# Patient Record
Sex: MALE | Race: BLACK OR AFRICAN AMERICAN | NOT HISPANIC OR LATINO | Employment: OTHER | URBAN - METROPOLITAN AREA
[De-identification: names, ages, dates, MRNs, and addresses within clinical notes are randomized per-mention and may not be internally consistent; named-entity substitution may affect disease eponyms.]

---

## 2021-01-04 ENCOUNTER — OFFICE VISIT (OUTPATIENT)
Dept: INTERNAL MEDICINE | Facility: CLINIC | Age: 66
End: 2021-01-04
Payer: MEDICARE

## 2021-01-04 ENCOUNTER — LAB VISIT (OUTPATIENT)
Dept: LAB | Facility: HOSPITAL | Age: 66
End: 2021-01-04
Attending: INTERNAL MEDICINE
Payer: MEDICARE

## 2021-01-04 VITALS
WEIGHT: 249.56 LBS | OXYGEN SATURATION: 97 % | HEART RATE: 58 BPM | TEMPERATURE: 98 F | BODY MASS INDEX: 40.11 KG/M2 | HEIGHT: 66 IN | SYSTOLIC BLOOD PRESSURE: 130 MMHG | DIASTOLIC BLOOD PRESSURE: 72 MMHG

## 2021-01-04 DIAGNOSIS — E78.5 HYPERLIPIDEMIA ASSOCIATED WITH TYPE 2 DIABETES MELLITUS: ICD-10-CM

## 2021-01-04 DIAGNOSIS — E11.69 HYPERLIPIDEMIA ASSOCIATED WITH TYPE 2 DIABETES MELLITUS: ICD-10-CM

## 2021-01-04 DIAGNOSIS — E55.9 VITAMIN D DEFICIENCY: ICD-10-CM

## 2021-01-04 DIAGNOSIS — E11.9 DIABETES MELLITUS WITHOUT COMPLICATION: ICD-10-CM

## 2021-01-04 DIAGNOSIS — Z00.00 ROUTINE GENERAL MEDICAL EXAMINATION AT A HEALTH CARE FACILITY: ICD-10-CM

## 2021-01-04 DIAGNOSIS — E66.01 MORBID OBESITY WITH BMI OF 40.0-44.9, ADULT: ICD-10-CM

## 2021-01-04 DIAGNOSIS — Z12.5 PROSTATE CANCER SCREENING: ICD-10-CM

## 2021-01-04 DIAGNOSIS — E11.59 HYPERTENSION ASSOCIATED WITH DIABETES: ICD-10-CM

## 2021-01-04 DIAGNOSIS — G56.02 CARPAL TUNNEL SYNDROME, LEFT: ICD-10-CM

## 2021-01-04 DIAGNOSIS — Z86.010 HISTORY OF COLON POLYPS: ICD-10-CM

## 2021-01-04 DIAGNOSIS — I15.2 HYPERTENSION ASSOCIATED WITH DIABETES: ICD-10-CM

## 2021-01-04 DIAGNOSIS — H26.9 CATARACT, UNSPECIFIED CATARACT TYPE, UNSPECIFIED LATERALITY: ICD-10-CM

## 2021-01-04 DIAGNOSIS — Z00.00 ROUTINE GENERAL MEDICAL EXAMINATION AT A HEALTH CARE FACILITY: Primary | ICD-10-CM

## 2021-01-04 LAB
BASOPHILS # BLD AUTO: 0.03 K/UL (ref 0–0.2)
BASOPHILS NFR BLD: 0.5 % (ref 0–1.9)
DIFFERENTIAL METHOD: ABNORMAL
EOSINOPHIL # BLD AUTO: 0.4 K/UL (ref 0–0.5)
EOSINOPHIL NFR BLD: 7.7 % (ref 0–8)
ERYTHROCYTE [DISTWIDTH] IN BLOOD BY AUTOMATED COUNT: 14.6 % (ref 11.5–14.5)
HCT VFR BLD AUTO: 45.9 % (ref 40–54)
HGB BLD-MCNC: 14.2 G/DL (ref 14–18)
IMM GRANULOCYTES # BLD AUTO: 0.02 K/UL (ref 0–0.04)
IMM GRANULOCYTES NFR BLD AUTO: 0.4 % (ref 0–0.5)
LYMPHOCYTES # BLD AUTO: 2.5 K/UL (ref 1–4.8)
LYMPHOCYTES NFR BLD: 44.4 % (ref 18–48)
MCH RBC QN AUTO: 28.7 PG (ref 27–31)
MCHC RBC AUTO-ENTMCNC: 30.9 G/DL (ref 32–36)
MCV RBC AUTO: 93 FL (ref 82–98)
MONOCYTES # BLD AUTO: 0.3 K/UL (ref 0.3–1)
MONOCYTES NFR BLD: 5.8 % (ref 4–15)
NEUTROPHILS # BLD AUTO: 2.4 K/UL (ref 1.8–7.7)
NEUTROPHILS NFR BLD: 41.2 % (ref 38–73)
NRBC BLD-RTO: 0 /100 WBC
PLATELET # BLD AUTO: 156 K/UL (ref 150–350)
PMV BLD AUTO: 13.3 FL (ref 9.2–12.9)
RBC # BLD AUTO: 4.94 M/UL (ref 4.6–6.2)
WBC # BLD AUTO: 5.7 K/UL (ref 3.9–12.7)

## 2021-01-04 PROCEDURE — 3078F DIAST BP <80 MM HG: CPT | Mod: HCNC,CPTII,S$GLB, | Performed by: INTERNAL MEDICINE

## 2021-01-04 PROCEDURE — 3288F PR FALLS RISK ASSESSMENT DOCUMENTED: ICD-10-PCS | Mod: HCNC,CPTII,S$GLB, | Performed by: INTERNAL MEDICINE

## 2021-01-04 PROCEDURE — 99999 PR PBB SHADOW E&M-EST. PATIENT-LVL IV: ICD-10-PCS | Mod: PBBFAC,HCNC,, | Performed by: INTERNAL MEDICINE

## 2021-01-04 PROCEDURE — 99999 PR PBB SHADOW E&M-EST. PATIENT-LVL IV: CPT | Mod: PBBFAC,HCNC,, | Performed by: INTERNAL MEDICINE

## 2021-01-04 PROCEDURE — 1125F AMNT PAIN NOTED PAIN PRSNT: CPT | Mod: HCNC,S$GLB,, | Performed by: INTERNAL MEDICINE

## 2021-01-04 PROCEDURE — 80061 LIPID PANEL: CPT | Mod: HCNC

## 2021-01-04 PROCEDURE — 99499 RISK ADDL DX/OHS AUDIT: ICD-10-PCS | Mod: HCNC,S$GLB,, | Performed by: INTERNAL MEDICINE

## 2021-01-04 PROCEDURE — 84443 ASSAY THYROID STIM HORMONE: CPT | Mod: HCNC

## 2021-01-04 PROCEDURE — 84153 ASSAY OF PSA TOTAL: CPT | Mod: HCNC

## 2021-01-04 PROCEDURE — 1125F PR PAIN SEVERITY QUANTIFIED, PAIN PRESENT: ICD-10-PCS | Mod: HCNC,S$GLB,, | Performed by: INTERNAL MEDICINE

## 2021-01-04 PROCEDURE — 80053 COMPREHEN METABOLIC PANEL: CPT | Mod: HCNC

## 2021-01-04 PROCEDURE — 83036 HEMOGLOBIN GLYCOSYLATED A1C: CPT | Mod: HCNC

## 2021-01-04 PROCEDURE — 3008F PR BODY MASS INDEX (BMI) DOCUMENTED: ICD-10-PCS | Mod: HCNC,CPTII,S$GLB, | Performed by: INTERNAL MEDICINE

## 2021-01-04 PROCEDURE — G0009 ADMIN PNEUMOCOCCAL VACCINE: HCPCS | Mod: HCNC,S$GLB,, | Performed by: INTERNAL MEDICINE

## 2021-01-04 PROCEDURE — G0009 PNEUMOCOCCAL CONJUGATE VACCINE 13-VALENT LESS THAN 5YO & GREATER THAN: ICD-10-PCS | Mod: HCNC,S$GLB,, | Performed by: INTERNAL MEDICINE

## 2021-01-04 PROCEDURE — 90670 PNEUMOCOCCAL CONJUGATE VACCINE 13-VALENT LESS THAN 5YO & GREATER THAN: ICD-10-PCS | Mod: HCNC,S$GLB,, | Performed by: INTERNAL MEDICINE

## 2021-01-04 PROCEDURE — 3008F BODY MASS INDEX DOCD: CPT | Mod: HCNC,CPTII,S$GLB, | Performed by: INTERNAL MEDICINE

## 2021-01-04 PROCEDURE — 99499 UNLISTED E&M SERVICE: CPT | Mod: HCNC,S$GLB,, | Performed by: INTERNAL MEDICINE

## 2021-01-04 PROCEDURE — 1101F PR PT FALLS ASSESS DOC 0-1 FALLS W/OUT INJ PAST YR: ICD-10-PCS | Mod: HCNC,CPTII,S$GLB, | Performed by: INTERNAL MEDICINE

## 2021-01-04 PROCEDURE — 99387 PR PREVENTIVE VISIT,NEW,65 & OVER: ICD-10-PCS | Mod: 25,HCNC,S$GLB, | Performed by: INTERNAL MEDICINE

## 2021-01-04 PROCEDURE — 3075F PR MOST RECENT SYSTOLIC BLOOD PRESS GE 130-139MM HG: ICD-10-PCS | Mod: HCNC,CPTII,S$GLB, | Performed by: INTERNAL MEDICINE

## 2021-01-04 PROCEDURE — 3288F FALL RISK ASSESSMENT DOCD: CPT | Mod: HCNC,CPTII,S$GLB, | Performed by: INTERNAL MEDICINE

## 2021-01-04 PROCEDURE — 1101F PT FALLS ASSESS-DOCD LE1/YR: CPT | Mod: HCNC,CPTII,S$GLB, | Performed by: INTERNAL MEDICINE

## 2021-01-04 PROCEDURE — 3075F SYST BP GE 130 - 139MM HG: CPT | Mod: HCNC,CPTII,S$GLB, | Performed by: INTERNAL MEDICINE

## 2021-01-04 PROCEDURE — 99387 INIT PM E/M NEW PAT 65+ YRS: CPT | Mod: 25,HCNC,S$GLB, | Performed by: INTERNAL MEDICINE

## 2021-01-04 PROCEDURE — 85025 COMPLETE CBC W/AUTO DIFF WBC: CPT | Mod: HCNC

## 2021-01-04 PROCEDURE — 3078F PR MOST RECENT DIASTOLIC BLOOD PRESSURE < 80 MM HG: ICD-10-PCS | Mod: HCNC,CPTII,S$GLB, | Performed by: INTERNAL MEDICINE

## 2021-01-04 PROCEDURE — 36415 COLL VENOUS BLD VENIPUNCTURE: CPT | Mod: HCNC,PO

## 2021-01-04 PROCEDURE — 90670 PCV13 VACCINE IM: CPT | Mod: HCNC,S$GLB,, | Performed by: INTERNAL MEDICINE

## 2021-01-04 RX ORDER — CHOLECALCIFEROL (VITAMIN D3) 125 MCG
220 CAPSULE ORAL DAILY
COMMUNITY
End: 2021-05-17

## 2021-01-04 RX ORDER — ASPIRIN 81 MG/1
81 TABLET ORAL DAILY
Status: ON HOLD | COMMUNITY
End: 2022-06-22 | Stop reason: HOSPADM

## 2021-01-04 RX ORDER — ATORVASTATIN CALCIUM 10 MG/1
10 TABLET, FILM COATED ORAL DAILY
COMMUNITY
Start: 2020-11-24 | End: 2021-11-05 | Stop reason: SDUPTHER

## 2021-01-04 RX ORDER — ERGOCALCIFEROL 1.25 MG/1
CAPSULE ORAL
COMMUNITY
Start: 2020-11-24 | End: 2021-04-12

## 2021-01-04 RX ORDER — METFORMIN HYDROCHLORIDE 1000 MG/1
1000 TABLET ORAL 2 TIMES DAILY WITH MEALS
COMMUNITY
Start: 2020-10-18 | End: 2021-05-03 | Stop reason: SDUPTHER

## 2021-01-04 RX ORDER — IRBESARTAN 150 MG/1
150 TABLET ORAL DAILY
COMMUNITY
Start: 2020-10-18 | End: 2021-05-03 | Stop reason: SDUPTHER

## 2021-01-05 ENCOUNTER — PATIENT MESSAGE (OUTPATIENT)
Dept: INTERNAL MEDICINE | Facility: CLINIC | Age: 66
End: 2021-01-05

## 2021-01-05 DIAGNOSIS — E83.51 HYPOCALCEMIA: Primary | ICD-10-CM

## 2021-01-05 DIAGNOSIS — E11.9 DIABETES MELLITUS WITHOUT COMPLICATION: ICD-10-CM

## 2021-01-05 LAB
ALBUMIN SERPL BCP-MCNC: 4.1 G/DL (ref 3.5–5.2)
ALP SERPL-CCNC: 82 U/L (ref 55–135)
ALT SERPL W/O P-5'-P-CCNC: 19 U/L (ref 10–44)
ANION GAP SERPL CALC-SCNC: 9 MMOL/L (ref 8–16)
AST SERPL-CCNC: 17 U/L (ref 10–40)
BILIRUB SERPL-MCNC: 0.4 MG/DL (ref 0.1–1)
BUN SERPL-MCNC: 16 MG/DL (ref 8–23)
CALCIUM SERPL-MCNC: 11 MG/DL (ref 8.7–10.5)
CHLORIDE SERPL-SCNC: 105 MMOL/L (ref 95–110)
CHOLEST SERPL-MCNC: 153 MG/DL (ref 120–199)
CHOLEST/HDLC SERPL: 4.8 {RATIO} (ref 2–5)
CO2 SERPL-SCNC: 27 MMOL/L (ref 23–29)
COMPLEXED PSA SERPL-MCNC: 0.73 NG/ML (ref 0–4)
CREAT SERPL-MCNC: 1.1 MG/DL (ref 0.5–1.4)
EST. GFR  (AFRICAN AMERICAN): >60 ML/MIN/1.73 M^2
EST. GFR  (NON AFRICAN AMERICAN): >60 ML/MIN/1.73 M^2
ESTIMATED AVG GLUCOSE: 177 MG/DL (ref 68–131)
GLUCOSE SERPL-MCNC: 125 MG/DL (ref 70–110)
HBA1C MFR BLD HPLC: 7.8 % (ref 4–5.6)
HDLC SERPL-MCNC: 32 MG/DL (ref 40–75)
HDLC SERPL: 20.9 % (ref 20–50)
LDLC SERPL CALC-MCNC: 80.4 MG/DL (ref 63–159)
NONHDLC SERPL-MCNC: 121 MG/DL
POTASSIUM SERPL-SCNC: 5.4 MMOL/L (ref 3.5–5.1)
PROT SERPL-MCNC: 7.4 G/DL (ref 6–8.4)
SODIUM SERPL-SCNC: 141 MMOL/L (ref 136–145)
TRIGL SERPL-MCNC: 203 MG/DL (ref 30–150)
TSH SERPL DL<=0.005 MIU/L-ACNC: 1.9 UIU/ML (ref 0.4–4)

## 2021-01-05 RX ORDER — EMPAGLIFLOZIN 10 MG/1
10 TABLET, FILM COATED ORAL DAILY
Qty: 90 TABLET | Refills: 3 | Status: SHIPPED | OUTPATIENT
Start: 2021-01-05 | End: 2021-04-12

## 2021-01-19 ENCOUNTER — TELEPHONE (OUTPATIENT)
Dept: ORTHOPEDICS | Facility: CLINIC | Age: 66
End: 2021-01-19

## 2021-01-19 ENCOUNTER — OFFICE VISIT (OUTPATIENT)
Dept: INTERNAL MEDICINE | Facility: CLINIC | Age: 66
End: 2021-01-19
Payer: MEDICARE

## 2021-01-19 VITALS
HEIGHT: 66 IN | TEMPERATURE: 98 F | SYSTOLIC BLOOD PRESSURE: 120 MMHG | OXYGEN SATURATION: 96 % | DIASTOLIC BLOOD PRESSURE: 74 MMHG | HEART RATE: 74 BPM | BODY MASS INDEX: 40.25 KG/M2 | WEIGHT: 250.44 LBS | RESPIRATION RATE: 20 BRPM

## 2021-01-19 DIAGNOSIS — E11.59 HYPERTENSION ASSOCIATED WITH DIABETES: ICD-10-CM

## 2021-01-19 DIAGNOSIS — E78.5 HYPERLIPIDEMIA ASSOCIATED WITH TYPE 2 DIABETES MELLITUS: ICD-10-CM

## 2021-01-19 DIAGNOSIS — E11.9 DIABETES MELLITUS WITHOUT COMPLICATION: Primary | ICD-10-CM

## 2021-01-19 DIAGNOSIS — G56.00 CARPAL TUNNEL SYNDROME, UNSPECIFIED LATERALITY: ICD-10-CM

## 2021-01-19 DIAGNOSIS — E11.69 HYPERLIPIDEMIA ASSOCIATED WITH TYPE 2 DIABETES MELLITUS: ICD-10-CM

## 2021-01-19 DIAGNOSIS — I15.2 HYPERTENSION ASSOCIATED WITH DIABETES: ICD-10-CM

## 2021-01-19 PROCEDURE — 3008F PR BODY MASS INDEX (BMI) DOCUMENTED: ICD-10-PCS | Mod: HCNC,CPTII,S$GLB, | Performed by: INTERNAL MEDICINE

## 2021-01-19 PROCEDURE — 3078F DIAST BP <80 MM HG: CPT | Mod: HCNC,CPTII,S$GLB, | Performed by: INTERNAL MEDICINE

## 2021-01-19 PROCEDURE — 3051F PR MOST RECENT HEMOGLOBIN A1C LEVEL 7.0 - < 8.0%: ICD-10-PCS | Mod: HCNC,CPTII,S$GLB, | Performed by: INTERNAL MEDICINE

## 2021-01-19 PROCEDURE — 3074F SYST BP LT 130 MM HG: CPT | Mod: HCNC,CPTII,S$GLB, | Performed by: INTERNAL MEDICINE

## 2021-01-19 PROCEDURE — 99999 PR PBB SHADOW E&M-EST. PATIENT-LVL IV: CPT | Mod: PBBFAC,HCNC,, | Performed by: INTERNAL MEDICINE

## 2021-01-19 PROCEDURE — 3078F PR MOST RECENT DIASTOLIC BLOOD PRESSURE < 80 MM HG: ICD-10-PCS | Mod: HCNC,CPTII,S$GLB, | Performed by: INTERNAL MEDICINE

## 2021-01-19 PROCEDURE — 1125F PR PAIN SEVERITY QUANTIFIED, PAIN PRESENT: ICD-10-PCS | Mod: HCNC,S$GLB,, | Performed by: INTERNAL MEDICINE

## 2021-01-19 PROCEDURE — 99213 OFFICE O/P EST LOW 20 MIN: CPT | Mod: HCNC,S$GLB,, | Performed by: INTERNAL MEDICINE

## 2021-01-19 PROCEDURE — 99999 PR PBB SHADOW E&M-EST. PATIENT-LVL IV: ICD-10-PCS | Mod: PBBFAC,HCNC,, | Performed by: INTERNAL MEDICINE

## 2021-01-19 PROCEDURE — 3051F HG A1C>EQUAL 7.0%<8.0%: CPT | Mod: HCNC,CPTII,S$GLB, | Performed by: INTERNAL MEDICINE

## 2021-01-19 PROCEDURE — 3008F BODY MASS INDEX DOCD: CPT | Mod: HCNC,CPTII,S$GLB, | Performed by: INTERNAL MEDICINE

## 2021-01-19 PROCEDURE — 99213 PR OFFICE/OUTPT VISIT, EST, LEVL III, 20-29 MIN: ICD-10-PCS | Mod: HCNC,S$GLB,, | Performed by: INTERNAL MEDICINE

## 2021-01-19 PROCEDURE — 1125F AMNT PAIN NOTED PAIN PRSNT: CPT | Mod: HCNC,S$GLB,, | Performed by: INTERNAL MEDICINE

## 2021-01-19 PROCEDURE — 3074F PR MOST RECENT SYSTOLIC BLOOD PRESSURE < 130 MM HG: ICD-10-PCS | Mod: HCNC,CPTII,S$GLB, | Performed by: INTERNAL MEDICINE

## 2021-01-20 ENCOUNTER — TELEPHONE (OUTPATIENT)
Dept: ORTHOPEDICS | Facility: CLINIC | Age: 66
End: 2021-01-20

## 2021-01-20 ENCOUNTER — OFFICE VISIT (OUTPATIENT)
Dept: ORTHOPEDICS | Facility: CLINIC | Age: 66
End: 2021-01-20
Payer: MEDICARE

## 2021-01-20 VITALS
HEART RATE: 71 BPM | BODY MASS INDEX: 40.18 KG/M2 | WEIGHT: 250 LBS | DIASTOLIC BLOOD PRESSURE: 78 MMHG | HEIGHT: 66 IN | SYSTOLIC BLOOD PRESSURE: 124 MMHG

## 2021-01-20 DIAGNOSIS — G56.22 CUBITAL TUNNEL SYNDROME ON LEFT: Primary | ICD-10-CM

## 2021-01-20 DIAGNOSIS — G56.02 CARPAL TUNNEL SYNDROME OF LEFT WRIST: ICD-10-CM

## 2021-01-20 DIAGNOSIS — Z01.818 PREOP TESTING: Primary | ICD-10-CM

## 2021-01-20 DIAGNOSIS — G56.02 CARPAL TUNNEL SYNDROME OF LEFT WRIST: Primary | ICD-10-CM

## 2021-01-20 DIAGNOSIS — G56.22 CUBITAL TUNNEL SYNDROME ON LEFT: ICD-10-CM

## 2021-01-20 PROCEDURE — 1126F AMNT PAIN NOTED NONE PRSNT: CPT | Mod: HCNC,S$GLB,, | Performed by: ORTHOPAEDIC SURGERY

## 2021-01-20 PROCEDURE — 1126F PR PAIN SEVERITY QUANTIFIED, NO PAIN PRESENT: ICD-10-PCS | Mod: HCNC,S$GLB,, | Performed by: ORTHOPAEDIC SURGERY

## 2021-01-20 PROCEDURE — 3078F PR MOST RECENT DIASTOLIC BLOOD PRESSURE < 80 MM HG: ICD-10-PCS | Mod: HCNC,CPTII,S$GLB, | Performed by: ORTHOPAEDIC SURGERY

## 2021-01-20 PROCEDURE — 99999 PR PBB SHADOW E&M-EST. PATIENT-LVL III: ICD-10-PCS | Mod: PBBFAC,HCNC,, | Performed by: ORTHOPAEDIC SURGERY

## 2021-01-20 PROCEDURE — 99203 PR OFFICE/OUTPT VISIT, NEW, LEVL III, 30-44 MIN: ICD-10-PCS | Mod: HCNC,S$GLB,, | Performed by: ORTHOPAEDIC SURGERY

## 2021-01-20 PROCEDURE — 3078F DIAST BP <80 MM HG: CPT | Mod: HCNC,CPTII,S$GLB, | Performed by: ORTHOPAEDIC SURGERY

## 2021-01-20 PROCEDURE — 99999 PR PBB SHADOW E&M-EST. PATIENT-LVL III: CPT | Mod: PBBFAC,HCNC,, | Performed by: ORTHOPAEDIC SURGERY

## 2021-01-20 PROCEDURE — 1101F PT FALLS ASSESS-DOCD LE1/YR: CPT | Mod: HCNC,CPTII,S$GLB, | Performed by: ORTHOPAEDIC SURGERY

## 2021-01-20 PROCEDURE — 3288F PR FALLS RISK ASSESSMENT DOCUMENTED: ICD-10-PCS | Mod: HCNC,CPTII,S$GLB, | Performed by: ORTHOPAEDIC SURGERY

## 2021-01-20 PROCEDURE — 1101F PR PT FALLS ASSESS DOC 0-1 FALLS W/OUT INJ PAST YR: ICD-10-PCS | Mod: HCNC,CPTII,S$GLB, | Performed by: ORTHOPAEDIC SURGERY

## 2021-01-20 PROCEDURE — 3288F FALL RISK ASSESSMENT DOCD: CPT | Mod: HCNC,CPTII,S$GLB, | Performed by: ORTHOPAEDIC SURGERY

## 2021-01-20 PROCEDURE — 3008F BODY MASS INDEX DOCD: CPT | Mod: HCNC,CPTII,S$GLB, | Performed by: ORTHOPAEDIC SURGERY

## 2021-01-20 PROCEDURE — 99203 OFFICE O/P NEW LOW 30 MIN: CPT | Mod: HCNC,S$GLB,, | Performed by: ORTHOPAEDIC SURGERY

## 2021-01-20 PROCEDURE — 3008F PR BODY MASS INDEX (BMI) DOCUMENTED: ICD-10-PCS | Mod: HCNC,CPTII,S$GLB, | Performed by: ORTHOPAEDIC SURGERY

## 2021-01-20 PROCEDURE — 3074F SYST BP LT 130 MM HG: CPT | Mod: HCNC,CPTII,S$GLB, | Performed by: ORTHOPAEDIC SURGERY

## 2021-01-20 PROCEDURE — 3074F PR MOST RECENT SYSTOLIC BLOOD PRESSURE < 130 MM HG: ICD-10-PCS | Mod: HCNC,CPTII,S$GLB, | Performed by: ORTHOPAEDIC SURGERY

## 2021-01-25 DIAGNOSIS — Z01.818 PREOP TESTING: Primary | ICD-10-CM

## 2021-02-11 ENCOUNTER — PATIENT OUTREACH (OUTPATIENT)
Dept: ADMINISTRATIVE | Facility: HOSPITAL | Age: 66
End: 2021-02-11

## 2021-02-22 ENCOUNTER — PATIENT OUTREACH (OUTPATIENT)
Dept: ADMINISTRATIVE | Facility: OTHER | Age: 66
End: 2021-02-22

## 2021-02-23 ENCOUNTER — OFFICE VISIT (OUTPATIENT)
Dept: OPHTHALMOLOGY | Facility: CLINIC | Age: 66
End: 2021-02-23
Payer: MEDICARE

## 2021-02-23 DIAGNOSIS — E11.9 TYPE 2 DIABETES MELLITUS WITHOUT COMPLICATION, WITHOUT LONG-TERM CURRENT USE OF INSULIN: Primary | ICD-10-CM

## 2021-02-23 DIAGNOSIS — E11.36 DIABETIC CATARACT OF RIGHT EYE: ICD-10-CM

## 2021-02-23 DIAGNOSIS — H52.7 REFRACTIVE DISORDER: ICD-10-CM

## 2021-02-23 PROCEDURE — 99999 PR PBB SHADOW E&M-EST. PATIENT-LVL II: ICD-10-PCS | Mod: PBBFAC,,, | Performed by: STUDENT IN AN ORGANIZED HEALTH CARE EDUCATION/TRAINING PROGRAM

## 2021-02-23 PROCEDURE — 99999 PR PBB SHADOW E&M-EST. PATIENT-LVL II: CPT | Mod: PBBFAC,,, | Performed by: STUDENT IN AN ORGANIZED HEALTH CARE EDUCATION/TRAINING PROGRAM

## 2021-02-23 PROCEDURE — 92004 PR EYE EXAM, NEW PATIENT,COMPREHESV: ICD-10-PCS | Mod: S$GLB,,, | Performed by: STUDENT IN AN ORGANIZED HEALTH CARE EDUCATION/TRAINING PROGRAM

## 2021-02-23 PROCEDURE — 2023F PR DILATED RETINAL EXAM W/O EVID OF RETINOPATHY: ICD-10-PCS | Mod: S$GLB,,, | Performed by: STUDENT IN AN ORGANIZED HEALTH CARE EDUCATION/TRAINING PROGRAM

## 2021-02-23 PROCEDURE — 2023F DILAT RTA XM W/O RTNOPTHY: CPT | Mod: S$GLB,,, | Performed by: STUDENT IN AN ORGANIZED HEALTH CARE EDUCATION/TRAINING PROGRAM

## 2021-02-23 PROCEDURE — 92004 COMPRE OPH EXAM NEW PT 1/>: CPT | Mod: S$GLB,,, | Performed by: STUDENT IN AN ORGANIZED HEALTH CARE EDUCATION/TRAINING PROGRAM

## 2021-03-01 ENCOUNTER — HOSPITAL ENCOUNTER (OUTPATIENT)
Dept: CARDIOLOGY | Facility: HOSPITAL | Age: 66
Discharge: HOME | End: 2021-03-01
Attending: ORTHOPAEDIC SURGERY
Payer: MEDICARE

## 2021-03-01 ENCOUNTER — HOSPITAL ENCOUNTER (OUTPATIENT)
Dept: RADIOLOGY | Facility: HOSPITAL | Age: 66
Discharge: HOME | End: 2021-03-01
Attending: ORTHOPAEDIC SURGERY
Payer: MEDICARE

## 2021-03-01 DIAGNOSIS — Z01.818 PREOP TESTING: ICD-10-CM

## 2021-03-01 PROCEDURE — 71046 X-RAY EXAM CHEST 2 VIEWS: CPT | Mod: TC

## 2021-03-01 PROCEDURE — 93005 ELECTROCARDIOGRAM TRACING: CPT

## 2021-03-01 PROCEDURE — 71046 XR CHEST PA AND LATERAL PRE-OP: ICD-10-PCS | Mod: 26,,, | Performed by: RADIOLOGY

## 2021-03-01 PROCEDURE — 93010 ELECTROCARDIOGRAM REPORT: CPT | Mod: ,,, | Performed by: INTERNAL MEDICINE

## 2021-03-01 PROCEDURE — 93010 EKG 12-LEAD: ICD-10-PCS | Mod: ,,, | Performed by: INTERNAL MEDICINE

## 2021-03-01 PROCEDURE — 71046 X-RAY EXAM CHEST 2 VIEWS: CPT | Mod: 26,,, | Performed by: RADIOLOGY

## 2021-03-23 ENCOUNTER — HOSPITAL ENCOUNTER (OUTPATIENT)
Dept: PREADMISSION TESTING | Facility: HOSPITAL | Age: 66
Discharge: HOME | End: 2021-03-23
Attending: ORTHOPAEDIC SURGERY
Payer: MEDICARE

## 2021-03-23 ENCOUNTER — OFFICE VISIT (OUTPATIENT)
Dept: CARDIOLOGY | Facility: CLINIC | Age: 66
End: 2021-03-23
Payer: MEDICARE

## 2021-03-23 VITALS
SYSTOLIC BLOOD PRESSURE: 140 MMHG | TEMPERATURE: 98 F | OXYGEN SATURATION: 99 % | RESPIRATION RATE: 16 BRPM | DIASTOLIC BLOOD PRESSURE: 68 MMHG | HEART RATE: 60 BPM

## 2021-03-23 VITALS
OXYGEN SATURATION: 100 % | WEIGHT: 252 LBS | BODY MASS INDEX: 40.5 KG/M2 | HEIGHT: 66 IN | SYSTOLIC BLOOD PRESSURE: 128 MMHG | DIASTOLIC BLOOD PRESSURE: 80 MMHG | HEART RATE: 58 BPM

## 2021-03-23 DIAGNOSIS — Z01.810 PREOP CARDIOVASCULAR EXAM: Primary | ICD-10-CM

## 2021-03-23 DIAGNOSIS — E11.69 HYPERLIPIDEMIA ASSOCIATED WITH TYPE 2 DIABETES MELLITUS: ICD-10-CM

## 2021-03-23 DIAGNOSIS — I15.2 HYPERTENSION ASSOCIATED WITH DIABETES: ICD-10-CM

## 2021-03-23 DIAGNOSIS — E11.9 DIABETES MELLITUS WITHOUT COMPLICATION: ICD-10-CM

## 2021-03-23 DIAGNOSIS — E11.59 HYPERTENSION ASSOCIATED WITH DIABETES: ICD-10-CM

## 2021-03-23 DIAGNOSIS — E78.5 HYPERLIPIDEMIA ASSOCIATED WITH TYPE 2 DIABETES MELLITUS: ICD-10-CM

## 2021-03-23 DIAGNOSIS — G56.02 LEFT CARPAL TUNNEL SYNDROME: ICD-10-CM

## 2021-03-23 DIAGNOSIS — E83.52 HYPERCALCEMIA: ICD-10-CM

## 2021-03-23 DIAGNOSIS — E66.01 MORBID OBESITY WITH BMI OF 40.0-44.9, ADULT: ICD-10-CM

## 2021-03-23 DIAGNOSIS — E55.9 VITAMIN D DEFICIENCY: ICD-10-CM

## 2021-03-23 DIAGNOSIS — R94.31 ABNORMAL EKG: ICD-10-CM

## 2021-03-23 DIAGNOSIS — D64.9 ANEMIA, UNSPECIFIED TYPE: ICD-10-CM

## 2021-03-23 PROCEDURE — 1126F PR PAIN SEVERITY QUANTIFIED, NO PAIN PRESENT: ICD-10-PCS | Mod: S$GLB,,, | Performed by: INTERNAL MEDICINE

## 2021-03-23 PROCEDURE — 3074F SYST BP LT 130 MM HG: CPT | Mod: CPTII,S$GLB,, | Performed by: INTERNAL MEDICINE

## 2021-03-23 PROCEDURE — 99999 PR PBB SHADOW E&M-EST. PATIENT-LVL III: CPT | Mod: PBBFAC,,, | Performed by: INTERNAL MEDICINE

## 2021-03-23 PROCEDURE — 3079F DIAST BP 80-89 MM HG: CPT | Mod: CPTII,S$GLB,, | Performed by: INTERNAL MEDICINE

## 2021-03-23 PROCEDURE — 3008F BODY MASS INDEX DOCD: CPT | Mod: CPTII,S$GLB,, | Performed by: INTERNAL MEDICINE

## 2021-03-23 PROCEDURE — 99204 OFFICE O/P NEW MOD 45 MIN: CPT | Mod: S$GLB,,, | Performed by: INTERNAL MEDICINE

## 2021-03-23 PROCEDURE — 1126F AMNT PAIN NOTED NONE PRSNT: CPT | Mod: S$GLB,,, | Performed by: INTERNAL MEDICINE

## 2021-03-23 PROCEDURE — 3051F HG A1C>EQUAL 7.0%<8.0%: CPT | Mod: CPTII,S$GLB,, | Performed by: INTERNAL MEDICINE

## 2021-03-23 PROCEDURE — 3074F PR MOST RECENT SYSTOLIC BLOOD PRESSURE < 130 MM HG: ICD-10-PCS | Mod: CPTII,S$GLB,, | Performed by: INTERNAL MEDICINE

## 2021-03-23 PROCEDURE — 99999 PR PBB SHADOW E&M-EST. PATIENT-LVL III: ICD-10-PCS | Mod: PBBFAC,,, | Performed by: INTERNAL MEDICINE

## 2021-03-23 PROCEDURE — 99204 PR OFFICE/OUTPT VISIT, NEW, LEVL IV, 45-59 MIN: ICD-10-PCS | Mod: S$GLB,,, | Performed by: INTERNAL MEDICINE

## 2021-03-23 PROCEDURE — 3008F PR BODY MASS INDEX (BMI) DOCUMENTED: ICD-10-PCS | Mod: CPTII,S$GLB,, | Performed by: INTERNAL MEDICINE

## 2021-03-23 PROCEDURE — 3079F PR MOST RECENT DIASTOLIC BLOOD PRESSURE 80-89 MM HG: ICD-10-PCS | Mod: CPTII,S$GLB,, | Performed by: INTERNAL MEDICINE

## 2021-03-23 PROCEDURE — 3051F PR MOST RECENT HEMOGLOBIN A1C LEVEL 7.0 - < 8.0%: ICD-10-PCS | Mod: CPTII,S$GLB,, | Performed by: INTERNAL MEDICINE

## 2021-03-29 ENCOUNTER — LAB VISIT (OUTPATIENT)
Dept: OTOLARYNGOLOGY | Facility: CLINIC | Age: 66
End: 2021-03-29
Payer: MEDICARE

## 2021-03-29 ENCOUNTER — TELEPHONE (OUTPATIENT)
Dept: ORTHOPEDICS | Facility: CLINIC | Age: 66
End: 2021-03-29

## 2021-03-29 DIAGNOSIS — Z01.818 PREOP TESTING: ICD-10-CM

## 2021-03-29 PROCEDURE — U0003 INFECTIOUS AGENT DETECTION BY NUCLEIC ACID (DNA OR RNA); SEVERE ACUTE RESPIRATORY SYNDROME CORONAVIRUS 2 (SARS-COV-2) (CORONAVIRUS DISEASE [COVID-19]), AMPLIFIED PROBE TECHNIQUE, MAKING USE OF HIGH THROUGHPUT TECHNOLOGIES AS DESCRIBED BY CMS-2020-01-R: HCPCS | Performed by: ORTHOPAEDIC SURGERY

## 2021-03-29 PROCEDURE — U0005 INFEC AGEN DETEC AMPLI PROBE: HCPCS | Performed by: ORTHOPAEDIC SURGERY

## 2021-03-30 LAB — SARS-COV-2 RNA RESP QL NAA+PROBE: NOT DETECTED

## 2021-03-31 ENCOUNTER — TELEPHONE (OUTPATIENT)
Dept: PREADMISSION TESTING | Facility: HOSPITAL | Age: 66
End: 2021-03-31

## 2021-03-31 ENCOUNTER — ANESTHESIA EVENT (OUTPATIENT)
Dept: SURGERY | Facility: HOSPITAL | Age: 66
End: 2021-03-31
Payer: MEDICARE

## 2021-04-01 ENCOUNTER — ANESTHESIA (OUTPATIENT)
Dept: SURGERY | Facility: HOSPITAL | Age: 66
End: 2021-04-01
Payer: MEDICARE

## 2021-04-01 ENCOUNTER — HOSPITAL ENCOUNTER (OUTPATIENT)
Facility: HOSPITAL | Age: 66
Discharge: HOME | End: 2021-04-01
Attending: ORTHOPAEDIC SURGERY | Admitting: ORTHOPAEDIC SURGERY
Payer: MEDICARE

## 2021-04-01 DIAGNOSIS — G56.22 CUBITAL TUNNEL SYNDROME ON LEFT: ICD-10-CM

## 2021-04-01 DIAGNOSIS — G56.02 LEFT CARPAL TUNNEL SYNDROME: ICD-10-CM

## 2021-04-01 DIAGNOSIS — G56.02 CARPAL TUNNEL SYNDROME OF LEFT WRIST: Primary | ICD-10-CM

## 2021-04-01 LAB
POCT GLUCOSE: 105 MG/DL (ref 70–110)
POCT GLUCOSE: 117 MG/DL (ref 70–110)

## 2021-04-01 PROCEDURE — 71000015 HC POSTOP RECOV 1ST HR: Performed by: ORTHOPAEDIC SURGERY

## 2021-04-01 PROCEDURE — 82962 GLUCOSE BLOOD TEST: CPT | Mod: 91 | Performed by: ORTHOPAEDIC SURGERY

## 2021-04-01 PROCEDURE — 25000003 PHARM REV CODE 250: Performed by: NURSE ANESTHETIST, CERTIFIED REGISTERED

## 2021-04-01 PROCEDURE — 63600175 PHARM REV CODE 636 W HCPCS: Performed by: PHYSICIAN ASSISTANT

## 2021-04-01 PROCEDURE — 63600175 PHARM REV CODE 636 W HCPCS: Performed by: ANESTHESIOLOGY

## 2021-04-01 PROCEDURE — 64721 PR REVISE MEDIAN N/CARPAL TUNNEL SURG: ICD-10-PCS | Mod: 51,LT,, | Performed by: ORTHOPAEDIC SURGERY

## 2021-04-01 PROCEDURE — 71000033 HC RECOVERY, INTIAL HOUR: Performed by: ORTHOPAEDIC SURGERY

## 2021-04-01 PROCEDURE — 36000707: Performed by: ORTHOPAEDIC SURGERY

## 2021-04-01 PROCEDURE — 63600175 PHARM REV CODE 636 W HCPCS: Performed by: NURSE ANESTHETIST, CERTIFIED REGISTERED

## 2021-04-01 PROCEDURE — 64718 REVISE ULNAR NERVE AT ELBOW: CPT | Mod: LT,,, | Performed by: ORTHOPAEDIC SURGERY

## 2021-04-01 PROCEDURE — 64721 CARPAL TUNNEL SURGERY: CPT | Mod: 51,LT,, | Performed by: ORTHOPAEDIC SURGERY

## 2021-04-01 PROCEDURE — D9220A PRA ANESTHESIA: Mod: ,,, | Performed by: ANESTHESIOLOGY

## 2021-04-01 PROCEDURE — 37000008 HC ANESTHESIA 1ST 15 MINUTES: Performed by: ORTHOPAEDIC SURGERY

## 2021-04-01 PROCEDURE — 64718 PR REVISE ULNAR NERVE AT ELBOW: ICD-10-PCS | Mod: LT,,, | Performed by: ORTHOPAEDIC SURGERY

## 2021-04-01 PROCEDURE — 36000706: Performed by: ORTHOPAEDIC SURGERY

## 2021-04-01 PROCEDURE — 82962 GLUCOSE BLOOD TEST: CPT | Performed by: ORTHOPAEDIC SURGERY

## 2021-04-01 PROCEDURE — D9220A PRA ANESTHESIA: ICD-10-PCS | Mod: ,,, | Performed by: ANESTHESIOLOGY

## 2021-04-01 PROCEDURE — 37000009 HC ANESTHESIA EA ADD 15 MINS: Performed by: ORTHOPAEDIC SURGERY

## 2021-04-01 PROCEDURE — 25000003 PHARM REV CODE 250: Performed by: ORTHOPAEDIC SURGERY

## 2021-04-01 RX ORDER — DIPHENHYDRAMINE HYDROCHLORIDE 50 MG/ML
25 INJECTION INTRAMUSCULAR; INTRAVENOUS EVERY 6 HOURS PRN
Status: DISCONTINUED | OUTPATIENT
Start: 2021-04-01 | End: 2021-04-01 | Stop reason: HOSPADM

## 2021-04-01 RX ORDER — LIDOCAINE HYDROCHLORIDE 20 MG/ML
INJECTION, SOLUTION INFILTRATION; PERINEURAL
Status: DISCONTINUED
Start: 2021-04-01 | End: 2021-04-01 | Stop reason: WASHOUT

## 2021-04-01 RX ORDER — HYDROCODONE BITARTRATE AND ACETAMINOPHEN 5; 325 MG/1; MG/1
1 TABLET ORAL EVERY 6 HOURS PRN
Qty: 28 TABLET | Refills: 0 | Status: SHIPPED | OUTPATIENT
Start: 2021-04-01 | End: 2021-05-17

## 2021-04-01 RX ORDER — FENTANYL CITRATE 50 UG/ML
INJECTION, SOLUTION INTRAMUSCULAR; INTRAVENOUS
Status: DISCONTINUED | OUTPATIENT
Start: 2021-04-01 | End: 2021-04-01

## 2021-04-01 RX ORDER — HYDROCODONE BITARTRATE AND ACETAMINOPHEN 5; 325 MG/1; MG/1
1 TABLET ORAL EVERY 4 HOURS PRN
Status: DISCONTINUED | OUTPATIENT
Start: 2021-04-01 | End: 2021-04-01 | Stop reason: HOSPADM

## 2021-04-01 RX ORDER — MIDAZOLAM HYDROCHLORIDE 1 MG/ML
INJECTION INTRAMUSCULAR; INTRAVENOUS
Status: DISCONTINUED | OUTPATIENT
Start: 2021-04-01 | End: 2021-04-01

## 2021-04-01 RX ORDER — HYDROCODONE BITARTRATE AND ACETAMINOPHEN 5; 325 MG/1; MG/1
1 TABLET ORAL
Status: DISCONTINUED | OUTPATIENT
Start: 2021-04-01 | End: 2021-04-01 | Stop reason: HOSPADM

## 2021-04-01 RX ORDER — SODIUM CHLORIDE, SODIUM LACTATE, POTASSIUM CHLORIDE, CALCIUM CHLORIDE 600; 310; 30; 20 MG/100ML; MG/100ML; MG/100ML; MG/100ML
INJECTION, SOLUTION INTRAVENOUS
Status: DISCONTINUED | OUTPATIENT
Start: 2021-04-01 | End: 2021-08-12

## 2021-04-01 RX ORDER — PROPOFOL 10 MG/ML
VIAL (ML) INTRAVENOUS
Status: DISCONTINUED | OUTPATIENT
Start: 2021-04-01 | End: 2021-04-01

## 2021-04-01 RX ORDER — BUPIVACAINE HYDROCHLORIDE 2.5 MG/ML
INJECTION, SOLUTION EPIDURAL; INFILTRATION; INTRACAUDAL
Status: DISCONTINUED | OUTPATIENT
Start: 2021-04-01 | End: 2021-04-01 | Stop reason: HOSPADM

## 2021-04-01 RX ORDER — BUPIVACAINE HYDROCHLORIDE 2.5 MG/ML
INJECTION, SOLUTION EPIDURAL; INFILTRATION; INTRACAUDAL
Status: DISCONTINUED
Start: 2021-04-01 | End: 2021-04-01 | Stop reason: HOSPADM

## 2021-04-01 RX ORDER — ONDANSETRON 2 MG/ML
4 INJECTION INTRAMUSCULAR; INTRAVENOUS ONCE AS NEEDED
Status: DISCONTINUED | OUTPATIENT
Start: 2021-04-01 | End: 2021-04-01 | Stop reason: HOSPADM

## 2021-04-01 RX ORDER — CHLORHEXIDINE GLUCONATE ORAL RINSE 1.2 MG/ML
10 SOLUTION DENTAL 2 TIMES DAILY
Status: DISCONTINUED | OUTPATIENT
Start: 2021-04-01 | End: 2021-04-01 | Stop reason: HOSPADM

## 2021-04-01 RX ORDER — MEPERIDINE HYDROCHLORIDE 25 MG/ML
12.5 INJECTION INTRAMUSCULAR; INTRAVENOUS; SUBCUTANEOUS ONCE
Status: DISCONTINUED | OUTPATIENT
Start: 2021-04-01 | End: 2021-04-01 | Stop reason: HOSPADM

## 2021-04-01 RX ORDER — FENTANYL CITRATE 50 UG/ML
25 INJECTION, SOLUTION INTRAMUSCULAR; INTRAVENOUS EVERY 5 MIN PRN
Status: DISCONTINUED | OUTPATIENT
Start: 2021-04-01 | End: 2021-04-01 | Stop reason: HOSPADM

## 2021-04-01 RX ORDER — CEFAZOLIN SODIUM 2 G/50ML
2 SOLUTION INTRAVENOUS
Status: COMPLETED | OUTPATIENT
Start: 2021-04-01 | End: 2021-04-01

## 2021-04-01 RX ORDER — SODIUM CHLORIDE, SODIUM LACTATE, POTASSIUM CHLORIDE, CALCIUM CHLORIDE 600; 310; 30; 20 MG/100ML; MG/100ML; MG/100ML; MG/100ML
INJECTION, SOLUTION INTRAVENOUS CONTINUOUS
Status: DISCONTINUED | OUTPATIENT
Start: 2021-04-01 | End: 2021-04-01 | Stop reason: HOSPADM

## 2021-04-01 RX ORDER — KETOROLAC TROMETHAMINE 30 MG/ML
INJECTION, SOLUTION INTRAMUSCULAR; INTRAVENOUS
Status: DISCONTINUED | OUTPATIENT
Start: 2021-04-01 | End: 2021-04-01

## 2021-04-01 RX ORDER — ONDANSETRON 2 MG/ML
INJECTION INTRAMUSCULAR; INTRAVENOUS
Status: DISCONTINUED | OUTPATIENT
Start: 2021-04-01 | End: 2021-04-01

## 2021-04-01 RX ORDER — ALBUTEROL SULFATE 0.83 MG/ML
2.5 SOLUTION RESPIRATORY (INHALATION) EVERY 4 HOURS PRN
Status: DISCONTINUED | OUTPATIENT
Start: 2021-04-01 | End: 2021-04-01 | Stop reason: HOSPADM

## 2021-04-01 RX ORDER — LIDOCAINE HYDROCHLORIDE 20 MG/ML
INJECTION, SOLUTION EPIDURAL; INFILTRATION; INTRACAUDAL; PERINEURAL
Status: DISCONTINUED | OUTPATIENT
Start: 2021-04-01 | End: 2021-04-01

## 2021-04-01 RX ADMIN — FENTANYL CITRATE 75 MCG: 50 INJECTION, SOLUTION INTRAMUSCULAR; INTRAVENOUS at 07:04

## 2021-04-01 RX ADMIN — CEFAZOLIN SODIUM 2 G: 2 SOLUTION INTRAVENOUS at 07:04

## 2021-04-01 RX ADMIN — SODIUM CHLORIDE, SODIUM LACTATE, POTASSIUM CHLORIDE, AND CALCIUM CHLORIDE: .6; .31; .03; .02 INJECTION, SOLUTION INTRAVENOUS at 06:04

## 2021-04-01 RX ADMIN — ONDANSETRON 4 MG: 2 INJECTION, SOLUTION INTRAMUSCULAR; INTRAVENOUS at 07:04

## 2021-04-01 RX ADMIN — PROPOFOL 200 MG: 10 INJECTION, EMULSION INTRAVENOUS at 07:04

## 2021-04-01 RX ADMIN — MIDAZOLAM HYDROCHLORIDE 2 MG: 1 INJECTION INTRAMUSCULAR; INTRAVENOUS at 06:04

## 2021-04-01 RX ADMIN — LIDOCAINE HYDROCHLORIDE 50 MG: 20 INJECTION, SOLUTION EPIDURAL; INFILTRATION; INTRACAUDAL; PERINEURAL at 07:04

## 2021-04-01 RX ADMIN — KETOROLAC TROMETHAMINE 30 MG: 30 INJECTION, SOLUTION INTRAMUSCULAR at 07:04

## 2021-04-05 ENCOUNTER — LAB VISIT (OUTPATIENT)
Dept: LAB | Facility: HOSPITAL | Age: 66
End: 2021-04-05
Attending: INTERNAL MEDICINE
Payer: MEDICARE

## 2021-04-05 ENCOUNTER — OFFICE VISIT (OUTPATIENT)
Dept: ORTHOPEDICS | Facility: CLINIC | Age: 66
End: 2021-04-05
Payer: MEDICARE

## 2021-04-05 VITALS
SYSTOLIC BLOOD PRESSURE: 131 MMHG | DIASTOLIC BLOOD PRESSURE: 84 MMHG | BODY MASS INDEX: 39.05 KG/M2 | HEART RATE: 66 BPM | WEIGHT: 243 LBS | HEIGHT: 66 IN

## 2021-04-05 DIAGNOSIS — G56.02 CARPAL TUNNEL SYNDROME OF LEFT WRIST: Primary | ICD-10-CM

## 2021-04-05 DIAGNOSIS — Z09 POSTOP CHECK: ICD-10-CM

## 2021-04-05 DIAGNOSIS — E83.51 HYPOCALCEMIA: ICD-10-CM

## 2021-04-05 DIAGNOSIS — E55.9 VITAMIN D DEFICIENCY: ICD-10-CM

## 2021-04-05 DIAGNOSIS — G56.22 CUBITAL TUNNEL SYNDROME ON LEFT: ICD-10-CM

## 2021-04-05 DIAGNOSIS — E11.9 DIABETES MELLITUS WITHOUT COMPLICATION: ICD-10-CM

## 2021-04-05 PROCEDURE — 99999 PR PBB SHADOW E&M-EST. PATIENT-LVL III: ICD-10-PCS | Mod: PBBFAC,,, | Performed by: PHYSICIAN ASSISTANT

## 2021-04-05 PROCEDURE — 83970 ASSAY OF PARATHORMONE: CPT | Performed by: INTERNAL MEDICINE

## 2021-04-05 PROCEDURE — 99999 PR PBB SHADOW E&M-EST. PATIENT-LVL III: CPT | Mod: PBBFAC,,, | Performed by: PHYSICIAN ASSISTANT

## 2021-04-05 PROCEDURE — 99024 POSTOP FOLLOW-UP VISIT: CPT | Mod: POP,,, | Performed by: PHYSICIAN ASSISTANT

## 2021-04-05 PROCEDURE — 36415 COLL VENOUS BLD VENIPUNCTURE: CPT | Mod: PO | Performed by: INTERNAL MEDICINE

## 2021-04-05 PROCEDURE — 80061 LIPID PANEL: CPT | Performed by: INTERNAL MEDICINE

## 2021-04-05 PROCEDURE — 80053 COMPREHEN METABOLIC PANEL: CPT | Performed by: INTERNAL MEDICINE

## 2021-04-05 PROCEDURE — 83036 HEMOGLOBIN GLYCOSYLATED A1C: CPT | Performed by: INTERNAL MEDICINE

## 2021-04-05 PROCEDURE — 99024 PR POST-OP FOLLOW-UP VISIT: ICD-10-PCS | Mod: POP,,, | Performed by: PHYSICIAN ASSISTANT

## 2021-04-05 PROCEDURE — 82330 ASSAY OF CALCIUM: CPT | Performed by: INTERNAL MEDICINE

## 2021-04-05 PROCEDURE — 99213 OFFICE O/P EST LOW 20 MIN: CPT | Mod: PBBFAC | Performed by: PHYSICIAN ASSISTANT

## 2021-04-05 PROCEDURE — 82306 VITAMIN D 25 HYDROXY: CPT | Performed by: INTERNAL MEDICINE

## 2021-04-05 PROCEDURE — 85025 COMPLETE CBC W/AUTO DIFF WBC: CPT | Performed by: INTERNAL MEDICINE

## 2021-04-05 PROCEDURE — 84100 ASSAY OF PHOSPHORUS: CPT | Performed by: INTERNAL MEDICINE

## 2021-04-05 PROCEDURE — 84443 ASSAY THYROID STIM HORMONE: CPT | Performed by: INTERNAL MEDICINE

## 2021-04-06 LAB
25(OH)D3+25(OH)D2 SERPL-MCNC: 19 NG/ML (ref 30–96)
ALBUMIN SERPL BCP-MCNC: 4.1 G/DL (ref 3.5–5.2)
ALP SERPL-CCNC: 79 U/L (ref 55–135)
ALT SERPL W/O P-5'-P-CCNC: 22 U/L (ref 10–44)
ANION GAP SERPL CALC-SCNC: 10 MMOL/L (ref 8–16)
ANION GAP SERPL CALC-SCNC: 10 MMOL/L (ref 8–16)
AST SERPL-CCNC: 22 U/L (ref 10–40)
BASOPHILS # BLD AUTO: 0.04 K/UL (ref 0–0.2)
BASOPHILS NFR BLD: 0.7 % (ref 0–1.9)
BILIRUB SERPL-MCNC: 0.6 MG/DL (ref 0.1–1)
BUN SERPL-MCNC: 10 MG/DL (ref 8–23)
BUN SERPL-MCNC: 10 MG/DL (ref 8–23)
CA-I BLDV-SCNC: 1.58 MMOL/L (ref 1.06–1.42)
CALCIUM SERPL-MCNC: 11.5 MG/DL (ref 8.7–10.5)
CALCIUM SERPL-MCNC: 11.5 MG/DL (ref 8.7–10.5)
CHLORIDE SERPL-SCNC: 102 MMOL/L (ref 95–110)
CHLORIDE SERPL-SCNC: 102 MMOL/L (ref 95–110)
CHOLEST SERPL-MCNC: 126 MG/DL (ref 120–199)
CHOLEST SERPL-MCNC: 126 MG/DL (ref 120–199)
CHOLEST/HDLC SERPL: 3.8 {RATIO} (ref 2–5)
CHOLEST/HDLC SERPL: 3.8 {RATIO} (ref 2–5)
CO2 SERPL-SCNC: 28 MMOL/L (ref 23–29)
CO2 SERPL-SCNC: 28 MMOL/L (ref 23–29)
CREAT SERPL-MCNC: 1.1 MG/DL (ref 0.5–1.4)
CREAT SERPL-MCNC: 1.1 MG/DL (ref 0.5–1.4)
DIFFERENTIAL METHOD: ABNORMAL
EOSINOPHIL # BLD AUTO: 0.3 K/UL (ref 0–0.5)
EOSINOPHIL NFR BLD: 4.9 % (ref 0–8)
ERYTHROCYTE [DISTWIDTH] IN BLOOD BY AUTOMATED COUNT: 14.4 % (ref 11.5–14.5)
EST. GFR  (AFRICAN AMERICAN): >60 ML/MIN/1.73 M^2
EST. GFR  (AFRICAN AMERICAN): >60 ML/MIN/1.73 M^2
EST. GFR  (NON AFRICAN AMERICAN): >60 ML/MIN/1.73 M^2
EST. GFR  (NON AFRICAN AMERICAN): >60 ML/MIN/1.73 M^2
ESTIMATED AVG GLUCOSE: 186 MG/DL (ref 68–131)
GLUCOSE SERPL-MCNC: 135 MG/DL (ref 70–110)
GLUCOSE SERPL-MCNC: 135 MG/DL (ref 70–110)
HBA1C MFR BLD: 8.1 % (ref 4–5.6)
HCT VFR BLD AUTO: 44.7 % (ref 40–54)
HDLC SERPL-MCNC: 33 MG/DL (ref 40–75)
HDLC SERPL-MCNC: 33 MG/DL (ref 40–75)
HDLC SERPL: 26.2 % (ref 20–50)
HDLC SERPL: 26.2 % (ref 20–50)
HGB BLD-MCNC: 14.1 G/DL (ref 14–18)
IMM GRANULOCYTES # BLD AUTO: 0.01 K/UL (ref 0–0.04)
IMM GRANULOCYTES NFR BLD AUTO: 0.2 % (ref 0–0.5)
LDLC SERPL CALC-MCNC: 61 MG/DL (ref 63–159)
LDLC SERPL CALC-MCNC: 61 MG/DL (ref 63–159)
LYMPHOCYTES # BLD AUTO: 2.6 K/UL (ref 1–4.8)
LYMPHOCYTES NFR BLD: 43 % (ref 18–48)
MCH RBC QN AUTO: 29.3 PG (ref 27–31)
MCHC RBC AUTO-ENTMCNC: 31.5 G/DL (ref 32–36)
MCV RBC AUTO: 93 FL (ref 82–98)
MONOCYTES # BLD AUTO: 0.3 K/UL (ref 0.3–1)
MONOCYTES NFR BLD: 5.5 % (ref 4–15)
NEUTROPHILS # BLD AUTO: 2.8 K/UL (ref 1.8–7.7)
NEUTROPHILS NFR BLD: 45.7 % (ref 38–73)
NONHDLC SERPL-MCNC: 93 MG/DL
NONHDLC SERPL-MCNC: 93 MG/DL
NRBC BLD-RTO: 0 /100 WBC
PHOSPHATE SERPL-MCNC: 2.4 MG/DL (ref 2.7–4.5)
PLATELET # BLD AUTO: 153 K/UL (ref 150–450)
PMV BLD AUTO: 13.5 FL (ref 9.2–12.9)
POTASSIUM SERPL-SCNC: 4.9 MMOL/L (ref 3.5–5.1)
POTASSIUM SERPL-SCNC: 4.9 MMOL/L (ref 3.5–5.1)
PROT SERPL-MCNC: 7.5 G/DL (ref 6–8.4)
PTH-INTACT SERPL-MCNC: 254 PG/ML (ref 9–77)
RBC # BLD AUTO: 4.81 M/UL (ref 4.6–6.2)
SODIUM SERPL-SCNC: 140 MMOL/L (ref 136–145)
SODIUM SERPL-SCNC: 140 MMOL/L (ref 136–145)
TRIGL SERPL-MCNC: 160 MG/DL (ref 30–150)
TRIGL SERPL-MCNC: 160 MG/DL (ref 30–150)
TSH SERPL DL<=0.005 MIU/L-ACNC: 2.5 UIU/ML (ref 0.4–4)
WBC # BLD AUTO: 6.14 K/UL (ref 3.9–12.7)

## 2021-04-12 ENCOUNTER — OFFICE VISIT (OUTPATIENT)
Dept: INTERNAL MEDICINE | Facility: CLINIC | Age: 66
End: 2021-04-12
Payer: MEDICARE

## 2021-04-12 VITALS
DIASTOLIC BLOOD PRESSURE: 80 MMHG | HEART RATE: 65 BPM | SYSTOLIC BLOOD PRESSURE: 130 MMHG | BODY MASS INDEX: 40.74 KG/M2 | OXYGEN SATURATION: 97 % | HEIGHT: 66 IN | WEIGHT: 253.5 LBS

## 2021-04-12 DIAGNOSIS — E11.69 HYPERLIPIDEMIA ASSOCIATED WITH TYPE 2 DIABETES MELLITUS: ICD-10-CM

## 2021-04-12 DIAGNOSIS — E78.5 HYPERLIPIDEMIA ASSOCIATED WITH TYPE 2 DIABETES MELLITUS: ICD-10-CM

## 2021-04-12 DIAGNOSIS — E66.01 MORBID OBESITY WITH BMI OF 40.0-44.9, ADULT: ICD-10-CM

## 2021-04-12 DIAGNOSIS — D64.9 ANEMIA, UNSPECIFIED TYPE: ICD-10-CM

## 2021-04-12 DIAGNOSIS — E11.59 HYPERTENSION ASSOCIATED WITH DIABETES: ICD-10-CM

## 2021-04-12 DIAGNOSIS — I15.2 HYPERTENSION ASSOCIATED WITH DIABETES: ICD-10-CM

## 2021-04-12 DIAGNOSIS — E11.9 DIABETES MELLITUS WITHOUT COMPLICATION: ICD-10-CM

## 2021-04-12 DIAGNOSIS — E83.52 HYPERCALCEMIA: Primary | ICD-10-CM

## 2021-04-12 DIAGNOSIS — E55.9 VITAMIN D DEFICIENCY: ICD-10-CM

## 2021-04-12 PROCEDURE — 99214 PR OFFICE/OUTPT VISIT, EST, LEVL IV, 30-39 MIN: ICD-10-PCS | Mod: S$PBB,,, | Performed by: INTERNAL MEDICINE

## 2021-04-12 PROCEDURE — 99999 PR PBB SHADOW E&M-EST. PATIENT-LVL III: ICD-10-PCS | Mod: PBBFAC,,, | Performed by: INTERNAL MEDICINE

## 2021-04-12 PROCEDURE — 99499 UNLISTED E&M SERVICE: CPT | Mod: S$PBB,,, | Performed by: INTERNAL MEDICINE

## 2021-04-12 PROCEDURE — 99499 RISK ADDL DX/OHS AUDIT: ICD-10-PCS | Mod: S$PBB,,, | Performed by: INTERNAL MEDICINE

## 2021-04-12 PROCEDURE — 99999 PR PBB SHADOW E&M-EST. PATIENT-LVL III: CPT | Mod: PBBFAC,,, | Performed by: INTERNAL MEDICINE

## 2021-04-12 PROCEDURE — 99213 OFFICE O/P EST LOW 20 MIN: CPT | Mod: PBBFAC,PO | Performed by: INTERNAL MEDICINE

## 2021-04-12 PROCEDURE — 99214 OFFICE O/P EST MOD 30 MIN: CPT | Mod: S$PBB,,, | Performed by: INTERNAL MEDICINE

## 2021-04-12 RX ORDER — ERGOCALCIFEROL 1.25 MG/1
50000 CAPSULE ORAL
Qty: 13 CAPSULE | Refills: 3 | Status: SHIPPED | OUTPATIENT
Start: 2021-04-12 | End: 2021-05-17 | Stop reason: SDUPTHER

## 2021-04-12 RX ORDER — EMPAGLIFLOZIN 10 MG/1
10 TABLET, FILM COATED ORAL DAILY
Qty: 90 TABLET | Refills: 3 | Status: SHIPPED | OUTPATIENT
Start: 2021-04-12 | End: 2021-07-12

## 2021-04-15 ENCOUNTER — OFFICE VISIT (OUTPATIENT)
Dept: ORTHOPEDICS | Facility: CLINIC | Age: 66
End: 2021-04-15
Payer: MEDICARE

## 2021-04-15 VITALS
HEART RATE: 58 BPM | SYSTOLIC BLOOD PRESSURE: 119 MMHG | BODY MASS INDEX: 40.66 KG/M2 | HEIGHT: 66 IN | DIASTOLIC BLOOD PRESSURE: 73 MMHG | WEIGHT: 253 LBS

## 2021-04-15 DIAGNOSIS — G56.02 CARPAL TUNNEL SYNDROME OF LEFT WRIST: Primary | ICD-10-CM

## 2021-04-15 DIAGNOSIS — G56.22 CUBITAL TUNNEL SYNDROME ON LEFT: ICD-10-CM

## 2021-04-15 DIAGNOSIS — Z09 POSTOP CHECK: ICD-10-CM

## 2021-04-15 PROCEDURE — 99024 POSTOP FOLLOW-UP VISIT: CPT | Mod: ,,, | Performed by: PHYSICIAN ASSISTANT

## 2021-04-15 PROCEDURE — 99999 PR PBB SHADOW E&M-EST. PATIENT-LVL III: CPT | Mod: PBBFAC,,, | Performed by: PHYSICIAN ASSISTANT

## 2021-04-15 PROCEDURE — 99213 OFFICE O/P EST LOW 20 MIN: CPT | Mod: PBBFAC | Performed by: PHYSICIAN ASSISTANT

## 2021-04-15 PROCEDURE — 99999 PR PBB SHADOW E&M-EST. PATIENT-LVL III: ICD-10-PCS | Mod: PBBFAC,,, | Performed by: PHYSICIAN ASSISTANT

## 2021-04-15 PROCEDURE — 99024 PR POST-OP FOLLOW-UP VISIT: ICD-10-PCS | Mod: ,,, | Performed by: PHYSICIAN ASSISTANT

## 2021-05-03 RX ORDER — IRBESARTAN 150 MG/1
150 TABLET ORAL DAILY
Qty: 90 TABLET | Refills: 3 | Status: SHIPPED | OUTPATIENT
Start: 2021-05-03 | End: 2022-08-20

## 2021-05-03 RX ORDER — METFORMIN HYDROCHLORIDE 1000 MG/1
1000 TABLET ORAL 2 TIMES DAILY WITH MEALS
Qty: 90 TABLET | Refills: 3 | Status: SHIPPED | OUTPATIENT
Start: 2021-05-03 | End: 2022-07-13

## 2021-05-05 ENCOUNTER — HOSPITAL ENCOUNTER (OUTPATIENT)
Dept: RADIOLOGY | Facility: HOSPITAL | Age: 66
Discharge: HOME | End: 2021-05-05
Attending: INTERNAL MEDICINE
Payer: MEDICARE

## 2021-05-05 DIAGNOSIS — E83.52 HYPERCALCEMIA: ICD-10-CM

## 2021-05-05 PROCEDURE — 78071 NM PARATHYROID SCAN WITH SPECT ROUTINE: ICD-10-PCS | Mod: 26,,, | Performed by: RADIOLOGY

## 2021-05-05 PROCEDURE — 78071 PARATHYRD PLANAR W/WO SUBTRJ: CPT | Mod: TC

## 2021-05-05 PROCEDURE — 78071 PARATHYRD PLANAR W/WO SUBTRJ: CPT | Mod: 26,,, | Performed by: RADIOLOGY

## 2021-05-05 PROCEDURE — A9500 TC99M SESTAMIBI: HCPCS

## 2021-05-07 ENCOUNTER — PATIENT MESSAGE (OUTPATIENT)
Dept: INTERNAL MEDICINE | Facility: CLINIC | Age: 66
End: 2021-05-07

## 2021-05-07 DIAGNOSIS — E21.3 HYPERPARATHYROIDISM: ICD-10-CM

## 2021-05-07 DIAGNOSIS — E21.0 PARATHYROID HYPERPLASIA: Primary | ICD-10-CM

## 2021-05-11 PROBLEM — E21.0 PRIMARY HYPERPARATHYROIDISM: Status: ACTIVE | Noted: 2021-05-11

## 2021-05-12 ENCOUNTER — PATIENT MESSAGE (OUTPATIENT)
Dept: INTERNAL MEDICINE | Facility: CLINIC | Age: 66
End: 2021-05-12

## 2021-05-12 ENCOUNTER — PATIENT OUTREACH (OUTPATIENT)
Dept: ADMINISTRATIVE | Facility: OTHER | Age: 66
End: 2021-05-12

## 2021-05-14 ENCOUNTER — LAB VISIT (OUTPATIENT)
Dept: LAB | Facility: HOSPITAL | Age: 66
End: 2021-05-14
Attending: OTOLARYNGOLOGY
Payer: MEDICARE

## 2021-05-14 ENCOUNTER — OFFICE VISIT (OUTPATIENT)
Dept: OTOLARYNGOLOGY | Facility: CLINIC | Age: 66
End: 2021-05-14
Payer: MEDICARE

## 2021-05-14 VITALS
HEART RATE: 65 BPM | HEIGHT: 66 IN | BODY MASS INDEX: 39.12 KG/M2 | WEIGHT: 243.38 LBS | DIASTOLIC BLOOD PRESSURE: 75 MMHG | TEMPERATURE: 98 F | SYSTOLIC BLOOD PRESSURE: 117 MMHG

## 2021-05-14 DIAGNOSIS — E21.0 PRIMARY HYPERPARATHYROIDISM: ICD-10-CM

## 2021-05-14 DIAGNOSIS — E21.0 PRIMARY HYPERPARATHYROIDISM: Primary | ICD-10-CM

## 2021-05-14 LAB
CREAT SERPL-MCNC: 1.1 MG/DL (ref 0.5–1.4)
EST. GFR  (AFRICAN AMERICAN): >60 ML/MIN/1.73 M^2
EST. GFR  (NON AFRICAN AMERICAN): >60 ML/MIN/1.73 M^2

## 2021-05-14 PROCEDURE — 99999 PR PBB SHADOW E&M-EST. PATIENT-LVL IV: ICD-10-PCS | Mod: PBBFAC,,, | Performed by: OTOLARYNGOLOGY

## 2021-05-14 PROCEDURE — 36415 COLL VENOUS BLD VENIPUNCTURE: CPT | Performed by: OTOLARYNGOLOGY

## 2021-05-14 PROCEDURE — 99214 OFFICE O/P EST MOD 30 MIN: CPT | Mod: PBBFAC | Performed by: OTOLARYNGOLOGY

## 2021-05-14 PROCEDURE — 99204 OFFICE O/P NEW MOD 45 MIN: CPT | Mod: S$GLB,,, | Performed by: OTOLARYNGOLOGY

## 2021-05-14 PROCEDURE — 99999 PR PBB SHADOW E&M-EST. PATIENT-LVL IV: CPT | Mod: PBBFAC,,, | Performed by: OTOLARYNGOLOGY

## 2021-05-14 PROCEDURE — 99204 PR OFFICE/OUTPT VISIT, NEW, LEVL IV, 45-59 MIN: ICD-10-PCS | Mod: S$GLB,,, | Performed by: OTOLARYNGOLOGY

## 2021-05-14 PROCEDURE — 82565 ASSAY OF CREATININE: CPT | Performed by: OTOLARYNGOLOGY

## 2021-05-17 ENCOUNTER — OFFICE VISIT (OUTPATIENT)
Dept: INTERNAL MEDICINE | Facility: CLINIC | Age: 66
End: 2021-05-17
Payer: MEDICARE

## 2021-05-17 ENCOUNTER — TELEPHONE (OUTPATIENT)
Dept: RADIOLOGY | Facility: HOSPITAL | Age: 66
End: 2021-05-17

## 2021-05-17 VITALS
TEMPERATURE: 98 F | BODY MASS INDEX: 39.72 KG/M2 | HEART RATE: 72 BPM | HEIGHT: 66 IN | SYSTOLIC BLOOD PRESSURE: 124 MMHG | OXYGEN SATURATION: 98 % | WEIGHT: 247.13 LBS | DIASTOLIC BLOOD PRESSURE: 72 MMHG

## 2021-05-17 DIAGNOSIS — M54.50 LUMBAR BACK PAIN: Primary | ICD-10-CM

## 2021-05-17 PROCEDURE — 99999 PR PBB SHADOW E&M-EST. PATIENT-LVL III: CPT | Mod: PBBFAC,,, | Performed by: INTERNAL MEDICINE

## 2021-05-17 PROCEDURE — 99213 PR OFFICE/OUTPT VISIT, EST, LEVL III, 20-29 MIN: ICD-10-PCS | Mod: S$PBB,,, | Performed by: INTERNAL MEDICINE

## 2021-05-17 PROCEDURE — 99213 OFFICE O/P EST LOW 20 MIN: CPT | Mod: S$PBB,,, | Performed by: INTERNAL MEDICINE

## 2021-05-17 PROCEDURE — 99213 OFFICE O/P EST LOW 20 MIN: CPT | Mod: PBBFAC,PO | Performed by: INTERNAL MEDICINE

## 2021-05-17 PROCEDURE — 99999 PR PBB SHADOW E&M-EST. PATIENT-LVL III: ICD-10-PCS | Mod: PBBFAC,,, | Performed by: INTERNAL MEDICINE

## 2021-05-17 RX ORDER — ERGOCALCIFEROL 1.25 MG/1
50000 CAPSULE ORAL
Qty: 13 CAPSULE | Refills: 3 | Status: SHIPPED | OUTPATIENT
Start: 2021-05-17 | End: 2022-05-18

## 2021-05-17 RX ORDER — MELOXICAM 15 MG/1
15 TABLET ORAL DAILY
Qty: 30 TABLET | Refills: 1 | Status: SHIPPED | OUTPATIENT
Start: 2021-05-17 | End: 2021-07-21

## 2021-05-17 RX ORDER — CYCLOBENZAPRINE HCL 10 MG
10 TABLET ORAL 3 TIMES DAILY PRN
Qty: 60 TABLET | Refills: 1 | Status: SHIPPED | OUTPATIENT
Start: 2021-05-17 | End: 2021-05-27

## 2021-05-18 ENCOUNTER — TELEPHONE (OUTPATIENT)
Dept: RADIOLOGY | Facility: HOSPITAL | Age: 66
End: 2021-05-18

## 2021-05-19 ENCOUNTER — HOSPITAL ENCOUNTER (OUTPATIENT)
Dept: RADIOLOGY | Facility: HOSPITAL | Age: 66
Discharge: HOME | End: 2021-05-19
Attending: OTOLARYNGOLOGY
Payer: MEDICARE

## 2021-05-19 DIAGNOSIS — E21.0 PRIMARY HYPERPARATHYROIDISM: ICD-10-CM

## 2021-05-19 PROCEDURE — 76536 US EXAM OF HEAD AND NECK: CPT | Mod: 26,,, | Performed by: RADIOLOGY

## 2021-05-19 PROCEDURE — 76536 US SOFT TISSUE HEAD NECK THYROID: ICD-10-PCS | Mod: 26,,, | Performed by: RADIOLOGY

## 2021-05-19 PROCEDURE — 76536 US EXAM OF HEAD AND NECK: CPT | Mod: TC

## 2021-05-19 PROCEDURE — 70492 CT SFT TSUE NCK W/O & W/DYE: CPT | Mod: TC

## 2021-05-19 PROCEDURE — 70492 CT NECK PARATHYROID (4D): ICD-10-PCS | Mod: 26,,, | Performed by: RADIOLOGY

## 2021-05-19 PROCEDURE — 25500020 PHARM REV CODE 255: Performed by: OTOLARYNGOLOGY

## 2021-05-19 PROCEDURE — 70492 CT SFT TSUE NCK W/O & W/DYE: CPT | Mod: 26,,, | Performed by: RADIOLOGY

## 2021-05-19 RX ADMIN — IOHEXOL 100 ML: 350 INJECTION, SOLUTION INTRAVENOUS at 03:05

## 2021-05-20 ENCOUNTER — OFFICE VISIT (OUTPATIENT)
Dept: OTOLARYNGOLOGY | Facility: CLINIC | Age: 66
End: 2021-05-20
Payer: MEDICARE

## 2021-05-20 VITALS
BODY MASS INDEX: 39.78 KG/M2 | DIASTOLIC BLOOD PRESSURE: 69 MMHG | SYSTOLIC BLOOD PRESSURE: 114 MMHG | HEART RATE: 59 BPM | TEMPERATURE: 98 F | WEIGHT: 246.5 LBS

## 2021-05-20 DIAGNOSIS — E21.0 PRIMARY HYPERPARATHYROIDISM: Primary | ICD-10-CM

## 2021-05-20 DIAGNOSIS — J98.59 MEDIASTINAL MASS: ICD-10-CM

## 2021-05-20 PROCEDURE — 99999 PR PBB SHADOW E&M-EST. PATIENT-LVL V: ICD-10-PCS | Mod: PBBFAC,,, | Performed by: OTOLARYNGOLOGY

## 2021-05-20 PROCEDURE — 99215 OFFICE O/P EST HI 40 MIN: CPT | Mod: PBBFAC | Performed by: OTOLARYNGOLOGY

## 2021-05-20 PROCEDURE — 99214 PR OFFICE/OUTPT VISIT, EST, LEVL IV, 30-39 MIN: ICD-10-PCS | Mod: S$GLB,,, | Performed by: OTOLARYNGOLOGY

## 2021-05-20 PROCEDURE — 99999 PR PBB SHADOW E&M-EST. PATIENT-LVL V: CPT | Mod: PBBFAC,,, | Performed by: OTOLARYNGOLOGY

## 2021-05-20 PROCEDURE — 99214 OFFICE O/P EST MOD 30 MIN: CPT | Mod: S$GLB,,, | Performed by: OTOLARYNGOLOGY

## 2021-06-01 ENCOUNTER — TELEPHONE (OUTPATIENT)
Dept: OTOLARYNGOLOGY | Facility: CLINIC | Age: 66
End: 2021-06-01

## 2021-06-02 VITALS
TEMPERATURE: 98 F | HEART RATE: 63 BPM | RESPIRATION RATE: 16 BRPM | HEIGHT: 66 IN | OXYGEN SATURATION: 98 % | DIASTOLIC BLOOD PRESSURE: 82 MMHG | WEIGHT: 243.5 LBS | BODY MASS INDEX: 39.13 KG/M2 | SYSTOLIC BLOOD PRESSURE: 128 MMHG

## 2021-06-08 ENCOUNTER — OFFICE VISIT (OUTPATIENT)
Dept: CARDIOTHORACIC SURGERY | Facility: CLINIC | Age: 66
End: 2021-06-08
Payer: MEDICARE

## 2021-06-08 VITALS
DIASTOLIC BLOOD PRESSURE: 67 MMHG | HEART RATE: 72 BPM | SYSTOLIC BLOOD PRESSURE: 111 MMHG | TEMPERATURE: 98 F | WEIGHT: 241.19 LBS | BODY MASS INDEX: 38.93 KG/M2

## 2021-06-08 DIAGNOSIS — D35.1 PARATHYROID ADENOMA: Primary | ICD-10-CM

## 2021-06-08 PROCEDURE — 99202 PR OFFICE/OUTPT VISIT, NEW, LEVL II, 15-29 MIN: ICD-10-PCS | Mod: S$GLB,,, | Performed by: THORACIC SURGERY (CARDIOTHORACIC VASCULAR SURGERY)

## 2021-06-08 PROCEDURE — 3008F PR BODY MASS INDEX (BMI) DOCUMENTED: ICD-10-PCS | Mod: CPTII,S$GLB,, | Performed by: THORACIC SURGERY (CARDIOTHORACIC VASCULAR SURGERY)

## 2021-06-08 PROCEDURE — 1101F PT FALLS ASSESS-DOCD LE1/YR: CPT | Mod: CPTII,S$GLB,, | Performed by: THORACIC SURGERY (CARDIOTHORACIC VASCULAR SURGERY)

## 2021-06-08 PROCEDURE — 3008F BODY MASS INDEX DOCD: CPT | Mod: CPTII,S$GLB,, | Performed by: THORACIC SURGERY (CARDIOTHORACIC VASCULAR SURGERY)

## 2021-06-08 PROCEDURE — 3288F PR FALLS RISK ASSESSMENT DOCUMENTED: ICD-10-PCS | Mod: CPTII,S$GLB,, | Performed by: THORACIC SURGERY (CARDIOTHORACIC VASCULAR SURGERY)

## 2021-06-08 PROCEDURE — 99202 OFFICE O/P NEW SF 15 MIN: CPT | Mod: S$GLB,,, | Performed by: THORACIC SURGERY (CARDIOTHORACIC VASCULAR SURGERY)

## 2021-06-08 PROCEDURE — 1126F AMNT PAIN NOTED NONE PRSNT: CPT | Mod: S$GLB,,, | Performed by: THORACIC SURGERY (CARDIOTHORACIC VASCULAR SURGERY)

## 2021-06-08 PROCEDURE — 3288F FALL RISK ASSESSMENT DOCD: CPT | Mod: CPTII,S$GLB,, | Performed by: THORACIC SURGERY (CARDIOTHORACIC VASCULAR SURGERY)

## 2021-06-08 PROCEDURE — 99999 PR PBB SHADOW E&M-EST. PATIENT-LVL III: CPT | Mod: PBBFAC,,, | Performed by: THORACIC SURGERY (CARDIOTHORACIC VASCULAR SURGERY)

## 2021-06-08 PROCEDURE — 99999 PR PBB SHADOW E&M-EST. PATIENT-LVL III: ICD-10-PCS | Mod: PBBFAC,,, | Performed by: THORACIC SURGERY (CARDIOTHORACIC VASCULAR SURGERY)

## 2021-06-08 PROCEDURE — 1126F PR PAIN SEVERITY QUANTIFIED, NO PAIN PRESENT: ICD-10-PCS | Mod: S$GLB,,, | Performed by: THORACIC SURGERY (CARDIOTHORACIC VASCULAR SURGERY)

## 2021-06-08 PROCEDURE — 1101F PR PT FALLS ASSESS DOC 0-1 FALLS W/OUT INJ PAST YR: ICD-10-PCS | Mod: CPTII,S$GLB,, | Performed by: THORACIC SURGERY (CARDIOTHORACIC VASCULAR SURGERY)

## 2021-07-06 ENCOUNTER — LAB VISIT (OUTPATIENT)
Dept: LAB | Facility: HOSPITAL | Age: 66
End: 2021-07-06
Attending: INTERNAL MEDICINE
Payer: MEDICARE

## 2021-07-06 DIAGNOSIS — E11.9 DIABETES MELLITUS WITHOUT COMPLICATION: ICD-10-CM

## 2021-07-06 LAB
ANION GAP SERPL CALC-SCNC: 7 MMOL/L (ref 8–16)
BUN SERPL-MCNC: 14 MG/DL (ref 8–23)
CALCIUM SERPL-MCNC: 10.9 MG/DL (ref 8.7–10.5)
CHLORIDE SERPL-SCNC: 105 MMOL/L (ref 95–110)
CHOLEST SERPL-MCNC: 210 MG/DL (ref 120–199)
CHOLEST/HDLC SERPL: 5.7 {RATIO} (ref 2–5)
CO2 SERPL-SCNC: 24 MMOL/L (ref 23–29)
CREAT SERPL-MCNC: 0.9 MG/DL (ref 0.5–1.4)
EST. GFR  (AFRICAN AMERICAN): >60 ML/MIN/1.73 M^2
EST. GFR  (NON AFRICAN AMERICAN): >60 ML/MIN/1.73 M^2
GLUCOSE SERPL-MCNC: 123 MG/DL (ref 70–110)
HDLC SERPL-MCNC: 37 MG/DL (ref 40–75)
HDLC SERPL: 17.6 % (ref 20–50)
LDLC SERPL CALC-MCNC: 119.8 MG/DL (ref 63–159)
NONHDLC SERPL-MCNC: 173 MG/DL
POTASSIUM SERPL-SCNC: 4.9 MMOL/L (ref 3.5–5.1)
SODIUM SERPL-SCNC: 136 MMOL/L (ref 136–145)
TRIGL SERPL-MCNC: 266 MG/DL (ref 30–150)

## 2021-07-06 PROCEDURE — 80048 BASIC METABOLIC PNL TOTAL CA: CPT | Performed by: INTERNAL MEDICINE

## 2021-07-06 PROCEDURE — 36415 COLL VENOUS BLD VENIPUNCTURE: CPT | Mod: PO | Performed by: INTERNAL MEDICINE

## 2021-07-06 PROCEDURE — 83036 HEMOGLOBIN GLYCOSYLATED A1C: CPT | Performed by: INTERNAL MEDICINE

## 2021-07-06 PROCEDURE — 80061 LIPID PANEL: CPT | Performed by: INTERNAL MEDICINE

## 2021-07-07 LAB
ESTIMATED AVG GLUCOSE: 171 MG/DL (ref 68–131)
HBA1C MFR BLD: 7.6 % (ref 4–5.6)

## 2021-07-09 ENCOUNTER — ANESTHESIA EVENT (OUTPATIENT)
Dept: SURGERY | Facility: HOSPITAL | Age: 66
DRG: 627 | End: 2021-07-09
Payer: MEDICARE

## 2021-07-12 ENCOUNTER — OFFICE VISIT (OUTPATIENT)
Dept: INTERNAL MEDICINE | Facility: CLINIC | Age: 66
End: 2021-07-12
Payer: MEDICARE

## 2021-07-12 VITALS
BODY MASS INDEX: 38.17 KG/M2 | TEMPERATURE: 99 F | HEART RATE: 62 BPM | WEIGHT: 243.19 LBS | SYSTOLIC BLOOD PRESSURE: 120 MMHG | DIASTOLIC BLOOD PRESSURE: 70 MMHG | OXYGEN SATURATION: 98 % | HEIGHT: 67 IN | RESPIRATION RATE: 20 BRPM

## 2021-07-12 DIAGNOSIS — E11.59 HYPERTENSION ASSOCIATED WITH DIABETES: ICD-10-CM

## 2021-07-12 DIAGNOSIS — E11.69 HYPERLIPIDEMIA ASSOCIATED WITH TYPE 2 DIABETES MELLITUS: Primary | ICD-10-CM

## 2021-07-12 DIAGNOSIS — M54.50 LUMBAR BACK PAIN: ICD-10-CM

## 2021-07-12 DIAGNOSIS — E78.5 HYPERLIPIDEMIA ASSOCIATED WITH TYPE 2 DIABETES MELLITUS: Primary | ICD-10-CM

## 2021-07-12 DIAGNOSIS — E11.9 DIABETES MELLITUS WITHOUT COMPLICATION: ICD-10-CM

## 2021-07-12 DIAGNOSIS — E55.9 VITAMIN D DEFICIENCY: ICD-10-CM

## 2021-07-12 DIAGNOSIS — I15.2 HYPERTENSION ASSOCIATED WITH DIABETES: ICD-10-CM

## 2021-07-12 DIAGNOSIS — E21.0 PRIMARY HYPERPARATHYROIDISM: ICD-10-CM

## 2021-07-12 PROCEDURE — 3051F PR MOST RECENT HEMOGLOBIN A1C LEVEL 7.0 - < 8.0%: ICD-10-PCS | Mod: CPTII,S$GLB,, | Performed by: INTERNAL MEDICINE

## 2021-07-12 PROCEDURE — 99214 OFFICE O/P EST MOD 30 MIN: CPT | Mod: S$GLB,,, | Performed by: INTERNAL MEDICINE

## 2021-07-12 PROCEDURE — 3008F BODY MASS INDEX DOCD: CPT | Mod: CPTII,S$GLB,, | Performed by: INTERNAL MEDICINE

## 2021-07-12 PROCEDURE — 3051F HG A1C>EQUAL 7.0%<8.0%: CPT | Mod: CPTII,S$GLB,, | Performed by: INTERNAL MEDICINE

## 2021-07-12 PROCEDURE — 99214 PR OFFICE/OUTPT VISIT, EST, LEVL IV, 30-39 MIN: ICD-10-PCS | Mod: S$GLB,,, | Performed by: INTERNAL MEDICINE

## 2021-07-12 PROCEDURE — 99999 PR PBB SHADOW E&M-EST. PATIENT-LVL IV: CPT | Mod: PBBFAC,,, | Performed by: INTERNAL MEDICINE

## 2021-07-12 PROCEDURE — 1125F AMNT PAIN NOTED PAIN PRSNT: CPT | Mod: S$GLB,,, | Performed by: INTERNAL MEDICINE

## 2021-07-12 PROCEDURE — 3008F PR BODY MASS INDEX (BMI) DOCUMENTED: ICD-10-PCS | Mod: CPTII,S$GLB,, | Performed by: INTERNAL MEDICINE

## 2021-07-12 PROCEDURE — 1125F PR PAIN SEVERITY QUANTIFIED, PAIN PRESENT: ICD-10-PCS | Mod: S$GLB,,, | Performed by: INTERNAL MEDICINE

## 2021-07-12 PROCEDURE — 99999 PR PBB SHADOW E&M-EST. PATIENT-LVL IV: ICD-10-PCS | Mod: PBBFAC,,, | Performed by: INTERNAL MEDICINE

## 2021-07-12 RX ORDER — EMPAGLIFLOZIN 25 MG/1
25 TABLET, FILM COATED ORAL DAILY
Qty: 90 TABLET | Refills: 3 | Status: SHIPPED | OUTPATIENT
Start: 2021-07-12 | End: 2022-01-04

## 2021-07-13 ENCOUNTER — CLINICAL SUPPORT (OUTPATIENT)
Dept: REHABILITATION | Facility: HOSPITAL | Age: 66
End: 2021-07-13
Attending: INTERNAL MEDICINE
Payer: MEDICARE

## 2021-07-13 DIAGNOSIS — R29.898 DECREASED STRENGTH OF TRUNK AND BACK: ICD-10-CM

## 2021-07-13 DIAGNOSIS — M54.50 LUMBAR BACK PAIN: ICD-10-CM

## 2021-07-13 PROCEDURE — 97110 THERAPEUTIC EXERCISES: CPT

## 2021-07-13 PROCEDURE — 97161 PT EVAL LOW COMPLEX 20 MIN: CPT

## 2021-07-21 ENCOUNTER — TELEPHONE (OUTPATIENT)
Dept: REHABILITATION | Facility: HOSPITAL | Age: 66
End: 2021-07-21

## 2021-07-21 DIAGNOSIS — Z01.818 PREOP TESTING: Primary | ICD-10-CM

## 2021-07-22 ENCOUNTER — LAB VISIT (OUTPATIENT)
Dept: LAB | Facility: HOSPITAL | Age: 66
End: 2021-07-22
Attending: OTOLARYNGOLOGY
Payer: MEDICARE

## 2021-07-22 DIAGNOSIS — Z01.818 PREOP TESTING: ICD-10-CM

## 2021-07-22 LAB
BASOPHILS # BLD AUTO: 0.03 K/UL (ref 0–0.2)
BASOPHILS NFR BLD: 0.6 % (ref 0–1.9)
DIFFERENTIAL METHOD: ABNORMAL
EOSINOPHIL # BLD AUTO: 0.3 K/UL (ref 0–0.5)
EOSINOPHIL NFR BLD: 4.8 % (ref 0–8)
ERYTHROCYTE [DISTWIDTH] IN BLOOD BY AUTOMATED COUNT: 15.2 % (ref 11.5–14.5)
HCT VFR BLD AUTO: 45 % (ref 40–54)
HGB BLD-MCNC: 14.1 G/DL (ref 14–18)
IMM GRANULOCYTES # BLD AUTO: 0.03 K/UL (ref 0–0.04)
IMM GRANULOCYTES NFR BLD AUTO: 0.6 % (ref 0–0.5)
LYMPHOCYTES # BLD AUTO: 2.6 K/UL (ref 1–4.8)
LYMPHOCYTES NFR BLD: 49.4 % (ref 18–48)
MCH RBC QN AUTO: 28.8 PG (ref 27–31)
MCHC RBC AUTO-ENTMCNC: 31.3 G/DL (ref 32–36)
MCV RBC AUTO: 92 FL (ref 82–98)
MONOCYTES # BLD AUTO: 0.3 K/UL (ref 0.3–1)
MONOCYTES NFR BLD: 6 % (ref 4–15)
NEUTROPHILS # BLD AUTO: 2 K/UL (ref 1.8–7.7)
NEUTROPHILS NFR BLD: 38.6 % (ref 38–73)
NRBC BLD-RTO: 0 /100 WBC
PLATELET # BLD AUTO: 184 K/UL (ref 150–450)
PMV BLD AUTO: 12.8 FL (ref 9.2–12.9)
RBC # BLD AUTO: 4.9 M/UL (ref 4.6–6.2)
WBC # BLD AUTO: 5.2 K/UL (ref 3.9–12.7)

## 2021-07-22 PROCEDURE — 36415 COLL VENOUS BLD VENIPUNCTURE: CPT | Mod: PO | Performed by: OTOLARYNGOLOGY

## 2021-07-22 PROCEDURE — 85025 COMPLETE CBC W/AUTO DIFF WBC: CPT | Performed by: OTOLARYNGOLOGY

## 2021-07-26 ENCOUNTER — TELEPHONE (OUTPATIENT)
Dept: OTOLARYNGOLOGY | Facility: CLINIC | Age: 66
End: 2021-07-26

## 2021-07-27 ENCOUNTER — ANESTHESIA (OUTPATIENT)
Dept: SURGERY | Facility: HOSPITAL | Age: 66
DRG: 627 | End: 2021-07-27
Payer: MEDICARE

## 2021-07-27 ENCOUNTER — HOSPITAL ENCOUNTER (INPATIENT)
Facility: HOSPITAL | Age: 66
LOS: 2 days | Discharge: HOME | DRG: 627 | End: 2021-07-29
Attending: OTOLARYNGOLOGY | Admitting: OTOLARYNGOLOGY
Payer: MEDICARE

## 2021-07-27 DIAGNOSIS — E83.52 HYPERCALCEMIA: ICD-10-CM

## 2021-07-27 DIAGNOSIS — E21.0 PRIMARY HYPERPARATHYROIDISM: Primary | ICD-10-CM

## 2021-07-27 LAB
CALCIUM SERPL-MCNC: 10.4 MG/DL (ref 8.7–10.5)
CALCIUM SERPL-MCNC: 9.8 MG/DL (ref 8.7–10.5)
MAGNESIUM SERPL-MCNC: 1.9 MG/DL (ref 1.6–2.6)
POCT GLUCOSE: 148 MG/DL (ref 70–110)
POCT GLUCOSE: 159 MG/DL (ref 70–110)
POCT GLUCOSE: 168 MG/DL (ref 70–110)
POCT GLUCOSE: 196 MG/DL (ref 70–110)
PTH-INTACT SERPL-MCNC: 190.3 PG/ML (ref 9–77)
PTH-INTACT SERPL-MCNC: 29.1 PG/ML (ref 9–77)

## 2021-07-27 PROCEDURE — C1729 CATH, DRAINAGE: HCPCS | Performed by: OTOLARYNGOLOGY

## 2021-07-27 PROCEDURE — 71000039 HC RECOVERY, EACH ADD'L HOUR: Performed by: OTOLARYNGOLOGY

## 2021-07-27 PROCEDURE — 25000003 PHARM REV CODE 250: Performed by: OTOLARYNGOLOGY

## 2021-07-27 PROCEDURE — 25000003 PHARM REV CODE 250: Performed by: NURSE ANESTHETIST, CERTIFIED REGISTERED

## 2021-07-27 PROCEDURE — 63600175 PHARM REV CODE 636 W HCPCS: Performed by: PHYSICIAN ASSISTANT

## 2021-07-27 PROCEDURE — 63600175 PHARM REV CODE 636 W HCPCS: Performed by: OTOLARYNGOLOGY

## 2021-07-27 PROCEDURE — 37000008 HC ANESTHESIA 1ST 15 MINUTES: Performed by: OTOLARYNGOLOGY

## 2021-07-27 PROCEDURE — 36000708 HC OR TIME LEV III 1ST 15 MIN: Performed by: OTOLARYNGOLOGY

## 2021-07-27 PROCEDURE — C9290 INJ, BUPIVACAINE LIPOSOME: HCPCS | Performed by: OTOLARYNGOLOGY

## 2021-07-27 PROCEDURE — 63600175 PHARM REV CODE 636 W HCPCS: Performed by: ANESTHESIOLOGY

## 2021-07-27 PROCEDURE — 88331 PATH CONSLTJ SURG 1 BLK 1SPC: CPT | Mod: 26,,, | Performed by: PATHOLOGY

## 2021-07-27 PROCEDURE — 36500 PR VENOUS SAMPLING BY CATHETER, W/ORGAN BLOOD SAMPLE: ICD-10-PCS | Mod: 51,,, | Performed by: OTOLARYNGOLOGY

## 2021-07-27 PROCEDURE — 88307 TISSUE EXAM BY PATHOLOGIST: CPT | Mod: 26,,, | Performed by: PATHOLOGY

## 2021-07-27 PROCEDURE — 83970 ASSAY OF PARATHORMONE: CPT | Mod: 91 | Performed by: OTOLARYNGOLOGY

## 2021-07-27 PROCEDURE — 83735 ASSAY OF MAGNESIUM: CPT | Performed by: OTOLARYNGOLOGY

## 2021-07-27 PROCEDURE — 60505 PR EXPLORE PARATHYROID+MEDIASTINUM: ICD-10-PCS | Mod: 62,,, | Performed by: THORACIC SURGERY (CARDIOTHORACIC VASCULAR SURGERY)

## 2021-07-27 PROCEDURE — 63600175 PHARM REV CODE 636 W HCPCS: Performed by: NURSE ANESTHETIST, CERTIFIED REGISTERED

## 2021-07-27 PROCEDURE — 60505 EXPLORE PARATHYROID GLANDS: CPT | Mod: 62,,, | Performed by: OTOLARYNGOLOGY

## 2021-07-27 PROCEDURE — 36415 COLL VENOUS BLD VENIPUNCTURE: CPT | Performed by: OTOLARYNGOLOGY

## 2021-07-27 PROCEDURE — 36000709 HC OR TIME LEV III EA ADD 15 MIN: Performed by: OTOLARYNGOLOGY

## 2021-07-27 PROCEDURE — 37000009 HC ANESTHESIA EA ADD 15 MINS: Performed by: OTOLARYNGOLOGY

## 2021-07-27 PROCEDURE — 36500 INSERTION OF CATHETER VEIN: CPT | Mod: 51,,, | Performed by: OTOLARYNGOLOGY

## 2021-07-27 PROCEDURE — 88307 PR  SURG PATH,LEVEL V: ICD-10-PCS | Mod: 26,,, | Performed by: PATHOLOGY

## 2021-07-27 PROCEDURE — 83970 ASSAY OF PARATHORMONE: CPT | Performed by: OTOLARYNGOLOGY

## 2021-07-27 PROCEDURE — 82310 ASSAY OF CALCIUM: CPT | Mod: 91 | Performed by: OTOLARYNGOLOGY

## 2021-07-27 PROCEDURE — 88331 PR  PATH CONSULT IN SURG,W FRZ SEC: ICD-10-PCS | Mod: 26,,, | Performed by: PATHOLOGY

## 2021-07-27 PROCEDURE — 60505 PR EXPLORE PARATHYROID+MEDIASTINUM: ICD-10-PCS | Mod: 62,,, | Performed by: OTOLARYNGOLOGY

## 2021-07-27 PROCEDURE — 88307 TISSUE EXAM BY PATHOLOGIST: CPT | Performed by: PATHOLOGY

## 2021-07-27 PROCEDURE — 27201423 OPTIME MED/SURG SUP & DEVICES STERILE SUPPLY: Performed by: OTOLARYNGOLOGY

## 2021-07-27 PROCEDURE — 11000001 HC ACUTE MED/SURG PRIVATE ROOM

## 2021-07-27 PROCEDURE — 88331 PATH CONSLTJ SURG 1 BLK 1SPC: CPT | Performed by: PATHOLOGY

## 2021-07-27 PROCEDURE — 71000033 HC RECOVERY, INTIAL HOUR: Performed by: OTOLARYNGOLOGY

## 2021-07-27 PROCEDURE — 60505 EXPLORE PARATHYROID GLANDS: CPT | Mod: 62,,, | Performed by: THORACIC SURGERY (CARDIOTHORACIC VASCULAR SURGERY)

## 2021-07-27 RX ORDER — FENTANYL CITRATE 50 UG/ML
INJECTION, SOLUTION INTRAMUSCULAR; INTRAVENOUS
Status: DISCONTINUED | OUTPATIENT
Start: 2021-07-27 | End: 2021-07-27

## 2021-07-27 RX ORDER — OXYCODONE HYDROCHLORIDE 5 MG/1
15 TABLET ORAL EVERY 4 HOURS PRN
Status: DISCONTINUED | OUTPATIENT
Start: 2021-07-27 | End: 2021-07-29 | Stop reason: HOSPADM

## 2021-07-27 RX ORDER — IBUPROFEN 200 MG
24 TABLET ORAL
Status: DISCONTINUED | OUTPATIENT
Start: 2021-07-27 | End: 2021-07-29 | Stop reason: HOSPADM

## 2021-07-27 RX ORDER — KETOROLAC TROMETHAMINE 30 MG/ML
15 INJECTION, SOLUTION INTRAMUSCULAR; INTRAVENOUS EVERY 8 HOURS PRN
Status: DISCONTINUED | OUTPATIENT
Start: 2021-07-27 | End: 2021-07-27 | Stop reason: HOSPADM

## 2021-07-27 RX ORDER — SODIUM CHLORIDE AND POTASSIUM CHLORIDE 150; 900 MG/100ML; MG/100ML
INJECTION, SOLUTION INTRAVENOUS CONTINUOUS
Status: DISCONTINUED | OUTPATIENT
Start: 2021-07-27 | End: 2021-07-29

## 2021-07-27 RX ORDER — INSULIN ASPART 100 [IU]/ML
1-10 INJECTION, SOLUTION INTRAVENOUS; SUBCUTANEOUS
Status: DISCONTINUED | OUTPATIENT
Start: 2021-07-27 | End: 2021-07-29 | Stop reason: HOSPADM

## 2021-07-27 RX ORDER — IBUPROFEN 200 MG
16 TABLET ORAL
Status: DISCONTINUED | OUTPATIENT
Start: 2021-07-27 | End: 2021-07-29 | Stop reason: HOSPADM

## 2021-07-27 RX ORDER — ROCURONIUM BROMIDE 10 MG/ML
INJECTION, SOLUTION INTRAVENOUS
Status: DISCONTINUED | OUTPATIENT
Start: 2021-07-27 | End: 2021-07-27

## 2021-07-27 RX ORDER — ONDANSETRON 2 MG/ML
4 INJECTION INTRAMUSCULAR; INTRAVENOUS DAILY PRN
Status: DISCONTINUED | OUTPATIENT
Start: 2021-07-27 | End: 2021-07-27 | Stop reason: HOSPADM

## 2021-07-27 RX ORDER — BUPIVACAINE HYDROCHLORIDE 2.5 MG/ML
INJECTION, SOLUTION EPIDURAL; INFILTRATION; INTRACAUDAL
Status: DISCONTINUED | OUTPATIENT
Start: 2021-07-27 | End: 2021-07-27 | Stop reason: HOSPADM

## 2021-07-27 RX ORDER — AMOXICILLIN 250 MG
1 CAPSULE ORAL 2 TIMES DAILY
Status: DISCONTINUED | OUTPATIENT
Start: 2021-07-27 | End: 2021-07-29 | Stop reason: HOSPADM

## 2021-07-27 RX ORDER — HYDROMORPHONE HYDROCHLORIDE 2 MG/ML
0.2 INJECTION, SOLUTION INTRAMUSCULAR; INTRAVENOUS; SUBCUTANEOUS EVERY 5 MIN PRN
Status: DISCONTINUED | OUTPATIENT
Start: 2021-07-27 | End: 2021-07-27 | Stop reason: HOSPADM

## 2021-07-27 RX ORDER — ATORVASTATIN CALCIUM 10 MG/1
10 TABLET, FILM COATED ORAL DAILY
Status: DISCONTINUED | OUTPATIENT
Start: 2021-07-27 | End: 2021-07-29 | Stop reason: HOSPADM

## 2021-07-27 RX ORDER — ERGOCALCIFEROL 1.25 MG/1
50000 CAPSULE ORAL
Status: DISCONTINUED | OUTPATIENT
Start: 2021-07-27 | End: 2021-07-29 | Stop reason: HOSPADM

## 2021-07-27 RX ORDER — NEOSTIGMINE METHYLSULFATE 1 MG/ML
INJECTION, SOLUTION INTRAVENOUS
Status: DISCONTINUED | OUTPATIENT
Start: 2021-07-27 | End: 2021-07-27

## 2021-07-27 RX ORDER — ONDANSETRON 2 MG/ML
4 INJECTION INTRAMUSCULAR; INTRAVENOUS EVERY 12 HOURS PRN
Status: DISCONTINUED | OUTPATIENT
Start: 2021-07-27 | End: 2021-07-29 | Stop reason: HOSPADM

## 2021-07-27 RX ORDER — ONDANSETRON 2 MG/ML
INJECTION INTRAMUSCULAR; INTRAVENOUS
Status: DISCONTINUED | OUTPATIENT
Start: 2021-07-27 | End: 2021-07-27

## 2021-07-27 RX ORDER — GLUCAGON 1 MG
1 KIT INJECTION
Status: DISCONTINUED | OUTPATIENT
Start: 2021-07-27 | End: 2021-07-29 | Stop reason: HOSPADM

## 2021-07-27 RX ORDER — OXYCODONE AND ACETAMINOPHEN 5; 325 MG/1; MG/1
1 TABLET ORAL
Status: DISCONTINUED | OUTPATIENT
Start: 2021-07-27 | End: 2021-07-27 | Stop reason: HOSPADM

## 2021-07-27 RX ORDER — VALSARTAN 40 MG/1
40 TABLET ORAL DAILY
Status: DISCONTINUED | OUTPATIENT
Start: 2021-07-27 | End: 2021-07-29 | Stop reason: HOSPADM

## 2021-07-27 RX ORDER — CEFAZOLIN SODIUM 2 G/50ML
2 SOLUTION INTRAVENOUS ONCE
Status: COMPLETED | OUTPATIENT
Start: 2021-07-27 | End: 2021-07-27

## 2021-07-27 RX ORDER — PROPOFOL 10 MG/ML
VIAL (ML) INTRAVENOUS
Status: DISCONTINUED | OUTPATIENT
Start: 2021-07-27 | End: 2021-07-27

## 2021-07-27 RX ORDER — ACETAMINOPHEN 325 MG/1
650 TABLET ORAL EVERY 4 HOURS PRN
Status: DISCONTINUED | OUTPATIENT
Start: 2021-07-27 | End: 2021-07-29 | Stop reason: HOSPADM

## 2021-07-27 RX ORDER — OXYCODONE HYDROCHLORIDE 5 MG/1
5 TABLET ORAL EVERY 4 HOURS PRN
Status: DISCONTINUED | OUTPATIENT
Start: 2021-07-27 | End: 2021-07-29 | Stop reason: HOSPADM

## 2021-07-27 RX ORDER — MIDAZOLAM HYDROCHLORIDE 1 MG/ML
INJECTION, SOLUTION INTRAMUSCULAR; INTRAVENOUS
Status: DISCONTINUED | OUTPATIENT
Start: 2021-07-27 | End: 2021-07-27

## 2021-07-27 RX ORDER — MORPHINE SULFATE 2 MG/ML
2 INJECTION, SOLUTION INTRAMUSCULAR; INTRAVENOUS
Status: DISCONTINUED | OUTPATIENT
Start: 2021-07-27 | End: 2021-07-29 | Stop reason: HOSPADM

## 2021-07-27 RX ORDER — PHENYLEPHRINE HYDROCHLORIDE 10 MG/ML
INJECTION INTRAVENOUS
Status: DISCONTINUED | OUTPATIENT
Start: 2021-07-27 | End: 2021-07-27

## 2021-07-27 RX ADMIN — SODIUM CHLORIDE AND POTASSIUM CHLORIDE: 9; 1.49 INJECTION, SOLUTION INTRAVENOUS at 02:07

## 2021-07-27 RX ADMIN — CEFAZOLIN SODIUM 2 G: 2 SOLUTION INTRAVENOUS at 09:07

## 2021-07-27 RX ADMIN — SODIUM CHLORIDE, SODIUM LACTATE, POTASSIUM CHLORIDE, AND CALCIUM CHLORIDE: .6; .31; .03; .02 INJECTION, SOLUTION INTRAVENOUS at 07:07

## 2021-07-27 RX ADMIN — HYDROMORPHONE HYDROCHLORIDE 0.2 MG: 2 INJECTION INTRAMUSCULAR; INTRAVENOUS; SUBCUTANEOUS at 12:07

## 2021-07-27 RX ADMIN — CEFAZOLIN SODIUM 2 G: 2 SOLUTION INTRAVENOUS at 07:07

## 2021-07-27 RX ADMIN — MIDAZOLAM HYDROCHLORIDE 2 MG: 1 INJECTION, SOLUTION INTRAMUSCULAR; INTRAVENOUS at 07:07

## 2021-07-27 RX ADMIN — OXYCODONE HYDROCHLORIDE 15 MG: 5 TABLET ORAL at 11:07

## 2021-07-27 RX ADMIN — ATORVASTATIN CALCIUM 10 MG: 10 TABLET, FILM COATED ORAL at 02:07

## 2021-07-27 RX ADMIN — SENNOSIDES AND DOCUSATE SODIUM 1 TABLET: 8.6; 5 TABLET ORAL at 09:07

## 2021-07-27 RX ADMIN — ONDANSETRON 4 MG: 2 INJECTION, SOLUTION INTRAMUSCULAR; INTRAVENOUS at 11:07

## 2021-07-27 RX ADMIN — ROCURONIUM BROMIDE 40 MG: 10 INJECTION, SOLUTION INTRAVENOUS at 07:07

## 2021-07-27 RX ADMIN — NEOSTIGMINE METHYLSULFATE 3 MG: 1 INJECTION INTRAVENOUS at 11:07

## 2021-07-27 RX ADMIN — PROPOFOL 50 MG: 10 INJECTION, EMULSION INTRAVENOUS at 09:07

## 2021-07-27 RX ADMIN — FENTANYL CITRATE 100 MCG: 50 INJECTION, SOLUTION INTRAMUSCULAR; INTRAVENOUS at 07:07

## 2021-07-27 RX ADMIN — PHENYLEPHRINE HYDROCHLORIDE 200 MCG: 10 INJECTION INTRAVENOUS at 08:07

## 2021-07-27 RX ADMIN — SODIUM CHLORIDE, SODIUM LACTATE, POTASSIUM CHLORIDE, AND CALCIUM CHLORIDE: .6; .31; .03; .02 INJECTION, SOLUTION INTRAVENOUS at 10:07

## 2021-07-27 RX ADMIN — PHENYLEPHRINE HYDROCHLORIDE 100 MCG: 10 INJECTION INTRAVENOUS at 07:07

## 2021-07-27 RX ADMIN — ROCURONIUM BROMIDE 40 MG: 10 INJECTION, SOLUTION INTRAVENOUS at 09:07

## 2021-07-27 RX ADMIN — ERGOCALCIFEROL 50000 UNITS: 1.25 CAPSULE ORAL at 03:07

## 2021-07-27 RX ADMIN — OXYCODONE HYDROCHLORIDE 15 MG: 5 TABLET ORAL at 05:07

## 2021-07-27 RX ADMIN — FENTANYL CITRATE 50 MCG: 50 INJECTION, SOLUTION INTRAMUSCULAR; INTRAVENOUS at 07:07

## 2021-07-27 RX ADMIN — HYDROMORPHONE HYDROCHLORIDE 0.2 MG: 2 INJECTION INTRAMUSCULAR; INTRAVENOUS; SUBCUTANEOUS at 01:07

## 2021-07-27 RX ADMIN — SODIUM CHLORIDE, SODIUM LACTATE, POTASSIUM CHLORIDE, AND CALCIUM CHLORIDE: .6; .31; .03; .02 INJECTION, SOLUTION INTRAVENOUS at 08:07

## 2021-07-27 RX ADMIN — PROPOFOL 50 MG: 10 INJECTION, EMULSION INTRAVENOUS at 07:07

## 2021-07-27 RX ADMIN — FENTANYL CITRATE 50 MCG: 50 INJECTION, SOLUTION INTRAMUSCULAR; INTRAVENOUS at 09:07

## 2021-07-27 RX ADMIN — PROPOFOL 70 MG: 10 INJECTION, EMULSION INTRAVENOUS at 07:07

## 2021-07-27 RX ADMIN — GLYCOPYRROLATE 0.4 MG: 0.2 INJECTION, SOLUTION INTRAMUSCULAR; INTRAVENOUS at 11:07

## 2021-07-27 RX ADMIN — VALSARTAN 40 MG: 40 TABLET, FILM COATED ORAL at 03:07

## 2021-07-28 LAB
CALCIUM SERPL-MCNC: 8.9 MG/DL (ref 8.7–10.5)
CALCIUM SERPL-MCNC: 9.5 MG/DL (ref 8.7–10.5)
CALCIUM SERPL-MCNC: 9.5 MG/DL (ref 8.7–10.5)
POCT GLUCOSE: 154 MG/DL (ref 70–110)
POCT GLUCOSE: 160 MG/DL (ref 70–110)
POCT GLUCOSE: 169 MG/DL (ref 70–110)
POCT GLUCOSE: 194 MG/DL (ref 70–110)

## 2021-07-28 PROCEDURE — 82310 ASSAY OF CALCIUM: CPT | Mod: 91 | Performed by: OTOLARYNGOLOGY

## 2021-07-28 PROCEDURE — 11000001 HC ACUTE MED/SURG PRIVATE ROOM

## 2021-07-28 PROCEDURE — 25000003 PHARM REV CODE 250: Performed by: THORACIC SURGERY (CARDIOTHORACIC VASCULAR SURGERY)

## 2021-07-28 PROCEDURE — 94760 N-INVAS EAR/PLS OXIMETRY 1: CPT

## 2021-07-28 PROCEDURE — 25000003 PHARM REV CODE 250: Performed by: OTOLARYNGOLOGY

## 2021-07-28 PROCEDURE — 63600175 PHARM REV CODE 636 W HCPCS: Performed by: OTOLARYNGOLOGY

## 2021-07-28 PROCEDURE — 36415 COLL VENOUS BLD VENIPUNCTURE: CPT | Performed by: OTOLARYNGOLOGY

## 2021-07-28 RX ORDER — SYRING-NEEDL,DISP,INSUL,0.3 ML 29 G X1/2"
296 SYRINGE, EMPTY DISPOSABLE MISCELLANEOUS ONCE
Status: COMPLETED | OUTPATIENT
Start: 2021-07-28 | End: 2021-07-28

## 2021-07-28 RX ORDER — POLYETHYLENE GLYCOL 3350 17 G/17G
17 POWDER, FOR SOLUTION ORAL 2 TIMES DAILY
Status: DISCONTINUED | OUTPATIENT
Start: 2021-07-28 | End: 2021-07-29 | Stop reason: HOSPADM

## 2021-07-28 RX ADMIN — VALSARTAN 40 MG: 40 TABLET, FILM COATED ORAL at 08:07

## 2021-07-28 RX ADMIN — SODIUM CHLORIDE AND POTASSIUM CHLORIDE: 9; 1.49 INJECTION, SOLUTION INTRAVENOUS at 09:07

## 2021-07-28 RX ADMIN — OXYCODONE HYDROCHLORIDE 15 MG: 5 TABLET ORAL at 05:07

## 2021-07-28 RX ADMIN — SENNOSIDES AND DOCUSATE SODIUM 1 TABLET: 8.6; 5 TABLET ORAL at 08:07

## 2021-07-28 RX ADMIN — SODIUM CHLORIDE AND POTASSIUM CHLORIDE: 9; 1.49 INJECTION, SOLUTION INTRAVENOUS at 12:07

## 2021-07-28 RX ADMIN — POLYETHYLENE GLYCOL 3350 17 G: 17 POWDER, FOR SOLUTION ORAL at 09:07

## 2021-07-28 RX ADMIN — INSULIN ASPART 1 UNITS: 100 INJECTION, SOLUTION INTRAVENOUS; SUBCUTANEOUS at 09:07

## 2021-07-28 RX ADMIN — OXYCODONE HYDROCHLORIDE 15 MG: 5 TABLET ORAL at 11:07

## 2021-07-28 RX ADMIN — INSULIN ASPART 2 UNITS: 100 INJECTION, SOLUTION INTRAVENOUS; SUBCUTANEOUS at 04:07

## 2021-07-28 RX ADMIN — SODIUM CHLORIDE AND POTASSIUM CHLORIDE: 9; 1.49 INJECTION, SOLUTION INTRAVENOUS at 11:07

## 2021-07-28 RX ADMIN — ATORVASTATIN CALCIUM 10 MG: 10 TABLET, FILM COATED ORAL at 08:07

## 2021-07-28 RX ADMIN — OXYCODONE HYDROCHLORIDE 15 MG: 5 TABLET ORAL at 12:07

## 2021-07-28 RX ADMIN — SENNOSIDES AND DOCUSATE SODIUM 1 TABLET: 8.6; 5 TABLET ORAL at 09:07

## 2021-07-28 RX ADMIN — OXYCODONE HYDROCHLORIDE 15 MG: 5 TABLET ORAL at 07:07

## 2021-07-28 RX ADMIN — MAGNESIUM CITRATE 296 ML: 1.75 LIQUID ORAL at 08:07

## 2021-07-28 RX ADMIN — POLYETHYLENE GLYCOL 3350 17 G: 17 POWDER, FOR SOLUTION ORAL at 08:07

## 2021-07-28 RX ADMIN — INSULIN ASPART 2 UNITS: 100 INJECTION, SOLUTION INTRAVENOUS; SUBCUTANEOUS at 05:07

## 2021-07-28 RX ADMIN — INSULIN ASPART 2 UNITS: 100 INJECTION, SOLUTION INTRAVENOUS; SUBCUTANEOUS at 12:07

## 2021-07-29 ENCOUNTER — TELEPHONE (OUTPATIENT)
Dept: CARDIOTHORACIC SURGERY | Facility: CLINIC | Age: 66
End: 2021-07-29

## 2021-07-29 VITALS
RESPIRATION RATE: 18 BRPM | TEMPERATURE: 99 F | HEART RATE: 67 BPM | BODY MASS INDEX: 37.7 KG/M2 | DIASTOLIC BLOOD PRESSURE: 80 MMHG | OXYGEN SATURATION: 94 % | HEIGHT: 67 IN | WEIGHT: 240.19 LBS | SYSTOLIC BLOOD PRESSURE: 135 MMHG

## 2021-07-29 PROBLEM — E21.0 PRIMARY HYPERPARATHYROIDISM: Status: RESOLVED | Noted: 2021-07-27 | Resolved: 2021-07-29

## 2021-07-29 PROBLEM — Z98.890 S/P PARATHYROIDECTOMY: Status: ACTIVE | Noted: 2021-07-29

## 2021-07-29 PROBLEM — Z90.89 S/P PARATHYROIDECTOMY: Status: ACTIVE | Noted: 2021-07-29

## 2021-07-29 LAB
CALCIUM SERPL-MCNC: 8.8 MG/DL (ref 8.7–10.5)
CALCIUM SERPL-MCNC: 9 MG/DL (ref 8.7–10.5)
POCT GLUCOSE: 145 MG/DL (ref 70–110)
POCT GLUCOSE: 149 MG/DL (ref 70–110)

## 2021-07-29 PROCEDURE — 94760 N-INVAS EAR/PLS OXIMETRY 1: CPT

## 2021-07-29 PROCEDURE — 25000003 PHARM REV CODE 250: Performed by: THORACIC SURGERY (CARDIOTHORACIC VASCULAR SURGERY)

## 2021-07-29 PROCEDURE — 25000003 PHARM REV CODE 250: Performed by: NURSE PRACTITIONER

## 2021-07-29 PROCEDURE — 94799 UNLISTED PULMONARY SVC/PX: CPT

## 2021-07-29 PROCEDURE — 82310 ASSAY OF CALCIUM: CPT | Performed by: OTOLARYNGOLOGY

## 2021-07-29 PROCEDURE — 25000003 PHARM REV CODE 250: Performed by: OTOLARYNGOLOGY

## 2021-07-29 PROCEDURE — 63600175 PHARM REV CODE 636 W HCPCS: Performed by: OTOLARYNGOLOGY

## 2021-07-29 PROCEDURE — 36415 COLL VENOUS BLD VENIPUNCTURE: CPT | Performed by: OTOLARYNGOLOGY

## 2021-07-29 RX ORDER — CALCIUM CARBONATE 500(1250)
1 TABLET ORAL 2 TIMES DAILY
Qty: 60 TABLET | Refills: 0 | Status: SHIPPED | OUTPATIENT
Start: 2021-07-29 | End: 2021-08-12

## 2021-07-29 RX ORDER — PSEUDOEPHEDRINE/ACETAMINOPHEN 30MG-500MG
100 TABLET ORAL
Status: COMPLETED | OUTPATIENT
Start: 2021-07-29 | End: 2021-07-29

## 2021-07-29 RX ORDER — HYDROCODONE BITARTRATE AND ACETAMINOPHEN 5; 325 MG/1; MG/1
1 TABLET ORAL EVERY 4 HOURS PRN
Status: DISCONTINUED | OUTPATIENT
Start: 2021-07-29 | End: 2021-07-29 | Stop reason: HOSPADM

## 2021-07-29 RX ORDER — SYRING-NEEDL,DISP,INSUL,0.3 ML 29 G X1/2"
296 SYRINGE, EMPTY DISPOSABLE MISCELLANEOUS
Status: COMPLETED | OUTPATIENT
Start: 2021-07-29 | End: 2021-07-29

## 2021-07-29 RX ORDER — OXYCODONE AND ACETAMINOPHEN 5; 325 MG/1; MG/1
1 TABLET ORAL EVERY 4 HOURS PRN
Qty: 30 TABLET | Refills: 0 | Status: SHIPPED | OUTPATIENT
Start: 2021-07-29 | End: 2021-08-12

## 2021-07-29 RX ADMIN — VALSARTAN 40 MG: 40 TABLET, FILM COATED ORAL at 08:07

## 2021-07-29 RX ADMIN — SODIUM CHLORIDE AND POTASSIUM CHLORIDE: 9; 1.49 INJECTION, SOLUTION INTRAVENOUS at 06:07

## 2021-07-29 RX ADMIN — POLYETHYLENE GLYCOL 3350 17 G: 17 POWDER, FOR SOLUTION ORAL at 08:07

## 2021-07-29 RX ADMIN — OXYCODONE HYDROCHLORIDE 15 MG: 5 TABLET ORAL at 11:07

## 2021-07-29 RX ADMIN — MAGNESIUM CITRATE 296 ML: 1.75 LIQUID ORAL at 09:07

## 2021-07-29 RX ADMIN — Medication 100 ML: at 09:07

## 2021-07-29 RX ADMIN — SENNOSIDES AND DOCUSATE SODIUM 1 TABLET: 8.6; 5 TABLET ORAL at 08:07

## 2021-07-29 RX ADMIN — ATORVASTATIN CALCIUM 10 MG: 10 TABLET, FILM COATED ORAL at 08:07

## 2021-07-29 RX ADMIN — SODIUM CHLORIDE 500 ML: 0.9 INJECTION, SOLUTION INTRAVENOUS at 09:07

## 2021-07-29 RX ADMIN — OXYCODONE HYDROCHLORIDE 15 MG: 5 TABLET ORAL at 06:07

## 2021-07-30 LAB
FINAL PATHOLOGIC DIAGNOSIS: NORMAL
FROZEN SECTION DIAGNOSIS: NORMAL
FROZEN SECTION FOOTNOTE: NORMAL
GROSS: NORMAL
Lab: NORMAL

## 2021-08-06 ENCOUNTER — DOCUMENTATION ONLY (OUTPATIENT)
Dept: REHABILITATION | Facility: HOSPITAL | Age: 66
End: 2021-08-06

## 2021-08-10 ENCOUNTER — OFFICE VISIT (OUTPATIENT)
Dept: CARDIOTHORACIC SURGERY | Facility: CLINIC | Age: 66
End: 2021-08-10
Payer: MEDICARE

## 2021-08-10 VITALS
TEMPERATURE: 99 F | WEIGHT: 226.81 LBS | SYSTOLIC BLOOD PRESSURE: 116 MMHG | BODY MASS INDEX: 35.52 KG/M2 | HEART RATE: 64 BPM | DIASTOLIC BLOOD PRESSURE: 69 MMHG

## 2021-08-10 DIAGNOSIS — Z90.89 S/P PARATHYROIDECTOMY: Primary | ICD-10-CM

## 2021-08-10 DIAGNOSIS — Z98.890 S/P PARATHYROIDECTOMY: Primary | ICD-10-CM

## 2021-08-10 PROCEDURE — 1126F PR PAIN SEVERITY QUANTIFIED, NO PAIN PRESENT: ICD-10-PCS | Mod: CPTII,S$GLB,, | Performed by: THORACIC SURGERY (CARDIOTHORACIC VASCULAR SURGERY)

## 2021-08-10 PROCEDURE — 3074F PR MOST RECENT SYSTOLIC BLOOD PRESSURE < 130 MM HG: ICD-10-PCS | Mod: CPTII,S$GLB,, | Performed by: THORACIC SURGERY (CARDIOTHORACIC VASCULAR SURGERY)

## 2021-08-10 PROCEDURE — 3078F DIAST BP <80 MM HG: CPT | Mod: CPTII,S$GLB,, | Performed by: THORACIC SURGERY (CARDIOTHORACIC VASCULAR SURGERY)

## 2021-08-10 PROCEDURE — 99024 PR POST-OP FOLLOW-UP VISIT: ICD-10-PCS | Mod: S$GLB,,, | Performed by: THORACIC SURGERY (CARDIOTHORACIC VASCULAR SURGERY)

## 2021-08-10 PROCEDURE — 99999 PR PBB SHADOW E&M-EST. PATIENT-LVL III: ICD-10-PCS | Mod: PBBFAC,,, | Performed by: THORACIC SURGERY (CARDIOTHORACIC VASCULAR SURGERY)

## 2021-08-10 PROCEDURE — 1126F AMNT PAIN NOTED NONE PRSNT: CPT | Mod: CPTII,S$GLB,, | Performed by: THORACIC SURGERY (CARDIOTHORACIC VASCULAR SURGERY)

## 2021-08-10 PROCEDURE — 3051F PR MOST RECENT HEMOGLOBIN A1C LEVEL 7.0 - < 8.0%: ICD-10-PCS | Mod: CPTII,S$GLB,, | Performed by: THORACIC SURGERY (CARDIOTHORACIC VASCULAR SURGERY)

## 2021-08-10 PROCEDURE — 1159F MED LIST DOCD IN RCRD: CPT | Mod: CPTII,S$GLB,, | Performed by: THORACIC SURGERY (CARDIOTHORACIC VASCULAR SURGERY)

## 2021-08-10 PROCEDURE — 99499 UNLISTED E&M SERVICE: CPT | Mod: S$PBB,,, | Performed by: THORACIC SURGERY (CARDIOTHORACIC VASCULAR SURGERY)

## 2021-08-10 PROCEDURE — 3008F BODY MASS INDEX DOCD: CPT | Mod: CPTII,S$GLB,, | Performed by: THORACIC SURGERY (CARDIOTHORACIC VASCULAR SURGERY)

## 2021-08-10 PROCEDURE — 3074F SYST BP LT 130 MM HG: CPT | Mod: CPTII,S$GLB,, | Performed by: THORACIC SURGERY (CARDIOTHORACIC VASCULAR SURGERY)

## 2021-08-10 PROCEDURE — 1159F PR MEDICATION LIST DOCUMENTED IN MEDICAL RECORD: ICD-10-PCS | Mod: CPTII,S$GLB,, | Performed by: THORACIC SURGERY (CARDIOTHORACIC VASCULAR SURGERY)

## 2021-08-10 PROCEDURE — 99999 PR PBB SHADOW E&M-EST. PATIENT-LVL III: CPT | Mod: PBBFAC,,, | Performed by: THORACIC SURGERY (CARDIOTHORACIC VASCULAR SURGERY)

## 2021-08-10 PROCEDURE — 99024 POSTOP FOLLOW-UP VISIT: CPT | Mod: S$GLB,,, | Performed by: THORACIC SURGERY (CARDIOTHORACIC VASCULAR SURGERY)

## 2021-08-10 PROCEDURE — 3008F PR BODY MASS INDEX (BMI) DOCUMENTED: ICD-10-PCS | Mod: CPTII,S$GLB,, | Performed by: THORACIC SURGERY (CARDIOTHORACIC VASCULAR SURGERY)

## 2021-08-10 PROCEDURE — 3078F PR MOST RECENT DIASTOLIC BLOOD PRESSURE < 80 MM HG: ICD-10-PCS | Mod: CPTII,S$GLB,, | Performed by: THORACIC SURGERY (CARDIOTHORACIC VASCULAR SURGERY)

## 2021-08-10 PROCEDURE — 99499 RISK ADDL DX/OHS AUDIT: ICD-10-PCS | Mod: S$PBB,,, | Performed by: THORACIC SURGERY (CARDIOTHORACIC VASCULAR SURGERY)

## 2021-08-10 PROCEDURE — 3051F HG A1C>EQUAL 7.0%<8.0%: CPT | Mod: CPTII,S$GLB,, | Performed by: THORACIC SURGERY (CARDIOTHORACIC VASCULAR SURGERY)

## 2021-08-10 RX ORDER — POLYETHYLENE GLYCOL 3350 17 G/17G
17 POWDER, FOR SOLUTION ORAL DAILY
Qty: 30 EACH | Refills: 0 | Status: SHIPPED | OUTPATIENT
Start: 2021-08-10 | End: 2021-09-09

## 2021-08-10 RX ORDER — DOCUSATE CALCIUM 240 MG
240 CAPSULE ORAL 2 TIMES DAILY PRN
Qty: 60 CAPSULE | Refills: 0 | Status: SHIPPED | OUTPATIENT
Start: 2021-08-10 | End: 2021-09-09

## 2021-08-10 RX ORDER — PSYLLIUM HUSK 2.6 G/4.1G
2.6 POWDER (GRAM) ORAL 2 TIMES DAILY
Qty: 100 G | Refills: 0 | Status: SHIPPED | OUTPATIENT
Start: 2021-08-10 | End: 2022-05-18

## 2021-08-12 ENCOUNTER — OFFICE VISIT (OUTPATIENT)
Dept: OTOLARYNGOLOGY | Facility: CLINIC | Age: 66
End: 2021-08-12
Payer: MEDICARE

## 2021-08-12 VITALS
SYSTOLIC BLOOD PRESSURE: 104 MMHG | TEMPERATURE: 99 F | DIASTOLIC BLOOD PRESSURE: 70 MMHG | BODY MASS INDEX: 36.67 KG/M2 | WEIGHT: 234.13 LBS | HEART RATE: 65 BPM

## 2021-08-12 DIAGNOSIS — Z98.890 POSTOPERATIVE STATE: Primary | ICD-10-CM

## 2021-08-12 PROBLEM — Z90.89 S/P PARATHYROIDECTOMY: Status: RESOLVED | Noted: 2021-07-29 | Resolved: 2021-08-12

## 2021-08-12 PROBLEM — E83.52 HYPERCALCEMIA: Status: RESOLVED | Noted: 2021-03-23 | Resolved: 2021-08-12

## 2021-08-12 PROCEDURE — 3078F DIAST BP <80 MM HG: CPT | Mod: CPTII,S$GLB,, | Performed by: OTOLARYNGOLOGY

## 2021-08-12 PROCEDURE — 99024 PR POST-OP FOLLOW-UP VISIT: ICD-10-PCS | Mod: S$GLB,,, | Performed by: OTOLARYNGOLOGY

## 2021-08-12 PROCEDURE — 3074F SYST BP LT 130 MM HG: CPT | Mod: CPTII,S$GLB,, | Performed by: OTOLARYNGOLOGY

## 2021-08-12 PROCEDURE — 3074F PR MOST RECENT SYSTOLIC BLOOD PRESSURE < 130 MM HG: ICD-10-PCS | Mod: CPTII,S$GLB,, | Performed by: OTOLARYNGOLOGY

## 2021-08-12 PROCEDURE — 3051F PR MOST RECENT HEMOGLOBIN A1C LEVEL 7.0 - < 8.0%: ICD-10-PCS | Mod: CPTII,S$GLB,, | Performed by: OTOLARYNGOLOGY

## 2021-08-12 PROCEDURE — 3008F BODY MASS INDEX DOCD: CPT | Mod: CPTII,S$GLB,, | Performed by: OTOLARYNGOLOGY

## 2021-08-12 PROCEDURE — 3078F PR MOST RECENT DIASTOLIC BLOOD PRESSURE < 80 MM HG: ICD-10-PCS | Mod: CPTII,S$GLB,, | Performed by: OTOLARYNGOLOGY

## 2021-08-12 PROCEDURE — 3008F PR BODY MASS INDEX (BMI) DOCUMENTED: ICD-10-PCS | Mod: CPTII,S$GLB,, | Performed by: OTOLARYNGOLOGY

## 2021-08-12 PROCEDURE — 99024 POSTOP FOLLOW-UP VISIT: CPT | Mod: S$GLB,,, | Performed by: OTOLARYNGOLOGY

## 2021-08-12 PROCEDURE — 1159F MED LIST DOCD IN RCRD: CPT | Mod: CPTII,S$GLB,, | Performed by: OTOLARYNGOLOGY

## 2021-08-12 PROCEDURE — 99999 PR PBB SHADOW E&M-EST. PATIENT-LVL III: CPT | Mod: PBBFAC,,, | Performed by: OTOLARYNGOLOGY

## 2021-08-12 PROCEDURE — 99999 PR PBB SHADOW E&M-EST. PATIENT-LVL III: ICD-10-PCS | Mod: PBBFAC,,, | Performed by: OTOLARYNGOLOGY

## 2021-08-12 PROCEDURE — 3051F HG A1C>EQUAL 7.0%<8.0%: CPT | Mod: CPTII,S$GLB,, | Performed by: OTOLARYNGOLOGY

## 2021-08-12 PROCEDURE — 1159F PR MEDICATION LIST DOCUMENTED IN MEDICAL RECORD: ICD-10-PCS | Mod: CPTII,S$GLB,, | Performed by: OTOLARYNGOLOGY

## 2021-08-16 ENCOUNTER — LAB VISIT (OUTPATIENT)
Dept: LAB | Facility: HOSPITAL | Age: 66
End: 2021-08-16
Attending: OTOLARYNGOLOGY
Payer: MEDICARE

## 2021-08-16 DIAGNOSIS — Z98.890 POSTOPERATIVE STATE: ICD-10-CM

## 2021-08-16 LAB — CALCIUM SERPL-MCNC: 10.1 MG/DL (ref 8.7–10.5)

## 2021-08-16 PROCEDURE — 82310 ASSAY OF CALCIUM: CPT | Performed by: OTOLARYNGOLOGY

## 2021-08-16 PROCEDURE — 36415 COLL VENOUS BLD VENIPUNCTURE: CPT | Mod: PO | Performed by: OTOLARYNGOLOGY

## 2021-08-17 ENCOUNTER — PATIENT MESSAGE (OUTPATIENT)
Dept: OTOLARYNGOLOGY | Facility: CLINIC | Age: 66
End: 2021-08-17

## 2021-08-19 ENCOUNTER — PATIENT MESSAGE (OUTPATIENT)
Dept: INTERNAL MEDICINE | Facility: CLINIC | Age: 66
End: 2021-08-19

## 2021-08-20 ENCOUNTER — PATIENT MESSAGE (OUTPATIENT)
Dept: INTERNAL MEDICINE | Facility: CLINIC | Age: 66
End: 2021-08-20

## 2021-08-20 RX ORDER — INSULIN PUMP SYRINGE, 3 ML
EACH MISCELLANEOUS
Qty: 1 EACH | Refills: 0 | Status: SHIPPED | OUTPATIENT
Start: 2021-08-20 | End: 2022-01-04

## 2021-08-20 RX ORDER — LANCETS
EACH MISCELLANEOUS
Qty: 100 EACH | Refills: 3 | Status: SHIPPED | OUTPATIENT
Start: 2021-08-20 | End: 2022-09-10

## 2021-09-14 ENCOUNTER — HOSPITAL ENCOUNTER (OUTPATIENT)
Dept: CARDIOLOGY | Facility: HOSPITAL | Age: 66
Discharge: HOME | End: 2021-09-14
Attending: INTERNAL MEDICINE
Payer: MEDICARE

## 2021-09-14 ENCOUNTER — HOSPITAL ENCOUNTER (OUTPATIENT)
Dept: RADIOLOGY | Facility: HOSPITAL | Age: 66
Discharge: HOME | End: 2021-09-14
Attending: INTERNAL MEDICINE
Payer: MEDICARE

## 2021-09-14 VITALS
SYSTOLIC BLOOD PRESSURE: 104 MMHG | DIASTOLIC BLOOD PRESSURE: 70 MMHG | BODY MASS INDEX: 36.73 KG/M2 | HEIGHT: 67 IN | WEIGHT: 234 LBS

## 2021-09-14 DIAGNOSIS — I15.2 HYPERTENSION ASSOCIATED WITH DIABETES: ICD-10-CM

## 2021-09-14 DIAGNOSIS — E11.59 HYPERTENSION ASSOCIATED WITH DIABETES: ICD-10-CM

## 2021-09-14 DIAGNOSIS — E78.5 HYPERLIPIDEMIA ASSOCIATED WITH TYPE 2 DIABETES MELLITUS: ICD-10-CM

## 2021-09-14 DIAGNOSIS — R94.31 ABNORMAL EKG: ICD-10-CM

## 2021-09-14 DIAGNOSIS — E11.69 HYPERLIPIDEMIA ASSOCIATED WITH TYPE 2 DIABETES MELLITUS: ICD-10-CM

## 2021-09-14 DIAGNOSIS — E66.01 MORBID OBESITY WITH BMI OF 40.0-44.9, ADULT: ICD-10-CM

## 2021-09-14 DIAGNOSIS — Z01.810 PREOP CARDIOVASCULAR EXAM: ICD-10-CM

## 2021-09-14 LAB
AORTIC ROOT ANNULUS: 3.56 CM
AV INDEX (PROSTH): 0.93
AV MEAN GRADIENT: 4 MMHG
AV PEAK GRADIENT: 7 MMHG
AV VALVE AREA: 2.83 CM2
AV VELOCITY RATIO: 0.92
BSA FOR ECHO PROCEDURE: 2.24 M2
CV ECHO LV RWT: 0.38 CM
CV STRESS BASE HR: 56 BPM
DIASTOLIC BLOOD PRESSURE: 85 MMHG
DOP CALC AO PEAK VEL: 1.3 M/S
DOP CALC AO VTI: 24.72 CM
DOP CALC LVOT AREA: 3 CM2
DOP CALC LVOT DIAMETER: 1.97 CM
DOP CALC LVOT PEAK VEL: 1.19 M/S
DOP CALC LVOT STROKE VOLUME: 69.92 CM3
DOP CALC MV VTI: 25.9 CM
DOP CALC RVOT PEAK VEL: 0.67 M/S
DOP CALC RVOT VTI: 13.76 CM
DOP CALCLVOT PEAK VEL VTI: 22.95 CM
E WAVE DECELERATION TIME: 277.17 MSEC
E/A RATIO: 1.09
E/E' RATIO: 7.73 M/S
ECHO LV POSTERIOR WALL: 0.91 CM (ref 0.6–1.1)
EJECTION FRACTION: 65 %
FRACTIONAL SHORTENING: 22 % (ref 28–44)
INTERVENTRICULAR SEPTUM: 0.96 CM (ref 0.6–1.1)
IVRT: 85.63 MSEC
LA MAJOR: 4.3 CM
LA MINOR: 4.09 CM
LA WIDTH: 3.35 CM
LEFT ATRIUM SIZE: 3.14 CM
LEFT ATRIUM VOLUME INDEX: 17.4 ML/M2
LEFT ATRIUM VOLUME: 37.48 CM3
LEFT INTERNAL DIMENSION IN SYSTOLE: 3.71 CM (ref 2.1–4)
LEFT VENTRICLE DIASTOLIC VOLUME INDEX: 48.57 ML/M2
LEFT VENTRICLE DIASTOLIC VOLUME: 104.92 ML
LEFT VENTRICLE MASS INDEX: 71 G/M2
LEFT VENTRICLE SYSTOLIC VOLUME INDEX: 27.1 ML/M2
LEFT VENTRICLE SYSTOLIC VOLUME: 58.45 ML
LEFT VENTRICULAR INTERNAL DIMENSION IN DIASTOLE: 4.75 CM (ref 3.5–6)
LEFT VENTRICULAR MASS: 152.81 G
LV LATERAL E/E' RATIO: 7.08 M/S
LV SEPTAL E/E' RATIO: 8.5 M/S
MV MEAN GRADIENT: 1 MMHG
MV PEAK A VEL: 0.78 M/S
MV PEAK E VEL: 0.85 M/S
MV PEAK GRADIENT: 3 MMHG
MV VALVE AREA BY CONTINUITY EQUATION: 2.7 CM2
NUC REST EJECTION FRACTION: 65
NUC STRESS EJECTION FRACTION: 64 %
OHS CV CPX 1 MINUTE RECOVERY HEART RATE: 131 BPM
OHS CV CPX 85 PERCENT MAX PREDICTED HEART RATE MALE: 132
OHS CV CPX ESTIMATED METS: 10
OHS CV CPX MAX PREDICTED HEART RATE: 155
OHS CV CPX PATIENT IS FEMALE: 0
OHS CV CPX PATIENT IS MALE: 1
OHS CV CPX PEAK DIASTOLIC BLOOD PRESSURE: 97 MMHG
OHS CV CPX PEAK HEAR RATE: 141 BPM
OHS CV CPX PEAK RATE PRESSURE PRODUCT: NORMAL
OHS CV CPX PEAK SYSTOLIC BLOOD PRESSURE: 199 MMHG
OHS CV CPX PERCENT MAX PREDICTED HEART RATE ACHIEVED: 91
OHS CV CPX RATE PRESSURE PRODUCT PRESENTING: 7840
PV MEAN GRADIENT: 1 MMHG
PV PEAK VELOCITY: 0.8 CM/S
RA MAJOR: 4.47 CM
RA PRESSURE: 3 MMHG
RA WIDTH: 2.8 CM
RIGHT VENTRICULAR END-DIASTOLIC DIMENSION: 2.09 CM
SINUS: 2.83 CM
STJ: 2.65 CM
STRESS ECHO POST EXERCISE DUR MIN: 9 MINUTES
STRESS ECHO POST EXERCISE DUR SEC: 23 SECONDS
SYSTOLIC BLOOD PRESSURE: 140 MMHG
TDI LATERAL: 0.12 M/S
TDI SEPTAL: 0.1 M/S
TDI: 0.11 M/S
TRICUSPID ANNULAR PLANE SYSTOLIC EXCURSION: 1.98 CM

## 2021-09-14 PROCEDURE — 93016 CV STRESS TEST SUPVJ ONLY: CPT | Mod: HCNC,,, | Performed by: INTERNAL MEDICINE

## 2021-09-14 PROCEDURE — 78452 HT MUSCLE IMAGE SPECT MULT: CPT | Mod: HCNC

## 2021-09-14 PROCEDURE — 93306 TTE W/DOPPLER COMPLETE: CPT | Mod: HCNC

## 2021-09-14 PROCEDURE — 93306 ECHO (CUPID ONLY): ICD-10-PCS | Mod: 26,HCNC,, | Performed by: INTERNAL MEDICINE

## 2021-09-14 PROCEDURE — 93018 STRESS TEST WITH MYOCARDIAL PERFUSION (CUPID ONLY): ICD-10-PCS | Mod: HCNC,,, | Performed by: INTERNAL MEDICINE

## 2021-09-14 PROCEDURE — 93306 TTE W/DOPPLER COMPLETE: CPT | Mod: 26,HCNC,, | Performed by: INTERNAL MEDICINE

## 2021-09-14 PROCEDURE — 93016 STRESS TEST WITH MYOCARDIAL PERFUSION (CUPID ONLY): ICD-10-PCS | Mod: HCNC,,, | Performed by: INTERNAL MEDICINE

## 2021-09-14 PROCEDURE — 93017 CV STRESS TEST TRACING ONLY: CPT | Mod: HCNC

## 2021-09-14 PROCEDURE — A9502 TC99M TETROFOSMIN: HCPCS | Mod: HCNC

## 2021-09-14 PROCEDURE — 78452 HT MUSCLE IMAGE SPECT MULT: CPT | Mod: 26,HCNC,, | Performed by: INTERNAL MEDICINE

## 2021-09-14 PROCEDURE — 78452 STRESS TEST WITH MYOCARDIAL PERFUSION (CUPID ONLY): ICD-10-PCS | Mod: 26,HCNC,, | Performed by: INTERNAL MEDICINE

## 2021-09-14 PROCEDURE — 93018 CV STRESS TEST I&R ONLY: CPT | Mod: HCNC,,, | Performed by: INTERNAL MEDICINE

## 2021-09-27 ENCOUNTER — PATIENT OUTREACH (OUTPATIENT)
Dept: ADMINISTRATIVE | Facility: OTHER | Age: 66
End: 2021-09-27

## 2021-09-28 ENCOUNTER — OFFICE VISIT (OUTPATIENT)
Dept: CARDIOLOGY | Facility: CLINIC | Age: 66
End: 2021-09-28
Payer: MEDICARE

## 2021-09-28 VITALS
DIASTOLIC BLOOD PRESSURE: 70 MMHG | SYSTOLIC BLOOD PRESSURE: 118 MMHG | BODY MASS INDEX: 37.3 KG/M2 | HEART RATE: 60 BPM | HEIGHT: 67 IN | OXYGEN SATURATION: 98 % | WEIGHT: 237.63 LBS

## 2021-09-28 DIAGNOSIS — E11.59 HYPERTENSION ASSOCIATED WITH DIABETES: Primary | ICD-10-CM

## 2021-09-28 DIAGNOSIS — E66.01 MORBID OBESITY WITH BMI OF 40.0-44.9, ADULT: ICD-10-CM

## 2021-09-28 DIAGNOSIS — E11.9 DIABETES MELLITUS WITHOUT COMPLICATION: ICD-10-CM

## 2021-09-28 DIAGNOSIS — M54.50 BILATERAL LOW BACK PAIN, UNSPECIFIED CHRONICITY, UNSPECIFIED WHETHER SCIATICA PRESENT: ICD-10-CM

## 2021-09-28 DIAGNOSIS — I15.2 HYPERTENSION ASSOCIATED WITH DIABETES: Primary | ICD-10-CM

## 2021-09-28 DIAGNOSIS — R94.31 ABNORMAL ECG: ICD-10-CM

## 2021-09-28 DIAGNOSIS — E11.69 HYPERLIPIDEMIA ASSOCIATED WITH TYPE 2 DIABETES MELLITUS: ICD-10-CM

## 2021-09-28 DIAGNOSIS — E78.5 HYPERLIPIDEMIA ASSOCIATED WITH TYPE 2 DIABETES MELLITUS: ICD-10-CM

## 2021-09-28 PROCEDURE — 3051F PR MOST RECENT HEMOGLOBIN A1C LEVEL 7.0 - < 8.0%: ICD-10-PCS | Mod: HCNC,CPTII,S$GLB, | Performed by: INTERNAL MEDICINE

## 2021-09-28 PROCEDURE — 99214 PR OFFICE/OUTPT VISIT, EST, LEVL IV, 30-39 MIN: ICD-10-PCS | Mod: HCNC,S$GLB,, | Performed by: INTERNAL MEDICINE

## 2021-09-28 PROCEDURE — 1160F PR REVIEW ALL MEDS BY PRESCRIBER/CLIN PHARMACIST DOCUMENTED: ICD-10-PCS | Mod: HCNC,CPTII,S$GLB, | Performed by: INTERNAL MEDICINE

## 2021-09-28 PROCEDURE — 4010F ACE/ARB THERAPY RXD/TAKEN: CPT | Mod: HCNC,CPTII,S$GLB, | Performed by: INTERNAL MEDICINE

## 2021-09-28 PROCEDURE — 1159F PR MEDICATION LIST DOCUMENTED IN MEDICAL RECORD: ICD-10-PCS | Mod: HCNC,CPTII,S$GLB, | Performed by: INTERNAL MEDICINE

## 2021-09-28 PROCEDURE — 99999 PR PBB SHADOW E&M-EST. PATIENT-LVL IV: ICD-10-PCS | Mod: PBBFAC,HCNC,, | Performed by: INTERNAL MEDICINE

## 2021-09-28 PROCEDURE — 3008F BODY MASS INDEX DOCD: CPT | Mod: HCNC,CPTII,S$GLB, | Performed by: INTERNAL MEDICINE

## 2021-09-28 PROCEDURE — 1126F AMNT PAIN NOTED NONE PRSNT: CPT | Mod: HCNC,CPTII,S$GLB, | Performed by: INTERNAL MEDICINE

## 2021-09-28 PROCEDURE — 1126F PR PAIN SEVERITY QUANTIFIED, NO PAIN PRESENT: ICD-10-PCS | Mod: HCNC,CPTII,S$GLB, | Performed by: INTERNAL MEDICINE

## 2021-09-28 PROCEDURE — 99214 OFFICE O/P EST MOD 30 MIN: CPT | Mod: HCNC,S$GLB,, | Performed by: INTERNAL MEDICINE

## 2021-09-28 PROCEDURE — 3008F PR BODY MASS INDEX (BMI) DOCUMENTED: ICD-10-PCS | Mod: HCNC,CPTII,S$GLB, | Performed by: INTERNAL MEDICINE

## 2021-09-28 PROCEDURE — 3074F PR MOST RECENT SYSTOLIC BLOOD PRESSURE < 130 MM HG: ICD-10-PCS | Mod: HCNC,CPTII,S$GLB, | Performed by: INTERNAL MEDICINE

## 2021-09-28 PROCEDURE — 3078F PR MOST RECENT DIASTOLIC BLOOD PRESSURE < 80 MM HG: ICD-10-PCS | Mod: HCNC,CPTII,S$GLB, | Performed by: INTERNAL MEDICINE

## 2021-09-28 PROCEDURE — 1160F RVW MEDS BY RX/DR IN RCRD: CPT | Mod: HCNC,CPTII,S$GLB, | Performed by: INTERNAL MEDICINE

## 2021-09-28 PROCEDURE — 99999 PR PBB SHADOW E&M-EST. PATIENT-LVL IV: CPT | Mod: PBBFAC,HCNC,, | Performed by: INTERNAL MEDICINE

## 2021-09-28 PROCEDURE — 3078F DIAST BP <80 MM HG: CPT | Mod: HCNC,CPTII,S$GLB, | Performed by: INTERNAL MEDICINE

## 2021-09-28 PROCEDURE — 3074F SYST BP LT 130 MM HG: CPT | Mod: HCNC,CPTII,S$GLB, | Performed by: INTERNAL MEDICINE

## 2021-09-28 PROCEDURE — 1159F MED LIST DOCD IN RCRD: CPT | Mod: HCNC,CPTII,S$GLB, | Performed by: INTERNAL MEDICINE

## 2021-09-28 PROCEDURE — 4010F PR ACE/ARB THEARPY RXD/TAKEN: ICD-10-PCS | Mod: HCNC,CPTII,S$GLB, | Performed by: INTERNAL MEDICINE

## 2021-09-28 PROCEDURE — 3051F HG A1C>EQUAL 7.0%<8.0%: CPT | Mod: HCNC,CPTII,S$GLB, | Performed by: INTERNAL MEDICINE

## 2021-10-04 RX ORDER — INSULIN PUMP SYRINGE, 3 ML
EACH MISCELLANEOUS
Qty: 1 EACH | Refills: 0 | Status: ON HOLD | OUTPATIENT
Start: 2021-10-04 | End: 2022-08-12 | Stop reason: HOSPADM

## 2021-10-04 RX ORDER — LANCETS
EACH MISCELLANEOUS
Qty: 100 EACH | Refills: 3 | Status: SHIPPED | OUTPATIENT
Start: 2021-10-04 | End: 2023-06-06 | Stop reason: SDUPTHER

## 2021-10-13 DIAGNOSIS — E11.9 TYPE 2 DIABETES MELLITUS WITHOUT COMPLICATION: ICD-10-CM

## 2021-11-02 ENCOUNTER — LAB VISIT (OUTPATIENT)
Dept: LAB | Facility: HOSPITAL | Age: 66
End: 2021-11-02
Attending: INTERNAL MEDICINE
Payer: MEDICARE

## 2021-11-02 DIAGNOSIS — E21.0 PRIMARY HYPERPARATHYROIDISM: ICD-10-CM

## 2021-11-02 DIAGNOSIS — E11.9 DIABETES MELLITUS WITHOUT COMPLICATION: ICD-10-CM

## 2021-11-02 DIAGNOSIS — E55.9 VITAMIN D DEFICIENCY: ICD-10-CM

## 2021-11-02 DIAGNOSIS — E11.9 TYPE 2 DIABETES MELLITUS WITHOUT COMPLICATION: ICD-10-CM

## 2021-11-02 PROCEDURE — 82570 ASSAY OF URINE CREATININE: CPT | Mod: HCNC | Performed by: INTERNAL MEDICINE

## 2021-11-02 PROCEDURE — 83036 HEMOGLOBIN GLYCOSYLATED A1C: CPT | Mod: HCNC | Performed by: INTERNAL MEDICINE

## 2021-11-02 PROCEDURE — 36415 COLL VENOUS BLD VENIPUNCTURE: CPT | Mod: HCNC,PO | Performed by: INTERNAL MEDICINE

## 2021-11-02 PROCEDURE — 80053 COMPREHEN METABOLIC PANEL: CPT | Mod: HCNC | Performed by: INTERNAL MEDICINE

## 2021-11-02 PROCEDURE — 83970 ASSAY OF PARATHORMONE: CPT | Mod: HCNC | Performed by: INTERNAL MEDICINE

## 2021-11-02 PROCEDURE — 82306 VITAMIN D 25 HYDROXY: CPT | Mod: HCNC | Performed by: INTERNAL MEDICINE

## 2021-11-02 PROCEDURE — 82330 ASSAY OF CALCIUM: CPT | Mod: HCNC | Performed by: INTERNAL MEDICINE

## 2021-11-02 PROCEDURE — 80061 LIPID PANEL: CPT | Mod: HCNC | Performed by: INTERNAL MEDICINE

## 2021-11-02 PROCEDURE — 84100 ASSAY OF PHOSPHORUS: CPT | Mod: HCNC | Performed by: INTERNAL MEDICINE

## 2021-11-03 LAB
25(OH)D3+25(OH)D2 SERPL-MCNC: 25 NG/ML (ref 30–96)
ALBUMIN SERPL BCP-MCNC: 3.9 G/DL (ref 3.5–5.2)
ALBUMIN/CREAT UR: 9.4 UG/MG (ref 0–30)
ALP SERPL-CCNC: 67 U/L (ref 55–135)
ALT SERPL W/O P-5'-P-CCNC: 19 U/L (ref 10–44)
ANION GAP SERPL CALC-SCNC: 10 MMOL/L (ref 8–16)
AST SERPL-CCNC: 22 U/L (ref 10–40)
BILIRUB SERPL-MCNC: 0.5 MG/DL (ref 0.1–1)
BUN SERPL-MCNC: 15 MG/DL (ref 8–23)
CA-I BLDV-SCNC: 1.24 MMOL/L (ref 1.06–1.42)
CALCIUM SERPL-MCNC: 9.4 MG/DL (ref 8.7–10.5)
CHLORIDE SERPL-SCNC: 104 MMOL/L (ref 95–110)
CHOLEST SERPL-MCNC: 186 MG/DL (ref 120–199)
CHOLEST/HDLC SERPL: 5 {RATIO} (ref 2–5)
CO2 SERPL-SCNC: 25 MMOL/L (ref 23–29)
CREAT SERPL-MCNC: 0.9 MG/DL (ref 0.5–1.4)
CREAT UR-MCNC: 159 MG/DL (ref 23–375)
EST. GFR  (AFRICAN AMERICAN): >60 ML/MIN/1.73 M^2
EST. GFR  (NON AFRICAN AMERICAN): >60 ML/MIN/1.73 M^2
ESTIMATED AVG GLUCOSE: 157 MG/DL (ref 68–131)
GLUCOSE SERPL-MCNC: 131 MG/DL (ref 70–110)
HBA1C MFR BLD: 7.1 % (ref 4–5.6)
HDLC SERPL-MCNC: 37 MG/DL (ref 40–75)
HDLC SERPL: 19.9 % (ref 20–50)
LDLC SERPL CALC-MCNC: 116.6 MG/DL (ref 63–159)
MICROALBUMIN UR DL<=1MG/L-MCNC: 15 UG/ML
NONHDLC SERPL-MCNC: 149 MG/DL
PHOSPHATE SERPL-MCNC: 2 MG/DL (ref 2.7–4.5)
POTASSIUM SERPL-SCNC: 4.5 MMOL/L (ref 3.5–5.1)
PROT SERPL-MCNC: 7.1 G/DL (ref 6–8.4)
PTH-INTACT SERPL-MCNC: 84.4 PG/ML (ref 9–77)
SODIUM SERPL-SCNC: 139 MMOL/L (ref 136–145)
TRIGL SERPL-MCNC: 162 MG/DL (ref 30–150)

## 2021-11-05 ENCOUNTER — OFFICE VISIT (OUTPATIENT)
Dept: INTERNAL MEDICINE | Facility: CLINIC | Age: 66
End: 2021-11-05
Payer: MEDICARE

## 2021-11-05 VITALS
HEIGHT: 67 IN | OXYGEN SATURATION: 98 % | BODY MASS INDEX: 36.95 KG/M2 | HEART RATE: 57 BPM | WEIGHT: 235.44 LBS | SYSTOLIC BLOOD PRESSURE: 127 MMHG | DIASTOLIC BLOOD PRESSURE: 76 MMHG

## 2021-11-05 DIAGNOSIS — E11.9 DIABETES MELLITUS WITHOUT COMPLICATION: ICD-10-CM

## 2021-11-05 DIAGNOSIS — E55.9 VITAMIN D DEFICIENCY: ICD-10-CM

## 2021-11-05 DIAGNOSIS — E66.01 MORBID OBESITY WITH BMI OF 40.0-44.9, ADULT: ICD-10-CM

## 2021-11-05 DIAGNOSIS — E11.59 HYPERTENSION ASSOCIATED WITH DIABETES: ICD-10-CM

## 2021-11-05 DIAGNOSIS — I15.2 HYPERTENSION ASSOCIATED WITH DIABETES: ICD-10-CM

## 2021-11-05 DIAGNOSIS — M54.32 SCIATICA OF LEFT SIDE: ICD-10-CM

## 2021-11-05 DIAGNOSIS — G56.00 CARPAL TUNNEL SYNDROME, UNSPECIFIED LATERALITY: ICD-10-CM

## 2021-11-05 DIAGNOSIS — D64.9 ANEMIA, UNSPECIFIED TYPE: ICD-10-CM

## 2021-11-05 DIAGNOSIS — Z12.5 PROSTATE CANCER SCREENING: ICD-10-CM

## 2021-11-05 DIAGNOSIS — E78.5 HYPERLIPIDEMIA ASSOCIATED WITH TYPE 2 DIABETES MELLITUS: Primary | ICD-10-CM

## 2021-11-05 DIAGNOSIS — E11.69 HYPERLIPIDEMIA ASSOCIATED WITH TYPE 2 DIABETES MELLITUS: Primary | ICD-10-CM

## 2021-11-05 PROCEDURE — 4010F PR ACE/ARB THEARPY RXD/TAKEN: ICD-10-PCS | Mod: HCNC,CPTII,S$GLB, | Performed by: INTERNAL MEDICINE

## 2021-11-05 PROCEDURE — 3066F PR DOCUMENTATION OF TREATMENT FOR NEPHROPATHY: ICD-10-PCS | Mod: HCNC,CPTII,S$GLB, | Performed by: INTERNAL MEDICINE

## 2021-11-05 PROCEDURE — 1125F AMNT PAIN NOTED PAIN PRSNT: CPT | Mod: HCNC,CPTII,S$GLB, | Performed by: INTERNAL MEDICINE

## 2021-11-05 PROCEDURE — 3008F PR BODY MASS INDEX (BMI) DOCUMENTED: ICD-10-PCS | Mod: HCNC,CPTII,S$GLB, | Performed by: INTERNAL MEDICINE

## 2021-11-05 PROCEDURE — G0008 FLU VACCINE - QUADRIVALENT - ADJUVANTED: ICD-10-PCS | Mod: HCNC,S$GLB,, | Performed by: INTERNAL MEDICINE

## 2021-11-05 PROCEDURE — 3061F NEG MICROALBUMINURIA REV: CPT | Mod: HCNC,CPTII,S$GLB, | Performed by: INTERNAL MEDICINE

## 2021-11-05 PROCEDURE — 3051F PR MOST RECENT HEMOGLOBIN A1C LEVEL 7.0 - < 8.0%: ICD-10-PCS | Mod: HCNC,CPTII,S$GLB, | Performed by: INTERNAL MEDICINE

## 2021-11-05 PROCEDURE — 3066F NEPHROPATHY DOC TX: CPT | Mod: HCNC,CPTII,S$GLB, | Performed by: INTERNAL MEDICINE

## 2021-11-05 PROCEDURE — 3074F SYST BP LT 130 MM HG: CPT | Mod: HCNC,CPTII,S$GLB, | Performed by: INTERNAL MEDICINE

## 2021-11-05 PROCEDURE — 4010F ACE/ARB THERAPY RXD/TAKEN: CPT | Mod: HCNC,CPTII,S$GLB, | Performed by: INTERNAL MEDICINE

## 2021-11-05 PROCEDURE — G0008 ADMIN INFLUENZA VIRUS VAC: HCPCS | Mod: HCNC,S$GLB,, | Performed by: INTERNAL MEDICINE

## 2021-11-05 PROCEDURE — 1160F PR REVIEW ALL MEDS BY PRESCRIBER/CLIN PHARMACIST DOCUMENTED: ICD-10-PCS | Mod: HCNC,CPTII,S$GLB, | Performed by: INTERNAL MEDICINE

## 2021-11-05 PROCEDURE — 99214 OFFICE O/P EST MOD 30 MIN: CPT | Mod: 25,HCNC,S$GLB, | Performed by: INTERNAL MEDICINE

## 2021-11-05 PROCEDURE — 1101F PR PT FALLS ASSESS DOC 0-1 FALLS W/OUT INJ PAST YR: ICD-10-PCS | Mod: HCNC,CPTII,S$GLB, | Performed by: INTERNAL MEDICINE

## 2021-11-05 PROCEDURE — 3078F DIAST BP <80 MM HG: CPT | Mod: HCNC,CPTII,S$GLB, | Performed by: INTERNAL MEDICINE

## 2021-11-05 PROCEDURE — 3051F HG A1C>EQUAL 7.0%<8.0%: CPT | Mod: HCNC,CPTII,S$GLB, | Performed by: INTERNAL MEDICINE

## 2021-11-05 PROCEDURE — 3061F PR NEG MICROALBUMINURIA RESULT DOCUMENTED/REVIEW: ICD-10-PCS | Mod: HCNC,CPTII,S$GLB, | Performed by: INTERNAL MEDICINE

## 2021-11-05 PROCEDURE — 1159F PR MEDICATION LIST DOCUMENTED IN MEDICAL RECORD: ICD-10-PCS | Mod: HCNC,CPTII,S$GLB, | Performed by: INTERNAL MEDICINE

## 2021-11-05 PROCEDURE — 1160F RVW MEDS BY RX/DR IN RCRD: CPT | Mod: HCNC,CPTII,S$GLB, | Performed by: INTERNAL MEDICINE

## 2021-11-05 PROCEDURE — 99999 PR PBB SHADOW E&M-EST. PATIENT-LVL IV: ICD-10-PCS | Mod: PBBFAC,HCNC,, | Performed by: INTERNAL MEDICINE

## 2021-11-05 PROCEDURE — 3008F BODY MASS INDEX DOCD: CPT | Mod: HCNC,CPTII,S$GLB, | Performed by: INTERNAL MEDICINE

## 2021-11-05 PROCEDURE — 3074F PR MOST RECENT SYSTOLIC BLOOD PRESSURE < 130 MM HG: ICD-10-PCS | Mod: HCNC,CPTII,S$GLB, | Performed by: INTERNAL MEDICINE

## 2021-11-05 PROCEDURE — 99214 PR OFFICE/OUTPT VISIT, EST, LEVL IV, 30-39 MIN: ICD-10-PCS | Mod: 25,HCNC,S$GLB, | Performed by: INTERNAL MEDICINE

## 2021-11-05 PROCEDURE — 1125F PR PAIN SEVERITY QUANTIFIED, PAIN PRESENT: ICD-10-PCS | Mod: HCNC,CPTII,S$GLB, | Performed by: INTERNAL MEDICINE

## 2021-11-05 PROCEDURE — 3288F PR FALLS RISK ASSESSMENT DOCUMENTED: ICD-10-PCS | Mod: HCNC,CPTII,S$GLB, | Performed by: INTERNAL MEDICINE

## 2021-11-05 PROCEDURE — 1159F MED LIST DOCD IN RCRD: CPT | Mod: HCNC,CPTII,S$GLB, | Performed by: INTERNAL MEDICINE

## 2021-11-05 PROCEDURE — 3078F PR MOST RECENT DIASTOLIC BLOOD PRESSURE < 80 MM HG: ICD-10-PCS | Mod: HCNC,CPTII,S$GLB, | Performed by: INTERNAL MEDICINE

## 2021-11-05 PROCEDURE — 99999 PR PBB SHADOW E&M-EST. PATIENT-LVL IV: CPT | Mod: PBBFAC,HCNC,, | Performed by: INTERNAL MEDICINE

## 2021-11-05 PROCEDURE — 90694 VACC AIIV4 NO PRSRV 0.5ML IM: CPT | Mod: HCNC,S$GLB,, | Performed by: INTERNAL MEDICINE

## 2021-11-05 PROCEDURE — 90694 FLU VACCINE - QUADRIVALENT - ADJUVANTED: ICD-10-PCS | Mod: HCNC,S$GLB,, | Performed by: INTERNAL MEDICINE

## 2021-11-05 PROCEDURE — 3288F FALL RISK ASSESSMENT DOCD: CPT | Mod: HCNC,CPTII,S$GLB, | Performed by: INTERNAL MEDICINE

## 2021-11-05 PROCEDURE — 1101F PT FALLS ASSESS-DOCD LE1/YR: CPT | Mod: HCNC,CPTII,S$GLB, | Performed by: INTERNAL MEDICINE

## 2021-11-05 RX ORDER — ATORVASTATIN CALCIUM 10 MG/1
10 TABLET, FILM COATED ORAL NIGHTLY
Qty: 90 TABLET | Refills: 3 | Status: SHIPPED | OUTPATIENT
Start: 2021-11-05 | End: 2022-08-16 | Stop reason: SDUPTHER

## 2021-11-11 ENCOUNTER — CLINICAL SUPPORT (OUTPATIENT)
Dept: REHABILITATION | Facility: HOSPITAL | Age: 66
End: 2021-11-11
Attending: INTERNAL MEDICINE
Payer: MEDICARE

## 2021-11-11 DIAGNOSIS — G89.29 CHRONIC LEFT-SIDED LOW BACK PAIN, UNSPECIFIED WHETHER SCIATICA PRESENT: Primary | ICD-10-CM

## 2021-11-11 DIAGNOSIS — R29.898 DECREASED STRENGTH OF TRUNK AND BACK: ICD-10-CM

## 2021-11-11 DIAGNOSIS — M54.32 SCIATICA OF LEFT SIDE: ICD-10-CM

## 2021-11-11 DIAGNOSIS — M54.50 CHRONIC LEFT-SIDED LOW BACK PAIN, UNSPECIFIED WHETHER SCIATICA PRESENT: Primary | ICD-10-CM

## 2021-11-11 PROCEDURE — 97161 PT EVAL LOW COMPLEX 20 MIN: CPT | Mod: HCNC

## 2021-11-11 PROCEDURE — 97014 ELECTRIC STIMULATION THERAPY: CPT | Mod: HCNC

## 2021-11-11 PROCEDURE — 97140 MANUAL THERAPY 1/> REGIONS: CPT | Mod: HCNC

## 2021-11-11 PROCEDURE — 97110 THERAPEUTIC EXERCISES: CPT | Mod: HCNC

## 2021-11-19 ENCOUNTER — CLINICAL SUPPORT (OUTPATIENT)
Dept: REHABILITATION | Facility: HOSPITAL | Age: 66
End: 2021-11-19
Payer: MEDICARE

## 2021-11-19 ENCOUNTER — DOCUMENTATION ONLY (OUTPATIENT)
Dept: REHABILITATION | Facility: HOSPITAL | Age: 66
End: 2021-11-19
Payer: MEDICARE

## 2021-11-19 DIAGNOSIS — R29.898 DECREASED STRENGTH OF TRUNK AND BACK: ICD-10-CM

## 2021-11-19 PROCEDURE — 97140 MANUAL THERAPY 1/> REGIONS: CPT | Mod: HCNC,CQ

## 2021-11-19 PROCEDURE — 97014 ELECTRIC STIMULATION THERAPY: CPT | Mod: HCNC,CQ

## 2021-11-19 PROCEDURE — 97110 THERAPEUTIC EXERCISES: CPT | Mod: HCNC,CQ

## 2021-11-23 ENCOUNTER — CLINICAL SUPPORT (OUTPATIENT)
Dept: REHABILITATION | Facility: HOSPITAL | Age: 66
End: 2021-11-23
Payer: MEDICARE

## 2021-11-23 DIAGNOSIS — G89.29 CHRONIC LEFT-SIDED LOW BACK PAIN, UNSPECIFIED WHETHER SCIATICA PRESENT: ICD-10-CM

## 2021-11-23 DIAGNOSIS — R29.898 DECREASED STRENGTH OF TRUNK AND BACK: Primary | ICD-10-CM

## 2021-11-23 DIAGNOSIS — M54.50 CHRONIC LEFT-SIDED LOW BACK PAIN, UNSPECIFIED WHETHER SCIATICA PRESENT: ICD-10-CM

## 2021-11-23 DIAGNOSIS — M54.32 SCIATICA OF LEFT SIDE: ICD-10-CM

## 2021-11-23 PROCEDURE — 97110 THERAPEUTIC EXERCISES: CPT | Mod: HCNC

## 2021-11-23 PROCEDURE — 97140 MANUAL THERAPY 1/> REGIONS: CPT | Mod: HCNC

## 2021-11-23 PROCEDURE — 97014 ELECTRIC STIMULATION THERAPY: CPT | Mod: HCNC

## 2021-11-26 ENCOUNTER — CLINICAL SUPPORT (OUTPATIENT)
Dept: REHABILITATION | Facility: HOSPITAL | Age: 66
End: 2021-11-26
Payer: MEDICARE

## 2021-11-26 DIAGNOSIS — R29.898 DECREASED STRENGTH OF TRUNK AND BACK: ICD-10-CM

## 2021-11-26 PROCEDURE — 97014 ELECTRIC STIMULATION THERAPY: CPT | Mod: HCNC,CQ

## 2021-11-26 PROCEDURE — 97110 THERAPEUTIC EXERCISES: CPT | Mod: HCNC,CQ

## 2021-11-29 ENCOUNTER — CLINICAL SUPPORT (OUTPATIENT)
Dept: REHABILITATION | Facility: HOSPITAL | Age: 66
End: 2021-11-29
Payer: MEDICARE

## 2021-11-29 DIAGNOSIS — R29.898 DECREASED STRENGTH OF TRUNK AND BACK: Primary | ICD-10-CM

## 2021-11-29 DIAGNOSIS — M54.32 SCIATICA OF LEFT SIDE: ICD-10-CM

## 2021-11-29 DIAGNOSIS — G89.29 CHRONIC LEFT-SIDED LOW BACK PAIN, UNSPECIFIED WHETHER SCIATICA PRESENT: ICD-10-CM

## 2021-11-29 DIAGNOSIS — M54.50 CHRONIC LEFT-SIDED LOW BACK PAIN, UNSPECIFIED WHETHER SCIATICA PRESENT: ICD-10-CM

## 2021-11-29 PROCEDURE — 97014 ELECTRIC STIMULATION THERAPY: CPT | Mod: HCNC

## 2021-11-29 PROCEDURE — 97140 MANUAL THERAPY 1/> REGIONS: CPT | Mod: HCNC

## 2021-11-29 PROCEDURE — 97110 THERAPEUTIC EXERCISES: CPT | Mod: HCNC

## 2021-12-02 ENCOUNTER — CLINICAL SUPPORT (OUTPATIENT)
Dept: REHABILITATION | Facility: HOSPITAL | Age: 66
End: 2021-12-02
Payer: MEDICARE

## 2021-12-02 DIAGNOSIS — R29.898 DECREASED STRENGTH OF TRUNK AND BACK: ICD-10-CM

## 2021-12-02 PROCEDURE — 97014 ELECTRIC STIMULATION THERAPY: CPT | Mod: HCNC

## 2021-12-02 PROCEDURE — 97110 THERAPEUTIC EXERCISES: CPT | Mod: HCNC

## 2021-12-02 PROCEDURE — 97140 MANUAL THERAPY 1/> REGIONS: CPT | Mod: HCNC

## 2021-12-06 ENCOUNTER — CLINICAL SUPPORT (OUTPATIENT)
Dept: REHABILITATION | Facility: HOSPITAL | Age: 66
End: 2021-12-06
Payer: MEDICARE

## 2021-12-06 DIAGNOSIS — R29.898 DECREASED STRENGTH OF TRUNK AND BACK: ICD-10-CM

## 2021-12-06 PROCEDURE — 97110 THERAPEUTIC EXERCISES: CPT | Mod: HCNC

## 2021-12-06 PROCEDURE — 97014 ELECTRIC STIMULATION THERAPY: CPT | Mod: HCNC

## 2021-12-06 PROCEDURE — 97140 MANUAL THERAPY 1/> REGIONS: CPT | Mod: HCNC

## 2021-12-09 ENCOUNTER — CLINICAL SUPPORT (OUTPATIENT)
Dept: REHABILITATION | Facility: HOSPITAL | Age: 66
End: 2021-12-09
Payer: MEDICARE

## 2021-12-09 DIAGNOSIS — R29.898 DECREASED STRENGTH OF TRUNK AND BACK: ICD-10-CM

## 2021-12-09 PROCEDURE — 97014 ELECTRIC STIMULATION THERAPY: CPT | Mod: HCNC

## 2021-12-09 PROCEDURE — 97110 THERAPEUTIC EXERCISES: CPT | Mod: HCNC

## 2021-12-13 ENCOUNTER — CLINICAL SUPPORT (OUTPATIENT)
Dept: REHABILITATION | Facility: HOSPITAL | Age: 66
End: 2021-12-13
Payer: MEDICARE

## 2021-12-13 DIAGNOSIS — R29.898 DECREASED STRENGTH OF TRUNK AND BACK: ICD-10-CM

## 2021-12-13 PROCEDURE — 97110 THERAPEUTIC EXERCISES: CPT | Mod: HCNC

## 2021-12-13 PROCEDURE — 97014 ELECTRIC STIMULATION THERAPY: CPT | Mod: HCNC

## 2021-12-16 ENCOUNTER — CLINICAL SUPPORT (OUTPATIENT)
Dept: REHABILITATION | Facility: HOSPITAL | Age: 66
End: 2021-12-16
Payer: MEDICARE

## 2021-12-16 DIAGNOSIS — R29.898 DECREASED STRENGTH OF TRUNK AND BACK: ICD-10-CM

## 2021-12-16 PROCEDURE — 97014 ELECTRIC STIMULATION THERAPY: CPT | Mod: HCNC

## 2021-12-16 PROCEDURE — 97110 THERAPEUTIC EXERCISES: CPT | Mod: HCNC

## 2021-12-20 ENCOUNTER — CLINICAL SUPPORT (OUTPATIENT)
Dept: REHABILITATION | Facility: HOSPITAL | Age: 66
End: 2021-12-20
Payer: MEDICARE

## 2021-12-20 DIAGNOSIS — R29.898 DECREASED STRENGTH OF TRUNK AND BACK: ICD-10-CM

## 2021-12-20 PROCEDURE — 97014 ELECTRIC STIMULATION THERAPY: CPT | Mod: HCNC

## 2021-12-20 PROCEDURE — 97110 THERAPEUTIC EXERCISES: CPT | Mod: HCNC

## 2021-12-23 ENCOUNTER — CLINICAL SUPPORT (OUTPATIENT)
Dept: REHABILITATION | Facility: HOSPITAL | Age: 66
End: 2021-12-23
Payer: MEDICARE

## 2021-12-23 DIAGNOSIS — R29.898 DECREASED STRENGTH OF TRUNK AND BACK: ICD-10-CM

## 2021-12-23 PROCEDURE — 97110 THERAPEUTIC EXERCISES: CPT | Mod: HCNC

## 2021-12-23 PROCEDURE — 97014 ELECTRIC STIMULATION THERAPY: CPT | Mod: HCNC

## 2021-12-27 ENCOUNTER — CLINICAL SUPPORT (OUTPATIENT)
Dept: REHABILITATION | Facility: HOSPITAL | Age: 66
End: 2021-12-27
Payer: MEDICARE

## 2021-12-27 DIAGNOSIS — G89.29 CHRONIC LEFT-SIDED LOW BACK PAIN, UNSPECIFIED WHETHER SCIATICA PRESENT: ICD-10-CM

## 2021-12-27 DIAGNOSIS — M54.50 CHRONIC LEFT-SIDED LOW BACK PAIN, UNSPECIFIED WHETHER SCIATICA PRESENT: ICD-10-CM

## 2021-12-27 DIAGNOSIS — R29.898 DECREASED STRENGTH OF TRUNK AND BACK: ICD-10-CM

## 2021-12-27 DIAGNOSIS — M54.50 LUMBAR BACK PAIN: Primary | ICD-10-CM

## 2021-12-27 PROCEDURE — 97014 ELECTRIC STIMULATION THERAPY: CPT | Mod: HCNC

## 2021-12-27 PROCEDURE — 97110 THERAPEUTIC EXERCISES: CPT | Mod: HCNC

## 2021-12-30 ENCOUNTER — CLINICAL SUPPORT (OUTPATIENT)
Dept: REHABILITATION | Facility: HOSPITAL | Age: 66
End: 2021-12-30
Payer: MEDICARE

## 2021-12-30 DIAGNOSIS — R29.898 DECREASED STRENGTH OF TRUNK AND BACK: ICD-10-CM

## 2021-12-30 PROCEDURE — 97110 THERAPEUTIC EXERCISES: CPT | Mod: HCNC

## 2021-12-30 NOTE — PROGRESS NOTES
[facility]   Physical Therapy Treatment Note     Name: Friday ROGELIO Blake  Clinic Number: [id number]    Therapy Diagnosis:   No diagnosis found.  Physician: Sid Elizabeth MD    Visit Date: 12/30/2021      Physician Orders: PT Eval and Treat    Medical Diagnosis from Referral: left sided sciatica  Evaluation Date: 11/11/2021  Authorization Period Expiration: 11/05/2022  Plan of Care Expiration: 1/6/2022  Visit # / Visits authorized: 13 / 20 + eval    PTA Visit #: -- /5     Time In: 7:15 am   Time Out: 8:30 am   Total Billable Time: 45 minutes    SUBJECTIVE     Pt reports:  He is improving   He was compliant with home exercise program.  Response to previous treatment: no complaints  Functional change: nothing significant    Pain: 4/10   Location: left back , buttocks     OBJECTIVE     Objective Measures updated at progress report unless specified.     Treatment     Friday received the treatments listed below:      Friday received therapeutic exercises to develop strength, ROM, posture and core stabilization for 45 minutes including:    Bike x 5  mins   LTR 3' on ball  DKTC 3' on ball  Quadruped cat/cow 3 x 10   Quadruped alt u/e and l/e raise 3 x 10   Piriformis stretch   Bridge  Med X lumbar extension 68#  3 x 10   - slow eccentric   Med X trunk rotation 22# 3 x 10 each     Standing narrow base rows 2 x 10 30#     [place] carry 20# KB 2 x 50 feet each arm    Friday received the following manual therapy techniques: Soft tissue mobilizations were applied to the: Lumbar spine  for 0 minutes, including:     Friday received the following supervised modalities after being cleared for contradictions: IFC Electrical Stimulation:  Friday received IFC Electrical Stimulation for pain control applied to the lumbar spine. Pt received stimulation at 40 % scan at a frequency of 150Hz for 15 minutes. Friday tolderated treatment well without any adverse effects.       Friday received hot pack for 15  minutes to LSp with stim.      Patient Education and Home Exercises     Home Exercises and Patient Education Provided     Education provided:   -Education on condition, HEP, and plan of care      Written Home Exercises Provided: yes.  Exercises were reviewed and Friday was able to demonstrate them prior to the end of the session.  Friday demonstrated good  understanding of the education provided.      See EMR under Patient Instructions for exercises provided 11/11/2021.    ASSESSMENT     Patient is progressing well towards all goals set and is progressing with exercise without any increased symptoms.  He will benefit from continued exercises directed at trunk strength and mobility of the hips.     Friday is progressing well towards his goals.   Pt prognosis is Good.     Pt will continue to benefit from skilled outpatient physical therapy to address the deficits listed in the problem list box on initial evaluation, provide pt/family education and to maximize pt's level of independence in the home and community environment.     Pt's spiritual, cultural and educational needs considered and pt agreeable to plan of care and goals.     Anticipated barriers to physical therapy: chronicity of condition      Goals:   Short Term Goals:   MET   1. Pt will be 50% independent with HEP.  2. Pt to report decreased left leg pain  3. Pt to report decreased pain with walking x up to 20 mins     Long Term Goals: 6 weeks   1. Pt will be 100% independent with HEP.  2. Pt will know proper way to squat down or bend down without hurting his back.  3. Pt will be able to work all day and not have pain in his back by the evening.  4. Pt will have 5/5 strength in his back and core.  5. Pt will be able to perform all of his usual house/work duties without difficulty.        Plan   Plan of care Certification: 11/11/2021 to 1/6/2021.     Outpatient Physical Therapy 2 times weekly for 10 weeks to include the following interventions:  Cervical/Lumbar Traction, Electrical Stimulation prn, dry needling prn, Manual Therapy, Moist Heat/ Ice, Neuromuscular Re-ed, Patient Education, Self Care, Therapeutic Activites and Therapeutic Exercise.        Tha Quiroga, PT

## 2022-01-04 ENCOUNTER — OFFICE VISIT (OUTPATIENT)
Dept: INTERNAL MEDICINE | Facility: CLINIC | Age: 67
End: 2022-01-04
Payer: MEDICARE

## 2022-01-04 ENCOUNTER — TELEPHONE (OUTPATIENT)
Dept: PHYSICAL MEDICINE AND REHAB | Facility: CLINIC | Age: 67
End: 2022-01-04
Payer: MEDICARE

## 2022-01-04 ENCOUNTER — TELEPHONE (OUTPATIENT)
Dept: INTERNAL MEDICINE | Facility: CLINIC | Age: 67
End: 2022-01-04

## 2022-01-04 VITALS
BODY MASS INDEX: 38.2 KG/M2 | HEART RATE: 58 BPM | SYSTOLIC BLOOD PRESSURE: 122 MMHG | DIASTOLIC BLOOD PRESSURE: 80 MMHG | OXYGEN SATURATION: 97 % | WEIGHT: 243.38 LBS | HEIGHT: 67 IN

## 2022-01-04 DIAGNOSIS — M54.16 LUMBAR RADICULOPATHY: Primary | ICD-10-CM

## 2022-01-04 PROCEDURE — 3008F PR BODY MASS INDEX (BMI) DOCUMENTED: ICD-10-PCS | Mod: HCNC,CPTII,S$GLB, | Performed by: INTERNAL MEDICINE

## 2022-01-04 PROCEDURE — 99213 PR OFFICE/OUTPT VISIT, EST, LEVL III, 20-29 MIN: ICD-10-PCS | Mod: HCNC,S$GLB,, | Performed by: INTERNAL MEDICINE

## 2022-01-04 PROCEDURE — 1125F PR PAIN SEVERITY QUANTIFIED, PAIN PRESENT: ICD-10-PCS | Mod: HCNC,CPTII,S$GLB, | Performed by: INTERNAL MEDICINE

## 2022-01-04 PROCEDURE — 3072F LOW RISK FOR RETINOPATHY: CPT | Mod: HCNC,CPTII,S$GLB, | Performed by: INTERNAL MEDICINE

## 2022-01-04 PROCEDURE — 3288F FALL RISK ASSESSMENT DOCD: CPT | Mod: HCNC,CPTII,S$GLB, | Performed by: INTERNAL MEDICINE

## 2022-01-04 PROCEDURE — 3072F PR LOW RISK FOR RETINOPATHY: ICD-10-PCS | Mod: HCNC,CPTII,S$GLB, | Performed by: INTERNAL MEDICINE

## 2022-01-04 PROCEDURE — 1101F PR PT FALLS ASSESS DOC 0-1 FALLS W/OUT INJ PAST YR: ICD-10-PCS | Mod: HCNC,CPTII,S$GLB, | Performed by: INTERNAL MEDICINE

## 2022-01-04 PROCEDURE — 3079F DIAST BP 80-89 MM HG: CPT | Mod: HCNC,CPTII,S$GLB, | Performed by: INTERNAL MEDICINE

## 2022-01-04 PROCEDURE — 3074F SYST BP LT 130 MM HG: CPT | Mod: HCNC,CPTII,S$GLB, | Performed by: INTERNAL MEDICINE

## 2022-01-04 PROCEDURE — 1159F PR MEDICATION LIST DOCUMENTED IN MEDICAL RECORD: ICD-10-PCS | Mod: HCNC,CPTII,S$GLB, | Performed by: INTERNAL MEDICINE

## 2022-01-04 PROCEDURE — 99999 PR PBB SHADOW E&M-EST. PATIENT-LVL III: CPT | Mod: PBBFAC,HCNC,, | Performed by: INTERNAL MEDICINE

## 2022-01-04 PROCEDURE — 1101F PT FALLS ASSESS-DOCD LE1/YR: CPT | Mod: HCNC,CPTII,S$GLB, | Performed by: INTERNAL MEDICINE

## 2022-01-04 PROCEDURE — 3079F PR MOST RECENT DIASTOLIC BLOOD PRESSURE 80-89 MM HG: ICD-10-PCS | Mod: HCNC,CPTII,S$GLB, | Performed by: INTERNAL MEDICINE

## 2022-01-04 PROCEDURE — 99213 OFFICE O/P EST LOW 20 MIN: CPT | Mod: HCNC,S$GLB,, | Performed by: INTERNAL MEDICINE

## 2022-01-04 PROCEDURE — 3288F PR FALLS RISK ASSESSMENT DOCUMENTED: ICD-10-PCS | Mod: HCNC,CPTII,S$GLB, | Performed by: INTERNAL MEDICINE

## 2022-01-04 PROCEDURE — 3008F BODY MASS INDEX DOCD: CPT | Mod: HCNC,CPTII,S$GLB, | Performed by: INTERNAL MEDICINE

## 2022-01-04 PROCEDURE — 1125F AMNT PAIN NOTED PAIN PRSNT: CPT | Mod: HCNC,CPTII,S$GLB, | Performed by: INTERNAL MEDICINE

## 2022-01-04 PROCEDURE — 3074F PR MOST RECENT SYSTOLIC BLOOD PRESSURE < 130 MM HG: ICD-10-PCS | Mod: HCNC,CPTII,S$GLB, | Performed by: INTERNAL MEDICINE

## 2022-01-04 PROCEDURE — 1159F MED LIST DOCD IN RCRD: CPT | Mod: HCNC,CPTII,S$GLB, | Performed by: INTERNAL MEDICINE

## 2022-01-04 PROCEDURE — 99999 PR PBB SHADOW E&M-EST. PATIENT-LVL III: ICD-10-PCS | Mod: PBBFAC,HCNC,, | Performed by: INTERNAL MEDICINE

## 2022-01-04 RX ORDER — INFLUENZA VACCINE, ADJUVANTED 15; 15; 15; 15 UG/.5ML; UG/.5ML; UG/.5ML; UG/.5ML
INJECTION, SUSPENSION INTRAMUSCULAR
COMMUNITY
Start: 2021-11-05 | End: 2022-01-04

## 2022-01-04 NOTE — TELEPHONE ENCOUNTER
----- Message from Faviola Lezama LPN sent at 1/4/2022  8:12 AM CST -----  Please contact and schedule Nerve Conduction. Thanks

## 2022-01-04 NOTE — TELEPHONE ENCOUNTER
----- Message from Harris Vitale LPN sent at 1/4/2022 10:31 AM CST -----  Spoke with the pt; emg is scheduled on 02/25.    Thanks,  May    ----- Message -----  From: Faviola Lezama LPN  Sent: 1/4/2022   8:14 AM CST  To: Harris Vitale LPN    Please contact and schedule Nerve Conduction. Thanks

## 2022-01-04 NOTE — PROGRESS NOTES
"HPI:  Patient is a 66-year-old man who comes in today with continued problems with pain in his left lower lumbar region that radiates down into his leg.  He has been to physical therapy for the last 6-8 weeks.  It really has not had any significant improvement.  Patient was post to have nerve conduction test done 2 months ago but it never got scheduled.  He states that no one called him.  Current meds have been verified and updated per the EMR  Exam:/80 (BP Location: Right arm)   Pulse (!) 58   Ht 5' 7" (1.702 m)   Wt 110.4 kg (243 lb 6.2 oz)   SpO2 97%   BMI 38.12 kg/m²   Exam deferred    Lab Results   Component Value Date    WBC 5.20 07/22/2021    HGB 14.1 07/22/2021    HCT 45.0 07/22/2021     07/22/2021    CHOL 186 11/02/2021    TRIG 162 (H) 11/02/2021    HDL 37 (L) 11/02/2021    ALT 19 11/02/2021    AST 22 11/02/2021     11/02/2021    K 4.5 11/02/2021     11/02/2021    CREATININE 0.9 11/02/2021    BUN 15 11/02/2021    CO2 25 11/02/2021    TSH 2.496 04/05/2021    PSA 0.73 01/04/2021    HGBA1C 7.1 (H) 11/02/2021       Impression:  Lumbar radiculopathy  Patient Active Problem List   Diagnosis    Diabetes mellitus without complication    Hypertension associated with diabetes    Hyperlipidemia associated with type 2 diabetes mellitus    Morbid obesity with BMI of 40.0-44.9, adult    History of colon polyps    Vitamin D deficiency    Left carpal tunnel syndrome    Anemia    Decreased strength of trunk and back    Low back pain       Plan:  Orders Placed This Encounter    MRI Lumbar Spine Without Contrast     Patient will have lumbar MRI done.  He will get nerve conduction test done as well.  We will then proceed once we have better definition of what is going on.    This note is generated with speech recognition software and is subject to transcription error and sound alike phrases that may be missed by proofreading.      "

## 2022-01-06 ENCOUNTER — PATIENT OUTREACH (OUTPATIENT)
Dept: ADMINISTRATIVE | Facility: OTHER | Age: 67
End: 2022-01-06
Payer: MEDICARE

## 2022-01-06 NOTE — PROGRESS NOTES
Health Maintenance Due   Topic Date Due    Hepatitis C Screening  Never done    Colorectal Cancer Screening  Never done    Foot Exam  01/04/2022     Updates were requested from care everywhere.  Chart was reviewed for overdue Proactive [facility] Encounters (KELVIN) topics (CRS, Breast Cancer Screening, Eye exam)  Health Maintenance has been updated.  LINKS immunization registry triggered.  Immunizations were reconciled.

## 2022-01-10 ENCOUNTER — OFFICE VISIT (OUTPATIENT)
Dept: OPHTHALMOLOGY | Facility: CLINIC | Age: 67
End: 2022-01-10
Payer: MEDICARE

## 2022-01-10 DIAGNOSIS — H25.11 AGE-RELATED NUCLEAR CATARACT OF RIGHT EYE: ICD-10-CM

## 2022-01-10 DIAGNOSIS — E11.9 TYPE 2 DIABETES MELLITUS WITHOUT RETINOPATHY: Primary | ICD-10-CM

## 2022-01-10 DIAGNOSIS — H26.9 CORTICAL CATARACT OF RIGHT EYE: ICD-10-CM

## 2022-01-10 DIAGNOSIS — Z96.1 PSEUDOPHAKIA OF LEFT EYE: ICD-10-CM

## 2022-01-10 DIAGNOSIS — E11.9 DIABETES MELLITUS WITHOUT COMPLICATION: ICD-10-CM

## 2022-01-10 DIAGNOSIS — E11.36 DIABETIC CATARACT OF RIGHT EYE: ICD-10-CM

## 2022-01-10 PROCEDURE — 99999 PR PBB SHADOW E&M-EST. PATIENT-LVL III: CPT | Mod: PBBFAC,HCNC,, | Performed by: OPTOMETRIST

## 2022-01-10 PROCEDURE — 1160F PR REVIEW ALL MEDS BY PRESCRIBER/CLIN PHARMACIST DOCUMENTED: ICD-10-PCS | Mod: HCNC,CPTII,S$GLB, | Performed by: OPTOMETRIST

## 2022-01-10 PROCEDURE — 2023F DILAT RTA XM W/O RTNOPTHY: CPT | Mod: HCNC,CPTII,S$GLB, | Performed by: OPTOMETRIST

## 2022-01-10 PROCEDURE — 92004 PR EYE EXAM, NEW PATIENT,COMPREHESV: ICD-10-PCS | Mod: HCNC,S$GLB,, | Performed by: OPTOMETRIST

## 2022-01-10 PROCEDURE — 1159F PR MEDICATION LIST DOCUMENTED IN MEDICAL RECORD: ICD-10-PCS | Mod: HCNC,CPTII,S$GLB, | Performed by: OPTOMETRIST

## 2022-01-10 PROCEDURE — 1159F MED LIST DOCD IN RCRD: CPT | Mod: HCNC,CPTII,S$GLB, | Performed by: OPTOMETRIST

## 2022-01-10 PROCEDURE — 2023F PR DILATED RETINAL EXAM W/O EVID OF RETINOPATHY: ICD-10-PCS | Mod: HCNC,CPTII,S$GLB, | Performed by: OPTOMETRIST

## 2022-01-10 PROCEDURE — 99999 PR PBB SHADOW E&M-EST. PATIENT-LVL III: ICD-10-PCS | Mod: PBBFAC,HCNC,, | Performed by: OPTOMETRIST

## 2022-01-10 PROCEDURE — 1160F RVW MEDS BY RX/DR IN RCRD: CPT | Mod: HCNC,CPTII,S$GLB, | Performed by: OPTOMETRIST

## 2022-01-10 PROCEDURE — 92004 COMPRE OPH EXAM NEW PT 1/>: CPT | Mod: HCNC,S$GLB,, | Performed by: OPTOMETRIST

## 2022-01-10 NOTE — PROGRESS NOTES
HPI     Diabetic Eye Exam     Comments: Diagnosed with diabetes Unsure.  Lab Results       Component                Value               Date                       HGBA1C                   7.1 (H)             11/02/2021                                 Comments     Last visit with ABR on 02/23/2021.  Patient states no changes with vision.  No ocular pain/discomfort.            Last edited by Cindy Estrella on 1/10/2022  8:31 AM. (History)            Assessment /Plan     For exam results, see Encounter Report.    Type 2 diabetes mellitus without retinopathy    There was no diabetic retinopathy present in either eye today.   Recommended that pt continue care with PCP and/or specialists regarding diabetes.  Follow-up dilated eye exam recommended in 12 months, sooner with any vision changes or new concerns.    Diabetic cataract of right eye  Cortical cataract of right eye  Age-related nuclear cataract of right eye  Cataracts not significantly affecting activities of daily living and therefore surgery is not indicated at this time.   Will continue to monitor over the next 12 months. Pt to call or RTC with any significant change in vision prior to next visit.     Pseudophakia of left eye  Doing well OS  Monitor 12 months    Pt declined spec rx    RTC 1 yr for dilated eye exam or PRN if any problems.   Discussed above and answered questions.

## 2022-01-18 ENCOUNTER — HOSPITAL ENCOUNTER (OUTPATIENT)
Dept: RADIOLOGY | Facility: HOSPITAL | Age: 67
Discharge: HOME | End: 2022-01-18
Attending: INTERNAL MEDICINE
Payer: MEDICARE

## 2022-01-18 ENCOUNTER — PATIENT MESSAGE (OUTPATIENT)
Dept: INTERNAL MEDICINE | Facility: CLINIC | Age: 67
End: 2022-01-18
Payer: MEDICARE

## 2022-01-18 DIAGNOSIS — M54.16 LUMBAR RADICULOPATHY: Primary | ICD-10-CM

## 2022-01-18 DIAGNOSIS — M54.16 LUMBAR RADICULOPATHY: ICD-10-CM

## 2022-01-18 PROCEDURE — 72148 MRI LUMBAR SPINE W/O DYE: CPT | Mod: TC,HCNC

## 2022-01-19 ENCOUNTER — PATIENT MESSAGE (OUTPATIENT)
Dept: INTERNAL MEDICINE | Facility: CLINIC | Age: 67
End: 2022-01-19
Payer: MEDICARE

## 2022-01-19 NOTE — TELEPHONE ENCOUNTER
Call pt and tell him his MRI shows pinched nerves at two levels. I want him to see pain management for NEHEMIAS. Referral put in, please book

## 2022-01-20 ENCOUNTER — OFFICE VISIT (OUTPATIENT)
Dept: INTERNAL MEDICINE | Facility: CLINIC | Age: 67
End: 2022-01-20
Payer: MEDICARE

## 2022-01-20 ENCOUNTER — TELEPHONE (OUTPATIENT)
Dept: INTERNAL MEDICINE | Facility: CLINIC | Age: 67
End: 2022-01-20

## 2022-01-20 VITALS
SYSTOLIC BLOOD PRESSURE: 130 MMHG | HEART RATE: 63 BPM | WEIGHT: 244.06 LBS | OXYGEN SATURATION: 97 % | BODY MASS INDEX: 38.3 KG/M2 | DIASTOLIC BLOOD PRESSURE: 88 MMHG | HEIGHT: 67 IN

## 2022-01-20 DIAGNOSIS — M51.16 LUMBAR DISC DISEASE WITH RADICULOPATHY: ICD-10-CM

## 2022-01-20 DIAGNOSIS — I15.2 HYPERTENSION ASSOCIATED WITH DIABETES: Primary | ICD-10-CM

## 2022-01-20 DIAGNOSIS — E11.9 DIABETES MELLITUS WITHOUT COMPLICATION: ICD-10-CM

## 2022-01-20 DIAGNOSIS — E11.59 HYPERTENSION ASSOCIATED WITH DIABETES: Primary | ICD-10-CM

## 2022-01-20 PROCEDURE — 3075F SYST BP GE 130 - 139MM HG: CPT | Mod: HCNC,CPTII,S$GLB, | Performed by: INTERNAL MEDICINE

## 2022-01-20 PROCEDURE — 3075F PR MOST RECENT SYSTOLIC BLOOD PRESS GE 130-139MM HG: ICD-10-PCS | Mod: HCNC,CPTII,S$GLB, | Performed by: INTERNAL MEDICINE

## 2022-01-20 PROCEDURE — 1125F PR PAIN SEVERITY QUANTIFIED, PAIN PRESENT: ICD-10-PCS | Mod: HCNC,CPTII,S$GLB, | Performed by: INTERNAL MEDICINE

## 2022-01-20 PROCEDURE — 1101F PT FALLS ASSESS-DOCD LE1/YR: CPT | Mod: HCNC,CPTII,S$GLB, | Performed by: INTERNAL MEDICINE

## 2022-01-20 PROCEDURE — 99999 PR PBB SHADOW E&M-EST. PATIENT-LVL III: ICD-10-PCS | Mod: PBBFAC,HCNC,, | Performed by: INTERNAL MEDICINE

## 2022-01-20 PROCEDURE — 3008F PR BODY MASS INDEX (BMI) DOCUMENTED: ICD-10-PCS | Mod: HCNC,CPTII,S$GLB, | Performed by: INTERNAL MEDICINE

## 2022-01-20 PROCEDURE — 3072F PR LOW RISK FOR RETINOPATHY: ICD-10-PCS | Mod: HCNC,CPTII,S$GLB, | Performed by: INTERNAL MEDICINE

## 2022-01-20 PROCEDURE — 3072F LOW RISK FOR RETINOPATHY: CPT | Mod: HCNC,CPTII,S$GLB, | Performed by: INTERNAL MEDICINE

## 2022-01-20 PROCEDURE — 3008F BODY MASS INDEX DOCD: CPT | Mod: HCNC,CPTII,S$GLB, | Performed by: INTERNAL MEDICINE

## 2022-01-20 PROCEDURE — 99499 UNLISTED E&M SERVICE: CPT | Mod: S$GLB,,, | Performed by: INTERNAL MEDICINE

## 2022-01-20 PROCEDURE — 1125F AMNT PAIN NOTED PAIN PRSNT: CPT | Mod: HCNC,CPTII,S$GLB, | Performed by: INTERNAL MEDICINE

## 2022-01-20 PROCEDURE — 99499 RISK ADDL DX/OHS AUDIT: ICD-10-PCS | Mod: S$GLB,,, | Performed by: INTERNAL MEDICINE

## 2022-01-20 PROCEDURE — 3288F PR FALLS RISK ASSESSMENT DOCUMENTED: ICD-10-PCS | Mod: HCNC,CPTII,S$GLB, | Performed by: INTERNAL MEDICINE

## 2022-01-20 PROCEDURE — 1101F PR PT FALLS ASSESS DOC 0-1 FALLS W/OUT INJ PAST YR: ICD-10-PCS | Mod: HCNC,CPTII,S$GLB, | Performed by: INTERNAL MEDICINE

## 2022-01-20 PROCEDURE — 1159F MED LIST DOCD IN RCRD: CPT | Mod: HCNC,CPTII,S$GLB, | Performed by: INTERNAL MEDICINE

## 2022-01-20 PROCEDURE — 3079F DIAST BP 80-89 MM HG: CPT | Mod: HCNC,CPTII,S$GLB, | Performed by: INTERNAL MEDICINE

## 2022-01-20 PROCEDURE — 3051F HG A1C>EQUAL 7.0%<8.0%: CPT | Mod: HCNC,CPTII,S$GLB, | Performed by: INTERNAL MEDICINE

## 2022-01-20 PROCEDURE — 1159F PR MEDICATION LIST DOCUMENTED IN MEDICAL RECORD: ICD-10-PCS | Mod: HCNC,CPTII,S$GLB, | Performed by: INTERNAL MEDICINE

## 2022-01-20 PROCEDURE — 99214 OFFICE O/P EST MOD 30 MIN: CPT | Mod: HCNC,S$GLB,, | Performed by: INTERNAL MEDICINE

## 2022-01-20 PROCEDURE — 3079F PR MOST RECENT DIASTOLIC BLOOD PRESSURE 80-89 MM HG: ICD-10-PCS | Mod: HCNC,CPTII,S$GLB, | Performed by: INTERNAL MEDICINE

## 2022-01-20 PROCEDURE — 3051F PR MOST RECENT HEMOGLOBIN A1C LEVEL 7.0 - < 8.0%: ICD-10-PCS | Mod: HCNC,CPTII,S$GLB, | Performed by: INTERNAL MEDICINE

## 2022-01-20 PROCEDURE — 3288F FALL RISK ASSESSMENT DOCD: CPT | Mod: HCNC,CPTII,S$GLB, | Performed by: INTERNAL MEDICINE

## 2022-01-20 PROCEDURE — 99214 PR OFFICE/OUTPT VISIT, EST, LEVL IV, 30-39 MIN: ICD-10-PCS | Mod: HCNC,S$GLB,, | Performed by: INTERNAL MEDICINE

## 2022-01-20 PROCEDURE — 99999 PR PBB SHADOW E&M-EST. PATIENT-LVL III: CPT | Mod: PBBFAC,HCNC,, | Performed by: INTERNAL MEDICINE

## 2022-01-20 RX ORDER — CYCLOBENZAPRINE HCL 10 MG
10 TABLET ORAL 3 TIMES DAILY PRN
Qty: 90 TABLET | Refills: 1 | Status: SHIPPED | OUTPATIENT
Start: 2022-01-20 | End: 2022-01-30

## 2022-01-20 RX ORDER — HYDROCODONE BITARTRATE AND ACETAMINOPHEN 7.5; 325 MG/1; MG/1
1 TABLET ORAL EVERY 8 HOURS PRN
Qty: 90 TABLET | Refills: 0 | Status: SHIPPED | OUTPATIENT
Start: 2022-01-20 | End: 2022-05-18

## 2022-01-20 NOTE — PROGRESS NOTES
"HPI:  Patient is a 66-year-old man who comes today for follow-up of his MRI done for lumbar radiculopathy.  The MRI shows significant compression of 2 of his lumbar nerve roots.  He has been schedule see a pain management doctor but unfortunately he leaves in 2 days ago in a 3 week trip to [facility].  He currently is not taking anything for the pain    Current meds have been verified and updated per the EMR  Exam:/88 (BP Location: Left arm)   Pulse 63   Ht 5' 7" (1.702 m)   Wt 110.7 kg (244 lb 0.8 oz)   SpO2 97%   BMI 38.22 kg/m²   Exam deferred    Lab Results   Component Value Date    WBC 5.20 07/22/2021    HGB 14.1 07/22/2021    HCT 45.0 07/22/2021     07/22/2021    CHOL 186 11/02/2021    TRIG 162 (H) 11/02/2021    HDL 37 (L) 11/02/2021    ALT 19 11/02/2021    AST 22 11/02/2021     11/02/2021    K 4.5 11/02/2021     11/02/2021    CREATININE 0.9 11/02/2021    BUN 15 11/02/2021    CO2 25 11/02/2021    TSH 2.496 04/05/2021    PSA 0.73 01/04/2021    HGBA1C 7.1 (H) 11/02/2021       Impression:  Lumbar disc disease with radiculopathy  Patient Active Problem List   Diagnosis    Diabetes mellitus without complication    Hypertension associated with diabetes    Hyperlipidemia associated with type 2 diabetes mellitus    Morbid obesity with BMI of 40.0-44.9, adult    History of colon polyps    Vitamin D deficiency    Left carpal tunnel syndrome    Anemia    Decreased strength of trunk and back    Low back pain    Lumbar disc disease with radiculopathy       Plan:  Orders Placed This Encounter    HYDROcodone-acetaminophen (NORCO) 7.5-325 mg per tablet    cyclobenzaprine (FLEXERIL) 10 MG tablet   He was given hydrocodone and Flexeril to use.  He may also use ibuprofen as needed.      This note is generated with speech recognition software and is subject to transcription error and sound alike phrases that may be missed by proofreading.      "

## 2022-01-20 NOTE — TELEPHONE ENCOUNTER
----- Message from Gale Stoll sent at 1/20/2022 12:31 PM CST -----  Contact: [facility]  Request a return call concerning the pt Norco medication wants to know if it's for a chronic issues, no additional info given and can be reached at [phone]///[id number]     yes

## 2022-01-21 ENCOUNTER — TELEPHONE (OUTPATIENT)
Dept: INTERNAL MEDICINE | Facility: CLINIC | Age: 67
End: 2022-01-21
Payer: MEDICARE

## 2022-01-21 NOTE — TELEPHONE ENCOUNTER
----- Message from Izabel Christopher sent at 1/21/2022 12:39 PM CST -----  Contact: Patient  Patient called to consult with nurse or staff regarding pain medication. He states that the medication was to be called in at the [facility] in [place], [place] but states that the pharmacy doesn't have the prescription yet. Patient would like a call back as he is leaving out of the country on tomorrow and needs the medication. Patient can be reached at [phone]. Thanks/MR

## 2022-02-25 ENCOUNTER — OFFICE VISIT (OUTPATIENT)
Dept: PHYSICAL MEDICINE AND REHAB | Facility: CLINIC | Age: 67
End: 2022-02-25
Payer: MEDICARE

## 2022-02-25 VITALS — RESPIRATION RATE: 13 BRPM | HEIGHT: 67 IN | BODY MASS INDEX: 38.3 KG/M2 | WEIGHT: 244 LBS

## 2022-02-25 DIAGNOSIS — M54.16 LUMBAR RADICULOPATHY: ICD-10-CM

## 2022-02-25 DIAGNOSIS — G56.03 BILATERAL CARPAL TUNNEL SYNDROME: ICD-10-CM

## 2022-02-25 PROCEDURE — 99999 PR PBB SHADOW E&M-EST. PATIENT-LVL III: CPT | Mod: PBBFAC,,, | Performed by: PHYSICAL MEDICINE & REHABILITATION

## 2022-02-25 PROCEDURE — 99204 OFFICE O/P NEW MOD 45 MIN: CPT | Mod: 25,S$GLB,, | Performed by: PHYSICAL MEDICINE & REHABILITATION

## 2022-02-25 PROCEDURE — 95886 MUSC TEST DONE W/N TEST COMP: CPT | Mod: S$GLB,,, | Performed by: PHYSICAL MEDICINE & REHABILITATION

## 2022-02-25 PROCEDURE — 1125F PR PAIN SEVERITY QUANTIFIED, PAIN PRESENT: ICD-10-PCS | Mod: CPTII,S$GLB,, | Performed by: PHYSICAL MEDICINE & REHABILITATION

## 2022-02-25 PROCEDURE — 1160F RVW MEDS BY RX/DR IN RCRD: CPT | Mod: CPTII,S$GLB,, | Performed by: PHYSICAL MEDICINE & REHABILITATION

## 2022-02-25 PROCEDURE — 99204 PR OFFICE/OUTPT VISIT, NEW, LEVL IV, 45-59 MIN: ICD-10-PCS | Mod: 25,S$GLB,, | Performed by: PHYSICAL MEDICINE & REHABILITATION

## 2022-02-25 PROCEDURE — 99999 PR PBB SHADOW E&M-EST. PATIENT-LVL III: ICD-10-PCS | Mod: PBBFAC,,, | Performed by: PHYSICAL MEDICINE & REHABILITATION

## 2022-02-25 PROCEDURE — 1125F AMNT PAIN NOTED PAIN PRSNT: CPT | Mod: CPTII,S$GLB,, | Performed by: PHYSICAL MEDICINE & REHABILITATION

## 2022-02-25 PROCEDURE — 95913 PR NERVE CONDUCTION STUDY; 13 OR MORE STUDIES: ICD-10-PCS | Mod: S$GLB,,, | Performed by: PHYSICAL MEDICINE & REHABILITATION

## 2022-02-25 PROCEDURE — 3072F LOW RISK FOR RETINOPATHY: CPT | Mod: CPTII,S$GLB,, | Performed by: PHYSICAL MEDICINE & REHABILITATION

## 2022-02-25 PROCEDURE — 1159F MED LIST DOCD IN RCRD: CPT | Mod: CPTII,S$GLB,, | Performed by: PHYSICAL MEDICINE & REHABILITATION

## 2022-02-25 PROCEDURE — 3072F PR LOW RISK FOR RETINOPATHY: ICD-10-PCS | Mod: CPTII,S$GLB,, | Performed by: PHYSICAL MEDICINE & REHABILITATION

## 2022-02-25 PROCEDURE — 3008F BODY MASS INDEX DOCD: CPT | Mod: CPTII,S$GLB,, | Performed by: PHYSICAL MEDICINE & REHABILITATION

## 2022-02-25 PROCEDURE — 1159F PR MEDICATION LIST DOCUMENTED IN MEDICAL RECORD: ICD-10-PCS | Mod: CPTII,S$GLB,, | Performed by: PHYSICAL MEDICINE & REHABILITATION

## 2022-02-25 PROCEDURE — 3008F PR BODY MASS INDEX (BMI) DOCUMENTED: ICD-10-PCS | Mod: CPTII,S$GLB,, | Performed by: PHYSICAL MEDICINE & REHABILITATION

## 2022-02-25 PROCEDURE — 1160F PR REVIEW ALL MEDS BY PRESCRIBER/CLIN PHARMACIST DOCUMENTED: ICD-10-PCS | Mod: CPTII,S$GLB,, | Performed by: PHYSICAL MEDICINE & REHABILITATION

## 2022-02-25 PROCEDURE — 95886 PR EMG COMPLETE, W/ NERVE CONDUCTION STUDIES, 5+ MUSCLES: ICD-10-PCS | Mod: S$GLB,,, | Performed by: PHYSICAL MEDICINE & REHABILITATION

## 2022-02-25 PROCEDURE — 95913 NRV CNDJ TEST 13/> STUDIES: CPT | Mod: S$GLB,,, | Performed by: PHYSICAL MEDICINE & REHABILITATION

## 2022-02-25 NOTE — PROGRESS NOTES
[facility]   [address]  [place] [address]  Phone: [phone]        Full Name: Friday Lou YOB: 1955  Patient ID: [id number]      Visit Date: 2/25/2022 07:52  Age: 66 Years 3 Months Old  Examining Physician: Nilam Poole M.D.  Referring Physician: Dr Elizabeth  Reason for Referral: upper and lower ext       Chief Complaint   Patient presents with    Back Pain     Into left leg    Hand Pain     Left hand       HPI: This is a 66 y.o.  male being seen in clinic today for evaluation of chronic left wrist/hand pain with numbness/tingling into his fingers at times.  He has a history of CTR a few years ago, but his symptoms have gradually returned.  He has a history of chronic low back achy pain with worsening of numbness radiating into his posterior left leg to the calf.  At times of prolonged standing/walking, his symptoms can worsen.  He has a few episodes of his leg buckling.  Rest, position change, and aleve provide some relief.     History obtained from patient    Past family, medical, social, and surgical history reviewed in chart    Review of Systems:     General- denies lethargy, weight change, fever, chills  Head/neck- denies swallowing difficulties  ENT- denies hearing changes  Cardiovascular-denies chest pain  Pulmonary- denies shortness of breath  GI- denies constipation or bowel incontinence  - denies bladder incontinence  Skin- denies wounds or rashes  Musculoskeletal- +/-weakness, +pain  Neurologic- +numbness and tingling  Psychiatric- denies depressive or psychotic features, denies anxiety  Lymphatic-denies swelling  Endocrine- denies hypoglycemic symptoms/+DM history  All other pertinent systems negative     Physical Examination:  General: Well developed, well nourished male, NAD  HEENT:NCAT EOMI bilaterally   Pulmonary:Normal respirations    Spinal Examination: CERVICAL  Active ROM is within normal limits.  Inspection: No deformity of spinal  alignment.    Spinal Examination: LUMBAR or THORACIC  Active ROM is within normal limits, mild limited at endranges  Inspection: No deformity of spinal alignment.    Palpation: No vertebral tenderness to percussion.      Musculoskeletal Tests:  Phalen: neg  Elbow compression (ulnar): neg  Tinels at wrist: + on left    Bilateral Upper and Lower Extremities:  Pulses are 2+ at radial bilaterally.  Shoulder/Elbow/Wrist/Hand ROM wnl  Hip/Knee/Ankle ROM wnl  Bilateral Extremities show normal capillary refill.  No signs of cyanosis, rubor, edema, skin changes, or dysvascular changes of appendages.  Nails appear intact.    Neurological Exam:  Cranial Nerves:  II-XII grossly intact    Manual Muscle Testing: (Motor 5=normal)  5/5 strength bilateral upper/lower extremities    No focal atrophy is noted of either upper or lower extremity.    Bilateral Reflexes:  Rodriguez's response is absent bilaterally.  No clonus at knee or ankle.    Sensation: tested to light touch  - intact in all four limbs except dec at left 2nd and 3rd digits    Gait: Narrow base and good arm swing.      Entire procedure explained to patient prior to proceeding.  Verbal consent obtained          SNC      Nerve / Sites Rec. Site Onset Lat Peak Lat Amp Segments Distance Velocity     ms ms µV  mm m/s   R Median - Digit II (Antidromic)      Wrist Dig II 4.5 5.9 11.4 Wrist - Dig  31   L Median - Digit II (Antidromic)      Wrist Dig II 3.5 4.7 11.0 Wrist - Dig  40   R Ulnar - Digit V (Antidromic)      Wrist Dig V 3.1 3.8 14.9 Wrist - Dig V 140 45   L Ulnar - Digit V (Antidromic)      Wrist Dig V 2.6 3.5 19.6 Wrist - Dig V 140 54   R Radial - Anatomical snuff box (Forearm)      Forearm Wrist 2.2 2.7 11.7 Forearm - Wrist 100 46   L Radial - Anatomical snuff box (Forearm)      Forearm Wrist 2.3 2.6 15.8 Forearm - Wrist 100 43   L Sural - Ankle (Calf)      Calf Ankle 2.8 3.5 8.2 Calf - Ankle 140 51   L Superficial peroneal - Ankle      1  2.6 3.6 15.4 1  - G1 140 55       MNC      Nerve / Sites Muscle Latency Amplitude Duration Rel Amp Segments Distance Lat Diff Velocity     ms mV ms %  mm ms m/s   R Median - APB      Wrist APB 6.8 6.0 7.4 100 Wrist - APB 80        Elbow APB 11.9 6.6  111 Elbow - Wrist 220 5.2 43   L Median - APB      Wrist APB 5.3 4.0 6.8 100 Wrist - APB 80        Elbow APB 10.7 4.0 7.3 98.1 Elbow - Wrist 230 5.4 42   R Ulnar - ADM      Wrist ADM 3.3 9.3 6.6 100 Wrist - ADM 80        B.Elbow ADM 8.0 8.4 7.3 91.1 B.Elbow - Wrist 250 4.7 53      A.Elbow ADM 10.4 8.3 7.6 98 A.Elbow - B.Elbow 120 2.3 51         A.Elbow - Wrist  7.0    L Ulnar - ADM      Wrist ADM 3.2 7.8 5.7 100 Wrist - ADM 80        B.Elbow ADM 7.9 4.7 6.5 60.4 B.Elbow - Wrist 260 4.7 55      A.Elbow ADM 10.1 5.0 7.2 105 A.Elbow - B.Elbow 110 2.1 52         A.Elbow - Wrist  6.8    L Peroneal - EDB      Ankle EDB 3.7 4.7 4.4 100 Ankle - EDB 80        Fib head EDB 10.5 3.6 5.1 77.8 Fib head - Ankle 310 6.8 46      Pop fossa EDB 12.0 3.6 5.1 99.8 Pop fossa - Fib head 70 1.6 45         Pop fossa - Ankle  8.3    L Tibial - AH      Ankle AH 5.4 6.1 4.4 100 Ankle - AH 80        Pop fossa AH 15.1 3.5 5.2 56.5 Pop fossa - Ankle 400 9.7 41       EMG            EMG Summary Table     Spontaneous MUAP Recruitment   Muscle IA Fib PSW Fasc Other Dur. Dur Amp Dur Polys Pattern Effort   L. Rectus femoris N None None None .   N Sl Incr 1+ Reduced Max   L. Vastus lateralis Incr None None None .   N Sl Incr 1+ Reduced Max   L. Biceps femoris (short head) Incr 1+ 1+ None .   N N 1+ sl red Max   L. Tibialis anterior N None None None .   Incr N 1+ sl red Max   L. Gastrocnemius (Medial head) Incr 1+ 2+ None .   N N 1+ Reduced Max   L. Extensor digitorum brevis N None None None .   Incr N 1+ sl red Max   R. Extensor digitorum brevis Incr 1+ 2+ None .   Incr N 1+ Reduced Max   R. Abductor hallucis Incr None None None .   N N 1+ sl red Max   R. Tibialis anterior N None None None .   Incr N 1+ Reduced Max   R.  Vastus lateralis N None None None .   N Sl Incr 1+ sl red Max                                                  INTERPRETATION  -Bilateral median motor nerve conduction study showed prolonged latency, normal amplitude, and dec conduction velocity  -Bilateral median sensory nerve conduction study showed prolonged peak latency and normal amplitude  -Bilateral ulnar motor nerve conduction study showed normal latency, amplitude, and conduction velocity  -Bilateral ulnar sensory nerve conduction study showed normal peak latency and amplitude  -Bilateral radial sensory nerve conduction study showed normal peak latency and amplitude  -Left superficial peroneal sensory nerve conduction study showed normal peak latency and amplitude  -Left sural sensory nerve conduction study showed normal peak latency and amplitude  -Left peroneal motor nerve conduction study showed normal latency, amplitude, and conduction velocity  -Left tibial motor nerve conduction study showed normal latency, amplitude, and conduction velocity  -Needle EMG examination performed to above mentioned muscles       IMPRESSION  1. ABNORMAL study  2. There is electrodiagnostic evidence of a MODERATE demyelinating median neuropathy (Carpal tunnel syndrome) across BILATERAL wrists.  There is an acute on chronic radiculopathy of the left L2,L3, S1 and right L5, S1 nerve roots.  There is a chronic radiculopathy of the left L4,L5 and right L2-L4 nerve roots    PLAN  Discussed in detail for greater than 40 minutes about diagnosis and treatment plan    1. Follow up with referring provider: Dr. Sid Elizabeth  2. Handouts on lumbar radic and CTS provided. Pt has appt with Dr Hodges for NEHEMIAS evals and referral to ortho placed for CTS  3. This study is good for one year. If symptoms worsen or do not improve, please re-consult.    Nilam Poole M.D.  Physical Medicine and Rehab

## 2022-03-08 ENCOUNTER — OFFICE VISIT (OUTPATIENT)
Dept: PAIN MEDICINE | Facility: CLINIC | Age: 67
End: 2022-03-08
Payer: MEDICARE

## 2022-03-08 ENCOUNTER — TELEPHONE (OUTPATIENT)
Dept: CARDIOLOGY | Facility: HOSPITAL | Age: 67
End: 2022-03-08
Payer: MEDICARE

## 2022-03-08 ENCOUNTER — TELEPHONE (OUTPATIENT)
Dept: PAIN MEDICINE | Facility: CLINIC | Age: 67
End: 2022-03-08

## 2022-03-08 VITALS
DIASTOLIC BLOOD PRESSURE: 83 MMHG | RESPIRATION RATE: 18 BRPM | HEART RATE: 76 BPM | BODY MASS INDEX: 38.24 KG/M2 | WEIGHT: 243.63 LBS | SYSTOLIC BLOOD PRESSURE: 123 MMHG | HEIGHT: 67 IN

## 2022-03-08 DIAGNOSIS — M54.16 LUMBAR RADICULOPATHY: ICD-10-CM

## 2022-03-08 DIAGNOSIS — M47.816 LUMBAR SPONDYLOSIS: Primary | ICD-10-CM

## 2022-03-08 PROCEDURE — 1101F PR PT FALLS ASSESS DOC 0-1 FALLS W/OUT INJ PAST YR: ICD-10-PCS | Mod: CPTII,S$GLB,, | Performed by: ANESTHESIOLOGY

## 2022-03-08 PROCEDURE — 3074F SYST BP LT 130 MM HG: CPT | Mod: CPTII,S$GLB,, | Performed by: ANESTHESIOLOGY

## 2022-03-08 PROCEDURE — 99999 PR PBB SHADOW E&M-EST. PATIENT-LVL IV: CPT | Mod: PBBFAC,,, | Performed by: ANESTHESIOLOGY

## 2022-03-08 PROCEDURE — 3079F DIAST BP 80-89 MM HG: CPT | Mod: CPTII,S$GLB,, | Performed by: ANESTHESIOLOGY

## 2022-03-08 PROCEDURE — 1101F PT FALLS ASSESS-DOCD LE1/YR: CPT | Mod: CPTII,S$GLB,, | Performed by: ANESTHESIOLOGY

## 2022-03-08 PROCEDURE — 99204 PR OFFICE/OUTPT VISIT, NEW, LEVL IV, 45-59 MIN: ICD-10-PCS | Mod: S$GLB,,, | Performed by: ANESTHESIOLOGY

## 2022-03-08 PROCEDURE — 3074F PR MOST RECENT SYSTOLIC BLOOD PRESSURE < 130 MM HG: ICD-10-PCS | Mod: CPTII,S$GLB,, | Performed by: ANESTHESIOLOGY

## 2022-03-08 PROCEDURE — 99204 OFFICE O/P NEW MOD 45 MIN: CPT | Mod: S$GLB,,, | Performed by: ANESTHESIOLOGY

## 2022-03-08 PROCEDURE — 3008F BODY MASS INDEX DOCD: CPT | Mod: CPTII,S$GLB,, | Performed by: ANESTHESIOLOGY

## 2022-03-08 PROCEDURE — 3008F PR BODY MASS INDEX (BMI) DOCUMENTED: ICD-10-PCS | Mod: CPTII,S$GLB,, | Performed by: ANESTHESIOLOGY

## 2022-03-08 PROCEDURE — 3079F PR MOST RECENT DIASTOLIC BLOOD PRESSURE 80-89 MM HG: ICD-10-PCS | Mod: CPTII,S$GLB,, | Performed by: ANESTHESIOLOGY

## 2022-03-08 PROCEDURE — 3288F PR FALLS RISK ASSESSMENT DOCUMENTED: ICD-10-PCS | Mod: CPTII,S$GLB,, | Performed by: ANESTHESIOLOGY

## 2022-03-08 PROCEDURE — 1125F AMNT PAIN NOTED PAIN PRSNT: CPT | Mod: CPTII,S$GLB,, | Performed by: ANESTHESIOLOGY

## 2022-03-08 PROCEDURE — 1125F PR PAIN SEVERITY QUANTIFIED, PAIN PRESENT: ICD-10-PCS | Mod: CPTII,S$GLB,, | Performed by: ANESTHESIOLOGY

## 2022-03-08 PROCEDURE — 3072F LOW RISK FOR RETINOPATHY: CPT | Mod: CPTII,S$GLB,, | Performed by: ANESTHESIOLOGY

## 2022-03-08 PROCEDURE — 3288F FALL RISK ASSESSMENT DOCD: CPT | Mod: CPTII,S$GLB,, | Performed by: ANESTHESIOLOGY

## 2022-03-08 PROCEDURE — 99999 PR PBB SHADOW E&M-EST. PATIENT-LVL IV: ICD-10-PCS | Mod: PBBFAC,,, | Performed by: ANESTHESIOLOGY

## 2022-03-08 PROCEDURE — 3072F PR LOW RISK FOR RETINOPATHY: ICD-10-PCS | Mod: CPTII,S$GLB,, | Performed by: ANESTHESIOLOGY

## 2022-03-08 NOTE — TELEPHONE ENCOUNTER
Good afternoon,  Dr. Hodges  would like to perform a Lumbar NEHEMIAS , but patient is on aspirin and would need to hold all asa products 7 days prior.  Please advise.

## 2022-03-08 NOTE — PROGRESS NOTES
Chief Pain Complaint:  Back Pain        History of Present Illness:   Friday ROGELIO Blake is a 66 y.o. male  who is presenting with a chief complaint of Back Pain  . The patient began experiencing this problem insidiously, and the pain has been gradually worsening over the past 5 month(s). The pain is described as throbbing, shooting, burning and electrical and is located in the left lumbar spine . Pain is intermittent and lasts hours. The pain radiates to  left leg L3 distribution. The patient rates his pain a 8 out of ten and interferes with activities of daily living a 8 out of ten. Pain is exacerbated by flexion of the lumbar spine, and is improved by rest. Patient reports no prior trauma, no prior spinal surgery     - pertinent negatives: No fever, No chills, No weight loss, No bladder dysfunction, No bowel dysfunction, No saddle anesthesia  - pertinent positives: left leg weakness    - medications, other therapies tried (physical therapy, injections):     >> NSAIDs, Tylenol, Tramadol, Norco, gabapentin, flexeril and medrol dose pack    >> Has previously undergone Physical Therapy    >> Has NOT previously undergone spinal injection/s      Imaging / Labs / Studies (reviewed on 3/8/2022):    Results for orders placed during the hospital encounter of 01/18/22    MRI Lumbar Spine Without Contrast    Narrative  EXAMINATION:  MRI LUMBAR SPINE WITHOUT CONTRAST    CLINICAL HISTORY:  Radiculopathy, lumbar regionlumbar radiculopahty;    TECHNIQUE:  MR Lumbar spine without contrast. Sagittal T1, T2, STIR. Axial T1, T2. Coronal T1.    COMPARISON:  None.    FINDINGS:  Straightening of the lumbar lordosis.  Vertebral body height is normal.  Marrow signal is within normal limits. The conus medullaris terminates at the level of L1-L2.  No abnormal signal within the conus. Mild congenital spinal stenosis.  Interspaces are as follows:    T12-L1 disc: Mild disc bulge.  Posterior annular fissure.  Slight buckling of ligamentum  flavum.  The thecal sac measures 12 mm.  No significant foraminal stenosis.    L1-L2 disc : Left paracentral disc protrusion that causes mild-to-moderate left lateral recess stenosis.  This is best demonstrated on axial T2 series 6, image 8.  Mild degenerative facet hypertrophy bilaterally.  The thecal sac measures 7 mm.  No significant foraminal stenosis.    L2-L3 disc: Focal left lateral recess/paracentral disc protrusion with annular fissure that likely compresses the descending left L3 spinal nerve roots.  This is best demonstrated on axial T2 series 6, image 12.  Mild degenerative facet hypertrophy with buckling of ligamentum flavum.  The thecal sac measures 6 mm AP.  No significant foraminal stenosis.    L3-L4 disc: Broad-based posterior disc bulge with a posterior annular fissure.  Mild degenerative facet hypertrophy and buckling of ligamentum flavum.  The thecal sac measures 8 mm AP.  Mild bilateral foraminal stenosis.    L4-L5 disc: Circumferential disc bulge.  Anterior osteophytes.  Severe degenerative facet hypertrophy.  Right-sided facet joint effusion.  Posteriorly and inferiorly projecting synovial cyst from the right facet joint measuring 10 mm.  The thecal sac measures 12 mm AP.  Disc encroaches into the left exit foramina and causes mild/moderate left foraminal stenosis.  Mild right foraminal stenosis.    L5-S1 disc: Posterior disc bulge.  Severe degenerative facet hypertrophy.  The thecal sac measures 12 mm.  No significant foraminal stenosis.    Impression  1. 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  2. Left-sided disc protrusion with annular fissure at L2-L3 causes moderate spinal stenosis and severe left lateral recess stenosis.  At likely compresses the descending left L3 spinal nerve roots and may result in left L3 radiculopathy.  3. Left paracentral disc protrusion L1-L2 causes mild to moderate left lateral recess stenosis and may compress the descending left L2  "spinal nerve roots.  4. Mild overall spinal stenosis at L1-L2 and L3-L4.  5. Mild/moderate left and mild right foraminal stenosis at L4-L5.  6. Severe facet arthropathy at L4-L5 and L5-S1.  Right facet joint effusion at L4-L5 with a posteriorly projecting synovial cyst.      Electronically signed by: Ramon Mead Jr., MD  Date:    01/18/2022  Time:    16:06          Review of Systems:  CONSTITUTIONAL: patient denies any fever, chills, or weight loss  SKIN: patient denies any rash or itching  RESPIRATORY: patient denies having any shortness of breath  GASTROINTESTINAL: patient denies having any diarrhea, constipation, or bowel incontinence  GENITOURINARY: patient denies having any abnormal bladder function    MUSCULOSKELETAL:  - patient complains of the above noted pain/s (see chief pain complaint)    NEUROLOGICAL:   - pain as above  - strength in Lower extremities is intact, BILATERALLY  - sensation in Lower extremities is intact, BILATERALLY  - patient denies any loss of bowel or bladder control      PSYCHIATRIC: patient denies any change in mood    Other:  All other systems reviewed and are negative      Physical Exam:  /83   Pulse 76   Resp 18   Ht 5' 7" (1.702 m)   Wt 110.5 kg (243 lb 9.7 oz)   BMI 38.15 kg/m²  (reviewed on 3/8/2022)  General: Alert and oriented, in no apparent distress.  Gait: normal gait.  Skin: No rashes, No discoloration, No obvious lesions  HEENT: Normocephalic, atraumatic. Pupils equal and round.  Cardiovascular: Regular rate and rhythm , no significant peripheral edema present  Respiratory: Without audible wheezing, without use of accessory muscles of respiration.    Musculoskeletal:    Cervical Spine    - Pain on flexion of cervical spine Absent  - Spurling's Test:  Absent    - Pain on extension of cervical spine Absent  - TTP over the cervical facet joints Absent  - Cervical facet loading Absent      Lumbar Spine    - Pain on flexion of lumbar spine Present  - Straight Leg " Raise:  Equivocal    - Pain on extension of lumbar spine Absent  - TTP over the lumbar facet joints Absent  - Lumbar facet loading Absent    -Pain on palpation over the SI joint  Absent  - LUIS: Absent      Neuro:    Strength:  UE R/L: D: 5/5; B: 5/5; T: 5/5; WF: 5/5; WE: 5/5; IO: 5/5;  LE R/L: HF: 5/5, HE: 5/5, KF: 5/5; KE: 5/5; FE: 5/5; FF: 5/5    Extremity Reflexes: Brisk and symmetric throughout.      Extremity Sensory: Sensation to pinprick and temperature symmetric. Proprioception intact.      Psych:  Mood and affect is appropriate      Assessment:    Friday ROGELIO Blake is a 66 y.o. year old male who is presenting with     Encounter Diagnoses   Name Primary?    Lumbar radiculopathy     Lumbar spondylosis Yes       Plan:    1. Interventional: Schedule patient for Left L2-3, L3-4 TFESI RN IV sedation. Will get approval to be off ASA 1 week from Dr White.     2. Pharmacologic: Naproxen PRN. Norco PRN. Consider starting Gabapentin.     3. Rehabilitative: Patient already did PT.    4. Diagnostic: Lumbar MRI reviewed.     5. Follow up: Post Injection.     20 minutes were spent in this encounter with more than 50% of the time used for counseling and review of the plan.  Imaging / studies reviewed, detailed above.  I discussed in detail the risks, benefits, and alternatives to any and all potential treatment options.  All questions and concerns were fully addressed today in clinic. Medical decision making moderate.    Thank you for the opportunity to assist in the care of this patient.    Best wishes,    Signed:    Jay Hodges MD          Disclaimer:  This note may have been prepared using voice recognition software, it may have not been extensively proofed, as such there could be errors within the text such as sound alike errors.

## 2022-03-10 ENCOUNTER — PATIENT MESSAGE (OUTPATIENT)
Dept: PAIN MEDICINE | Facility: CLINIC | Age: 67
End: 2022-03-10
Payer: MEDICARE

## 2022-03-10 NOTE — TELEPHONE ENCOUNTER
Contacted pt. Appt for procedure scheduled 03/23/22 with Dr. Hodges . Made f/u appt post procedure. Went over instructions with pt and pt verbalized understanding.Instructions also mailed to pt.  All questions answered.

## 2022-03-16 ENCOUNTER — TELEPHONE (OUTPATIENT)
Dept: INTERNAL MEDICINE | Facility: CLINIC | Age: 67
End: 2022-03-16
Payer: MEDICARE

## 2022-03-16 NOTE — TELEPHONE ENCOUNTER
----- Message from Lucy Crystal sent at 3/16/2022  8:52 AM CDT -----  Contact: Friday Friday called regarding a letter he received in the mail about a foot exam, but doesn't have issues with his feet. Please give him a call back at [phone]    Thanks  Kb

## 2022-03-17 NOTE — PRE-PROCEDURE INSTRUCTIONS
Attempted to PAT patient for procedure on 3.23 with Dr. purcell. No answer. M with return phone number. No return call at this time.

## 2022-03-21 ENCOUNTER — PATIENT MESSAGE (OUTPATIENT)
Dept: PAIN MEDICINE | Facility: HOSPITAL | Age: 67
End: 2022-03-21
Payer: MEDICARE

## 2022-03-21 NOTE — PRE-PROCEDURE INSTRUCTIONS
Attempted to PAT patient for procedure on 3.23. with Dr. purcell. No answer. LVM with return phone number. No return call at this time.

## 2022-03-22 ENCOUNTER — PATIENT MESSAGE (OUTPATIENT)
Dept: PAIN MEDICINE | Facility: HOSPITAL | Age: 67
End: 2022-03-22
Payer: MEDICARE

## 2022-03-22 NOTE — PRE-PROCEDURE INSTRUCTIONS
Spoke with patient regarding procedure scheduled on 3.23     Arrival time 1400     Has patient been sick with fever or on antibiotics within the last 7 days? No     Does the patient have any open wounds, sores or rashes? No     Does the patient have any recent fractures? no     Has patient received a vaccination within the last 7 days? No     Received the COVID vaccination? yes     Has the patient stopped all medications as directed? Asa 7 days prior to procedure. Cardiac clearance obtained from dr reynoso on 3.8. hold metformin am of procedure     Does patient have a pacemaker and or defibrillator? no     Does the patient have a ride to and from procedure and someone reliable to remain with patient?      Is the patient diabetic? yes     Does the patient have sleep apnea? Or use O2 at home? No and no      Is the patient receiving sedation? yes     Is the patient instructed to remain NPO beginning at midnight the night before their procedure? yes     Procedure location confirmed with patient? Yes     Covid- Denies signs/symptoms. Instructed to notify PAT/MD if any changes.

## 2022-03-23 ENCOUNTER — HOSPITAL ENCOUNTER (OUTPATIENT)
Facility: HOSPITAL | Age: 67
Discharge: HOME | End: 2022-03-23
Attending: ANESTHESIOLOGY | Admitting: ANESTHESIOLOGY
Payer: MEDICARE

## 2022-03-23 VITALS
BODY MASS INDEX: 38.06 KG/M2 | DIASTOLIC BLOOD PRESSURE: 94 MMHG | WEIGHT: 242.5 LBS | TEMPERATURE: 98 F | SYSTOLIC BLOOD PRESSURE: 154 MMHG | HEART RATE: 77 BPM | HEIGHT: 67 IN | OXYGEN SATURATION: 100 % | RESPIRATION RATE: 16 BRPM

## 2022-03-23 DIAGNOSIS — M54.16 LUMBAR RADICULOPATHY: Primary | ICD-10-CM

## 2022-03-23 PROCEDURE — 64484 NJX AA&/STRD TFRM EPI L/S EA: CPT | Mod: 50 | Performed by: ANESTHESIOLOGY

## 2022-03-23 PROCEDURE — 64484 NJX AA&/STRD TFRM EPI L/S EA: CPT | Mod: LT,,, | Performed by: ANESTHESIOLOGY

## 2022-03-23 PROCEDURE — 64493 INJ PARAVERT F JNT L/S 1 LEV: CPT | Mod: 50 | Performed by: ANESTHESIOLOGY

## 2022-03-23 PROCEDURE — 64494 INJ PARAVERT F JNT L/S 2 LEV: CPT | Mod: 50 | Performed by: ANESTHESIOLOGY

## 2022-03-23 PROCEDURE — 64483 NJX AA&/STRD TFRM EPI L/S 1: CPT | Mod: 50 | Performed by: ANESTHESIOLOGY

## 2022-03-23 PROCEDURE — 64483 PR EPIDURAL INJ, ANES/STEROID, TRANSFORAMINAL, LUMB/SACR, SNGL LEVL: ICD-10-PCS | Mod: LT,,, | Performed by: ANESTHESIOLOGY

## 2022-03-23 PROCEDURE — 64483 NJX AA&/STRD TFRM EPI L/S 1: CPT | Mod: LT,,, | Performed by: ANESTHESIOLOGY

## 2022-03-23 PROCEDURE — 25500020 PHARM REV CODE 255: Performed by: ANESTHESIOLOGY

## 2022-03-23 PROCEDURE — 63600175 PHARM REV CODE 636 W HCPCS: Performed by: ANESTHESIOLOGY

## 2022-03-23 PROCEDURE — 25000003 PHARM REV CODE 250: Performed by: ANESTHESIOLOGY

## 2022-03-23 PROCEDURE — 64484 PRA INJECT ANES/STEROID FORAMEN LUMBAR/SACRAL W IMG GUIDE ,EA ADD LEVEL: ICD-10-PCS | Mod: LT,,, | Performed by: ANESTHESIOLOGY

## 2022-03-23 RX ORDER — FENTANYL CITRATE 50 UG/ML
INJECTION, SOLUTION INTRAMUSCULAR; INTRAVENOUS
Status: DISCONTINUED | OUTPATIENT
Start: 2022-03-23 | End: 2022-03-23 | Stop reason: HOSPADM

## 2022-03-23 RX ORDER — DEXAMETHASONE SODIUM PHOSPHATE 10 MG/ML
INJECTION INTRAMUSCULAR; INTRAVENOUS
Status: DISCONTINUED | OUTPATIENT
Start: 2022-03-23 | End: 2022-03-23 | Stop reason: HOSPADM

## 2022-03-23 RX ORDER — LIDOCAINE HYDROCHLORIDE 10 MG/ML
INJECTION, SOLUTION EPIDURAL; INFILTRATION; INTRACAUDAL; PERINEURAL
Status: DISCONTINUED | OUTPATIENT
Start: 2022-03-23 | End: 2022-03-23 | Stop reason: HOSPADM

## 2022-03-23 RX ORDER — MIDAZOLAM HYDROCHLORIDE 1 MG/ML
INJECTION, SOLUTION INTRAMUSCULAR; INTRAVENOUS
Status: DISCONTINUED | OUTPATIENT
Start: 2022-03-23 | End: 2022-03-23 | Stop reason: HOSPADM

## 2022-03-23 NOTE — OP NOTE
"Procedure: Lumbar Transforaminal Epidural Steroid Injection under Fluoroscopic Guidance (supraneural approach)    Level: L2/3 L3/4    Side: Left    PROCEDURE DATE: 3/23/2022    Pre-operative Diagnosis: Lumbar Radiculopathy  Post-operative Diagnosis: Lumbar Radiculopathy    Provider: Jay Hodges MD  Assistant(s): None    Anesthesia: Local, IV Sedation    >> 2 mg of VERSED    >> 100 mcg of FENTANYL     Indication: Low back pain with radiculopathy consistent with distribution of targeted nerve. Symptoms unresponsive to conservative treatments. Fluoroscopy was used to optimize visualization of needle placement and to maximize safety.     Procedure Description / Technique:  The patient was seen and identified in the preoperative area. Risks, benefits, complications, and alternatives were discussed with the patient. The patient agreed to proceed with the procedure and signed the consent. The site and side of the procedure was identified and marked. An IV was started. The patient was taken to the procedural suite.    The patient was positioned in prone orientation on procedure table and a pillow was placed under the abdomen to reduce lumbar lordosis. A time out was performed prior to any intervention. The procedure, site, side, and allergies were stated and agreed to by all present. The lumbosacral area was widely prepped with ChloraPrep. The procedural site was draped in usual sterile fashion. Vital signs were closely monitored throughout this procedure. Conscious sedation was used for this procedure to decrease patient anxiety.    The target area was visualized under fluoroscopy. The cephalocaudal angle of the fluoroscope was adjusted as to align the vertebral end plates. The fluoroscopic arm was rotated ipsilaterally to an angle of approximately 30 degrees until the "elise dog" outline came into view and the tip of the inferior superior articular process pointed towards the midline, 6:00 position of the above pedicle. " "A 25 gauge 3.5 inch spinal needle was directed towards the "chin" of the "elise dog" (adjacent to the pars interarticularis and inferior to the pedicle). The needle was advanced until OS was met at the inferior border of the pedicle / pars interface. The needle was adjusted so that it would pass inferior to the osseous border. The fluoroscope was then placed in the lateral position and the needle was slowly advanced until it rested in the posterior 1/3rd of the vertebral foramen. AP fluoroscopy was checked and the needle tip rested at the 6:00 position under the pedicle. No paresthesia was elicited during needle placement. With the needle tip in its final position, gentle aspiration was negative for blood and CSF. Omnipaque 240 (1 to 2 mL) was injected under live fluoroscopy. Microbore tubing was used for injection. There was no pain or paresthesia on injection. The contrast clearly delineated the targeted nerve root on AP fluoroscopy. No vascular uptake was seen. A solution containing 2 mL of 1% PF Lidocaine and 2 mL of Dexamethasone (10 mg/mL) was mixed and 2 mL was injected slowly at each level targeted. There was minimal resistance on injection. No pain or paresthesia was elicited on injection. The stylet was replaced and the needle was withdrawn intact. This procedure was performed for each of the above indicated levels.     Description of Findings: Not applicable    Prosthetic devices, grafts, tissues, or devices implanted: None    Specimen Removed: No    Estimated Blood Loss: minimal    COMPLICATIONS: None    DISPOSITION / PLANS: The patient was transferred to the recovery area in a stable condition for observation. The patient was reexamined prior to discharge. There was no evidence of acute neurologic injury following the procedure.  Patient was discharged from the recovery room after meeting discharge criteria. Home discharge instructions were given to the patient by the staff.  "

## 2022-03-23 NOTE — DISCHARGE INSTRUCTIONS

## 2022-03-23 NOTE — H&P
HPI  Patient presenting for Procedure(s) (LRB):  BLOCK, SPINAL NERVE ROOT, LUMBAR, SELECTIVE, TRANSFORAMINAL APPROACH Left L2-3, L3-4 RN IV sedation (Left)     Patient on Anti-coagulation No    No health changes since previous encounter    Past Medical History:   Diagnosis Date    Carpal tunnel syndrome     Diabetes mellitus without complication     History of colon polyps     Hypercalcemia 3/23/2021    Hyperlipidemia associated with type 2 diabetes mellitus     Hypertension associated with diabetes     Lumbar disc disease with radiculopathy     Morbid obesity with BMI of 40.0-44.9, adult     S/P parathyroidectomy 7/29/2021    Vitamin D deficiency      Past Surgical History:   Procedure Laterality Date    CARPAL TUNNEL RELEASE Left 4/1/2021    Procedure: RELEASE, CARPAL TUNNEL;  Surgeon: Yoandy David MD;  Location: [facility] OR;  Service: Orthopedics;  Laterality: Left;    HERNIA REPAIR      PARATHYROIDECTOMY Bilateral 7/27/2021    Procedure: PARATHYROIDECTOMY;  Surgeon: Tha Dial MD;  Location: [facility];  Service: ENT;  Laterality: Bilateral;  with medialstinal exploration    STERNOTOMY N/A 7/27/2021    Procedure: STERNOTOMY;  Surgeon: Tha Dial MD;  Location: [facility] OR;  Service: ENT;  Laterality: N/A;    ULNAR NERVE TRANSPOSITION Left 4/1/2021    Procedure: TRANSPOSITION, NERVE, ULNAR;  Surgeon: Yoandy David MD;  Location: [facility] OR;  Service: Orthopedics;  Laterality: Left;    ULNAR TUNNEL RELEASE Left 4/1/2021    Procedure: RELEASE, CUBITAL TUNNEL;  Surgeon: Yoandy David MD;  Location: [facility];  Service: Orthopedics;  Laterality: Left;    UMBILICAL HERNIA REPAIR       Review of patient's allergies indicates:  No Known Allergies     No current facility-administered medications on file prior to encounter.     Current Outpatient Medications on File Prior to Encounter   Medication Sig Dispense Refill    aspirin (ECOTRIN) 81 MG EC tablet Take 81 mg by mouth once daily.       "HYDROcodone-acetaminophen (NORCO) 7.5-325 mg per tablet Take 1 tablet by mouth every 8 (eight) hours as needed for Pain. 90 tablet 0    irbesartan (AVAPRO) 150 MG tablet Take 1 tablet (150 mg total) by mouth once daily. 90 tablet 3    metFORMIN (GLUCOPHAGE) 1000 MG tablet Take 1 tablet (1,000 mg total) by mouth 2 (two) times daily with meals. 90 tablet 3    atorvastatin (LIPITOR) 10 MG tablet Take 1 tablet (10 mg total) by mouth every evening. 90 tablet 3    blood sugar diagnostic Strp To check BG 1 times daily, to use with insurance preferred meter 100 strip 3    blood-glucose meter kit To check BG 1 times daily, to use with insurance preferred meter 1 each 0    ergocalciferol (ERGOCALCIFEROL) 50,000 unit Cap Take 1 capsule (50,000 Units total) by mouth every 7 days. 13 capsule 3    lancets (ONETOUCH ULTRASOFT LANCETS) Misc Check FS daily 100 each 3    lancets Misc To check BG 1 times daily, to use with insurance preferred meter 100 each 3    ONETOUCH VERIO TEST STRIPS Strp USE 1 STRIP TO CHECK GLUCOSE ONCE DAILY 100 each 3    psyllium husk 2.6 gram/4.1 gram Powd Take 2.6 g/day by mouth 2 (two) times a day. 100 g 0        PMHx, PSHx, Allergies, Medications reviewed in epic    ROS negative except pain complaints in HPI    OBJECTIVE:    BP (!) 154/88 (BP Location: Right arm, Patient Position: Sitting)   Pulse 87   Temp 97.5 °F (36.4 °C) (Temporal)   Resp 16   Ht 5' 7" (1.702 m)   Wt 110 kg (242 lb 8.1 oz)   SpO2 97%   BMI 37.98 kg/m²     PHYSICAL EXAMINATION:    GENERAL: Well appearing, in no acute distress, alert and oriented x3.  PSYCH:  Mood and affect appropriate.  SKIN: Skin color, texture, turgor normal, no rashes or lesions which will impact the procedure.  CV: RRR with palpation of the radial artery.  PULM: No evidence of respiratory difficulty, symmetric chest rise. Clear to auscultation.  NEURO: Cranial nerves grossly intact.    Plan:    Proceed with procedure as planned Procedure(s) " (LRB):  BLOCK, SPINAL NERVE ROOT, LUMBAR, SELECTIVE, TRANSFORAMINAL APPROACH Left L2-3, L3-4 RN IV sedation (Left)    Jay Hodges MD  03/23/2022

## 2022-03-23 NOTE — DISCHARGE SUMMARY
[facility]  Discharge Note       Description of Procedure: Lumbar Transforaminal Epidural Steroid Injection under Fluoroscopic Guidance    Procedure Date: 3/23/2022    Admit Date: 3/23/2022  Discharge Date: 3/23/2022     Attending Physician: Jay Hodges   Discharge Provider: Jay Hodges    Preoperative Diagnosis: Lumbar Radiculopathy     Postoperative Diagnosis: as above, same as preoperative diagnosis    Discharged Condition: Stable    Hospital Course: Patient was admitted for an outpatient procedure. The procedure was tolerated well with no complications.    Final Diagnoses: Same as principal problem.    Disposition: Home, self-care.    Follow up/Patient Instructions:  Follow-up in clinic in 2-3 weeks.    Medications: No medications were prescribed today. The patient was advised to resume normal medication regimen without change.  Specific information was provided regarding restarting any anticoagulant/s.    Discharge Procedure Orders (must include Diet, Follow-up, Activity):  Light activity for the remainder of the day, resume normal activity tomorrow. Resume normal diet. Follow-up in clinic in 2-3 weeks.

## 2022-03-24 LAB — POCT GLUCOSE: 164 MG/DL (ref 70–110)

## 2022-03-29 DIAGNOSIS — M25.532 LEFT WRIST PAIN: Primary | ICD-10-CM

## 2022-04-01 ENCOUNTER — OFFICE VISIT (OUTPATIENT)
Dept: SPORTS MEDICINE | Facility: CLINIC | Age: 67
End: 2022-04-01
Payer: MEDICARE

## 2022-04-01 ENCOUNTER — HOSPITAL ENCOUNTER (OUTPATIENT)
Dept: RADIOLOGY | Facility: HOSPITAL | Age: 67
Discharge: HOME | End: 2022-04-01
Attending: STUDENT IN AN ORGANIZED HEALTH CARE EDUCATION/TRAINING PROGRAM
Payer: MEDICARE

## 2022-04-01 VITALS — WEIGHT: 244.06 LBS | BODY MASS INDEX: 38.22 KG/M2

## 2022-04-01 DIAGNOSIS — G56.03 BILATERAL CARPAL TUNNEL SYNDROME: ICD-10-CM

## 2022-04-01 DIAGNOSIS — G56.02 LEFT CARPAL TUNNEL SYNDROME: Primary | ICD-10-CM

## 2022-04-01 DIAGNOSIS — M25.332 SCAPHO-LUNATE DISSOCIATION, LEFT: ICD-10-CM

## 2022-04-01 DIAGNOSIS — M25.532 LEFT WRIST PAIN: ICD-10-CM

## 2022-04-01 DIAGNOSIS — M87.08 IDIOPATHIC ASEPTIC NECROSIS OF BONE, OTHER SITE: ICD-10-CM

## 2022-04-01 PROCEDURE — 1101F PT FALLS ASSESS-DOCD LE1/YR: CPT | Mod: CPTII,S$GLB,, | Performed by: STUDENT IN AN ORGANIZED HEALTH CARE EDUCATION/TRAINING PROGRAM

## 2022-04-01 PROCEDURE — 3072F PR LOW RISK FOR RETINOPATHY: ICD-10-PCS | Mod: CPTII,S$GLB,, | Performed by: STUDENT IN AN ORGANIZED HEALTH CARE EDUCATION/TRAINING PROGRAM

## 2022-04-01 PROCEDURE — 3072F LOW RISK FOR RETINOPATHY: CPT | Mod: CPTII,S$GLB,, | Performed by: STUDENT IN AN ORGANIZED HEALTH CARE EDUCATION/TRAINING PROGRAM

## 2022-04-01 PROCEDURE — 73110 X-RAY EXAM OF WRIST: CPT | Mod: TC,LT

## 2022-04-01 PROCEDURE — 3288F FALL RISK ASSESSMENT DOCD: CPT | Mod: CPTII,S$GLB,, | Performed by: STUDENT IN AN ORGANIZED HEALTH CARE EDUCATION/TRAINING PROGRAM

## 2022-04-01 PROCEDURE — 73110 X-RAY EXAM OF WRIST: CPT | Mod: 26,LT,, | Performed by: RADIOLOGY

## 2022-04-01 PROCEDURE — 3008F PR BODY MASS INDEX (BMI) DOCUMENTED: ICD-10-PCS | Mod: CPTII,S$GLB,, | Performed by: STUDENT IN AN ORGANIZED HEALTH CARE EDUCATION/TRAINING PROGRAM

## 2022-04-01 PROCEDURE — 99999 PR PBB SHADOW E&M-EST. PATIENT-LVL III: CPT | Mod: PBBFAC,,, | Performed by: STUDENT IN AN ORGANIZED HEALTH CARE EDUCATION/TRAINING PROGRAM

## 2022-04-01 PROCEDURE — 3288F PR FALLS RISK ASSESSMENT DOCUMENTED: ICD-10-PCS | Mod: CPTII,S$GLB,, | Performed by: STUDENT IN AN ORGANIZED HEALTH CARE EDUCATION/TRAINING PROGRAM

## 2022-04-01 PROCEDURE — 3008F BODY MASS INDEX DOCD: CPT | Mod: CPTII,S$GLB,, | Performed by: STUDENT IN AN ORGANIZED HEALTH CARE EDUCATION/TRAINING PROGRAM

## 2022-04-01 PROCEDURE — 73110 XR WRIST COMPLETE 3 VIEWS LEFT: ICD-10-PCS | Mod: 26,LT,, | Performed by: RADIOLOGY

## 2022-04-01 PROCEDURE — 99214 PR OFFICE/OUTPT VISIT, EST, LEVL IV, 30-39 MIN: ICD-10-PCS | Mod: 25,S$GLB,, | Performed by: STUDENT IN AN ORGANIZED HEALTH CARE EDUCATION/TRAINING PROGRAM

## 2022-04-01 PROCEDURE — 99214 OFFICE O/P EST MOD 30 MIN: CPT | Mod: 25,S$GLB,, | Performed by: STUDENT IN AN ORGANIZED HEALTH CARE EDUCATION/TRAINING PROGRAM

## 2022-04-01 PROCEDURE — 1101F PR PT FALLS ASSESS DOC 0-1 FALLS W/OUT INJ PAST YR: ICD-10-PCS | Mod: CPTII,S$GLB,, | Performed by: STUDENT IN AN ORGANIZED HEALTH CARE EDUCATION/TRAINING PROGRAM

## 2022-04-01 PROCEDURE — 99999 PR PBB SHADOW E&M-EST. PATIENT-LVL III: ICD-10-PCS | Mod: PBBFAC,,, | Performed by: STUDENT IN AN ORGANIZED HEALTH CARE EDUCATION/TRAINING PROGRAM

## 2022-04-01 NOTE — PROGRESS NOTES
Patient ID: Friday ROGELIO Blake  YOB: 1955  MRN: [id number]    Chief Complaint: Pain, Swelling, and Numbness of the Left Hand and Numbness, Pain, and Swelling of the Left Wrist    Referred By:  Nilam Poole for left wrist pain    History of Present Illness: Friday ROGELIO Blake is a 66-year-old male presenting today for left wrist pain.  Recently seen by Dr. Nilam Poole for an EMG that showed moderate carpal tunnel across the left wrist.  Of note he has had a carpal tunnel release just over year ago with Dr. David.  He had about 6 months of good relief following that surgery but notes pain starting back on both sides his wrist since then.  He notes mostly pain sometimes radiating up to the elbow rarely into the hand denies any specific numbness and tingling but can bother him at night and with increased activity.  Pain is both on the dorsal and volar aspect.  Described as sharp and stabbing at times has been in a wrist brace for last few weeks due to the increased pain which helps overall.  Worse with some end ranges of motion as well.  Denies dropping things or numbness and tingling but notes worsening pain with function.  No new injuries traumas that he can remember recently.  He has tried some topicals over-the-counter without much relief.    Past Medical History:   Past Medical History:   Diagnosis Date    Carpal tunnel syndrome     Diabetes mellitus without complication     History of colon polyps     Hypercalcemia 3/23/2021    Hyperlipidemia associated with type 2 diabetes mellitus     Hypertension associated with diabetes     Lumbar disc disease with radiculopathy     Morbid obesity with BMI of 40.0-44.9, adult     S/P parathyroidectomy 7/29/2021    Vitamin D deficiency      Past Surgical History:   Procedure Laterality Date    CARPAL TUNNEL RELEASE Left 4/1/2021    Procedure: RELEASE, CARPAL TUNNEL;  Surgeon: Yoandy David MD;  Location: [facility];  Service:  Orthopedics;  Laterality: Left;    HERNIA REPAIR      PARATHYROIDECTOMY Bilateral 7/27/2021    Procedure: PARATHYROIDECTOMY;  Surgeon: Tha Dial MD;  Location: [facility] OR;  Service: ENT;  Laterality: Bilateral;  with medialstinal exploration    SELECTIVE INJECTION OF ANESTHETIC AGENT AROUND LUMBAR SPINAL NERVE ROOT BY TRANSFORAMINAL APPROACH Left 3/23/2022    Procedure: BLOCK, SPINAL NERVE ROOT, LUMBAR, SELECTIVE, TRANSFORAMINAL APPROACH Left L2-3, L3-4 RN IV sedation;  Surgeon: Jay Hodges MD;  Location: [facility];  Service: Pain Management;  Laterality: Left;    STERNOTOMY N/A 7/27/2021    Procedure: STERNOTOMY;  Surgeon: Tha Dial MD;  Location: [facility] OR;  Service: ENT;  Laterality: N/A;    ULNAR NERVE TRANSPOSITION Left 4/1/2021    Procedure: TRANSPOSITION, NERVE, ULNAR;  Surgeon: Yoandy David MD;  Location: [facility] OR;  Service: Orthopedics;  Laterality: Left;    ULNAR TUNNEL RELEASE Left 4/1/2021    Procedure: RELEASE, CUBITAL TUNNEL;  Surgeon: Yoandy David MD;  Location: [facility] OR;  Service: Orthopedics;  Laterality: Left;    UMBILICAL HERNIA REPAIR       Family History   Problem Relation Age of Onset    Asthma Mother     Hypertension Father     Diabetes Mellitus Father     Prostate cancer Brother      Social History     Socioeconomic History    Marital status:    Tobacco Use    Smoking status: Never Smoker    Smokeless tobacco: Never Used   Substance and Sexual Activity    Alcohol use: Never    Drug use: Never    Sexual activity: Yes     Partners: Female     Medication List with Changes/Refills   Current Medications    ASPIRIN (ECOTRIN) 81 MG EC TABLET    Take 81 mg by mouth once daily.    ATORVASTATIN (LIPITOR) 10 MG TABLET    Take 1 tablet (10 mg total) by mouth every evening.    BLOOD SUGAR DIAGNOSTIC STRP    To check BG 1 times daily, to use with insurance preferred meter    BLOOD-GLUCOSE METER KIT    To check BG 1 times daily, to use with insurance preferred  meter    ERGOCALCIFEROL (ERGOCALCIFEROL) 50,000 UNIT CAP    Take 1 capsule (50,000 Units total) by mouth every 7 days.    HYDROCODONE-ACETAMINOPHEN (NORCO) 7.5-325 MG PER TABLET    Take 1 tablet by mouth every 8 (eight) hours as needed for Pain.    IRBESARTAN (AVAPRO) 150 MG TABLET    Take 1 tablet (150 mg total) by mouth once daily.    LANCETS (ONETOUCH ULTRASOFT LANCETS) MISC    Check FS daily    LANCETS MISC    To check BG 1 times daily, to use with insurance preferred meter    METFORMIN (GLUCOPHAGE) 1000 MG TABLET    Take 1 tablet (1,000 mg total) by mouth 2 (two) times daily with meals.    ONETOUCH VERIO TEST STRIPS STRP    USE 1 STRIP TO CHECK GLUCOSE ONCE DAILY    PSYLLIUM HUSK 2.6 GRAM/4.1 GRAM POWD    Take 2.6 g/day by mouth 2 (two) times a day.     Review of patient's allergies indicates:  No Known Allergies    Physical Exam:   Body mass index is 38.22 kg/m².    GENERAL: Well appearing, in no acute distress.  HEAD: Normocephalic and atraumatic.  ENT: External ears and nose grossly normal.  EYES: EOMI bilaterally  PULMONARY: Respirations are grossly even and non-labored.  NEURO: Awake, alert, and oriented x 3.  SKIN: No obvious rashes appreciated.  PSYCH: Mood & affect are appropriate.    Detailed MSK exam:     Left wrist exam  Good range of motion in flexion extension slight decrease in extension compared to contralateral normal radial ulnar deviation pain and range of extension as well motor function of median ulnar radial nerve all intact 2+ radial pulse no thenar atrophy appreciated tenderness with Tinel's but no reproduction of symptoms into the hand.  Point tenderness over the scapholunate joint on the dorsal and volar aspect no tenderness in the anatomical snuffbox no tenderness over the ulnar fovea DRUJ or distal radiocarpal joint.  Negative carpal compression    Imaging:    Narrative & Impression  EXAMINATION:  XR WRIST COMPLETE 3 VIEWS LEFT     CLINICAL HISTORY:  Pain in left  wrist     TECHNIQUE:  PA, lateral, and oblique views of the left wrist were performed.     COMPARISON:  None     FINDINGS:  There is widening of the scapholunate interval with suspected increased sclerosis of the lunate suggesting possible osteonecrosis.  No acute fracture.  Soft tissues unremarkable.  Correlate with MRI as clinically indicated.     Impression:     As above        Electronically signed by: Caleb Barron MD  Date:                                            04/01/2022  Time:                                           10:58    Relevant imaging results were reviewed and interpreted by me and per my read as above.  This was discussed with the patient and / or family today.     Assessment:  Friday ROGELIO Blake is a 66 y.o. male presents today for left wrist pain concerns for residual carpal tunnel syndrome on EMG, but exam and x-rays more concerning for possible scapholunate dissociation and possible osteonecrosis of the lunate.  He is point tender on both the dorsal and volar aspect of this would like to get a MR arthrogram to further concern many injuries to the scapholunate joint as well as 2 lunate itself.  Will follow-up after MRI.  Discussed with holding any injections into the carpal tunnel today as he seems to be less symptomatic from that discussed topical Voltaren wrist brace as needed in the meantime.  Follow-up with me after MRI.    Left carpal tunnel syndrome  -     Cancel: Sports Medicine US - Guidance for Needle Placement    Bilateral carpal tunnel syndrome  Comments:  moderate demyelinating bilaterally  Orders:  -     Ambulatory referral/consult to Orthopedics    Scapho-lunate dissociation, left  -     X-Ray Arthrogram Wrist Left, COMPLETE (xpd); Future; Expected date: 04/01/2022  -     MRI Arthrogram Wrist W/O Contrast Left; Future; Expected date: 04/01/2022    Idiopathic aseptic necrosis of bone, other site  -     X-Ray Arthrogram Wrist Left, COMPLETE (xpd); Future; Expected date:  04/01/2022  -     MRI Arthrogram Wrist W/O Contrast Left; Future; Expected date: 04/01/2022         A copy of today's visit note has been sent to the referring provider.       Shankar Aguero MD    Disclaimer: This note was prepared using a voice recognition system and is likely to have sound alike errors within the text.

## 2022-04-01 NOTE — PATIENT INSTRUCTIONS
Assessment:  Friday ROGELIO Blake is a 66 y.o. male   Chief Complaint   Patient presents with    Left Hand - Pain, Swelling, Numbness    Left Wrist - Numbness, Pain, Swelling       Encounter Diagnoses   Name Primary?    Bilateral carpal tunnel syndrome     Left carpal tunnel syndrome Yes    Scapho-lunate dissociation, left         Plan:  Apply topical diclofenac (Voltaren) up to 4 times a day to the affected area.  It can be bought over the counter at any local pharmacy.    MRI left wrist     Follow-up: After MRI or sooner if there are any problems between now and then.    Thank you for choosing [facility] and Dr. Shankar Aguero for your orthopedic & sports medicine care. It is our goal to provide you with exceptional care that will help keep you healthy, active, and get you back in the game.    Please do not hesitate to reach out to us via email, phone, or MyChart with any questions, concerns, or feedback.    If you felt that you received exemplary care today, please consider leaving us feedback on [employer] at:  [email or web address]    If you are experiencing pain/discomfort ,or have questions after 5pm and would like to be connected to the [facility] on-call team, please call this number and specify which Sports Medicine provider is treating you: [phone]

## 2022-04-14 ENCOUNTER — TELEPHONE (OUTPATIENT)
Dept: PAIN MEDICINE | Facility: CLINIC | Age: 67
End: 2022-04-14
Payer: MEDICARE

## 2022-04-14 ENCOUNTER — PATIENT OUTREACH (OUTPATIENT)
Dept: ADMINISTRATIVE | Facility: OTHER | Age: 67
End: 2022-04-14
Payer: MEDICARE

## 2022-04-14 NOTE — TELEPHONE ENCOUNTER
Attempt to call patient to confirm appointment. Patent did not answer, Left message on patients voice mail to call back at earliest convenience to confirm or reschedule p.t apt.     Severiano Torres  Medical Assistant

## 2022-04-19 ENCOUNTER — OFFICE VISIT (OUTPATIENT)
Dept: PAIN MEDICINE | Facility: CLINIC | Age: 67
End: 2022-04-19
Payer: MEDICARE

## 2022-04-19 ENCOUNTER — TELEPHONE (OUTPATIENT)
Dept: NEUROSURGERY | Facility: CLINIC | Age: 67
End: 2022-04-19
Payer: MEDICARE

## 2022-04-19 VITALS
WEIGHT: 249 LBS | HEART RATE: 62 BPM | RESPIRATION RATE: 17 BRPM | DIASTOLIC BLOOD PRESSURE: 81 MMHG | HEIGHT: 67 IN | BODY MASS INDEX: 39.08 KG/M2 | SYSTOLIC BLOOD PRESSURE: 136 MMHG

## 2022-04-19 DIAGNOSIS — M54.16 LUMBAR RADICULOPATHY: ICD-10-CM

## 2022-04-19 DIAGNOSIS — M47.816 LUMBAR SPONDYLOSIS: Primary | ICD-10-CM

## 2022-04-19 DIAGNOSIS — M48.00 SPINAL STENOSIS, UNSPECIFIED SPINAL REGION: ICD-10-CM

## 2022-04-19 DIAGNOSIS — M54.16 LUMBAR RADICULOPATHY: Primary | ICD-10-CM

## 2022-04-19 PROCEDURE — 1101F PT FALLS ASSESS-DOCD LE1/YR: CPT | Mod: CPTII,S$GLB,, | Performed by: PHYSICIAN ASSISTANT

## 2022-04-19 PROCEDURE — 3072F PR LOW RISK FOR RETINOPATHY: ICD-10-PCS | Mod: CPTII,S$GLB,, | Performed by: PHYSICIAN ASSISTANT

## 2022-04-19 PROCEDURE — 1125F AMNT PAIN NOTED PAIN PRSNT: CPT | Mod: CPTII,S$GLB,, | Performed by: PHYSICIAN ASSISTANT

## 2022-04-19 PROCEDURE — 99999 PR PBB SHADOW E&M-EST. PATIENT-LVL IV: CPT | Mod: PBBFAC,,, | Performed by: PHYSICIAN ASSISTANT

## 2022-04-19 PROCEDURE — 1159F MED LIST DOCD IN RCRD: CPT | Mod: CPTII,S$GLB,, | Performed by: PHYSICIAN ASSISTANT

## 2022-04-19 PROCEDURE — 3008F BODY MASS INDEX DOCD: CPT | Mod: CPTII,S$GLB,, | Performed by: PHYSICIAN ASSISTANT

## 2022-04-19 PROCEDURE — 99213 OFFICE O/P EST LOW 20 MIN: CPT | Mod: S$GLB,,, | Performed by: PHYSICIAN ASSISTANT

## 2022-04-19 PROCEDURE — 1101F PR PT FALLS ASSESS DOC 0-1 FALLS W/OUT INJ PAST YR: ICD-10-PCS | Mod: CPTII,S$GLB,, | Performed by: PHYSICIAN ASSISTANT

## 2022-04-19 PROCEDURE — 1125F PR PAIN SEVERITY QUANTIFIED, PAIN PRESENT: ICD-10-PCS | Mod: CPTII,S$GLB,, | Performed by: PHYSICIAN ASSISTANT

## 2022-04-19 PROCEDURE — 99999 PR PBB SHADOW E&M-EST. PATIENT-LVL IV: ICD-10-PCS | Mod: PBBFAC,,, | Performed by: PHYSICIAN ASSISTANT

## 2022-04-19 PROCEDURE — 1159F PR MEDICATION LIST DOCUMENTED IN MEDICAL RECORD: ICD-10-PCS | Mod: CPTII,S$GLB,, | Performed by: PHYSICIAN ASSISTANT

## 2022-04-19 PROCEDURE — 3075F PR MOST RECENT SYSTOLIC BLOOD PRESS GE 130-139MM HG: ICD-10-PCS | Mod: CPTII,S$GLB,, | Performed by: PHYSICIAN ASSISTANT

## 2022-04-19 PROCEDURE — 99213 PR OFFICE/OUTPT VISIT, EST, LEVL III, 20-29 MIN: ICD-10-PCS | Mod: S$GLB,,, | Performed by: PHYSICIAN ASSISTANT

## 2022-04-19 PROCEDURE — 3008F PR BODY MASS INDEX (BMI) DOCUMENTED: ICD-10-PCS | Mod: CPTII,S$GLB,, | Performed by: PHYSICIAN ASSISTANT

## 2022-04-19 PROCEDURE — 3079F DIAST BP 80-89 MM HG: CPT | Mod: CPTII,S$GLB,, | Performed by: PHYSICIAN ASSISTANT

## 2022-04-19 PROCEDURE — 1160F RVW MEDS BY RX/DR IN RCRD: CPT | Mod: CPTII,S$GLB,, | Performed by: PHYSICIAN ASSISTANT

## 2022-04-19 PROCEDURE — 1160F PR REVIEW ALL MEDS BY PRESCRIBER/CLIN PHARMACIST DOCUMENTED: ICD-10-PCS | Mod: CPTII,S$GLB,, | Performed by: PHYSICIAN ASSISTANT

## 2022-04-19 PROCEDURE — 3075F SYST BP GE 130 - 139MM HG: CPT | Mod: CPTII,S$GLB,, | Performed by: PHYSICIAN ASSISTANT

## 2022-04-19 PROCEDURE — 3288F FALL RISK ASSESSMENT DOCD: CPT | Mod: CPTII,S$GLB,, | Performed by: PHYSICIAN ASSISTANT

## 2022-04-19 PROCEDURE — 3288F PR FALLS RISK ASSESSMENT DOCUMENTED: ICD-10-PCS | Mod: CPTII,S$GLB,, | Performed by: PHYSICIAN ASSISTANT

## 2022-04-19 PROCEDURE — 3072F LOW RISK FOR RETINOPATHY: CPT | Mod: CPTII,S$GLB,, | Performed by: PHYSICIAN ASSISTANT

## 2022-04-19 PROCEDURE — 3079F PR MOST RECENT DIASTOLIC BLOOD PRESSURE 80-89 MM HG: ICD-10-PCS | Mod: CPTII,S$GLB,, | Performed by: PHYSICIAN ASSISTANT

## 2022-04-19 NOTE — PROGRESS NOTES
Chief Pain Complaint:  Low-back Pain (C/o lower back pain worse than the wrist ) and Hand Pain (Left wrist pain )        History of Present Illness:    Interval History (4/19/2022): Friday ROGELIO Blake presents today for follow-up visit.  he underwent Left L2/3 L3/4 TF NEHEMIAS on 3/23/22.  The patient reports that he is/was better following the procedure.  he reports 80% pain relief and increased mobility that lasted about 2 weeks.  The changes lasted 4 weeks, slowly returning over time, returning to original pain yesterday. Patient reports pain as 7/10 today. Patient reports that although he did receive good relief from the injection, he would like to discuss surgical options for long-term relief. Reports that he is taking Naproxen daily to help with the pain and is only able to stand for about 30 minutes before having to sit due to pain. Reports the pain has been 80% axial.    Initial HPI (03/08/2022)  Friday ROGELIO Blake is a 66 y.o. male  who is presenting with a chief complaint of Low-back Pain (C/o lower back pain worse than the wrist ) and Hand Pain (Left wrist pain )  . The patient began experiencing this problem insidiously, and the pain has been gradually worsening over the past 5 month(s). The pain is described as throbbing, shooting, burning and electrical and is located in the left lumbar spine . Pain is intermittent and lasts hours. The pain radiates to  left leg L3 distribution. The patient rates his pain a 8 out of ten and interferes with activities of daily living a 8 out of ten. Pain is exacerbated by flexion of the lumbar spine, and is improved by rest. Patient reports no prior trauma, no prior spinal surgery     - pertinent negatives: No fever, No chills, No weight loss, No bladder dysfunction, No bowel dysfunction, No saddle anesthesia  - pertinent positives: left leg weakness    - medications, other therapies tried (physical therapy, injections):     >> NSAIDs, Tylenol, Tramadol, Norco, gabapentin,  flexeril and medrol dose pack    >> Has previously undergone Physical Therapy    >> Has NOT previously undergone spinal injection/s      Imaging / Labs / Studies (reviewed on 4/19/2022):    Results for orders placed during the hospital encounter of 01/18/22    MRI Lumbar Spine Without Contrast    Narrative  EXAMINATION:  MRI LUMBAR SPINE WITHOUT CONTRAST    CLINICAL HISTORY:  Radiculopathy, lumbar regionlumbar radiculopahty;    TECHNIQUE:  MR Lumbar spine without contrast. Sagittal T1, T2, STIR. Axial T1, T2. Coronal T1.    COMPARISON:  None.    FINDINGS:  Straightening of the lumbar lordosis.  Vertebral body height is normal.  Marrow signal is within normal limits. The conus medullaris terminates at the level of L1-L2.  No abnormal signal within the conus. Mild congenital spinal stenosis.  Interspaces are as follows:    T12-L1 disc: Mild disc bulge.  Posterior annular fissure.  Slight buckling of ligamentum flavum.  The thecal sac measures 12 mm.  No significant foraminal stenosis.    L1-L2 disc : Left paracentral disc protrusion that causes mild-to-moderate left lateral recess stenosis.  This is best demonstrated on axial T2 series 6, image 8.  Mild degenerative facet hypertrophy bilaterally.  The thecal sac measures 7 mm.  No significant foraminal stenosis.    L2-L3 disc: Focal left lateral recess/paracentral disc protrusion with annular fissure that likely compresses the descending left L3 spinal nerve roots.  This is best demonstrated on axial T2 series 6, image 12.  Mild degenerative facet hypertrophy with buckling of ligamentum flavum.  The thecal sac measures 6 mm AP.  No significant foraminal stenosis.    L3-L4 disc: Broad-based posterior disc bulge with a posterior annular fissure.  Mild degenerative facet hypertrophy and buckling of ligamentum flavum.  The thecal sac measures 8 mm AP.  Mild bilateral foraminal stenosis.    L4-L5 disc: Circumferential disc bulge.  Anterior osteophytes.  Severe degenerative  facet hypertrophy.  Right-sided facet joint effusion.  Posteriorly and inferiorly projecting synovial cyst from the right facet joint measuring 10 mm.  The thecal sac measures 12 mm AP.  Disc encroaches into the left exit foramina and causes mild/moderate left foraminal stenosis.  Mild right foraminal stenosis.    L5-S1 disc: Posterior disc bulge.  Severe degenerative facet hypertrophy.  The thecal sac measures 12 mm.  No significant foraminal stenosis.    Impression  1. 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  2. Left-sided disc protrusion with annular fissure at L2-L3 causes moderate spinal stenosis and severe left lateral recess stenosis.  At likely compresses the descending left L3 spinal nerve roots and may result in left L3 radiculopathy.  3. Left paracentral disc protrusion L1-L2 causes mild to moderate left lateral recess stenosis and may compress the descending left L2 spinal nerve roots.  4. Mild overall spinal stenosis at L1-L2 and L3-L4.  5. Mild/moderate left and mild right foraminal stenosis at L4-L5.  6. Severe facet arthropathy at L4-L5 and L5-S1.  Right facet joint effusion at L4-L5 with a posteriorly projecting synovial cyst.      Electronically signed by: Ramon Mead Jr., MD  Date:    01/18/2022  Time:    16:06          Review of Systems:  CONSTITUTIONAL: patient denies any fever, chills, or weight loss  SKIN: patient denies any rash or itching  RESPIRATORY: patient denies having any shortness of breath  GASTROINTESTINAL: patient denies having any diarrhea, constipation, or bowel incontinence  GENITOURINARY: patient denies having any abnormal bladder function    MUSCULOSKELETAL:  - patient complains of the above noted pain/s (see chief pain complaint)    NEUROLOGICAL:   - pain as above  - strength in Lower extremities is intact, BILATERALLY  - sensation in Lower extremities is intact, BILATERALLY  - patient denies any loss of bowel or bladder control      PSYCHIATRIC:  "patient denies any change in mood    Other:  All other systems reviewed and are negative      Physical Exam:  /81   Pulse 62   Resp 17   Ht 5' 7" (1.702 m)   Wt 112.9 kg (249 lb 0.2 oz)   BMI 39.00 kg/m²  (reviewed on 4/19/2022)  General: Alert and oriented, in no apparent distress.  Gait: normal gait.  Skin: No rashes, No discoloration, No obvious lesions  HEENT: Normocephalic, atraumatic. Pupils equal and round.  Cardiovascular: Regular rate and rhythm , no significant peripheral edema present  Respiratory: Without audible wheezing, without use of accessory muscles of respiration.    Musculoskeletal:    Cervical Spine    - Pain on flexion of cervical spine Absent  - Spurling's Test:  Absent    - Pain on extension of cervical spine Absent  - TTP over the cervical facet joints Absent  - Cervical facet loading Absent      Lumbar Spine    - Pain on flexion of lumbar spine Present  - Straight Leg Raise:  Equivocal    - Pain on extension of lumbar spine Absent  - TTP over the lumbar facet joints Present  - Lumbar facet loading Present    -Pain on palpation over the SI joint  Absent  - LUIS: Absent      Neuro:    Strength:  UE R/L: D: 5/5; B: 5/5; T: 5/5; WF: 5/5; WE: 5/5; IO: 5/5;  LE R/L: HF: 5/5, HE: 5/5, KF: 5/5; KE: 5/5; FE: 5/5; FF: 5/5    Extremity Reflexes: Brisk and symmetric throughout.      Extremity Sensory: Sensation to pinprick and temperature symmetric. Proprioception intact.      Psych:  Mood and affect is appropriate      Assessment:    Friday ROGELIO Blake is a 66 y.o. year old male who is presenting with     Encounter Diagnosis   Name Primary?    Lumbar spondylosis Yes       Plan:    1. Interventional:   None at this time, patient would like second opinion from neurosurgery to go over surgical options  S/p Left L2-3, L3-4 TFESI RN IV sedation w/ 80% relief x 2 weeks then slowly returned over 2 weeks    2. Pharmacologic: Continue Naproxen PRN.      3. Rehabilitative: Patient already did " PT.    4. Referral: Neurosurgery for surgical options.    5. Follow up: PRN or per Neurosurg     Jessi Mills PA-C  Interventional Pain Medicine        Disclaimer:  This note may have been prepared using voice recognition software, it may have not been extensively proofed, as such there could be errors within the text such as sound alike errors.

## 2022-04-19 NOTE — TELEPHONE ENCOUNTER
Contacted patient regarding referral with Neurosurgery . Scheduled  appointment.  Shaylee Parks RN

## 2022-04-20 ENCOUNTER — TELEPHONE (OUTPATIENT)
Dept: NEUROSURGERY | Facility: CLINIC | Age: 67
End: 2022-04-20
Payer: MEDICARE

## 2022-04-21 ENCOUNTER — HOSPITAL ENCOUNTER (OUTPATIENT)
Dept: RADIOLOGY | Facility: HOSPITAL | Age: 67
Discharge: HOME | End: 2022-04-21
Attending: NURSE PRACTITIONER
Payer: MEDICARE

## 2022-04-21 ENCOUNTER — OFFICE VISIT (OUTPATIENT)
Dept: NEUROSURGERY | Facility: CLINIC | Age: 67
End: 2022-04-21
Payer: MEDICARE

## 2022-04-21 VITALS
RESPIRATION RATE: 18 BRPM | BODY MASS INDEX: 38.76 KG/M2 | SYSTOLIC BLOOD PRESSURE: 131 MMHG | HEIGHT: 67 IN | DIASTOLIC BLOOD PRESSURE: 84 MMHG | HEART RATE: 64 BPM | WEIGHT: 246.94 LBS

## 2022-04-21 DIAGNOSIS — M51.36 DEGENERATIVE DISC DISEASE, LUMBAR: ICD-10-CM

## 2022-04-21 DIAGNOSIS — M48.062 LUMBAR STENOSIS WITH NEUROGENIC CLAUDICATION: ICD-10-CM

## 2022-04-21 DIAGNOSIS — M43.16 SPONDYLOLISTHESIS, LUMBAR REGION: ICD-10-CM

## 2022-04-21 DIAGNOSIS — M54.16 LUMBAR RADICULOPATHY: ICD-10-CM

## 2022-04-21 DIAGNOSIS — M47.816 LUMBAR SPONDYLOSIS: Primary | ICD-10-CM

## 2022-04-21 PROCEDURE — 72114 XR LUMBAR SPINE 5 VIEW WITH FLEX AND EXT: ICD-10-PCS | Mod: 26,,, | Performed by: RADIOLOGY

## 2022-04-21 PROCEDURE — 99999 PR PBB SHADOW E&M-EST. PATIENT-LVL IV: ICD-10-PCS | Mod: PBBFAC,,, | Performed by: NEUROLOGICAL SURGERY

## 2022-04-21 PROCEDURE — 1101F PR PT FALLS ASSESS DOC 0-1 FALLS W/OUT INJ PAST YR: ICD-10-PCS | Mod: CPTII,S$GLB,, | Performed by: NEUROLOGICAL SURGERY

## 2022-04-21 PROCEDURE — 3079F PR MOST RECENT DIASTOLIC BLOOD PRESSURE 80-89 MM HG: ICD-10-PCS | Mod: CPTII,S$GLB,, | Performed by: NEUROLOGICAL SURGERY

## 2022-04-21 PROCEDURE — 3288F PR FALLS RISK ASSESSMENT DOCUMENTED: ICD-10-PCS | Mod: CPTII,S$GLB,, | Performed by: NEUROLOGICAL SURGERY

## 2022-04-21 PROCEDURE — 1101F PT FALLS ASSESS-DOCD LE1/YR: CPT | Mod: CPTII,S$GLB,, | Performed by: NEUROLOGICAL SURGERY

## 2022-04-21 PROCEDURE — 3288F FALL RISK ASSESSMENT DOCD: CPT | Mod: CPTII,S$GLB,, | Performed by: NEUROLOGICAL SURGERY

## 2022-04-21 PROCEDURE — 3079F DIAST BP 80-89 MM HG: CPT | Mod: CPTII,S$GLB,, | Performed by: NEUROLOGICAL SURGERY

## 2022-04-21 PROCEDURE — 3075F PR MOST RECENT SYSTOLIC BLOOD PRESS GE 130-139MM HG: ICD-10-PCS | Mod: CPTII,S$GLB,, | Performed by: NEUROLOGICAL SURGERY

## 2022-04-21 PROCEDURE — 1159F PR MEDICATION LIST DOCUMENTED IN MEDICAL RECORD: ICD-10-PCS | Mod: CPTII,S$GLB,, | Performed by: NEUROLOGICAL SURGERY

## 2022-04-21 PROCEDURE — 72114 X-RAY EXAM L-S SPINE BENDING: CPT | Mod: 26,,, | Performed by: RADIOLOGY

## 2022-04-21 PROCEDURE — 99204 OFFICE O/P NEW MOD 45 MIN: CPT | Mod: S$GLB,,, | Performed by: NEUROLOGICAL SURGERY

## 2022-04-21 PROCEDURE — 1125F AMNT PAIN NOTED PAIN PRSNT: CPT | Mod: CPTII,S$GLB,, | Performed by: NEUROLOGICAL SURGERY

## 2022-04-21 PROCEDURE — 3072F LOW RISK FOR RETINOPATHY: CPT | Mod: CPTII,S$GLB,, | Performed by: NEUROLOGICAL SURGERY

## 2022-04-21 PROCEDURE — 3008F BODY MASS INDEX DOCD: CPT | Mod: CPTII,S$GLB,, | Performed by: NEUROLOGICAL SURGERY

## 2022-04-21 PROCEDURE — 1159F MED LIST DOCD IN RCRD: CPT | Mod: CPTII,S$GLB,, | Performed by: NEUROLOGICAL SURGERY

## 2022-04-21 PROCEDURE — 3072F PR LOW RISK FOR RETINOPATHY: ICD-10-PCS | Mod: CPTII,S$GLB,, | Performed by: NEUROLOGICAL SURGERY

## 2022-04-21 PROCEDURE — 3008F PR BODY MASS INDEX (BMI) DOCUMENTED: ICD-10-PCS | Mod: CPTII,S$GLB,, | Performed by: NEUROLOGICAL SURGERY

## 2022-04-21 PROCEDURE — 1125F PR PAIN SEVERITY QUANTIFIED, PAIN PRESENT: ICD-10-PCS | Mod: CPTII,S$GLB,, | Performed by: NEUROLOGICAL SURGERY

## 2022-04-21 PROCEDURE — 99999 PR PBB SHADOW E&M-EST. PATIENT-LVL IV: CPT | Mod: PBBFAC,,, | Performed by: NEUROLOGICAL SURGERY

## 2022-04-21 PROCEDURE — 99204 PR OFFICE/OUTPT VISIT, NEW, LEVL IV, 45-59 MIN: ICD-10-PCS | Mod: S$GLB,,, | Performed by: NEUROLOGICAL SURGERY

## 2022-04-21 PROCEDURE — 3075F SYST BP GE 130 - 139MM HG: CPT | Mod: CPTII,S$GLB,, | Performed by: NEUROLOGICAL SURGERY

## 2022-04-21 PROCEDURE — 72114 X-RAY EXAM L-S SPINE BENDING: CPT | Mod: TC,PO

## 2022-05-03 ENCOUNTER — HOSPITAL ENCOUNTER (OUTPATIENT)
Dept: RADIOLOGY | Facility: HOSPITAL | Age: 67
Discharge: HOME | End: 2022-05-03
Attending: NEUROLOGICAL SURGERY
Payer: MEDICARE

## 2022-05-03 DIAGNOSIS — M47.816 LUMBAR SPONDYLOSIS: ICD-10-CM

## 2022-05-03 PROCEDURE — 72131 CT LUMBAR SPINE W/O DYE: CPT | Mod: TC

## 2022-05-05 ENCOUNTER — OFFICE VISIT (OUTPATIENT)
Dept: NEUROSURGERY | Facility: CLINIC | Age: 67
End: 2022-05-05
Payer: MEDICARE

## 2022-05-05 ENCOUNTER — TELEPHONE (OUTPATIENT)
Dept: NEUROSURGERY | Facility: CLINIC | Age: 67
End: 2022-05-05
Payer: MEDICARE

## 2022-05-05 ENCOUNTER — TELEPHONE (OUTPATIENT)
Dept: INTERNAL MEDICINE | Facility: CLINIC | Age: 67
End: 2022-05-05
Payer: MEDICARE

## 2022-05-05 VITALS
HEART RATE: 64 BPM | SYSTOLIC BLOOD PRESSURE: 104 MMHG | RESPIRATION RATE: 16 BRPM | WEIGHT: 246 LBS | DIASTOLIC BLOOD PRESSURE: 68 MMHG | BODY MASS INDEX: 38.61 KG/M2 | HEIGHT: 67 IN

## 2022-05-05 DIAGNOSIS — Z01.818 PRE-PROCEDURAL EXAMINATION: Primary | ICD-10-CM

## 2022-05-05 DIAGNOSIS — M54.16 LUMBAR RADICULOPATHY: ICD-10-CM

## 2022-05-05 DIAGNOSIS — M51.36 DEGENERATIVE DISC DISEASE, LUMBAR: ICD-10-CM

## 2022-05-05 DIAGNOSIS — D69.9 BLEEDING DISORDER: ICD-10-CM

## 2022-05-05 PROCEDURE — 3051F HG A1C>EQUAL 7.0%<8.0%: CPT | Mod: HCNC,CPTII,S$GLB, | Performed by: NEUROLOGICAL SURGERY

## 2022-05-05 PROCEDURE — 99999 PR PBB SHADOW E&M-EST. PATIENT-LVL III: ICD-10-PCS | Mod: PBBFAC,HCNC,, | Performed by: NEUROLOGICAL SURGERY

## 2022-05-05 PROCEDURE — 3078F PR MOST RECENT DIASTOLIC BLOOD PRESSURE < 80 MM HG: ICD-10-PCS | Mod: HCNC,CPTII,S$GLB, | Performed by: NEUROLOGICAL SURGERY

## 2022-05-05 PROCEDURE — 3288F PR FALLS RISK ASSESSMENT DOCUMENTED: ICD-10-PCS | Mod: HCNC,CPTII,S$GLB, | Performed by: NEUROLOGICAL SURGERY

## 2022-05-05 PROCEDURE — 3074F PR MOST RECENT SYSTOLIC BLOOD PRESSURE < 130 MM HG: ICD-10-PCS | Mod: HCNC,CPTII,S$GLB, | Performed by: NEUROLOGICAL SURGERY

## 2022-05-05 PROCEDURE — 3066F NEPHROPATHY DOC TX: CPT | Mod: HCNC,CPTII,S$GLB, | Performed by: NEUROLOGICAL SURGERY

## 2022-05-05 PROCEDURE — 4010F PR ACE/ARB THEARPY RXD/TAKEN: ICD-10-PCS | Mod: HCNC,CPTII,S$GLB, | Performed by: NEUROLOGICAL SURGERY

## 2022-05-05 PROCEDURE — 3061F PR NEG MICROALBUMINURIA RESULT DOCUMENTED/REVIEW: ICD-10-PCS | Mod: HCNC,CPTII,S$GLB, | Performed by: NEUROLOGICAL SURGERY

## 2022-05-05 PROCEDURE — 3078F DIAST BP <80 MM HG: CPT | Mod: HCNC,CPTII,S$GLB, | Performed by: NEUROLOGICAL SURGERY

## 2022-05-05 PROCEDURE — 1101F PT FALLS ASSESS-DOCD LE1/YR: CPT | Mod: HCNC,CPTII,S$GLB, | Performed by: NEUROLOGICAL SURGERY

## 2022-05-05 PROCEDURE — 3051F PR MOST RECENT HEMOGLOBIN A1C LEVEL 7.0 - < 8.0%: ICD-10-PCS | Mod: HCNC,CPTII,S$GLB, | Performed by: NEUROLOGICAL SURGERY

## 2022-05-05 PROCEDURE — 3008F BODY MASS INDEX DOCD: CPT | Mod: HCNC,CPTII,S$GLB, | Performed by: NEUROLOGICAL SURGERY

## 2022-05-05 PROCEDURE — 4010F ACE/ARB THERAPY RXD/TAKEN: CPT | Mod: HCNC,CPTII,S$GLB, | Performed by: NEUROLOGICAL SURGERY

## 2022-05-05 PROCEDURE — 1125F PR PAIN SEVERITY QUANTIFIED, PAIN PRESENT: ICD-10-PCS | Mod: HCNC,CPTII,S$GLB, | Performed by: NEUROLOGICAL SURGERY

## 2022-05-05 PROCEDURE — 99212 PR OFFICE/OUTPT VISIT, EST, LEVL II, 10-19 MIN: ICD-10-PCS | Mod: HCNC,S$GLB,, | Performed by: NEUROLOGICAL SURGERY

## 2022-05-05 PROCEDURE — 3008F PR BODY MASS INDEX (BMI) DOCUMENTED: ICD-10-PCS | Mod: HCNC,CPTII,S$GLB, | Performed by: NEUROLOGICAL SURGERY

## 2022-05-05 PROCEDURE — 3288F FALL RISK ASSESSMENT DOCD: CPT | Mod: HCNC,CPTII,S$GLB, | Performed by: NEUROLOGICAL SURGERY

## 2022-05-05 PROCEDURE — 3061F NEG MICROALBUMINURIA REV: CPT | Mod: HCNC,CPTII,S$GLB, | Performed by: NEUROLOGICAL SURGERY

## 2022-05-05 PROCEDURE — 99999 PR PBB SHADOW E&M-EST. PATIENT-LVL III: CPT | Mod: PBBFAC,HCNC,, | Performed by: NEUROLOGICAL SURGERY

## 2022-05-05 PROCEDURE — 3074F SYST BP LT 130 MM HG: CPT | Mod: HCNC,CPTII,S$GLB, | Performed by: NEUROLOGICAL SURGERY

## 2022-05-05 PROCEDURE — 3072F PR LOW RISK FOR RETINOPATHY: ICD-10-PCS | Mod: HCNC,CPTII,S$GLB, | Performed by: NEUROLOGICAL SURGERY

## 2022-05-05 PROCEDURE — 1125F AMNT PAIN NOTED PAIN PRSNT: CPT | Mod: HCNC,CPTII,S$GLB, | Performed by: NEUROLOGICAL SURGERY

## 2022-05-05 PROCEDURE — 3066F PR DOCUMENTATION OF TREATMENT FOR NEPHROPATHY: ICD-10-PCS | Mod: HCNC,CPTII,S$GLB, | Performed by: NEUROLOGICAL SURGERY

## 2022-05-05 PROCEDURE — 1101F PR PT FALLS ASSESS DOC 0-1 FALLS W/OUT INJ PAST YR: ICD-10-PCS | Mod: HCNC,CPTII,S$GLB, | Performed by: NEUROLOGICAL SURGERY

## 2022-05-05 PROCEDURE — 99212 OFFICE O/P EST SF 10 MIN: CPT | Mod: HCNC,S$GLB,, | Performed by: NEUROLOGICAL SURGERY

## 2022-05-05 PROCEDURE — 3072F LOW RISK FOR RETINOPATHY: CPT | Mod: HCNC,CPTII,S$GLB, | Performed by: NEUROLOGICAL SURGERY

## 2022-05-05 NOTE — PROGRESS NOTES
Subjective:      Patient ID: Friday ROGELIO Blake is a 67 y.o. male.    Chief Complaint: L    Patient is here for follow up to review MR and CT   Since last visit he is having lower back and lower extremity symptoms   Back + leg   Left > R to mid thigh slightly past the knee  Rates pain today as 7/10   Worse with prolong standing or walking   Better with sitting/rest  Currently only taking aleve   \    Back Pain  Associated symptoms include weakness. Pertinent negatives include no abdominal pain, chest pain, dysuria or headaches.        Review of Systems   Constitutional:  Negative for activity change, appetite change and chills.   HENT:  Negative for hearing loss, sore throat and tinnitus.    Eyes:  Negative for pain, discharge and itching.   Cardiovascular:  Negative for chest pain.   Gastrointestinal:  Negative for abdominal pain.   Endocrine: Negative for cold intolerance and heat intolerance.   Genitourinary:  Negative for difficulty urinating and dysuria.   Musculoskeletal:  Positive for back pain and gait problem.   Allergic/Immunologic: Negative for environmental allergies.   Neurological:  Positive for weakness. Negative for dizziness, tremors, light-headedness and headaches.   Hematological:  Negative for adenopathy.   Psychiatric/Behavioral:  Negative for agitation, behavioral problems and confusion.          Objective:       Physical Exam:  Nursing note and vitals reviewed.    Constitutional: He appears well-nourished. He is not diaphoretic. No distress.     Eyes: Pupils are equal, round, and reactive to light. EOM are normal.     Cardiovascular: Normal rate and regular rhythm.     Psych/Behavior: He is alert. He is oriented to person, place, and time. He has a normal mood and affect.     Musculoskeletal:        Back: Range of motion is limited. There is tenderness. Muscle strength is 5/5.       Right Lower Extremities: Range of motion is full. There is no tenderness. Muscle strength is 5/5. Tone is normal.         Left Lower Extremities: There is no tenderness. Muscle strength is 5/5. Tone is normal.     Neurological:        Sensory: There is no sensory deficit in the trunk. There is no sensory deficit in the extremities.        Cranial nerves: Cranial nerve(s) II, III, IV, V, VI, VII, VIII, IX, X, XI and XII are intact.     General    Nursing note and vitals reviewed.  Constitutional: He is oriented to person, place, and time. He appears well-nourished. No distress.   Eyes: EOM are normal. Pupils are equal, round, and reactive to light.   Cardiovascular:  Normal rate and regular rhythm.            Neurological: He is alert and oriented to person, place, and time.   Psychiatric: He has a normal mood and affect.             Ortho Exam    CT lumbar     L3-4: Circumferential disc bulge, moderate facet arthropathy and ligament flavum hypertrophy causes mild to moderate central canal and left foraminal stenosis and mild right foraminal stenosis.     L4-5: Severe facet arthropathy with joint space widening, erosive change and intra-articular vacuum phenomena.  Ligamentum flavum hypertrophy.  Circumferential disc bulge.  Moderate bilateral foraminal stenosis.  Mild central canal stenosis.     L5-S1:  Severe facet arthropathy.  Small annular disc bulge.  Mild right foraminal stenosis.  Moderate left foraminal stenosis.  No significant central canal stenosis.     Impression:     Stable multilevel lumbar degenerative changes resulting in various degrees of stenosis, as detailed above.              Results for orders placed during the hospital encounter of 01/18/22    MRI Lumbar Spine Without Contrast    Narrative  EXAMINATION:  MRI LUMBAR SPINE WITHOUT CONTRAST    FINDINGS:  Straightening of the lumbar lordosis.  Vertebral body height is normal.  Marrow signal is within normal limits. The conus medullaris terminates at the level of L1-L2.  No abnormal signal within the conus. Mild congenital spinal stenosis.  Interspaces are as  follows:    T12-L1 disc: Mild disc bulge.  Posterior annular fissure.  Slight buckling of ligamentum flavum.  The thecal sac measures 12 mm.  No significant foraminal stenosis.    L1-L2 disc : Left paracentral disc protrusion that causes mild-to-moderate left lateral recess stenosis.  This is best demonstrated on axial T2 series 6, image 8.  Mild degenerative facet hypertrophy bilaterally.  The thecal sac measures 7 mm.  No significant foraminal stenosis.ertrophy with buckling of ligamentum flavum.  The thecal sac measures 6 mm AP.  No significant foraminal stenosis.    L2-L3 disc: Focal left lateral recess/paracentral disc protrusion with annular fissure that likely compresses the descending left L3 spinal nerve roots.  This is best demonstrated on axial T2 series 6, image 12.  Mild degenerative facet hyp    L3-L4 disc: Broad-based posterior disc bulge with a posterior annular fissure.  Mild degenerative facet hypertrophy and buckling of ligamentum flavum.  The thecal sac measures 8 mm AP.  Mild bilateral foraminal stenosis.    L4-L5 disc: Circumferential disc bulge.  Anterior osteophytes.  Severe degenerative facet hypertrophy.  Right-sided facet joint effusion.  Posteriorly and inferiorly projecting synovial cyst from the right facet joint measuring 10 mm.  The thecal sac measures 12 mm AP.  Disc encroaches into the left exit foramina and causes mild/moderate left foraminal stenosis.  Mild right foraminal stenosis.    L5-S1 disc: Posterior disc bulge.  Severe degenerative facet hypertrophy.  The thecal sac measures 12 mm.  No significant foraminal stenosis.    Impression  1. 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  2. Left-sided disc protrusion with annular fissure at L2-L3 causes moderate spinal stenosis and severe left lateral recess stenosis.  At likely compresses the descending left L3 spinal nerve roots and may result in left L3 radiculopathy.  3. Left paracentral disc protrusion  L1-L2 causes mild to moderate left lateral recess stenosis and may compress the descending left L2 spinal nerve roots.  4. Mild overall spinal stenosis at L1-L2 and L3-L4.  5. Mild/moderate left and mild right foraminal stenosis at L4-L5.  6. Severe facet arthropathy at L4-L5 and L5-S1.  Right facet joint effusion at L4-L5 with a posteriorly projecting synovial cyst.      Electronically signed by: Ramon Mead Jr., MD  Date:    01/18/2022  Time:    16:06     Results for orders placed during the hospital encounter of 04/21/22    X-Ray Lumbar Complete Including Flex And Ext    Narrative  EXAMINATION:  XR LUMBAR SPINE 5 VIEW WITH FLEX AND EXT    CLINICAL HISTORY:  Radiculopathy, lumbar region    TECHNIQUE:  Five views of the lumbar spine plus flexion extension views were performed.    COMPARISON:  None.    FINDINGS:  Alignment of the lumbar spine is normal within the neutral and extension positions.  There is minimal grade 1 anterolisthesis of L4 on L5 which develops with flexion.  No fracture or pars defect identified.  There is mild L4-L5 disc height loss and endplate spurring.  Mild inferior lumbar spine facet arthropathy.  Sacroiliac joints appear normal.  No osseous erosion or suspicious osseous lesion.  Surgical mesh material overlies the lower abdomen.    Impression  As above.      Electronically signed by: Jonathan Marshall  Date:    04/21/2022  Time:    11:51    No results found for this or any previous visit.         I  reviewed all pertinent imaging regarding this case.  Assessment:     1. Pre-procedural examination    2. Bleeding disorder    3. Degenerative disc disease, lumbar    4. Lumbar radiculopathy      Plan:     Pre-procedural examination  -     X-Ray Chest 1 View Pre-OP; Future; Expected date: 05/05/2022  -     EKG 12-lead; Future  -     Comprehensive Metabolic Panel; Future; Expected date: 05/05/2022  -     CBC Auto Differential; Future; Expected date: 05/05/2022  -     Cancel: URINALYSIS  -      APTT; Future; Expected date: 05/05/2022  -     PROTIME-INR; Future; Expected date: 05/05/2022    Bleeding disorder  -     APTT; Future; Expected date: 05/05/2022  -     PROTIME-INR; Future; Expected date: 05/05/2022    Degenerative disc disease, lumbar    Lumbar radiculopathy        Patient with DDD   Primarily L LE symptoms seems to be coming from L3-4 level   He has multiple level DDD throughout   We discussed possible single level discectomy as this seems to be his primary pain generator   Pt will consider and RTC after to discuss sx options   y  Thank you for the referral   Please call with any questions    Shaggy Zepeda MD  Neurosurgery     Disclaimer: This note was prepared using a voice recognition system and is likely to have sound alike errors within the text.

## 2022-05-05 NOTE — TELEPHONE ENCOUNTER
----- Message from Ashli Colindres MA sent at 5/5/2022  1:30 PM CDT -----  Dr. Duane Serrato consented this pt in clinic today for a Microdiscectomy and wanted this pt to get a medical clearance. Can you please reach out to this pt in regards to scheduling a appt for clearance.    Thanks

## 2022-05-09 ENCOUNTER — HOSPITAL ENCOUNTER (OUTPATIENT)
Dept: RADIOLOGY | Facility: HOSPITAL | Age: 67
Discharge: HOME | End: 2022-05-09
Attending: NEUROLOGICAL SURGERY
Payer: MEDICARE

## 2022-05-09 ENCOUNTER — HOSPITAL ENCOUNTER (OUTPATIENT)
Dept: CARDIOLOGY | Facility: HOSPITAL | Age: 67
Discharge: HOME | End: 2022-05-09
Attending: NEUROLOGICAL SURGERY
Payer: MEDICARE

## 2022-05-09 DIAGNOSIS — Z01.818 PRE-PROCEDURAL EXAMINATION: ICD-10-CM

## 2022-05-09 PROCEDURE — 71045 XR CHEST 1 VIEW PRE-OP: ICD-10-PCS | Mod: 26,,, | Performed by: RADIOLOGY

## 2022-05-09 PROCEDURE — 93010 EKG 12-LEAD: ICD-10-PCS | Mod: ,,, | Performed by: STUDENT IN AN ORGANIZED HEALTH CARE EDUCATION/TRAINING PROGRAM

## 2022-05-09 PROCEDURE — 71045 X-RAY EXAM CHEST 1 VIEW: CPT | Mod: TC

## 2022-05-09 PROCEDURE — 93010 ELECTROCARDIOGRAM REPORT: CPT | Mod: ,,, | Performed by: STUDENT IN AN ORGANIZED HEALTH CARE EDUCATION/TRAINING PROGRAM

## 2022-05-09 PROCEDURE — 71045 X-RAY EXAM CHEST 1 VIEW: CPT | Mod: 26,,, | Performed by: RADIOLOGY

## 2022-05-09 PROCEDURE — 93005 ELECTROCARDIOGRAM TRACING: CPT

## 2022-05-13 ENCOUNTER — OFFICE VISIT (OUTPATIENT)
Dept: INTERNAL MEDICINE | Facility: CLINIC | Age: 67
End: 2022-05-13
Payer: MEDICARE

## 2022-05-13 VITALS
HEART RATE: 72 BPM | SYSTOLIC BLOOD PRESSURE: 126 MMHG | BODY MASS INDEX: 38.06 KG/M2 | DIASTOLIC BLOOD PRESSURE: 80 MMHG | HEIGHT: 67 IN | OXYGEN SATURATION: 97 % | WEIGHT: 242.5 LBS

## 2022-05-13 DIAGNOSIS — E66.01 MORBID OBESITY WITH BMI OF 40.0-44.9, ADULT: ICD-10-CM

## 2022-05-13 DIAGNOSIS — E11.69 HYPERLIPIDEMIA ASSOCIATED WITH TYPE 2 DIABETES MELLITUS: ICD-10-CM

## 2022-05-13 DIAGNOSIS — E11.59 HYPERTENSION ASSOCIATED WITH DIABETES: ICD-10-CM

## 2022-05-13 DIAGNOSIS — E11.9 DIABETES MELLITUS WITHOUT COMPLICATION: ICD-10-CM

## 2022-05-13 DIAGNOSIS — E78.5 HYPERLIPIDEMIA ASSOCIATED WITH TYPE 2 DIABETES MELLITUS: ICD-10-CM

## 2022-05-13 DIAGNOSIS — Z01.818 PREOP EXAMINATION: Primary | ICD-10-CM

## 2022-05-13 DIAGNOSIS — I15.2 HYPERTENSION ASSOCIATED WITH DIABETES: ICD-10-CM

## 2022-05-13 DIAGNOSIS — M54.16 LUMBAR RADICULOPATHY: ICD-10-CM

## 2022-05-13 PROCEDURE — 3072F PR LOW RISK FOR RETINOPATHY: ICD-10-PCS | Mod: CPTII,S$GLB,, | Performed by: INTERNAL MEDICINE

## 2022-05-13 PROCEDURE — 99214 OFFICE O/P EST MOD 30 MIN: CPT | Mod: S$GLB,,, | Performed by: INTERNAL MEDICINE

## 2022-05-13 PROCEDURE — 99999 PR PBB SHADOW E&M-EST. PATIENT-LVL III: ICD-10-PCS | Mod: PBBFAC,,, | Performed by: INTERNAL MEDICINE

## 2022-05-13 PROCEDURE — 1126F PR PAIN SEVERITY QUANTIFIED, NO PAIN PRESENT: ICD-10-PCS | Mod: CPTII,S$GLB,, | Performed by: INTERNAL MEDICINE

## 2022-05-13 PROCEDURE — 1159F PR MEDICATION LIST DOCUMENTED IN MEDICAL RECORD: ICD-10-PCS | Mod: CPTII,S$GLB,, | Performed by: INTERNAL MEDICINE

## 2022-05-13 PROCEDURE — 3051F HG A1C>EQUAL 7.0%<8.0%: CPT | Mod: CPTII,S$GLB,, | Performed by: INTERNAL MEDICINE

## 2022-05-13 PROCEDURE — 1126F AMNT PAIN NOTED NONE PRSNT: CPT | Mod: CPTII,S$GLB,, | Performed by: INTERNAL MEDICINE

## 2022-05-13 PROCEDURE — 99499 RISK ADDL DX/OHS AUDIT: ICD-10-PCS | Mod: S$GLB,,, | Performed by: INTERNAL MEDICINE

## 2022-05-13 PROCEDURE — 4010F ACE/ARB THERAPY RXD/TAKEN: CPT | Mod: CPTII,S$GLB,, | Performed by: INTERNAL MEDICINE

## 2022-05-13 PROCEDURE — 99214 PR OFFICE/OUTPT VISIT, EST, LEVL IV, 30-39 MIN: ICD-10-PCS | Mod: S$GLB,,, | Performed by: INTERNAL MEDICINE

## 2022-05-13 PROCEDURE — 3079F PR MOST RECENT DIASTOLIC BLOOD PRESSURE 80-89 MM HG: ICD-10-PCS | Mod: CPTII,S$GLB,, | Performed by: INTERNAL MEDICINE

## 2022-05-13 PROCEDURE — 3288F FALL RISK ASSESSMENT DOCD: CPT | Mod: CPTII,S$GLB,, | Performed by: INTERNAL MEDICINE

## 2022-05-13 PROCEDURE — 1159F MED LIST DOCD IN RCRD: CPT | Mod: CPTII,S$GLB,, | Performed by: INTERNAL MEDICINE

## 2022-05-13 PROCEDURE — 4010F PR ACE/ARB THEARPY RXD/TAKEN: ICD-10-PCS | Mod: CPTII,S$GLB,, | Performed by: INTERNAL MEDICINE

## 2022-05-13 PROCEDURE — 3008F BODY MASS INDEX DOCD: CPT | Mod: CPTII,S$GLB,, | Performed by: INTERNAL MEDICINE

## 2022-05-13 PROCEDURE — 99999 PR PBB SHADOW E&M-EST. PATIENT-LVL III: CPT | Mod: PBBFAC,,, | Performed by: INTERNAL MEDICINE

## 2022-05-13 PROCEDURE — 3074F SYST BP LT 130 MM HG: CPT | Mod: CPTII,S$GLB,, | Performed by: INTERNAL MEDICINE

## 2022-05-13 PROCEDURE — 3072F LOW RISK FOR RETINOPATHY: CPT | Mod: CPTII,S$GLB,, | Performed by: INTERNAL MEDICINE

## 2022-05-13 PROCEDURE — 3008F PR BODY MASS INDEX (BMI) DOCUMENTED: ICD-10-PCS | Mod: CPTII,S$GLB,, | Performed by: INTERNAL MEDICINE

## 2022-05-13 PROCEDURE — 1101F PT FALLS ASSESS-DOCD LE1/YR: CPT | Mod: CPTII,S$GLB,, | Performed by: INTERNAL MEDICINE

## 2022-05-13 PROCEDURE — 1101F PR PT FALLS ASSESS DOC 0-1 FALLS W/OUT INJ PAST YR: ICD-10-PCS | Mod: CPTII,S$GLB,, | Performed by: INTERNAL MEDICINE

## 2022-05-13 PROCEDURE — 3079F DIAST BP 80-89 MM HG: CPT | Mod: CPTII,S$GLB,, | Performed by: INTERNAL MEDICINE

## 2022-05-13 PROCEDURE — 3051F PR MOST RECENT HEMOGLOBIN A1C LEVEL 7.0 - < 8.0%: ICD-10-PCS | Mod: CPTII,S$GLB,, | Performed by: INTERNAL MEDICINE

## 2022-05-13 PROCEDURE — 3074F PR MOST RECENT SYSTOLIC BLOOD PRESSURE < 130 MM HG: ICD-10-PCS | Mod: CPTII,S$GLB,, | Performed by: INTERNAL MEDICINE

## 2022-05-13 PROCEDURE — 99499 UNLISTED E&M SERVICE: CPT | Mod: S$GLB,,, | Performed by: INTERNAL MEDICINE

## 2022-05-13 PROCEDURE — 3288F PR FALLS RISK ASSESSMENT DOCUMENTED: ICD-10-PCS | Mod: CPTII,S$GLB,, | Performed by: INTERNAL MEDICINE

## 2022-05-13 RX ORDER — TAMSULOSIN HYDROCHLORIDE 0.4 MG/1
0.4 CAPSULE ORAL DAILY
Qty: 90 CAPSULE | Refills: 3 | Status: SHIPPED | OUTPATIENT
Start: 2022-05-13 | End: 2022-05-18

## 2022-05-13 RX ORDER — FINASTERIDE 5 MG/1
5 TABLET, FILM COATED ORAL DAILY
Qty: 90 TABLET | Refills: 3 | Status: SHIPPED | OUTPATIENT
Start: 2022-05-13 | End: 2022-05-18

## 2022-05-13 NOTE — PROGRESS NOTES
HPI:  Patient is a 66-year-old man who comes in today for preop clearance to have lumbar back surgery done.  Patient otherwise has been doing well.  He denies any chest pain shortness a breath.  His blood pressures been well controlled.  He has had no problems with his diabetes.  He denies any hypoglycemia.  There have been no other new problems or complaints.      Current MEDS: medcard review, verified and update  Allergies: Per the electronic medical record    Past Medical History:   Diagnosis Date    Carpal tunnel syndrome     Diabetes mellitus without complication     History of colon polyps     Hypercalcemia 3/23/2021    Hyperlipidemia associated with type 2 diabetes mellitus     Hypertension associated with diabetes     Lumbar disc disease with radiculopathy     Morbid obesity with BMI of 40.0-44.9, adult     S/P parathyroidectomy 7/29/2021    Vitamin D deficiency        Past Surgical History:   Procedure Laterality Date    CARPAL TUNNEL RELEASE Left 4/1/2021    Procedure: RELEASE, CARPAL TUNNEL;  Surgeon: Yoandy David MD;  Location: [facility];  Service: Orthopedics;  Laterality: Left;    HERNIA REPAIR      PARATHYROIDECTOMY Bilateral 7/27/2021    Procedure: PARATHYROIDECTOMY;  Surgeon: Tha Dial MD;  Location: [facility] OR;  Service: ENT;  Laterality: Bilateral;  with medialstinal exploration    SELECTIVE INJECTION OF ANESTHETIC AGENT AROUND LUMBAR SPINAL NERVE ROOT BY TRANSFORAMINAL APPROACH Left 3/23/2022    Procedure: BLOCK, SPINAL NERVE ROOT, LUMBAR, SELECTIVE, TRANSFORAMINAL APPROACH Left L2-3, L3-4 RN IV sedation;  Surgeon: Jay Hodges MD;  Location: [facility];  Service: Pain Management;  Laterality: Left;    STERNOTOMY N/A 7/27/2021    Procedure: STERNOTOMY;  Surgeon: Tha Dial MD;  Location: [facility] OR;  Service: ENT;  Laterality: N/A;    ULNAR NERVE TRANSPOSITION Left 4/1/2021    Procedure: TRANSPOSITION, NERVE, ULNAR;  Surgeon: Yoandy David MD;  Location: [facility] OR;   "Service: Orthopedics;  Laterality: Left;    ULNAR TUNNEL RELEASE Left 4/1/2021    Procedure: RELEASE, CUBITAL TUNNEL;  Surgeon: Yoandy David MD;  Location: [facility];  Service: Orthopedics;  Laterality: Left;    UMBILICAL HERNIA REPAIR         SHx: per the electronic medical record    FHx: recorded in the electronic medical record    ROS:    denies any chest pains or shortness of breath. Denies any nausea, vomiting or diarrhea. Denies any fever, chills or sweats. Denies any change in weight, voice, stool, skin or hair. Denies any dysuria, dyspepsia or dysphagia. Denies any change in vision, hearing or headaches. Denies any swollen lymph nodes or loss of memory.    PE:  /80 (BP Location: Right arm)   Pulse 72   Ht 5' 7" (1.702 m)   Wt 110 kg (242 lb 8.1 oz)   SpO2 97%   BMI 37.98 kg/m²   Gen: Well-developed, well-nourished, male, in no acute distress, oriented x3  HEENT: neck is supple, no adenopathy, carotids 2+ equal without bruits, thyroid exam normal size without nodules.  CHEST: clear to auscultation and percussion  CVS: regular rate and rhythm without significant murmur, gallop, or rubs  ABD: soft, benign, no rebound no guarding, no distention.  Bowel sounds are normal.     nontender.  No palpable masses.  No organomegaly and no audible bruits.    Chest x-ray an EKG unremarkable  Coags were normal  Urinalysis normal    Lab Results   Component Value Date    WBC 6.15 05/09/2022    HGB 13.2 (L) 05/09/2022    HCT 42.6 05/09/2022     05/09/2022    CHOL 186 11/02/2021    TRIG 162 (H) 11/02/2021    HDL 37 (L) 11/02/2021    ALT 26 05/09/2022    AST 25 05/09/2022     (L) 05/09/2022    K 4.5 05/09/2022     05/09/2022    CREATININE 1.1 05/09/2022    BUN 18 05/09/2022    CO2 25 05/09/2022    TSH 2.496 04/05/2021    PSA 0.73 01/04/2021    INR 1.0 05/09/2022    HGBA1C 7.1 (H) 11/02/2021       Impression:  Lumbar radiculopathy  Patient Active Problem List   Diagnosis    Diabetes " mellitus without complication    Hypertension associated with diabetes    Hyperlipidemia associated with type 2 diabetes mellitus    Morbid obesity with BMI of 40.0-44.9, adult    History of colon polyps    Vitamin D deficiency    Left carpal tunnel syndrome    Anemia    Decreased strength of trunk and back    Low back pain    Lumbar radiculopathy    Bilateral carpal tunnel syndrome       Plan:   Orders Placed This Encounter    tamsulosin (FLOMAX) 0.4 mg Cap    finasteride (PROSCAR) 5 mg tablet     Patient is cleared for anesthesia and surgery.  He has no absolute contraindications.  He is at slightly higher increased risk due to his comorbid conditions.  All medical problems have been maximally optimized.  His surgeon has been notified.  This note is generated with speech recognition software and is subject to transcription error and sound alike phrases that may be missed by proofreading.

## 2022-05-16 ENCOUNTER — TELEPHONE (OUTPATIENT)
Dept: INTERNAL MEDICINE | Facility: CLINIC | Age: 67
End: 2022-05-16
Payer: MEDICARE

## 2022-05-16 DIAGNOSIS — E11.8 DIABETIC COMPLICATION: Primary | ICD-10-CM

## 2022-05-16 DIAGNOSIS — Z12.5 PROSTATE CANCER SCREENING: ICD-10-CM

## 2022-05-16 DIAGNOSIS — E11.9 DIABETES MELLITUS WITHOUT COMPLICATION: Primary | ICD-10-CM

## 2022-05-18 ENCOUNTER — OFFICE VISIT (OUTPATIENT)
Dept: INTERNAL MEDICINE | Facility: CLINIC | Age: 67
End: 2022-05-18
Payer: MEDICARE

## 2022-05-18 ENCOUNTER — LAB VISIT (OUTPATIENT)
Dept: LAB | Facility: HOSPITAL | Age: 67
End: 2022-05-18
Attending: INTERNAL MEDICINE
Payer: MEDICARE

## 2022-05-18 VITALS
DIASTOLIC BLOOD PRESSURE: 78 MMHG | SYSTOLIC BLOOD PRESSURE: 128 MMHG | TEMPERATURE: 98 F | BODY MASS INDEX: 38.58 KG/M2 | HEIGHT: 67 IN | WEIGHT: 245.81 LBS | HEART RATE: 65 BPM | OXYGEN SATURATION: 97 %

## 2022-05-18 DIAGNOSIS — M54.16 LUMBAR RADICULOPATHY: ICD-10-CM

## 2022-05-18 DIAGNOSIS — E11.9 DIABETES MELLITUS WITHOUT COMPLICATION: ICD-10-CM

## 2022-05-18 DIAGNOSIS — Z00.00 ROUTINE GENERAL MEDICAL EXAMINATION AT A HEALTH CARE FACILITY: Primary | ICD-10-CM

## 2022-05-18 DIAGNOSIS — E78.5 HYPERLIPIDEMIA ASSOCIATED WITH TYPE 2 DIABETES MELLITUS: ICD-10-CM

## 2022-05-18 DIAGNOSIS — Z12.5 PROSTATE CANCER SCREENING: ICD-10-CM

## 2022-05-18 DIAGNOSIS — E11.69 HYPERLIPIDEMIA ASSOCIATED WITH TYPE 2 DIABETES MELLITUS: ICD-10-CM

## 2022-05-18 DIAGNOSIS — Z12.11 SCREENING FOR COLON CANCER: ICD-10-CM

## 2022-05-18 DIAGNOSIS — E55.9 VITAMIN D DEFICIENCY: ICD-10-CM

## 2022-05-18 DIAGNOSIS — E66.01 MORBID OBESITY WITH BMI OF 40.0-44.9, ADULT: ICD-10-CM

## 2022-05-18 DIAGNOSIS — Z86.010 HISTORY OF COLON POLYPS: ICD-10-CM

## 2022-05-18 DIAGNOSIS — E11.59 HYPERTENSION ASSOCIATED WITH DIABETES: ICD-10-CM

## 2022-05-18 DIAGNOSIS — I15.2 HYPERTENSION ASSOCIATED WITH DIABETES: ICD-10-CM

## 2022-05-18 DIAGNOSIS — D64.9 ANEMIA, UNSPECIFIED TYPE: ICD-10-CM

## 2022-05-18 LAB
ALBUMIN SERPL BCP-MCNC: 4 G/DL (ref 3.5–5.2)
ALP SERPL-CCNC: 59 U/L (ref 55–135)
ALT SERPL W/O P-5'-P-CCNC: 25 U/L (ref 10–44)
ANION GAP SERPL CALC-SCNC: 10 MMOL/L (ref 8–16)
AST SERPL-CCNC: 26 U/L (ref 10–40)
BASOPHILS # BLD AUTO: 0.04 K/UL (ref 0–0.2)
BASOPHILS NFR BLD: 0.7 % (ref 0–1.9)
BILIRUB SERPL-MCNC: 0.5 MG/DL (ref 0.1–1)
BUN SERPL-MCNC: 20 MG/DL (ref 8–23)
CALCIUM SERPL-MCNC: 9.4 MG/DL (ref 8.7–10.5)
CHLORIDE SERPL-SCNC: 105 MMOL/L (ref 95–110)
CHOLEST SERPL-MCNC: 131 MG/DL (ref 120–199)
CHOLEST/HDLC SERPL: 3.7 {RATIO} (ref 2–5)
CO2 SERPL-SCNC: 24 MMOL/L (ref 23–29)
COMPLEXED PSA SERPL-MCNC: 0.93 NG/ML (ref 0–4)
CREAT SERPL-MCNC: 1.1 MG/DL (ref 0.5–1.4)
DIFFERENTIAL METHOD: ABNORMAL
EOSINOPHIL # BLD AUTO: 0.3 K/UL (ref 0–0.5)
EOSINOPHIL NFR BLD: 5 % (ref 0–8)
ERYTHROCYTE [DISTWIDTH] IN BLOOD BY AUTOMATED COUNT: 14.9 % (ref 11.5–14.5)
EST. GFR  (AFRICAN AMERICAN): >60 ML/MIN/1.73 M^2
EST. GFR  (NON AFRICAN AMERICAN): >60 ML/MIN/1.73 M^2
ESTIMATED AVG GLUCOSE: 192 MG/DL (ref 68–131)
GLUCOSE SERPL-MCNC: 135 MG/DL (ref 70–110)
HBA1C MFR BLD: 8.3 % (ref 4–5.6)
HCT VFR BLD AUTO: 41.7 % (ref 40–54)
HDLC SERPL-MCNC: 35 MG/DL (ref 40–75)
HDLC SERPL: 26.7 % (ref 20–50)
HGB BLD-MCNC: 12.9 G/DL (ref 14–18)
IMM GRANULOCYTES # BLD AUTO: 0.01 K/UL (ref 0–0.04)
IMM GRANULOCYTES NFR BLD AUTO: 0.2 % (ref 0–0.5)
LDLC SERPL CALC-MCNC: 64.6 MG/DL (ref 63–159)
LYMPHOCYTES # BLD AUTO: 2.7 K/UL (ref 1–4.8)
LYMPHOCYTES NFR BLD: 50.6 % (ref 18–48)
MCH RBC QN AUTO: 28.7 PG (ref 27–31)
MCHC RBC AUTO-ENTMCNC: 30.9 G/DL (ref 32–36)
MCV RBC AUTO: 93 FL (ref 82–98)
MONOCYTES # BLD AUTO: 0.3 K/UL (ref 0.3–1)
MONOCYTES NFR BLD: 6.3 % (ref 4–15)
NEUTROPHILS # BLD AUTO: 2 K/UL (ref 1.8–7.7)
NEUTROPHILS NFR BLD: 37.2 % (ref 38–73)
NONHDLC SERPL-MCNC: 96 MG/DL
NRBC BLD-RTO: 0 /100 WBC
PLATELET # BLD AUTO: 189 K/UL (ref 150–450)
PMV BLD AUTO: 12.7 FL (ref 9.2–12.9)
POTASSIUM SERPL-SCNC: 4.9 MMOL/L (ref 3.5–5.1)
PROT SERPL-MCNC: 6.8 G/DL (ref 6–8.4)
RBC # BLD AUTO: 4.5 M/UL (ref 4.6–6.2)
SODIUM SERPL-SCNC: 139 MMOL/L (ref 136–145)
TRIGL SERPL-MCNC: 157 MG/DL (ref 30–150)
TSH SERPL DL<=0.005 MIU/L-ACNC: 2.69 UIU/ML (ref 0.4–4)
WBC # BLD AUTO: 5.42 K/UL (ref 3.9–12.7)

## 2022-05-18 PROCEDURE — 99397 PR PREVENTIVE VISIT,EST,65 & OVER: ICD-10-PCS | Mod: S$GLB,,, | Performed by: INTERNAL MEDICINE

## 2022-05-18 PROCEDURE — 1101F PR PT FALLS ASSESS DOC 0-1 FALLS W/OUT INJ PAST YR: ICD-10-PCS | Mod: CPTII,S$GLB,, | Performed by: INTERNAL MEDICINE

## 2022-05-18 PROCEDURE — 1125F AMNT PAIN NOTED PAIN PRSNT: CPT | Mod: CPTII,S$GLB,, | Performed by: INTERNAL MEDICINE

## 2022-05-18 PROCEDURE — 3288F PR FALLS RISK ASSESSMENT DOCUMENTED: ICD-10-PCS | Mod: CPTII,S$GLB,, | Performed by: INTERNAL MEDICINE

## 2022-05-18 PROCEDURE — 3051F HG A1C>EQUAL 7.0%<8.0%: CPT | Mod: CPTII,S$GLB,, | Performed by: INTERNAL MEDICINE

## 2022-05-18 PROCEDURE — 99999 PR PBB SHADOW E&M-EST. PATIENT-LVL V: ICD-10-PCS | Mod: PBBFAC,,, | Performed by: INTERNAL MEDICINE

## 2022-05-18 PROCEDURE — 1125F PR PAIN SEVERITY QUANTIFIED, PAIN PRESENT: ICD-10-PCS | Mod: CPTII,S$GLB,, | Performed by: INTERNAL MEDICINE

## 2022-05-18 PROCEDURE — 80053 COMPREHEN METABOLIC PANEL: CPT | Performed by: INTERNAL MEDICINE

## 2022-05-18 PROCEDURE — 99999 PR PBB SHADOW E&M-EST. PATIENT-LVL V: CPT | Mod: PBBFAC,,, | Performed by: INTERNAL MEDICINE

## 2022-05-18 PROCEDURE — 3074F PR MOST RECENT SYSTOLIC BLOOD PRESSURE < 130 MM HG: ICD-10-PCS | Mod: CPTII,S$GLB,, | Performed by: INTERNAL MEDICINE

## 2022-05-18 PROCEDURE — 80061 LIPID PANEL: CPT | Performed by: INTERNAL MEDICINE

## 2022-05-18 PROCEDURE — 3078F PR MOST RECENT DIASTOLIC BLOOD PRESSURE < 80 MM HG: ICD-10-PCS | Mod: CPTII,S$GLB,, | Performed by: INTERNAL MEDICINE

## 2022-05-18 PROCEDURE — 84443 ASSAY THYROID STIM HORMONE: CPT | Performed by: INTERNAL MEDICINE

## 2022-05-18 PROCEDURE — 84153 ASSAY OF PSA TOTAL: CPT | Performed by: INTERNAL MEDICINE

## 2022-05-18 PROCEDURE — 3078F DIAST BP <80 MM HG: CPT | Mod: CPTII,S$GLB,, | Performed by: INTERNAL MEDICINE

## 2022-05-18 PROCEDURE — 3074F SYST BP LT 130 MM HG: CPT | Mod: CPTII,S$GLB,, | Performed by: INTERNAL MEDICINE

## 2022-05-18 PROCEDURE — 4010F PR ACE/ARB THEARPY RXD/TAKEN: ICD-10-PCS | Mod: CPTII,S$GLB,, | Performed by: INTERNAL MEDICINE

## 2022-05-18 PROCEDURE — 3072F PR LOW RISK FOR RETINOPATHY: ICD-10-PCS | Mod: CPTII,S$GLB,, | Performed by: INTERNAL MEDICINE

## 2022-05-18 PROCEDURE — 3051F PR MOST RECENT HEMOGLOBIN A1C LEVEL 7.0 - < 8.0%: ICD-10-PCS | Mod: CPTII,S$GLB,, | Performed by: INTERNAL MEDICINE

## 2022-05-18 PROCEDURE — 83036 HEMOGLOBIN GLYCOSYLATED A1C: CPT | Performed by: INTERNAL MEDICINE

## 2022-05-18 PROCEDURE — 1159F MED LIST DOCD IN RCRD: CPT | Mod: CPTII,S$GLB,, | Performed by: INTERNAL MEDICINE

## 2022-05-18 PROCEDURE — 99397 PER PM REEVAL EST PAT 65+ YR: CPT | Mod: S$GLB,,, | Performed by: INTERNAL MEDICINE

## 2022-05-18 PROCEDURE — 36415 COLL VENOUS BLD VENIPUNCTURE: CPT | Mod: PO | Performed by: INTERNAL MEDICINE

## 2022-05-18 PROCEDURE — 3072F LOW RISK FOR RETINOPATHY: CPT | Mod: CPTII,S$GLB,, | Performed by: INTERNAL MEDICINE

## 2022-05-18 PROCEDURE — 99499 UNLISTED E&M SERVICE: CPT | Mod: S$GLB,,, | Performed by: INTERNAL MEDICINE

## 2022-05-18 PROCEDURE — 1159F PR MEDICATION LIST DOCUMENTED IN MEDICAL RECORD: ICD-10-PCS | Mod: CPTII,S$GLB,, | Performed by: INTERNAL MEDICINE

## 2022-05-18 PROCEDURE — 99499 RISK ADDL DX/OHS AUDIT: ICD-10-PCS | Mod: S$GLB,,, | Performed by: INTERNAL MEDICINE

## 2022-05-18 PROCEDURE — 1101F PT FALLS ASSESS-DOCD LE1/YR: CPT | Mod: CPTII,S$GLB,, | Performed by: INTERNAL MEDICINE

## 2022-05-18 PROCEDURE — 3288F FALL RISK ASSESSMENT DOCD: CPT | Mod: CPTII,S$GLB,, | Performed by: INTERNAL MEDICINE

## 2022-05-18 PROCEDURE — 3008F PR BODY MASS INDEX (BMI) DOCUMENTED: ICD-10-PCS | Mod: CPTII,S$GLB,, | Performed by: INTERNAL MEDICINE

## 2022-05-18 PROCEDURE — 4010F ACE/ARB THERAPY RXD/TAKEN: CPT | Mod: CPTII,S$GLB,, | Performed by: INTERNAL MEDICINE

## 2022-05-18 PROCEDURE — 3008F BODY MASS INDEX DOCD: CPT | Mod: CPTII,S$GLB,, | Performed by: INTERNAL MEDICINE

## 2022-05-18 PROCEDURE — 85025 COMPLETE CBC W/AUTO DIFF WBC: CPT | Performed by: INTERNAL MEDICINE

## 2022-05-18 NOTE — PROGRESS NOTES
HPI:  Patient is 66-year-old man who comes today for follow-up of diabetes, hypertension, lipids, and for his annual physical.  Patient has not yet had his back surgery.  It will be next few weeks to a month.  Patient states his blood sugar and blood pressures have been doing well.  There been no other problems or complaints.      Current MEDS: medcard review, verified and update  Allergies: Per the electronic medical record    Past Medical History:   Diagnosis Date    Carpal tunnel syndrome     Diabetes mellitus without complication     History of colon polyps     Hypercalcemia 3/23/2021    Hyperlipidemia associated with type 2 diabetes mellitus     Hypertension associated with diabetes     Lumbar disc disease with radiculopathy     Morbid obesity with BMI of 40.0-44.9, adult     S/P parathyroidectomy 7/29/2021    Vitamin D deficiency        Past Surgical History:   Procedure Laterality Date    CARPAL TUNNEL RELEASE Left 4/1/2021    Procedure: RELEASE, CARPAL TUNNEL;  Surgeon: Yoandy David MD;  Location: [facility] OR;  Service: Orthopedics;  Laterality: Left;    HERNIA REPAIR      PARATHYROIDECTOMY Bilateral 7/27/2021    Procedure: PARATHYROIDECTOMY;  Surgeon: Tha Dial MD;  Location: [facility] OR;  Service: ENT;  Laterality: Bilateral;  with medialstinal exploration    SELECTIVE INJECTION OF ANESTHETIC AGENT AROUND LUMBAR SPINAL NERVE ROOT BY TRANSFORAMINAL APPROACH Left 3/23/2022    Procedure: BLOCK, SPINAL NERVE ROOT, LUMBAR, SELECTIVE, TRANSFORAMINAL APPROACH Left L2-3, L3-4 RN IV sedation;  Surgeon: Jay Hodges MD;  Location: [facility];  Service: Pain Management;  Laterality: Left;    STERNOTOMY N/A 7/27/2021    Procedure: STERNOTOMY;  Surgeon: Tha Dial MD;  Location: [facility] OR;  Service: ENT;  Laterality: N/A;    ULNAR NERVE TRANSPOSITION Left 4/1/2021    Procedure: TRANSPOSITION, NERVE, ULNAR;  Surgeon: Yoandy David MD;  Location: [facility] OR;  Service: Orthopedics;  Laterality:  "Left;    ULNAR TUNNEL RELEASE Left 4/1/2021    Procedure: RELEASE, CUBITAL TUNNEL;  Surgeon: Yoandy David MD;  Location: [facility];  Service: Orthopedics;  Laterality: Left;    UMBILICAL HERNIA REPAIR         SHx: per the electronic medical record    FHx: recorded in the electronic medical record    ROS:    denies any chest pains or shortness of breath. Denies any nausea, vomiting or diarrhea. Denies any fever, chills or sweats. Denies any change in weight, voice, stool, skin or hair. Denies any dysuria, dyspepsia or dysphagia. Denies any change in vision, hearing or headaches. Denies any swollen lymph nodes or loss of memory.    PE:  /78   Pulse 65   Temp 97.7 °F (36.5 °C) (Temporal)   Ht 5' 7" (1.702 m)   Wt 111.5 kg (245 lb 13 oz)   SpO2 97%   BMI 38.50 kg/m²   Gen: Well-developed, well-nourished, male, in no acute distress, oriented x3  HEENT: neck is supple, no adenopathy, carotids 2+ equal without bruits, thyroid exam normal size without nodules.  CHEST: clear to auscultation and percussion  CVS: regular rate and rhythm without significant murmur, gallop, or rubs  ABD: soft, benign, no rebound no guarding, no distention.  Bowel sounds are normal.     nontender.  No palpable masses.  No organomegaly and no audible bruits.  RECTAL:  Deferred.  EXT: no clubbing, cyanosis, or edema  LYMPH: no cervical, inguinal, or axillary adenopathy  FEET: no loss of sensation.  No ulcers or pressure sores.  Monofilament testing normal  NEURO: gait normal.  Cranial nerves II- XII intact. No nystagmus.  Speech normal.   Gross motor and sensory unremarkable.    Lab Results   Component Value Date    WBC 6.15 05/09/2022    HGB 13.2 (L) 05/09/2022    HCT 42.6 05/09/2022     05/09/2022    CHOL 186 11/02/2021    TRIG 162 (H) 11/02/2021    HDL 37 (L) 11/02/2021    ALT 26 05/09/2022    AST 25 05/09/2022     (L) 05/09/2022    K 4.5 05/09/2022     05/09/2022    CREATININE 1.1 05/09/2022    BUN 18 " 05/09/2022    CO2 25 05/09/2022    TSH 2.496 04/05/2021    PSA 0.73 01/04/2021    INR 1.0 05/09/2022    HGBA1C 7.1 (H) 11/02/2021       Impression:  Multiple medical problems below, stable  Patient Active Problem List   Diagnosis    Diabetes mellitus without complication    Hypertension associated with diabetes    Hyperlipidemia associated with type 2 diabetes mellitus    Morbid obesity with BMI of 40.0-44.9, adult    History of colon polyps    Vitamin D deficiency    Left carpal tunnel syndrome    Anemia    Decreased strength of trunk and back    Low back pain    Lumbar radiculopathy    Bilateral carpal tunnel syndrome       Plan:   Orders Placed This Encounter    Hemoglobin A1C    Lipid Panel    Basic Metabolic Panel    TSH    Ambulatory referral/consult to Endo Procedure     Ambulatory referral/consult to Optometry     He will have above lab work in 6 months.  He due to get an eye exam and also colonoscopy.  This note is generated with speech recognition software and is subject to transcription error and sound alike phrases that may be missed by proofreading.

## 2022-05-19 ENCOUNTER — TELEPHONE (OUTPATIENT)
Dept: INTERNAL MEDICINE | Facility: CLINIC | Age: 67
End: 2022-05-19
Payer: MEDICARE

## 2022-05-19 ENCOUNTER — PATIENT MESSAGE (OUTPATIENT)
Dept: INTERNAL MEDICINE | Facility: CLINIC | Age: 67
End: 2022-05-19
Payer: MEDICARE

## 2022-05-19 RX ORDER — DAPAGLIFLOZIN 5 MG/1
5 TABLET, FILM COATED ORAL DAILY
Qty: 90 TABLET | Refills: 3 | Status: ON HOLD | OUTPATIENT
Start: 2022-05-19 | End: 2022-08-12 | Stop reason: HOSPADM

## 2022-05-19 NOTE — TELEPHONE ENCOUNTER
----- Message from Marie Fisher sent at 5/19/2022  9:53 AM CDT -----  Contact: Pt Mobile [phone]  Patient is returning a phone call.  Who left a message for the patient: Leeanna Ramirez LPN  Does patient know what this is regarding:  A message from Leeanna Ramirez LPN  Would you like a call back, or a response through your [facility] portal?:   Call

## 2022-06-02 ENCOUNTER — PATIENT MESSAGE (OUTPATIENT)
Dept: INTERNAL MEDICINE | Facility: CLINIC | Age: 67
End: 2022-06-02
Payer: MEDICARE

## 2022-06-03 ENCOUNTER — PATIENT MESSAGE (OUTPATIENT)
Dept: NEUROSURGERY | Facility: CLINIC | Age: 67
End: 2022-06-03
Payer: MEDICARE

## 2022-06-04 ENCOUNTER — PATIENT MESSAGE (OUTPATIENT)
Dept: NEUROSURGERY | Facility: CLINIC | Age: 67
End: 2022-06-04
Payer: MEDICARE

## 2022-06-04 DIAGNOSIS — M51.36 DEGENERATIVE DISC DISEASE, LUMBAR: Primary | ICD-10-CM

## 2022-06-04 DIAGNOSIS — M54.16 LUMBAR RADICULOPATHY: ICD-10-CM

## 2022-06-04 DIAGNOSIS — M48.062 LUMBAR STENOSIS WITH NEUROGENIC CLAUDICATION: ICD-10-CM

## 2022-06-10 ENCOUNTER — OFFICE VISIT (OUTPATIENT)
Dept: NEUROSURGERY | Facility: CLINIC | Age: 67
End: 2022-06-10
Payer: MEDICARE

## 2022-06-10 ENCOUNTER — LAB VISIT (OUTPATIENT)
Dept: LAB | Facility: HOSPITAL | Age: 67
End: 2022-06-10
Attending: NEUROLOGICAL SURGERY
Payer: MEDICARE

## 2022-06-10 VITALS
SYSTOLIC BLOOD PRESSURE: 135 MMHG | WEIGHT: 245 LBS | BODY MASS INDEX: 38.45 KG/M2 | HEIGHT: 67 IN | RESPIRATION RATE: 18 BRPM | DIASTOLIC BLOOD PRESSURE: 79 MMHG | HEART RATE: 60 BPM

## 2022-06-10 DIAGNOSIS — D69.9 BLEEDING DISORDER: ICD-10-CM

## 2022-06-10 DIAGNOSIS — Z01.818 PRE-PROCEDURAL EXAMINATION: Primary | ICD-10-CM

## 2022-06-10 DIAGNOSIS — Z01.818 PRE-PROCEDURAL EXAMINATION: ICD-10-CM

## 2022-06-10 LAB
ALBUMIN SERPL BCP-MCNC: 3.9 G/DL (ref 3.5–5.2)
ALP SERPL-CCNC: 60 U/L (ref 55–135)
ALT SERPL W/O P-5'-P-CCNC: 18 U/L (ref 10–44)
ANION GAP SERPL CALC-SCNC: 11 MMOL/L (ref 8–16)
APTT BLDCRRT: 23.3 SEC (ref 21–32)
AST SERPL-CCNC: 20 U/L (ref 10–40)
BILIRUB SERPL-MCNC: 0.6 MG/DL (ref 0.1–1)
BUN SERPL-MCNC: 14 MG/DL (ref 8–23)
CALCIUM SERPL-MCNC: 9.9 MG/DL (ref 8.7–10.5)
CHLORIDE SERPL-SCNC: 101 MMOL/L (ref 95–110)
CO2 SERPL-SCNC: 26 MMOL/L (ref 23–29)
CREAT SERPL-MCNC: 1.2 MG/DL (ref 0.5–1.4)
EST. GFR  (AFRICAN AMERICAN): >60 ML/MIN/1.73 M^2
EST. GFR  (NON AFRICAN AMERICAN): >60 ML/MIN/1.73 M^2
GLUCOSE SERPL-MCNC: 203 MG/DL (ref 70–110)
INR PPP: 1 (ref 0.8–1.2)
POTASSIUM SERPL-SCNC: 4.8 MMOL/L (ref 3.5–5.1)
PROT SERPL-MCNC: 6.9 G/DL (ref 6–8.4)
PROTHROMBIN TIME: 10.9 SEC (ref 9–12.5)
SODIUM SERPL-SCNC: 138 MMOL/L (ref 136–145)

## 2022-06-10 PROCEDURE — 3008F BODY MASS INDEX DOCD: CPT | Mod: CPTII,S$GLB,, | Performed by: NEUROLOGICAL SURGERY

## 2022-06-10 PROCEDURE — 3078F PR MOST RECENT DIASTOLIC BLOOD PRESSURE < 80 MM HG: ICD-10-PCS | Mod: CPTII,S$GLB,, | Performed by: NEUROLOGICAL SURGERY

## 2022-06-10 PROCEDURE — 3288F PR FALLS RISK ASSESSMENT DOCUMENTED: ICD-10-PCS | Mod: CPTII,S$GLB,, | Performed by: NEUROLOGICAL SURGERY

## 2022-06-10 PROCEDURE — 99999 PR PBB SHADOW E&M-EST. PATIENT-LVL III: ICD-10-PCS | Mod: PBBFAC,,, | Performed by: NEUROLOGICAL SURGERY

## 2022-06-10 PROCEDURE — 3075F SYST BP GE 130 - 139MM HG: CPT | Mod: CPTII,S$GLB,, | Performed by: NEUROLOGICAL SURGERY

## 2022-06-10 PROCEDURE — 85025 COMPLETE CBC W/AUTO DIFF WBC: CPT | Performed by: NEUROLOGICAL SURGERY

## 2022-06-10 PROCEDURE — 4010F ACE/ARB THERAPY RXD/TAKEN: CPT | Mod: CPTII,S$GLB,, | Performed by: NEUROLOGICAL SURGERY

## 2022-06-10 PROCEDURE — 3288F FALL RISK ASSESSMENT DOCD: CPT | Mod: CPTII,S$GLB,, | Performed by: NEUROLOGICAL SURGERY

## 2022-06-10 PROCEDURE — 99999 PR PBB SHADOW E&M-EST. PATIENT-LVL III: CPT | Mod: PBBFAC,,, | Performed by: NEUROLOGICAL SURGERY

## 2022-06-10 PROCEDURE — 85610 PROTHROMBIN TIME: CPT | Performed by: NEUROLOGICAL SURGERY

## 2022-06-10 PROCEDURE — 3072F LOW RISK FOR RETINOPATHY: CPT | Mod: CPTII,S$GLB,, | Performed by: NEUROLOGICAL SURGERY

## 2022-06-10 PROCEDURE — 1101F PT FALLS ASSESS-DOCD LE1/YR: CPT | Mod: CPTII,S$GLB,, | Performed by: NEUROLOGICAL SURGERY

## 2022-06-10 PROCEDURE — 4010F PR ACE/ARB THEARPY RXD/TAKEN: ICD-10-PCS | Mod: CPTII,S$GLB,, | Performed by: NEUROLOGICAL SURGERY

## 2022-06-10 PROCEDURE — 1125F AMNT PAIN NOTED PAIN PRSNT: CPT | Mod: CPTII,S$GLB,, | Performed by: NEUROLOGICAL SURGERY

## 2022-06-10 PROCEDURE — 1101F PR PT FALLS ASSESS DOC 0-1 FALLS W/OUT INJ PAST YR: ICD-10-PCS | Mod: CPTII,S$GLB,, | Performed by: NEUROLOGICAL SURGERY

## 2022-06-10 PROCEDURE — 80053 COMPREHEN METABOLIC PANEL: CPT | Performed by: NEUROLOGICAL SURGERY

## 2022-06-10 PROCEDURE — 1159F PR MEDICATION LIST DOCUMENTED IN MEDICAL RECORD: ICD-10-PCS | Mod: CPTII,S$GLB,, | Performed by: NEUROLOGICAL SURGERY

## 2022-06-10 PROCEDURE — 3078F DIAST BP <80 MM HG: CPT | Mod: CPTII,S$GLB,, | Performed by: NEUROLOGICAL SURGERY

## 2022-06-10 PROCEDURE — 3066F PR DOCUMENTATION OF TREATMENT FOR NEPHROPATHY: ICD-10-PCS | Mod: CPTII,S$GLB,, | Performed by: NEUROLOGICAL SURGERY

## 2022-06-10 PROCEDURE — 1159F MED LIST DOCD IN RCRD: CPT | Mod: CPTII,S$GLB,, | Performed by: NEUROLOGICAL SURGERY

## 2022-06-10 PROCEDURE — 3066F NEPHROPATHY DOC TX: CPT | Mod: CPTII,S$GLB,, | Performed by: NEUROLOGICAL SURGERY

## 2022-06-10 PROCEDURE — 99214 PR OFFICE/OUTPT VISIT, EST, LEVL IV, 30-39 MIN: ICD-10-PCS | Mod: S$GLB,,, | Performed by: NEUROLOGICAL SURGERY

## 2022-06-10 PROCEDURE — 3052F HG A1C>EQUAL 8.0%<EQUAL 9.0%: CPT | Mod: CPTII,S$GLB,, | Performed by: NEUROLOGICAL SURGERY

## 2022-06-10 PROCEDURE — 3061F PR NEG MICROALBUMINURIA RESULT DOCUMENTED/REVIEW: ICD-10-PCS | Mod: CPTII,S$GLB,, | Performed by: NEUROLOGICAL SURGERY

## 2022-06-10 PROCEDURE — 3008F PR BODY MASS INDEX (BMI) DOCUMENTED: ICD-10-PCS | Mod: CPTII,S$GLB,, | Performed by: NEUROLOGICAL SURGERY

## 2022-06-10 PROCEDURE — 1125F PR PAIN SEVERITY QUANTIFIED, PAIN PRESENT: ICD-10-PCS | Mod: CPTII,S$GLB,, | Performed by: NEUROLOGICAL SURGERY

## 2022-06-10 PROCEDURE — 3075F PR MOST RECENT SYSTOLIC BLOOD PRESS GE 130-139MM HG: ICD-10-PCS | Mod: CPTII,S$GLB,, | Performed by: NEUROLOGICAL SURGERY

## 2022-06-10 PROCEDURE — 3061F NEG MICROALBUMINURIA REV: CPT | Mod: CPTII,S$GLB,, | Performed by: NEUROLOGICAL SURGERY

## 2022-06-10 PROCEDURE — 36415 COLL VENOUS BLD VENIPUNCTURE: CPT | Performed by: NEUROLOGICAL SURGERY

## 2022-06-10 PROCEDURE — 85730 THROMBOPLASTIN TIME PARTIAL: CPT | Performed by: NEUROLOGICAL SURGERY

## 2022-06-10 PROCEDURE — 99214 OFFICE O/P EST MOD 30 MIN: CPT | Mod: S$GLB,,, | Performed by: NEUROLOGICAL SURGERY

## 2022-06-10 PROCEDURE — 3052F PR MOST RECENT HEMOGLOBIN A1C LEVEL 8.0 - < 9.0%: ICD-10-PCS | Mod: CPTII,S$GLB,, | Performed by: NEUROLOGICAL SURGERY

## 2022-06-10 PROCEDURE — 3072F PR LOW RISK FOR RETINOPATHY: ICD-10-PCS | Mod: CPTII,S$GLB,, | Performed by: NEUROLOGICAL SURGERY

## 2022-06-10 NOTE — H&P (VIEW-ONLY)
Subjective:      Patient ID: Friday ROGELIO Blake is a 66 y.o. male.    Chief Complaint: Pre-op Exam (Pt is here today for pre op appt pt c/o lbp rating pain 5/10. Pt states that the pain worsens if he standing too long. Pt denies PT & currently takes aleve to help ease pain. )    Patient here today for follow up   He has had MR,CT, XR   Complaints of lbp bilateral but through L > R into his groin   Ambulates unassisted   occ will get N/T   No weakness   He has tried PT NEHEMIAS     Review of Systems   Constitutional: Negative for activity change, appetite change and chills.   HENT: Negative for hearing loss, sore throat and tinnitus.    Eyes: Negative for pain, discharge and itching.   Cardiovascular: Negative for chest pain.   Gastrointestinal: Negative for abdominal pain.   Endocrine: Negative for cold intolerance and heat intolerance.   Genitourinary: Negative for difficulty urinating and dysuria.   Musculoskeletal: Positive for back pain and gait problem.   Allergic/Immunologic: Negative for environmental allergies.   Neurological: Positive for weakness. Negative for dizziness, tremors, light-headedness and headaches.   Hematological: Negative for adenopathy.   Psychiatric/Behavioral: Negative for agitation, behavioral problems and confusion.         Objective:       Neurosurgery Physical Exam  Ortho/SPM Exam  Ortho Exam    Strength   Deltoids Triceps Biceps Wrist Extension Wrist Flexion Hand    Upper: R 5/5 5/5 5/5 5/5 5/5 5/5     L 5/5 5/5 5/5 5/5 5/5 5/5       Hip Flexion Knee Extension Knee  Flexion Dorsiflexion Plantar flexion EHL   Lower: R 5/5 5/5 5/5 5/5 5/5 5/5     L 5/5 5/5 5/5 5/5 5/5 5/5    sensation improved to light touch   Proprioception intact          FINDINGS:    L1-L2 disc : Left paracentral disc protrusion that causes mild-to-moderate left lateral recess stenosis.  This is best demonstrated on axial T2 series 6, image 8.  Mild degenerative facet hypertrophy bilaterally.  The thecal sac measures 7  mm.  No significant foraminal stenosis.    L2-L3 disc: Focal left lateral recess/paracentral disc protrusion with annular fissure that likely compresses the descending left L3 spinal nerve roots.  This is best demonstrated on axial T2 series 6, image 12.  Mild degenerative facet hypertrophy with buckling of ligamentum flavum.  The thecal sac measures 6 mm AP.  No significant foraminal stenosis.    L3-L4 disc: Broad-based posterior disc bulge with a posterior annular fissure.  Mild degenerative facet hypertrophy and buckling of ligamentum flavum.  The thecal sac measures 8 mm AP.  Mild bilateral foraminal stenosis.    L4-L5 disc: Circumferential disc bulge.  Anterior osteophytes.  Severe degenerative facet hypertrophy.  Right-sided facet joint effusion.  Posteriorly and inferiorly projecting synovial cyst from the right facet joint measuring 10 mm.  The thecal sac measures 12 mm AP.  Disc encroaches into the left exit foramina and causes mild/moderate left foraminal stenosis.  Mild right foraminal stenosis.    L5-S1 disc: Posterior disc bulge.  Severe degenerative facet hypertrophy.  The thecal sac measures 12 mm.  No significant foraminal stenosis.    Impression  1. 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  2. Left-sided disc protrusion with annular fissure at L2-L3 causes moderate spinal stenosis and severe left lateral recess stenosis.  At likely compresses the descending left L3 spinal nerve roots and may result in left L3 radiculopathy.  3. Left paracentral disc protrusion L1-L2 causes mild to moderate left lateral recess stenosis and may compress the descending left L2 spinal nerve roots.  4. Mild overall spinal stenosis at L1-L2 and L3-L4.  5. Mild/moderate left and mild right foraminal stenosis at L4-L5.  6. Severe facet arthropathy at L4-L5 and L5-S1.  Right facet joint effusion at L4-L5 with a posteriorly projecting synovial cyst.        X-Ray Lumbar Complete Including Flex And  Ext    Narrative  EXAMINATION:  XR LUMBAR SPINE 5 VIEW WITH FLEX AND EXT    CLINICAL HISTORY:  Radiculopathy, lumbar region    TECHNIQUE:  Five views of the lumbar spine plus flexion extension views were performed.    COMPARISON:  None.    FINDINGS:  Alignment of the lumbar spine is normal within the neutral and extension positions.  There is minimal grade 1 anterolisthesis of L4 on L5 which develops with flexion.  No fracture or pars defect identified.  There is mild L4-L5 disc height loss and endplate spurring.  Mild inferior lumbar spine facet arthropathy.  Sacroiliac joints appear normal.      I  reviewed all pertinent imaging regarding this case.  Assessment:     1. Pre-procedural examination    2. Bleeding disorder      Plan:     Pre-procedural examination  -     Comprehensive Metabolic Panel; Future; Expected date: 06/10/2022  -     URINALYSIS; Future; Expected date: 06/10/2022  -     CBC Auto Differential; Future; Expected date: 06/10/2022  -     APTT; Future; Expected date: 06/10/2022  -     PROTIME-INR; Future; Expected date: 06/10/2022  -     TYPE AND SCREEN PREOP; Future; Expected date: 06/10/2022    Bleeding disorder  -     APTT; Future; Expected date: 06/10/2022  -     PROTIME-INR; Future; Expected date: 06/10/2022    This patient is degenerative disc disease with arthropathy at multiple levels.  Given his overall symptoms and presentation I do believe that most of his symptoms are likely stemming from the L2-3 herniated disc on the left side causing radicular symptoms through the left,    He does have some facet disease with mild listhesis at 4 5 however the majority of symptoms again are located the L2-3 level    To lesser degree does have a left-sided recess stenosis at L1-2.    He did have a selective block at L2-3 and L3-4 to see what percentage of his pain was relieved and he was temporarily pleased with results until the symptoms gradually returned    We discussed possible treatment options  including continued conservative treatments PT, NEHEMIAS, medications, as well as multilevel decompression with fusion     At this time will recommend a  minimally invasive L2-3 discectomy and decompression     Consents signed in the office today     The patient was informed of all benefits and potential risk of the operation including but not limited to:  Pain, infection, bleeding, coma, paralysis, death.  Cerebrospinal fluid leak, re herniation of lumbar disc, the need for additional procedures in the future. No guarantee was given that this procedure would alleviate all of the symptoms.    Will tentatively schedule for June 22     Thank you for the referral   Please call with any questions    Shaggy Zepeda MD  Neurosurgery     Disclaimer: This note was prepared using a voice recognition system and is likely to have sound alike errors within the text.

## 2022-06-11 LAB
BASOPHILS # BLD AUTO: 0.04 K/UL (ref 0–0.2)
BASOPHILS NFR BLD: 0.7 % (ref 0–1.9)
DIFFERENTIAL METHOD: ABNORMAL
EOSINOPHIL # BLD AUTO: 0.3 K/UL (ref 0–0.5)
EOSINOPHIL NFR BLD: 5 % (ref 0–8)
ERYTHROCYTE [DISTWIDTH] IN BLOOD BY AUTOMATED COUNT: 14.5 % (ref 11.5–14.5)
HCT VFR BLD AUTO: 41.9 % (ref 40–54)
HGB BLD-MCNC: 13 G/DL (ref 14–18)
IMM GRANULOCYTES # BLD AUTO: 0.02 K/UL (ref 0–0.04)
IMM GRANULOCYTES NFR BLD AUTO: 0.3 % (ref 0–0.5)
LYMPHOCYTES # BLD AUTO: 2.8 K/UL (ref 1–4.8)
LYMPHOCYTES NFR BLD: 47.3 % (ref 18–48)
MCH RBC QN AUTO: 29 PG (ref 27–31)
MCHC RBC AUTO-ENTMCNC: 31 G/DL (ref 32–36)
MCV RBC AUTO: 93 FL (ref 82–98)
MONOCYTES # BLD AUTO: 0.3 K/UL (ref 0.3–1)
MONOCYTES NFR BLD: 5.9 % (ref 4–15)
NEUTROPHILS # BLD AUTO: 2.4 K/UL (ref 1.8–7.7)
NEUTROPHILS NFR BLD: 40.8 % (ref 38–73)
NRBC BLD-RTO: 0 /100 WBC
PLATELET # BLD AUTO: 170 K/UL (ref 150–450)
PMV BLD AUTO: 12.6 FL (ref 9.2–12.9)
RBC # BLD AUTO: 4.49 M/UL (ref 4.6–6.2)
WBC # BLD AUTO: 5.81 K/UL (ref 3.9–12.7)

## 2022-06-20 ENCOUNTER — TELEPHONE (OUTPATIENT)
Dept: PREADMISSION TESTING | Facility: HOSPITAL | Age: 67
End: 2022-06-20
Payer: MEDICARE

## 2022-06-20 NOTE — TELEPHONE ENCOUNTER
----- Message from Ashli Colindres MA sent at 6/20/2022  8:27 AM CDT -----  Regarding: RE: sx pt  Consent is in T&S not needed for this particular sx    ----- Message -----  From: Olga Bateman RN  Sent: 6/20/2022   8:03 AM CDT  To: Shreya Davis RN, Duane Coon Staff  Subject: sx pt                                            Hello, Pre-Admit Testing Department is inquiring about this surgery patient's consent & type and screen appt for their procedure on 6/22/22. Please let us know if it will be available in Epic, or will be done day of surgery. Thanks in advance.          -Pre Admit Testing Dept   Office # [phone] or [phone]

## 2022-06-20 NOTE — PRE ADMISSION SCREENING
Pre op instructions reviewed with pt per phone: Spoke about pre op process and surgery instructions, verbalized understanding.    Surgery is scheduled on 6/22/22. Please arrive at 12pm. We will call you the afternoon prior to surgery to confirm arrival time, as it is subject to change due to cancellations & emergencies.    Please report to the [facility] (1st Floor) at [facility] located off of [facility] (2nd building on the left, in front of the [facility] pole).  Address: [address] [place] [address] [place]. [address]        INSTRUCTIONS IMPORTANT!!!  Do Not Eat, Drink, or Smoke after 12 midnight! NO WATER after midnight! OK to brush teeth, no gum, candy or mints!      *Take only these medicines with a small swallow of water-morning of surgery.  None    ____  NO Acrylic/fake nails or nail polish worn day of surgery (specifically hand/arm & foot surgeries).  ____  NO powder, lotions, deodorants, oils or creams on body.  ____  Please Remove All jewelry & piercings prior to surgery.  ____  Please Remove Dentures, Hearing Aids & Contact Lens prior to the start of surgery.  ____  Please bring photo ID and insurance information to hospital (Leave Valuables at Home).  ____  If going home the same day, arrange for a ride home. You will not be able to drive 24 hrs if Anesthesia was used.   ____  Females (ages 11-60) may need to give a urine sample the morning of surgery; please see Pre op Nurse prior to voiding.  ____  Wear clean, loose fitting clothing. Allow for dressings, bandages.  ____  Stop all Aspirin products, Ibuprofen, Advil, Motrin & Aleve at least 5-7 days before surgery, unless otherwise instructed by your doctor, or the nurse.   ____  Blood Thinners are stopped based on your Provider's recommendation; Call Surgeon's Office to inquire when to stop/hold.  ____  Stop taking any Fish Oil supplements or Vitamins at least 5 days prior to surgery, unless instructed otherwise by your Doctor.            Diabetic  Patients: If you take diabetic medication, do NOT take morning of surgery unless instructed by             Doctor. Metformin to be stopped 24 hrs prior to surgery time. DO NOT take long-acting insulin the evening before surgery. Blood sugars will be checked in pre-op morning of.    Bathing Instructions:    -Do not shave your face or body the day before or the day of surgery.  -Do not shave pubic hair 7 days prior to surgery (gyn pt's).   -Shower & Rinse your body as usual with anti-bacterial Soap (Dial, Lever 2000, or Hibiclens)   -Do not use Hibiclens on your head, face, or genitals.   -Do not wash with anti-bacterial soap after you use the Hibiclens.   -Rinse your body thoroughly.      [facility] Visitor/Ride Policy:  Only 2 adults allowed (over the age of 18) to accompany you to the Hospital. You Must have a ride home from a responsible adult that you know and trust. Medical Transport, Uber or Lyft can only be used if patient has a responsible adult to accompany them during ride home.    Post-Op Instructions: You will receive Post-op/Discharge instructions by your Discharge Nurse prior to going home. Please call your Surgeon's office with any post-surgery questions/concerns.    *Call [facility] Pre-Admissions Department with surgery instruction questions @ [phone] or [phone] (Mon-Fri 7:30a to 3:45p)  *If you are running late or have questions the morning of surgery, please call the Surgery Dept @ [phone]  *Insurance/ Financial Questions, please call [phone].

## 2022-06-21 ENCOUNTER — TELEPHONE (OUTPATIENT)
Dept: PREADMISSION TESTING | Facility: HOSPITAL | Age: 67
End: 2022-06-21
Payer: MEDICARE

## 2022-06-22 ENCOUNTER — ANESTHESIA (OUTPATIENT)
Dept: SURGERY | Facility: HOSPITAL | Age: 67
End: 2022-06-22
Payer: MEDICARE

## 2022-06-22 ENCOUNTER — ANESTHESIA EVENT (OUTPATIENT)
Dept: SURGERY | Facility: HOSPITAL | Age: 67
End: 2022-06-22
Payer: MEDICARE

## 2022-06-22 ENCOUNTER — HOSPITAL ENCOUNTER (OUTPATIENT)
Facility: HOSPITAL | Age: 67
Discharge: HOME | End: 2022-06-23
Attending: NEUROLOGICAL SURGERY | Admitting: NEUROLOGICAL SURGERY
Payer: MEDICARE

## 2022-06-22 DIAGNOSIS — M51.36 DDD (DEGENERATIVE DISC DISEASE), LUMBAR: ICD-10-CM

## 2022-06-22 DIAGNOSIS — M51.36 DEGENERATIVE DISC DISEASE, LUMBAR: Primary | Chronic | ICD-10-CM

## 2022-06-22 DIAGNOSIS — M54.16 LUMBAR RADICULOPATHY: ICD-10-CM

## 2022-06-22 PROBLEM — M51.369 DEGENERATIVE DISC DISEASE, LUMBAR: Chronic | Status: ACTIVE | Noted: 2022-06-22

## 2022-06-22 PROBLEM — M51.369 DEGENERATIVE DISC DISEASE, LUMBAR: Status: ACTIVE | Noted: 2022-06-22

## 2022-06-22 LAB
POCT GLUCOSE: 136 MG/DL (ref 70–110)
POCT GLUCOSE: 181 MG/DL (ref 70–110)

## 2022-06-22 PROCEDURE — 82962 GLUCOSE BLOOD TEST: CPT | Performed by: NEUROLOGICAL SURGERY

## 2022-06-22 PROCEDURE — 25000003 PHARM REV CODE 250: Performed by: NEUROLOGICAL SURGERY

## 2022-06-22 PROCEDURE — 25000003 PHARM REV CODE 250: Performed by: NURSE ANESTHETIST, CERTIFIED REGISTERED

## 2022-06-22 PROCEDURE — 37000009 HC ANESTHESIA EA ADD 15 MINS: Performed by: NEUROLOGICAL SURGERY

## 2022-06-22 PROCEDURE — 37000008 HC ANESTHESIA 1ST 15 MINUTES: Performed by: NEUROLOGICAL SURGERY

## 2022-06-22 PROCEDURE — 63600175 PHARM REV CODE 636 W HCPCS: Performed by: NURSE ANESTHETIST, CERTIFIED REGISTERED

## 2022-06-22 PROCEDURE — 63600175 PHARM REV CODE 636 W HCPCS: Performed by: PHYSICIAN ASSISTANT

## 2022-06-22 PROCEDURE — 71000039 HC RECOVERY, EACH ADD'L HOUR: Performed by: NEUROLOGICAL SURGERY

## 2022-06-22 PROCEDURE — 71000015 HC POSTOP RECOV 1ST HR: Performed by: NEUROLOGICAL SURGERY

## 2022-06-22 PROCEDURE — 63600175 PHARM REV CODE 636 W HCPCS: Performed by: ANESTHESIOLOGY

## 2022-06-22 PROCEDURE — C9290 INJ, BUPIVACAINE LIPOSOME: HCPCS | Performed by: NEUROLOGICAL SURGERY

## 2022-06-22 PROCEDURE — 25000003 PHARM REV CODE 250: Performed by: ANESTHESIOLOGY

## 2022-06-22 PROCEDURE — 63600175 PHARM REV CODE 636 W HCPCS: Performed by: NEUROLOGICAL SURGERY

## 2022-06-22 PROCEDURE — 27201423 OPTIME MED/SURG SUP & DEVICES STERILE SUPPLY: Performed by: NEUROLOGICAL SURGERY

## 2022-06-22 PROCEDURE — 36000710: Performed by: NEUROLOGICAL SURGERY

## 2022-06-22 PROCEDURE — 36000711: Performed by: NEUROLOGICAL SURGERY

## 2022-06-22 PROCEDURE — 63047 PR LAMINEC/FACETECT/FORAMIN,LUMBAR 1 SEG: ICD-10-PCS | Mod: ,,, | Performed by: NEUROLOGICAL SURGERY

## 2022-06-22 PROCEDURE — 25000003 PHARM REV CODE 250: Performed by: PHYSICIAN ASSISTANT

## 2022-06-22 PROCEDURE — 63047 LAM FACETEC & FORAMOT LUMBAR: CPT | Mod: ,,, | Performed by: NEUROLOGICAL SURGERY

## 2022-06-22 PROCEDURE — 71000033 HC RECOVERY, INTIAL HOUR: Performed by: NEUROLOGICAL SURGERY

## 2022-06-22 RX ORDER — OXYCODONE AND ACETAMINOPHEN 10; 325 MG/1; MG/1
1 TABLET ORAL EVERY 4 HOURS PRN
Qty: 30 TABLET | Refills: 0 | Status: ON HOLD | OUTPATIENT
Start: 2022-06-22 | End: 2022-07-06 | Stop reason: HOSPADM

## 2022-06-22 RX ORDER — DOCUSATE SODIUM 100 MG/1
100 CAPSULE, LIQUID FILLED ORAL 2 TIMES DAILY
Qty: 20 CAPSULE | Refills: 0 | Status: SHIPPED | OUTPATIENT
Start: 2022-06-22 | End: 2022-09-10

## 2022-06-22 RX ORDER — ONDANSETRON 2 MG/ML
INJECTION INTRAMUSCULAR; INTRAVENOUS
Status: DISCONTINUED | OUTPATIENT
Start: 2022-06-22 | End: 2022-06-22

## 2022-06-22 RX ORDER — MUPIROCIN 20 MG/G
OINTMENT TOPICAL
Status: DISCONTINUED | OUTPATIENT
Start: 2022-06-22 | End: 2022-06-22 | Stop reason: HOSPADM

## 2022-06-22 RX ORDER — HYDROMORPHONE HYDROCHLORIDE 2 MG/ML
0.2 INJECTION, SOLUTION INTRAMUSCULAR; INTRAVENOUS; SUBCUTANEOUS EVERY 5 MIN PRN
Status: DISCONTINUED | OUTPATIENT
Start: 2022-06-22 | End: 2022-06-22 | Stop reason: HOSPADM

## 2022-06-22 RX ORDER — SODIUM CHLORIDE 0.9 % (FLUSH) 0.9 %
10 SYRINGE (ML) INJECTION
Status: DISCONTINUED | OUTPATIENT
Start: 2022-06-22 | End: 2022-06-23

## 2022-06-22 RX ORDER — BUPIVACAINE HYDROCHLORIDE 2.5 MG/ML
INJECTION, SOLUTION EPIDURAL; INFILTRATION; INTRACAUDAL
Status: DISCONTINUED | OUTPATIENT
Start: 2022-06-22 | End: 2022-06-22 | Stop reason: HOSPADM

## 2022-06-22 RX ORDER — METHYLPREDNISOLONE ACETATE 40 MG/ML
INJECTION, SUSPENSION INTRA-ARTICULAR; INTRALESIONAL; INTRAMUSCULAR; SOFT TISSUE
Status: DISCONTINUED | OUTPATIENT
Start: 2022-06-22 | End: 2022-06-22 | Stop reason: HOSPADM

## 2022-06-22 RX ORDER — ACETAMINOPHEN 325 MG/1
325 TABLET ORAL EVERY 6 HOURS PRN
Status: DISCONTINUED | OUTPATIENT
Start: 2022-06-22 | End: 2022-06-23 | Stop reason: HOSPADM

## 2022-06-22 RX ORDER — ACETAMINOPHEN 10 MG/ML
INJECTION, SOLUTION INTRAVENOUS
Status: DISCONTINUED | OUTPATIENT
Start: 2022-06-22 | End: 2022-06-22

## 2022-06-22 RX ORDER — METHOCARBAMOL 750 MG/1
750 TABLET, FILM COATED ORAL EVERY 8 HOURS PRN
Status: DISCONTINUED | OUTPATIENT
Start: 2022-06-22 | End: 2022-06-23 | Stop reason: HOSPADM

## 2022-06-22 RX ORDER — PROPOFOL 10 MG/ML
VIAL (ML) INTRAVENOUS
Status: DISCONTINUED | OUTPATIENT
Start: 2022-06-22 | End: 2022-06-22

## 2022-06-22 RX ORDER — OXYCODONE AND ACETAMINOPHEN 10; 325 MG/1; MG/1
1 TABLET ORAL EVERY 4 HOURS PRN
Status: DISCONTINUED | OUTPATIENT
Start: 2022-06-22 | End: 2022-06-23 | Stop reason: HOSPADM

## 2022-06-22 RX ORDER — ONDANSETRON 2 MG/ML
4 INJECTION INTRAMUSCULAR; INTRAVENOUS DAILY PRN
Status: DISCONTINUED | OUTPATIENT
Start: 2022-06-22 | End: 2022-06-22 | Stop reason: HOSPADM

## 2022-06-22 RX ORDER — MIDAZOLAM HYDROCHLORIDE 1 MG/ML
INJECTION, SOLUTION INTRAMUSCULAR; INTRAVENOUS
Status: DISCONTINUED | OUTPATIENT
Start: 2022-06-22 | End: 2022-06-22

## 2022-06-22 RX ORDER — OXYCODONE HYDROCHLORIDE 5 MG/1
5 TABLET ORAL
Status: DISCONTINUED | OUTPATIENT
Start: 2022-06-22 | End: 2022-06-22 | Stop reason: HOSPADM

## 2022-06-22 RX ORDER — FENTANYL CITRATE 50 UG/ML
INJECTION, SOLUTION INTRAMUSCULAR; INTRAVENOUS
Status: DISCONTINUED | OUTPATIENT
Start: 2022-06-22 | End: 2022-06-22

## 2022-06-22 RX ORDER — DEXTROSE MONOHYDRATE AND SODIUM CHLORIDE 5; .9 G/100ML; G/100ML
INJECTION, SOLUTION INTRAVENOUS CONTINUOUS
Status: DISCONTINUED | OUTPATIENT
Start: 2022-06-22 | End: 2022-06-23

## 2022-06-22 RX ORDER — METHOCARBAMOL 750 MG/1
750 TABLET, FILM COATED ORAL 3 TIMES DAILY PRN
Qty: 60 TABLET | Refills: 0 | Status: SHIPPED | OUTPATIENT
Start: 2022-06-22 | End: 2023-05-24

## 2022-06-22 RX ORDER — OXYCODONE AND ACETAMINOPHEN 5; 325 MG/1; MG/1
1 TABLET ORAL EVERY 4 HOURS PRN
Status: DISCONTINUED | OUTPATIENT
Start: 2022-06-22 | End: 2022-06-23 | Stop reason: HOSPADM

## 2022-06-22 RX ORDER — AMOXICILLIN 250 MG
1 CAPSULE ORAL DAILY
Qty: 14 TABLET | Refills: 0 | Status: SHIPPED | OUTPATIENT
Start: 2022-06-22 | End: 2022-09-10

## 2022-06-22 RX ORDER — ROCURONIUM BROMIDE 10 MG/ML
INJECTION, SOLUTION INTRAVENOUS
Status: DISCONTINUED | OUTPATIENT
Start: 2022-06-22 | End: 2022-06-22

## 2022-06-22 RX ORDER — ONDANSETRON 8 MG/1
8 TABLET, ORALLY DISINTEGRATING ORAL EVERY 6 HOURS PRN
Status: DISCONTINUED | OUTPATIENT
Start: 2022-06-22 | End: 2022-06-23 | Stop reason: HOSPADM

## 2022-06-22 RX ORDER — VANCOMYCIN HYDROCHLORIDE 1 G/20ML
INJECTION, POWDER, LYOPHILIZED, FOR SOLUTION INTRAVENOUS
Status: DISCONTINUED | OUTPATIENT
Start: 2022-06-22 | End: 2022-06-22 | Stop reason: ALTCHOICE

## 2022-06-22 RX ORDER — LIDOCAINE HYDROCHLORIDE 20 MG/ML
INJECTION INTRAVENOUS
Status: DISCONTINUED | OUTPATIENT
Start: 2022-06-22 | End: 2022-06-22

## 2022-06-22 RX ORDER — NEOSTIGMINE METHYLSULFATE 1 MG/ML
INJECTION, SOLUTION INTRAVENOUS
Status: DISCONTINUED | OUTPATIENT
Start: 2022-06-22 | End: 2022-06-22

## 2022-06-22 RX ADMIN — ROCURONIUM BROMIDE 50 MG: 10 INJECTION, SOLUTION INTRAVENOUS at 12:06

## 2022-06-22 RX ADMIN — GLYCOPYRROLATE 0.8 MG: 0.2 INJECTION, SOLUTION INTRAMUSCULAR; INTRAVENOUS at 03:06

## 2022-06-22 RX ADMIN — HYDROMORPHONE HYDROCHLORIDE 0.2 MG: 2 INJECTION INTRAMUSCULAR; INTRAVENOUS; SUBCUTANEOUS at 05:06

## 2022-06-22 RX ADMIN — VANCOMYCIN HYDROCHLORIDE 1500 MG: 1.5 INJECTION, POWDER, LYOPHILIZED, FOR SOLUTION INTRAVENOUS at 12:06

## 2022-06-22 RX ADMIN — PROPOFOL 110 MG: 10 INJECTION, EMULSION INTRAVENOUS at 12:06

## 2022-06-22 RX ADMIN — FENTANYL CITRATE 50 MCG: 50 INJECTION, SOLUTION INTRAMUSCULAR; INTRAVENOUS at 03:06

## 2022-06-22 RX ADMIN — NEOSTIGMINE METHYLSULFATE 5 MG: 1 INJECTION INTRAVENOUS at 03:06

## 2022-06-22 RX ADMIN — ACETAMINOPHEN 1000 MG: 10 INJECTION, SOLUTION INTRAVENOUS at 02:06

## 2022-06-22 RX ADMIN — MIDAZOLAM 2 MG: 1 INJECTION INTRAMUSCULAR; INTRAVENOUS at 12:06

## 2022-06-22 RX ADMIN — SODIUM CHLORIDE, SODIUM LACTATE, POTASSIUM CHLORIDE, AND CALCIUM CHLORIDE: .6; .31; .03; .02 INJECTION, SOLUTION INTRAVENOUS at 12:06

## 2022-06-22 RX ADMIN — FENTANYL CITRATE 100 MCG: 50 INJECTION, SOLUTION INTRAMUSCULAR; INTRAVENOUS at 12:06

## 2022-06-22 RX ADMIN — FENTANYL CITRATE 50 MCG: 50 INJECTION, SOLUTION INTRAMUSCULAR; INTRAVENOUS at 04:06

## 2022-06-22 RX ADMIN — ROCURONIUM BROMIDE 20 MG: 10 INJECTION, SOLUTION INTRAVENOUS at 12:06

## 2022-06-22 RX ADMIN — ONDANSETRON 4 MG: 2 INJECTION, SOLUTION INTRAMUSCULAR; INTRAVENOUS at 03:06

## 2022-06-22 RX ADMIN — HYDROMORPHONE HYDROCHLORIDE 0.2 MG: 2 INJECTION INTRAMUSCULAR; INTRAVENOUS; SUBCUTANEOUS at 04:06

## 2022-06-22 RX ADMIN — LIDOCAINE HYDROCHLORIDE 100 MG: 20 INJECTION, SOLUTION INTRAVENOUS at 12:06

## 2022-06-22 RX ADMIN — OXYCODONE HYDROCHLORIDE 5 MG: 5 TABLET ORAL at 04:06

## 2022-06-22 RX ADMIN — OXYCODONE AND ACETAMINOPHEN 1 TABLET: 10; 325 TABLET ORAL at 11:06

## 2022-06-22 NOTE — ANESTHESIA POSTPROCEDURE EVALUATION
Anesthesia Post Evaluation    Patient: Friday ROGELIO Blake    Procedure(s) Performed: Procedure(s) (LRB):  LAMINECTOMY, SPINE, LUMBAR, WITH DISCECTOMY (Left)    Final Anesthesia Type: general      Patient location during evaluation: PACU  Patient participation: Yes- Able to Participate  Level of consciousness: awake  Post-procedure vital signs: reviewed and stable  Pain management: adequate  Airway patency: patent    PONV status at discharge: No PONV  Anesthetic complications: no      Cardiovascular status: hemodynamically stable  Respiratory status: unassisted  Hydration status: euvolemic  Follow-up not needed.          Vitals Value Taken Time   /81 06/22/22 1635   Temp 36.3 °C (97.4 °F) 06/22/22 1603   Pulse 75 06/22/22 1635   Resp 28 06/22/22 1635   SpO2 99 % 06/22/22 1635   Vitals shown include unvalidated device data.      No case tracking events are documented in the log.      Pain/Javid Score: Javid Score: 4 (6/22/2022  4:15 PM)

## 2022-06-22 NOTE — ANESTHESIA PREPROCEDURE EVALUATION
06/22/2022 Friday ROGELIO Blake is a 66 y.o., male.    Patient Active Problem List   Diagnosis    Diabetes mellitus without complication    Hypertension associated with diabetes    Hyperlipidemia associated with type 2 diabetes mellitus    Morbid obesity with BMI of 40.0-44.9, adult    History of colon polyps    Vitamin D deficiency    Left carpal tunnel syndrome    Anemia    Decreased strength of trunk and back    Low back pain    Lumbar radiculopathy    Bilateral carpal tunnel syndrome     Past Surgical History:   Procedure Laterality Date    CARPAL TUNNEL RELEASE Left 4/1/2021    Procedure: RELEASE, CARPAL TUNNEL;  Surgeon: Yoandy David MD;  Location: [facility] OR;  Service: Orthopedics;  Laterality: Left;    HERNIA REPAIR      PARATHYROIDECTOMY Bilateral 7/27/2021    Procedure: PARATHYROIDECTOMY;  Surgeon: Tha Dial MD;  Location: [facility] OR;  Service: ENT;  Laterality: Bilateral;  with medialstinal exploration    SELECTIVE INJECTION OF ANESTHETIC AGENT AROUND LUMBAR SPINAL NERVE ROOT BY TRANSFORAMINAL APPROACH Left 3/23/2022    Procedure: BLOCK, SPINAL NERVE ROOT, LUMBAR, SELECTIVE, TRANSFORAMINAL APPROACH Left L2-3, L3-4 RN IV sedation;  Surgeon: Jay Hodges MD;  Location: [facility] PAIN MGT;  Service: Pain Management;  Laterality: Left;    STERNOTOMY N/A 7/27/2021    Procedure: STERNOTOMY;  Surgeon: Tha Dial MD;  Location: [facility] OR;  Service: ENT;  Laterality: N/A;    ULNAR NERVE TRANSPOSITION Left 4/1/2021    Procedure: TRANSPOSITION, NERVE, ULNAR;  Surgeon: Yoandy David MD;  Location: [facility] OR;  Service: Orthopedics;  Laterality: Left;    ULNAR TUNNEL RELEASE Left 4/1/2021    Procedure: RELEASE, CUBITAL TUNNEL;  Surgeon: Yoandy David MD;  Location: [facility] OR;  Service: Orthopedics;  Laterality: Left;    UMBILICAL HERNIA REPAIR         Pre-op Assessment    I have  reviewed the Patient Summary Reports.     I have reviewed the Nursing Notes. I have reviewed the NPO Status.   I have reviewed the Medications.     Review of Systems  Anesthesia Hx:  No problems with previous Anesthesia    Social:  Non-Smoker    Cardiovascular:   Hypertension hyperlipidemia    Pulmonary:  Pulmonary Normal    Renal/:  Renal/ Normal     Hepatic/GI:  Hepatic/GI Normal    Musculoskeletal:  Spine Disorders: lumbar    Neurological:  Neurology Normal    Endocrine:   Diabetes, well controlled, type 2  Morbid Obesity / BMI > 40      Physical Exam  General: Well nourished, Cooperative, Alert and Oriented    Airway:  Mallampati: II   Mouth Opening: Normal  TM Distance: Normal  Neck ROM: Normal ROM    Dental:  Intact      Lab Results   Component Value Date    WBC 5.81 06/10/2022    HGB 13.0 (L) 06/10/2022    HCT 41.9 06/10/2022    MCV 93 06/10/2022     06/10/2022         Chemistry        Component Value Date/Time     06/10/2022 1126    K 4.8 06/10/2022 1126     06/10/2022 1126    CO2 26 06/10/2022 1126    BUN 14 06/10/2022 1126    CREATININE 1.2 06/10/2022 1126     (H) 06/10/2022 1126        Component Value Date/Time    CALCIUM 9.9 06/10/2022 1126    ALKPHOS 60 06/10/2022 1126    AST 20 06/10/2022 1126    ALT 18 06/10/2022 1126    BILITOT 0.6 06/10/2022 1126    ESTGFRAFRICA >60.0 06/10/2022 1126    EGFRNONAA >60.0 06/10/2022 1126        EKG  Normal sinus rhythm   T wave abnormality, consider inferior ischemia   Abnormal ECG   When compared with ECG of 14-SEP-2021 10:32,   Previous ECG has undetermined rhythm, needs review   Criteria for Septal infarct are no longer Present   T wave inversion now evident in Inferior leads   Inverted T waves have replaced nonspecific T wave abnormality in Anterior   leads   Confirmed by Gonzales HINSON, Alpa' JHON ([id number]) on 5  10/2022 6:44:43 AM     ECHO   Summary    · The left ventricle is normal in size with normal systolic function.  · The estimated  ejection fraction is 65%.  · Normal left ventricular diastolic function.  · Normal right ventricular size with normal right ventricular systolic function.      Anesthesia Plan  Type of Anesthesia, risks & benefits discussed:    Anesthesia Type: Gen ETT  Intra-op Monitoring Plan: Standard ASA Monitors  Post Op Pain Control Plan: IV/PO Opioids PRN  Induction:  IV  Airway Plan: Direct  Informed Consent: Informed consent signed with the Patient and all parties understand the risks and agree with anesthesia plan.  All questions answered.   ASA Score: 3    Ready For Surgery From Anesthesia Perspective.        .

## 2022-06-22 NOTE — PATIENT INSTRUCTIONS
PLEASE READ BELOW:    No NSAIDS (Aleve, Advil, Voltaren, etc. ) for 14 days.    Resume blood thinners (including Aspirin, fish oil supplements) in 5 days.    Minimize BLTs- bending, lifting and twisting. No lifting anything greater than 5-10 lbs for the first two weeks.    Dressing change:    It is okay to leave current dressing in place for the next 2-3 days. After this change dressing every other day using Mepilex or similar dressing. With each dressing change, apply thin layer of antibiotic ointment over incision.       Shower/Bath: You may shower on postop day #1. Do not submerge in water until you are evaluated in 2 weeks for staple removal.

## 2022-06-22 NOTE — TRANSFER OF CARE
"Anesthesia Transfer of Care Note    Patient: Friday ROGELIO Blake    Procedure(s) Performed: Procedure(s) (LRB):  LAMINECTOMY, SPINE, LUMBAR, WITH DISCECTOMY (Left)    Patient location: PACU    Anesthesia Type: general    Transport from OR: Transported from OR on room air with adequate spontaneous ventilation    Post pain: adequate analgesia    Post assessment: no apparent anesthetic complications    Post vital signs: stable    Level of consciousness: responds to stimulation    Nausea/Vomiting: no nausea/vomiting    Complications: none    Transfer of care protocol was followed      Last vitals:   Visit Vitals  /72 (BP Location: Right arm, Patient Position: Sitting)   Pulse (!) 55   Temp 36.8 °C (98.2 °F) (Temporal)   Resp 18   Ht 5' 6" (1.676 m)   Wt 110.2 kg (242 lb 15.2 oz)   SpO2 98%   BMI 39.21 kg/m²     "

## 2022-06-22 NOTE — ANESTHESIA PROCEDURE NOTES
Intubation    Date/Time: 6/22/2022 12:07 PM  Performed by: Demarcus Jhaveri CRNA  Authorized by: Presley Jones MD     Intubation:     Induction:  Intravenous    Intubated:  Postinduction    Mask Ventilation:  Easy mask    Attempts:  1    Attempted By:  CRNA    Method of Intubation:  Direct    Blade:  Joshua 3    Laryngeal View Grade: Grade I - full view of cords      Difficult Airway Encountered?: No      Complications:  None    Airway Device:  Oral endotracheal tube    Airway Device Size:  7.5    Style/Cuff Inflation:  Cuffed    Inflation Amount (mL):  8    Tube secured:  21    Secured at:  The lips    Placement Verified By:  Capnometry    Findings Post-Intubation:  BS equal bilateral

## 2022-06-22 NOTE — PLAN OF CARE
Pt resting on stretcher c/o low back pain. IV pain meds will be administered shortly. See flow sheet for detailed assessment.

## 2022-06-22 NOTE — INTERVAL H&P NOTE
The patient has been examined and the H&P has been reviewed:    I concur with the findings and changes have been noted since the H&P was written: jayden went over his imaging again and I added that we would possibly include a  laminectomy discectomy at L1-2 as well as L2-3 as indicated      Surgery risks, benefits and alternative options discussed and understood by patient/family.          There are no hospital problems to display for this patient.

## 2022-06-22 NOTE — BRIEF OP NOTE
[facility] - Surgery (Hospital)  Brief Operative Note    SUMMARY     Surgery Date: 6/22/2022     Surgeon(s) and Role:     * Shaggy Zepeda MD - Primary    Assistant: Oliver Clifton PA-C    Pre-op Diagnosis:  Degenerative disc disease, lumbar [M51.36]  Lumbar stenosis with neurogenic claudication [M48.062]  Lumbar radiculopathy [M54.16]    Post-op Diagnosis:  Post-Op Diagnosis Codes:     * Degenerative disc disease, lumbar [M51.36]     * Lumbar stenosis with neurogenic claudication [M48.062]     * Lumbar radiculopathy [M54.16]    Procedure(s) (LRB):  LAMINECTOMY, SPINE, LUMBAR, WITH DISCECTOMY (Left) L2-3     Anesthesia: General    Operative Findings: herniated disc with left lateral recess stenosis       Estimated Blood Loss: * No values recorded between 6/22/2022 12:40 PM and 6/22/2022  3:44 PM *    Estimated Blood Loss has been documented.         Specimens:   Specimen (24h ago, onward)            None          [id number]

## 2022-06-23 VITALS
DIASTOLIC BLOOD PRESSURE: 56 MMHG | TEMPERATURE: 98 F | SYSTOLIC BLOOD PRESSURE: 110 MMHG | WEIGHT: 242.94 LBS | BODY MASS INDEX: 39.04 KG/M2 | HEIGHT: 66 IN | HEART RATE: 57 BPM | RESPIRATION RATE: 18 BRPM | OXYGEN SATURATION: 96 %

## 2022-06-23 LAB
POCT GLUCOSE: 164 MG/DL (ref 70–110)
POCT GLUCOSE: 193 MG/DL (ref 70–110)
POCT GLUCOSE: 302 MG/DL (ref 70–110)

## 2022-06-23 PROCEDURE — 63600175 PHARM REV CODE 636 W HCPCS: Performed by: NURSE PRACTITIONER

## 2022-06-23 PROCEDURE — 25000003 PHARM REV CODE 250: Performed by: PHYSICIAN ASSISTANT

## 2022-06-23 RX ORDER — DAPAGLIFLOZIN 5 MG/1
5 TABLET, FILM COATED ORAL DAILY
Status: DISCONTINUED | OUTPATIENT
Start: 2022-06-23 | End: 2022-06-23

## 2022-06-23 RX ORDER — GLUCAGON 1 MG
1 KIT INJECTION
Status: DISCONTINUED | OUTPATIENT
Start: 2022-06-23 | End: 2022-06-23 | Stop reason: HOSPADM

## 2022-06-23 RX ORDER — ATORVASTATIN CALCIUM 10 MG/1
10 TABLET, FILM COATED ORAL NIGHTLY
Status: DISCONTINUED | OUTPATIENT
Start: 2022-06-23 | End: 2022-06-23 | Stop reason: HOSPADM

## 2022-06-23 RX ORDER — INSULIN ASPART 100 [IU]/ML
0-5 INJECTION, SOLUTION INTRAVENOUS; SUBCUTANEOUS EVERY 6 HOURS PRN
Status: DISCONTINUED | OUTPATIENT
Start: 2022-06-23 | End: 2022-06-23 | Stop reason: HOSPADM

## 2022-06-23 RX ADMIN — METHOCARBAMOL 750 MG: 750 TABLET ORAL at 01:06

## 2022-06-23 RX ADMIN — INSULIN ASPART 2 UNITS: 100 INJECTION, SOLUTION INTRAVENOUS; SUBCUTANEOUS at 01:06

## 2022-06-23 RX ADMIN — OXYCODONE AND ACETAMINOPHEN 1 TABLET: 10; 325 TABLET ORAL at 08:06

## 2022-06-23 RX ADMIN — ATORVASTATIN CALCIUM 10 MG: 10 TABLET, FILM COATED ORAL at 01:06

## 2022-06-23 NOTE — PLAN OF CARE
[facility] - Med Surg  Initial Discharge Assessment       Primary Care Provider: Sid Elizabeth MD    Admission Diagnosis: Degenerative disc disease, lumbar [M51.36]  Lumbar stenosis with neurogenic claudication [M48.062]  Lumbar radiculopathy [M54.16]  DDD (degenerative disc disease), lumbar [M51.36]    Admission Date: 6/22/2022  Expected Discharge Date: 6/22/2022    Discharge Barriers Identified: None    Payor: [employer] MANAGED MEDICARE / Plan: [employer] TOTAL CARE ADVANTAGE / Product Type: Medicare Advantage /     Extended Emergency Contact Information  Primary Emergency Contact: Cyndi Blake  Mobile Phone: [phone]  Relation: Spouse  Preferred language: English   needed? No    Discharge Plan A: Home  Discharge Plan B: Home      [facility] [address] [place] [address] [address] [address]  Phone: [phone] Fax: [phone]      Initial Assessment (most recent)     Adult Discharge Assessment - 06/23/22 0913        Discharge Assessment    Assessment Type Discharge Planning Assessment     Confirmed/corrected address, phone number and insurance Yes     Confirmed Demographics Correct on Facesheet     Source of Information patient     Communicated MATEUS with patient/caregiver Date not available/Unable to determine     Reason For Admission Degenerative disc disease     Lives With spouse     Facility Arrived From: home     Do you expect to return to your current living situation? Yes     Do you have help at home or someone to help you manage your care at home? No     Prior to hospitilization cognitive status: Alert/Oriented     Current cognitive status: Alert/Oriented     Walking or Climbing Stairs Difficulty none     Dressing/Bathing Difficulty none     Equipment Currently Used at Home none     Readmission within 30 days? No     Patient currently being followed by outpatient case management? No     Do you currently have service(s) that help you manage your care at home? No     Do  you take prescription medications? Yes     Do you have prescription coverage? Yes     Coverage [employer]     Do you have any problems affording any of your prescribed medications? No     Is the patient taking medications as prescribed? yes     Who is going to help you get home at discharge? patients spouse     How do you get to doctors appointments? car, drives self;family or friend will provide     Are you on dialysis? No     Discharge Plan A Home     Discharge Plan B Home     DME Needed Upon Discharge  none     Discharge Plan discussed with: Patient     Discharge Barriers Identified None               Initial assessment completed with patient. Patient reports independence with ADL's  prior to hospitalization. Patient uses no DME at home. Patient currently has no cm needs upon DC. CM will continue following to assist with other needs.   CM provided information on advanced directives, information on pharmacy bedside delivery, and discharge planning begins on admission with contact information for any needs/questions.

## 2022-06-23 NOTE — PROGRESS NOTES
[facility] - [facility]  Neurosurgery  Progress Note    Subjective:     Interval History:   The patient was seen this morning.  No acute events overnight.  He has been ambulating around his room.  No issues urinating.   Tolerating diet.  Pain is controlled.    History of Present Illness: See H&P.    Post-Op Info:  Procedure(s) (LRB):  LAMINECTOMY, SPINE, LUMBAR, WITH DISCECTOMY (Left)   1 Day Post-Op      Medications:  Continuous Infusions:  Scheduled Meds:   atorvastatin  10 mg Oral QHS     PRN Meds:acetaminophen, dextrose 10%, dextrose 10%, glucagon (human recombinant), insulin aspart U-100, methocarbamoL, ondansetron, oxyCODONE-acetaminophen, oxyCODONE-acetaminophen     Review of Systems  Objective:     Weight: 110.2 kg (242 lb 15.2 oz)  Body mass index is 39.21 kg/m².  Vital Signs (Most Recent):  Temp: 98.1 °F (36.7 °C) (06/23/22 0814)  Pulse: (!) 57 (06/23/22 0814)  Resp: 18 (06/23/22 0849)  BP: (!) 110/56 (06/23/22 0814)  SpO2: 96 % (06/23/22 0814) Vital Signs (24h Range):  Temp:  [97.4 °F (36.3 °C)-98.9 °F (37.2 °C)] 98.1 °F (36.7 °C)  Pulse:  [55-81] 57  Resp:  [12-26] 18  SpO2:  [88 %-100 %] 96 %  BP: ()/(51-93) 110/56     Date 06/23/22 0700 - 06/24/22 0659   Shift 2334-8880 8433-4643 0341-2748 24 Hour Total   INTAKE   P.O. 240   240   Shift Total(mL/kg) 240(2.2)   240(2.2)   OUTPUT   Shift Total(mL/kg)       Weight (kg) 110.2 110.2 110.2 110.2              Oxygen Concentration (%):  [21-98] 21         Neurosurgery Physical Exam  - Vitals reviewed  Moves all 4 ext  Incision CDI  Staples intact  There is no erythema, swelling or fluctuance  Dressing changed at bedside      Significant Labs:  No results for input(s): GLU, NA, K, CL, CO2, BUN, CREATININE, CALCIUM, MG in the last 48 hours.  No results for input(s): WBC, HGB, HCT, PLT in the last 48 hours.  No results for input(s): LABPT, INR, APTT in the last 48 hours.  Microbiology Results (last 7 days)     ** No results found for the last 168 hours. **         All pertinent labs from the last 24 hours have been reviewed.  Significant Diagnostics:  I have reviewed all pertinent imaging results/findings within the past 24 hours.    Assessment/Plan:     Active Diagnoses:    Diagnosis Date Noted POA    PRINCIPAL PROBLEM:  Degenerative disc disease, lumbar [M51.36] 06/22/2022 Yes     Chronic      Problems Resolved During this Admission:     POD #1    - Ambulate several times daily  - Up in chair with all meals    Patient will be discharged to home this morning.   He will follow-up in 2 weeks for wound check and staple removal.   Please reach out with any changes/concerns.      Oliver Clifton PA-C  Neurosurgery  [facility] - Med Surg

## 2022-06-23 NOTE — PLAN OF CARE
[facility] - [facility]  Discharge Final Note    Primary Care Provider: Sid Elizabeth MD    Expected Discharge Date: 6/22/2022    Final Discharge Note (most recent)     Final Note - 06/23/22 0917        Final Note    Assessment Type Final Discharge Note     Anticipated Discharge Disposition Home or Self Care     Hospital Resources/Appts/Education Provided Provided patient/caregiver with written discharge plan information;Appointments scheduled and added to AVS        Post-Acute Status    Discharge Delays None known at this time                 Important Message from Medicare             Contact Info     Oliver Clifton PA-C   Specialty: Neurosurgery    [address] [place] [address] [address] [place] [id number]   Phone: [phone]       Next Steps: Go in 2 week(s)    Instructions: For staple removal, For wound re-check        Patient to dc home with self care. Fu appts scheduled and added to AVS. No other cm needs.

## 2022-06-23 NOTE — NURSING
Discharge instructions reviewed with patient and patient's wife at bedside. Questions answered. Paper work handed to patient. IV d'c/d.

## 2022-06-26 NOTE — OP NOTE
[facility] Surg  Neurosurgery  Operative Note    SUMMARY      Date of Procedure: 6/22/2022     Procedure: Procedure(s) (LRB):  LAMINECTOMY, SPINE, LUMBAR, WITH DISCECTOMY (Left)   Left L2-3     Surgeon(s) and Role:     * Shaggy Zepeda MD - Primary    Assisting Surgeon: None    Pre-Operative Diagnosis: Degenerative disc disease, lumbar [M51.36]  Lumbar stenosis with neurogenic claudication [M48.062]  Lumbar radiculopathy [M54.16]    Post-Operative Diagnosis: Post-Op Diagnosis Codes:     * Degenerative disc disease, lumbar [M51.36]     * Lumbar stenosis with neurogenic claudication [M48.062]     * Lumbar radiculopathy [M54.16]    Anesthesia: General    Operative Findings (including complications, if any):   Herniated disc with lateral recess stenosis L2-3 on the L     Description of Technical Procedures:   Hemilaminectomy, partial medial facetectomy and foraminotomy L2-3 on left   with discectomy using microscopic dissection   Use of stereotactic navigation     Significant Surgical Tasks Conducted by the Assistant(s), if Applicable: thecal sac retraction during discectomy     Estimated Blood Loss (EBL): 50 mL           Specimens:   Specimen (24h ago, onward)            None           Implants: * No implants in log *           Condition: Good    Disposition: PACU - hemodynamically stable.    Attestation: I was present and scrubbed for the entire procedure.     Procedure:     History:  Patient is a 66-year-old gentleman who presented to my office with progressive back pain as well as radiculopathy.  He had multiple degenerative disc changes as well as spondylosis at multiple levels.  Based on his symptom pattern his symptoms primarily seem to be on the left side and associated with a large herniated disc at the L2-3 level.  He had multiple areas of degenerative changes including L1-2 L2-3 L4-5 as well as L5-S1.  Based on his symptoms he chose to have a more minimal invasive approach and prior to needing a large  corrective surgery he wished to undergo a decompression with diskectomy to see if this would alleviate the majority of the patient's symptoms.  After discussing all treatment options we set her up performing a decompression with diskectomy at L2-3 on the left side.     Surgical Risks:  The patient was well apprised of all objectives, benefits, risks and potential complications of the procedure, including but not limited to:  Worsening of current status, the possible need for further procedures, the risk of infection, headaches, CSF leak, possible spinal nerve injury resulting in paralysis, infection, injury to major vessels causing hemorrhage, stroke, loss of language function, coma and even death.  No assurance was given whether the symptoms would improve following the procedure.  Informed consent was obtained and secured to the chart after the patient voiced understanding of these risks and decided to proceed with the operation.     Description of procedure  The patient was transferred to the operating room and was given preoperative prophylactic IV antibiotics.  The Anesthesia Service sedated and intubated the patient.  The eyes were taped shut after ointment was applied to prevent corneal abrasion.  A Alessandro Hugger was placed over the upper body to maintain control of the core body temperature.  Roche catheter was inserted.  The patient was turned prone on the Wally table.  All pressure points were carefully padded.  The electrophysiology monitoring team inserted needles in their proper locations.     Operative Technique:  The patient was prepped and draped in the standard sterile fashion.  The C-arm fluoroscopy was draped and brought into the operative field and the L2-3.  Local anesthetic was infiltrated midline.  The skin was subsequently opened sharply with a #10 blade.  Dissection was carried down superiorly and inferiorly in the midline to expose the supraspinous ligament and the lamina.  The musculature  was elevated subperiosteally to expose the facets on the bilaterally with the Bovie electrocautery as well as the Martinez elevator.  Hemostasis was achieved.  Self retraining retractors were then inserted.     Next the stereotactic reference frame was fastened to the spinous process at the level above the planned procedure.  Sterile drapes were then prior placed around the operative site.  The O-arm navigation system was then brought into the operative field and intraoperative spin was done.  The O-arm was then removed. using navigation a high-speed drill was used to remove the lamina on the left side, and medial 1/3 of the left L2-3 facet l. The thecal sac was completely decompressed and the nerve roots going into their foramen was identified     Next the microscope was draped and brought into the surgical field.  Using stereotactic navigation the disc space was identified.  There was a large disc fragment extending on the left side into the L2-3 foramen affecting the L2 nerve root laterally as well as the L3 nerve root descending medially.  The disc was cauterized using the bipolar and then incised with a 15. Blade.  Pituitary Hooks were used to remove the disc fragments until the free fragment was sufficiently removed and nerve root was free of any.  Remaining compression      The wound was copiously irrigated with antibiotic saline solution.    The fascia was subsequently closed utilizing 0 Vicryl sutures.  Exparel was injected into the muscle for postoperative pain control.  The subcuticular layer was closed with a 2 0 Vicryl in an inverted fashion.  The skin was then approximated with staples.  The incision was dressed in a clean and dry dressing.     All sponge counts, needle counts and instrument counts were correct at the end of the case x 2.  The patient tolerated the procedure well without any complication and was transferred in a stable condition to the recovery room.    Thank you for the referral   Please  call with any questions    Shaggy Zepdea MD  Neurosurgery     Disclaimer: This note was prepared using a voice recognition system and is likely to have sound alike errors within the text.

## 2022-06-27 ENCOUNTER — HOSPITAL ENCOUNTER (INPATIENT)
Facility: HOSPITAL | Age: 67
LOS: 6 days | Discharge: HOME HEALTH | DRG: 908 | End: 2022-07-06
Attending: EMERGENCY MEDICINE | Admitting: NEUROLOGICAL SURGERY
Payer: MEDICARE

## 2022-06-27 ENCOUNTER — TELEPHONE (OUTPATIENT)
Dept: INTERNAL MEDICINE | Facility: CLINIC | Age: 67
End: 2022-06-27
Payer: MEDICARE

## 2022-06-27 DIAGNOSIS — M54.42 ACUTE BILATERAL LOW BACK PAIN WITH LEFT-SIDED SCIATICA: ICD-10-CM

## 2022-06-27 DIAGNOSIS — R78.81 BACTEREMIA: ICD-10-CM

## 2022-06-27 DIAGNOSIS — R78.81 GRAM-POSITIVE BACTEREMIA: Primary | ICD-10-CM

## 2022-06-27 DIAGNOSIS — M51.36 DEGENERATIVE DISC DISEASE, LUMBAR: Chronic | ICD-10-CM

## 2022-06-27 DIAGNOSIS — G97.61 POSTOPERATIVE HEMATOMA INVOLVING NERVOUS SYSTEM FOLLOWING NERVOUS SYSTEM PROCEDURE: ICD-10-CM

## 2022-06-27 DIAGNOSIS — M54.9 DORSALGIA, UNSPECIFIED: ICD-10-CM

## 2022-06-27 DIAGNOSIS — R50.82 POST-PROCEDURAL FEVER: ICD-10-CM

## 2022-06-27 DIAGNOSIS — M54.16 LUMBAR RADICULOPATHY: Chronic | ICD-10-CM

## 2022-06-27 DIAGNOSIS — G62.9 NEUROPATHY: ICD-10-CM

## 2022-06-27 LAB
ALBUMIN SERPL BCP-MCNC: 3.9 G/DL (ref 3.5–5.2)
ALP SERPL-CCNC: 68 U/L (ref 55–135)
ALT SERPL W/O P-5'-P-CCNC: 29 U/L (ref 10–44)
ANION GAP SERPL CALC-SCNC: 13 MMOL/L (ref 8–16)
APTT BLDCRRT: 25.3 SEC (ref 21–32)
AST SERPL-CCNC: 23 U/L (ref 10–40)
BASOPHILS # BLD AUTO: 0.04 K/UL (ref 0–0.2)
BASOPHILS NFR BLD: 0.4 % (ref 0–1.9)
BILIRUB SERPL-MCNC: 0.7 MG/DL (ref 0.1–1)
BILIRUB UR QL STRIP: NEGATIVE
BUN SERPL-MCNC: 12 MG/DL (ref 8–23)
CALCIUM SERPL-MCNC: 9.9 MG/DL (ref 8.7–10.5)
CHLORIDE SERPL-SCNC: 98 MMOL/L (ref 95–110)
CLARITY UR: CLEAR
CO2 SERPL-SCNC: 28 MMOL/L (ref 23–29)
COLOR UR: YELLOW
CREAT SERPL-MCNC: 1 MG/DL (ref 0.5–1.4)
DIFFERENTIAL METHOD: ABNORMAL
EOSINOPHIL # BLD AUTO: 0.2 K/UL (ref 0–0.5)
EOSINOPHIL NFR BLD: 2 % (ref 0–8)
ERYTHROCYTE [DISTWIDTH] IN BLOOD BY AUTOMATED COUNT: 14 % (ref 11.5–14.5)
EST. GFR  (AFRICAN AMERICAN): >60 ML/MIN/1.73 M^2
EST. GFR  (NON AFRICAN AMERICAN): >60 ML/MIN/1.73 M^2
GLUCOSE SERPL-MCNC: 184 MG/DL (ref 70–110)
GLUCOSE UR QL STRIP: NEGATIVE
HCT VFR BLD AUTO: 39.8 % (ref 40–54)
HGB BLD-MCNC: 12.9 G/DL (ref 14–18)
HGB UR QL STRIP: NEGATIVE
IMM GRANULOCYTES # BLD AUTO: 0.03 K/UL (ref 0–0.04)
IMM GRANULOCYTES NFR BLD AUTO: 0.3 % (ref 0–0.5)
INR PPP: 1 (ref 0.8–1.2)
KETONES UR QL STRIP: NEGATIVE
LEUKOCYTE ESTERASE UR QL STRIP: NEGATIVE
LYMPHOCYTES # BLD AUTO: 2.1 K/UL (ref 1–4.8)
LYMPHOCYTES NFR BLD: 22.3 % (ref 18–48)
MCH RBC QN AUTO: 28.7 PG (ref 27–31)
MCHC RBC AUTO-ENTMCNC: 32.4 G/DL (ref 32–36)
MCV RBC AUTO: 88 FL (ref 82–98)
MONOCYTES # BLD AUTO: 0.7 K/UL (ref 0.3–1)
MONOCYTES NFR BLD: 7.5 % (ref 4–15)
NEUTROPHILS # BLD AUTO: 6.5 K/UL (ref 1.8–7.7)
NEUTROPHILS NFR BLD: 67.5 % (ref 38–73)
NITRITE UR QL STRIP: NEGATIVE
NRBC BLD-RTO: 0 /100 WBC
PH UR STRIP: 8 [PH] (ref 5–8)
PLATELET # BLD AUTO: 184 K/UL (ref 150–450)
PMV BLD AUTO: 11.5 FL (ref 9.2–12.9)
POTASSIUM SERPL-SCNC: 4.5 MMOL/L (ref 3.5–5.1)
PROT SERPL-MCNC: 7.4 G/DL (ref 6–8.4)
PROT UR QL STRIP: ABNORMAL
PROTHROMBIN TIME: 10.9 SEC (ref 9–12.5)
RBC # BLD AUTO: 4.5 M/UL (ref 4.6–6.2)
SARS-COV-2 RDRP RESP QL NAA+PROBE: NEGATIVE
SODIUM SERPL-SCNC: 139 MMOL/L (ref 136–145)
SP GR UR STRIP: 1.02 (ref 1–1.03)
URN SPEC COLLECT METH UR: ABNORMAL
UROBILINOGEN UR STRIP-ACNC: NEGATIVE EU/DL
WBC # BLD AUTO: 9.6 K/UL (ref 3.9–12.7)

## 2022-06-27 PROCEDURE — 85730 THROMBOPLASTIN TIME PARTIAL: CPT | Performed by: NEUROLOGICAL SURGERY

## 2022-06-27 PROCEDURE — 63600175 PHARM REV CODE 636 W HCPCS: Performed by: PHYSICIAN ASSISTANT

## 2022-06-27 PROCEDURE — G0378 HOSPITAL OBSERVATION PER HR: HCPCS

## 2022-06-27 PROCEDURE — 96374 THER/PROPH/DIAG INJ IV PUSH: CPT

## 2022-06-27 PROCEDURE — 81003 URINALYSIS AUTO W/O SCOPE: CPT | Performed by: NURSE PRACTITIONER

## 2022-06-27 PROCEDURE — 25000003 PHARM REV CODE 250: Performed by: PHYSICIAN ASSISTANT

## 2022-06-27 PROCEDURE — 63600175 PHARM REV CODE 636 W HCPCS: Performed by: NURSE PRACTITIONER

## 2022-06-27 PROCEDURE — 63600175 PHARM REV CODE 636 W HCPCS: Performed by: EMERGENCY MEDICINE

## 2022-06-27 PROCEDURE — 85610 PROTHROMBIN TIME: CPT | Performed by: NEUROLOGICAL SURGERY

## 2022-06-27 PROCEDURE — 85025 COMPLETE CBC W/AUTO DIFF WBC: CPT | Performed by: NURSE PRACTITIONER

## 2022-06-27 PROCEDURE — 96375 TX/PRO/DX INJ NEW DRUG ADDON: CPT

## 2022-06-27 PROCEDURE — 80053 COMPREHEN METABOLIC PANEL: CPT | Performed by: NURSE PRACTITIONER

## 2022-06-27 PROCEDURE — U0002 COVID-19 LAB TEST NON-CDC: HCPCS | Performed by: EMERGENCY MEDICINE

## 2022-06-27 PROCEDURE — 99291 CRITICAL CARE FIRST HOUR: CPT | Mod: 25

## 2022-06-27 RX ORDER — OXYCODONE AND ACETAMINOPHEN 10; 325 MG/1; MG/1
1 TABLET ORAL EVERY 4 HOURS PRN
Status: DISCONTINUED | OUTPATIENT
Start: 2022-06-27 | End: 2022-06-28

## 2022-06-27 RX ORDER — SODIUM CHLORIDE 0.9 % (FLUSH) 0.9 %
10 SYRINGE (ML) INJECTION
Status: DISCONTINUED | OUTPATIENT
Start: 2022-06-27 | End: 2022-07-06 | Stop reason: HOSPADM

## 2022-06-27 RX ORDER — ONDANSETRON 2 MG/ML
4 INJECTION INTRAMUSCULAR; INTRAVENOUS ONCE
Status: DISCONTINUED | OUTPATIENT
Start: 2022-06-27 | End: 2022-06-27

## 2022-06-27 RX ORDER — MORPHINE SULFATE 4 MG/ML
1 INJECTION, SOLUTION INTRAMUSCULAR; INTRAVENOUS EVERY 4 HOURS PRN
Status: DISCONTINUED | OUTPATIENT
Start: 2022-06-27 | End: 2022-06-28

## 2022-06-27 RX ORDER — SODIUM CHLORIDE 9 MG/ML
INJECTION, SOLUTION INTRAVENOUS CONTINUOUS
OUTPATIENT
Start: 2022-06-27

## 2022-06-27 RX ORDER — ONDANSETRON 2 MG/ML
4 INJECTION INTRAMUSCULAR; INTRAVENOUS
Status: COMPLETED | OUTPATIENT
Start: 2022-06-27 | End: 2022-06-27

## 2022-06-27 RX ORDER — TALC
6 POWDER (GRAM) TOPICAL NIGHTLY PRN
Status: DISCONTINUED | OUTPATIENT
Start: 2022-06-27 | End: 2022-07-06 | Stop reason: HOSPADM

## 2022-06-27 RX ORDER — MORPHINE SULFATE 4 MG/ML
6 INJECTION, SOLUTION INTRAMUSCULAR; INTRAVENOUS
Status: COMPLETED | OUTPATIENT
Start: 2022-06-27 | End: 2022-06-27

## 2022-06-27 RX ORDER — MORPHINE SULFATE 4 MG/ML
4 INJECTION, SOLUTION INTRAMUSCULAR; INTRAVENOUS
Status: COMPLETED | OUTPATIENT
Start: 2022-06-27 | End: 2022-06-27

## 2022-06-27 RX ORDER — MORPHINE SULFATE 4 MG/ML
4 INJECTION, SOLUTION INTRAMUSCULAR; INTRAVENOUS EVERY 4 HOURS PRN
Status: DISCONTINUED | OUTPATIENT
Start: 2022-06-27 | End: 2022-06-27 | Stop reason: SDUPTHER

## 2022-06-27 RX ADMIN — MORPHINE SULFATE 6 MG: 4 INJECTION INTRAVENOUS at 02:06

## 2022-06-27 RX ADMIN — MORPHINE SULFATE 4 MG: 4 INJECTION INTRAVENOUS at 04:06

## 2022-06-27 RX ADMIN — ONDANSETRON 4 MG: 2 INJECTION INTRAMUSCULAR; INTRAVENOUS at 01:06

## 2022-06-27 RX ADMIN — MORPHINE SULFATE 1 MG: 4 INJECTION INTRAVENOUS at 07:06

## 2022-06-27 RX ADMIN — OXYCODONE AND ACETAMINOPHEN 1 TABLET: 10; 325 TABLET ORAL at 07:06

## 2022-06-27 NOTE — H&P
[facility]  Neurosurgery  Progress Note    Subjective:     Patient is a 66-year-old male who underwent a L2-3 diskectomy on Thursday of last week.  Postoperatively he was held for observation overnight without any issues and was discharged postoperative day 1 on the 24th.  Patient states that on Saturday he noticed increased lower back pain with radiation into the lower extremities bilaterally.  Numbness and tingling as well.  Denies any weakness.  Pain was not controlled with pain medication.  States that he has a squeezing sensation to his back.  No urinary retention  Denies headache, dizziness, nausea vomiting, chest pain, fevers,redness, swelling or drainage from incision     Positive BM  Positive abdominal pain  MRI done on arrival to the ER shows postoperative ventral epidural fluid collection consistent with most likely epidural hematoma      Post-Op Info:  * No surgery found *          Medications:  Continuous Infusions:  Scheduled Meds:  PRN Meds:     Review of Systems  Objective:     Weight: 108.6 kg (239 lb 6.7 oz)  Body mass index is 38.64 kg/m².  Vital Signs (Most Recent):  Temp: 98.6 °F (37 °C) (06/27/22 1450)  Pulse: 69 (06/27/22 1704)  Resp: 18 (06/27/22 1642)  BP: (!) 155/69 (06/27/22 1704)  SpO2: 98 % (06/27/22 1704) Vital Signs (24h Range):  Temp:  [98.6 °F (37 °C)-98.8 °F (37.1 °C)] 98.6 °F (37 °C)  Pulse:  [69-77] 69  Resp:  [18-22] 18  SpO2:  [97 %-98 %] 98 %  BP: (139-155)/(69-76) 155/69                          Physical Exam:  Vitals reviewed.    Constitutional: He appears well-developed and well-nourished. No distress.     Eyes: Pupils are equal, round, and reactive to light. EOM are normal.     Cardiovascular: Normal rate.     Abdominal: Soft.     Skin: Skin displays no rash on trunk.     Psych/Behavior: He is alert. He is oriented to person, place, and time. He has a normal mood and affect.     Musculoskeletal: Gait is abnormal.        Neck: Range of motion is full. Muscle  strength is 5/5. Tone is normal.        Back: Range of motion is limited. There is tenderness. Muscle strength is 5/5. Tone is normal.        Right Upper Extremities: There is no tenderness. Tone is normal.        Left Upper Extremities: There is no tenderness. Tone is normal.       Right Lower Extremities: There is no tenderness.        Left Lower Extremities: There is no tenderness.     Neurological:        Sensory: There is no sensory deficit in the trunk. There is no sensory deficit in the extremities.        DTRs: DTRs are normal.        Cranial nerves: Cranial nerve(s) II, III, IV, V, VI, VII, VIII, IX, X, XI and XII are intact.     Incision cdi   No redness swelling or drainage    Significant Labs:  Recent Labs   Lab 06/27/22  1354   *      K 4.5   CL 98   CO2 28   BUN 12   CREATININE 1.0   CALCIUM 9.9     Recent Labs   Lab 06/27/22  1354   WBC 9.60   HGB 12.9*   HCT 39.8*        No results for input(s): LABPT, INR, APTT in the last 48 hours.  Microbiology Results (last 7 days)     ** No results found for the last 168 hours. **        All pertinent labs from the last 24 hours have been reviewed.    Significant Diagnostics:  Mri    here are postoperative findings from interval left hemilaminectomy at L2-L3.  There is small granulation tissue within the dorsal laminotomy site.  There is abnormal material within the ventral epidural spinal canal that flattens the thecal sac toward the right at the level of mid L3.  The thecal sac measures 5 mm in transverse dimension as demonstrated on axial T2 series 6, image 18.  The abnormal ventral soft tissue/hematoma/extruded disc measures 57 mm craniocaudal approximately by 19 mm transverse by 8 mm in AP dimension.  There is also abnormal tissue within the bilateral lateral recesses at this level, left greater than right, that could affect the descending L3 spinal nerve roots.  Normal appearing marrow signal without definite signs of spondylodiscitis.   There is increased T2 signal within the bilateral paraspinal muscles adjacent to the operative site extending inferiorly to the level of L4-L5.  The conus medullaris terminates at the level of L1-L2.  No abnormal signal  within the conus. Intervertebral disc levels are as follows:     Assessment/Plan:     There are no hospital problems to display for this patient.    NPO after MN   Postoperative epidural hematoma with increasing back pain   Patient was taking blood thinners prior to surgery but did not resume following procedure       The patient was informed of all benefits and potential risk of the operation including but not limited to:  Pain, infection, bleeding, coma, paralysis, death.  Cerebrospinal fluid leak, failure of any instrumentation, the need for additional procedures in the future. No guarantee was given that this procedure would alleviate all of the symptoms.     Shaggy Zepeda MD  Neurosurgery  [facility] - Emergency Dept.

## 2022-06-27 NOTE — FIRST PROVIDER EVALUATION
Medical screening exam completed.  I have conducted a focused provider triage encounter, findings are as follows:    Brief history of present illness:  66-year-old male with complaint of excruciating lower back pain with radiation into legs since Thursday.  Patient had laminectomy performed 4 days ago.  No fever chills.  No weakness in lower legs.    Vitals:    06/27/22 1244   BP: (!) 148/73   BP Location: Right arm   Patient Position: Sitting   Pulse: 77   Resp: (!) 22   Temp: 98.8 °F (37.1 °C)   TempSrc: Oral   SpO2: 97%   Weight: Comment: please obtain bed weight       Pertinent physical exam:  Midline lower surgical incision clean and free of redness, staples intact

## 2022-06-27 NOTE — TELEPHONE ENCOUNTER
Pt states he has tried to contact the office today about his sx he had on his back his nerves are on fire non stop he cant lie down he cant stand he tried to reach the surgeons and no one has responded to him he is heading to the ER now. FYI/ pt wanted me to let you know.

## 2022-06-27 NOTE — ED PROVIDER NOTES
SCRIBE #1 NOTE: I, Lew Salvador, am scribing for, and in the presence of, Chau Alford Jr., MD. I have scribed the HPI, ROS, and PEx.     SCRIBE #2 NOTE: I, Josselin Ramirez, am scribing for, and in the presence of,  Dominique Franco MD. I have scribed the remaining portions of the note not scribed by Scribe #1.     History      Chief Complaint   Patient presents with    Post-op Problem     Pt had low back surgery last week with Dr. Zepeda.  Pt c/o burning pain and weakness to both legs since the first day post-surgery.       Review of patient's allergies indicates:  No Known Allergies     HPI   HPI    6/27/2022, 1:42 PM   History obtained from the patient      History of Present Illness: Friday ROGELIO Blake is a 66 y.o. male patient who presents to the Emergency Department for post-op problem. Pt had a lumbar laminectomy 4 days ago by Dr. Zepeda (Neurosurgery), and was discharged home the next day. Pt reports lower back pain that radiates to his BLE shortly after returning home. Symptoms are constant and moderate in severity. No mitigating or exacerbating factors reported. Associated sxs include BLE pain. Patient denies any trauma/injury, fever, chills, n/v/d, SOB, CP, weakness, numbness, dizziness, headache, and all other sxs at this time. Prior Tx includes oxycodone, with no improvement of sxs. No further complaints or concerns at this time.     Arrival mode: Personal vehicle    PCP: Sid Elizabeth MD       Past Medical History:  Past Medical History:   Diagnosis Date    Carpal tunnel syndrome     Diabetes mellitus without complication     History of colon polyps     Hypercalcemia 3/23/2021    Hyperlipidemia associated with type 2 diabetes mellitus     Hypertension associated with diabetes     Lumbar disc disease with radiculopathy     Morbid obesity with BMI of 40.0-44.9, adult     S/P parathyroidectomy 7/29/2021    Vitamin D deficiency        Past Surgical History:  Past Surgical History:   Procedure  Laterality Date    CARPAL TUNNEL RELEASE Left 4/1/2021    Procedure: RELEASE, CARPAL TUNNEL;  Surgeon: Yoandy David MD;  Location: [facility] OR;  Service: Orthopedics;  Laterality: Left;    HERNIA REPAIR      LUMBAR LAMINECTOMY WITH DISCECTOMY Left 6/22/2022    Procedure: LAMINECTOMY, SPINE, LUMBAR, WITH DISCECTOMY;  Surgeon: Shaggy Zepeda MD;  Location: [facility] OR;  Service: Neurosurgery;  Laterality: Left;  minimally invasive L2-3 discectomy and decompression     PARATHYROIDECTOMY Bilateral 7/27/2021    Procedure: PARATHYROIDECTOMY;  Surgeon: Tha Dial MD;  Location: [facility] OR;  Service: ENT;  Laterality: Bilateral;  with medialstinal exploration    SELECTIVE INJECTION OF ANESTHETIC AGENT AROUND LUMBAR SPINAL NERVE ROOT BY TRANSFORAMINAL APPROACH Left 3/23/2022    Procedure: BLOCK, SPINAL NERVE ROOT, LUMBAR, SELECTIVE, TRANSFORAMINAL APPROACH Left L2-3, L3-4 RN IV sedation;  Surgeon: Jay Hodges MD;  Location: [facility] PAIN MGT;  Service: Pain Management;  Laterality: Left;    STERNOTOMY N/A 7/27/2021    Procedure: STERNOTOMY;  Surgeon: Tha Dial MD;  Location: [facility] OR;  Service: ENT;  Laterality: N/A;    ULNAR NERVE TRANSPOSITION Left 4/1/2021    Procedure: TRANSPOSITION, NERVE, ULNAR;  Surgeon: Yoandy David MD;  Location: [facility] OR;  Service: Orthopedics;  Laterality: Left;    ULNAR TUNNEL RELEASE Left 4/1/2021    Procedure: RELEASE, CUBITAL TUNNEL;  Surgeon: Yoandy David MD;  Location: [facility] OR;  Service: Orthopedics;  Laterality: Left;    UMBILICAL HERNIA REPAIR           Family History:  Family History   Problem Relation Age of Onset    Asthma Mother     Hypertension Father     Diabetes Mellitus Father     Prostate cancer Brother        Social History:  Social History     Tobacco Use    Smoking status: Never Smoker    Smokeless tobacco: Never Used   Substance and Sexual Activity    Alcohol use: Never    Drug use: Never    Sexual activity: Yes     Partners: Female        ROS   Review of Systems   Constitutional: Negative for chills and fever.   HENT: Negative for sore throat.    Respiratory: Negative for shortness of breath.    Cardiovascular: Negative for chest pain.   Gastrointestinal: Negative for diarrhea, nausea and vomiting.   Genitourinary: Negative for dysuria.   Musculoskeletal: Positive for back pain (lower) and myalgias (BLE).   Skin: Negative for rash.   Neurological: Negative for dizziness, weakness, light-headedness, numbness and headaches.   Hematological: Does not bruise/bleed easily.   All other systems reviewed and are negative.    Physical Exam      Initial Vitals [06/27/22 1244]   BP Pulse Resp Temp SpO2   (!) 148/73 77 (!) 22 98.8 °F (37.1 °C) 97 %      MAP       --          Physical Exam  Nursing Notes and Vital Signs Reviewed.  Constitutional: Patient is in no acute distress. Well-developed and well-nourished.  Head: Atraumatic. Normocephalic.  Eyes: PERRL. EOM intact. Conjunctivae are not pale. No scleral icterus.  ENT: Mucous membranes are moist. Oropharynx is clear and symmetric.    Neck: Supple. Full ROM. No lymphadenopathy.  Cardiovascular: Regular rate. Regular rhythm. No murmurs, rubs, or gallops. Distal pulses are 2+ and symmetric.  Pulmonary/Chest: No respiratory distress. Clear to auscultation bilaterally. No wheezing or rales.  Abdominal: Soft and non-distended.  There is no tenderness.  No rebound, guarding, or rigidity.   Musculoskeletal: Moves all extremities. No obvious deformities. No edema. 6-8 inch surgical incision to the lumbar region. Site is clean, dry, and intact.  Skin: Warm and dry.  Neurological:  Alert, awake, and appropriate.  Normal speech.  No acute focal neurological deficits are appreciated.  Psychiatric: Normal affect. Good eye contact. Appropriate in content.    ED Course    Critical Care    Date/Time: 6/27/2022 4:37 PM  Performed by: Chau Alford Jr., MD  Authorized by: Dominique Franco MD   Direct patient critical  care time: 5 minutes  Additional history critical care time: 5 minutes  Ordering / reviewing critical care time: 5 minutes  Documentation critical care time: 5 minutes  Consulting other physicians critical care time: 10 minutes  Other critical care time: 5 minutes  Total critical care time (exclusive of procedural time) : 35 minutes  Critical care was necessary to treat or prevent imminent or life-threatening deterioration of the following conditions: CNS failure or compromise.  Critical care was time spent personally by me on the following activities: discussions with consultants, pulse oximetry and review of old charts.        ED Vital Signs:  Vitals:    06/27/22 1244 06/27/22 1330 06/27/22 1401 06/27/22 1450   BP: (!) 148/73   139/76   Pulse: 77   76   Resp: (!) 22  18 20   Temp: 98.8 °F (37.1 °C)   98.6 °F (37 °C)   TempSrc: Oral   Oral   SpO2: 97%   98%   Weight:  108.6 kg (239 lb 6.7 oz)         Abnormal Lab Results:  Labs Reviewed   CBC W/ AUTO DIFFERENTIAL - Abnormal; Notable for the following components:       Result Value    RBC 4.50 (*)     Hemoglobin 12.9 (*)     Hematocrit 39.8 (*)     All other components within normal limits   COMPREHENSIVE METABOLIC PANEL - Abnormal; Notable for the following components:    Glucose 184 (*)     All other components within normal limits   URINALYSIS, REFLEX TO URINE CULTURE - Abnormal; Notable for the following components:    Protein, UA Trace (*)     All other components within normal limits    Narrative:     Specimen Source->Urine   SARS-COV-2 RNA AMPLIFICATION, QUAL        All Lab Results:  Results for orders placed or performed during the hospital encounter of 06/27/22   CBC auto differential   Result Value Ref Range    WBC 9.60 3.90 - 12.70 K/uL    RBC 4.50 (L) 4.60 - 6.20 M/uL    Hemoglobin 12.9 (L) 14.0 - 18.0 g/dL    Hematocrit 39.8 (L) 40.0 - 54.0 %    MCV 88 82 - 98 fL    MCH 28.7 27.0 - 31.0 pg    MCHC 32.4 32.0 - 36.0 g/dL    RDW 14.0 11.5 - 14.5 %     Platelets 184 150 - 450 K/uL    MPV 11.5 9.2 - 12.9 fL    Immature Granulocytes 0.3 0.0 - 0.5 %    Gran # (ANC) 6.5 1.8 - 7.7 K/uL    Immature Grans (Abs) 0.03 0.00 - 0.04 K/uL    Lymph # 2.1 1.0 - 4.8 K/uL    Mono # 0.7 0.3 - 1.0 K/uL    Eos # 0.2 0.0 - 0.5 K/uL    Baso # 0.04 0.00 - 0.20 K/uL    nRBC 0 0 /100 WBC    Gran % 67.5 38.0 - 73.0 %    Lymph % 22.3 18.0 - 48.0 %    Mono % 7.5 4.0 - 15.0 %    Eosinophil % 2.0 0.0 - 8.0 %    Basophil % 0.4 0.0 - 1.9 %    Differential Method Automated    Comprehensive metabolic panel   Result Value Ref Range    Sodium 139 136 - 145 mmol/L    Potassium 4.5 3.5 - 5.1 mmol/L    Chloride 98 95 - 110 mmol/L    CO2 28 23 - 29 mmol/L    Glucose 184 (H) 70 - 110 mg/dL    BUN 12 8 - 23 mg/dL    Creatinine 1.0 0.5 - 1.4 mg/dL    Calcium 9.9 8.7 - 10.5 mg/dL    Total Protein 7.4 6.0 - 8.4 g/dL    Albumin 3.9 3.5 - 5.2 g/dL    Total Bilirubin 0.7 0.1 - 1.0 mg/dL    Alkaline Phosphatase 68 55 - 135 U/L    AST 23 10 - 40 U/L    ALT 29 10 - 44 U/L    Anion Gap 13 8 - 16 mmol/L    eGFR if African American >60 >60 mL/min/1.73 m^2    eGFR if non African American >60 >60 mL/min/1.73 m^2   Urinalysis, Reflex to Urine Culture Urine, Clean Catch    Specimen: Urine   Result Value Ref Range    Specimen UA Urine, Clean Catch     Color, UA Yellow Yellow, Straw, Jazmyn    Appearance, UA Clear Clear    pH, UA 8.0 5.0 - 8.0    Specific Gravity, UA 1.020 1.005 - 1.030    Protein, UA Trace (A) Negative    Glucose, UA Negative Negative    Ketones, UA Negative Negative    Bilirubin (UA) Negative Negative    Occult Blood UA Negative Negative    Nitrite, UA Negative Negative    Urobilinogen, UA Negative <2.0 EU/dL    Leukocytes, UA Negative Negative     Imaging Results:  Imaging Results          MRI Lumbar Spine Without Contrast (Final result)  Result time 06/27/22 16:24:14    Final result by Ramon Mead Jr., MD (06/27/22 16:24:14)                 Impression:      1. Interval left hemilaminectomy at L2-L3.   There is ventral epidural tissue that likely relates to small hematoma and less likely extruded disc or phlegmonous material.  This distorts the thecal sac toward the right and results in flattening to 5 mm.  There is severe lateral recess stenosis that could affect the L3 spinal nerve roots that are descending at this level.  2. No definitive signs of spondylodiscitis.  No definite rosette abscess.  Increased T2 signal within the paraspinal muscles about the laminectomy site is not necessarily unusual given the postoperative state.  3. Unchanged degenerative findings elsewhere as described above.  Persistent mild/moderate left lateral recess stenosis at L1-L2 a could affect the left L2 spinal nerve roots.  These findings were discussed with Dr. Alford at 16:24.      Electronically signed by: Ramon Mead Jr., MD  Date:    06/27/2022  Time:    16:24             Narrative:    EXAMINATION:  MRI LUMBAR SPINE WITHOUT CONTRAST    CLINICAL HISTORY:  back pain;    TECHNIQUE:  MR Lumbar spine without contrast. Sagittal T1, T2, STIR. Axial T1, T2. Coronal T1.    COMPARISON:  Intraoperative fluoroscopy views from 06/22/2022 were reviewed.  CT of the lumbar spine from 05/03/2022 was also reviewed.  MRI of the lumbar spine from 01/18/2022 was reviewed as well.    FINDINGS:  There are postoperative findings from interval left hemilaminectomy at L2-L3.  There is small granulation tissue within the dorsal laminotomy site.  There is abnormal material within the ventral epidural spinal canal that flattens the thecal sac toward the right at the level of mid L3.  The thecal sac measures 5 mm in transverse dimension as demonstrated on axial T2 series 6, image 18.  The abnormal ventral soft tissue/hematoma/extruded disc measures 57 mm craniocaudal approximately by 19 mm transverse by 8 mm in AP dimension.  There is also abnormal tissue within the bilateral lateral recesses at this level, left greater than right, that could affect the  descending L3 spinal nerve roots.  Normal appearing marrow signal without definite signs of spondylodiscitis.  There is increased T2 signal within the bilateral paraspinal muscles adjacent to the operative site extending inferiorly to the level of L4-L5.  The conus medullaris terminates at the level of L1-L2.  No abnormal signal  within the conus. Intervertebral disc levels are as follows:    T12-L1 disc: Unchanged minimal disc bulge with mild facet hypertrophy.  The thecal sac measures 11 mm with no significant foraminal stenosis.    L1-L2 disc : Unchanged left paracentral disc protrusion with annular fissure that causes mild left lateral recess stenosis.  Mild degenerative facet hypertrophy.  The thecal sac measures 7 mm AP which is unchanged.  No significant foraminal stenosis.    L2-L3 disc: Level of prior surgery with epidural abnormal tissue that flattens the thecal sac dorsally and toward the right where it measures as small as 5 mm.  Lateral recess stenosis could affect the descending L3 spinal nerve roots.  Abnormal tissue also encroaches into the left exit foramen at L2-L3 surrounding the exiting left L2 spinal nerve.    L3-L4 disc: Broad-based posterior disc bulge.  Mild degenerative facet hypertrophy bilaterally.  The thecal sac measures 8 mm in AP dimension.  Mild bilateral neural foraminal stenosis due to bulging disc which is similar to the previous exam.    L4-L5 disc: Circumferentially bulging disc that encroaches into the floors of the exit foramina.  Anterior osteophytes.  Severe degenerative facet hypertrophy with facet joint effusions.  The thecal sac measures 10 mm.  Mild bilateral foraminal stenosis.    L5-S1 disc: Normal disc space height with severe degenerative facet hypertrophy.  No significant spinal or foraminal stenosis.  The thecal sac measures 11 mm.                                        The Emergency Provider reviewed the vital signs and test results, which are outlined above.    ED  Discussion     1:45 PM: Discussed pt's case with Dr. Zepeda (Neurosurgery) who recommends MRI L-spine without contrast.  4:36 PM spoke to Dr. Zepeda with Neurosurgery       ED Medication(s):  Medications   morphine injection 4 mg (has no administration in time range)   ondansetron injection 4 mg (has no administration in time range)   ondansetron injection 4 mg (4 mg Intravenous Given 6/27/22 1358)   morphine injection 6 mg (6 mg Intravenous Given 6/27/22 1401)     New Prescriptions    No medications on file           Medical Decision Making    Medical Decision Making:   Initial Assessment:   66-year-old male presents with severe right lower extremity radiating pain from his back down to his leg.  He is a few days postop from lumbar surgery.  MRI was obtained patient has a ventral spinal hematoma causing narrowing at the thecal sac down to 5 mm.  Dr. Zepeda was notified.  Patient is being admitted to Neurosurgery Service at this time and will be NPO after midnight for repeat procedure tomorrow.             Scribe Attestation:   Scribe #1: I performed the above scribed service and the documentation accurately describes the services I performed. I attest to the accuracy of the note.    Attending:   Physician Attestation Statement for Scribe #1: I, Chau Alford Jr., MD, personally performed the services described in this documentation, as scribed by Lew Salvador, in my presence, and it is both accurate and complete.       Scribe Attestation:   Scribe #2: I performed the above scribed service and the documentation accurately describes the services I performed. I attest to the accuracy of the note.    Attending Attestation:           Physician Attestation for Scribe:    Physician Attestation Statement for Scribe #2: I, Dominique Franco MD, reviewed documentation, as scribed by Josselin Ramirez in my presence, and it is both accurate and complete. I also acknowledge and confirm the content of the note done by Kya  #1.          Clinical Impression       ICD-10-CM ICD-9-CM   1. Postoperative hematoma involving nervous system following nervous system procedure  G97.61 998.12   2. Neuropathy  G62.9 355.9               Chau Alford Jr., MD  06/27/22 5791

## 2022-06-28 ENCOUNTER — ANESTHESIA EVENT (OUTPATIENT)
Dept: SURGERY | Facility: HOSPITAL | Age: 67
DRG: 908 | End: 2022-06-28
Payer: MEDICARE

## 2022-06-28 ENCOUNTER — TELEPHONE (OUTPATIENT)
Dept: NEUROSURGERY | Facility: CLINIC | Age: 67
End: 2022-06-28
Payer: MEDICARE

## 2022-06-28 ENCOUNTER — ANESTHESIA (OUTPATIENT)
Dept: SURGERY | Facility: HOSPITAL | Age: 67
DRG: 908 | End: 2022-06-28
Payer: MEDICARE

## 2022-06-28 PROBLEM — G97.61 POSTOPERATIVE HEMATOMA INVOLVING NERVOUS SYSTEM FOLLOWING NERVOUS SYSTEM PROCEDURE: Status: ACTIVE | Noted: 2022-06-28

## 2022-06-28 LAB — POCT GLUCOSE: 260 MG/DL (ref 70–110)

## 2022-06-28 PROCEDURE — 63267 EXCISE INTRSPINL LESION LMBR: CPT | Mod: 78,,, | Performed by: NEUROLOGICAL SURGERY

## 2022-06-28 PROCEDURE — 63600175 PHARM REV CODE 636 W HCPCS: Performed by: NEUROLOGICAL SURGERY

## 2022-06-28 PROCEDURE — 25000003 PHARM REV CODE 250: Performed by: PHYSICIAN ASSISTANT

## 2022-06-28 PROCEDURE — 87015 SPECIMEN INFECT AGNT CONCNTJ: CPT | Performed by: NEUROLOGICAL SURGERY

## 2022-06-28 PROCEDURE — G0378 HOSPITAL OBSERVATION PER HR: HCPCS

## 2022-06-28 PROCEDURE — 36000710: Performed by: NEUROLOGICAL SURGERY

## 2022-06-28 PROCEDURE — 87102 FUNGUS ISOLATION CULTURE: CPT | Performed by: NEUROLOGICAL SURGERY

## 2022-06-28 PROCEDURE — 63600175 PHARM REV CODE 636 W HCPCS: Performed by: ANESTHESIOLOGY

## 2022-06-28 PROCEDURE — 87070 CULTURE OTHR SPECIMN AEROBIC: CPT | Mod: 59 | Performed by: NEUROLOGICAL SURGERY

## 2022-06-28 PROCEDURE — 25000003 PHARM REV CODE 250: Performed by: ANESTHESIOLOGY

## 2022-06-28 PROCEDURE — 87077 CULTURE AEROBIC IDENTIFY: CPT | Performed by: NEUROLOGICAL SURGERY

## 2022-06-28 PROCEDURE — A9585 GADOBUTROL INJECTION: HCPCS | Performed by: NEUROLOGICAL SURGERY

## 2022-06-28 PROCEDURE — C1729 CATH, DRAINAGE: HCPCS | Performed by: NEUROLOGICAL SURGERY

## 2022-06-28 PROCEDURE — 27000221 HC OXYGEN, UP TO 24 HOURS

## 2022-06-28 PROCEDURE — 87186 SC STD MICRODIL/AGAR DIL: CPT | Performed by: NEUROLOGICAL SURGERY

## 2022-06-28 PROCEDURE — 88311 DECALCIFY TISSUE: CPT | Mod: 26,,, | Performed by: STUDENT IN AN ORGANIZED HEALTH CARE EDUCATION/TRAINING PROGRAM

## 2022-06-28 PROCEDURE — 99900035 HC TECH TIME PER 15 MIN (STAT)

## 2022-06-28 PROCEDURE — C1762 CONN TISS, HUMAN(INC FASCIA): HCPCS | Performed by: NEUROLOGICAL SURGERY

## 2022-06-28 PROCEDURE — 27201423 OPTIME MED/SURG SUP & DEVICES STERILE SUPPLY: Performed by: NEUROLOGICAL SURGERY

## 2022-06-28 PROCEDURE — 87205 SMEAR GRAM STAIN: CPT | Mod: 59 | Performed by: NEUROLOGICAL SURGERY

## 2022-06-28 PROCEDURE — 87206 SMEAR FLUORESCENT/ACID STAI: CPT | Mod: 91 | Performed by: NEUROLOGICAL SURGERY

## 2022-06-28 PROCEDURE — 25000003 PHARM REV CODE 250: Performed by: NURSE ANESTHETIST, CERTIFIED REGISTERED

## 2022-06-28 PROCEDURE — 88304 TISSUE EXAM BY PATHOLOGIST: CPT | Mod: 26,,, | Performed by: STUDENT IN AN ORGANIZED HEALTH CARE EDUCATION/TRAINING PROGRAM

## 2022-06-28 PROCEDURE — 87075 CULTR BACTERIA EXCEPT BLOOD: CPT | Mod: 59 | Performed by: NEUROLOGICAL SURGERY

## 2022-06-28 PROCEDURE — 63267 PR EXCIS INTRASP LESN,XDURAL,LUMBAR: ICD-10-PCS | Mod: 78,,, | Performed by: NEUROLOGICAL SURGERY

## 2022-06-28 PROCEDURE — 36000711: Performed by: NEUROLOGICAL SURGERY

## 2022-06-28 PROCEDURE — 63600175 PHARM REV CODE 636 W HCPCS: Performed by: NURSE ANESTHETIST, CERTIFIED REGISTERED

## 2022-06-28 PROCEDURE — 71000039 HC RECOVERY, EACH ADD'L HOUR: Performed by: NEUROLOGICAL SURGERY

## 2022-06-28 PROCEDURE — 63600175 PHARM REV CODE 636 W HCPCS: Performed by: PHYSICIAN ASSISTANT

## 2022-06-28 PROCEDURE — 87116 MYCOBACTERIA CULTURE: CPT | Mod: 59 | Performed by: NEUROLOGICAL SURGERY

## 2022-06-28 PROCEDURE — 25500020 PHARM REV CODE 255: Performed by: NEUROLOGICAL SURGERY

## 2022-06-28 PROCEDURE — 37000009 HC ANESTHESIA EA ADD 15 MINS: Performed by: NEUROLOGICAL SURGERY

## 2022-06-28 PROCEDURE — 88304 PR  SURG PATH,LEVEL III: ICD-10-PCS | Mod: 26,,, | Performed by: STUDENT IN AN ORGANIZED HEALTH CARE EDUCATION/TRAINING PROGRAM

## 2022-06-28 PROCEDURE — 88311 DECALCIFY TISSUE: CPT | Performed by: STUDENT IN AN ORGANIZED HEALTH CARE EDUCATION/TRAINING PROGRAM

## 2022-06-28 PROCEDURE — 25000003 PHARM REV CODE 250: Performed by: NEUROLOGICAL SURGERY

## 2022-06-28 PROCEDURE — 87076 CULTURE ANAEROBE IDENT EACH: CPT | Performed by: NEUROLOGICAL SURGERY

## 2022-06-28 PROCEDURE — 94761 N-INVAS EAR/PLS OXIMETRY MLT: CPT

## 2022-06-28 PROCEDURE — 37000008 HC ANESTHESIA 1ST 15 MINUTES: Performed by: NEUROLOGICAL SURGERY

## 2022-06-28 PROCEDURE — 88311 PR  DECALCIFY TISSUE: ICD-10-PCS | Mod: 26,,, | Performed by: STUDENT IN AN ORGANIZED HEALTH CARE EDUCATION/TRAINING PROGRAM

## 2022-06-28 PROCEDURE — 88304 TISSUE EXAM BY PATHOLOGIST: CPT | Performed by: STUDENT IN AN ORGANIZED HEALTH CARE EDUCATION/TRAINING PROGRAM

## 2022-06-28 PROCEDURE — 71000033 HC RECOVERY, INTIAL HOUR: Performed by: NEUROLOGICAL SURGERY

## 2022-06-28 DEVICE — DURA MATRIX ONLAY PLUS 3X3: Type: IMPLANTABLE DEVICE | Site: BACK | Status: FUNCTIONAL

## 2022-06-28 RX ORDER — DIAZEPAM 10 MG/2ML
5 INJECTION INTRAMUSCULAR EVERY 6 HOURS PRN
Status: DISCONTINUED | OUTPATIENT
Start: 2022-06-28 | End: 2022-07-06 | Stop reason: HOSPADM

## 2022-06-28 RX ORDER — BACITRACIN ZINC 500 UNIT/G
OINTMENT (GRAM) TOPICAL
Status: DISCONTINUED | OUTPATIENT
Start: 2022-06-28 | End: 2022-06-28 | Stop reason: HOSPADM

## 2022-06-28 RX ORDER — OXYCODONE AND ACETAMINOPHEN 5; 325 MG/1; MG/1
1 TABLET ORAL
Status: DISCONTINUED | OUTPATIENT
Start: 2022-06-28 | End: 2022-06-28 | Stop reason: HOSPADM

## 2022-06-28 RX ORDER — ROCURONIUM BROMIDE 10 MG/ML
INJECTION, SOLUTION INTRAVENOUS
Status: DISCONTINUED | OUTPATIENT
Start: 2022-06-28 | End: 2022-06-28

## 2022-06-28 RX ORDER — AMOXICILLIN 250 MG
2 CAPSULE ORAL NIGHTLY PRN
Status: DISCONTINUED | OUTPATIENT
Start: 2022-06-28 | End: 2022-07-06 | Stop reason: HOSPADM

## 2022-06-28 RX ORDER — SUCCINYLCHOLINE CHLORIDE 20 MG/ML
INJECTION INTRAMUSCULAR; INTRAVENOUS
Status: DISCONTINUED | OUTPATIENT
Start: 2022-06-28 | End: 2022-06-28

## 2022-06-28 RX ORDER — SODIUM CHLORIDE 0.9 % (FLUSH) 0.9 %
10 SYRINGE (ML) INJECTION
Status: DISCONTINUED | OUTPATIENT
Start: 2022-06-28 | End: 2022-07-06 | Stop reason: HOSPADM

## 2022-06-28 RX ORDER — GADOBUTROL 604.72 MG/ML
10 INJECTION INTRAVENOUS
Status: COMPLETED | OUTPATIENT
Start: 2022-06-28 | End: 2022-06-28

## 2022-06-28 RX ORDER — DOCUSATE SODIUM 100 MG/1
100 CAPSULE, LIQUID FILLED ORAL DAILY
Status: DISCONTINUED | OUTPATIENT
Start: 2022-06-29 | End: 2022-06-28 | Stop reason: SDUPTHER

## 2022-06-28 RX ORDER — VANCOMYCIN HYDROCHLORIDE 1 G/20ML
INJECTION, POWDER, LYOPHILIZED, FOR SOLUTION INTRAVENOUS
Status: DISCONTINUED | OUTPATIENT
Start: 2022-06-28 | End: 2022-06-28 | Stop reason: HOSPADM

## 2022-06-28 RX ORDER — SODIUM CHLORIDE, SODIUM LACTATE, POTASSIUM CHLORIDE, CALCIUM CHLORIDE 600; 310; 30; 20 MG/100ML; MG/100ML; MG/100ML; MG/100ML
INJECTION, SOLUTION INTRAVENOUS CONTINUOUS PRN
Status: DISCONTINUED | OUTPATIENT
Start: 2022-06-28 | End: 2022-06-28

## 2022-06-28 RX ORDER — ATORVASTATIN CALCIUM 10 MG/1
10 TABLET, FILM COATED ORAL NIGHTLY
Status: DISCONTINUED | OUTPATIENT
Start: 2022-06-28 | End: 2022-07-06 | Stop reason: HOSPADM

## 2022-06-28 RX ORDER — MAG HYDROX/ALUMINUM HYD/SIMETH 200-200-20
30 SUSPENSION, ORAL (FINAL DOSE FORM) ORAL EVERY 4 HOURS PRN
Status: DISCONTINUED | OUTPATIENT
Start: 2022-06-28 | End: 2022-07-06 | Stop reason: HOSPADM

## 2022-06-28 RX ORDER — ONDANSETRON 8 MG/1
8 TABLET, ORALLY DISINTEGRATING ORAL EVERY 6 HOURS PRN
Status: DISCONTINUED | OUTPATIENT
Start: 2022-06-28 | End: 2022-07-06 | Stop reason: HOSPADM

## 2022-06-28 RX ORDER — FENTANYL CITRATE 50 UG/ML
INJECTION, SOLUTION INTRAMUSCULAR; INTRAVENOUS
Status: DISCONTINUED | OUTPATIENT
Start: 2022-06-28 | End: 2022-06-28

## 2022-06-28 RX ORDER — DOCUSATE SODIUM 100 MG/1
100 CAPSULE, LIQUID FILLED ORAL 2 TIMES DAILY
Status: DISCONTINUED | OUTPATIENT
Start: 2022-06-28 | End: 2022-07-06 | Stop reason: HOSPADM

## 2022-06-28 RX ORDER — HYDROMORPHONE HYDROCHLORIDE 2 MG/ML
0.2 INJECTION, SOLUTION INTRAMUSCULAR; INTRAVENOUS; SUBCUTANEOUS EVERY 5 MIN PRN
Status: DISCONTINUED | OUTPATIENT
Start: 2022-06-28 | End: 2022-06-28 | Stop reason: HOSPADM

## 2022-06-28 RX ORDER — DIPHENHYDRAMINE HCL 25 MG
50 CAPSULE ORAL EVERY 6 HOURS PRN
Status: DISCONTINUED | OUTPATIENT
Start: 2022-06-28 | End: 2022-07-06 | Stop reason: HOSPADM

## 2022-06-28 RX ORDER — MIDAZOLAM HYDROCHLORIDE 1 MG/ML
INJECTION INTRAMUSCULAR; INTRAVENOUS
Status: DISCONTINUED | OUTPATIENT
Start: 2022-06-28 | End: 2022-06-28

## 2022-06-28 RX ORDER — PROPOFOL 10 MG/ML
VIAL (ML) INTRAVENOUS
Status: DISCONTINUED | OUTPATIENT
Start: 2022-06-28 | End: 2022-06-28

## 2022-06-28 RX ORDER — SODIUM CHLORIDE 9 MG/ML
INJECTION, SOLUTION INTRAVENOUS CONTINUOUS
Status: DISCONTINUED | OUTPATIENT
Start: 2022-06-28 | End: 2022-07-03

## 2022-06-28 RX ORDER — DEXAMETHASONE SODIUM PHOSPHATE 4 MG/ML
4 INJECTION, SOLUTION INTRA-ARTICULAR; INTRALESIONAL; INTRAMUSCULAR; INTRAVENOUS; SOFT TISSUE ONCE
Status: COMPLETED | OUTPATIENT
Start: 2022-06-28 | End: 2022-06-28

## 2022-06-28 RX ORDER — ONDANSETRON 2 MG/ML
INJECTION INTRAMUSCULAR; INTRAVENOUS
Status: DISCONTINUED | OUTPATIENT
Start: 2022-06-28 | End: 2022-06-28

## 2022-06-28 RX ORDER — NALOXONE HCL 0.4 MG/ML
0.02 VIAL (ML) INJECTION
Status: DISCONTINUED | OUTPATIENT
Start: 2022-06-28 | End: 2022-07-06 | Stop reason: HOSPADM

## 2022-06-28 RX ORDER — KETOROLAC TROMETHAMINE 30 MG/ML
INJECTION, SOLUTION INTRAMUSCULAR; INTRAVENOUS
Status: DISCONTINUED | OUTPATIENT
Start: 2022-06-28 | End: 2022-06-28

## 2022-06-28 RX ORDER — HYDROMORPHONE HYDROCHLORIDE 2 MG/ML
0.2 INJECTION, SOLUTION INTRAMUSCULAR; INTRAVENOUS; SUBCUTANEOUS EVERY 6 HOURS PRN
Status: DISCONTINUED | OUTPATIENT
Start: 2022-06-28 | End: 2022-06-28

## 2022-06-28 RX ORDER — HYDROMORPHONE HCL IN 0.9% NACL 6 MG/30 ML
PATIENT CONTROLLED ANALGESIA SYRINGE INTRAVENOUS CONTINUOUS
Status: DISCONTINUED | OUTPATIENT
Start: 2022-06-28 | End: 2022-06-29

## 2022-06-28 RX ORDER — LIDOCAINE HYDROCHLORIDE 10 MG/ML
INJECTION, SOLUTION EPIDURAL; INFILTRATION; INTRACAUDAL; PERINEURAL
Status: DISCONTINUED | OUTPATIENT
Start: 2022-06-28 | End: 2022-06-28

## 2022-06-28 RX ORDER — LABETALOL HYDROCHLORIDE 5 MG/ML
10 INJECTION, SOLUTION INTRAVENOUS ONCE
Status: COMPLETED | OUTPATIENT
Start: 2022-06-28 | End: 2022-06-28

## 2022-06-28 RX ORDER — MORPHINE SULFATE 2 MG/ML
2 INJECTION, SOLUTION INTRAMUSCULAR; INTRAVENOUS
Status: DISCONTINUED | OUTPATIENT
Start: 2022-06-28 | End: 2022-06-30

## 2022-06-28 RX ORDER — PROCHLORPERAZINE EDISYLATE 5 MG/ML
5 INJECTION INTRAMUSCULAR; INTRAVENOUS EVERY 6 HOURS PRN
Status: DISCONTINUED | OUTPATIENT
Start: 2022-06-28 | End: 2022-07-06 | Stop reason: HOSPADM

## 2022-06-28 RX ADMIN — FENTANYL CITRATE 100 MCG: 50 INJECTION, SOLUTION INTRAMUSCULAR; INTRAVENOUS at 08:06

## 2022-06-28 RX ADMIN — FENTANYL CITRATE 100 MCG: 50 INJECTION, SOLUTION INTRAMUSCULAR; INTRAVENOUS at 03:06

## 2022-06-28 RX ADMIN — ONDANSETRON 4 MG: 2 INJECTION, SOLUTION INTRAMUSCULAR; INTRAVENOUS at 03:06

## 2022-06-28 RX ADMIN — MORPHINE SULFATE 1 MG: 4 INJECTION INTRAVENOUS at 12:06

## 2022-06-28 RX ADMIN — SODIUM CHLORIDE, SODIUM LACTATE, POTASSIUM CHLORIDE, AND CALCIUM CHLORIDE: 600; 310; 30; 20 INJECTION, SOLUTION INTRAVENOUS at 03:06

## 2022-06-28 RX ADMIN — ROCURONIUM BROMIDE 5 MG: 10 INJECTION, SOLUTION INTRAVENOUS at 03:06

## 2022-06-28 RX ADMIN — MIDAZOLAM HYDROCHLORIDE 2 MG: 1 INJECTION, SOLUTION INTRAMUSCULAR; INTRAVENOUS at 03:06

## 2022-06-28 RX ADMIN — LABETALOL HYDROCHLORIDE 10 MG: 5 INJECTION INTRAVENOUS at 08:06

## 2022-06-28 RX ADMIN — MORPHINE SULFATE 1 MG: 4 INJECTION INTRAVENOUS at 07:06

## 2022-06-28 RX ADMIN — GADOBUTROL 10 ML: 604.72 INJECTION INTRAVENOUS at 12:06

## 2022-06-28 RX ADMIN — MORPHINE SULFATE 1 MG: 4 INJECTION INTRAVENOUS at 02:06

## 2022-06-28 RX ADMIN — PROPOFOL 100 MG: 10 INJECTION, EMULSION INTRAVENOUS at 03:06

## 2022-06-28 RX ADMIN — DOCUSATE SODIUM 100 MG: 100 CAPSULE, LIQUID FILLED ORAL at 10:06

## 2022-06-28 RX ADMIN — SUCCINYLCHOLINE CHLORIDE 120 MG: 20 INJECTION, SOLUTION INTRAMUSCULAR; INTRAVENOUS at 03:06

## 2022-06-28 RX ADMIN — HYDROMORPHONE HYDROCHLORIDE 0.2 MG: 2 INJECTION INTRAMUSCULAR; INTRAVENOUS; SUBCUTANEOUS at 08:06

## 2022-06-28 RX ADMIN — LIDOCAINE HYDROCHLORIDE 50 MG: 10 INJECTION, SOLUTION EPIDURAL; INFILTRATION; INTRACAUDAL; PERINEURAL at 03:06

## 2022-06-28 RX ADMIN — OXYCODONE AND ACETAMINOPHEN 1 TABLET: 10; 325 TABLET ORAL at 03:06

## 2022-06-28 RX ADMIN — KETOROLAC TROMETHAMINE 30 MG: 30 INJECTION, SOLUTION INTRAMUSCULAR at 07:06

## 2022-06-28 RX ADMIN — VANCOMYCIN HYDROCHLORIDE 1000 MG: 1 INJECTION, POWDER, LYOPHILIZED, FOR SOLUTION INTRAVENOUS at 04:06

## 2022-06-28 RX ADMIN — ATORVASTATIN CALCIUM 10 MG: 10 TABLET, FILM COATED ORAL at 10:06

## 2022-06-28 RX ADMIN — HYDROMORPHONE HYDROCHLORIDE 0.2 MG: 2 INJECTION INTRAMUSCULAR; INTRAVENOUS; SUBCUTANEOUS at 10:06

## 2022-06-28 RX ADMIN — SODIUM CHLORIDE: 0.9 INJECTION, SOLUTION INTRAVENOUS at 10:06

## 2022-06-28 RX ADMIN — OXYCODONE AND ACETAMINOPHEN 1 TABLET: 10; 325 TABLET ORAL at 08:06

## 2022-06-28 RX ADMIN — Medication: at 10:06

## 2022-06-28 RX ADMIN — DEXAMETHASONE SODIUM PHOSPHATE 4 MG: 4 INJECTION INTRA-ARTICULAR; INTRALESIONAL; INTRAMUSCULAR; INTRAVENOUS; SOFT TISSUE at 10:06

## 2022-06-28 NOTE — PROGRESS NOTES
[facility] ([facility])  Neurosurgery  Progress Note    Subjective:     Patient admitted overnight   Pain medication was helping alleviate symptoms   States that this AM medication is no longer able to alleviate pain   States has restless legs   No weaknes  No urinary retention   Ambulated without assistance     MRI done with contrast     Post contrasted images demonstrate a largely rim enhancing collection within the ventral epidural space from L1 through L3-L4.  Largest AP extent of the collection is at the mid L3 vertebral body and L2-L3 levels.  The collection measures up to 11.6 x 21.6 x 92 mm.  As demonstrated on the prior MRI, there is ventral thecal sac impression with moderate to severe spinal canal stenosis.  Findings again may reflect a postoperative hematoma as described on the prior report, but epidural abscess is a consideration.    Post-Op Info:  Procedure(s) (LRB):  LAMINECTOMY, SPINE, LUMBAR, POSTERIOR APPROACH, WITH EXCISION OF NEOPLASM OR LESION OF SPINAL CORD (N/A)   Day of Surgery      Medications:  Continuous Infusions:  Scheduled Meds:  PRN Meds:HYDROmorphone, melatonin, morphine, oxyCODONE-acetaminophen, sodium chloride 0.9%, sodium chloride 0.9%, sodium chloride 0.9%     Review of Systems  Objective:     Weight: 108.2 kg (238 lb 8.6 oz)  Body mass index is 38.5 kg/m².  Vital Signs (Most Recent):  Temp: 98.2 °F (36.8 °C) (06/28/22 1205)  Pulse: 75 (06/28/22 1205)  Resp: 14 (06/28/22 1237)  BP: (!) 117/57 (06/28/22 1205)  SpO2: 97 % (06/28/22 1205) Vital Signs (24h Range):  Temp:  [97.3 °F (36.3 °C)-99.8 °F (37.7 °C)] 98.2 °F (36.8 °C)  Pulse:  [69-79] 75  Resp:  [13-18] 14  SpO2:  [93 %-98 %] 97 %  BP: (117-155)/(57-78) 117/57                              Neurosurgery Physical Exam    Significant Labs:  Recent Labs   Lab 06/27/22  1354   *      K 4.5   CL 98   CO2 28   BUN 12   CREATININE 1.0   CALCIUM 9.9     Recent Labs   Lab 06/27/22  1354   WBC 9.60   HGB 12.9*   HCT  39.8*        Recent Labs   Lab 06/27/22  1744   INR 1.0   APTT 25.3     Microbiology Results (last 7 days)     ** No results found for the last 168 hours. **        All pertinent labs from the last 24 hours have been reviewed.    Significant Diagnostics:  MRI:   Post contrasted images demonstrate a largely rim enhancing collection within the ventral epidural space from L1 through L3-L4.  Largest AP extent of the collection is at the mid L3 vertebral body and L2-L3 levels.  The collection measures up to 11.6 x 21.6 x 92 mm.  As demonstrated on the prior MRI, there is ventral thecal sac impression with moderate to severe spinal canal stenosis.  Findings again may reflect a postoperative hematoma as described on the prior report, but epidural abscess is a consideration.    Assessment/Plan:     There are no hospital problems to display for this patient.  patient with post operative epidural hematoma   Worsened pain with compression   Pt NPO   To OR this afternoon for wound washout and drainage   Possible placement of epidural drain     The patient was informed of all benefits and potential risk of the operation including but not limited to:  Pain, infection, bleeding, coma, paralysis, death.  Cerebrospinal fluid leak, failure of any instrumentation, the need for additional procedures in the future. No guarantee was given that this procedure would alleviate all of the symptoms.    Consents signed and in chart       Shaggy Zepeda MD  Neurosurgery  [facility] ([facility])

## 2022-06-28 NOTE — ANESTHESIA PREPROCEDURE EVALUATION
06/28/2022 Friday ROGELIO Blake is a 66 y.o., male.    Patient Active Problem List   Diagnosis    Diabetes mellitus without complication    Hypertension associated with diabetes    Hyperlipidemia associated with type 2 diabetes mellitus    Morbid obesity with BMI of 40.0-44.9, adult    History of colon polyps    Vitamin D deficiency    Left carpal tunnel syndrome    Anemia    Decreased strength of trunk and back    Low back pain    Lumbar radiculopathy    Bilateral carpal tunnel syndrome    Degenerative disc disease, lumbar     Past Surgical History:   Procedure Laterality Date    CARPAL TUNNEL RELEASE Left 4/1/2021    Procedure: RELEASE, CARPAL TUNNEL;  Surgeon: Yoandy David MD;  Location: [facility] OR;  Service: Orthopedics;  Laterality: Left;    HERNIA REPAIR      LUMBAR LAMINECTOMY WITH DISCECTOMY Left 6/22/2022    Procedure: LAMINECTOMY, SPINE, LUMBAR, WITH DISCECTOMY;  Surgeon: Shaggy Zepeda MD;  Location: [facility] OR;  Service: Neurosurgery;  Laterality: Left;  minimally invasive L2-3 discectomy and decompression     PARATHYROIDECTOMY Bilateral 7/27/2021    Procedure: PARATHYROIDECTOMY;  Surgeon: Tha Dial MD;  Location: [facility] OR;  Service: ENT;  Laterality: Bilateral;  with medialstinal exploration    SELECTIVE INJECTION OF ANESTHETIC AGENT AROUND LUMBAR SPINAL NERVE ROOT BY TRANSFORAMINAL APPROACH Left 3/23/2022    Procedure: BLOCK, SPINAL NERVE ROOT, LUMBAR, SELECTIVE, TRANSFORAMINAL APPROACH Left L2-3, L3-4 RN IV sedation;  Surgeon: Jay Hodges MD;  Location: [facility] PAIN T;  Service: Pain Management;  Laterality: Left;    STERNOTOMY N/A 7/27/2021    Procedure: STERNOTOMY;  Surgeon: Tha Dial MD;  Location: [facility] OR;  Service: ENT;  Laterality: N/A;    ULNAR NERVE TRANSPOSITION Left 4/1/2021    Procedure: TRANSPOSITION, NERVE, ULNAR;  Surgeon: Yoandy David MD;   Location: [facility] OR;  Service: Orthopedics;  Laterality: Left;    ULNAR TUNNEL RELEASE Left 4/1/2021    Procedure: RELEASE, CUBITAL TUNNEL;  Surgeon: Yoandy David MD;  Location: [facility] OR;  Service: Orthopedics;  Laterality: Left;    UMBILICAL HERNIA REPAIR         Pre-op Assessment    I have reviewed the Patient Summary Reports.     I have reviewed the Nursing Notes. I have reviewed the NPO Status.   I have reviewed the Medications.     Review of Systems  Anesthesia Hx:  No problems with previous Anesthesia    Social:  Non-Smoker    Cardiovascular:   Hypertension hyperlipidemia    Pulmonary:  Pulmonary Normal    Renal/:  Renal/ Normal     Hepatic/GI:  Hepatic/GI Normal    Musculoskeletal:  Spine Disorders: lumbar    Neurological:  Neurology Normal    Endocrine:   Diabetes, well controlled, type 2  Morbid Obesity / BMI > 40      Physical Exam  General: Well nourished, Cooperative, Alert and Oriented    Airway:  Mallampati: II   Mouth Opening: Normal  TM Distance: Normal  Neck ROM: Normal ROM    Dental:  Intact      Lab Results   Component Value Date    WBC 9.60 06/27/2022    HGB 12.9 (L) 06/27/2022    HCT 39.8 (L) 06/27/2022    MCV 88 06/27/2022     06/27/2022         Chemistry        Component Value Date/Time     06/27/2022 1354    K 4.5 06/27/2022 1354    CL 98 06/27/2022 1354    CO2 28 06/27/2022 1354    BUN 12 06/27/2022 1354    CREATININE 1.0 06/27/2022 1354     (H) 06/27/2022 1354        Component Value Date/Time    CALCIUM 9.9 06/27/2022 1354    ALKPHOS 68 06/27/2022 1354    AST 23 06/27/2022 1354    ALT 29 06/27/2022 1354    BILITOT 0.7 06/27/2022 1354    ESTGFRAFRICA >60 06/27/2022 1354    EGFRNONAA >60 06/27/2022 1354        EKG  Normal sinus rhythm   T wave abnormality, consider inferior ischemia   Abnormal ECG   When compared with ECG of 14-SEP-2021 10:32,   Previous ECG has undetermined rhythm, needs review   Criteria for Septal infarct are no longer Present   T wave  inversion now evident in Inferior leads   Inverted T waves have replaced nonspecific T wave abnormality in Anterior   leads   Confirmed by Ry Rolon MD ([id number]) on 5  10/2022 6:44:43 AM     ECHO   Summary    · The left ventricle is normal in size with normal systolic function.  · The estimated ejection fraction is 65%.  · Normal left ventricular diastolic function.  · Normal right ventricular size with normal right ventricular systolic function.      Anesthesia Plan  Type of Anesthesia, risks & benefits discussed:    Anesthesia Type: Gen ETT  Intra-op Monitoring Plan: Standard ASA Monitors  Post Op Pain Control Plan: IV/PO Opioids PRN  Induction:  IV  Airway Plan: Direct  Informed Consent: Informed consent signed with the Patient and all parties understand the risks and agree with anesthesia plan.  All questions answered.   ASA Score: 3    Ready For Surgery From Anesthesia Perspective.        .

## 2022-06-28 NOTE — TELEPHONE ENCOUNTER
After checking the pt chart, I did not see where the caller was on the pt list to speak with in regards to the pt care..    Provider notified and stated rounds were made on the pt this morning by the PA and they will see him again today.        ----- Message from Marisabel Lo sent at 6/28/2022 10:29 AM CDT -----  Contact: Mathew/Uncle  Please give Mathew a call back at [phone]. Pt is currently in hospital, stated no one has came to check up on the pt, when last spoken to. The uncle/pt needs to speak with someone for medical advice.

## 2022-06-28 NOTE — PLAN OF CARE
Problem: Adult Inpatient Plan of Care  Goal: Absence of Hospital-Acquired Illness or Injury  Outcome: Ongoing, Progressing     Problem: Adult Inpatient Plan of Care  Goal: Optimal Comfort and Wellbeing  Outcome: Ongoing, Progressing     Problem: Adult Inpatient Plan of Care  Goal: Readiness for Transition of Care  Outcome: Ongoing, Progressing     Problem: Diabetes Comorbidity  Goal: Blood Glucose Level Within Targeted Range  Outcome: Ongoing, Progressing     Problem: Pain Acute  Goal: Acceptable Pain Control and Functional Ability  Outcome: Ongoing, Progressing     Problem: Fall Injury Risk  Goal: Absence of Fall and Fall-Related Injury  Outcome: Ongoing, Progressing

## 2022-06-28 NOTE — PLAN OF CARE
[facility] - Med Surg  Initial Discharge Assessment       Primary Care Provider: Sid Elizabeth MD    Admission Diagnosis: Neuropathy [G62.9]  Postoperative hematoma involving nervous system following nervous system procedure [G97.61]    Admission Date: 6/27/2022  Expected Discharge Date:     Discharge Barriers Identified: None    Payor: HU[employer] MANAGED MEDICARE / Plan: HU[employer] TOTAL CARE ADVANTAGE / Product Type: Medicare Advantage /     Extended Emergency Contact Information  Primary Emergency Contact: Cyndi Blake  Mobile Phone: [phone]  Relation: Spouse  Preferred language: English   needed? No    Discharge Plan A: Home with family  Discharge Plan B: Home with family, Home Health      [facility] [address] [place] [id number] [facility]  [address] [facility] [address]  Phone: [phone] Fax: [phone]      Initial Assessment (most recent)     Adult Discharge Assessment - 06/28/22 0910        Discharge Assessment    Assessment Type Discharge Planning Assessment     Confirmed/corrected address, phone number and insurance Yes     Confirmed Demographics Correct on Facesheet     Source of Information patient     Communicated MATEUS with patient/caregiver Yes     Reason For Admission pain     Lives With spouse     Facility Arrived From: Home     Do you expect to return to your current living situation? Yes     Do you have help at home or someone to help you manage your care at home? Yes     Who are your caregiver(s) and their phone number(s)? Cyndi Blake, spouse [phone]     Prior to hospitilization cognitive status: Alert/Oriented     Current cognitive status: Alert/Oriented     Walking or Climbing Stairs Difficulty ambulation difficulty, requires equipment     Mobility Management has a rolling walker     Dressing/Bathing Difficulty none     Equipment Currently Used at Home walker, rolling     Readmission within 30 days? No     Patient currently being followed by outpatient case  management? No     Do you currently have service(s) that help you manage your care at home? No     Do you take prescription medications? Yes     Do you have prescription coverage? Yes     Do you have any problems affording any of your prescribed medications? No     Is the patient taking medications as prescribed? yes     Who is going to help you get home at discharge? Patience     How do you get to doctors appointments? car, drives self;family or friend will provide     Are you on dialysis? No     Do you take coumadin? No     Discharge Plan A Home with family     Discharge Plan B Home with family;Home Health     DME Needed Upon Discharge  none     Discharge Plan discussed with: Patient     Discharge Barriers Identified None        Relationship/Environment    Name(s) of Who Lives With Patient Cyndi Blake CM met with patient at the bedside to assess for discharge needs.  Patient was using a rolling walker prior to admission after a recent surgery.  Patient needs will be determined by hospital progress.  CM provided a transitional care folder, information on advanced directives, information on pharmacy bedside delivery, and discharge planning begins on admission with contact information for any needs/questions.

## 2022-06-28 NOTE — CHAPLAIN
Consult visit with patient.  Visited with patient to determine ways that I might be of support to him today.  Pt was preparing for a procedure and wanted prayer.  I did this for him before leaving and spiritual care remains available as needed.    Chaplain Vitor Dyer M.Div., [facility]

## 2022-06-28 NOTE — ANESTHESIA PROCEDURE NOTES
Intubation    Date/Time: 6/28/2022 3:34 PM  Performed by: Jean Lancaster CRNA  Authorized by: Jay Cardenas MD     Intubation:     Induction:  Rapid sequence induction    Intubated:  Postinduction    Mask Ventilation:  N/a    Attempts:  1    Attempted By:  CRNA    Method of Intubation:  Video laryngoscopy    Blade:  Rhodes 3    Laryngeal View Grade: Grade I - full view of cords      Difficult Airway Encountered?: No      Complications:  None    Airway Device:  Oral endotracheal tube    Airway Device Size:  7.5    Style/Cuff Inflation:  Cuffed (inflated to minimal occlusive pressure)    Inflation Amount (mL):  5    Tube secured:  22    Secured at:  The lips    Placement Verified By:  Capnometry    Complicating Factors:  None    Findings Post-Intubation:  BS equal bilateral and atraumatic/condition of teeth unchanged

## 2022-06-29 LAB
ANION GAP SERPL CALC-SCNC: 13 MMOL/L (ref 8–16)
BASOPHILS # BLD AUTO: 0.02 K/UL (ref 0–0.2)
BASOPHILS NFR BLD: 0.2 % (ref 0–1.9)
BILIRUB UR QL STRIP: NEGATIVE
BUN SERPL-MCNC: 23 MG/DL (ref 8–23)
CALCIUM SERPL-MCNC: 8.8 MG/DL (ref 8.7–10.5)
CHLORIDE SERPL-SCNC: 101 MMOL/L (ref 95–110)
CLARITY UR: CLEAR
CO2 SERPL-SCNC: 26 MMOL/L (ref 23–29)
COLOR UR: YELLOW
CREAT SERPL-MCNC: 1 MG/DL (ref 0.5–1.4)
CRP SERPL-MCNC: 77.2 MG/L (ref 0–8.2)
DIFFERENTIAL METHOD: ABNORMAL
EOSINOPHIL # BLD AUTO: 0.1 K/UL (ref 0–0.5)
EOSINOPHIL NFR BLD: 0.6 % (ref 0–8)
ERYTHROCYTE [DISTWIDTH] IN BLOOD BY AUTOMATED COUNT: 14 % (ref 11.5–14.5)
ERYTHROCYTE [SEDIMENTATION RATE] IN BLOOD BY WESTERGREN METHOD: 59 MM/HR (ref 0–10)
EST. GFR  (AFRICAN AMERICAN): >60 ML/MIN/1.73 M^2
EST. GFR  (NON AFRICAN AMERICAN): >60 ML/MIN/1.73 M^2
GLUCOSE SERPL-MCNC: 167 MG/DL (ref 70–110)
GLUCOSE UR QL STRIP: ABNORMAL
GRAM STN SPEC: NORMAL
HCT VFR BLD AUTO: 35.9 % (ref 40–54)
HGB BLD-MCNC: 11.5 G/DL (ref 14–18)
HGB UR QL STRIP: NEGATIVE
IMM GRANULOCYTES # BLD AUTO: 0.03 K/UL (ref 0–0.04)
IMM GRANULOCYTES NFR BLD AUTO: 0.3 % (ref 0–0.5)
KETONES UR QL STRIP: NEGATIVE
LEUKOCYTE ESTERASE UR QL STRIP: ABNORMAL
LYMPHOCYTES # BLD AUTO: 2.6 K/UL (ref 1–4.8)
LYMPHOCYTES NFR BLD: 26.9 % (ref 18–48)
MCH RBC QN AUTO: 28.6 PG (ref 27–31)
MCHC RBC AUTO-ENTMCNC: 32 G/DL (ref 32–36)
MCV RBC AUTO: 89 FL (ref 82–98)
MICROSCOPIC COMMENT: NORMAL
MONOCYTES # BLD AUTO: 0.9 K/UL (ref 0.3–1)
MONOCYTES NFR BLD: 9.2 % (ref 4–15)
NEUTROPHILS # BLD AUTO: 6 K/UL (ref 1.8–7.7)
NEUTROPHILS NFR BLD: 62.8 % (ref 38–73)
NITRITE UR QL STRIP: NEGATIVE
NRBC BLD-RTO: 0 /100 WBC
PH UR STRIP: 6 [PH] (ref 5–8)
PLATELET # BLD AUTO: 171 K/UL (ref 150–450)
PMV BLD AUTO: 12.1 FL (ref 9.2–12.9)
POCT GLUCOSE: 221 MG/DL (ref 70–110)
POCT GLUCOSE: 262 MG/DL (ref 70–110)
POTASSIUM SERPL-SCNC: 4.7 MMOL/L (ref 3.5–5.1)
PROCALCITONIN SERPL IA-MCNC: 0.03 NG/ML
PROT UR QL STRIP: NEGATIVE
RBC # BLD AUTO: 4.02 M/UL (ref 4.6–6.2)
SODIUM SERPL-SCNC: 140 MMOL/L (ref 136–145)
SP GR UR STRIP: 1.02 (ref 1–1.03)
URN SPEC COLLECT METH UR: ABNORMAL
UROBILINOGEN UR STRIP-ACNC: NEGATIVE EU/DL
WBC # BLD AUTO: 9.63 K/UL (ref 3.9–12.7)
WBC #/AREA URNS HPF: 3 /HPF (ref 0–5)

## 2022-06-29 PROCEDURE — 97161 PT EVAL LOW COMPLEX 20 MIN: CPT

## 2022-06-29 PROCEDURE — 87186 SC STD MICRODIL/AGAR DIL: CPT | Performed by: NURSE PRACTITIONER

## 2022-06-29 PROCEDURE — 94799 UNLISTED PULMONARY SVC/PX: CPT

## 2022-06-29 PROCEDURE — 99900035 HC TECH TIME PER 15 MIN (STAT)

## 2022-06-29 PROCEDURE — 84145 PROCALCITONIN (PCT): CPT | Performed by: NURSE PRACTITIONER

## 2022-06-29 PROCEDURE — 63600175 PHARM REV CODE 636 W HCPCS: Performed by: PHYSICIAN ASSISTANT

## 2022-06-29 PROCEDURE — 36415 COLL VENOUS BLD VENIPUNCTURE: CPT | Performed by: PHYSICIAN ASSISTANT

## 2022-06-29 PROCEDURE — 87040 BLOOD CULTURE FOR BACTERIA: CPT | Mod: 59 | Performed by: NURSE PRACTITIONER

## 2022-06-29 PROCEDURE — 87186 SC STD MICRODIL/AGAR DIL: CPT | Mod: 59 | Performed by: PHYSICIAN ASSISTANT

## 2022-06-29 PROCEDURE — 87040 BLOOD CULTURE FOR BACTERIA: CPT | Performed by: PHYSICIAN ASSISTANT

## 2022-06-29 PROCEDURE — 25000003 PHARM REV CODE 250: Performed by: NURSE PRACTITIONER

## 2022-06-29 PROCEDURE — 63600175 PHARM REV CODE 636 W HCPCS: Performed by: NURSE PRACTITIONER

## 2022-06-29 PROCEDURE — 25000003 PHARM REV CODE 250: Performed by: NEUROLOGICAL SURGERY

## 2022-06-29 PROCEDURE — 87077 CULTURE AEROBIC IDENTIFY: CPT | Mod: 59 | Performed by: PHYSICIAN ASSISTANT

## 2022-06-29 PROCEDURE — G0378 HOSPITAL OBSERVATION PER HR: HCPCS

## 2022-06-29 PROCEDURE — 86140 C-REACTIVE PROTEIN: CPT | Performed by: PHYSICIAN ASSISTANT

## 2022-06-29 PROCEDURE — 27000221 HC OXYGEN, UP TO 24 HOURS

## 2022-06-29 PROCEDURE — 94761 N-INVAS EAR/PLS OXIMETRY MLT: CPT

## 2022-06-29 PROCEDURE — 80048 BASIC METABOLIC PNL TOTAL CA: CPT | Performed by: PHYSICIAN ASSISTANT

## 2022-06-29 PROCEDURE — 25000003 PHARM REV CODE 250: Performed by: PHYSICIAN ASSISTANT

## 2022-06-29 PROCEDURE — 87077 CULTURE AEROBIC IDENTIFY: CPT | Performed by: NURSE PRACTITIONER

## 2022-06-29 PROCEDURE — 85025 COMPLETE CBC W/AUTO DIFF WBC: CPT | Performed by: PHYSICIAN ASSISTANT

## 2022-06-29 PROCEDURE — 85651 RBC SED RATE NONAUTOMATED: CPT | Performed by: PHYSICIAN ASSISTANT

## 2022-06-29 PROCEDURE — 63600175 PHARM REV CODE 636 W HCPCS: Performed by: NEUROLOGICAL SURGERY

## 2022-06-29 PROCEDURE — 81000 URINALYSIS NONAUTO W/SCOPE: CPT | Performed by: NURSE PRACTITIONER

## 2022-06-29 RX ORDER — OXYCODONE AND ACETAMINOPHEN 10; 325 MG/1; MG/1
1 TABLET ORAL EVERY 4 HOURS PRN
Status: DISCONTINUED | OUTPATIENT
Start: 2022-06-29 | End: 2022-06-30

## 2022-06-29 RX ORDER — LOSARTAN POTASSIUM 50 MG/1
50 TABLET ORAL DAILY
Refills: 3 | Status: DISCONTINUED | OUTPATIENT
Start: 2022-06-30 | End: 2022-07-06 | Stop reason: HOSPADM

## 2022-06-29 RX ORDER — ACETAMINOPHEN 325 MG/1
650 TABLET ORAL EVERY 6 HOURS PRN
Status: DISCONTINUED | OUTPATIENT
Start: 2022-06-29 | End: 2022-07-06 | Stop reason: HOSPADM

## 2022-06-29 RX ORDER — INSULIN ASPART 100 [IU]/ML
1-10 INJECTION, SOLUTION INTRAVENOUS; SUBCUTANEOUS
Status: DISCONTINUED | OUTPATIENT
Start: 2022-06-29 | End: 2022-07-06 | Stop reason: HOSPADM

## 2022-06-29 RX ORDER — IBUPROFEN 200 MG
16 TABLET ORAL
Status: DISCONTINUED | OUTPATIENT
Start: 2022-06-29 | End: 2022-07-06 | Stop reason: HOSPADM

## 2022-06-29 RX ORDER — IBUPROFEN 200 MG
24 TABLET ORAL
Status: DISCONTINUED | OUTPATIENT
Start: 2022-06-29 | End: 2022-07-06 | Stop reason: HOSPADM

## 2022-06-29 RX ORDER — GLUCAGON 1 MG
1 KIT INJECTION
Status: DISCONTINUED | OUTPATIENT
Start: 2022-06-29 | End: 2022-07-06 | Stop reason: HOSPADM

## 2022-06-29 RX ADMIN — SODIUM CHLORIDE: 0.9 INJECTION, SOLUTION INTRAVENOUS at 07:06

## 2022-06-29 RX ADMIN — VANCOMYCIN HYDROCHLORIDE 2000 MG: 10 INJECTION, POWDER, LYOPHILIZED, FOR SOLUTION INTRAVENOUS at 05:06

## 2022-06-29 RX ADMIN — OXYCODONE AND ACETAMINOPHEN 1 TABLET: 10; 325 TABLET ORAL at 09:06

## 2022-06-29 RX ADMIN — MORPHINE SULFATE 2 MG: 2 INJECTION, SOLUTION INTRAMUSCULAR; INTRAVENOUS at 10:06

## 2022-06-29 RX ADMIN — ACETAMINOPHEN 650 MG: 325 TABLET ORAL at 04:06

## 2022-06-29 RX ADMIN — ONDANSETRON 8 MG: 8 TABLET, ORALLY DISINTEGRATING ORAL at 07:06

## 2022-06-29 RX ADMIN — THERA TABS 1 TABLET: TAB at 08:06

## 2022-06-29 RX ADMIN — ATORVASTATIN CALCIUM 10 MG: 10 TABLET, FILM COATED ORAL at 08:06

## 2022-06-29 RX ADMIN — INSULIN ASPART 6 UNITS: 100 INJECTION, SOLUTION INTRAVENOUS; SUBCUTANEOUS at 05:06

## 2022-06-29 RX ADMIN — OXYCODONE AND ACETAMINOPHEN 1 TABLET: 10; 325 TABLET ORAL at 07:06

## 2022-06-29 RX ADMIN — DIPHENHYDRAMINE HYDROCHLORIDE 50 MG: 25 CAPSULE ORAL at 07:06

## 2022-06-29 RX ADMIN — DOCUSATE SODIUM 100 MG: 100 CAPSULE, LIQUID FILLED ORAL at 08:06

## 2022-06-29 RX ADMIN — OXYCODONE AND ACETAMINOPHEN 1 TABLET: 10; 325 TABLET ORAL at 03:06

## 2022-06-29 NOTE — ANESTHESIA POSTPROCEDURE EVALUATION
Anesthesia Post Evaluation    Patient: Friday ROGELIO Blake    Procedure(s) Performed: Procedure(s) (LRB):  INCISION AND DRAINAGE, HEMATOMA (N/A)  LAMINECTOMY, SPINE, LUMBAR, WITH DISCECTOMY (N/A)    Final Anesthesia Type: general      Patient location during evaluation: PACU  Patient participation: Yes- Able to Participate  Level of consciousness: awake and alert  Post-procedure vital signs: reviewed and stable  Pain management: adequate  Airway patency: patent    PONV status at discharge: No PONV  Anesthetic complications: no      Cardiovascular status: blood pressure returned to baseline  Respiratory status: unassisted  Hydration status: euvolemic  Follow-up not needed.          Vitals Value Taken Time   /67 06/29/22 0437   Temp 36.8 °C (98.3 °F) 06/29/22 0437   Pulse 78 06/29/22 0437   Resp 20 06/29/22 0437   SpO2 98 % 06/29/22 0437         Event Time   Out of Recovery 06/28/2022 21:25:00         Pain/Javid Score: Pain Rating Prior to Med Admin: 4 (6/28/2022 10:36 PM)  Pain Rating Post Med Admin: 8 (6/28/2022  1:07 PM)  Javid Score: 8 (6/28/2022  9:15 PM)

## 2022-06-29 NOTE — PLAN OF CARE
Problem: Adult Inpatient Plan of Care  Goal: Plan of Care Review  Outcome: Ongoing, Progressing  Goal: Patient-Specific Goal (Individualized)  Outcome: Ongoing, Progressing  Goal: Absence of Hospital-Acquired Illness or Injury  Outcome: Ongoing, Progressing  Goal: Optimal Comfort and Wellbeing  Outcome: Ongoing, Progressing  Goal: Readiness for Transition of Care  Outcome: Ongoing, Progressing     Problem: Fall Injury Risk  Goal: Absence of Fall and Fall-Related Injury  Outcome: Ongoing, Progressing     Problem: Pain Acute  Goal: Acceptable Pain Control and Functional Ability  Outcome: Ongoing, Progressing     Problem: Diabetes Comorbidity  Goal: Blood Glucose Level Within Targeted Range  Outcome: Ongoing, Progressing

## 2022-06-29 NOTE — DISCHARGE SUMMARY
[facility] - Med [facility]urg  Neurosurgery  Discharge Summary      Patient Name: Friday ROGELIO Blake  MRN: [id number]  Admission Date: 6/22/2022  Hospital Length of Stay: 0 days  Discharge Date and Time: 6/23/2022  1:01 PM  Attending Physician: No att. providers found   Discharging Provider: Oliver Clifton PA-C  Primary Care Provider: Sid Elizabeth MD     HPI: See H&P.    Procedure(s) (LRB):  LAMINECTOMY, SPINE, LUMBAR, WITH DISCECTOMY (Left)     Hospital Course: The patient underwent a lumbar decompression- laminectomy with discectomy.   There were no complications. He was awakened, taken to recovery and placed in observation overnight. Patient was discharged home the following day.     Consults:     Significant Diagnostic Studies: See Epic.    Pending Diagnostic Studies:     None        Final Active Diagnoses:    Diagnosis Date Noted POA    PRINCIPAL PROBLEM:  Degenerative disc disease, lumbar [M51.36] 06/22/2022 Yes     Chronic      Problems Resolved During this Admission:      Discharged Condition: good    Disposition: Home or Self Care    Follow Up:   Follow-up Information     Oliver Clifton PA-C. Go in 2 week(s).    Specialty: Neurosurgery  Why: For staple removal, For wound re-check  Contact information:  [address] [place] [address] [address] Luis BARROS [address] [phone]                       Patient Instructions:      Diet general     Call MD for:  extreme fatigue     Call MD for:  hives     Call MD for:  redness, tenderness, or signs of infection (pain, swelling, redness, odor or green/yellow discharge around incision site)     Call MD for:  difficulty breathing, headache or visual disturbances     Call MD for:  severe uncontrolled pain     Call MD for:  persistent nausea and vomiting     Call MD for:  temperature >100.4     Call MD for:  persistent dizziness or light-headedness     Change dressing (specify)   Order Comments: Dressing change:   It is okay to leave current dressing in place for the next 3 days. After  this change dressing every other day using Mepilex or similar dressing. With each dressing change, apply thin layer of antibiotic ointment over incision.     Medications:  Reconciled Home Medications:      Medication List      START taking these medications    docusate sodium 100 MG capsule  Commonly known as: COLACE  Take 1 capsule (100 mg total) by mouth 2 (two) times daily.     methocarbamoL 750 MG Tab  Commonly known as: ROBAXIN  Take 1 tablet (750 mg total) by mouth 3 (three) times daily as needed (muscle spasms).     oxyCODONE-acetaminophen  mg per tablet  Commonly known as: PERCOCET  Take 1 tablet by mouth every 4 (four) hours as needed for Pain.     STOOL SOFTENER-LAXATIVE 8.6-50 mg per tablet  Generic drug: senna-docusate 8.6-50 mg  Take 1 tablet by mouth once daily.        CONTINUE taking these medications    atorvastatin 10 MG tablet  Commonly known as: LIPITOR  Take 1 tablet (10 mg total) by mouth every evening.     blood-glucose meter kit  To check BG 1 times daily, to use with insurance preferred meter     dapagliflozin 5 mg Tab tablet  Commonly known as: FARXIGA  Take 1 tablet (5 mg total) by mouth once daily.     irbesartan 150 MG tablet  Commonly known as: AVAPRO  Take 1 tablet (150 mg total) by mouth once daily.     * lancets Misc  Commonly known as: ONETOUCH ULTRASOFT LANCETS  Check FS daily     * lancets Misc  To check BG 1 times daily, to use with insurance preferred meter     metFORMIN 1000 MG tablet  Commonly known as: GLUCOPHAGE  Take 1 tablet (1,000 mg total) by mouth 2 (two) times daily with meals.     * ONETOUCH VERIO TEST STRIPS Strp  Generic drug: blood sugar diagnostic  USE 1 STRIP TO CHECK GLUCOSE ONCE DAILY     * blood sugar diagnostic Strp  To check BG 1 times daily, to use with insurance preferred meter         * This list has 4 medication(s) that are the same as other medications prescribed for you. Read the directions carefully, and ask your doctor or other care provider to  review them with you.            STOP taking these medications    aspirin 81 MG EC tablet  Commonly known as: ECOPERCY Clifton PA-C  Neurosurgery  [facility] [place] [facility]

## 2022-06-29 NOTE — CONSULTS
[facility]  Consult Note    Patient Name: Friday ROGELIO Blake  MRN: [id number]  Admission Date: 6/27/2022  Hospital Length of Stay: 0 days  Attending Physician: Shaggy Zepeda MD   Primary Care Provider: Sid Elizabeth MD           Patient information was obtained from patient, past medical records and ER records.     Consults  Subjective:     Principal Problem: Postoperative hematoma involving nervous system following nervous system procedure    Chief Complaint:   Chief Complaint   Patient presents with    Post-op Problem     Pt had low back surgery last week with Dr. Zepeda.  Pt c/o burning pain and weakness to both legs since the first day post-surgery.        HPI: Pt is a 67 yo male with PMhx of DM, HTN, HPL who underwent an I & D of hematoma S/P lumbar spine laminectomy and discectomy performed by Dr. Zepeda. Hospital Medicine was consulted to assist with medical management. Pt's blood pressure controlled today, oxygenation appropriate and labs stable. Glucose 167 and sliding scale insulin ordered. Pt seen and examined and sitting in chair at bedside. He reports working with PT/OT. He describes left hip pain and is now taking oral analgesic. Other symptoms are denied. He denies weakness and paresthesias to legs.       Past Medical History:   Diagnosis Date    Carpal tunnel syndrome     Diabetes mellitus without complication     History of colon polyps     Hypercalcemia 3/23/2021    Hyperlipidemia associated with type 2 diabetes mellitus     Hypertension associated with diabetes     Lumbar disc disease with radiculopathy     Morbid obesity with BMI of 40.0-44.9, adult     S/P parathyroidectomy 7/29/2021    Vitamin D deficiency        Past Surgical History:   Procedure Laterality Date    CARPAL TUNNEL RELEASE Left 4/1/2021    Procedure: RELEASE, CARPAL TUNNEL;  Surgeon: Yoandy David MD;  Location: [facility];  Service: Orthopedics;  Laterality: Left;    HERNIA REPAIR       INCISION AND DRAINAGE OF HEMATOMA N/A 6/28/2022    Procedure: INCISION AND DRAINAGE, HEMATOMA;  Surgeon: Shaggy Zepeda MD;  Location: [facility] OR;  Service: Neurosurgery;  Laterality: N/A;    LUMBAR LAMINECTOMY WITH DISCECTOMY Left 6/22/2022    Procedure: LAMINECTOMY, SPINE, LUMBAR, WITH DISCECTOMY;  Surgeon: Shaggy Zepeda MD;  Location: [facility] OR;  Service: Neurosurgery;  Laterality: Left;  minimally invasive L2-3 discectomy and decompression     LUMBAR LAMINECTOMY WITH DISCECTOMY N/A 6/28/2022    Procedure: LAMINECTOMY, SPINE, LUMBAR, WITH DISCECTOMY;  Surgeon: Shaggy Zepeda MD;  Location: [facility] OR;  Service: Neurosurgery;  Laterality: N/A;    PARATHYROIDECTOMY Bilateral 7/27/2021    Procedure: PARATHYROIDECTOMY;  Surgeon: Tha Dial MD;  Location: [facility] OR;  Service: ENT;  Laterality: Bilateral;  with medialstinal exploration    SELECTIVE INJECTION OF ANESTHETIC AGENT AROUND LUMBAR SPINAL NERVE ROOT BY TRANSFORAMINAL APPROACH Left 3/23/2022    Procedure: BLOCK, SPINAL NERVE ROOT, LUMBAR, SELECTIVE, TRANSFORAMINAL APPROACH Left L2-3, L3-4 RN IV sedation;  Surgeon: Jay Hodges MD;  Location: [facility];  Service: Pain Management;  Laterality: Left;    STERNOTOMY N/A 7/27/2021    Procedure: STERNOTOMY;  Surgeon: Tha Dial MD;  Location: [facility] OR;  Service: ENT;  Laterality: N/A;    ULNAR NERVE TRANSPOSITION Left 4/1/2021    Procedure: TRANSPOSITION, NERVE, ULNAR;  Surgeon: Yoandy David MD;  Location: [facility] OR;  Service: Orthopedics;  Laterality: Left;    ULNAR TUNNEL RELEASE Left 4/1/2021    Procedure: RELEASE, CUBITAL TUNNEL;  Surgeon: Yoandy David MD;  Location: [facility] OR;  Service: Orthopedics;  Laterality: Left;    UMBILICAL HERNIA REPAIR         Review of patient's allergies indicates:  No Known Allergies    No current facility-administered medications on file prior to encounter.     Current Outpatient Medications on File Prior to Encounter   Medication Sig    atorvastatin  (LIPITOR) 10 MG tablet Take 1 tablet (10 mg total) by mouth every evening.    dapagliflozin (FARXIGA) 5 mg Tab tablet Take 1 tablet (5 mg total) by mouth once daily.    docusate sodium (COLACE) 100 MG capsule Take 1 capsule (100 mg total) by mouth 2 (two) times daily.    irbesartan (AVAPRO) 150 MG tablet Take 1 tablet (150 mg total) by mouth once daily.    metFORMIN (GLUCOPHAGE) 1000 MG tablet Take 1 tablet (1,000 mg total) by mouth 2 (two) times daily with meals.    methocarbamoL (ROBAXIN) 750 MG Tab Take 1 tablet (750 mg total) by mouth 3 (three) times daily as needed (muscle spasms).    oxyCODONE-acetaminophen (PERCOCET)  mg per tablet Take 1 tablet by mouth every 4 (four) hours as needed for Pain.    senna-docusate 8.6-50 mg (SENNA LAXATIVE-STOOL SOFTENER) 8.6-50 mg per tablet Take 1 tablet by mouth once daily.    blood sugar diagnostic Strp To check BG 1 times daily, to use with insurance preferred meter    blood-glucose meter kit To check BG 1 times daily, to use with insurance preferred meter    lancets (ONETOUCH ULTRASOFT LANCETS) Misc Check FS daily    lancets Misc To check BG 1 times daily, to use with insurance preferred meter    ONETOUCH VERIO TEST STRIPS Strp USE 1 STRIP TO CHECK GLUCOSE ONCE DAILY    [DISCONTINUED] aspirin (ECOTRIN) 81 MG EC tablet Take 81 mg by mouth once daily.     Family History       Problem Relation (Age of Onset)    Asthma Mother    Diabetes Mellitus Father    Hypertension Father    Prostate cancer Brother          Tobacco Use    Smoking status: Never Smoker    Smokeless tobacco: Never Used   Substance and Sexual Activity    Alcohol use: Never    Drug use: Never    Sexual activity: Yes     Partners: Female     Review of Systems   Constitutional:  Positive for activity change.   HENT: Negative.     Respiratory:  Negative for cough and shortness of breath.    Cardiovascular:  Negative for chest pain, palpitations and leg swelling.   Gastrointestinal:   Negative for nausea and vomiting.   Genitourinary:  Negative for decreased urine volume and difficulty urinating.   Musculoskeletal:  Positive for arthralgias and myalgias.   Skin:  Positive for wound.   Neurological: Negative.  Negative for weakness and numbness.   Psychiatric/Behavioral: Negative.     Objective:     Vital Signs (Most Recent):  Temp: 98.9 °F (37.2 °C) (06/29/22 1119)  Pulse: 83 (06/29/22 1340)  Resp: 16 (06/29/22 1119)  BP: (!) 147/63 (06/29/22 1119)  SpO2: 98 % (06/29/22 1119)   Vital Signs (24h Range):  Temp:  [98.3 °F (36.8 °C)-100.1 °F (37.8 °C)] 98.9 °F (37.2 °C)  Pulse:  [72-99] 83  Resp:  [10-29] 16  SpO2:  [92 %-100 %] 98 %  BP: (127-214)/() 147/63     Weight: 108.2 kg (238 lb 8.6 oz)  Body mass index is 38.5 kg/m².    Physical Exam  Vitals and nursing note reviewed.   Constitutional:       Appearance: He is well-developed.   HENT:      Head: Normocephalic and atraumatic.      Nose: Nose normal.   Eyes:      General: No scleral icterus.     Conjunctiva/sclera: Conjunctivae normal.      Pupils: Pupils are equal, round, and reactive to light.   Cardiovascular:      Rate and Rhythm: Normal rate and regular rhythm.      Heart sounds: Normal heart sounds. No murmur heard.    No friction rub. No gallop.   Pulmonary:      Effort: Pulmonary effort is normal.      Breath sounds: Normal breath sounds.   Abdominal:      General: Bowel sounds are normal.      Palpations: Abdomen is soft.   Musculoskeletal:         General: No tenderness. Normal range of motion.      Cervical back: Normal range of motion and neck supple.   Skin:     General: Skin is warm and dry.      Comments: Occlusive dressing to midline spine   Neurological:      Mental Status: He is alert and oriented to person, place, and time.   Psychiatric:         Behavior: Behavior normal.         Thought Content: Thought content normal.       Significant Labs: All pertinent labs within the past 24 hours have been reviewed.  CBC:    Recent Labs   Lab 06/29/22  0624   WBC 9.63   HGB 11.5*   HCT 35.9*        CMP:   Recent Labs   Lab 06/29/22  0624      K 4.7      CO2 26   *   BUN 23   CREATININE 1.0   CALCIUM 8.8   ANIONGAP 13   EGFRNONAA >60       Significant Imaging: I have reviewed all pertinent imaging results/findings within the past 24 hours.    Assessment/Plan:     * Postoperative hematoma involving nervous system following nervous system procedure  Continue care by primary team      Hypertension associated with diabetes  B/P controlled - 135/63, 147/63  Resume antihypertensives      Diabetes mellitus without complication  accuchecks  Sliding scale insulin  Hold oral diabetic medications        VTE Risk Mitigation (From admission, onward)         Ordered     Place sequential compression device  Until discontinued         06/29/22 0710     IP VTE HIGH RISK PATIENT  Once         06/28/22 7949                    Thank you for your consult. I will follow-up with patient. Please contact us if you have any additional questions.    Josselin Forte NP  Department of Hospital Medicine   [facility]

## 2022-06-29 NOTE — TRANSFER OF CARE
"Anesthesia Transfer of Care Note    Patient: Friday ROGELIO Blake    Procedure(s) Performed: Procedure(s) (LRB):  INCISION AND DRAINAGE, HEMATOMA (N/A)  LAMINECTOMY, SPINE, LUMBAR, WITH DISCECTOMY (N/A)    Patient location: PACU    Anesthesia Type: general    Transport from OR: Transported from OR on room air with adequate spontaneous ventilation    Post pain: adequate analgesia    Post assessment: no apparent anesthetic complications and tolerated procedure well    Post vital signs: stable    Level of consciousness: responds to stimulation    Nausea/Vomiting: no nausea/vomiting    Complications: none    Transfer of care protocol was followed      Last vitals:   Visit Vitals  BP (!) 117/57 (Patient Position: Lying)   Pulse 75   Temp 36.8 °C (98.2 °F) (Oral)   Resp 14   Ht 5' 6" (1.676 m)   Wt 108.2 kg (238 lb 8.6 oz)   SpO2 97%   BMI 38.50 kg/m²     "

## 2022-06-29 NOTE — SIGNIFICANT EVENT
Pt spiked a temp of 101.6 with c/o headache    Gram stain from hematoma evacuation notes rare gram + cocci    Infection work up ordered including  Neuro checks  Blood cultures  CXR, U/A  Vancomycin              Critical care time:30 mins   Critical care time was exclusive of separately billable procedures and treating other patients.   Critical care was time spent personally by me on the following activities: development of treatment plan with patient or surrogate, discussions with consultants, evaluation of patient's response to treatment, examination of patient, obtaining history from patient or surrogate, ordering and performing treatments and interventions, ordering and review of laboratory studies, ordering and review of radiographic studies, pulse oximetry, re-evaluation of patient's condition, review of old charts and interpretation of cardiac output measurements

## 2022-06-29 NOTE — PROGRESS NOTES
[facility]  Neurosurgery  Progress Note    Subjective:     History of Present Illness: No notes on file    Post-Op Info:  Procedure(s) (LRB):  INCISION AND DRAINAGE, HEMATOMA (N/A)  LAMINECTOMY, SPINE, LUMBAR, WITH DISCECTOMY (N/A)   1 Day Post-Op   No issues overnight  Pain controlled with pca pump   Denies weakness or N/T   denies HA N/V      Vitals reviewed   MAEW  Sensation intact   Incision cdi       Assessment/Plan:       D/C pca  Up and ambulate today   Possible d/c to home when cleared by PT   cultures pending   Labs pending       Shaggy Zepeda MD  Neurosurgery  [facility]

## 2022-06-29 NOTE — NURSING
Patient has a PCA pump. Patient has a surgical incision to the medial back, which is CDI. Pt. Has aquaform as the surgical dressing. Pt. Is AX04.pt voided post surgical. Pt. Was free from fall or injury. Pain managed appropriately. Pt. shows no signs of distress. Will continue to monitor. 24 hour chart check complete.

## 2022-06-29 NOTE — PT/OT/SLP EVAL
Physical Therapy Evaluation    Patient Name:  Friday ROGELIO Blake   MRN:  [id number]    Recommendations:     Discharge Recommendations:  rehabilitation facility, home health PT (VS DEPENDING ON PROGRESS)   Discharge Equipment Recommendations: walker, rolling   Barriers to discharge: None    Assessment:     Friday ROGELIO Blake is a 66 y.o. male admitted with a medical diagnosis of Postoperative hematoma involving nervous system following nervous system procedure.  He presents with the following impairments/functional limitations:  weakness, impaired endurance, impaired functional mobilty, gait instability, impaired balance, pain, decreased lower extremity function, decreased ROM .    Rehab Prognosis: Good; patient would benefit from acute skilled PT services to address these deficits and reach maximum level of function.    Recent Surgery: Procedure(s) (LRB):  INCISION AND DRAINAGE, HEMATOMA (N/A)  LAMINECTOMY, SPINE, LUMBAR, WITH DISCECTOMY (N/A) 1 Day Post-Op    Plan:     During this hospitalization, patient to be seen 3 x/week to address the identified rehab impairments via gait training, therapeutic activities, therapeutic exercises and progress toward the following goals:    · Plan of Care Expires:  07/13/22    Subjective     Chief Complaint: PAIN L HIP   Patient/Family Comments/goals: DEC PAIN AND INC MOBILITY  Pain/Comfort:  · Pain Rating 1: 5/10  · Location - Side 1: Left  · Location 1: hip  · Pain Rating Post-Intervention 1: 5/10    Patients cultural, spiritual, Cheondoism conflicts given the current situation:      Living Environment:  PT LIVES AT HOME WITH WIFE IN A ONE STORY HOME WITH NO STEPS   Prior to admission, patients level of function was IND AND DRIVES .  Equipment used at home: none.  DME owned (not currently used): none.  Upon discharge, patient will have assistance from WIFE .    Objective:     Communicated with NURSE AND Epic CHART REVIEW  prior to session.  Patient found sitting edge of bed with  peripheral IV, telemetry  upon PT entry to room.    General Precautions: Standard, fall   Orthopedic Precautions:spinal precautions   Braces: N/A  Respiratory Status: Room air    Exams:  · RLE ROM: WFL  · RLE Strength: WFL  · LLE ROM: IMPAIRED  · LLE Strength: NA D/T PAIN    Functional Mobility:  · Bed Mobility:     · Supine to Sit: NA  · Transfers:     · Sit to Stand:  minimum assistance with rolling walker  · Bed to Chair: minimum assistance with  rolling walker  using  Stand Pivot  · Gait: PT GT TRAINED X 30' WITH STEP TO GT WITH WIFE NAHOMY  AND CUES FOR STEP THROUGH GT.      AM-PAC 6 CLICK MOBILITY  Total Score:14     Patient left sitting edge of bed with call button in reach.    GOALS:   Multidisciplinary Problems     Physical Therapy Goals        Problem: Physical Therapy    Goal Priority Disciplines Outcome Goal Variances Interventions   Physical Therapy Goal     PT, PT/OT      Description: LT22  1. PT WILL COMPLETE BED MOBILITY WITH SBA  2. PT WILL STAND WITH RW AND SBA FOR T/F TO CHAIR  3. PT WILL GT TRAIN X 150' WITH RW AND SBA.                     History:     Past Medical History:   Diagnosis Date    Carpal tunnel syndrome     Diabetes mellitus without complication     History of colon polyps     Hypercalcemia 3/23/2021    Hyperlipidemia associated with type 2 diabetes mellitus     Hypertension associated with diabetes     Lumbar disc disease with radiculopathy     Morbid obesity with BMI of 40.0-44.9, adult     S/P parathyroidectomy 2021    Vitamin D deficiency        Past Surgical History:   Procedure Laterality Date    CARPAL TUNNEL RELEASE Left 2021    Procedure: RELEASE, CARPAL TUNNEL;  Surgeon: Yoandy David MD;  Location: [facility] OR;  Service: Orthopedics;  Laterality: Left;    HERNIA REPAIR      INCISION AND DRAINAGE OF HEMATOMA N/A 2022    Procedure: INCISION AND DRAINAGE, HEMATOMA;  Surgeon: Shaggy Zepeda MD;  Location: [facility] OR;  Service: Neurosurgery;   Laterality: N/A;    LUMBAR LAMINECTOMY WITH DISCECTOMY Left 6/22/2022    Procedure: LAMINECTOMY, SPINE, LUMBAR, WITH DISCECTOMY;  Surgeon: Shaggy Zepeda MD;  Location: [facility] OR;  Service: Neurosurgery;  Laterality: Left;  minimally invasive L2-3 discectomy and decompression     LUMBAR LAMINECTOMY WITH DISCECTOMY N/A 6/28/2022    Procedure: LAMINECTOMY, SPINE, LUMBAR, WITH DISCECTOMY;  Surgeon: Shaggy Zepeda MD;  Location: [facility] OR;  Service: Neurosurgery;  Laterality: N/A;    PARATHYROIDECTOMY Bilateral 7/27/2021    Procedure: PARATHYROIDECTOMY;  Surgeon: Tha Dial MD;  Location: [facility] OR;  Service: ENT;  Laterality: Bilateral;  with medialstinal exploration    SELECTIVE INJECTION OF ANESTHETIC AGENT AROUND LUMBAR SPINAL NERVE ROOT BY TRANSFORAMINAL APPROACH Left 3/23/2022    Procedure: BLOCK, SPINAL NERVE ROOT, LUMBAR, SELECTIVE, TRANSFORAMINAL APPROACH Left L2-3, L3-4 RN IV sedation;  Surgeon: Jay Hodges MD;  [facility];  Service: Pain Management;  Laterality: Left;    STERNOTOMY N/A 7/27/2021    Procedure: STERNOTOMY;  Surgeon: Tha Dial MD;  Location: [facility] OR;  Service: ENT;  Laterality: N/A;    ULNAR NERVE TRANSPOSITION Left 4/1/2021    Procedure: TRANSPOSITION, NERVE, ULNAR;  Surgeon: Yoandy David MD;  Location: [facility] OR;  Service: Orthopedics;  Laterality: Left;    ULNAR TUNNEL RELEASE Left 4/1/2021    Procedure: RELEASE, CUBITAL TUNNEL;  Surgeon: Yoandy David MD;  Location: [facility];  Service: Orthopedics;  Laterality: Left;    UMBILICAL HERNIA REPAIR         Time Tracking:     PT Received On: 06/29/22  PT Start Time: 0935     PT Stop Time: 0958  PT Total Time (min): 23 min     Billable Minutes: Evaluation 23 06/29/2022

## 2022-06-29 NOTE — PLAN OF CARE
Patient remains free of injury. AAOx4. POC reviewed, patient verbalizes understanding. Pain controlled with oral pain medication. NSR on heart monitor #8681. Denies numbness/tingling in legs. Diabetic diet tolerated, glucose monitoring continued. Insulin administered as needed. Facial lesion draining tan drainage- MD aware. Call light and personal items within reach. Chart check complete. Will continue to monitor.    Problem: Adult Inpatient Plan of Care  Goal: Plan of Care Review  Outcome: Ongoing, Progressing  Goal: Patient-Specific Goal (Individualized)  Outcome: Ongoing, Progressing  Goal: Absence of Hospital-Acquired Illness or Injury  Outcome: Ongoing, Progressing  Goal: Optimal Comfort and Wellbeing  Outcome: Ongoing, Progressing  Goal: Readiness for Transition of Care  Outcome: Ongoing, Progressing

## 2022-06-29 NOTE — SUBJECTIVE & OBJECTIVE
Past Medical History:   Diagnosis Date    Carpal tunnel syndrome     Diabetes mellitus without complication     History of colon polyps     Hypercalcemia 3/23/2021    Hyperlipidemia associated with type 2 diabetes mellitus     Hypertension associated with diabetes     Lumbar disc disease with radiculopathy     Morbid obesity with BMI of 40.0-44.9, adult     S/P parathyroidectomy 7/29/2021    Vitamin D deficiency        Past Surgical History:   Procedure Laterality Date    CARPAL TUNNEL RELEASE Left 4/1/2021    Procedure: RELEASE, CARPAL TUNNEL;  Surgeon: Yoandy David MD;  Location: [facility] OR;  Service: Orthopedics;  Laterality: Left;    HERNIA REPAIR      INCISION AND DRAINAGE OF HEMATOMA N/A 6/28/2022    Procedure: INCISION AND DRAINAGE, HEMATOMA;  Surgeon: Shaggy Zepeda MD;  Location: [facility] OR;  Service: Neurosurgery;  Laterality: N/A;    LUMBAR LAMINECTOMY WITH DISCECTOMY Left 6/22/2022    Procedure: LAMINECTOMY, SPINE, LUMBAR, WITH DISCECTOMY;  Surgeon: Shaggy Zepeda MD;  Location: [facility] OR;  Service: Neurosurgery;  Laterality: Left;  minimally invasive L2-3 discectomy and decompression     LUMBAR LAMINECTOMY WITH DISCECTOMY N/A 6/28/2022    Procedure: LAMINECTOMY, SPINE, LUMBAR, WITH DISCECTOMY;  Surgeon: Shaggy Zepeda MD;  Location: [facility] OR;  Service: Neurosurgery;  Laterality: N/A;    PARATHYROIDECTOMY Bilateral 7/27/2021    Procedure: PARATHYROIDECTOMY;  Surgeon: Tha Dial MD;  Location: [facility] OR;  Service: ENT;  Laterality: Bilateral;  with medialstinal exploration    SELECTIVE INJECTION OF ANESTHETIC AGENT AROUND LUMBAR SPINAL NERVE ROOT BY TRANSFORAMINAL APPROACH Left 3/23/2022    Procedure: BLOCK, SPINAL NERVE ROOT, LUMBAR, SELECTIVE, TRANSFORAMINAL APPROACH Left L2-3, L3-4 RN IV sedation;  Surgeon: Jay Hodges MD;  Location: [facility] PAIN MGT;  Service: Pain Management;  Laterality: Left;    STERNOTOMY N/A 7/27/2021    Procedure: STERNOTOMY;  Surgeon: Tha Dial MD;  Location: [facility];   Service: ENT;  Laterality: N/A;    ULNAR NERVE TRANSPOSITION Left 4/1/2021    Procedure: TRANSPOSITION, NERVE, ULNAR;  Surgeon: Yoandy David MD;  Location: [facility] OR;  Service: Orthopedics;  Laterality: Left;    ULNAR TUNNEL RELEASE Left 4/1/2021    Procedure: RELEASE, CUBITAL TUNNEL;  Surgeon: Yoandy David MD;  Location: [facility] OR;  Service: Orthopedics;  Laterality: Left;    UMBILICAL HERNIA REPAIR         Review of patient's allergies indicates:  No Known Allergies    No current facility-administered medications on file prior to encounter.     Current Outpatient Medications on File Prior to Encounter   Medication Sig    atorvastatin (LIPITOR) 10 MG tablet Take 1 tablet (10 mg total) by mouth every evening.    dapagliflozin (FARXIGA) 5 mg Tab tablet Take 1 tablet (5 mg total) by mouth once daily.    docusate sodium (COLACE) 100 MG capsule Take 1 capsule (100 mg total) by mouth 2 (two) times daily.    irbesartan (AVAPRO) 150 MG tablet Take 1 tablet (150 mg total) by mouth once daily.    metFORMIN (GLUCOPHAGE) 1000 MG tablet Take 1 tablet (1,000 mg total) by mouth 2 (two) times daily with meals.    methocarbamoL (ROBAXIN) 750 MG Tab Take 1 tablet (750 mg total) by mouth 3 (three) times daily as needed (muscle spasms).    oxyCODONE-acetaminophen (PERCOCET)  mg per tablet Take 1 tablet by mouth every 4 (four) hours as needed for Pain.    senna-docusate 8.6-50 mg (SENNA LAXATIVE-STOOL SOFTENER) 8.6-50 mg per tablet Take 1 tablet by mouth once daily.    blood sugar diagnostic Strp To check BG 1 times daily, to use with insurance preferred meter    blood-glucose meter kit To check BG 1 times daily, to use with insurance preferred meter    lancets (ONETOUCH ULTRASOFT LANCETS) Misc Check FS daily    lancets Misc To check BG 1 times daily, to use with insurance preferred meter    ONETOUCH VERIO TEST STRIPS Strp USE 1 STRIP TO CHECK GLUCOSE ONCE DAILY    [DISCONTINUED] aspirin (ECOTRIN) 81 MG EC tablet  Take 81 mg by mouth once daily.     Family History       Problem Relation (Age of Onset)    Asthma Mother    Diabetes Mellitus Father    Hypertension Father    Prostate cancer Brother          Tobacco Use    Smoking status: Never Smoker    Smokeless tobacco: Never Used   Substance and Sexual Activity    Alcohol use: Never    Drug use: Never    Sexual activity: Yes     Partners: Female     Review of Systems   Constitutional:  Positive for activity change.   HENT: Negative.     Respiratory:  Negative for cough and shortness of breath.    Cardiovascular:  Negative for chest pain, palpitations and leg swelling.   Gastrointestinal:  Negative for nausea and vomiting.   Genitourinary:  Negative for decreased urine volume and difficulty urinating.   Musculoskeletal:  Positive for arthralgias and myalgias.   Skin:  Positive for wound.   Neurological: Negative.  Negative for weakness and numbness.   Psychiatric/Behavioral: Negative.     Objective:     Vital Signs (Most Recent):  Temp: 98.9 °F (37.2 °C) (06/29/22 1119)  Pulse: 83 (06/29/22 1340)  Resp: 16 (06/29/22 1119)  BP: (!) 147/63 (06/29/22 1119)  SpO2: 98 % (06/29/22 1119)   Vital Signs (24h Range):  Temp:  [98.3 °F (36.8 °C)-100.1 °F (37.8 °C)] 98.9 °F (37.2 °C)  Pulse:  [72-99] 83  Resp:  [10-29] 16  SpO2:  [92 %-100 %] 98 %  BP: (127-214)/() 147/63     Weight: 108.2 kg (238 lb 8.6 oz)  Body mass index is 38.5 kg/m².    Physical Exam  Vitals and nursing note reviewed.   Constitutional:       Appearance: He is well-developed.   HENT:      Head: Normocephalic and atraumatic.      Nose: Nose normal.   Eyes:      General: No scleral icterus.     Conjunctiva/sclera: Conjunctivae normal.      Pupils: Pupils are equal, round, and reactive to light.   Cardiovascular:      Rate and Rhythm: Normal rate and regular rhythm.      Heart sounds: Normal heart sounds. No murmur heard.    No friction rub. No gallop.   Pulmonary:      Effort: Pulmonary effort is normal.       Breath sounds: Normal breath sounds.   Abdominal:      General: Bowel sounds are normal.      Palpations: Abdomen is soft.   Musculoskeletal:         General: No tenderness. Normal range of motion.      Cervical back: Normal range of motion and neck supple.   Skin:     General: Skin is warm and dry.      Comments: Occlusive dressing to midline spine   Neurological:      Mental Status: He is alert and oriented to person, place, and time.   Psychiatric:         Behavior: Behavior normal.         Thought Content: Thought content normal.       Significant Labs: All pertinent labs within the past 24 hours have been reviewed.  CBC:   Recent Labs   Lab 06/29/22  0624   WBC 9.63   HGB 11.5*   HCT 35.9*        CMP:   Recent Labs   Lab 06/29/22  0624      K 4.7      CO2 26   *   BUN 23   CREATININE 1.0   CALCIUM 8.8   ANIONGAP 13   EGFRNONAA >60       Significant Imaging: I have reviewed all pertinent imaging results/findings within the past 24 hours.

## 2022-06-29 NOTE — HPI
Pt is a 65 yo male with PMhx of DM, HTN, HPL who underwent an I & D of hematoma S/P lumbar spine laminectomy and discectomy performed by Dr. Zepeda. Hospital Medicine was consulted to assist with medical management. Pt's blood pressure controlled today, oxygenation appropriate and labs stable. Glucose 167 and sliding scale insulin ordered. Pt seen and examined and sitting in chair at bedside. He reports working with PT/OT. He describes left hip pain and is now taking oral analgesic. Other symptoms are denied. He denies weakness and paresthesias to legs.

## 2022-06-29 NOTE — OP NOTE
[facility] - Surgery (Hospital)  Neurosurgery  Operative Note    SUMMARY      Date of Procedure: 6/28/2022     Procedure: Procedure(s) (LRB):  INCISION AND DRAINAGE, HEMATOMA (N/A)  LAMINECTOMY, SPINE, LUMBAR, WITH DISCECTOMY (N/A)   L2-3   Surgeon(s) and Role:     * Shaggy Zepeda MD - Primary    Assisting Surgeon: None    Pre-Operative Diagnosis: Postoperative hematoma involving nervous system following nervous system procedure [G97.61]    Post-Operative Diagnosis: Post-Op Diagnosis Codes:     * Postoperative hematoma involving nervous system following nervous system procedure [G97.61]    Anesthesia: General    Operative Findings (including complications, if any):   Fluid collection ventral and lateral consistent with epidural epidural hematoma  compression   Retained disc fragment     Description of Technical Procedures:  Removal of spinous process as well as bilateral laminectomy L2-3 with foraminotomy bilateral  Exploration of disc space  Removal of disc fragment  Drainage of epidural hematoma  Wound cultures sent for analysis    Significant Surgical Tasks Conducted by the Assistant(s), if Applicable:  Thecal sac retraction during wound washout and disc exploration    Estimated Blood Loss (EBL): * No values recorded between 6/28/2022  4:19 PM and 6/28/2022  8:28 PM *           Specimens:   Specimen (24h ago, onward)             Start     Ordered    06/28/22 1946  Specimen to Pathology, Surgery Neurosurgery  Once        Comments: Pre-op Diagnosis: Postoperative hematoma involving nervous system following nervous system procedure [G97.61]Procedure(s):LAMINECTOMY, SPINE, LUMBAR, POSTERIOR APPROACH, WITH EXCISION OF NEOPLASM OR LESION OF SPINAL CORD Number of specimens: 2Name of specimens: 1)  disk --perm2)  L2-3 disk fragment --perm     References:    Click here for ordering Quick Tip   Question Answer Comment   Procedure Type: Neurosurgery    Specimen Class: Routine/Screening    Which provider would you like to  cc? RANDALL GILES    Release to patient Immediate        06/28/22 1950                 Implants:   Implant Name Type Inv. Item Serial No.  Lot No. LRB No. Used Action   DURA MATRIX ONLAY PLUS 3X3 - [id number]  DURA MATRIX ONLAY PLUS 3X3  [employer]. [id number] N/A 1 Implanted              Condition: Good    Disposition: PACU - hemodynamically stable.    Attestation: I was present and scrubbed for the entire procedure.      Patient is a 66-year-old gentleman who underwent an L2-3 diskectomy on the left side cervical days prior.  He did stay overnight for observation and was discharged home the following morning.  Initially was doing well without any pain however he had worsening pain over the course of the weekend.  On Monday he did present back to the ER with worsened back pain as well as radicular symptoms.  He had an MRI which did show a ventral epidural fluid collection causing distortion of the thecal sac.  The differential was possible epidural hematoma versus, abscess,seroma, possible CSF leak  Patient was on blood thinners prior surgery however he stopped several weeks prior to undergoing the procedure.  On examination he was neurologically intact.  Denies any numbness and tingling nausea/vomiting, headaches, fever ,redness, swelling or drainage from incision.  After discussing treatment options he did agree and elect to undergo posterior incision and exploration of wound with further laminectomy and diskectomy as needed    Patient's symptoms worsen over the span of his admits and  Surgical Risks:  The patient was well apprised of all objectives, benefits, risks and potential complications of the procedure, including but not limited to:  Worsening of current status, the possible need for further procedures, the risk of infection, headaches, CSF leak, possible spinal nerve injury resulting in paralysis, infection, injury to major vessels causing hemorrhage, stroke, loss of language  function, coma and even death.  No assurance was given whether the symptoms would improve following the procedure.  Informed consent was obtained and secured to the chart after the patient voiced understanding of these risks and decided to proceed with the operation.     Description of procedure  The patient was transferred to the operating room and was given preoperative prophylactic IV antibiotics.  The Anesthesia Service sedated and intubated the patient.  The eyes were taped shut after ointment was applied to prevent corneal abrasion.  A Alessandro Hugger was placed over the upper body to maintain control of the core body temperature.  Roche catheter was inserted.  The patient was turned prone on the Wally table.  All pressure points were carefully padded.       Operative Technique:  The patient was prepped and draped in the standard sterile fashion.  The C-arm fluoroscopy was draped and brought into the operative field.  Next the prior incision was incised using a 10. Blade.  Dissection was carried down to the fascial layer where a fluid collection consistent with postoperative hematoma was identified.  Fluid was sent for culture and analysis.  Dissection was taken down to the fascia.  Retractors were placed deep in the muscle was retracted.  There again was epidural fluid consistent with hematoma in the epidural space.  Cultures were taken of the posterior fluid collection for routine analysis.  Next the microscope was draped.  Using a micro dissector the thecal sac was carefully retracted medially.  Under microscopic visualization the ventral epidural space was explored.  There again was fluid collection consistent with hematoma traveling both superiorly and inferiorly which was drained.  There was a retained disc fragment causing compression ventrally.  This was removed using a pituitary.  This was sent for permanent specimen    Laminectomy was performed bilaterally from the extent of the L2 pedicle to the L3  pedicle.  The nerve roots traveling L2 was identified and there was not any apparent compression of the L2 nerve root.  Traveling inferiorly the nerve root of L3 was identified and its course around the medial pedicle wall of L3 was dissected and there was no further compression of the L3 nerve root either.  Using a Zavala the ventral thecal sac was felt to be flat without any further compression.    At this point there appeared to be no further bleeders or fluid collection causing any further compression of the thecal sac.  FloSeal was used to the ventral space to control any epidural bleeders.  Because there was fluid and although the patient did not have signs and symptoms of CSF leak a piece of Duraform was placed around the extent of the thecal sac laterally and tucked underneath ventrally.  DuraSeal was used on top of the dural onlay to create a seal around the thecal sac.     Next attention turned towards closing the incision    The wound was copiously irrigated with antibiotic saline solution.   The fascia was subsequently closed utilizing 0 Vicryl sutures then strata fix to the superficial layers     The subcuticular layer was closed with a 2 0 Vicryl in an inverted fashion.  The skin was then approximated with running 2-0 nylon.  The incision was dressed in a clean and dry dressing.     All sponge counts, needle counts and instrument counts were correct at the end of the case x 2.  The patient tolerated the procedure well without any complication and was transferred in a stable condition to the recovery room.  Roche catheter was removed prior to going to postoperative holding room    Thank you for the referral   Please call with any questions    Shaggy Zepeda MD  Neurosurgery     Disclaimer: This note was prepared using a voice recognition system and is likely to have sound alike errors within the text.

## 2022-06-30 PROBLEM — R50.9 FEVER: Status: ACTIVE | Noted: 2022-06-30

## 2022-06-30 LAB
BASOPHILS # BLD AUTO: 0.05 K/UL (ref 0–0.2)
BASOPHILS NFR BLD: 0.5 % (ref 0–1.9)
DIFFERENTIAL METHOD: ABNORMAL
EOSINOPHIL # BLD AUTO: 0.2 K/UL (ref 0–0.5)
EOSINOPHIL NFR BLD: 2 % (ref 0–8)
ERYTHROCYTE [DISTWIDTH] IN BLOOD BY AUTOMATED COUNT: 14.1 % (ref 11.5–14.5)
HCT VFR BLD AUTO: 39.2 % (ref 40–54)
HGB BLD-MCNC: 12.2 G/DL (ref 14–18)
IMM GRANULOCYTES # BLD AUTO: 0.07 K/UL (ref 0–0.04)
IMM GRANULOCYTES NFR BLD AUTO: 0.7 % (ref 0–0.5)
LYMPHOCYTES # BLD AUTO: 2 K/UL (ref 1–4.8)
LYMPHOCYTES NFR BLD: 20.5 % (ref 18–48)
MCH RBC QN AUTO: 28.2 PG (ref 27–31)
MCHC RBC AUTO-ENTMCNC: 31.1 G/DL (ref 32–36)
MCV RBC AUTO: 91 FL (ref 82–98)
MONOCYTES # BLD AUTO: 0.7 K/UL (ref 0.3–1)
MONOCYTES NFR BLD: 7.5 % (ref 4–15)
NEUTROPHILS # BLD AUTO: 6.7 K/UL (ref 1.8–7.7)
NEUTROPHILS NFR BLD: 68.8 % (ref 38–73)
NRBC BLD-RTO: 0 /100 WBC
PLATELET # BLD AUTO: 189 K/UL (ref 150–450)
PMV BLD AUTO: 11.9 FL (ref 9.2–12.9)
POCT GLUCOSE: 139 MG/DL (ref 70–110)
POCT GLUCOSE: 145 MG/DL (ref 70–110)
POCT GLUCOSE: 179 MG/DL (ref 70–110)
POCT GLUCOSE: 245 MG/DL (ref 70–110)
RBC # BLD AUTO: 4.32 M/UL (ref 4.6–6.2)
WBC # BLD AUTO: 9.68 K/UL (ref 3.9–12.7)

## 2022-06-30 PROCEDURE — 97110 THERAPEUTIC EXERCISES: CPT | Mod: CQ

## 2022-06-30 PROCEDURE — 63600175 PHARM REV CODE 636 W HCPCS: Performed by: NURSE PRACTITIONER

## 2022-06-30 PROCEDURE — 94799 UNLISTED PULMONARY SVC/PX: CPT

## 2022-06-30 PROCEDURE — 63600175 PHARM REV CODE 636 W HCPCS: Performed by: NEUROLOGICAL SURGERY

## 2022-06-30 PROCEDURE — 99900035 HC TECH TIME PER 15 MIN (STAT)

## 2022-06-30 PROCEDURE — 25000003 PHARM REV CODE 250: Performed by: PHYSICIAN ASSISTANT

## 2022-06-30 PROCEDURE — 36415 COLL VENOUS BLD VENIPUNCTURE: CPT | Performed by: NURSE PRACTITIONER

## 2022-06-30 PROCEDURE — 85025 COMPLETE CBC W/AUTO DIFF WBC: CPT | Performed by: NURSE PRACTITIONER

## 2022-06-30 PROCEDURE — 25000003 PHARM REV CODE 250: Performed by: NURSE PRACTITIONER

## 2022-06-30 PROCEDURE — 11000001 HC ACUTE MED/SURG PRIVATE ROOM

## 2022-06-30 PROCEDURE — 63600175 PHARM REV CODE 636 W HCPCS: Performed by: PHYSICIAN ASSISTANT

## 2022-06-30 PROCEDURE — 94761 N-INVAS EAR/PLS OXIMETRY MLT: CPT

## 2022-06-30 PROCEDURE — 25000003 PHARM REV CODE 250: Performed by: NEUROLOGICAL SURGERY

## 2022-06-30 RX ORDER — HYDROMORPHONE HYDROCHLORIDE 2 MG/ML
1 INJECTION, SOLUTION INTRAMUSCULAR; INTRAVENOUS; SUBCUTANEOUS EVERY 6 HOURS PRN
Status: DISCONTINUED | OUTPATIENT
Start: 2022-06-30 | End: 2022-07-06 | Stop reason: HOSPADM

## 2022-06-30 RX ORDER — POLYETHYLENE GLYCOL 3350 17 G/17G
17 POWDER, FOR SOLUTION ORAL 2 TIMES DAILY
Status: DISCONTINUED | OUTPATIENT
Start: 2022-06-30 | End: 2022-07-06 | Stop reason: HOSPADM

## 2022-06-30 RX ORDER — DEXAMETHASONE SODIUM PHOSPHATE 4 MG/ML
4 INJECTION, SOLUTION INTRA-ARTICULAR; INTRALESIONAL; INTRAMUSCULAR; INTRAVENOUS; SOFT TISSUE EVERY 12 HOURS
Status: DISCONTINUED | OUTPATIENT
Start: 2022-06-30 | End: 2022-07-01

## 2022-06-30 RX ORDER — SYRING-NEEDL,DISP,INSUL,0.3 ML 29 G X1/2"
296 SYRINGE, EMPTY DISPOSABLE MISCELLANEOUS ONCE
Status: COMPLETED | OUTPATIENT
Start: 2022-06-30 | End: 2022-06-30

## 2022-06-30 RX ADMIN — Medication 1 ENEMA: at 09:06

## 2022-06-30 RX ADMIN — DOCUSATE SODIUM 100 MG: 100 CAPSULE, LIQUID FILLED ORAL at 09:06

## 2022-06-30 RX ADMIN — INSULIN ASPART 2 UNITS: 100 INJECTION, SOLUTION INTRAVENOUS; SUBCUTANEOUS at 08:06

## 2022-06-30 RX ADMIN — MORPHINE SULFATE 2 MG: 2 INJECTION, SOLUTION INTRAMUSCULAR; INTRAVENOUS at 10:06

## 2022-06-30 RX ADMIN — ATORVASTATIN CALCIUM 10 MG: 10 TABLET, FILM COATED ORAL at 08:06

## 2022-06-30 RX ADMIN — POLYETHYLENE GLYCOL 3350 17 G: 17 POWDER, FOR SOLUTION ORAL at 08:06

## 2022-06-30 RX ADMIN — VANCOMYCIN HYDROCHLORIDE 1500 MG: 1.5 INJECTION, POWDER, LYOPHILIZED, FOR SOLUTION INTRAVENOUS at 08:06

## 2022-06-30 RX ADMIN — POLYETHYLENE GLYCOL 3350 17 G: 17 POWDER, FOR SOLUTION ORAL at 09:06

## 2022-06-30 RX ADMIN — DIAZEPAM 5 MG: 10 INJECTION, SOLUTION INTRAMUSCULAR; INTRAVENOUS at 02:06

## 2022-06-30 RX ADMIN — DEXAMETHASONE SODIUM PHOSPHATE 4 MG: 4 INJECTION INTRA-ARTICULAR; INTRALESIONAL; INTRAMUSCULAR; INTRAVENOUS; SOFT TISSUE at 08:06

## 2022-06-30 RX ADMIN — OXYCODONE AND ACETAMINOPHEN 1 TABLET: 10; 325 TABLET ORAL at 01:06

## 2022-06-30 RX ADMIN — LOSARTAN POTASSIUM 50 MG: 50 TABLET, FILM COATED ORAL at 09:06

## 2022-06-30 RX ADMIN — VANCOMYCIN HYDROCHLORIDE 1500 MG: 1.5 INJECTION, POWDER, LYOPHILIZED, FOR SOLUTION INTRAVENOUS at 07:06

## 2022-06-30 RX ADMIN — THERA TABS 1 TABLET: TAB at 09:06

## 2022-06-30 RX ADMIN — DOCUSATE SODIUM 100 MG: 100 CAPSULE, LIQUID FILLED ORAL at 08:06

## 2022-06-30 RX ADMIN — HYDROMORPHONE HYDROCHLORIDE 1 MG: 2 INJECTION INTRAMUSCULAR; INTRAVENOUS; SUBCUTANEOUS at 07:06

## 2022-06-30 RX ADMIN — HYDROMORPHONE HYDROCHLORIDE 1 MG: 2 INJECTION INTRAMUSCULAR; INTRAVENOUS; SUBCUTANEOUS at 12:06

## 2022-06-30 RX ADMIN — MORPHINE SULFATE 2 MG: 2 INJECTION, SOLUTION INTRAMUSCULAR; INTRAVENOUS at 05:06

## 2022-06-30 RX ADMIN — MAGNESIUM CITRATE 296 ML: 1.75 LIQUID ORAL at 09:06

## 2022-06-30 NOTE — CARE UPDATE
Contacted Dr. Zepeda by telephone around 0200 without response, secured chat URI [place] in regards to complaints of increasing low back pains with minimal relief from IV morphine and Oxycodone. Patient and his wife is also requesting laxative for bowel movement and wants additional imaging performed to areas of increased pains. New orders by Dr. Zepeda include MRI, Lumbar x-ray and CT Scan. Escorted patient personally and had lumbar x-ray with flexion and extension performed. Will continue to monitor

## 2022-06-30 NOTE — PLAN OF CARE
Pt remains free from falls/injuries this shift. Safety precautions maintained. Pain managed with managed with PRN meds. No s/s of acute distress noted. Will continue to monitor. Chart check completed.

## 2022-06-30 NOTE — HOSPITAL COURSE
6/30/22 - increasing left hip and lower back pain. Primary is attempting to obtain further CT and MRI imaging but patient intolerant due to pain. Given Dilaudid prn. Given Mag Citrate and Enema for c/o constipation with small response. On Vancomycin, no further fevers. Blood cultures NGTD and aerobic culture obtained during surgery has isolated Staph Epi. Neurosurgery following S/P I & D L spine hematoma. Linear bruise to the left nasal fold. To left cheek there is indurated area oozing clear fluid. Wound care is applying bactroban. As of 7/1/2022, vital signs and labs stable. Glucose elevated, probable related to IV steroid. Pt reports back pain improving. Neurosurgery following, no surgery plan for today. Patient reports left facial blister improving as well.  As of 7/2/2022, patient feeling much better, vital signs and labs stable. He reports back pain has greatly decreased. Patient is currently sitting up in chair and walking with walker with Physical Therapy. As of 7/3/2022, patient continue to do well, vital signs stable. Will need 2 weeks of IV antibiotic for Bacteremia, follow repeat Blood cultures. As of 7/4/2022, patient continues feel well, vital signs and labs stable. Will need 2 weeks of IV antibiotic for bacteremia. Case management unable to arranged IV antibiotic due to holiday. PICC line placed today. As of 7/5/2022, patient doing well, vital signs stable, pain well control. Awaiting final insurance approval for home IV antibiotic for Bacteremia. Left facial blister continues to improve. As of 7/6/2022, patient doing well, vital signs stable, awaiting D/C

## 2022-06-30 NOTE — SUBJECTIVE & OBJECTIVE
Interval History: See Hospital Course    Review of Systems   Constitutional:  Positive for activity change.   HENT: Negative.     Respiratory:  Negative for cough and shortness of breath.    Cardiovascular:  Negative for chest pain, palpitations and leg swelling.   Gastrointestinal:  Negative for nausea and vomiting.   Genitourinary:  Negative for decreased urine volume and difficulty urinating.   Musculoskeletal:  Positive for arthralgias and myalgias.   Skin:  Positive for wound.   Neurological: Negative.  Negative for weakness and numbness.   Psychiatric/Behavioral: Negative.     Objective:     Vital Signs (Most Recent):  Temp: 99.8 °F (37.7 °C) (06/30/22 1127)  Pulse: 82 (06/30/22 1300)  Resp: 16 (06/30/22 1256)  BP: 131/68 (06/30/22 1127)  SpO2: (!) 94 % (06/30/22 1127)   Vital Signs (24h Range):  Temp:  [99 °F (37.2 °C)-99.8 °F (37.7 °C)] 99.8 °F (37.7 °C)  Pulse:  [70-85] 82  Resp:  [14-20] 16  SpO2:  [93 %-98 %] 94 %  BP: (130-140)/(59-80) 131/68     Weight: 108.2 kg (238 lb 8.6 oz)  Body mass index is 38.5 kg/m².    Intake/Output Summary (Last 24 hours) at 6/30/2022 1615  Last data filed at 6/30/2022 0600  Gross per 24 hour   Intake 200 ml   Output 550 ml   Net -350 ml      Physical Exam  Vitals and nursing note reviewed.   Constitutional:       Appearance: He is well-developed. He is obese. He is ill-appearing.   HENT:      Head: Normocephalic and atraumatic.      Nose: Nose normal.   Eyes:      General: No scleral icterus.     Conjunctiva/sclera: Conjunctivae normal.      Pupils: Pupils are equal, round, and reactive to light.   Cardiovascular:      Rate and Rhythm: Normal rate and regular rhythm.      Heart sounds: Normal heart sounds. No murmur heard.    No friction rub. No gallop.   Pulmonary:      Effort: Pulmonary effort is normal.      Breath sounds: Normal breath sounds.   Abdominal:      General: Bowel sounds are normal.      Palpations: Abdomen is soft.   Musculoskeletal:         General: No  tenderness. Normal range of motion.      Cervical back: Normal range of motion and neck supple.   Skin:     General: Skin is warm and dry.      Comments: Occlusive dressing to midline spine  Linear bruising to left nasal fold  Left cheek with approx 1 inch circular area that is indurated and draining serous fluid   Neurological:      Mental Status: He is alert and oriented to person, place, and time.   Psychiatric:         Behavior: Behavior normal.         Thought Content: Thought content normal.             Significant Labs: All pertinent labs within the past 24 hours have been reviewed.  CBC:   Recent Labs   Lab 06/29/22  0624 06/30/22  1057   WBC 9.63 9.68   HGB 11.5* 12.2*   HCT 35.9* 39.2*    189     CMP:   Recent Labs   Lab 06/29/22  0624      K 4.7      CO2 26   *   BUN 23   CREATININE 1.0   CALCIUM 8.8   ANIONGAP 13   EGFRNONAA >60       Significant Imaging: I have reviewed all pertinent imaging results/findings within the past 24 hours.

## 2022-06-30 NOTE — CONSULTS
[facility] Surg  Wound Care    Patient Name:  Friday ROGELIO Blake   MRN:  [id number]  Date: 6/30/2022  Diagnosis: Postoperative hematoma involving nervous system following nervous system procedure    History:     Past Medical History:   Diagnosis Date    Carpal tunnel syndrome     Diabetes mellitus without complication     History of colon polyps     Hypercalcemia 3/23/2021    Hyperlipidemia associated with type 2 diabetes mellitus     Hypertension associated with diabetes     Lumbar disc disease with radiculopathy     Morbid obesity with BMI of 40.0-44.9, adult     S/P parathyroidectomy 7/29/2021    Vitamin D deficiency        Social History     Socioeconomic History    Marital status:    Tobacco Use    Smoking status: Never Smoker    Smokeless tobacco: Never Used   Substance and Sexual Activity    Alcohol use: Never    Drug use: Never    Sexual activity: Yes     Partners: Female       Precautions:     Allergies as of 06/27/2022    (No Known Allergies)       [facility] Assessment Details/Treatment             Consulted to this 66 year old male patient for wound care. PMhx of DM, HTN, HPL who underwent an I & D of hematoma S/P lumbar spine laminectomy and discectomy performed with Dr Zepeda. magaly yesterday. Now with swelling and induration and blistering to the left side of his face. Potentially from pressure in the OR. Will follow to see how it evolves. Dr Zepeda aware and patient wanting to speak with anesthesia. Will follow closely.    Recommend bactroban.     06/30/2022

## 2022-06-30 NOTE — PT/OT/SLP PROGRESS
Physical Therapy      Patient Name:  Friday ROGELIO Blake   MRN:  [id number]    12:05 p.m.  Patient on bedside commode at this time and was not finished.    01:05 p.m.  Patient out of room for CT scan.    Will follow up during next scheduled PT session.

## 2022-06-30 NOTE — PLAN OF CARE
Plan of care discussed with patient, safety precautions maintained, cardiac monitoring in place. PRN pain medications administered, cleaned face abrasion and applied Bacitracin. Will continue to monitor

## 2022-06-30 NOTE — CONSULTS
Pharmacokinetic Initial Assessment: IV Vancomycin    Assessment/Plan:    Initiate intravenous vancomycin with loading dose of 2000 mg once followed by a maintenance dose of vancomycin 1500 mg IV every 12 hours  Desired empiric serum trough concentration is 10 to 20 mcg/mL  Draw vancomycin trough level 60 min prior to fourth dose on 7/1/22 at approximately 0500.  Pharmacy will continue to follow and monitor vancomycin.      Please contact pharmacy at extension [phone] with any questions regarding this assessment.     Thank you for the consult,   Mulugeta PonceD       Patient brief summary:  Friday ROGELIO Blake is a 66 y.o. male initiated on antimicrobial therapy with IV Vancomycin for treatment of suspected skin & soft tissue infection    Drug Allergies:   Review of patient's allergies indicates:  No Known Allergies    Actual Body Weight:   108.2 kg    Renal Function:   Estimated Creatinine Clearance: 83.9 mL/min (based on SCr of 1 mg/dL).,     Dialysis Method (if applicable):  N/A    CBC (last 72 hours):  Recent Labs   Lab Result Units 06/27/22  1354 06/29/22  0624   WBC K/uL 9.60 9.63   Hemoglobin g/dL 12.9* 11.5*   Hematocrit % 39.8* 35.9*   Platelets K/uL 184 171   Gran % % 67.5 62.8   Lymph % % 22.3 26.9   Mono % % 7.5 9.2   Eosinophil % % 2.0 0.6   Basophil % % 0.4 0.2   Differential Method  Automated Automated       Metabolic Panel (last 72 hours):  Recent Labs   Lab Result Units 06/27/22  1354 06/27/22  1438 06/29/22  0624 06/29/22  1712   Sodium mmol/L 139  --  140  --    Potassium mmol/L 4.5  --  4.7  --    Chloride mmol/L 98  --  101  --    CO2 mmol/L 28  --  26  --    Glucose mg/dL 184*  --  167*  --    Glucose, UA   --  Negative  --  Trace*   BUN mg/dL 12  --  23  --    Creatinine mg/dL 1.0  --  1.0  --    Albumin g/dL 3.9  --   --   --    Total Bilirubin mg/dL 0.7  --   --   --    Alkaline Phosphatase U/L 68  --   --   --    AST U/L 23  --   --   --    ALT U/L 29  --   --   --        Drug levels (last  3 results):  No results for input(s): VANCOMYCINRA, VANCORANDOM, VANCOMYCINPE, VANCOPEAK, VANCOMYCINTR, VANCOTROUGH in the last 72 hours.    Microbiologic Results:  Microbiology Results (last 7 days)     Procedure Component Value Units Date/Time    Blood culture [[id number]] Collected: 06/29/22 1657    Order Status: Sent Specimen: Blood Updated: 06/29/22 1657    Blood culture [[id number]] Collected: 06/29/22 1439    Order Status: Sent Specimen: Blood Updated: 06/29/22 1439    AFB Culture & Smear [[id number]] Collected: 06/28/22 1950    Order Status: Completed Specimen: Abscess from Back Updated: 06/29/22 1247     AFB CULTURE STAIN No acid fast bacilli seen.    Narrative:      superficial    AFB Culture & Smear [[id number]] Collected: 06/28/22 1950    Order Status: Completed Specimen: Abscess from Back Updated: 06/29/22 1247     AFB CULTURE STAIN No acid fast bacilli seen.    Narrative:      Deep    Gram stain [[id number]] Collected: 06/28/22 1950    Order Status: Completed Specimen: Abscess from Back Updated: 06/29/22 0613     Gram Stain Result Few WBC's      Rare Gram positive cocci    Narrative:      superficial    Gram stain [[id number]] Collected: 06/28/22 1950    Order Status: Completed Specimen: Abscess from Back Updated: 06/29/22 0611     Gram Stain Result Rare WBC's      Rare Gram positive cocci    Narrative:      Deep    Culture, Anaerobe [[id number]] Collected: 06/28/22 1950    Order Status: Sent Specimen: Abscess from Back Updated: 06/29/22 0307    Fungus culture [[id number]] Collected: 06/28/22 1950    Order Status: Sent Specimen: Abscess from Back Updated: 06/29/22 0307    Fungus culture [[id number]] Collected: 06/28/22 1950    Order Status: Sent Specimen: Abscess from Back Updated: 06/29/22 0307    Culture, Anaerobe [[id number]] Collected: 06/28/22 1950    Order Status: Sent Specimen: Abscess from Back Updated: 06/29/22 0307    Aerobic culture [[id number]] Collected: 06/28/22 1950    Order Status: Sent Specimen:  Abscess from Back Updated: 06/29/22 0307    Aerobic culture [[id number]] Collected: 06/28/22 1950    Order Status: Sent Specimen: Abscess from Back Updated: 06/29/22 0307        Thank you for allowing us to participate in this patient's care.     Gely Sanchez, PharmD 06/29/2022 8:01 PM

## 2022-06-30 NOTE — PROGRESS NOTES
Formerly named [facility] Medicine  Progress Note    Patient Name: Friday ROGELIO Blake  MRN: [id number]  Patient Class: OP- Outpatient Recovery   Admission Date: 6/27/2022  Length of Stay: 0 days  Attending Physician: Shaggy Zepeda MD  Primary Care Provider: Sid Elizabeth MD        Subjective:     Principal Problem:Postoperative hematoma involving nervous system following nervous system procedure        HPI:  Pt is a 65 yo male with PMhx of DM, HTN, HPL who underwent an I & D of hematoma S/P lumbar spine laminectomy and discectomy performed by Dr. Zepeda. Hospital Medicine was consulted to assist with medical management. Pt's blood pressure controlled today, oxygenation appropriate and labs stable. Glucose 167 and sliding scale insulin ordered. Pt seen and examined and sitting in chair at bedside. He reports working with PT/OT. He describes left hip pain and is now taking oral analgesic. Other symptoms are denied. He denies weakness and paresthesias to legs.       Overview/Hospital Course:  6/30/22 - increasing left hip and lower back pain. Primary is attempting to obtain further CT and MRI imaging but patient intolerant due to pain. Given Dilaudid prn. Given Mag Citrate and Enema for c/o constipation with small response. On Vancomycin, no further fevers. Blood cultures NGTD and aerobic culture obtained during surgery has isolated Staph Epi. Neurosurgery following S/P I & D L spine hematoma. Linear bruise to the left nasal fold. To left cheek there is indurated area oozing clear fluid. Wound care is applying bactroban.       Interval History: See Hospital Course    Review of Systems   Constitutional:  Positive for activity change.   HENT: Negative.     Respiratory:  Negative for cough and shortness of breath.    Cardiovascular:  Negative for chest pain, palpitations and leg swelling.   Gastrointestinal:  Negative for nausea and vomiting.   Genitourinary:  Negative for decreased urine volume and difficulty  urinating.   Musculoskeletal:  Positive for arthralgias and myalgias.   Skin:  Positive for wound.   Neurological: Negative.  Negative for weakness and numbness.   Psychiatric/Behavioral: Negative.     Objective:     Vital Signs (Most Recent):  Temp: 99.8 °F (37.7 °C) (06/30/22 1127)  Pulse: 82 (06/30/22 1300)  Resp: 16 (06/30/22 1256)  BP: 131/68 (06/30/22 1127)  SpO2: (!) 94 % (06/30/22 1127)   Vital Signs (24h Range):  Temp:  [99 °F (37.2 °C)-99.8 °F (37.7 °C)] 99.8 °F (37.7 °C)  Pulse:  [70-85] 82  Resp:  [14-20] 16  SpO2:  [93 %-98 %] 94 %  BP: (130-140)/(59-80) 131/68     Weight: 108.2 kg (238 lb 8.6 oz)  Body mass index is 38.5 kg/m².    Intake/Output Summary (Last 24 hours) at 6/30/2022 1615  Last data filed at 6/30/2022 0600  Gross per 24 hour   Intake 200 ml   Output 550 ml   Net -350 ml      Physical Exam  Vitals and nursing note reviewed.   Constitutional:       Appearance: He is well-developed. He is obese. He is ill-appearing.   HENT:      Head: Normocephalic and atraumatic.      Nose: Nose normal.   Eyes:      General: No scleral icterus.     Conjunctiva/sclera: Conjunctivae normal.      Pupils: Pupils are equal, round, and reactive to light.   Cardiovascular:      Rate and Rhythm: Normal rate and regular rhythm.      Heart sounds: Normal heart sounds. No murmur heard.    No friction rub. No gallop.   Pulmonary:      Effort: Pulmonary effort is normal.      Breath sounds: Normal breath sounds.   Abdominal:      General: Bowel sounds are normal.      Palpations: Abdomen is soft.   Musculoskeletal:         General: No tenderness. Normal range of motion.      Cervical back: Normal range of motion and neck supple.   Skin:     General: Skin is warm and dry.      Comments: Occlusive dressing to midline spine  Linear bruising to left nasal fold  Left cheek with approx 1 inch circular area that is indurated and draining serous fluid   Neurological:      Mental Status: He is alert and oriented to person,  place, and time.   Psychiatric:         Behavior: Behavior normal.         Thought Content: Thought content normal.             Significant Labs: All pertinent labs within the past 24 hours have been reviewed.  CBC:   Recent Labs   Lab 06/29/22  0624 06/30/22  1057   WBC 9.63 9.68   HGB 11.5* 12.2*   HCT 35.9* 39.2*    189     CMP:   Recent Labs   Lab 06/29/22  0624      K 4.7      CO2 26   *   BUN 23   CREATININE 1.0   CALCIUM 8.8   ANIONGAP 13   EGFRNONAA >60       Significant Imaging: I have reviewed all pertinent imaging results/findings within the past 24 hours.      Assessment/Plan:      * Postoperative hematoma involving nervous system following nervous system procedure  Continue care by primary team      Fever  CXR and U/A negative  Empiric Vancomycin started  Aerobic culture taken from surgery notes Staph Epi      Hypertension associated with diabetes  B/P controlled - 135/63, 147/63  Resume antihypertensives      Diabetes mellitus without complication  accuchecks  Sliding scale insulin  Hold oral diabetic medications        VTE Risk Mitigation (From admission, onward)         Ordered     Place sequential compression device  Until discontinued         06/29/22 0710     IP VTE HIGH RISK PATIENT  Once         06/28/22 2146                Discharge Planning   MATEUS:      Code Status: Prior   Is the patient medically ready for discharge?:     Reason for patient still in hospital (select all that apply): Patient trending condition  Discharge Plan A: Home with family                  Josselin Forte NP  Department of Hospital Medicine   [facility] - Med Surg

## 2022-06-30 NOTE — ASSESSMENT & PLAN NOTE
CXR and U/A negative  Empiric Vancomycin started  Aerobic culture taken from surgery notes Staph Epi

## 2022-07-01 ENCOUNTER — PATIENT MESSAGE (OUTPATIENT)
Dept: NEUROSURGERY | Facility: CLINIC | Age: 67
End: 2022-07-01
Payer: MEDICARE

## 2022-07-01 ENCOUNTER — TELEPHONE (OUTPATIENT)
Dept: CARDIOLOGY | Facility: HOSPITAL | Age: 67
End: 2022-07-01
Payer: MEDICARE

## 2022-07-01 LAB
ANION GAP SERPL CALC-SCNC: 11 MMOL/L (ref 8–16)
BASOPHILS # BLD AUTO: 0.02 K/UL (ref 0–0.2)
BASOPHILS NFR BLD: 0.2 % (ref 0–1.9)
BUN SERPL-MCNC: 13 MG/DL (ref 8–23)
CALCIUM SERPL-MCNC: 8.5 MG/DL (ref 8.7–10.5)
CHLORIDE SERPL-SCNC: 103 MMOL/L (ref 95–110)
CO2 SERPL-SCNC: 23 MMOL/L (ref 23–29)
CREAT SERPL-MCNC: 0.8 MG/DL (ref 0.5–1.4)
DIFFERENTIAL METHOD: ABNORMAL
EOSINOPHIL # BLD AUTO: 0.1 K/UL (ref 0–0.5)
EOSINOPHIL NFR BLD: 1.1 % (ref 0–8)
ERYTHROCYTE [DISTWIDTH] IN BLOOD BY AUTOMATED COUNT: 13.7 % (ref 11.5–14.5)
EST. GFR  (AFRICAN AMERICAN): >60 ML/MIN/1.73 M^2
EST. GFR  (NON AFRICAN AMERICAN): >60 ML/MIN/1.73 M^2
GLUCOSE SERPL-MCNC: 190 MG/DL (ref 70–110)
HCT VFR BLD AUTO: 37.1 % (ref 40–54)
HGB BLD-MCNC: 11.7 G/DL (ref 14–18)
IMM GRANULOCYTES # BLD AUTO: 0.05 K/UL (ref 0–0.04)
IMM GRANULOCYTES NFR BLD AUTO: 0.6 % (ref 0–0.5)
LYMPHOCYTES # BLD AUTO: 2.2 K/UL (ref 1–4.8)
LYMPHOCYTES NFR BLD: 24.8 % (ref 18–48)
MCH RBC QN AUTO: 28.3 PG (ref 27–31)
MCHC RBC AUTO-ENTMCNC: 31.5 G/DL (ref 32–36)
MCV RBC AUTO: 90 FL (ref 82–98)
MONOCYTES # BLD AUTO: 0.6 K/UL (ref 0.3–1)
MONOCYTES NFR BLD: 7.4 % (ref 4–15)
NEUTROPHILS # BLD AUTO: 5.7 K/UL (ref 1.8–7.7)
NEUTROPHILS NFR BLD: 65.9 % (ref 38–73)
NRBC BLD-RTO: 0 /100 WBC
PLATELET # BLD AUTO: 203 K/UL (ref 150–450)
PMV BLD AUTO: 10.8 FL (ref 9.2–12.9)
POCT GLUCOSE: 176 MG/DL (ref 70–110)
POCT GLUCOSE: 195 MG/DL (ref 70–110)
POCT GLUCOSE: 197 MG/DL (ref 70–110)
POCT GLUCOSE: 199 MG/DL (ref 70–110)
POTASSIUM SERPL-SCNC: 4.6 MMOL/L (ref 3.5–5.1)
RBC # BLD AUTO: 4.13 M/UL (ref 4.6–6.2)
SODIUM SERPL-SCNC: 137 MMOL/L (ref 136–145)
VANCOMYCIN TROUGH SERPL-MCNC: 11.8 UG/ML (ref 10–22)
WBC # BLD AUTO: 8.7 K/UL (ref 3.9–12.7)

## 2022-07-01 PROCEDURE — 99024 POSTOP FOLLOW-UP VISIT: CPT | Mod: ,,, | Performed by: PHYSICIAN ASSISTANT

## 2022-07-01 PROCEDURE — 94761 N-INVAS EAR/PLS OXIMETRY MLT: CPT

## 2022-07-01 PROCEDURE — 25500020 PHARM REV CODE 255: Performed by: NEUROLOGICAL SURGERY

## 2022-07-01 PROCEDURE — 63600175 PHARM REV CODE 636 W HCPCS: Performed by: NURSE PRACTITIONER

## 2022-07-01 PROCEDURE — 80202 ASSAY OF VANCOMYCIN: CPT | Performed by: NEUROLOGICAL SURGERY

## 2022-07-01 PROCEDURE — 94799 UNLISTED PULMONARY SVC/PX: CPT

## 2022-07-01 PROCEDURE — 36415 COLL VENOUS BLD VENIPUNCTURE: CPT | Performed by: NEUROLOGICAL SURGERY

## 2022-07-01 PROCEDURE — 63600175 PHARM REV CODE 636 W HCPCS: Performed by: NEUROLOGICAL SURGERY

## 2022-07-01 PROCEDURE — 25000003 PHARM REV CODE 250: Performed by: PHYSICIAN ASSISTANT

## 2022-07-01 PROCEDURE — 85025 COMPLETE CBC W/AUTO DIFF WBC: CPT | Performed by: NEUROLOGICAL SURGERY

## 2022-07-01 PROCEDURE — 25000003 PHARM REV CODE 250: Performed by: NEUROLOGICAL SURGERY

## 2022-07-01 PROCEDURE — 21400001 HC TELEMETRY ROOM

## 2022-07-01 PROCEDURE — 80048 BASIC METABOLIC PNL TOTAL CA: CPT | Performed by: NEUROLOGICAL SURGERY

## 2022-07-01 PROCEDURE — A9585 GADOBUTROL INJECTION: HCPCS | Performed by: NEUROLOGICAL SURGERY

## 2022-07-01 PROCEDURE — 25000003 PHARM REV CODE 250: Performed by: NURSE PRACTITIONER

## 2022-07-01 PROCEDURE — C9399 UNCLASSIFIED DRUGS OR BIOLOG: HCPCS | Performed by: NURSE PRACTITIONER

## 2022-07-01 PROCEDURE — 99900035 HC TECH TIME PER 15 MIN (STAT)

## 2022-07-01 PROCEDURE — 11000001 HC ACUTE MED/SURG PRIVATE ROOM

## 2022-07-01 PROCEDURE — 99024 PR POST-OP FOLLOW-UP VISIT: ICD-10-PCS | Mod: ,,, | Performed by: PHYSICIAN ASSISTANT

## 2022-07-01 RX ORDER — GABAPENTIN 300 MG/1
300 CAPSULE ORAL 3 TIMES DAILY
Status: DISCONTINUED | OUTPATIENT
Start: 2022-07-01 | End: 2022-07-06 | Stop reason: HOSPADM

## 2022-07-01 RX ORDER — DEXAMETHASONE SODIUM PHOSPHATE 4 MG/ML
4 INJECTION, SOLUTION INTRA-ARTICULAR; INTRALESIONAL; INTRAMUSCULAR; INTRAVENOUS; SOFT TISSUE EVERY 6 HOURS
Status: DISCONTINUED | OUTPATIENT
Start: 2022-07-01 | End: 2022-07-02

## 2022-07-01 RX ORDER — GADOBUTROL 604.72 MG/ML
10 INJECTION INTRAVENOUS
Status: COMPLETED | OUTPATIENT
Start: 2022-07-01 | End: 2022-07-01

## 2022-07-01 RX ADMIN — LOSARTAN POTASSIUM 50 MG: 50 TABLET, FILM COATED ORAL at 08:07

## 2022-07-01 RX ADMIN — ONDANSETRON 8 MG: 8 TABLET, ORALLY DISINTEGRATING ORAL at 10:07

## 2022-07-01 RX ADMIN — INSULIN ASPART 2 UNITS: 100 INJECTION, SOLUTION INTRAVENOUS; SUBCUTANEOUS at 05:07

## 2022-07-01 RX ADMIN — INSULIN ASPART 2 UNITS: 100 INJECTION, SOLUTION INTRAVENOUS; SUBCUTANEOUS at 09:07

## 2022-07-01 RX ADMIN — GADOBUTROL 10 ML: 604.72 INJECTION INTRAVENOUS at 09:07

## 2022-07-01 RX ADMIN — VANCOMYCIN HYDROCHLORIDE 1500 MG: 1.5 INJECTION, POWDER, LYOPHILIZED, FOR SOLUTION INTRAVENOUS at 08:07

## 2022-07-01 RX ADMIN — ATORVASTATIN CALCIUM 10 MG: 10 TABLET, FILM COATED ORAL at 09:07

## 2022-07-01 RX ADMIN — GABAPENTIN 300 MG: 300 CAPSULE ORAL at 09:07

## 2022-07-01 RX ADMIN — POLYETHYLENE GLYCOL 3350 17 G: 17 POWDER, FOR SOLUTION ORAL at 09:07

## 2022-07-01 RX ADMIN — INSULIN DETEMIR 10 UNITS: 100 INJECTION, SOLUTION SUBCUTANEOUS at 09:07

## 2022-07-01 RX ADMIN — POLYETHYLENE GLYCOL 3350 17 G: 17 POWDER, FOR SOLUTION ORAL at 08:07

## 2022-07-01 RX ADMIN — DEXAMETHASONE SODIUM PHOSPHATE 4 MG: 4 INJECTION INTRA-ARTICULAR; INTRALESIONAL; INTRAMUSCULAR; INTRAVENOUS; SOFT TISSUE at 05:07

## 2022-07-01 RX ADMIN — DEXAMETHASONE SODIUM PHOSPHATE 4 MG: 4 INJECTION INTRA-ARTICULAR; INTRALESIONAL; INTRAMUSCULAR; INTRAVENOUS; SOFT TISSUE at 08:07

## 2022-07-01 RX ADMIN — INSULIN ASPART 2 UNITS: 100 INJECTION, SOLUTION INTRAVENOUS; SUBCUTANEOUS at 11:07

## 2022-07-01 RX ADMIN — GABAPENTIN 300 MG: 300 CAPSULE ORAL at 03:07

## 2022-07-01 RX ADMIN — VANCOMYCIN HYDROCHLORIDE 1750 MG: 10 INJECTION, POWDER, LYOPHILIZED, FOR SOLUTION INTRAVENOUS at 08:07

## 2022-07-01 RX ADMIN — DOCUSATE SODIUM 100 MG: 100 CAPSULE, LIQUID FILLED ORAL at 09:07

## 2022-07-01 RX ADMIN — HYDROMORPHONE HYDROCHLORIDE 1 MG: 2 INJECTION INTRAMUSCULAR; INTRAVENOUS; SUBCUTANEOUS at 08:07

## 2022-07-01 RX ADMIN — THERA TABS 1 TABLET: TAB at 08:07

## 2022-07-01 RX ADMIN — DOCUSATE SODIUM 100 MG: 100 CAPSULE, LIQUID FILLED ORAL at 08:07

## 2022-07-01 NOTE — PLAN OF CARE
Pt pleasantly declined gait training today due to significant pain with weight bearing. Tolerated LE rom  with out issues.

## 2022-07-01 NOTE — ASSESSMENT & PLAN NOTE
accuchecks  Sliding scale insulin  Hold oral diabetic medications    7/1/2022  Diabetic diet   Glucose elevated, probable related to IV steroids  Continue moderated dose SSI  Start detemir 10 units BID

## 2022-07-01 NOTE — SUBJECTIVE & OBJECTIVE
Interval History: Pt reports overall pain has decrease today. Case reviewed with Neurosurgery, continue current management. Will follow.     Review of Systems   Constitutional:  Positive for activity change.   HENT: Negative.     Respiratory:  Negative for cough and shortness of breath.    Cardiovascular:  Negative for chest pain, palpitations and leg swelling.   Gastrointestinal:  Negative for nausea and vomiting.   Genitourinary:  Negative for decreased urine volume and difficulty urinating.   Musculoskeletal:  Positive for arthralgias, back pain and myalgias.   Skin:  Positive for wound.   Neurological: Negative.  Negative for weakness and numbness.   Psychiatric/Behavioral: Negative.     Objective:     Vital Signs (Most Recent):  Temp: 98.4 °F (36.9 °C) (07/01/22 1628)  Pulse: 70 (07/01/22 1628)  Resp: 17 (07/01/22 1628)  BP: (!) 143/78 (07/01/22 1628)  SpO2: 97 % (07/01/22 1628)   Vital Signs (24h Range):  Temp:  [98 °F (36.7 °C)-99.4 °F (37.4 °C)] 98.4 °F (36.9 °C)  Pulse:  [63-95] 70  Resp:  [17-18] 17  SpO2:  [92 %-97 %] 97 %  BP: (107-153)/(53-81) 143/78     Weight: 107.3 kg (236 lb 8.9 oz)  Body mass index is 38.18 kg/m².    Intake/Output Summary (Last 24 hours) at 7/1/2022 1742  Last data filed at 7/1/2022 1400  Gross per 24 hour   Intake 240 ml   Output 600 ml   Net -360 ml      Physical Exam  Vitals and nursing note reviewed.   Constitutional:       General: He is not in acute distress.     Appearance: He is not ill-appearing, toxic-appearing or diaphoretic.   HENT:      Head: Normocephalic and atraumatic.      Comments: Left facial blister improving.   Cardiovascular:      Heart sounds: No murmur heard.    No friction rub. No gallop.   Pulmonary:      Effort: No respiratory distress.      Breath sounds: No stridor. No wheezing, rhonchi or rales.   Chest:      Chest wall: No tenderness.   Abdominal:      General: There is no distension.      Palpations: There is no mass.      Tenderness: There is no  abdominal tenderness. There is no right CVA tenderness, left CVA tenderness, guarding or rebound.      Hernia: No hernia is present.   Musculoskeletal:         General: Tenderness (lower left back) present. No swelling, deformity or signs of injury.      Right lower leg: No edema.      Left lower leg: No edema.      Comments: Left lower back dressing intact    Skin:     Coloration: Skin is not jaundiced or pale.      Findings: No bruising, erythema, lesion or rash.   Neurological:      Cranial Nerves: No cranial nerve deficit.      Sensory: No sensory deficit.      Motor: Weakness present.      Coordination: Coordination normal.      Gait: Gait normal.      Deep Tendon Reflexes: Reflexes normal.       Significant Labs: All pertinent labs within the past 24 hours have been reviewed.  CBC:   Recent Labs   Lab 06/30/22  1057 07/01/22  0701   WBC 9.68 8.70   HGB 12.2* 11.7*   HCT 39.2* 37.1*    203     CMP:   Recent Labs   Lab 07/01/22  0701      K 4.6      CO2 23   *   BUN 13   CREATININE 0.8   CALCIUM 8.5*   ANIONGAP 11   EGFRNONAA >60     POCT Glucose:   Recent Labs   Lab 07/01/22  0603 07/01/22  1105 07/01/22  1628   POCTGLUCOSE 199* 195* 176*       Significant Imaging:     Imaging Results              MRI Lumbar Spine Without Contrast (Final result)  Result time 06/27/22 16:24:14      Final result by Ramon Mead Jr., MD (06/27/22 16:24:14)                   Impression:      1. Interval left hemilaminectomy at L2-L3.  There is ventral epidural tissue that likely relates to small hematoma and less likely extruded disc or phlegmonous material.  This distorts the thecal sac toward the right and results in flattening to 5 mm.  There is severe lateral recess stenosis that could affect the L3 spinal nerve roots that are descending at this level.  2. No definitive signs of spondylodiscitis.  No definite rosette abscess.  Increased T2 signal within the paraspinal muscles about the laminectomy  site is not necessarily unusual given the postoperative state.  3. Unchanged degenerative findings elsewhere as described above.  Persistent mild/moderate left lateral recess stenosis at L1-L2 a could affect the left L2 spinal nerve roots.  These findings were discussed with Dr. Alford at 16:24.      Electronically signed by: Ramon Mead Jr., MD  Date:    06/27/2022  Time:    16:24               Narrative:    EXAMINATION:  MRI LUMBAR SPINE WITHOUT CONTRAST    CLINICAL HISTORY:  back pain;    TECHNIQUE:  MR Lumbar spine without contrast. Sagittal T1, T2, STIR. Axial T1, T2. Coronal T1.    COMPARISON:  Intraoperative fluoroscopy views from 06/22/2022 were reviewed.  CT of the lumbar spine from 05/03/2022 was also reviewed.  MRI of the lumbar spine from 01/18/2022 was reviewed as well.    FINDINGS:  There are postoperative findings from interval left hemilaminectomy at L2-L3.  There is small granulation tissue within the dorsal laminotomy site.  There is abnormal material within the ventral epidural spinal canal that flattens the thecal sac toward the right at the level of mid L3.  The thecal sac measures 5 mm in transverse dimension as demonstrated on axial T2 series 6, image 18.  The abnormal ventral soft tissue/hematoma/extruded disc measures 57 mm craniocaudal approximately by 19 mm transverse by 8 mm in AP dimension.  There is also abnormal tissue within the bilateral lateral recesses at this level, left greater than right, that could affect the descending L3 spinal nerve roots.  Normal appearing marrow signal without definite signs of spondylodiscitis.  There is increased T2 signal within the bilateral paraspinal muscles adjacent to the operative site extending inferiorly to the level of L4-L5.  The conus medullaris terminates at the level of L1-L2.  No abnormal signal  within the conus. Intervertebral disc levels are as follows:    T12-L1 disc: Unchanged minimal disc bulge with mild facet hypertrophy.  The  thecal sac measures 11 mm with no significant foraminal stenosis.    L1-L2 disc : Unchanged left paracentral disc protrusion with annular fissure that causes mild left lateral recess stenosis.  Mild degenerative facet hypertrophy.  The thecal sac measures 7 mm AP which is unchanged.  No significant foraminal stenosis.    L2-L3 disc: Level of prior surgery with epidural abnormal tissue that flattens the thecal sac dorsally and toward the right where it measures as small as 5 mm.  Lateral recess stenosis could affect the descending L3 spinal nerve roots.  Abnormal tissue also encroaches into the left exit foramen at L2-L3 surrounding the exiting left L2 spinal nerve.    L3-L4 disc: Broad-based posterior disc bulge.  Mild degenerative facet hypertrophy bilaterally.  The thecal sac measures 8 mm in AP dimension.  Mild bilateral neural foraminal stenosis due to bulging disc which is similar to the previous exam.    L4-L5 disc: Circumferentially bulging disc that encroaches into the floors of the exit foramina.  Anterior osteophytes.  Severe degenerative facet hypertrophy with facet joint effusions.  The thecal sac measures 10 mm.  Mild bilateral foraminal stenosis.    L5-S1 disc: Normal disc space height with severe degenerative facet hypertrophy.  No significant spinal or foraminal stenosis.  The thecal sac measures 11 mm.

## 2022-07-01 NOTE — ASSESSMENT & PLAN NOTE
CXR and U/A negative  Empiric Vancomycin started  Aerobic culture taken from surgery notes Staph Epi    7/1/2022  Aerobic culture from surgery positive for Staph  BC, 2 days ago, Gram positive cocci in clusters resembling Staph    Continue Vanco

## 2022-07-01 NOTE — PROGRESS NOTES
Pharmacokinetic Assessment Follow Up: IV Vancomycin    Vancomycin serum concentration assessment(s):  Pt received a 2 gram loading dose followed by 1500 mg every 12 hours (note that 2nd dose was given 2 hours late)  Trough before 4th total dose @ 0701 this AM (collected on time) = 11.8 mcg/ml (subtherapeutic)     Vancomycin Regimen Plan:  Increasing dose from 1500 mg IV every 12 hours to 1750 mg IV every 12 hours  Next trough due tomorrow 7/02 @ 1900 before 3rd new dose (4th total dose since trough result)  Goal trough: 15-20 mcg/ml (until blood culture determined a contaminant vs true bacteremia)     Drug levels (last 3 results):  Recent Labs   Lab Result Units 07/01/22  0701   Vancomycin-Trough ug/mL 11.8       Pharmacy will continue to follow and monitor vancomycin.    Please contact pharmacy at extension [phone] for questions regarding this assessment.    Thank you for the consult,   Katherine McArdle, Pharm.D. 7/1/2022 2:54 PM      Patient brief summary:  Friday ROGELIO Blake is a 66 y.o. male initiated on antimicrobial therapy with IV Vancomycin for treatment of skin & soft tissue infection, possible bacteremia vs contaminant     The patient's current regimen is 1750 mg IV every 12 hours    Drug Allergies:   Review of patient's allergies indicates:  No Known Allergies    Actual Body Weight:   107.3 kg    Renal Function:   Estimated Creatinine Clearance: 104.3 mL/min (based on SCr of 0.8 mg/dL). -- down from 1.0      CBC (last 72 hours):  Recent Labs   Lab Result Units 06/29/22  0624 06/30/22  1057 07/01/22  0701   WBC K/uL 9.63 9.68 8.70   Hemoglobin g/dL 11.5* 12.2* 11.7*   Hematocrit % 35.9* 39.2* 37.1*   Platelets K/uL 171 189 203   Gran % % 62.8 68.8 65.9   Lymph % % 26.9 20.5 24.8   Mono % % 9.2 7.5 7.4   Eosinophil % % 0.6 2.0 1.1   Basophil % % 0.2 0.5 0.2   Differential Method  Automated Automated Automated       Metabolic Panel (last 72 hours):  Recent Labs   Lab Result Units 06/29/22  0624  06/29/22  1712 07/01/22  0701   Sodium mmol/L 140  --  137   Potassium mmol/L 4.7  --  4.6   Chloride mmol/L 101  --  103   CO2 mmol/L 26  --  23   Glucose mg/dL 167*  --  190*   Glucose, UA   --  Trace*  --    BUN mg/dL 23  --  13   Creatinine mg/dL 1.0  --  0.8       Vancomycin Administrations:  vancomycin given in the last 96 hours                   vancomycin 1.5 g in dextrose 5 % 250 mL IVPB (ready to mix) (mg) 1,500 mg New Bag 07/01/22 0818     1,500 mg New Bag 06/30/22 1957     1,500 mg New Bag  0801    vancomycin 2 g in dextrose 5 % 500 mL IVPB (mg) 2,000 mg New Bag 06/29/22 1745    vancomycin injection (g) 1 g Given 06/28/22 1846    vancomycin (VANCOCIN) 1,000 mg in sodium chloride 0.9% 250 mL IVPB (mg) 1,000 mg New Bag 06/28/22 1601                Microbiologic Results:  Microbiology Results (last 7 days)     Procedure Component Value Units Date/Time    Aerobic culture [[id number]]  (Abnormal) Collected: 06/28/22 1950    Order Status: Completed Specimen: Abscess from Back Updated: 07/01/22 1024     Aerobic Bacterial Culture STAPHYLOCOCCUS EPIDERMIDIS  Moderate  Susceptibility pending      Narrative:      Deep    Culture, Anaerobe [[id number]] Collected: 06/28/22 1950    Order Status: Completed Specimen: Abscess from Back Updated: 07/01/22 0857     Anaerobic Culture Culture in progress    Narrative:      Deep    Culture, Anaerobe [[id number]] Collected: 06/28/22 1950    Order Status: Completed Specimen: Abscess from Back Updated: 07/01/22 0856     Anaerobic Culture Culture in progress    Narrative:      superficial    Blood culture [[id number]] Collected: 06/29/22 1657    Order Status: Completed Specimen: Blood Updated: 07/01/22 0547     Blood Culture, Routine Gram stain nico bottle: Gram positive cocci in clusters resembling Staph       Results called to and read back by: oSheila You 07/01/2022  05:47    Blood culture [[id number]] Collected: 06/29/22 1439    Order Status: Completed Specimen: Blood Updated:  06/30/22 2212     Blood Culture, Routine No Growth to date      No Growth to date    Narrative:      Aerobic, anaerobic cx    AFB Culture & Smear [[id number]] Collected: 06/28/22 1950    Order Status: Completed Specimen: Abscess from Back Updated: 06/30/22 0927     AFB Culture & Smear Culture in progress     AFB CULTURE STAIN No acid fast bacilli seen.    Narrative:      superficial    AFB Culture & Smear [[id number]] Collected: 06/28/22 1950    Order Status: Completed Specimen: Abscess from Back Updated: 06/30/22 0927     AFB Culture & Smear Culture in progress     AFB CULTURE STAIN No acid fast bacilli seen.    Narrative:      Deep    Aerobic culture [[id number]] Collected: 06/28/22 1950    Order Status: Completed Specimen: Abscess from Back Updated: 06/30/22 0848     Aerobic Bacterial Culture No growth    Narrative:      Superficial    Gram stain [[id number]] Collected: 06/28/22 1950    Order Status: Completed Specimen: Abscess from Back Updated: 06/29/22 0613     Gram Stain Result Few WBC's      Rare Gram positive cocci    Narrative:      superficial    Gram stain [[id number]] Collected: 06/28/22 1950    Order Status: Completed Specimen: Abscess from Back Updated: 06/29/22 0611     Gram Stain Result Rare WBC's      Rare Gram positive cocci    Narrative:      Deep    Fungus culture [[id number]] Collected: 06/28/22 1950    Order Status: Sent Specimen: Abscess from Back Updated: 06/29/22 0307    Fungus culture [[id number]] Collected: 06/28/22 1950    Order Status: Sent Specimen: Abscess from Back Updated: 06/29/22 0307

## 2022-07-01 NOTE — NURSING
Received in side-lying position awake and alert, resp even and unlabored, assessment per flowsheet, will continue to monitor.

## 2022-07-01 NOTE — PT/OT/SLP PROGRESS
Physical Therapy      Patient Name:  Friday ROGELIO Blake   MRN:  [id number]    08:45 a.m.  Per patient's nurse, patient had just been transported to radiology. Will follow up during next scheduled PT session.

## 2022-07-01 NOTE — PLAN OF CARE
Plan of care discussed with pt. Pt verbalized understanding. Free from injury. No s/s of distress noted. Vitals stable. IV abx. Meds as ordered. Pain controlled. Diet tolerated. Tele monitor in place. Lumbar dressing clean, dry, intact. Q2 hour rounding. No complaints. Will continue to monitor pt. 12 hour chart review.

## 2022-07-01 NOTE — PROGRESS NOTES
[facility] Medicine  Progress Note    Patient Name: Friday ROGELIO Blake  MRN: [id number]  Patient Class: IP- Inpatient   Admission Date: 6/27/2022  Length of Stay: 1 days  Attending Physician: Shaggy Zepeda MD  Primary Care Provider: Sid Elizabeth MD        Subjective:     Principal Problem:Postoperative hematoma involving nervous system following nervous system procedure        HPI:  Pt is a 65 yo male with PMhx of DM, HTN, HPL who underwent an I & D of hematoma S/P lumbar spine laminectomy and discectomy performed by Dr. Zepeda. Hospital Medicine was consulted to assist with medical management. Pt's blood pressure controlled today, oxygenation appropriate and labs stable. Glucose 167 and sliding scale insulin ordered. Pt seen and examined and sitting in chair at bedside. He reports working with PT/OT. He describes left hip pain and is now taking oral analgesic. Other symptoms are denied. He denies weakness and paresthesias to legs.       Overview/Hospital Course:  6/30/22 - increasing left hip and lower back pain. Primary is attempting to obtain further CT and MRI imaging but patient intolerant due to pain. Given Dilaudid prn. Given Mag Citrate and Enema for c/o constipation with small response. On Vancomycin, no further fevers. Blood cultures NGTD and aerobic culture obtained during surgery has isolated Staph Epi. Neurosurgery following S/P I & D L spine hematoma. Linear bruise to the left nasal fold. To left cheek there is indurated area oozing clear fluid. Wound care is applying bactroban. As of 7/1/2022, vital signs and labs stable. Glucose elevated, probable related to IV steroid. Pt reports back pain improving. Neurosurgery following, no surgery plan for today. Patient reports left facial blister improving as well.       Interval History: Pt reports overall pain has decrease today. Case reviewed with Neurosurgery, continue current management. Will follow.     Review of Systems    Constitutional:  Positive for activity change.   HENT: Negative.     Respiratory:  Negative for cough and shortness of breath.    Cardiovascular:  Negative for chest pain, palpitations and leg swelling.   Gastrointestinal:  Negative for nausea and vomiting.   Genitourinary:  Negative for decreased urine volume and difficulty urinating.   Musculoskeletal:  Positive for arthralgias, back pain and myalgias.   Skin:  Positive for wound.   Neurological: Negative.  Negative for weakness and numbness.   Psychiatric/Behavioral: Negative.     Objective:     Vital Signs (Most Recent):  Temp: 98.4 °F (36.9 °C) (07/01/22 1628)  Pulse: 70 (07/01/22 1628)  Resp: 17 (07/01/22 1628)  BP: (!) 143/78 (07/01/22 1628)  SpO2: 97 % (07/01/22 1628)   Vital Signs (24h Range):  Temp:  [98 °F (36.7 °C)-99.4 °F (37.4 °C)] 98.4 °F (36.9 °C)  Pulse:  [63-95] 70  Resp:  [17-18] 17  SpO2:  [92 %-97 %] 97 %  BP: (107-153)/(53-81) 143/78     Weight: 107.3 kg (236 lb 8.9 oz)  Body mass index is 38.18 kg/m².    Intake/Output Summary (Last 24 hours) at 7/1/2022 1742  Last data filed at 7/1/2022 1400  Gross per 24 hour   Intake 240 ml   Output 600 ml   Net -360 ml      Physical Exam  Vitals and nursing note reviewed.   Constitutional:       General: He is not in acute distress.     Appearance: He is not ill-appearing, toxic-appearing or diaphoretic.   HENT:      Head: Normocephalic and atraumatic.      Comments: Left facial blister improving.   Cardiovascular:      Heart sounds: No murmur heard.    No friction rub. No gallop.   Pulmonary:      Effort: No respiratory distress.      Breath sounds: No stridor. No wheezing, rhonchi or rales.   Chest:      Chest wall: No tenderness.   Abdominal:      General: There is no distension.      Palpations: There is no mass.      Tenderness: There is no abdominal tenderness. There is no right CVA tenderness, left CVA tenderness, guarding or rebound.      Hernia: No hernia is present.   Musculoskeletal:          General: Tenderness (lower left back) present. No swelling, deformity or signs of injury.      Right lower leg: No edema.      Left lower leg: No edema.      Comments: Left lower back dressing intact    Skin:     Coloration: Skin is not jaundiced or pale.      Findings: No bruising, erythema, lesion or rash.   Neurological:      Cranial Nerves: No cranial nerve deficit.      Sensory: No sensory deficit.      Motor: Weakness present.      Coordination: Coordination normal.      Gait: Gait normal.      Deep Tendon Reflexes: Reflexes normal.       Significant Labs: All pertinent labs within the past 24 hours have been reviewed.  CBC:   Recent Labs   Lab 06/30/22  1057 07/01/22  0701   WBC 9.68 8.70   HGB 12.2* 11.7*   HCT 39.2* 37.1*    203     CMP:   Recent Labs   Lab 07/01/22  0701      K 4.6      CO2 23   *   BUN 13   CREATININE 0.8   CALCIUM 8.5*   ANIONGAP 11   EGFRNONAA >60     POCT Glucose:   Recent Labs   Lab 07/01/22  0603 07/01/22  1105 07/01/22  1628   POCTGLUCOSE 199* 195* 176*       Significant Imaging:     Imaging Results              MRI Lumbar Spine Without Contrast (Final result)  Result time 06/27/22 16:24:14      Final result by Ramon Mead Jr., MD (06/27/22 16:24:14)                   Impression:      1. Interval left hemilaminectomy at L2-L3.  There is ventral epidural tissue that likely relates to small hematoma and less likely extruded disc or phlegmonous material.  This distorts the thecal sac toward the right and results in flattening to 5 mm.  There is severe lateral recess stenosis that could affect the L3 spinal nerve roots that are descending at this level.  2. No definitive signs of spondylodiscitis.  No definite rosette abscess.  Increased T2 signal within the paraspinal muscles about the laminectomy site is not necessarily unusual given the postoperative state.  3. Unchanged degenerative findings elsewhere as described above.  Persistent mild/moderate left  lateral recess stenosis at L1-L2 a could affect the left L2 spinal nerve roots.  These findings were discussed with Dr. Alford at 16:24.      Electronically signed by: Ramon Mead Jr., MD  Date:    06/27/2022  Time:    16:24               Narrative:    EXAMINATION:  MRI LUMBAR SPINE WITHOUT CONTRAST    CLINICAL HISTORY:  back pain;    TECHNIQUE:  MR Lumbar spine without contrast. Sagittal T1, T2, STIR. Axial T1, T2. Coronal T1.    COMPARISON:  Intraoperative fluoroscopy views from 06/22/2022 were reviewed.  CT of the lumbar spine from 05/03/2022 was also reviewed.  MRI of the lumbar spine from 01/18/2022 was reviewed as well.    FINDINGS:  There are postoperative findings from interval left hemilaminectomy at L2-L3.  There is small granulation tissue within the dorsal laminotomy site.  There is abnormal material within the ventral epidural spinal canal that flattens the thecal sac toward the right at the level of mid L3.  The thecal sac measures 5 mm in transverse dimension as demonstrated on axial T2 series 6, image 18.  The abnormal ventral soft tissue/hematoma/extruded disc measures 57 mm craniocaudal approximately by 19 mm transverse by 8 mm in AP dimension.  There is also abnormal tissue within the bilateral lateral recesses at this level, left greater than right, that could affect the descending L3 spinal nerve roots.  Normal appearing marrow signal without definite signs of spondylodiscitis.  There is increased T2 signal within the bilateral paraspinal muscles adjacent to the operative site extending inferiorly to the level of L4-L5.  The conus medullaris terminates at the level of L1-L2.  No abnormal signal  within the conus. Intervertebral disc levels are as follows:    T12-L1 disc: Unchanged minimal disc bulge with mild facet hypertrophy.  The thecal sac measures 11 mm with no significant foraminal stenosis.    L1-L2 disc : Unchanged left paracentral disc protrusion with annular fissure that causes mild  left lateral recess stenosis.  Mild degenerative facet hypertrophy.  The thecal sac measures 7 mm AP which is unchanged.  No significant foraminal stenosis.    L2-L3 disc: Level of prior surgery with epidural abnormal tissue that flattens the thecal sac dorsally and toward the right where it measures as small as 5 mm.  Lateral recess stenosis could affect the descending L3 spinal nerve roots.  Abnormal tissue also encroaches into the left exit foramen at L2-L3 surrounding the exiting left L2 spinal nerve.    L3-L4 disc: Broad-based posterior disc bulge.  Mild degenerative facet hypertrophy bilaterally.  The thecal sac measures 8 mm in AP dimension.  Mild bilateral neural foraminal stenosis due to bulging disc which is similar to the previous exam.    L4-L5 disc: Circumferentially bulging disc that encroaches into the floors of the exit foramina.  Anterior osteophytes.  Severe degenerative facet hypertrophy with facet joint effusions.  The thecal sac measures 10 mm.  Mild bilateral foraminal stenosis.    L5-S1 disc: Normal disc space height with severe degenerative facet hypertrophy.  No significant spinal or foraminal stenosis.  The thecal sac measures 11 mm.                                         Assessment/Plan:      * Postoperative hematoma involving nervous system following nervous system procedure  Continue care by primary team      Fever  CXR and U/A negative  Empiric Vancomycin started  Aerobic culture taken from surgery notes Staph Epi    7/1/2022  Aerobic culture from surgery positive for Staph  BC, 2 days ago, Gram positive cocci in clusters resembling Staph    Continue Vanco       Hypertension associated with diabetes  B/P controlled - 135/63, 147/63  Resume antihypertensives      Diabetes mellitus without complication  accuchecks  Sliding scale insulin  Hold oral diabetic medications    7/1/2022  Diabetic diet   Glucose elevated, probable related to IV steroids  Continue moderated dose SSI  Start  detemir 10 units BID         VTE Risk Mitigation (From admission, onward)         Ordered     Place sequential compression device  Until discontinued         06/29/22 0710     IP VTE HIGH RISK PATIENT  Once         06/28/22 8695                Discharge Planning   MATEUS:      Code Status: Prior   Is the patient medically ready for discharge?:     Reason for patient still in hospital (select all that apply): Patient trending condition and Treatment  Discharge Plan A: Home with family                  Kamron Suero NP  Department of Hospital Medicine   [facility] - Med Surg

## 2022-07-01 NOTE — PROGRESS NOTES
"[facility] - Med [facility]urg  Neurosurgery  Progress Note    Subjective:     Interval History:   Patient was seen earlier this morning.  Appears more comfortable and in less distress compared to yesterday.  Reports he slept very well from 8:30 pm to 2 am and able to tolerate lying in his bed.   States "my right leg feels normal again."  He still have pain on L side of lower back and pelvis.  Patient reports he wants to attempt MRI today since pain is better.    Yesterday patient had BM.  He participated with therapy yesterday. Able to tolerate LE ROM exercises but declined gait training.    History of Present Illness: See H&P.    Post-Op Info:  Procedure(s) (LRB):  INCISION AND DRAINAGE, HEMATOMA (N/A)  LAMINECTOMY, SPINE, LUMBAR, WITH DISCECTOMY (N/A)   3 Days Post-Op      Medications:  Continuous Infusions:   sodium chloride 0.9% 100 mL/hr at 06/29/22 0707     Scheduled Meds:   atorvastatin  10 mg Oral QHS    dexamethasone  4 mg Intravenous Q6H    docusate sodium  100 mg Oral BID    insulin detemir U-100  10 Units Subcutaneous BID    losartan  50 mg Oral Daily    multivitamin  1 tablet Oral Daily    polyethylene glycol  17 g Oral BID    vancomycin (VANCOCIN) IVPB  1,750 mg Intravenous Q12H     PRN Meds:acetaminophen, aluminum-magnesium hydroxide-simethicone, dextrose 10%, dextrose 10%, diazePAM, diphenhydrAMINE, glucagon (human recombinant), glucose, glucose, HYDROmorphone, insulin aspart U-100, melatonin, naloxone, ondansetron, prochlorperazine, senna-docusate 8.6-50 mg, sodium chloride 0.9%, sodium chloride 0.9%, sodium chloride 0.9%, Pharmacy to dose Vancomycin consult **AND** vancomycin - pharmacy to dose     Review of Systems  Objective:     Weight: 107.3 kg (236 lb 8.9 oz)  Body mass index is 38.18 kg/m².  Vital Signs (Most Recent):  Temp: 98 °F (36.7 °C) (07/01/22 1243)  Pulse: 73 (07/01/22 1300)  Resp: 17 (07/01/22 1243)  BP: 132/81 (07/01/22 1243)  SpO2: 97 % (07/01/22 1243) Vital Signs (24h Range):  Temp:  " [98 °F (36.7 °C)-99.6 °F (37.6 °C)] 98 °F (36.7 °C)  Pulse:  [63-95] 73  Resp:  [16-18] 17  SpO2:  [92 %-97 %] 97 %  BP: (107-153)/(53-81) 132/81     Date 07/01/22 0700 - 07/02/22 0659   Shift 8263-2131 6332-3633 5312-7555 24 Hour Total   INTAKE   P.O. 0   0   Shift Total(mL/kg) 0(0)   0(0)   OUTPUT   Urine(mL/kg/hr) 350   350   Shift Total(mL/kg) 350(3.3)   350(3.3)   Weight (kg) 107.3 107.3 107.3 107.3                 Neurosurgery Physical Exam  Vitals reviewed  Labs reviewed  Moves RLE well , w/out pain  Able to move LLE however it is painful    Incision CDI  There is no drainage, swelling , erythema or fluctuance  Dressing changed at bedside    Significant Labs:  Recent Labs   Lab 07/01/22  0701   *      K 4.6      CO2 23   BUN 13   CREATININE 0.8   CALCIUM 8.5*     Recent Labs   Lab 06/30/22  1057 07/01/22  0701   WBC 9.68 8.70   HGB 12.2* 11.7*   HCT 39.2* 37.1*    203     No results for input(s): LABPT, INR, APTT in the last 48 hours.  Microbiology Results (last 7 days)     Procedure Component Value Units Date/Time    Aerobic culture [[id number]]  (Abnormal) Collected: 06/28/22 1950    Order Status: Completed Specimen: Abscess from Back Updated: 07/01/22 1024     Aerobic Bacterial Culture STAPHYLOCOCCUS EPIDERMIDIS  Moderate  Susceptibility pending      Narrative:      Deep    Culture, Anaerobe [[id number]] Collected: 06/28/22 1950    Order Status: Completed Specimen: Abscess from Back Updated: 07/01/22 0857     Anaerobic Culture Culture in progress    Narrative:      Deep    Culture, Anaerobe [[id number]] Collected: 06/28/22 1950    Order Status: Completed Specimen: Abscess from Back Updated: 07/01/22 0856     Anaerobic Culture Culture in progress    Narrative:      superficial    Blood culture [[id number]] Collected: 06/29/22 1657    Order Status: Completed Specimen: Blood Updated: 07/01/22 0547     Blood Culture, Routine Gram stain nico bottle: Gram positive cocci in clusters  resembling Staph       Results called to and read back by: Soheila You 07/01/2022  05:47    Blood culture [[id number]] Collected: 06/29/22 1439    Order Status: Completed Specimen: Blood Updated: 06/30/22 2212     Blood Culture, Routine No Growth to date      No Growth to date    Narrative:      Aerobic, anaerobic cx    AFB Culture & Smear [[id number]] Collected: 06/28/22 1950    Order Status: Completed Specimen: Abscess from Back Updated: 06/30/22 0927     AFB Culture & Smear Culture in progress     AFB CULTURE STAIN No acid fast bacilli seen.    Narrative:      superficial    AFB Culture & Smear [[id number]] Collected: 06/28/22 1950    Order Status: Completed Specimen: Abscess from Back Updated: 06/30/22 0927     AFB Culture & Smear Culture in progress     AFB CULTURE STAIN No acid fast bacilli seen.    Narrative:      Deep    Aerobic culture [[id number]] Collected: 06/28/22 1950    Order Status: Completed Specimen: Abscess from Back Updated: 06/30/22 0848     Aerobic Bacterial Culture No growth    Narrative:      Superficial    Gram stain [[id number]] Collected: 06/28/22 1950    Order Status: Completed Specimen: Abscess from Back Updated: 06/29/22 0613     Gram Stain Result Few WBC's      Rare Gram positive cocci    Narrative:      superficial    Gram stain [[id number]] Collected: 06/28/22 1950    Order Status: Completed Specimen: Abscess from Back Updated: 06/29/22 0611     Gram Stain Result Rare WBC's      Rare Gram positive cocci    Narrative:      Deep    Fungus culture [[id number]] Collected: 06/28/22 1950    Order Status: Sent Specimen: Abscess from Back Updated: 06/29/22 0307    Fungus culture [[id number]] Collected: 06/28/22 1950    Order Status: Sent Specimen: Abscess from Back Updated: 06/29/22 0307        All pertinent labs from the last 24 hours have been reviewed.  Significant Diagnostics:  MRI:   I have reviewed all pertinent imaging results/findings within the past 24 hours.  COMPARISON:  CT 06/30/2022 and  MRI 06/27/2022     FINDINGS:  There are postoperative changes consistent with recent lumbar spine surgery with L1-2 posterior decompression with left laminectomy.  There is prominent edema in the dorsal paraspinal and subcutaneous soft tissues.   There is nonenhancing fluid in the dorsal laminectomy defect and adjacent paraspinal soft tissues.  This is continuous with dorsal epidural space.   There is a very small linear area of hypoenhancing fluid in the ventral epidural space eccentric to the left at the L1-2 disc space seen best on sagittal T1 postcontrast enhanced sequence image 7 of series 9.   There appears to be edema/enhancement of the intradural nerve roots consistent with possible neuritis or less likely meningitis.   The overall size of the ventral epidural hematoma is reduced.   Remainder of the disc spaces appear stable.     Impression:   Postoperative changes consistent with L1-2 decompression/left laminectomy with evacuation of the ventral epidural hematoma.  Dorsal epidural/paraspinal hypoenhancing fluid may represent postoperative serous fluid.  Follow-up suggested.  Dorsal paraspinal edema/subcutaneous edema consistent with postoperative change.     Intermediate signal of the cauda equina nerve roots at the L2 and L3 levels with enhancement which may represent neuritis/meningitis.     Assessment/Plan:     Active Diagnoses:    Diagnosis Date Noted POA    PRINCIPAL PROBLEM:  Postoperative hematoma involving nervous system following nervous system procedure [G97.61] 06/28/2022 Yes    Fever [R50.9] 06/30/2022 Yes    Hypertension associated with diabetes [E11.59, I15.2]  Yes    Diabetes mellitus without complication [E11.9]  Yes      Problems Resolved During this Admission:     POD #3    MRI from earlier today reviewed. Findings consistent with neuritis.   Continue steroids and Vanco.  Work with PT/OT today  Start Gabapentin.  No surgical intervention planned for today.  Will continue to monitor.  Please call with any changes.    Oliver Clifton PA-C  Neurosurgery  [facility]

## 2022-07-01 NOTE — PT/OT/SLP PROGRESS
"Physical Therapy  Treatment    Friday ROGELIO Blake   MRN: [id number]   Admitting Diagnosis: Postoperative hematoma involving nervous system following nervous system procedure    PT Received On: 06/30/22  PT Start Time: 1740     PT Stop Time: 1755    PT Total Time (min): 15 min       Billable Minutes:  Therapeutic Exercise 15    Treatment Type: Treatment  PT/PTA: PTA     PTA Visit Number: 1       General Precautions: Standard, fall  Orthopedic Precautions: spinal precautions   Braces: N/A  Respiratory Status: Room air         Subjective:  Communicated with epic and nurse Iain prior to session.  Pt found OOB in the recliner, post imaging today. Wife in room "I can't, I can't put any weight on this leg.... it hurts  From my foot to my hip" ( pleasantly refused gait)     Pain/Comfort  Pain Rating 1: 4/10  Location - Side 1: Left  Location 1: leg  Pain Rating Post-Intervention 1: 4/10    Objective:   Patient found with: telemetry, peripheral IV    Functional Mobility:  Bed Mobility:     NT    Transfers:    NT    Gait:     NT        Therapeutic Activities and Exercises:  Long sitting in the recliner pt performed 20 reps of AP, heel slides, hip ab/adcution. Attempted pressing his foot into my arm to simulate weight bearing and pt instantly  Started to  Hurt,  Demonstrating the inability to weight bear.  Very pleasant and talkative. His daughter is getting  in ~ 2 weeks in [facility].    AM-PAC 6 CLICK MOBILITY  How much help from another person does this patient currently need?   1 = Unable, Total/Dependent Assistance  2 = A lot, Maximum/Moderate Assistance  3 = A little, Minimum/Contact Guard/Supervision  4 = None, Modified [place]/Independent    Turning over in bed (including adjusting bedclothes, sheets and blankets)?: 1  Sitting down on and standing up from a chair with arms (e.g., wheelchair, bedside commode, etc.): 1  Moving from lying on back to sitting on the side of the bed?: 1  Moving to and from a bed to a " chair (including a wheelchair)?: 1  Need to walk in hospital room?: 1  Climbing 3-5 steps with a railing?: 1  Basic Mobility Total Score: 6    AM-PAC Raw Score CMS G-Code Modifier Level of Impairment Assistance   6 % Total / Unable   7 - 9 CM 80 - 100% Maximal Assist   10 - 14 CL 60 - 80% Moderate Assist   15 - 19 CK 40 - 60% Moderate Assist   20 - 22 CJ 20 - 40% Minimal Assist   23 CI 1-20% SBA / CGA   24 CH 0% Independent/ Mod I     Patient left up in chair with all lines intact, call button in reach and wife present.    Assessment:  Friday ROGELIO Blake is a 66 y.o. male with a medical diagnosis of Postoperative hematoma involving nervous system following nervous system procedure and presents with ongoing pain which is limiting  His progress and mobility.    Rehab identified problem list/impairments: Rehab identified problem list/impairments: weakness, gait instability, decreased upper extremity function, decreased ROM, pain, impaired self care skills, impaired functional mobilty, impaired sensation, impaired endurance, impaired balance    Rehab potential is fair.    Activity tolerance: Fair    Discharge recommendations: Discharge Facility/Level of Care Needs: rehabilitation facility, home with home health, home health PT     Barriers to discharge:      Equipment recommendations: Equipment Needed After Discharge: walker, rolling     GOALS:   Multidisciplinary Problems     Physical Therapy Goals        Problem: Physical Therapy    Goal Priority Disciplines Outcome Goal Variances Interventions   Physical Therapy Goal     PT, PT/OT Ongoing, Not Progressing     Description: LT22  1. PT WILL COMPLETE BED MOBILITY WITH SBA  2. PT WILL STAND WITH RW AND SBA FOR T/F TO CHAIR  3. PT WILL GT TRAIN X 150' WITH RW AND SBA.                     PLAN:    Patient to be seen 3 x/week  to address the above listed problems via gait training, therapeutic activities, therapeutic exercises  Plan of Care expires:  07/13/22  Plan of Care reviewed with: patient, spouse         06/30/2022

## 2022-07-01 NOTE — PLAN OF CARE
Met with patient at bedside. Explained IMM appeal process and answered all questions. Pt referred to  if appeal is sought.

## 2022-07-02 PROBLEM — R78.81 GRAM-POSITIVE BACTEREMIA: Status: ACTIVE | Noted: 2022-07-02

## 2022-07-02 LAB
ANION GAP SERPL CALC-SCNC: 11 MMOL/L (ref 8–16)
BACTERIA SPEC AEROBE CULT: ABNORMAL
BACTERIA SPEC AEROBE CULT: NO GROWTH
BUN SERPL-MCNC: 18 MG/DL (ref 8–23)
CALCIUM SERPL-MCNC: 8.8 MG/DL (ref 8.7–10.5)
CHLORIDE SERPL-SCNC: 104 MMOL/L (ref 95–110)
CO2 SERPL-SCNC: 22 MMOL/L (ref 23–29)
CREAT SERPL-MCNC: 0.8 MG/DL (ref 0.5–1.4)
EST. GFR  (AFRICAN AMERICAN): >60 ML/MIN/1.73 M^2
EST. GFR  (NON AFRICAN AMERICAN): >60 ML/MIN/1.73 M^2
GLUCOSE SERPL-MCNC: 231 MG/DL (ref 70–110)
POCT GLUCOSE: 183 MG/DL (ref 70–110)
POCT GLUCOSE: 239 MG/DL (ref 70–110)
POCT GLUCOSE: 241 MG/DL (ref 70–110)
POCT GLUCOSE: 341 MG/DL (ref 70–110)
POTASSIUM SERPL-SCNC: 5.5 MMOL/L (ref 3.5–5.1)
SODIUM SERPL-SCNC: 137 MMOL/L (ref 136–145)

## 2022-07-02 PROCEDURE — 97116 GAIT TRAINING THERAPY: CPT | Mod: CQ

## 2022-07-02 PROCEDURE — 25000003 PHARM REV CODE 250: Performed by: NURSE PRACTITIONER

## 2022-07-02 PROCEDURE — 97165 OT EVAL LOW COMPLEX 30 MIN: CPT

## 2022-07-02 PROCEDURE — 63600175 PHARM REV CODE 636 W HCPCS: Performed by: NURSE PRACTITIONER

## 2022-07-02 PROCEDURE — 36415 COLL VENOUS BLD VENIPUNCTURE: CPT | Performed by: NEUROLOGICAL SURGERY

## 2022-07-02 PROCEDURE — 21400001 HC TELEMETRY ROOM

## 2022-07-02 PROCEDURE — C9399 UNCLASSIFIED DRUGS OR BIOLOG: HCPCS | Performed by: NURSE PRACTITIONER

## 2022-07-02 PROCEDURE — 99024 PR POST-OP FOLLOW-UP VISIT: ICD-10-PCS | Mod: 95,,, | Performed by: PHYSICIAN ASSISTANT

## 2022-07-02 PROCEDURE — 97530 THERAPEUTIC ACTIVITIES: CPT

## 2022-07-02 PROCEDURE — 25000003 PHARM REV CODE 250: Performed by: NEUROLOGICAL SURGERY

## 2022-07-02 PROCEDURE — 63600175 PHARM REV CODE 636 W HCPCS: Performed by: NEUROLOGICAL SURGERY

## 2022-07-02 PROCEDURE — 11000001 HC ACUTE MED/SURG PRIVATE ROOM

## 2022-07-02 PROCEDURE — 94761 N-INVAS EAR/PLS OXIMETRY MLT: CPT

## 2022-07-02 PROCEDURE — 80048 BASIC METABOLIC PNL TOTAL CA: CPT | Performed by: NEUROLOGICAL SURGERY

## 2022-07-02 PROCEDURE — 99024 POSTOP FOLLOW-UP VISIT: CPT | Mod: 95,,, | Performed by: PHYSICIAN ASSISTANT

## 2022-07-02 PROCEDURE — 25000003 PHARM REV CODE 250: Performed by: PHYSICIAN ASSISTANT

## 2022-07-02 RX ORDER — CEFAZOLIN SODIUM 2 G/50ML
2 SOLUTION INTRAVENOUS
Status: DISCONTINUED | OUTPATIENT
Start: 2022-07-02 | End: 2022-07-06 | Stop reason: HOSPADM

## 2022-07-02 RX ORDER — DEXAMETHASONE SODIUM PHOSPHATE 4 MG/ML
2 INJECTION, SOLUTION INTRA-ARTICULAR; INTRALESIONAL; INTRAMUSCULAR; INTRAVENOUS; SOFT TISSUE EVERY 6 HOURS
Status: DISCONTINUED | OUTPATIENT
Start: 2022-07-02 | End: 2022-07-03

## 2022-07-02 RX ADMIN — POLYETHYLENE GLYCOL 3350 17 G: 17 POWDER, FOR SOLUTION ORAL at 10:07

## 2022-07-02 RX ADMIN — HYDROMORPHONE HYDROCHLORIDE 1 MG: 2 INJECTION INTRAMUSCULAR; INTRAVENOUS; SUBCUTANEOUS at 04:07

## 2022-07-02 RX ADMIN — DOCUSATE SODIUM 100 MG: 100 CAPSULE, LIQUID FILLED ORAL at 08:07

## 2022-07-02 RX ADMIN — INSULIN ASPART 4 UNITS: 100 INJECTION, SOLUTION INTRAVENOUS; SUBCUTANEOUS at 06:07

## 2022-07-02 RX ADMIN — ATORVASTATIN CALCIUM 10 MG: 10 TABLET, FILM COATED ORAL at 08:07

## 2022-07-02 RX ADMIN — DOCUSATE SODIUM 100 MG: 100 CAPSULE, LIQUID FILLED ORAL at 10:07

## 2022-07-02 RX ADMIN — CEFAZOLIN SODIUM 2 G: 2 SOLUTION INTRAVENOUS at 06:07

## 2022-07-02 RX ADMIN — DEXAMETHASONE SODIUM PHOSPHATE 4 MG: 4 INJECTION INTRA-ARTICULAR; INTRALESIONAL; INTRAMUSCULAR; INTRAVENOUS; SOFT TISSUE at 06:07

## 2022-07-02 RX ADMIN — POLYETHYLENE GLYCOL 3350 17 G: 17 POWDER, FOR SOLUTION ORAL at 08:07

## 2022-07-02 RX ADMIN — INSULIN DETEMIR 10 UNITS: 100 INJECTION, SOLUTION SUBCUTANEOUS at 08:07

## 2022-07-02 RX ADMIN — GABAPENTIN 300 MG: 300 CAPSULE ORAL at 06:07

## 2022-07-02 RX ADMIN — INSULIN ASPART 2 UNITS: 100 INJECTION, SOLUTION INTRAVENOUS; SUBCUTANEOUS at 08:07

## 2022-07-02 RX ADMIN — INSULIN DETEMIR 10 UNITS: 100 INJECTION, SOLUTION SUBCUTANEOUS at 10:07

## 2022-07-02 RX ADMIN — VANCOMYCIN HYDROCHLORIDE 1750 MG: 10 INJECTION, POWDER, LYOPHILIZED, FOR SOLUTION INTRAVENOUS at 10:07

## 2022-07-02 RX ADMIN — DEXAMETHASONE SODIUM PHOSPHATE 4 MG: 4 INJECTION INTRA-ARTICULAR; INTRALESIONAL; INTRAMUSCULAR; INTRAVENOUS; SOFT TISSUE at 12:07

## 2022-07-02 RX ADMIN — THERA TABS 1 TABLET: TAB at 10:07

## 2022-07-02 RX ADMIN — HYDROMORPHONE HYDROCHLORIDE 1 MG: 2 INJECTION INTRAMUSCULAR; INTRAVENOUS; SUBCUTANEOUS at 09:07

## 2022-07-02 RX ADMIN — GABAPENTIN 300 MG: 300 CAPSULE ORAL at 10:07

## 2022-07-02 RX ADMIN — DEXAMETHASONE SODIUM PHOSPHATE 2 MG: 4 INJECTION INTRA-ARTICULAR; INTRALESIONAL; INTRAMUSCULAR; INTRAVENOUS; SOFT TISSUE at 07:07

## 2022-07-02 RX ADMIN — LOSARTAN POTASSIUM 50 MG: 50 TABLET, FILM COATED ORAL at 10:07

## 2022-07-02 NOTE — ASSESSMENT & PLAN NOTE
Blood and wound culture positive for Staphylococcus epidermidis  Sensitivity results noted   Stop Vanco  Start Ancef

## 2022-07-02 NOTE — PROGRESS NOTES
[facility]  Neurosurgery  Progress Note    Subjective:     Interval History:   MRI completed yesterday shows small residual fluid collection and findings consistent with neuritis.  Dr. Zepeda discussed findings and treatment plan with patient just prior to lunch yesterday.    Yesterday he prayed and fasted with family members.  At 8 pm he was determined and walked with RW to/from room door.  Overnight he went w/out pain medications for 10 hrs (had no pain).  Patient reports this morning he stood up at the sink for 45 minutes to shave and brush his teeth.   Since yesterday he reports 80% improvement in LLE pain.  Patient missed PT yesterday due to being transported to radiology.     Patient denies HA, dizziness, shortness of breath and chest pain.  No pain with RLE.  Very mild pain in LLE.  Back pain has greatly improved as well as LLE pain.    Patient continues to have issues with L side of face. Swelling has improved but he now has blistering lesions.  has been applying bactroban to area.   Patient requests to speak to anesthesia about this. Will place consult.    History of Present Illness: See H&P.    Post-Op Info:  Procedure(s) (LRB):  INCISION AND DRAINAGE, HEMATOMA (N/A)  LAMINECTOMY, SPINE, LUMBAR, WITH DISCECTOMY (N/A)   4 Days Post-Op      Medications:  Continuous Infusions:   sodium chloride 0.9% 100 mL/hr at 06/29/22 0707     Scheduled Meds:   atorvastatin  10 mg Oral QHS    dexamethasone  4 mg Intravenous Q6H    docusate sodium  100 mg Oral BID    gabapentin  300 mg Oral TID    insulin detemir U-100  10 Units Subcutaneous BID    losartan  50 mg Oral Daily    multivitamin  1 tablet Oral Daily    polyethylene glycol  17 g Oral BID    vancomycin (VANCOCIN) IVPB  1,750 mg Intravenous Q12H     PRN Meds:acetaminophen, aluminum-magnesium hydroxide-simethicone, dextrose 10%, dextrose 10%, diazePAM, diphenhydrAMINE, glucagon (human recombinant), glucose, glucose, HYDROmorphone, insulin aspart  U-100, melatonin, naloxone, ondansetron, prochlorperazine, senna-docusate 8.6-50 mg, sodium chloride 0.9%, sodium chloride 0.9%, sodium chloride 0.9%, Pharmacy to dose Vancomycin consult **AND** vancomycin - pharmacy to dose     Review of Systems  Objective:     Weight: 107.8 kg (237 lb 10.5 oz)  Body mass index is 38.36 kg/m².  Vital Signs (Most Recent):  Temp: 98.3 °F (36.8 °C) (07/02/22 0415)  Pulse: (!) 59 (07/02/22 0415)  Resp: 18 (07/02/22 0415)  BP: (!) 160/80 (07/02/22 0415)  SpO2: 96 % (07/02/22 0415) Vital Signs (24h Range):  Temp:  [98 °F (36.7 °C)-99.1 °F (37.3 °C)] 98.3 °F (36.8 °C)  Pulse:  [59-83] 59  Resp:  [16-18] 18  SpO2:  [94 %-100 %] 96 %  BP: (132-160)/(70-81) 160/80                  Neurosurgery Physical Exam    Vitals reviewed  Sensation intact to light touch  Moves all 4 ext well  Patient was able to demonstrate standing  He amb around his room with SBA  Incision/dressing CDI  There is no drainage, swelling , erythema or fluctuance       Significant Labs:  Recent Labs   Lab 07/01/22  0701   *      K 4.6      CO2 23   BUN 13   CREATININE 0.8   CALCIUM 8.5*     Recent Labs   Lab 06/30/22  1057 07/01/22  0701   WBC 9.68 8.70   HGB 12.2* 11.7*   HCT 39.2* 37.1*    203     No results for input(s): LABPT, INR, APTT in the last 48 hours.  Microbiology Results (last 7 days)     Procedure Component Value Units Date/Time    Blood culture [[id number]] Collected: 06/29/22 1439    Order Status: Completed Specimen: Blood Updated: 07/02/22 0018     Blood Culture, Routine Gram stain aer bottle: Gram positive cocci in clusters resembling Staph      Results called to and read back by: Alisha Eller RN. 07/02/2022  00:17    Narrative:      Aerobic, anaerobic cx    Aerobic culture [[id number]]  (Abnormal) Collected: 06/28/22 1950    Order Status: Completed Specimen: Abscess from Back Updated: 07/01/22 1024     Aerobic Bacterial Culture STAPHYLOCOCCUS EPIDERMIDIS  Moderate  Susceptibility  pending      Narrative:      Deep    Culture, Anaerobe [[id number]] Collected: 06/28/22 1950    Order Status: Completed Specimen: Abscess from Back Updated: 07/01/22 0857     Anaerobic Culture Culture in progress    Narrative:      Deep    Culture, Anaerobe [[id number]] Collected: 06/28/22 1950    Order Status: Completed Specimen: Abscess from Back Updated: 07/01/22 0856     Anaerobic Culture Culture in progress    Narrative:      superficial    Blood culture [[id number]] Collected: 06/29/22 1657    Order Status: Completed Specimen: Blood Updated: 07/01/22 0547     Blood Culture, Routine Gram stain nico bottle: Gram positive cocci in clusters resembling Staph       Results called to and read back by: Soheila You 07/01/2022  05:47    AFB Culture & Smear [[id number]] Collected: 06/28/22 1950    Order Status: Completed Specimen: Abscess from Back Updated: 06/30/22 0927     AFB Culture & Smear Culture in progress     AFB CULTURE STAIN No acid fast bacilli seen.    Narrative:      superficial    AFB Culture & Smear [[id number]] Collected: 06/28/22 1950    Order Status: Completed Specimen: Abscess from Back Updated: 06/30/22 0927     AFB Culture & Smear Culture in progress     AFB CULTURE STAIN No acid fast bacilli seen.    Narrative:      Deep    Aerobic culture [[id number]] Collected: 06/28/22 1950    Order Status: Completed Specimen: Abscess from Back Updated: 06/30/22 0848     Aerobic Bacterial Culture No growth    Narrative:      Superficial    Gram stain [[id number]] Collected: 06/28/22 1950    Order Status: Completed Specimen: Abscess from Back Updated: 06/29/22 0613     Gram Stain Result Few WBC's      Rare Gram positive cocci    Narrative:      superficial    Gram stain [[id number]] Collected: 06/28/22 1950    Order Status: Completed Specimen: Abscess from Back Updated: 06/29/22 0611     Gram Stain Result Rare WBC's      Rare Gram positive cocci    Narrative:      Deep    Fungus culture [[id number]] Collected: 06/28/22  1950    Order Status: Sent Specimen: Abscess from Back Updated: 06/29/22 0307    Fungus culture [[id number]] Collected: 06/28/22 1950    Order Status: Sent Specimen: Abscess from Back Updated: 06/29/22 0307        All pertinent labs from the last 24 hours have been reviewed.  Significant Diagnostics:  I have reviewed all pertinent imaging results/findings within the past 24 hours.    Assessment/Plan:     Active Diagnoses:    Diagnosis Date Noted POA    PRINCIPAL PROBLEM:  Postoperative hematoma involving nervous system following nervous system procedure [G97.61] 06/28/2022 Yes    Fever [R50.9] 06/30/2022 Yes    Hypertension associated with diabetes [E11.59, I15.2]  Yes    Diabetes mellitus without complication [E11.9]  Yes      Problems Resolved During this Admission:     POD #4    Continue steroids and Vanco.  Work with PT/OT today  No surgical intervention planned.  Patient will continue his stay and be reassessed in the morning.  If he continues to improve, possibly d/c home tomorrow.    Will continue to monitor. Please call with any changes.     Oliver Clifton PA-C  Neurosurgery  [facility]

## 2022-07-02 NOTE — PT/OT/SLP PROGRESS
Physical Therapy  Treatment    Friday ROGELIO Blake   MRN: [id number]   Admitting Diagnosis: Postoperative hematoma involving nervous system following nervous system procedure       PT Start Time: 1130     PT Stop Time: 1150    PT Total Time (min): 20 min       Billable Minutes:  Gait Training 15    Treatment Type: Treatment  PT/PTA: PTA     PTA Visit Number: 1       General Precautions: Standard, fall  Orthopedic Precautions: spinal precautions   Braces:    Subjective:  Communicated with KHADIJAH  prior to session.      Pain/Comfort  Pain Rating 1: 0/10  Pain Rating Post-Intervention 1: 0/10    Therapeutic Activities and Exercises:    SITTING UPON ARRIVING    SIT<-->STAND WITHOUT VC SBA    RW 2 X 80' WITH CONVERSATION CG    INSTRUCTED TO NO AMBULATE IN/OUT OF ROOM WITHOUT STAFF    PT IS MOTIVATED AND WANTS TO DO EVERYTHING HE CAN TO GET BETTER.     EDUCATED ON EXERCISES HE CAN DO INDEPENDENTLY TO ASSIST WITH GAINING STRENGTH.      AM-PAC 6 CLICK MOBILITY  How much help from another person does this patient currently need?   1 = Unable, Total/Dependent Assistance  2 = A lot, Maximum/Moderate Assistance  3 = A little, Minimum/Contact Guard/Supervision  4 = None, Modified [place]/Independent    Turning over in bed (including adjusting bedclothes, sheets and blankets)?:  (NA)  Sitting down on and standing up from a chair with arms (e.g., wheelchair, bedside commode, etc.): 4  Moving from lying on back to sitting on the side of the bed?:  (NA)  Moving to and from a bed to a chair (including a wheelchair)?:  (NA)  Need to walk in hospital room?: 3  Climbing 3-5 steps with a railing?:  (NA)    AM-PAC Raw Score CMS G-Code Modifier Level of Impairment Assistance   6 % Total / Unable   7 - 9 CM 80 - 100% Maximal Assist   10 - 14 CL 60 - 80% Moderate Assist   15 - 19 CK 40 - 60% Moderate Assist   20 - 22 CJ 20 - 40% Minimal Assist   23 CI 1-20% SBA / CGA   24 CH 0% Independent/ Mod I     Patient left up in chair with all  lines intact, call button in reach and WIFE present.    Assessment:  Friday ROGELIO Blake is a 66 y.o. male with a medical diagnosis of Postoperative hematoma involving nervous system following nervous system procedure and presents with .    Rehab identified problem list/impairments: Rehab identified problem list/impairments: weakness, impaired endurance, decreased safety awareness, impaired self care skills, impaired functional mobilty    Rehab potential is good.    Activity tolerance: Good    Discharge recommendations: Discharge Facility/Level of Care Needs: rehabilitation facility     Barriers to discharge:      Equipment recommendations: Equipment Needed After Discharge: walker, rolling     GOALS:   Multidisciplinary Problems     Physical Therapy Goals        Problem: Physical Therapy    Goal Priority Disciplines Outcome Goal Variances Interventions   Physical Therapy Goal     PT, PT/OT Ongoing, Not Progressing     Description: LT22  1. PT WILL COMPLETE BED MOBILITY WITH SBA  2. PT WILL STAND WITH RW AND SBA FOR T/F TO CHAIR  3. PT WILL GT TRAIN X 150' WITH RW AND SBA.                     PLAN:    Patient to be seen 3 x/week  to address the above listed problems via gait training, therapeutic activities, therapeutic exercises  Plan of Care expires: 22  Plan of Care reviewed with: patient, spouse         2022

## 2022-07-02 NOTE — SUBJECTIVE & OBJECTIVE
Interval History: Symptoms have greatly improved.     Review of Systems   Constitutional:  Negative for chills, diaphoresis, fatigue and fever.   HENT:  Negative for congestion, ear discharge, rhinorrhea, sinus pressure, sore throat and trouble swallowing.    Eyes:  Negative for discharge and visual disturbance.   Respiratory:  Negative for apnea, cough, choking, chest tightness, shortness of breath, wheezing and stridor.    Cardiovascular:  Negative for chest pain, palpitations and leg swelling.   Gastrointestinal:  Negative for abdominal distention, abdominal pain, diarrhea, nausea and vomiting.   Endocrine: Negative for cold intolerance and heat intolerance.   Genitourinary:  Negative for dysuria, frequency and hematuria.   Musculoskeletal:  Positive for back pain (decreased) and gait problem (ambulating with walker). Negative for arthralgias, myalgias and neck pain.   Skin:  Negative for pallor and rash.   Neurological:  Negative for dizziness, seizures, syncope, weakness and headaches.   Psychiatric/Behavioral:  Negative for agitation, confusion and sleep disturbance.    Objective:     Vital Signs (Most Recent):  Temp: 97.8 °F (36.6 °C) (07/02/22 1217)  Pulse: 66 (07/02/22 1217)  Resp: 17 (07/02/22 1217)  BP: (!) 147/70 (07/02/22 1217)  SpO2: 100 % (07/02/22 1217)   Vital Signs (24h Range):  Temp:  [97.8 °F (36.6 °C)-99.1 °F (37.3 °C)] 97.8 °F (36.6 °C)  Pulse:  [59-83] 66  Resp:  [16-20] 17  SpO2:  [95 %-100 %] 100 %  BP: (132-170)/(70-81) 147/70     Weight: 107.8 kg (237 lb 10.5 oz)  Body mass index is 38.36 kg/m².    Intake/Output Summary (Last 24 hours) at 7/2/2022 1400  Last data filed at 7/2/2022 0900  Gross per 24 hour   Intake 3534.9 ml   Output 950 ml   Net 2584.9 ml      Physical Exam  Vitals and nursing note reviewed.   Constitutional:       General: He is not in acute distress.     Appearance: He is not ill-appearing, toxic-appearing or diaphoretic.   HENT:      Head: Normocephalic and atraumatic.       Comments: Left facial blister continues to improve   Eyes:      General:         Right eye: No discharge.         Left eye: No discharge.   Cardiovascular:      Heart sounds: No murmur heard.    No friction rub. No gallop.   Pulmonary:      Effort: No respiratory distress.      Breath sounds: No stridor. No wheezing, rhonchi or rales.   Chest:      Chest wall: No tenderness.   Abdominal:      General: There is no distension.      Palpations: There is no mass.      Tenderness: There is no abdominal tenderness. There is no right CVA tenderness, left CVA tenderness, guarding or rebound.      Hernia: No hernia is present.   Musculoskeletal:         General: Tenderness (lower back pain decreased) present. No swelling, deformity or signs of injury.      Right lower leg: No edema.      Left lower leg: No edema.      Comments: Left lower back dressing intact    Skin:     Coloration: Skin is not jaundiced or pale.      Findings: No bruising, erythema, lesion or rash.   Neurological:      Cranial Nerves: No cranial nerve deficit.      Sensory: No sensory deficit.      Motor: No weakness.      Coordination: Coordination normal.      Gait: Gait normal.      Deep Tendon Reflexes: Reflexes normal.       Significant Labs: All pertinent labs within the past 24 hours have been reviewed.  BMP:   Recent Labs   Lab 07/02/22  0715   *      K 5.5*      CO2 22*   BUN 18   CREATININE 0.8   CALCIUM 8.8     POCT Glucose:   Recent Labs   Lab 07/01/22  2103 07/02/22  0604 07/02/22  1231   POCTGLUCOSE 197* 239* 341*       Significant Imaging:     Imaging Results              MRI Lumbar Spine Without Contrast (Final result)  Result time 06/27/22 16:24:14      Final result by Ramon Mead Jr., MD (06/27/22 16:24:14)                   Impression:      1. Interval left hemilaminectomy at L2-L3.  There is ventral epidural tissue that likely relates to small hematoma and less likely extruded disc or phlegmonous material.   This distorts the thecal sac toward the right and results in flattening to 5 mm.  There is severe lateral recess stenosis that could affect the L3 spinal nerve roots that are descending at this level.  2. No definitive signs of spondylodiscitis.  No definite rosette abscess.  Increased T2 signal within the paraspinal muscles about the laminectomy site is not necessarily unusual given the postoperative state.  3. Unchanged degenerative findings elsewhere as described above.  Persistent mild/moderate left lateral recess stenosis at L1-L2 a could affect the left L2 spinal nerve roots.  These findings were discussed with Dr. Alford at 16:24.      Electronically signed by: Ramon Mead Jr., MD  Date:    06/27/2022  Time:    16:24               Narrative:    EXAMINATION:  MRI LUMBAR SPINE WITHOUT CONTRAST    CLINICAL HISTORY:  back pain;    TECHNIQUE:  MR Lumbar spine without contrast. Sagittal T1, T2, STIR. Axial T1, T2. Coronal T1.    COMPARISON:  Intraoperative fluoroscopy views from 06/22/2022 were reviewed.  CT of the lumbar spine from 05/03/2022 was also reviewed.  MRI of the lumbar spine from 01/18/2022 was reviewed as well.    FINDINGS:  There are postoperative findings from interval left hemilaminectomy at L2-L3.  There is small granulation tissue within the dorsal laminotomy site.  There is abnormal material within the ventral epidural spinal canal that flattens the thecal sac toward the right at the level of mid L3.  The thecal sac measures 5 mm in transverse dimension as demonstrated on axial T2 series 6, image 18.  The abnormal ventral soft tissue/hematoma/extruded disc measures 57 mm craniocaudal approximately by 19 mm transverse by 8 mm in AP dimension.  There is also abnormal tissue within the bilateral lateral recesses at this level, left greater than right, that could affect the descending L3 spinal nerve roots.  Normal appearing marrow signal without definite signs of spondylodiscitis.  There is increased  T2 signal within the bilateral paraspinal muscles adjacent to the operative site extending inferiorly to the level of L4-L5.  The conus medullaris terminates at the level of L1-L2.  No abnormal signal  within the conus. Intervertebral disc levels are as follows:    T12-L1 disc: Unchanged minimal disc bulge with mild facet hypertrophy.  The thecal sac measures 11 mm with no significant foraminal stenosis.    L1-L2 disc : Unchanged left paracentral disc protrusion with annular fissure that causes mild left lateral recess stenosis.  Mild degenerative facet hypertrophy.  The thecal sac measures 7 mm AP which is unchanged.  No significant foraminal stenosis.    L2-L3 disc: Level of prior surgery with epidural abnormal tissue that flattens the thecal sac dorsally and toward the right where it measures as small as 5 mm.  Lateral recess stenosis could affect the descending L3 spinal nerve roots.  Abnormal tissue also encroaches into the left exit foramen at L2-L3 surrounding the exiting left L2 spinal nerve.    L3-L4 disc: Broad-based posterior disc bulge.  Mild degenerative facet hypertrophy bilaterally.  The thecal sac measures 8 mm in AP dimension.  Mild bilateral neural foraminal stenosis due to bulging disc which is similar to the previous exam.    L4-L5 disc: Circumferentially bulging disc that encroaches into the floors of the exit foramina.  Anterior osteophytes.  Severe degenerative facet hypertrophy with facet joint effusions.  The thecal sac measures 10 mm.  Mild bilateral foraminal stenosis.    L5-S1 disc: Normal disc space height with severe degenerative facet hypertrophy.  No significant spinal or foraminal stenosis.  The thecal sac measures 11 mm.

## 2022-07-02 NOTE — ASSESSMENT & PLAN NOTE
accuchecks  Sliding scale insulin  Hold oral diabetic medications    7/1/2022  Diabetic diet   Glucose elevated, probable related to IV steroids  Continue moderated dose SSI  Start detemir 10 units BID     7/2/2022  Diabetic diet   Glucose elevated, probable related to IV steroids  Continue moderated dose SSI  Continue detemir 10 units BID

## 2022-07-02 NOTE — ASSESSMENT & PLAN NOTE
CXR and U/A negative  Empiric Vancomycin started  Aerobic culture taken from surgery notes Staph Epi    7/1/2022  Aerobic culture from surgery positive for Staph  BC, 2 days ago, Gram positive cocci in clusters resembling Staph    Continue Vanco       7/2/2022  Blood cultures and wound cultures positive for Staphylococcus epidermidis  Sensitivities noted   Stop Vanco   Start Ancef

## 2022-07-02 NOTE — PT/OT/SLP EVAL
Occupational Therapy   Evaluation and Discharge Note    Name: Friday ROGELIO Blake  MRN: [id number]  Admitting Diagnosis:  Postoperative hematoma involving nervous system following nervous system procedure   Recent Surgery: Procedure(s) (LRB):  INCISION AND DRAINAGE, HEMATOMA (N/A)  LAMINECTOMY, SPINE, LUMBAR, WITH DISCECTOMY (N/A) 4 Days Post-Op    Recommendations:     Discharge Recommendations: home health OT  Discharge Equipment Recommendations:  none  Barriers to discharge:       Assessment:     Friday ROGELIO Blake is a 66 y.o. male with a medical diagnosis of Postoperative hematoma involving nervous system following nervous system procedure. At this time, patient is functioning at their prior level of function and does not require further acute OT services.     Plan:     During this hospitalization, patient does not require further acute OT services.  Please re-consult if situation changes.    · Plan of Care Reviewed with:      Subjective     Chief Complaint: NONE  Patient/Family Comments/goals: TO RETURN HOME Monday, 7/4/22.    Occupational Profile:  Living Environment: PATIENT LIVES WITH WIFE IN 1 STORY HOME WITH 1 STEP AT ENTRANCE.  Previous level of function: PATIENT WAS (I) WITH ALL LEVELS OF SELF CARE.  Roles and Routines: PATIENT OWNS RENTAL PROPERTIES AND PERFORMS REPAIRS AS NEEDED.  Equipment Used at home:  none  Assistance upon Discharge: FAMILY    Pain/Comfort:  · Pain Rating 1: 0/10    Patients cultural, spiritual, Taoism conflicts given the current situation:      Objective:     Communicated with: NURSE prior to session.  Patient found up in chair with peripheral IV, telemetry upon OT entry to room.    General Precautions: Standard,     Orthopedic Precautions:    Braces:    Respiratory Status:      Occupational Performance:    Bed Mobility:    · NT DUE TO PATIENT PRESENTED IN B/S CHAIR.    Functional Mobility/Transfers:  · Patient completed Sit <> Stand Transfer with stand by assistance  with  no  assistive device   · Patient completed Bed <> Chair Transfer using Stand Pivot technique with stand by assistance with no assistive device  · Patient completed Toilet Transfer Stand Pivot technique with stand by assistance with  PATIENT HOLDING ON TO WALL FOR BALANCE.  · Functional Mobility: SBA    Activities of Daily Living:  · Lower Body Dressing: supervision TO DON/DOFF SOCKS.    Cognitive/Visual Perceptual:  NO DEFICITS NOTED.    Physical Exam:  Balance:    -       NO LOB WITH STANDING AND SITTING ACTIVITIES.  Upper Extremity Range of Motion:     -       Right Upper Extremity: WFL  LUE WFL.    AMPAC 6 Click ADL:  AMPAC Total Score: 18    Treatment & Education:  OT EVAL PERFORMED.  PATIENT AND WIFE EDUCATED RE:  PURPOSE OF OT.  PATIENT PRACTICED FOOTWEAR MANAGEMENT AND FUNC T/F'S.  Education:    Patient left up in chair with all lines intact, call button in reach and WIFE AND SON present    GOALS:   Multidisciplinary Problems     Occupational Therapy Goals        Problem: Occupational Therapy    Goal Priority Disciplines Outcome Interventions   Occupational Therapy Goal     OT, PT/OT     Description: OT EVAL PERFORMED. NO SKILLED OT NEEDS IDENTIFIED. PATIENT WILL PARTICIPATE IN PEOPLE MOVERS PROGRAM FOR CONT MOBILITY.                   History:     Past Medical History:   Diagnosis Date    Carpal tunnel syndrome     Diabetes mellitus without complication     History of colon polyps     Hypercalcemia 3/23/2021    Hyperlipidemia associated with type 2 diabetes mellitus     Hypertension associated with diabetes     Lumbar disc disease with radiculopathy     Morbid obesity with BMI of 40.0-44.9, adult     S/P parathyroidectomy 7/29/2021    Vitamin D deficiency        Past Surgical History:   Procedure Laterality Date    CARPAL TUNNEL RELEASE Left 4/1/2021    Procedure: RELEASE, CARPAL TUNNEL;  Surgeon: Yoandy David MD;  Location: [facility];  Service: Orthopedics;  Laterality: Left;    HERNIA  REPAIR      INCISION AND DRAINAGE OF HEMATOMA N/A 6/28/2022    Procedure: INCISION AND DRAINAGE, HEMATOMA;  Surgeon: Shaggy Zepeda MD;  Location: [facility];  Service: Neurosurgery;  Laterality: N/A;    LUMBAR LAMINECTOMY WITH DISCECTOMY Left 6/22/2022    Procedure: LAMINECTOMY, SPINE, LUMBAR, WITH DISCECTOMY;  Surgeon: Shaggy Zepeda MD;  Location: [facility];  Service: Neurosurgery;  Laterality: Left;  minimally invasive L2-3 discectomy and decompression     LUMBAR LAMINECTOMY WITH DISCECTOMY N/A 6/28/2022    Procedure: LAMINECTOMY, SPINE, LUMBAR, WITH DISCECTOMY;  Surgeon: Shaggy Zepeda MD;  Location: [facility];  Service: Neurosurgery;  Laterality: N/A;    PARATHYROIDECTOMY Bilateral 7/27/2021    Procedure: PARATHYROIDECTOMY;  Surgeon: Tha Dial MD;  Location: [facility];  Service: ENT;  Laterality: Bilateral;  with medialstinal exploration    SELECTIVE INJECTION OF ANESTHETIC AGENT AROUND LUMBAR SPINAL NERVE ROOT BY TRANSFORAMINAL APPROACH Left 3/23/2022    Procedure: BLOCK, SPINAL NERVE ROOT, LUMBAR, SELECTIVE, TRANSFORAMINAL APPROACH Left L2-3, L3-4 RN IV sedation;  Surgeon: Jay Hodges MD;  Location: [facility];  Service: Pain Management;  Laterality: Left;    STERNOTOMY N/A 7/27/2021    Procedure: STERNOTOMY;  Surgeon: Tha Dial MD;  Location: [facility];  Service: ENT;  Laterality: N/A;    ULNAR NERVE TRANSPOSITION Left 4/1/2021    Procedure: TRANSPOSITION, NERVE, ULNAR;  Surgeon: Yoandy David MD;  Location: [facility];  Service: Orthopedics;  Laterality: Left;    ULNAR TUNNEL RELEASE Left 4/1/2021    Procedure: RELEASE, CUBITAL TUNNEL;  Surgeon: Yoandy David MD;  Location: [facility];  Service: Orthopedics;  Laterality: Left;    UMBILICAL HERNIA REPAIR         Time Tracking:     OT Date of Treatment: 07/02/22  OT Start Time: 1234  OT Stop Time: 1259  OT Total Time (min): 25 min    Billable Minutes:Evaluation 10  Therapeutic Activity 15    7/2/2022

## 2022-07-02 NOTE — PROGRESS NOTES
[facility] Medicine  Progress Note    Patient Name: Friday ROGELIO Blake  MRN: [id number]  Patient Class: IP- Inpatient   Admission Date: 6/27/2022  Length of Stay: 2 days  Attending Physician: Shaggy Zepeda MD  Primary Care Provider: Sid Elizabeth MD        Subjective:     Principal Problem:Postoperative hematoma involving nervous system following nervous system procedure        HPI:  Pt is a 67 yo male with PMhx of DM, HTN, HPL who underwent an I & D of hematoma S/P lumbar spine laminectomy and discectomy performed by Dr. Zepeda. Hospital Medicine was consulted to assist with medical management. Pt's blood pressure controlled today, oxygenation appropriate and labs stable. Glucose 167 and sliding scale insulin ordered. Pt seen and examined and sitting in chair at bedside. He reports working with PT/OT. He describes left hip pain and is now taking oral analgesic. Other symptoms are denied. He denies weakness and paresthesias to legs.       Overview/Hospital Course:  6/30/22 - increasing left hip and lower back pain. Primary is attempting to obtain further CT and MRI imaging but patient intolerant due to pain. Given Dilaudid prn. Given Mag Citrate and Enema for c/o constipation with small response. On Vancomycin, no further fevers. Blood cultures NGTD and aerobic culture obtained during surgery has isolated Staph Epi. Neurosurgery following S/P I & D L spine hematoma. Linear bruise to the left nasal fold. To left cheek there is indurated area oozing clear fluid. Wound care is applying bactroban. As of 7/1/2022, vital signs and labs stable. Glucose elevated, probable related to IV steroid. Pt reports back pain improving. Neurosurgery following, no surgery plan for today. Patient reports left facial blister improving as well.  As of 7/2/2022, patient feeling much better, vital signs and labs stable. He reports back pain has greatly decreased. Patient is currently sitting up in chair and walking with  walker with Physical Therapy.       Interval History: Symptoms have greatly improved.     Review of Systems   Constitutional:  Negative for chills, diaphoresis, fatigue and fever.   HENT:  Negative for congestion, ear discharge, rhinorrhea, sinus pressure, sore throat and trouble swallowing.    Eyes:  Negative for discharge and visual disturbance.   Respiratory:  Negative for apnea, cough, choking, chest tightness, shortness of breath, wheezing and stridor.    Cardiovascular:  Negative for chest pain, palpitations and leg swelling.   Gastrointestinal:  Negative for abdominal distention, abdominal pain, diarrhea, nausea and vomiting.   Endocrine: Negative for cold intolerance and heat intolerance.   Genitourinary:  Negative for dysuria, frequency and hematuria.   Musculoskeletal:  Positive for back pain (decreased) and gait problem (ambulating with walker). Negative for arthralgias, myalgias and neck pain.   Skin:  Negative for pallor and rash.   Neurological:  Negative for dizziness, seizures, syncope, weakness and headaches.   Psychiatric/Behavioral:  Negative for agitation, confusion and sleep disturbance.    Objective:     Vital Signs (Most Recent):  Temp: 97.8 °F (36.6 °C) (07/02/22 1217)  Pulse: 66 (07/02/22 1217)  Resp: 17 (07/02/22 1217)  BP: (!) 147/70 (07/02/22 1217)  SpO2: 100 % (07/02/22 1217)   Vital Signs (24h Range):  Temp:  [97.8 °F (36.6 °C)-99.1 °F (37.3 °C)] 97.8 °F (36.6 °C)  Pulse:  [59-83] 66  Resp:  [16-20] 17  SpO2:  [95 %-100 %] 100 %  BP: (132-170)/(70-81) 147/70     Weight: 107.8 kg (237 lb 10.5 oz)  Body mass index is 38.36 kg/m².    Intake/Output Summary (Last 24 hours) at 7/2/2022 1400  Last data filed at 7/2/2022 0900  Gross per 24 hour   Intake 3534.9 ml   Output 950 ml   Net 2584.9 ml      Physical Exam  Vitals and nursing note reviewed.   Constitutional:       General: He is not in acute distress.     Appearance: He is not ill-appearing, toxic-appearing or diaphoretic.   HENT:       Head: Normocephalic and atraumatic.      Comments: Left facial blister continues to improve   Eyes:      General:         Right eye: No discharge.         Left eye: No discharge.   Cardiovascular:      Heart sounds: No murmur heard.    No friction rub. No gallop.   Pulmonary:      Effort: No respiratory distress.      Breath sounds: No stridor. No wheezing, rhonchi or rales.   Chest:      Chest wall: No tenderness.   Abdominal:      General: There is no distension.      Palpations: There is no mass.      Tenderness: There is no abdominal tenderness. There is no right CVA tenderness, left CVA tenderness, guarding or rebound.      Hernia: No hernia is present.   Musculoskeletal:         General: Tenderness (lower back pain decreased) present. No swelling, deformity or signs of injury.      Right lower leg: No edema.      Left lower leg: No edema.      Comments: Left lower back dressing intact    Skin:     Coloration: Skin is not jaundiced or pale.      Findings: No bruising, erythema, lesion or rash.   Neurological:      Cranial Nerves: No cranial nerve deficit.      Sensory: No sensory deficit.      Motor: No weakness.      Coordination: Coordination normal.      Gait: Gait normal.      Deep Tendon Reflexes: Reflexes normal.       Significant Labs: All pertinent labs within the past 24 hours have been reviewed.  BMP:   Recent Labs   Lab 07/02/22  0715   *      K 5.5*      CO2 22*   BUN 18   CREATININE 0.8   CALCIUM 8.8     POCT Glucose:   Recent Labs   Lab 07/01/22  2103 07/02/22  0604 07/02/22  1231   POCTGLUCOSE 197* 239* 341*       Significant Imaging:     Imaging Results              MRI Lumbar Spine Without Contrast (Final result)  Result time 06/27/22 16:24:14      Final result by Ramon Mead Jr., MD (06/27/22 16:24:14)                   Impression:      1. Interval left hemilaminectomy at L2-L3.  There is ventral epidural tissue that likely relates to small hematoma and less likely  extruded disc or phlegmonous material.  This distorts the thecal sac toward the right and results in flattening to 5 mm.  There is severe lateral recess stenosis that could affect the L3 spinal nerve roots that are descending at this level.  2. No definitive signs of spondylodiscitis.  No definite rosette abscess.  Increased T2 signal within the paraspinal muscles about the laminectomy site is not necessarily unusual given the postoperative state.  3. Unchanged degenerative findings elsewhere as described above.  Persistent mild/moderate left lateral recess stenosis at L1-L2 a could affect the left L2 spinal nerve roots.  These findings were discussed with Dr. Alford at 16:24.      Electronically signed by: Ramon Mead Jr., MD  Date:    06/27/2022  Time:    16:24               Narrative:    EXAMINATION:  MRI LUMBAR SPINE WITHOUT CONTRAST    CLINICAL HISTORY:  back pain;    TECHNIQUE:  MR Lumbar spine without contrast. Sagittal T1, T2, STIR. Axial T1, T2. Coronal T1.    COMPARISON:  Intraoperative fluoroscopy views from 06/22/2022 were reviewed.  CT of the lumbar spine from 05/03/2022 was also reviewed.  MRI of the lumbar spine from 01/18/2022 was reviewed as well.    FINDINGS:  There are postoperative findings from interval left hemilaminectomy at L2-L3.  There is small granulation tissue within the dorsal laminotomy site.  There is abnormal material within the ventral epidural spinal canal that flattens the thecal sac toward the right at the level of mid L3.  The thecal sac measures 5 mm in transverse dimension as demonstrated on axial T2 series 6, image 18.  The abnormal ventral soft tissue/hematoma/extruded disc measures 57 mm craniocaudal approximately by 19 mm transverse by 8 mm in AP dimension.  There is also abnormal tissue within the bilateral lateral recesses at this level, left greater than right, that could affect the descending L3 spinal nerve roots.  Normal appearing marrow signal without definite signs  of spondylodiscitis.  There is increased T2 signal within the bilateral paraspinal muscles adjacent to the operative site extending inferiorly to the level of L4-L5.  The conus medullaris terminates at the level of L1-L2.  No abnormal signal  within the conus. Intervertebral disc levels are as follows:    T12-L1 disc: Unchanged minimal disc bulge with mild facet hypertrophy.  The thecal sac measures 11 mm with no significant foraminal stenosis.    L1-L2 disc : Unchanged left paracentral disc protrusion with annular fissure that causes mild left lateral recess stenosis.  Mild degenerative facet hypertrophy.  The thecal sac measures 7 mm AP which is unchanged.  No significant foraminal stenosis.    L2-L3 disc: Level of prior surgery with epidural abnormal tissue that flattens the thecal sac dorsally and toward the right where it measures as small as 5 mm.  Lateral recess stenosis could affect the descending L3 spinal nerve roots.  Abnormal tissue also encroaches into the left exit foramen at L2-L3 surrounding the exiting left L2 spinal nerve.    L3-L4 disc: Broad-based posterior disc bulge.  Mild degenerative facet hypertrophy bilaterally.  The thecal sac measures 8 mm in AP dimension.  Mild bilateral neural foraminal stenosis due to bulging disc which is similar to the previous exam.    L4-L5 disc: Circumferentially bulging disc that encroaches into the floors of the exit foramina.  Anterior osteophytes.  Severe degenerative facet hypertrophy with facet joint effusions.  The thecal sac measures 10 mm.  Mild bilateral foraminal stenosis.    L5-S1 disc: Normal disc space height with severe degenerative facet hypertrophy.  No significant spinal or foraminal stenosis.  The thecal sac measures 11 mm.                                         Assessment/Plan:      * Postoperative hematoma involving nervous system following nervous system procedure  Continue care by primary team    7/2/2022  Neurology Surgery managing    Continue Steroids       Gram-positive bacteremia  Blood and wound culture positive for Staphylococcus epidermidis  Sensitivity results noted   Stop Vanco  Start Ancef       Fever  CXR and U/A negative  Empiric Vancomycin started  Aerobic culture taken from surgery notes Staph Epi    7/1/2022  Aerobic culture from surgery positive for Staph  BC, 2 days ago, Gram positive cocci in clusters resembling Staph    Continue Vanco       7/2/2022  Blood cultures and wound cultures positive for Staphylococcus epidermidis  Sensitivities noted   Stop Vanco   Start Ancef         Hypertension associated with diabetes  B/P controlled - 135/63, 147/63  Resume antihypertensives      Diabetes mellitus without complication  accuchecks  Sliding scale insulin  Hold oral diabetic medications    7/1/2022  Diabetic diet   Glucose elevated, probable related to IV steroids  Continue moderated dose SSI  Start detemir 10 units BID     7/2/2022  Diabetic diet   Glucose elevated, probable related to IV steroids  Continue moderated dose SSI  Continue detemir 10 units BID         VTE Risk Mitigation (From admission, onward)         Ordered     Place sequential compression device  Until discontinued         06/29/22 0710     IP VTE HIGH RISK PATIENT  Once         06/28/22 4936                Discharge Planning   MATEUS:      Code Status: Prior   Is the patient medically ready for discharge?:     Reason for patient still in hospital (select all that apply): Patient trending condition and Treatment  Discharge Plan A: Home with family                  Kamron Suero NP  Department of Hospital Medicine   [facility] - Med Surg

## 2022-07-02 NOTE — PLAN OF CARE
SITTING UPON ARRIVING    SIT<-->STAND WITHOUT VC SBA    RW 2 X 80' WITH CONVERSATION CG    INSTRUCTED TO NO AMBULATE IN/OUT OF ROOM WITHOUT STAFF    PT IS MOTIVATED AND WANTS TO DO EVERYTHING HE CAN TO GET BETTER.     EDUCATED ON EXERCISES HE CAN DO INDEPENDENTLY TO ASSIST WITH GAINING STRENGTH.

## 2022-07-02 NOTE — PLAN OF CARE
Problem: Adult Inpatient Plan of Care  Goal: Absence of Hospital-Acquired Illness or Injury  Outcome: Ongoing, Progressing  Goal: Readiness for Transition of Care  Outcome: Ongoing, Progressing     Problem: Diabetes Comorbidity  Goal: Blood Glucose Level Within Targeted Range  Outcome: Ongoing, Progressing

## 2022-07-02 NOTE — PLAN OF CARE
Patient remains free from falls and injuries this shift. Pain managed with Dilaudid. No s/s of acute distress noted. Will continue to monitor patient. Chart check completed.

## 2022-07-02 NOTE — PLAN OF CARE
OT EVAL PERFORMED.  PATIENT WOULD BENEFIT FROM OT HH AT D/C TO ASSESS SAFETY IN THE HOME ENVIRONMENT.

## 2022-07-03 ENCOUNTER — ANESTHESIA EVENT (OUTPATIENT)
Dept: MEDSURG UNIT | Facility: HOSPITAL | Age: 67
End: 2022-07-03

## 2022-07-03 ENCOUNTER — ANESTHESIA (OUTPATIENT)
Dept: MEDSURG UNIT | Facility: HOSPITAL | Age: 67
End: 2022-07-03

## 2022-07-03 LAB
POCT GLUCOSE: 156 MG/DL (ref 70–110)
POCT GLUCOSE: 188 MG/DL (ref 70–110)
POCT GLUCOSE: 194 MG/DL (ref 70–110)

## 2022-07-03 PROCEDURE — 25000003 PHARM REV CODE 250: Performed by: NURSE PRACTITIONER

## 2022-07-03 PROCEDURE — 99024 PR POST-OP FOLLOW-UP VISIT: ICD-10-PCS | Mod: ,,, | Performed by: PHYSICIAN ASSISTANT

## 2022-07-03 PROCEDURE — 21400001 HC TELEMETRY ROOM

## 2022-07-03 PROCEDURE — 25000003 PHARM REV CODE 250: Performed by: PHYSICIAN ASSISTANT

## 2022-07-03 PROCEDURE — 63600175 PHARM REV CODE 636 W HCPCS: Performed by: NEUROLOGICAL SURGERY

## 2022-07-03 PROCEDURE — 25000003 PHARM REV CODE 250: Performed by: NEUROLOGICAL SURGERY

## 2022-07-03 PROCEDURE — 87040 BLOOD CULTURE FOR BACTERIA: CPT | Mod: 59 | Performed by: NURSE PRACTITIONER

## 2022-07-03 PROCEDURE — 63600175 PHARM REV CODE 636 W HCPCS: Performed by: NURSE PRACTITIONER

## 2022-07-03 PROCEDURE — 99024 POSTOP FOLLOW-UP VISIT: CPT | Mod: ,,, | Performed by: PHYSICIAN ASSISTANT

## 2022-07-03 PROCEDURE — C9399 UNCLASSIFIED DRUGS OR BIOLOG: HCPCS | Performed by: NURSE PRACTITIONER

## 2022-07-03 PROCEDURE — 11000001 HC ACUTE MED/SURG PRIVATE ROOM

## 2022-07-03 PROCEDURE — 36415 COLL VENOUS BLD VENIPUNCTURE: CPT | Performed by: NURSE PRACTITIONER

## 2022-07-03 RX ORDER — DEXAMETHASONE 1 MG/1
2 TABLET ORAL EVERY 8 HOURS
Status: DISCONTINUED | OUTPATIENT
Start: 2022-07-03 | End: 2022-07-05

## 2022-07-03 RX ORDER — BISACODYL 10 MG
10 SUPPOSITORY, RECTAL RECTAL ONCE
Status: COMPLETED | OUTPATIENT
Start: 2022-07-03 | End: 2022-07-03

## 2022-07-03 RX ADMIN — INSULIN ASPART 2 UNITS: 100 INJECTION, SOLUTION INTRAVENOUS; SUBCUTANEOUS at 01:07

## 2022-07-03 RX ADMIN — CEFAZOLIN SODIUM 2 G: 2 SOLUTION INTRAVENOUS at 06:07

## 2022-07-03 RX ADMIN — THERA TABS 1 TABLET: TAB at 09:07

## 2022-07-03 RX ADMIN — BISACODYL 10 MG: 10 SUPPOSITORY RECTAL at 09:07

## 2022-07-03 RX ADMIN — INSULIN DETEMIR 10 UNITS: 100 INJECTION, SOLUTION SUBCUTANEOUS at 09:07

## 2022-07-03 RX ADMIN — HYDROMORPHONE HYDROCHLORIDE 1 MG: 2 INJECTION INTRAMUSCULAR; INTRAVENOUS; SUBCUTANEOUS at 07:07

## 2022-07-03 RX ADMIN — POLYETHYLENE GLYCOL 3350 17 G: 17 POWDER, FOR SOLUTION ORAL at 09:07

## 2022-07-03 RX ADMIN — INSULIN ASPART 2 UNITS: 100 INJECTION, SOLUTION INTRAVENOUS; SUBCUTANEOUS at 06:07

## 2022-07-03 RX ADMIN — DEXAMETHASONE SODIUM PHOSPHATE 2 MG: 4 INJECTION INTRA-ARTICULAR; INTRALESIONAL; INTRAMUSCULAR; INTRAVENOUS; SOFT TISSUE at 12:07

## 2022-07-03 RX ADMIN — DOCUSATE SODIUM 100 MG: 100 CAPSULE, LIQUID FILLED ORAL at 09:07

## 2022-07-03 RX ADMIN — DEXAMETHASONE 2 MG: 1 TABLET ORAL at 09:07

## 2022-07-03 RX ADMIN — CEFAZOLIN SODIUM 2 G: 2 SOLUTION INTRAVENOUS at 03:07

## 2022-07-03 RX ADMIN — HYDROMORPHONE HYDROCHLORIDE 1 MG: 2 INJECTION INTRAMUSCULAR; INTRAVENOUS; SUBCUTANEOUS at 03:07

## 2022-07-03 RX ADMIN — ATORVASTATIN CALCIUM 10 MG: 10 TABLET, FILM COATED ORAL at 09:07

## 2022-07-03 RX ADMIN — GABAPENTIN 300 MG: 300 CAPSULE ORAL at 09:07

## 2022-07-03 RX ADMIN — CEFAZOLIN SODIUM 2 G: 2 SOLUTION INTRAVENOUS at 09:07

## 2022-07-03 RX ADMIN — DEXAMETHASONE SODIUM PHOSPHATE 2 MG: 4 INJECTION INTRA-ARTICULAR; INTRALESIONAL; INTRAMUSCULAR; INTRAVENOUS; SOFT TISSUE at 05:07

## 2022-07-03 RX ADMIN — GABAPENTIN 300 MG: 300 CAPSULE ORAL at 03:07

## 2022-07-03 RX ADMIN — LOSARTAN POTASSIUM 50 MG: 50 TABLET, FILM COATED ORAL at 09:07

## 2022-07-03 NOTE — PROGRESS NOTES
[facility]  Neurosurgery  Progress Note    Subjective:     Interval History:  The patient was seen this morning sitting at the edge of his bed.  No acute events overnight.  Patient continues to improve.  Yesterday he participated with therapy. Amb with RW 2 x 80'.  Patient reports that he is amb around his room.  He continues to have decreased back and LLE pain.    Denies HA, dizziness, N/V, shortness of breath and chest pain.   No numbness/tingling.  No issues urinating.  + flatus, no BM in several days.    Blood cultures and Aerobic cultures + STAPHYLOCOCCUS EPIDERMIDIS.   Hosp med will reach out to ID in [place] regarding tx.    History of Present Illness: See H&P.    Post-Op Info:  Procedure(s) (LRB):  INCISION AND DRAINAGE, HEMATOMA (N/A)  LAMINECTOMY, SPINE, LUMBAR, WITH DISCECTOMY (N/A)   5 Days Post-Op      Medications:  Continuous Infusions:   sodium chloride 0.9% 100 mL/hr at 06/29/22 0707     Scheduled Meds:   atorvastatin  10 mg Oral QHS    ceFAZolin (ANCEF) IVPB  2 g Intravenous Q8H    dexamethasone  2 mg Intravenous Q6H    docusate sodium  100 mg Oral BID    gabapentin  300 mg Oral TID    insulin detemir U-100  10 Units Subcutaneous BID    losartan  50 mg Oral Daily    multivitamin  1 tablet Oral Daily    polyethylene glycol  17 g Oral BID     PRN Meds:acetaminophen, aluminum-magnesium hydroxide-simethicone, dextrose 10%, dextrose 10%, diazePAM, diphenhydrAMINE, glucagon (human recombinant), glucose, glucose, HYDROmorphone, insulin aspart U-100, melatonin, naloxone, ondansetron, prochlorperazine, senna-docusate 8.6-50 mg, sodium chloride 0.9%, sodium chloride 0.9%, sodium chloride 0.9%       Objective:     Weight: 107.8 kg (237 lb 10.5 oz)  Body mass index is 38.36 kg/m².  Vital Signs (Most Recent):  Temp: 97.6 °F (36.4 °C) (07/03/22 0739)  Pulse: (!) 53 (07/03/22 0739)  Resp: 16 (07/03/22 0739)  BP: 122/66 (07/03/22 0739)  SpO2: 98 % (07/03/22 0739) Vital Signs (24h Range):  Temp:   [97.5 °F (36.4 °C)-98.3 °F (36.8 °C)] 97.6 °F (36.4 °C)  Pulse:  [49-66] 53  Resp:  [16-18] 16  SpO2:  [96 %-100 %] 98 %  BP: (101-170)/(55-79) 122/66                   Neurosurgery Physical Exam  Vitals reviewed  Sensation intact to light touch  Moves all 4 ext well    Incision/dressing CDI  There is no drainage, swelling , erythema or fluctuance  New dressing  placed    Significant Labs:  Recent Labs   Lab 07/02/22  0715   *      K 5.5*      CO2 22*   BUN 18   CREATININE 0.8   CALCIUM 8.8     No results for input(s): WBC, HGB, HCT, PLT in the last 48 hours.  No results for input(s): LABPT, INR, APTT in the last 48 hours.  Microbiology Results (last 7 days)     Procedure Component Value Units Date/Time    Aerobic culture [[id number]]  (Abnormal)  (Susceptibility) Collected: 06/28/22 1950    Order Status: Completed Specimen: Abscess from Back Updated: 07/02/22 1208     Aerobic Bacterial Culture STAPHYLOCOCCUS EPIDERMIDIS  Moderate      Narrative:      Deep    Aerobic culture [[id number]] Collected: 06/28/22 1950    Order Status: Completed Specimen: Abscess from Back Updated: 07/02/22 1122     Aerobic Bacterial Culture No growth    Narrative:      Superficial    Blood culture [[id number]]  (Abnormal) Collected: 06/29/22 1439    Order Status: Completed Specimen: Blood Updated: 07/02/22 1020     Blood Culture, Routine Gram stain aer bottle: Gram positive cocci in clusters resembling Staph      Results called to and read back by: Alisha Eller RN. 07/02/2022  00:17      STAPHYLOCOCCUS EPIDERMIDIS  Susceptibility pending      Narrative:      Aerobic, anaerobic cx    Blood culture [[id number]]  (Abnormal) Collected: 06/29/22 1657    Order Status: Completed Specimen: Blood Updated: 07/02/22 1018     Blood Culture, Routine Gram stain nico bottle: Gram positive cocci in clusters resembling Staph       Results called to and read back by: Soheila You 07/01/2022  05:47      STAPHYLOCOCCUS  EPIDERMIDIS  Susceptibility pending      Culture, Anaerobe [[id number]] Collected: 06/28/22 1950    Order Status: Completed Specimen: Abscess from Back Updated: 07/01/22 0857     Anaerobic Culture Culture in progress    Narrative:      Deep    Culture, Anaerobe [[id number]] Collected: 06/28/22 1950    Order Status: Completed Specimen: Abscess from Back Updated: 07/01/22 0856     Anaerobic Culture Culture in progress    Narrative:      superficial    AFB Culture & Smear [[id number]] Collected: 06/28/22 1950    Order Status: Completed Specimen: Abscess from Back Updated: 06/30/22 0927     AFB Culture & Smear Culture in progress     AFB CULTURE STAIN No acid fast bacilli seen.    Narrative:      superficial    AFB Culture & Smear [[id number]] Collected: 06/28/22 1950    Order Status: Completed Specimen: Abscess from Back Updated: 06/30/22 0927     AFB Culture & Smear Culture in progress     AFB CULTURE STAIN No acid fast bacilli seen.    Narrative:      Deep    Gram stain [[id number]] Collected: 06/28/22 1950    Order Status: Completed Specimen: Abscess from Back Updated: 06/29/22 0613     Gram Stain Result Few WBC's      Rare Gram positive cocci    Narrative:      superficial    Gram stain [[id number]] Collected: 06/28/22 1950    Order Status: Completed Specimen: Abscess from Back Updated: 06/29/22 0611     Gram Stain Result Rare WBC's      Rare Gram positive cocci    Narrative:      Deep    Fungus culture [[id number]] Collected: 06/28/22 1950    Order Status: Sent Specimen: Abscess from Back Updated: 06/29/22 0307    Fungus culture [[id number]] Collected: 06/28/22 1950    Order Status: Sent Specimen: Abscess from Back Updated: 06/29/22 0307        All pertinent labs from the last 24 hours have been reviewed.  Significant Diagnostics:  I have reviewed all pertinent imaging results/findings within the past 24 hours.    Assessment/Plan:     Active Diagnoses:    Diagnosis Date Noted POA    PRINCIPAL PROBLEM:  Postoperative  hematoma involving nervous system following nervous system procedure [G97.61] 06/28/2022 Yes    Gram-positive bacteremia [R78.81] 07/02/2022 Yes    Fever [R50.9] 06/30/2022 Yes    Hypertension associated with diabetes [E11.59, I15.2]  Yes    Diabetes mellitus without complication [E11.9]  Yes      Problems Resolved During this Admission:     POD #5    Continue current treatment. Will order suppository. Patient is not interested in any further lax/stool softener. Determined to amb more in order to hopefully have BM.    Reached out to [facility]osp [facility] regarding Abx for STAPHYLOCOCCUS EPIDERMIDIS.   NP informed me that he is discussing case with ID in [place] this morning since we don't have ID coverage until later next week.  Continue PT/OT.    Will continue to monitor. Please reach out with any changes.    Update: Per [facility]-  Pt will need 2 weeks of IV antibiotic for Bacteremia, will need PICC line and home IV antibiotic arranged. Probable best D/C tomorrow.         Oliver Clifton PA-C  Neurosurgery  [facility]

## 2022-07-03 NOTE — ANESTHESIA POSTPROCEDURE EVALUATION
Anesthesia Post Evaluation    Patient: Friday ROGELIO Blake    Procedure(s) Performed: Procedure(s) (LRB):  INCISION AND DRAINAGE, HEMATOMA (N/A)  LAMINECTOMY, SPINE, LUMBAR, WITH DISCECTOMY (N/A)                    Comments: I made a visit to the patient's bedside.  He has had some post op swelling and blistering on his face from his spine surgery.  Although it is healing, he has had the expectation that someone from the Anesthesia department would stop by his room to make a visit earlier.  He has voiced his displeasure with the fact that no one from Anesthesia has followed up with him in person prior to today.      We had a lengthy conversation about his hospital course as well as his post op facial blistering.  I have reassured him regarding his skin and its healing process.  He is doing better since his surgery and is looking forward to a discharge soon (depending on the antibiotic regimen that is needed).          Vitals Value Taken Time   /66 07/03/22 0739   Temp 36.4 °C (97.6 °F) 07/03/22 0739   Pulse 53 07/03/22 0739   Resp 16 07/03/22 0739   SpO2 98 % 07/03/22 0739         Event Time   Out of Recovery 06/28/2022 21:25:00         Pain/Javid Score: Pain Rating Prior to Med Admin: 7 (7/3/2022  3:19 AM)  Pain Rating Post Med Admin: 0 (7/3/2022  4:00 AM)  Javid Score: 10 (7/3/2022  7:30 AM)

## 2022-07-03 NOTE — CHAPLAIN
Follow up visit with patient.  Visited with patient because he had requested a visit from me today on a previous visit.  Pt was doing well and he took time to talk about the progress that was made.  Pt wanted us to pray together and we took time to do this before I left.  Pt currently had no other needs and spiritual care remains available as needed.    Chaplain Vitor Dyer M.Div., [facility]

## 2022-07-04 LAB
AORTIC ROOT ANNULUS: 3.65 CM
ASCENDING AORTA: 3.74 CM
AV INDEX (PROSTH): 0.9
AV MEAN GRADIENT: 8 MMHG
AV PEAK GRADIENT: 11 MMHG
AV VALVE AREA: 2.83 CM2
AV VELOCITY RATIO: 0.84
BACTERIA BLD CULT: ABNORMAL
BACTERIA SPEC ANAEROBE CULT: ABNORMAL
BACTERIA SPEC ANAEROBE CULT: ABNORMAL
BSA FOR ECHO PROCEDURE: 2.28 M2
CV ECHO LV RWT: 0.55 CM
DOP CALC AO PEAK VEL: 1.68 M/S
DOP CALC AO VTI: 36 CM
DOP CALC LVOT AREA: 3.1 CM2
DOP CALC LVOT DIAMETER: 2 CM
DOP CALC LVOT PEAK VEL: 1.41 M/S
DOP CALC LVOT STROKE VOLUME: 101.74 CM3
DOP CALC RVOT PEAK VEL: 1.01 M/S
DOP CALC RVOT VTI: 24.3 CM
DOP CALCLVOT PEAK VEL VTI: 32.4 CM
E WAVE DECELERATION TIME: 243.79 MSEC
E/A RATIO: 1.15
E/E' RATIO: 9.9 M/S
ECHO LV POSTERIOR WALL: 1.3 CM (ref 0.6–1.1)
EJECTION FRACTION: 60 %
FRACTIONAL SHORTENING: 35 % (ref 28–44)
INTERVENTRICULAR SEPTUM: 1.27 CM (ref 0.6–1.1)
IVC DIAMETER: 1.56 CM
IVRT: 72.31 MSEC
LA MAJOR: 5.71 CM
LA MINOR: 5.61 CM
LA WIDTH: 3.7 CM
LEFT ATRIUM SIZE: 3.74 CM
LEFT ATRIUM VOLUME INDEX: 30.5 ML/M2
LEFT ATRIUM VOLUME: 66.57 CM3
LEFT INTERNAL DIMENSION IN SYSTOLE: 3.08 CM (ref 2.1–4)
LEFT VENTRICLE DIASTOLIC VOLUME INDEX: 48.15 ML/M2
LEFT VENTRICLE DIASTOLIC VOLUME: 104.97 ML
LEFT VENTRICLE MASS INDEX: 109 G/M2
LEFT VENTRICLE SYSTOLIC VOLUME INDEX: 17.1 ML/M2
LEFT VENTRICLE SYSTOLIC VOLUME: 37.37 ML
LEFT VENTRICULAR INTERNAL DIMENSION IN DIASTOLE: 4.75 CM (ref 3.5–6)
LEFT VENTRICULAR MASS: 237.76 G
LV LATERAL E/E' RATIO: 9.9 M/S
LV SEPTAL E/E' RATIO: 9.9 M/S
LVOT MG: 4.19 MMHG
LVOT MV: 0.98 CM/S
MV PEAK A VEL: 0.86 M/S
MV PEAK E VEL: 0.99 M/S
PISA MRMAX VEL: 3.48 M/S
PISA TR MAX VEL: 2.27 M/S
POCT GLUCOSE: 157 MG/DL (ref 70–110)
POCT GLUCOSE: 184 MG/DL (ref 70–110)
POCT GLUCOSE: 227 MG/DL (ref 70–110)
POCT GLUCOSE: 233 MG/DL (ref 70–110)
PV MEAN GRADIENT: 2.25 MMHG
RA MAJOR: 4.7 CM
RA PRESSURE: 3 MMHG
STJ: 3.65 CM
TDI LATERAL: 0.1 M/S
TDI SEPTAL: 0.1 M/S
TDI: 0.1 M/S
TR MAX PG: 21 MMHG
TV REST PULMONARY ARTERY PRESSURE: 24 MMHG

## 2022-07-04 PROCEDURE — 63600175 PHARM REV CODE 636 W HCPCS: Performed by: NURSE PRACTITIONER

## 2022-07-04 PROCEDURE — 25000003 PHARM REV CODE 250: Performed by: NURSE PRACTITIONER

## 2022-07-04 PROCEDURE — 63600175 PHARM REV CODE 636 W HCPCS: Performed by: NEUROLOGICAL SURGERY

## 2022-07-04 PROCEDURE — 25000003 PHARM REV CODE 250: Performed by: NEUROLOGICAL SURGERY

## 2022-07-04 PROCEDURE — C9399 UNCLASSIFIED DRUGS OR BIOLOG: HCPCS | Performed by: NURSE PRACTITIONER

## 2022-07-04 PROCEDURE — 25000003 PHARM REV CODE 250: Performed by: PHYSICIAN ASSISTANT

## 2022-07-04 PROCEDURE — 97116 GAIT TRAINING THERAPY: CPT | Mod: CQ

## 2022-07-04 PROCEDURE — 11000001 HC ACUTE MED/SURG PRIVATE ROOM

## 2022-07-04 PROCEDURE — C1751 CATH, INF, PER/CENT/MIDLINE: HCPCS

## 2022-07-04 PROCEDURE — 21400001 HC TELEMETRY ROOM

## 2022-07-04 PROCEDURE — 36569 INSJ PICC 5 YR+ W/O IMAGING: CPT

## 2022-07-04 PROCEDURE — 94760 N-INVAS EAR/PLS OXIMETRY 1: CPT

## 2022-07-04 RX ADMIN — GABAPENTIN 300 MG: 300 CAPSULE ORAL at 02:07

## 2022-07-04 RX ADMIN — POLYETHYLENE GLYCOL 3350 17 G: 17 POWDER, FOR SOLUTION ORAL at 08:07

## 2022-07-04 RX ADMIN — LOSARTAN POTASSIUM 50 MG: 50 TABLET, FILM COATED ORAL at 09:07

## 2022-07-04 RX ADMIN — INSULIN DETEMIR 10 UNITS: 100 INJECTION, SOLUTION SUBCUTANEOUS at 08:07

## 2022-07-04 RX ADMIN — CEFAZOLIN SODIUM 2 G: 2 SOLUTION INTRAVENOUS at 06:07

## 2022-07-04 RX ADMIN — DEXAMETHASONE 2 MG: 1 TABLET ORAL at 06:07

## 2022-07-04 RX ADMIN — DEXAMETHASONE 2 MG: 1 TABLET ORAL at 10:07

## 2022-07-04 RX ADMIN — DOCUSATE SODIUM 100 MG: 100 CAPSULE, LIQUID FILLED ORAL at 08:07

## 2022-07-04 RX ADMIN — CEFAZOLIN SODIUM 2 G: 2 SOLUTION INTRAVENOUS at 09:07

## 2022-07-04 RX ADMIN — INSULIN ASPART 4 UNITS: 100 INJECTION, SOLUTION INTRAVENOUS; SUBCUTANEOUS at 06:07

## 2022-07-04 RX ADMIN — INSULIN ASPART 2 UNITS: 100 INJECTION, SOLUTION INTRAVENOUS; SUBCUTANEOUS at 12:07

## 2022-07-04 RX ADMIN — ATORVASTATIN CALCIUM 10 MG: 10 TABLET, FILM COATED ORAL at 08:07

## 2022-07-04 RX ADMIN — GABAPENTIN 300 MG: 300 CAPSULE ORAL at 09:07

## 2022-07-04 RX ADMIN — HYDROMORPHONE HYDROCHLORIDE 1 MG: 2 INJECTION INTRAMUSCULAR; INTRAVENOUS; SUBCUTANEOUS at 07:07

## 2022-07-04 RX ADMIN — HYDROMORPHONE HYDROCHLORIDE 1 MG: 2 INJECTION INTRAMUSCULAR; INTRAVENOUS; SUBCUTANEOUS at 02:07

## 2022-07-04 RX ADMIN — HYDROMORPHONE HYDROCHLORIDE 1 MG: 2 INJECTION INTRAMUSCULAR; INTRAVENOUS; SUBCUTANEOUS at 08:07

## 2022-07-04 RX ADMIN — HYDROMORPHONE HYDROCHLORIDE 1 MG: 2 INJECTION INTRAMUSCULAR; INTRAVENOUS; SUBCUTANEOUS at 01:07

## 2022-07-04 RX ADMIN — DOCUSATE SODIUM 100 MG: 100 CAPSULE, LIQUID FILLED ORAL at 09:07

## 2022-07-04 RX ADMIN — DEXAMETHASONE 2 MG: 1 TABLET ORAL at 02:07

## 2022-07-04 RX ADMIN — POLYETHYLENE GLYCOL 3350 17 G: 17 POWDER, FOR SOLUTION ORAL at 09:07

## 2022-07-04 RX ADMIN — INSULIN ASPART 2 UNITS: 100 INJECTION, SOLUTION INTRAVENOUS; SUBCUTANEOUS at 04:07

## 2022-07-04 RX ADMIN — CEFAZOLIN SODIUM 2 G: 2 SOLUTION INTRAVENOUS at 01:07

## 2022-07-04 RX ADMIN — INSULIN ASPART 2 UNITS: 100 INJECTION, SOLUTION INTRAVENOUS; SUBCUTANEOUS at 08:07

## 2022-07-04 RX ADMIN — THERA TABS 1 TABLET: TAB at 09:07

## 2022-07-04 RX ADMIN — GABAPENTIN 300 MG: 300 CAPSULE ORAL at 08:07

## 2022-07-04 RX ADMIN — INSULIN DETEMIR 10 UNITS: 100 INJECTION, SOLUTION SUBCUTANEOUS at 09:07

## 2022-07-04 NOTE — PROCEDURES
"Friday ROGELIO Blake is a 66 y.o. male patient.    Temp: 98.2 °F (36.8 °C) (07/04/22 1239)  Pulse: 60 (07/04/22 1239)  Resp: 17 (07/04/22 1239)  BP: 133/75 (07/04/22 1239)  SpO2: 98 % (07/04/22 1239)  Weight: 111.6 kg (246 lb) (07/04/22 1239)  Height: 5' 6" (167.6 cm) (07/04/22 1239)    PICC  Date/Time: 7/4/2022 12:32 PM  Performed by: Rafael Mcclendon RN  Supervising provider: Sary Quinones RN  Consent Done: Yes  Time out: Immediately prior to procedure a time out was called to verify the correct patient, procedure, equipment, support staff and site/side marked as required  Indications: med administration and vascular access  Anesthesia: local infiltration  Local anesthetic: lidocaine 1% without epinephrine  Anesthetic Total (mL): 3  Preparation: skin prepped with ChloraPrep  Skin prep agent dried: skin prep agent completely dried prior to procedure  Sterile barriers: all five maximum sterile barriers used - cap, mask, sterile gown, sterile gloves, and large sterile sheet  Hand hygiene: hand hygiene performed prior to central venous catheter insertion  Location details: left basilic  Catheter type: double lumen  Catheter size: 4 Fr  Catheter Length: 45cm    Ultrasound guidance: yes  Vessel Caliber: medium and patent, compressibility normal  Vascular Doppler: not done  Needle advanced into vessel with real time Ultrasound guidance.  Guidewire confirmed in vessel.  Sterile sheath used.  no esophageal manometryNumber of attempts: 1  Post-procedure: blood return through all ports, chlorhexidine patch and sterile dressing applied  Estimated blood loss (mL): 0  Specimens: No    Assessment: placement verified by x-ray  Tip Termination Explanation: see rad. report  Complications: none          Name Rafael Mcclendon RN  7/4/2022    "

## 2022-07-04 NOTE — PLAN OF CARE
Patient remains free from falls and injuries this shift. Patient had no complaints of pain this shift. No s/s of acute distress noted. Will continue to monitor patient chart check completed.

## 2022-07-04 NOTE — ASSESSMENT & PLAN NOTE
Blood and wound culture positive for Staphylococcus epidermidis  Sensitivity results noted   Stop Vanco  Start Ancef     7/3/2022  Repeat BC in progress   Continue Ancef   Will need 2 weeks of IV antibiotic for negative cultures       7/4/2022  Repeat BC negative   Continue Ancef   Will need 2 weeks of IV antibiotic for negative cultures   PICC line placed

## 2022-07-04 NOTE — ASSESSMENT & PLAN NOTE
accuchecks  Sliding scale insulin  Hold oral diabetic medications    7/1/2022  Diabetic diet   Glucose elevated, probable related to IV steroids  Continue moderated dose SSI  Start detemir 10 units BID     7/2/2022  Diabetic diet   Glucose elevated, probable related to IV steroids  Continue moderated dose SSI  Continue detemir 10 units BID       7/4/2022  Diabetic diet   Continue moderated dose SSI  Continue detemir 10 units BID

## 2022-07-04 NOTE — PT/OT/SLP PROGRESS
Physical Therapy  Treatment    Friday ROGELIO Blake   MRN: [id number]   Admitting Diagnosis: Postoperative hematoma involving nervous system following nervous system procedure    PT Received On: 07/04/22  PT Start Time: 0950     PT Stop Time: 1005    PT Total Time (min): 15 min       Billable Minutes:  Gait Training 15    Treatment Type: Treatment  PT/PTA: PTA     PTA Visit Number: 2       General Precautions: Standard, fall  Orthopedic Precautions: spinal precautions   Braces: N/A  Respiratory Status: Room air         Subjective:  Communicated with patient's nurse, Nieves CHRISTOPHER, and completed Epic chart review prior to session.  Patient agreed to PT session and reports he is now feeling significantly better.     Pain/Comfort  Pain Rating 1: 0/10  Pain Rating Post-Intervention 1: 0/10    Objective:   Patient found with: peripheral IV, telemetry    Functional Mobility:  Patient found sitting up in chair.    STS from EOB > RW: Independent    400ft w/ RW SBA     Stand pivot T/F to chair w/ RW: SBA    AM-PAC 6 CLICK MOBILITY  How much help from another person does this patient currently need?   1 = Unable, Total/Dependent Assistance  2 = A lot, Maximum/Moderate Assistance  3 = A little, Minimum/Contact Guard/Supervision  4 = None, Modified [place]ady/Independent    Turning over in bed (including adjusting bedclothes, sheets and blankets)?:  (NT)  Sitting down on and standing up from a chair with arms (e.g., wheelchair, bedside commode, etc.): 4  Moving from lying on back to sitting on the side of the bed?:  (NT)  Moving to and from a bed to a chair (including a wheelchair)?:  (NT)  Need to walk in hospital room?: 4  Climbing 3-5 steps with a railing?: 1    AM-PAC Raw Score CMS G-Code Modifier Level of Impairment Assistance   6 % Total / Unable   7 - 9 CM 80 - 100% Maximal Assist   10 - 14 CL 60 - 80% Moderate Assist   15 - 19 CK 40 - 60% Moderate Assist   20 - 22 CJ 20 - 40% Minimal Assist   23 CI 1-20% SBA / CGA   24  CH 0% Independent/ Mod I     Patient left up in chair with call button in reach and nurse present.    Assessment:  Friday ROGELIO Blake is a 66 y.o. male with a medical diagnosis of Postoperative hematoma involving nervous system following nervous system procedure and presents with overall decline in functional mobility. Patient would continue to benefit from skilled PT to address functional limitations listed below in order to return to PLOF/decrease caregiver burden.     Rehab identified problem list/impairments: Rehab identified problem list/impairments: weakness, impaired cardiopulmonary response to activity, orthopedic precautions    Rehab potential is good.    Activity tolerance: Good    Discharge recommendations: Discharge Facility/Level of Care Needs: home health PT (SPOKE WITH [facility] PT RE: CHANGE IN D/C RECOMMENDATION. UPDATED TO  BASED ON PATIENT PROGRESS)    Barriers to discharge:      Equipment recommendations: Equipment Needed After Discharge: walker, rolling     GOALS:   Multidisciplinary Problems     Physical Therapy Goals        Problem: Physical Therapy    Goal Priority Disciplines Outcome Goal Variances Interventions   Physical Therapy Goal     PT, PT/OT Ongoing, Not Progressing     Description: LT22  1. PT WILL COMPLETE BED MOBILITY WITH SBA  2. PT WILL STAND WITH RW AND SBA FOR T/F TO CHAIR  3. PT WILL GT TRAIN X 150' WITH RW AND SBA.                     PLAN:    Patient to be seen 3 x/week  to address the above listed problems via gait training, therapeutic activities, therapeutic exercises  Plan of Care expires: 22  Plan of Care reviewed with: patient         2022

## 2022-07-04 NOTE — CONSULTS
[facility] [place] via [facility].

## 2022-07-04 NOTE — PROGRESS NOTES
[facility] Medicine  Progress Note    Patient Name: Friday ROGELIO Blake  MRN: [id number]  Patient Class: IP- Inpatient   Admission Date: 6/27/2022  Length of Stay: 4 days  Attending Physician: Shaggy Zepeda MD  Primary Care Provider: Sid Elizabeth MD        Subjective:     Principal Problem:Postoperative hematoma involving nervous system following nervous system procedure        HPI:  Pt is a 67 yo male with PMhx of DM, HTN, HPL who underwent an I & D of hematoma S/P lumbar spine laminectomy and discectomy performed by Dr. Zepeda. Hospital Medicine was consulted to assist with medical management. Pt's blood pressure controlled today, oxygenation appropriate and labs stable. Glucose 167 and sliding scale insulin ordered. Pt seen and examined and sitting in chair at bedside. He reports working with PT/OT. He describes left hip pain and is now taking oral analgesic. Other symptoms are denied. He denies weakness and paresthesias to legs.       Overview/Hospital Course:  6/30/22 - increasing left hip and lower back pain. Primary is attempting to obtain further CT and MRI imaging but patient intolerant due to pain. Given Dilaudid prn. Given Mag Citrate and Enema for c/o constipation with small response. On Vancomycin, no further fevers. Blood cultures NGTD and aerobic culture obtained during surgery has isolated Staph Epi. Neurosurgery following S/P I & D L spine hematoma. Linear bruise to the left nasal fold. To left cheek there is indurated area oozing clear fluid. Wound care is applying bactroban. As of 7/1/2022, vital signs and labs stable. Glucose elevated, probable related to IV steroid. Pt reports back pain improving. Neurosurgery following, no surgery plan for today. Patient reports left facial blister improving as well.  As of 7/2/2022, patient feeling much better, vital signs and labs stable. He reports back pain has greatly decreased. Patient is currently sitting up in chair and walking with  walker with Physical Therapy. As of 7/3/2022, patient continue to do well, vital signs stable. Will need 2 weeks of IV antibiotic for Bacteremia, follow repeat Blood cultures. As of 7/4/2022, patient continues feel well, vital signs and labs stable. Will need 2 weeks of IV antibiotic for bacteremia. Case management unable to arranged IV antibiotic due to holiday. PICC line placed today.       Interval History: Plan discharge tomorrow per Neurosurgery, when IV antidotic arranged.     Review of Systems   Constitutional:  Negative for chills, diaphoresis, fatigue and fever.   HENT:  Negative for congestion, ear discharge, rhinorrhea, sinus pressure, sore throat and trouble swallowing.    Eyes:  Negative for discharge and visual disturbance.   Respiratory:  Negative for apnea, cough, choking, chest tightness, shortness of breath, wheezing and stridor.    Cardiovascular:  Negative for chest pain, palpitations and leg swelling.   Gastrointestinal:  Negative for abdominal distention, abdominal pain, diarrhea, nausea and vomiting.   Endocrine: Negative for cold intolerance and heat intolerance.   Genitourinary:  Negative for dysuria, frequency and hematuria.   Musculoskeletal:  Positive for back pain (decreased) and gait problem (ambulating with walker). Negative for arthralgias, myalgias and neck pain.   Skin:  Negative for pallor and rash.   Neurological:  Negative for dizziness, seizures, syncope, weakness and headaches.   Psychiatric/Behavioral:  Negative for agitation, confusion and sleep disturbance.    Objective:     Vital Signs (Most Recent):  Temp: 97.9 °F (36.6 °C) (07/04/22 1617)  Pulse: 61 (07/04/22 1720)  Resp: 16 (07/04/22 1617)  BP: 129/65 (07/04/22 1617)  SpO2: 97 % (07/04/22 1617) Vital Signs (24h Range):  Temp:  [97.6 °F (36.4 °C)-98.2 °F (36.8 °C)] 97.9 °F (36.6 °C)  Pulse:  [56-62] 61  Resp:  [16-18] 16  SpO2:  [96 %-100 %] 97 %  BP: (106-158)/(53-82) 129/65     Weight: 111.6 kg (246 lb)  Body mass index  is 39.71 kg/m².    Intake/Output Summary (Last 24 hours) at 7/4/2022 1843  Last data filed at 7/4/2022 1813  Gross per 24 hour   Intake 551.69 ml   Output --   Net 551.69 ml      Physical Exam  Vitals and nursing note reviewed.   Constitutional:       General: He is not in acute distress.     Appearance: He is not ill-appearing, toxic-appearing or diaphoretic.   HENT:      Head: Normocephalic and atraumatic.      Comments: Left facial blister continues to improve   Eyes:      General:         Right eye: No discharge.         Left eye: No discharge.   Cardiovascular:      Heart sounds: No murmur heard.    No friction rub. No gallop.   Pulmonary:      Effort: No respiratory distress.      Breath sounds: No stridor. No wheezing, rhonchi or rales.   Chest:      Chest wall: No tenderness.   Abdominal:      General: There is no distension.      Palpations: There is no mass.      Tenderness: There is no abdominal tenderness. There is no right CVA tenderness, left CVA tenderness, guarding or rebound.      Hernia: No hernia is present.   Musculoskeletal:         General: Tenderness (lower back pain decreased) present. No swelling, deformity or signs of injury.      Right lower leg: No edema.      Left lower leg: No edema.      Comments: Left lower back dressing intact    Skin:     Coloration: Skin is not jaundiced or pale.      Findings: No bruising, erythema, lesion or rash.   Neurological:      Cranial Nerves: No cranial nerve deficit.      Sensory: No sensory deficit.      Motor: No weakness.      Coordination: Coordination normal.      Gait: Gait normal.      Deep Tendon Reflexes: Reflexes normal.            Significant Imaging:     Imaging Results              MRI Lumbar Spine Without Contrast (Final result)  Result time 06/27/22 16:24:14      Final result by Ramon Mead Jr., MD (06/27/22 16:24:14)                   Impression:      1. Interval left hemilaminectomy at L2-L3.  There is ventral epidural tissue that  likely relates to small hematoma and less likely extruded disc or phlegmonous material.  This distorts the thecal sac toward the right and results in flattening to 5 mm.  There is severe lateral recess stenosis that could affect the L3 spinal nerve roots that are descending at this level.  2. No definitive signs of spondylodiscitis.  No definite rosette abscess.  Increased T2 signal within the paraspinal muscles about the laminectomy site is not necessarily unusual given the postoperative state.  3. Unchanged degenerative findings elsewhere as described above.  Persistent mild/moderate left lateral recess stenosis at L1-L2 a could affect the left L2 spinal nerve roots.  These findings were discussed with Dr. Alford at 16:24.      Electronically signed by: Ramon Mead Jr., MD  Date:    06/27/2022  Time:    16:24               Narrative:    EXAMINATION:  MRI LUMBAR SPINE WITHOUT CONTRAST    CLINICAL HISTORY:  back pain;    TECHNIQUE:  MR Lumbar spine without contrast. Sagittal T1, T2, STIR. Axial T1, T2. Coronal T1.    COMPARISON:  Intraoperative fluoroscopy views from 06/22/2022 were reviewed.  CT of the lumbar spine from 05/03/2022 was also reviewed.  MRI of the lumbar spine from 01/18/2022 was reviewed as well.    FINDINGS:  There are postoperative findings from interval left hemilaminectomy at L2-L3.  There is small granulation tissue within the dorsal laminotomy site.  There is abnormal material within the ventral epidural spinal canal that flattens the thecal sac toward the right at the level of mid L3.  The thecal sac measures 5 mm in transverse dimension as demonstrated on axial T2 series 6, image 18.  The abnormal ventral soft tissue/hematoma/extruded disc measures 57 mm craniocaudal approximately by 19 mm transverse by 8 mm in AP dimension.  There is also abnormal tissue within the bilateral lateral recesses at this level, left greater than right, that could affect the descending L3 spinal nerve roots.   Normal appearing marrow signal without definite signs of spondylodiscitis.  There is increased T2 signal within the bilateral paraspinal muscles adjacent to the operative site extending inferiorly to the level of L4-L5.  The conus medullaris terminates at the level of L1-L2.  No abnormal signal  within the conus. Intervertebral disc levels are as follows:    T12-L1 disc: Unchanged minimal disc bulge with mild facet hypertrophy.  The thecal sac measures 11 mm with no significant foraminal stenosis.    L1-L2 disc : Unchanged left paracentral disc protrusion with annular fissure that causes mild left lateral recess stenosis.  Mild degenerative facet hypertrophy.  The thecal sac measures 7 mm AP which is unchanged.  No significant foraminal stenosis.    L2-L3 disc: Level of prior surgery with epidural abnormal tissue that flattens the thecal sac dorsally and toward the right where it measures as small as 5 mm.  Lateral recess stenosis could affect the descending L3 spinal nerve roots.  Abnormal tissue also encroaches into the left exit foramen at L2-L3 surrounding the exiting left L2 spinal nerve.    L3-L4 disc: Broad-based posterior disc bulge.  Mild degenerative facet hypertrophy bilaterally.  The thecal sac measures 8 mm in AP dimension.  Mild bilateral neural foraminal stenosis due to bulging disc which is similar to the previous exam.    L4-L5 disc: Circumferentially bulging disc that encroaches into the floors of the exit foramina.  Anterior osteophytes.  Severe degenerative facet hypertrophy with facet joint effusions.  The thecal sac measures 10 mm.  Mild bilateral foraminal stenosis.    L5-S1 disc: Normal disc space height with severe degenerative facet hypertrophy.  No significant spinal or foraminal stenosis.  The thecal sac measures 11 mm.                                         Assessment/Plan:      * Postoperative hematoma involving nervous system following nervous system procedure  Continue care by  primary team    7/2/2022  Neurology Surgery managing   Continue Steroids       Gram-positive bacteremia  Blood and wound culture positive for Staphylococcus epidermidis  Sensitivity results noted   Stop Vanco  Start Ancef     7/3/2022  Repeat BC in progress   Continue Ancef   Will need 2 weeks of IV antibiotic for negative cultures       7/4/2022  Repeat BC negative   Continue Ancef   Will need 2 weeks of IV antibiotic for negative cultures   PICC line placed       Fever  CXR and U/A negative  Empiric Vancomycin started  Aerobic culture taken from surgery notes Staph Epi    7/1/2022  Aerobic culture from surgery positive for Staph  BC, 2 days ago, Gram positive cocci in clusters resembling Staph    Continue Vanco       7/2/2022  Blood cultures and wound cultures positive for Staphylococcus epidermidis  Sensitivities noted   Stop Vanco   Start Ancef         Hypertension associated with diabetes  B/P controlled - 135/63, 147/63  Resume antihypertensives      Diabetes mellitus without complication  accuchecks  Sliding scale insulin  Hold oral diabetic medications    7/1/2022  Diabetic diet   Glucose elevated, probable related to IV steroids  Continue moderated dose SSI  Start detemir 10 units BID     7/2/2022  Diabetic diet   Glucose elevated, probable related to IV steroids  Continue moderated dose SSI  Continue detemir 10 units BID       7/4/2022  Diabetic diet   Continue moderated dose SSI  Continue detemir 10 units BID        VTE Risk Mitigation (From admission, onward)         Ordered     Place sequential compression device  Until discontinued         06/29/22 0710     IP VTE HIGH RISK PATIENT  Once         06/28/22 2146                Discharge Planning   MATEUS:      Code Status: Prior   Is the patient medically ready for discharge?:     Reason for patient still in hospital (select all that apply): Patient trending condition and Treatment  Discharge Plan A: Home with family                  Kamron Suero,  NP  Department of Hospital Medicine   '[facility]

## 2022-07-04 NOTE — PROGRESS NOTES
[facility] Medicine  Progress Note    Patient Name: Friday ROGELIO Blake  MRN: [id number]  Patient Class: IP- Inpatient   Admission Date: 6/27/2022  Length of Stay: 3 days  Attending Physician: Shaggy Zepeda MD  Primary Care Provider: Sid Elizabeth MD        Subjective:     Principal Problem:Postoperative hematoma involving nervous system following nervous system procedure        HPI:  Pt is a 67 yo male with PMhx of DM, HTN, HPL who underwent an I & D of hematoma S/P lumbar spine laminectomy and discectomy performed by Dr. Zepeda. Hospital Medicine was consulted to assist with medical management. Pt's blood pressure controlled today, oxygenation appropriate and labs stable. Glucose 167 and sliding scale insulin ordered. Pt seen and examined and sitting in chair at bedside. He reports working with PT/OT. He describes left hip pain and is now taking oral analgesic. Other symptoms are denied. He denies weakness and paresthesias to legs.       Overview/Hospital Course:  6/30/22 - increasing left hip and lower back pain. Primary is attempting to obtain further CT and MRI imaging but patient intolerant due to pain. Given Dilaudid prn. Given Mag Citrate and Enema for c/o constipation with small response. On Vancomycin, no further fevers. Blood cultures NGTD and aerobic culture obtained during surgery has isolated Staph Epi. Neurosurgery following S/P I & D L spine hematoma. Linear bruise to the left nasal fold. To left cheek there is indurated area oozing clear fluid. Wound care is applying bactroban. As of 7/1/2022, vital signs and labs stable. Glucose elevated, probable related to IV steroid. Pt reports back pain improving. Neurosurgery following, no surgery plan for today. Patient reports left facial blister improving as well.  As of 7/2/2022, patient feeling much better, vital signs and labs stable. He reports back pain has greatly decreased. Patient is currently sitting up in chair and walking with  walker with Physical Therapy. As of 7/3/2022, patient continue to do well, vital signs stable. Will need 2 weeks of IV antibiotic for Bacteremia, follow repeat Blood cultures.       No new subjective & objective note has been filed under this hospital service since the last note was generated.      Assessment/Plan:      * Postoperative hematoma involving nervous system following nervous system procedure  Continue care by primary team    7/2/2022  Neurology Surgery managing   Continue Steroids       Gram-positive bacteremia  Blood and wound culture positive for Staphylococcus epidermidis  Sensitivity results noted   Stop Vanco  Start Ancef     7/3/2022  Repeat BC in progress   Continue Ancef   Will need 2 weeks of IV antibiotic for negative cultures       Fever  CXR and U/A negative  Empiric Vancomycin started  Aerobic culture taken from surgery notes Staph Epi    7/1/2022  Aerobic culture from surgery positive for Staph  BC, 2 days ago, Gram positive cocci in clusters resembling Staph    Continue Vanco       7/2/2022  Blood cultures and wound cultures positive for Staphylococcus epidermidis  Sensitivities noted   Stop Vanco   Start Ancef         Hypertension associated with diabetes  B/P controlled - 135/63, 147/63  Resume antihypertensives      Diabetes mellitus without complication  accuchecks  Sliding scale insulin  Hold oral diabetic medications    7/1/2022  Diabetic diet   Glucose elevated, probable related to IV steroids  Continue moderated dose SSI  Start detemir 10 units BID     7/2/2022  Diabetic diet   Glucose elevated, probable related to IV steroids  Continue moderated dose SSI  Continue detemir 10 units BID         VTE Risk Mitigation (From admission, onward)         Ordered     Place sequential compression device  Until discontinued         06/29/22 0710     IP VTE HIGH RISK PATIENT  Once         06/28/22 8411                Discharge Planning   MATEUS:      Code Status: Prior   Is the patient medically  ready for discharge?:     Reason for patient still in hospital (select all that apply): Patient trending condition and Treatment  Discharge Plan A: Home with family                  Kamron Suero NP  Department of Hospital Medicine   '[facility]

## 2022-07-04 NOTE — SUBJECTIVE & OBJECTIVE
Interval History: Plan discharge tomorrow per Neurosurgery, when IV antidotic arranged.     Review of Systems   Constitutional:  Negative for chills, diaphoresis, fatigue and fever.   HENT:  Negative for congestion, ear discharge, rhinorrhea, sinus pressure, sore throat and trouble swallowing.    Eyes:  Negative for discharge and visual disturbance.   Respiratory:  Negative for apnea, cough, choking, chest tightness, shortness of breath, wheezing and stridor.    Cardiovascular:  Negative for chest pain, palpitations and leg swelling.   Gastrointestinal:  Negative for abdominal distention, abdominal pain, diarrhea, nausea and vomiting.   Endocrine: Negative for cold intolerance and heat intolerance.   Genitourinary:  Negative for dysuria, frequency and hematuria.   Musculoskeletal:  Positive for back pain (decreased) and gait problem (ambulating with walker). Negative for arthralgias, myalgias and neck pain.   Skin:  Negative for pallor and rash.   Neurological:  Negative for dizziness, seizures, syncope, weakness and headaches.   Psychiatric/Behavioral:  Negative for agitation, confusion and sleep disturbance.    Objective:     Vital Signs (Most Recent):  Temp: 97.9 °F (36.6 °C) (07/04/22 1617)  Pulse: 61 (07/04/22 1720)  Resp: 16 (07/04/22 1617)  BP: 129/65 (07/04/22 1617)  SpO2: 97 % (07/04/22 1617) Vital Signs (24h Range):  Temp:  [97.6 °F (36.4 °C)-98.2 °F (36.8 °C)] 97.9 °F (36.6 °C)  Pulse:  [56-62] 61  Resp:  [16-18] 16  SpO2:  [96 %-100 %] 97 %  BP: (106-158)/(53-82) 129/65     Weight: 111.6 kg (246 lb)  Body mass index is 39.71 kg/m².    Intake/Output Summary (Last 24 hours) at 7/4/2022 1843  Last data filed at 7/4/2022 1813  Gross per 24 hour   Intake 551.69 ml   Output --   Net 551.69 ml      Physical Exam  Vitals and nursing note reviewed.   Constitutional:       General: He is not in acute distress.     Appearance: He is not ill-appearing, toxic-appearing or diaphoretic.   HENT:      Head: Normocephalic  and atraumatic.      Comments: Left facial blister continues to improve   Eyes:      General:         Right eye: No discharge.         Left eye: No discharge.   Cardiovascular:      Heart sounds: No murmur heard.    No friction rub. No gallop.   Pulmonary:      Effort: No respiratory distress.      Breath sounds: No stridor. No wheezing, rhonchi or rales.   Chest:      Chest wall: No tenderness.   Abdominal:      General: There is no distension.      Palpations: There is no mass.      Tenderness: There is no abdominal tenderness. There is no right CVA tenderness, left CVA tenderness, guarding or rebound.      Hernia: No hernia is present.   Musculoskeletal:         General: Tenderness (lower back pain decreased) present. No swelling, deformity or signs of injury.      Right lower leg: No edema.      Left lower leg: No edema.      Comments: Left lower back dressing intact    Skin:     Coloration: Skin is not jaundiced or pale.      Findings: No bruising, erythema, lesion or rash.   Neurological:      Cranial Nerves: No cranial nerve deficit.      Sensory: No sensory deficit.      Motor: No weakness.      Coordination: Coordination normal.      Gait: Gait normal.      Deep Tendon Reflexes: Reflexes normal.            Significant Imaging:     Imaging Results              MRI Lumbar Spine Without Contrast (Final result)  Result time 06/27/22 16:24:14      Final result by Ramon Mead Jr., MD (06/27/22 16:24:14)                   Impression:      1. Interval left hemilaminectomy at L2-L3.  There is ventral epidural tissue that likely relates to small hematoma and less likely extruded disc or phlegmonous material.  This distorts the thecal sac toward the right and results in flattening to 5 mm.  There is severe lateral recess stenosis that could affect the L3 spinal nerve roots that are descending at this level.  2. No definitive signs of spondylodiscitis.  No definite rosette abscess.  Increased T2 signal within the  paraspinal muscles about the laminectomy site is not necessarily unusual given the postoperative state.  3. Unchanged degenerative findings elsewhere as described above.  Persistent mild/moderate left lateral recess stenosis at L1-L2 a could affect the left L2 spinal nerve roots.  These findings were discussed with Dr. Alford at 16:24.      Electronically signed by: Ramon Mead Jr., MD  Date:    06/27/2022  Time:    16:24               Narrative:    EXAMINATION:  MRI LUMBAR SPINE WITHOUT CONTRAST    CLINICAL HISTORY:  back pain;    TECHNIQUE:  MR Lumbar spine without contrast. Sagittal T1, T2, STIR. Axial T1, T2. Coronal T1.    COMPARISON:  Intraoperative fluoroscopy views from 06/22/2022 were reviewed.  CT of the lumbar spine from 05/03/2022 was also reviewed.  MRI of the lumbar spine from 01/18/2022 was reviewed as well.    FINDINGS:  There are postoperative findings from interval left hemilaminectomy at L2-L3.  There is small granulation tissue within the dorsal laminotomy site.  There is abnormal material within the ventral epidural spinal canal that flattens the thecal sac toward the right at the level of mid L3.  The thecal sac measures 5 mm in transverse dimension as demonstrated on axial T2 series 6, image 18.  The abnormal ventral soft tissue/hematoma/extruded disc measures 57 mm craniocaudal approximately by 19 mm transverse by 8 mm in AP dimension.  There is also abnormal tissue within the bilateral lateral recesses at this level, left greater than right, that could affect the descending L3 spinal nerve roots.  Normal appearing marrow signal without definite signs of spondylodiscitis.  There is increased T2 signal within the bilateral paraspinal muscles adjacent to the operative site extending inferiorly to the level of L4-L5.  The conus medullaris terminates at the level of L1-L2.  No abnormal signal  within the conus. Intervertebral disc levels are as follows:    T12-L1 disc: Unchanged minimal disc  bulge with mild facet hypertrophy.  The thecal sac measures 11 mm with no significant foraminal stenosis.    L1-L2 disc : Unchanged left paracentral disc protrusion with annular fissure that causes mild left lateral recess stenosis.  Mild degenerative facet hypertrophy.  The thecal sac measures 7 mm AP which is unchanged.  No significant foraminal stenosis.    L2-L3 disc: Level of prior surgery with epidural abnormal tissue that flattens the thecal sac dorsally and toward the right where it measures as small as 5 mm.  Lateral recess stenosis could affect the descending L3 spinal nerve roots.  Abnormal tissue also encroaches into the left exit foramen at L2-L3 surrounding the exiting left L2 spinal nerve.    L3-L4 disc: Broad-based posterior disc bulge.  Mild degenerative facet hypertrophy bilaterally.  The thecal sac measures 8 mm in AP dimension.  Mild bilateral neural foraminal stenosis due to bulging disc which is similar to the previous exam.    L4-L5 disc: Circumferentially bulging disc that encroaches into the floors of the exit foramina.  Anterior osteophytes.  Severe degenerative facet hypertrophy with facet joint effusions.  The thecal sac measures 10 mm.  Mild bilateral foraminal stenosis.    L5-S1 disc: Normal disc space height with severe degenerative facet hypertrophy.  No significant spinal or foraminal stenosis.  The thecal sac measures 11 mm.

## 2022-07-04 NOTE — ASSESSMENT & PLAN NOTE
Blood and wound culture positive for Staphylococcus epidermidis  Sensitivity results noted   Stop Vanco  Start Ancef     7/3/2022  Repeat BC in progress   Continue Ancef   Will need 2 weeks of IV antibiotic for negative cultures

## 2022-07-05 LAB
POCT GLUCOSE: 146 MG/DL (ref 70–110)
POCT GLUCOSE: 170 MG/DL (ref 70–110)
POCT GLUCOSE: 191 MG/DL (ref 70–110)
POCT GLUCOSE: 198 MG/DL (ref 70–110)
POCT GLUCOSE: 215 MG/DL (ref 70–110)

## 2022-07-05 PROCEDURE — 63600175 PHARM REV CODE 636 W HCPCS: Performed by: NEUROLOGICAL SURGERY

## 2022-07-05 PROCEDURE — 25000003 PHARM REV CODE 250: Performed by: NEUROLOGICAL SURGERY

## 2022-07-05 PROCEDURE — 11000001 HC ACUTE MED/SURG PRIVATE ROOM

## 2022-07-05 PROCEDURE — 25000003 PHARM REV CODE 250: Performed by: PHYSICIAN ASSISTANT

## 2022-07-05 PROCEDURE — 25000003 PHARM REV CODE 250: Performed by: NURSE PRACTITIONER

## 2022-07-05 PROCEDURE — 63600175 PHARM REV CODE 636 W HCPCS: Performed by: NURSE PRACTITIONER

## 2022-07-05 PROCEDURE — 21400001 HC TELEMETRY ROOM

## 2022-07-05 RX ORDER — DEXAMETHASONE 1 MG/1
2 TABLET ORAL EVERY 12 HOURS
Status: DISCONTINUED | OUTPATIENT
Start: 2022-07-05 | End: 2022-07-06

## 2022-07-05 RX ADMIN — POLYETHYLENE GLYCOL 3350 17 G: 17 POWDER, FOR SOLUTION ORAL at 10:07

## 2022-07-05 RX ADMIN — INSULIN ASPART 1 UNITS: 100 INJECTION, SOLUTION INTRAVENOUS; SUBCUTANEOUS at 10:07

## 2022-07-05 RX ADMIN — HYDROMORPHONE HYDROCHLORIDE 1 MG: 2 INJECTION INTRAMUSCULAR; INTRAVENOUS; SUBCUTANEOUS at 10:07

## 2022-07-05 RX ADMIN — POLYETHYLENE GLYCOL 3350 17 G: 17 POWDER, FOR SOLUTION ORAL at 09:07

## 2022-07-05 RX ADMIN — CEFAZOLIN SODIUM 2 G: 2 SOLUTION INTRAVENOUS at 05:07

## 2022-07-05 RX ADMIN — INSULIN ASPART 4 UNITS: 100 INJECTION, SOLUTION INTRAVENOUS; SUBCUTANEOUS at 11:07

## 2022-07-05 RX ADMIN — DEXAMETHASONE 2 MG: 1 TABLET ORAL at 05:07

## 2022-07-05 RX ADMIN — INSULIN DETEMIR 10 UNITS: 100 INJECTION, SOLUTION SUBCUTANEOUS at 09:07

## 2022-07-05 RX ADMIN — THERA TABS 1 TABLET: TAB at 09:07

## 2022-07-05 RX ADMIN — GABAPENTIN 300 MG: 300 CAPSULE ORAL at 03:07

## 2022-07-05 RX ADMIN — LOSARTAN POTASSIUM 50 MG: 50 TABLET, FILM COATED ORAL at 09:07

## 2022-07-05 RX ADMIN — DOCUSATE SODIUM 100 MG: 100 CAPSULE, LIQUID FILLED ORAL at 09:07

## 2022-07-05 RX ADMIN — DOCUSATE SODIUM 100 MG: 100 CAPSULE, LIQUID FILLED ORAL at 10:07

## 2022-07-05 RX ADMIN — HYDROMORPHONE HYDROCHLORIDE 1 MG: 2 INJECTION INTRAMUSCULAR; INTRAVENOUS; SUBCUTANEOUS at 03:07

## 2022-07-05 RX ADMIN — INSULIN DETEMIR 10 UNITS: 100 INJECTION, SOLUTION SUBCUTANEOUS at 10:07

## 2022-07-05 RX ADMIN — GABAPENTIN 300 MG: 300 CAPSULE ORAL at 10:07

## 2022-07-05 RX ADMIN — DEXAMETHASONE 2 MG: 1 TABLET ORAL at 10:07

## 2022-07-05 RX ADMIN — CEFAZOLIN SODIUM 2 G: 2 SOLUTION INTRAVENOUS at 01:07

## 2022-07-05 RX ADMIN — CEFAZOLIN SODIUM 2 G: 2 SOLUTION INTRAVENOUS at 09:07

## 2022-07-05 RX ADMIN — INSULIN ASPART 2 UNITS: 100 INJECTION, SOLUTION INTRAVENOUS; SUBCUTANEOUS at 05:07

## 2022-07-05 RX ADMIN — MELATONIN TAB 3 MG 6 MG: 3 TAB at 10:07

## 2022-07-05 RX ADMIN — HYDROMORPHONE HYDROCHLORIDE 1 MG: 2 INJECTION INTRAMUSCULAR; INTRAVENOUS; SUBCUTANEOUS at 02:07

## 2022-07-05 RX ADMIN — ATORVASTATIN CALCIUM 10 MG: 10 TABLET, FILM COATED ORAL at 10:07

## 2022-07-05 RX ADMIN — GABAPENTIN 300 MG: 300 CAPSULE ORAL at 09:07

## 2022-07-05 NOTE — PROGRESS NOTES
[facility] Surg  Neurosurgery  Progress Note    Subjective:     Interval History: POD #7 from drainage of hematoma   No new issues   Seen ambulating in room   Mild tenderness to back   Mild tenderness to L LE with ext   Pain controlled     Denies HA, dizziness, N/V, shortness of breath and chest pain.   No numbness/tingling.    Blood cultures and Aerobic cultures + STAPHYLOCOCCUS EPIDERMIDIS.   Hosp med will reach out to ID in [place] regarding tx.    History of Present Illness: See H&P.    Post-Op Info:  Procedure(s) (LRB):  INCISION AND DRAINAGE, HEMATOMA (N/A)  LAMINECTOMY, SPINE, LUMBAR, WITH DISCECTOMY (N/A)   7 Days Post-Op      Medications:  Continuous Infusions:    Scheduled Meds:   atorvastatin  10 mg Oral QHS    ceFAZolin (ANCEF) IVPB  2 g Intravenous Q8H    dexAMETHasone  2 mg Oral Q12H    docusate sodium  100 mg Oral BID    gabapentin  300 mg Oral TID    insulin detemir U-100  10 Units Subcutaneous BID    losartan  50 mg Oral Daily    multivitamin  1 tablet Oral Daily    polyethylene glycol  17 g Oral BID     PRN Meds:acetaminophen, aluminum-magnesium hydroxide-simethicone, dextrose 10%, dextrose 10%, diazePAM, diphenhydrAMINE, glucagon (human recombinant), glucose, glucose, HYDROmorphone, insulin aspart U-100, melatonin, naloxone, ondansetron, prochlorperazine, senna-docusate 8.6-50 mg, sodium chloride 0.9%, sodium chloride 0.9%, sodium chloride 0.9%       Objective:     Weight: 112.2 kg (247 lb 5.7 oz)  Body mass index is 39.92 kg/m².  Vital Signs (Most Recent):  Temp: 98 °F (36.7 °C) (07/05/22 0708)  Pulse: (!) 57 (07/05/22 0708)  Resp: 18 (07/05/22 0708)  BP: 127/67 (07/05/22 0708)  SpO2: 97 % (07/05/22 0708) Vital Signs (24h Range):  Temp:  [97.9 °F (36.6 °C)-98.5 °F (36.9 °C)] 98 °F (36.7 °C)  Pulse:  [57-67] 57  Resp:  [14-19] 18  SpO2:  [95 %-98 %] 97 %  BP: (108-149)/(57-75) 127/67                   Neurosurgery Physical Exam  Vitals reviewed  Sensation intact to light  touch  Moves all 4 ext well    Incision/dressing CDI  There is no drainage, swelling , erythema or fluctuance  New dressing  placed    Significant Labs:  No results for input(s): GLU, NA, K, CL, CO2, BUN, CREATININE, CALCIUM, MG in the last 48 hours.  No results for input(s): WBC, HGB, HCT, PLT in the last 48 hours.  No results for input(s): LABPT, INR, APTT in the last 48 hours.  Microbiology Results (last 7 days)     Procedure Component Value Units Date/Time    Blood culture [[id number]] Collected: 07/03/22 1015    Order Status: Completed Specimen: Blood from Antecubital, Left Arm Updated: 07/04/22 1422     Blood Culture, Routine No Growth to date      No Growth to date    Blood culture [[id number]] Collected: 07/03/22 1015    Order Status: Completed Specimen: Blood Updated: 07/04/22 1422     Blood Culture, Routine No Growth to date      No Growth to date    Fungus culture [[id number]] Collected: 06/28/22 1950    Order Status: Completed Specimen: Abscess from Back Updated: 07/04/22 1301     Fungus (Mycology) Culture Culture in progress    Narrative:      superficial    Fungus culture [[id number]] Collected: 06/28/22 1950    Order Status: Completed Specimen: Abscess from Back Updated: 07/04/22 1301     Fungus (Mycology) Culture Culture in progress    Narrative:      Deep    Culture, Anaerobe [[id number]]  (Abnormal) Collected: 06/28/22 1950    Order Status: Completed Specimen: Abscess from Back Updated: 07/04/22 1232     Anaerobic Culture CUTIBACTERIUM ACNES  Few      Narrative:      Deep    Culture, Anaerobe [[id number]]  (Abnormal) Collected: 06/28/22 1950    Order Status: Completed Specimen: Abscess from Back Updated: 07/04/22 1208     Anaerobic Culture BACTEROIDES FRAGILIS  From broth only      Narrative:      superficial    Blood culture [[id number]]  (Abnormal)  (Susceptibility) Collected: 06/29/22 1657    Order Status: Completed Specimen: Blood Updated: 07/04/22 0930     Blood Culture, Routine Gram stain nico  bottle: Gram positive cocci in clusters resembling Staph       Results called to and read back by: Soheila You 07/01/2022  05:47      STAPHYLOCOCCUS EPIDERMIDIS    Blood culture [[id number]]  (Abnormal)  (Susceptibility) Collected: 06/29/22 1439    Order Status: Completed Specimen: Blood Updated: 07/04/22 0930     Blood Culture, Routine Gram stain aer bottle: Gram positive cocci in clusters resembling Staph      Results called to and read back by: Alisha Eller RN. 07/02/2022  00:17      STAPHYLOCOCCUS EPIDERMIDIS    Narrative:      Aerobic, anaerobic cx    Aerobic culture [[id number]]  (Abnormal)  (Susceptibility) Collected: 06/28/22 1950    Order Status: Completed Specimen: Abscess from Back Updated: 07/02/22 1208     Aerobic Bacterial Culture STAPHYLOCOCCUS EPIDERMIDIS  Moderate      Narrative:      Deep    Aerobic culture [[id number]] Collected: 06/28/22 1950    Order Status: Completed Specimen: Abscess from Back Updated: 07/02/22 1122     Aerobic Bacterial Culture No growth    Narrative:      Superficial    AFB Culture & Smear [[id number]] Collected: 06/28/22 1950    Order Status: Completed Specimen: Abscess from Back Updated: 06/30/22 0927     AFB Culture & Smear Culture in progress     AFB CULTURE STAIN No acid fast bacilli seen.    Narrative:      superficial    AFB Culture & Smear [[id number]] Collected: 06/28/22 1950    Order Status: Completed Specimen: Abscess from Back Updated: 06/30/22 0927     AFB Culture & Smear Culture in progress     AFB CULTURE STAIN No acid fast bacilli seen.    Narrative:      Deep    Gram stain [[id number]] Collected: 06/28/22 1950    Order Status: Completed Specimen: Abscess from Back Updated: 06/29/22 0613     Gram Stain Result Few WBC's      Rare Gram positive cocci    Narrative:      superficial    Gram stain [[id number]] Collected: 06/28/22 1950    Order Status: Completed Specimen: Abscess from Back Updated: 06/29/22 0611     Gram Stain Result Rare WBC's      Rare Gram positive  cocci    Narrative:      Deep        All pertinent labs from the last 24 hours have been reviewed.  Significant Diagnostics:  I have reviewed all pertinent imaging results/findings within the past 24 hours.    Assessment/Plan:     Active Diagnoses:    Diagnosis Date Noted POA    PRINCIPAL PROBLEM:  Postoperative hematoma involving nervous system following nervous system procedure [G97.61] 06/28/2022 Yes    Gram-positive bacteremia [R78.81] 07/02/2022 Yes    Fever [R50.9] 06/30/2022 Yes    Hypertension associated with diabetes [E11.59, I15.2]  Yes    Diabetes mellitus without complication [E11.9]  Yes      Problems Resolved During this Admission:     POD #7  Arrange for H/H   Blood cultres remain negative   Pain fluctuates but is a lot better   Ambulates without assistance  Increasing activity   Wants to go home later today if possible         Shaggy Zepeda MD  Neurosurgery  [facility] - Med Surg

## 2022-07-05 NOTE — PROGRESS NOTES
[facility] Medicine  Progress Note    Patient Name: Friday ROGELIO Blake  MRN: [id number]  Patient Class: IP- Inpatient   Admission Date: 6/27/2022  Length of Stay: 5 days  Attending Physician: Shaggy Zepeda MD  Primary Care Provider: Sid Elizabeth MD        Subjective:     Principal Problem:Postoperative hematoma involving nervous system following nervous system procedure        HPI:  Pt is a 65 yo male with PMhx of DM, HTN, HPL who underwent an I & D of hematoma S/P lumbar spine laminectomy and discectomy performed by Dr. Zepeda. Hospital Medicine was consulted to assist with medical management. Pt's blood pressure controlled today, oxygenation appropriate and labs stable. Glucose 167 and sliding scale insulin ordered. Pt seen and examined and sitting in chair at bedside. He reports working with PT/OT. He describes left hip pain and is now taking oral analgesic. Other symptoms are denied. He denies weakness and paresthesias to legs.       Overview/Hospital Course:  6/30/22 - increasing left hip and lower back pain. Primary is attempting to obtain further CT and MRI imaging but patient intolerant due to pain. Given Dilaudid prn. Given Mag Citrate and Enema for c/o constipation with small response. On Vancomycin, no further fevers. Blood cultures NGTD and aerobic culture obtained during surgery has isolated Staph Epi. Neurosurgery following S/P I & D L spine hematoma. Linear bruise to the left nasal fold. To left cheek there is indurated area oozing clear fluid. Wound care is applying bactroban. As of 7/1/2022, vital signs and labs stable. Glucose elevated, probable related to IV steroid. Pt reports back pain improving. Neurosurgery following, no surgery plan for today. Patient reports left facial blister improving as well.  As of 7/2/2022, patient feeling much better, vital signs and labs stable. He reports back pain has greatly decreased. Patient is currently sitting up in chair and walking with  walker with Physical Therapy. As of 7/3/2022, patient continue to do well, vital signs stable. Will need 2 weeks of IV antibiotic for Bacteremia, follow repeat Blood cultures. As of 7/4/2022, patient continues feel well, vital signs and labs stable. Will need 2 weeks of IV antibiotic for bacteremia. Case management unable to arranged IV antibiotic due to holiday. PICC line placed today. As of 7/5/2022, patient doing well, vital signs stable, pain well control. Awaiting final insurance approval for home IV antibiotic for Bacteremia. Left facial blister continues to improve.       Interval History: Awaiting insurance approval for IV antibiotic for bacteremia.       Review of Systems   Constitutional:  Negative for chills, diaphoresis, fatigue and fever.   HENT:  Negative for congestion, ear discharge, rhinorrhea, sinus pressure, sore throat and trouble swallowing.    Eyes:  Negative for discharge and visual disturbance.   Respiratory:  Negative for apnea, cough, choking, chest tightness, shortness of breath, wheezing and stridor.    Cardiovascular:  Negative for chest pain, palpitations and leg swelling.   Gastrointestinal:  Negative for abdominal distention, abdominal pain, diarrhea, nausea and vomiting.   Endocrine: Negative for cold intolerance and heat intolerance.   Genitourinary:  Negative for dysuria, frequency and hematuria.   Musculoskeletal:  Positive for back pain (decreased) and gait problem (ambulating with walker). Negative for arthralgias, myalgias and neck pain.   Skin:  Negative for pallor and rash.   Neurological:  Negative for dizziness, seizures, syncope, weakness and headaches.   Psychiatric/Behavioral:  Negative for agitation, confusion and sleep disturbance.    Objective:     Vital Signs (Most Recent):  Temp: 98.3 °F (36.8 °C) (07/05/22 1539)  Pulse: 63 (07/05/22 1539)  Resp: 17 (07/05/22 1539)  BP: (!) 119/58 (07/05/22 1539)  SpO2: 99 % (07/05/22 1539)   Vital Signs (24h Range):  Temp:  [97.9  °F (36.6 °C)-98.5 °F (36.9 °C)] 98.3 °F (36.8 °C)  Pulse:  [57-68] 63  Resp:  [14-19] 17  SpO2:  [95 %-99 %] 99 %  BP: (108-149)/(57-86) 119/58     Weight: 112.2 kg (247 lb 5.7 oz)  Body mass index is 39.92 kg/m².    Intake/Output Summary (Last 24 hours) at 7/5/2022 1659  Last data filed at 7/5/2022 1022  Gross per 24 hour   Intake 568.6 ml   Output 1161 ml   Net -592.4 ml      Physical Exam  Vitals and nursing note reviewed.   Constitutional:       General: He is not in acute distress.     Appearance: He is not ill-appearing, toxic-appearing or diaphoretic.   HENT:      Head: Normocephalic and atraumatic.      Comments: Left facial blister continues to improve   Eyes:      General:         Right eye: No discharge.         Left eye: No discharge.   Cardiovascular:      Heart sounds: No murmur heard.    No friction rub. No gallop.   Pulmonary:      Effort: No respiratory distress.      Breath sounds: No stridor. No wheezing, rhonchi or rales.   Chest:      Chest wall: No tenderness.   Abdominal:      General: There is no distension.      Palpations: There is no mass.      Tenderness: There is no abdominal tenderness. There is no right CVA tenderness, left CVA tenderness, guarding or rebound.      Hernia: No hernia is present.   Musculoskeletal:         General: Tenderness (lower back pain decreased) present. No swelling, deformity or signs of injury.      Right lower leg: No edema.      Left lower leg: No edema.      Comments: Left lower back dressing intact    Skin:     Coloration: Skin is not jaundiced or pale.      Findings: No bruising, erythema, lesion or rash.   Neurological:      Cranial Nerves: No cranial nerve deficit.      Sensory: No sensory deficit.      Motor: No weakness.      Coordination: Coordination normal.      Gait: Gait normal.      Deep Tendon Reflexes: Reflexes normal.       Significant Labs: All pertinent labs within the past 24 hours have been reviewed.  POCT Glucose:   Recent Labs   Lab  07/04/22 1953 07/05/22 0518 07/05/22  1149   POCTGLUCOSE 227* 170* 215*       Significant Imaging:     Imaging Results              MRI Lumbar Spine Without Contrast (Final result)  Result time 06/27/22 16:24:14      Final result by Ramon Mead Jr., MD (06/27/22 16:24:14)                   Impression:      1. Interval left hemilaminectomy at L2-L3.  There is ventral epidural tissue that likely relates to small hematoma and less likely extruded disc or phlegmonous material.  This distorts the thecal sac toward the right and results in flattening to 5 mm.  There is severe lateral recess stenosis that could affect the L3 spinal nerve roots that are descending at this level.  2. No definitive signs of spondylodiscitis.  No definite rosette abscess.  Increased T2 signal within the paraspinal muscles about the laminectomy site is not necessarily unusual given the postoperative state.  3. Unchanged degenerative findings elsewhere as described above.  Persistent mild/moderate left lateral recess stenosis at L1-L2 a could affect the left L2 spinal nerve roots.  These findings were discussed with Dr. Alford at 16:24.      Electronically signed by: Ramon Mead Jr., MD  Date:    06/27/2022  Time:    16:24               Narrative:    EXAMINATION:  MRI LUMBAR SPINE WITHOUT CONTRAST    CLINICAL HISTORY:  back pain;    TECHNIQUE:  MR Lumbar spine without contrast. Sagittal T1, T2, STIR. Axial T1, T2. Coronal T1.    COMPARISON:  Intraoperative fluoroscopy views from 06/22/2022 were reviewed.  CT of the lumbar spine from 05/03/2022 was also reviewed.  MRI of the lumbar spine from 01/18/2022 was reviewed as well.    FINDINGS:  There are postoperative findings from interval left hemilaminectomy at L2-L3.  There is small granulation tissue within the dorsal laminotomy site.  There is abnormal material within the ventral epidural spinal canal that flattens the thecal sac toward the right at the level of mid L3.  The thecal sac  measures 5 mm in transverse dimension as demonstrated on axial T2 series 6, image 18.  The abnormal ventral soft tissue/hematoma/extruded disc measures 57 mm craniocaudal approximately by 19 mm transverse by 8 mm in AP dimension.  There is also abnormal tissue within the bilateral lateral recesses at this level, left greater than right, that could affect the descending L3 spinal nerve roots.  Normal appearing marrow signal without definite signs of spondylodiscitis.  There is increased T2 signal within the bilateral paraspinal muscles adjacent to the operative site extending inferiorly to the level of L4-L5.  The conus medullaris terminates at the level of L1-L2.  No abnormal signal  within the conus. Intervertebral disc levels are as follows:    T12-L1 disc: Unchanged minimal disc bulge with mild facet hypertrophy.  The thecal sac measures 11 mm with no significant foraminal stenosis.    L1-L2 disc : Unchanged left paracentral disc protrusion with annular fissure that causes mild left lateral recess stenosis.  Mild degenerative facet hypertrophy.  The thecal sac measures 7 mm AP which is unchanged.  No significant foraminal stenosis.    L2-L3 disc: Level of prior surgery with epidural abnormal tissue that flattens the thecal sac dorsally and toward the right where it measures as small as 5 mm.  Lateral recess stenosis could affect the descending L3 spinal nerve roots.  Abnormal tissue also encroaches into the left exit foramen at L2-L3 surrounding the exiting left L2 spinal nerve.    L3-L4 disc: Broad-based posterior disc bulge.  Mild degenerative facet hypertrophy bilaterally.  The thecal sac measures 8 mm in AP dimension.  Mild bilateral neural foraminal stenosis due to bulging disc which is similar to the previous exam.    L4-L5 disc: Circumferentially bulging disc that encroaches into the floors of the exit foramina.  Anterior osteophytes.  Severe degenerative facet hypertrophy with facet joint effusions.  The  thecal sac measures 10 mm.  Mild bilateral foraminal stenosis.    L5-S1 disc: Normal disc space height with severe degenerative facet hypertrophy.  No significant spinal or foraminal stenosis.  The thecal sac measures 11 mm.                                         Assessment/Plan:      * Postoperative hematoma involving nervous system following nervous system procedure  Continue care by primary team    7/2/2022  Neurology Surgery managing   Continue Steroids       Gram-positive bacteremia  Blood and wound culture positive for Staphylococcus epidermidis  Sensitivity results noted   Stop Vanco  Start Ancef     7/3/2022  Repeat BC in progress   Continue Ancef   Will need 2 weeks of IV antibiotic for negative cultures       7/4/2022  Repeat BC negative   Continue Ancef   Will need 2 weeks of IV antibiotic for negative cultures   PICC line placed     7/5/2022  Repeat BC negative   Continue Ancef   Will need 2 weeks of IV antibiotic for negative cultures   PICC line placed   Awaiting insurance approval for home IV antibiotic       Fever  CXR and U/A negative  Empiric Vancomycin started  Aerobic culture taken from surgery notes Staph Epi    7/1/2022  Aerobic culture from surgery positive for Staph  BC, 2 days ago, Gram positive cocci in clusters resembling Staph    Continue Vanco       7/2/2022  Blood cultures and wound cultures positive for Staphylococcus epidermidis  Sensitivities noted   Stop Vanco   Start Ancef         Hypertension associated with diabetes  B/P controlled - 135/63, 147/63  Resume antihypertensives      Diabetes mellitus without complication  accuchecks  Sliding scale insulin  Hold oral diabetic medications    7/1/2022  Diabetic diet   Glucose elevated, probable related to IV steroids  Continue moderated dose SSI  Start detemir 10 units BID     7/2/2022  Diabetic diet   Glucose elevated, probable related to IV steroids  Continue moderated dose SSI  Continue detemir 10 units BID       7/4/2022  Diabetic  diet   Continue moderated dose SSI  Continue detemir 10 units BID      7/4/2022  Glucose elevated due to IV steroids   Diabetic diet   Continue moderated dose SSI  Continue detemir 10 units BID        VTE Risk Mitigation (From admission, onward)         Ordered     Place sequential compression device  Until discontinued         06/29/22 0710     IP VTE HIGH RISK PATIENT  Once         06/28/22 2146                Discharge Planning   MATEUS:      Code Status: Prior   Is the patient medically ready for discharge?:     Reason for patient still in hospital (select all that apply): Patient trending condition and Treatment  Discharge Plan A: Home with family                  Kamron Suero NP  Department of Hospital Medicine   [facility] - Med Surg

## 2022-07-05 NOTE — ASSESSMENT & PLAN NOTE
Blood and wound culture positive for Staphylococcus epidermidis  Sensitivity results noted   Stop Vanco  Start Ancef     7/3/2022  Repeat BC in progress   Continue Ancef   Will need 2 weeks of IV antibiotic for negative cultures       7/4/2022  Repeat BC negative   Continue Ancef   Will need 2 weeks of IV antibiotic for negative cultures   PICC line placed     7/5/2022  Repeat BC negative   Continue Ancef   Will need 2 weeks of IV antibiotic for negative cultures   PICC line placed   Awaiting insurance approval for home IV antibiotic

## 2022-07-05 NOTE — PLAN OF CARE
Pt is not in network for any infusion services except for [facility]. Referred to [facility] infusion.     Josep Grimes LMSW 7/5/2022 12:00 PM

## 2022-07-05 NOTE — PLAN OF CARE
Pt remains free from falls/injuries this shift. Safety precautions maintained. Pain managed with pain medication. No s/s of acute distress noted. VSS. Tele monitoring per orders. PICC dressing CDI. Will continue to monitor. Chart check completed.

## 2022-07-05 NOTE — ASSESSMENT & PLAN NOTE
accuchecks  Sliding scale insulin  Hold oral diabetic medications    7/1/2022  Diabetic diet   Glucose elevated, probable related to IV steroids  Continue moderated dose SSI  Start detemir 10 units BID     7/2/2022  Diabetic diet   Glucose elevated, probable related to IV steroids  Continue moderated dose SSI  Continue detemir 10 units BID       7/4/2022  Diabetic diet   Continue moderated dose SSI  Continue detemir 10 units BID      7/4/2022  Glucose elevated due to IV steroids   Diabetic diet   Continue moderated dose SSI  Continue detemir 10 units BID

## 2022-07-05 NOTE — SUBJECTIVE & OBJECTIVE
Interval History: Awaiting insurance approval for IV antibiotic for bacteremia.       Review of Systems   Constitutional:  Negative for chills, diaphoresis, fatigue and fever.   HENT:  Negative for congestion, ear discharge, rhinorrhea, sinus pressure, sore throat and trouble swallowing.    Eyes:  Negative for discharge and visual disturbance.   Respiratory:  Negative for apnea, cough, choking, chest tightness, shortness of breath, wheezing and stridor.    Cardiovascular:  Negative for chest pain, palpitations and leg swelling.   Gastrointestinal:  Negative for abdominal distention, abdominal pain, diarrhea, nausea and vomiting.   Endocrine: Negative for cold intolerance and heat intolerance.   Genitourinary:  Negative for dysuria, frequency and hematuria.   Musculoskeletal:  Positive for back pain (decreased) and gait problem (ambulating with walker). Negative for arthralgias, myalgias and neck pain.   Skin:  Negative for pallor and rash.   Neurological:  Negative for dizziness, seizures, syncope, weakness and headaches.   Psychiatric/Behavioral:  Negative for agitation, confusion and sleep disturbance.    Objective:     Vital Signs (Most Recent):  Temp: 98.3 °F (36.8 °C) (07/05/22 1539)  Pulse: 63 (07/05/22 1539)  Resp: 17 (07/05/22 1539)  BP: (!) 119/58 (07/05/22 1539)  SpO2: 99 % (07/05/22 1539)   Vital Signs (24h Range):  Temp:  [97.9 °F (36.6 °C)-98.5 °F (36.9 °C)] 98.3 °F (36.8 °C)  Pulse:  [57-68] 63  Resp:  [14-19] 17  SpO2:  [95 %-99 %] 99 %  BP: (108-149)/(57-86) 119/58     Weight: 112.2 kg (247 lb 5.7 oz)  Body mass index is 39.92 kg/m².    Intake/Output Summary (Last 24 hours) at 7/5/2022 1659  Last data filed at 7/5/2022 1022  Gross per 24 hour   Intake 568.6 ml   Output 1161 ml   Net -592.4 ml      Physical Exam  Vitals and nursing note reviewed.   Constitutional:       General: He is not in acute distress.     Appearance: He is not ill-appearing, toxic-appearing or diaphoretic.   HENT:      Head:  Normocephalic and atraumatic.      Comments: Left facial blister continues to improve   Eyes:      General:         Right eye: No discharge.         Left eye: No discharge.   Cardiovascular:      Heart sounds: No murmur heard.    No friction rub. No gallop.   Pulmonary:      Effort: No respiratory distress.      Breath sounds: No stridor. No wheezing, rhonchi or rales.   Chest:      Chest wall: No tenderness.   Abdominal:      General: There is no distension.      Palpations: There is no mass.      Tenderness: There is no abdominal tenderness. There is no right CVA tenderness, left CVA tenderness, guarding or rebound.      Hernia: No hernia is present.   Musculoskeletal:         General: Tenderness (lower back pain decreased) present. No swelling, deformity or signs of injury.      Right lower leg: No edema.      Left lower leg: No edema.      Comments: Left lower back dressing intact    Skin:     Coloration: Skin is not jaundiced or pale.      Findings: No bruising, erythema, lesion or rash.   Neurological:      Cranial Nerves: No cranial nerve deficit.      Sensory: No sensory deficit.      Motor: No weakness.      Coordination: Coordination normal.      Gait: Gait normal.      Deep Tendon Reflexes: Reflexes normal.       Significant Labs: All pertinent labs within the past 24 hours have been reviewed.  POCT Glucose:   Recent Labs   Lab 07/04/22  1953 07/05/22  0518 07/05/22  1149   POCTGLUCOSE 227* 170* 215*       Significant Imaging:     Imaging Results              MRI Lumbar Spine Without Contrast (Final result)  Result time 06/27/22 16:24:14      Final result by Ramon Mead Jr., MD (06/27/22 16:24:14)                   Impression:      1. Interval left hemilaminectomy at L2-L3.  There is ventral epidural tissue that likely relates to small hematoma and less likely extruded disc or phlegmonous material.  This distorts the thecal sac toward the right and results in flattening to 5 mm.  There is severe  lateral recess stenosis that could affect the L3 spinal nerve roots that are descending at this level.  2. No definitive signs of spondylodiscitis.  No definite rosette abscess.  Increased T2 signal within the paraspinal muscles about the laminectomy site is not necessarily unusual given the postoperative state.  3. Unchanged degenerative findings elsewhere as described above.  Persistent mild/moderate left lateral recess stenosis at L1-L2 a could affect the left L2 spinal nerve roots.  These findings were discussed with Dr. Alford at 16:24.      Electronically signed by: Ramon Mead Jr., MD  Date:    06/27/2022  Time:    16:24               Narrative:    EXAMINATION:  MRI LUMBAR SPINE WITHOUT CONTRAST    CLINICAL HISTORY:  back pain;    TECHNIQUE:  MR Lumbar spine without contrast. Sagittal T1, T2, STIR. Axial T1, T2. Coronal T1.    COMPARISON:  Intraoperative fluoroscopy views from 06/22/2022 were reviewed.  CT of the lumbar spine from 05/03/2022 was also reviewed.  MRI of the lumbar spine from 01/18/2022 was reviewed as well.    FINDINGS:  There are postoperative findings from interval left hemilaminectomy at L2-L3.  There is small granulation tissue within the dorsal laminotomy site.  There is abnormal material within the ventral epidural spinal canal that flattens the thecal sac toward the right at the level of mid L3.  The thecal sac measures 5 mm in transverse dimension as demonstrated on axial T2 series 6, image 18.  The abnormal ventral soft tissue/hematoma/extruded disc measures 57 mm craniocaudal approximately by 19 mm transverse by 8 mm in AP dimension.  There is also abnormal tissue within the bilateral lateral recesses at this level, left greater than right, that could affect the descending L3 spinal nerve roots.  Normal appearing marrow signal without definite signs of spondylodiscitis.  There is increased T2 signal within the bilateral paraspinal muscles adjacent to the operative site extending  inferiorly to the level of L4-L5.  The conus medullaris terminates at the level of L1-L2.  No abnormal signal  within the conus. Intervertebral disc levels are as follows:    T12-L1 disc: Unchanged minimal disc bulge with mild facet hypertrophy.  The thecal sac measures 11 mm with no significant foraminal stenosis.    L1-L2 disc : Unchanged left paracentral disc protrusion with annular fissure that causes mild left lateral recess stenosis.  Mild degenerative facet hypertrophy.  The thecal sac measures 7 mm AP which is unchanged.  No significant foraminal stenosis.    L2-L3 disc: Level of prior surgery with epidural abnormal tissue that flattens the thecal sac dorsally and toward the right where it measures as small as 5 mm.  Lateral recess stenosis could affect the descending L3 spinal nerve roots.  Abnormal tissue also encroaches into the left exit foramen at L2-L3 surrounding the exiting left L2 spinal nerve.    L3-L4 disc: Broad-based posterior disc bulge.  Mild degenerative facet hypertrophy bilaterally.  The thecal sac measures 8 mm in AP dimension.  Mild bilateral neural foraminal stenosis due to bulging disc which is similar to the previous exam.    L4-L5 disc: Circumferentially bulging disc that encroaches into the floors of the exit foramina.  Anterior osteophytes.  Severe degenerative facet hypertrophy with facet joint effusions.  The thecal sac measures 10 mm.  Mild bilateral foraminal stenosis.    L5-S1 disc: Normal disc space height with severe degenerative facet hypertrophy.  No significant spinal or foraminal stenosis.  The thecal sac measures 11 mm.

## 2022-07-06 VITALS
BODY MASS INDEX: 38.79 KG/M2 | WEIGHT: 241.38 LBS | OXYGEN SATURATION: 97 % | HEIGHT: 66 IN | HEART RATE: 74 BPM | RESPIRATION RATE: 18 BRPM | TEMPERATURE: 98 F | SYSTOLIC BLOOD PRESSURE: 117 MMHG | DIASTOLIC BLOOD PRESSURE: 56 MMHG

## 2022-07-06 DIAGNOSIS — G97.61 POSTOPERATIVE HEMATOMA INVOLVING NERVOUS SYSTEM FOLLOWING NERVOUS SYSTEM PROCEDURE: ICD-10-CM

## 2022-07-06 DIAGNOSIS — M54.16 LUMBAR RADICULOPATHY: Primary | ICD-10-CM

## 2022-07-06 PROBLEM — R78.81 GRAM-POSITIVE BACTEREMIA: Chronic | Status: ACTIVE | Noted: 2022-07-02

## 2022-07-06 PROBLEM — R50.9 FEVER: Chronic | Status: ACTIVE | Noted: 2022-06-30

## 2022-07-06 LAB
FINAL PATHOLOGIC DIAGNOSIS: NORMAL
GROSS: NORMAL
Lab: NORMAL
MICROSCOPIC EXAM: NORMAL
POCT GLUCOSE: 130 MG/DL (ref 70–110)
POCT GLUCOSE: 188 MG/DL (ref 70–110)
POCT GLUCOSE: 236 MG/DL (ref 70–110)

## 2022-07-06 PROCEDURE — 99024 PR POST-OP FOLLOW-UP VISIT: ICD-10-PCS | Mod: ,,, | Performed by: PHYSICIAN ASSISTANT

## 2022-07-06 PROCEDURE — 63600175 PHARM REV CODE 636 W HCPCS: Performed by: NURSE PRACTITIONER

## 2022-07-06 PROCEDURE — 99024 POSTOP FOLLOW-UP VISIT: CPT | Mod: ,,, | Performed by: PHYSICIAN ASSISTANT

## 2022-07-06 PROCEDURE — 25000003 PHARM REV CODE 250: Performed by: NURSE PRACTITIONER

## 2022-07-06 PROCEDURE — 25000003 PHARM REV CODE 250: Performed by: PHYSICIAN ASSISTANT

## 2022-07-06 RX ORDER — GABAPENTIN 300 MG/1
300 CAPSULE ORAL 3 TIMES DAILY
Qty: 90 CAPSULE | Refills: 11 | Status: SHIPPED | OUTPATIENT
Start: 2022-07-06 | End: 2023-10-10

## 2022-07-06 RX ORDER — OXYCODONE AND ACETAMINOPHEN 10; 325 MG/1; MG/1
1 TABLET ORAL
Qty: 30 TABLET | Refills: 0 | Status: ON HOLD | OUTPATIENT
Start: 2022-07-06 | End: 2022-08-12 | Stop reason: SDUPTHER

## 2022-07-06 RX ORDER — ASPIRIN 81 MG/1
81 TABLET ORAL DAILY
Start: 2022-07-06

## 2022-07-06 RX ORDER — AMOXICILLIN AND CLAVULANATE POTASSIUM 875; 125 MG/1; MG/1
1 TABLET, FILM COATED ORAL 2 TIMES DAILY
Qty: 14 TABLET | Refills: 0 | Status: SHIPPED | OUTPATIENT
Start: 2022-07-10 | End: 2022-07-12

## 2022-07-06 RX ADMIN — HYDROMORPHONE HYDROCHLORIDE 1 MG: 2 INJECTION INTRAMUSCULAR; INTRAVENOUS; SUBCUTANEOUS at 05:07

## 2022-07-06 RX ADMIN — INSULIN DETEMIR 10 UNITS: 100 INJECTION, SOLUTION SUBCUTANEOUS at 10:07

## 2022-07-06 RX ADMIN — LOSARTAN POTASSIUM 50 MG: 50 TABLET, FILM COATED ORAL at 09:07

## 2022-07-06 RX ADMIN — GABAPENTIN 300 MG: 300 CAPSULE ORAL at 04:07

## 2022-07-06 RX ADMIN — INSULIN ASPART 4 UNITS: 100 INJECTION, SOLUTION INTRAVENOUS; SUBCUTANEOUS at 05:07

## 2022-07-06 RX ADMIN — CEFAZOLIN SODIUM 2 G: 2 SOLUTION INTRAVENOUS at 02:07

## 2022-07-06 RX ADMIN — THERA TABS 1 TABLET: TAB at 09:07

## 2022-07-06 RX ADMIN — GABAPENTIN 300 MG: 300 CAPSULE ORAL at 09:07

## 2022-07-06 RX ADMIN — HYDROMORPHONE HYDROCHLORIDE 1 MG: 2 INJECTION INTRAMUSCULAR; INTRAVENOUS; SUBCUTANEOUS at 12:07

## 2022-07-06 RX ADMIN — CEFAZOLIN SODIUM 2 G: 2 SOLUTION INTRAVENOUS at 09:07

## 2022-07-06 NOTE — ASSESSMENT & PLAN NOTE
accuchecks  Sliding scale insulin  Hold oral diabetic medications    7/1/2022  Diabetic diet   Glucose elevated, probable related to IV steroids  Continue moderated dose SSI  Start detemir 10 units BID     7/2/2022  Diabetic diet   Glucose elevated, probable related to IV steroids  Continue moderated dose SSI  Continue detemir 10 units BID       7/4/2022  Diabetic diet   Continue moderated dose SSI  Continue detemir 10 units BID      7/5/2022  Glucose elevated due to IV steroids   Diabetic diet   Continue moderated dose SSI  Continue detemir 10 units BID

## 2022-07-06 NOTE — PROGRESS NOTES
[facility] Medicine  Progress Note    Patient Name: Friday ROGELIO Blake  MRN: [id number]  Patient Class: IP- Inpatient   Admission Date: 6/27/2022  Length of Stay: 6 days  Attending Physician: Shaggy Zepeda MD  Primary Care Provider: Sid Elizabeth MD        Subjective:     Principal Problem:Postoperative hematoma involving nervous system following nervous system procedure        HPI:  Pt is a 65 yo male with PMhx of DM, HTN, HPL who underwent an I & D of hematoma S/P lumbar spine laminectomy and discectomy performed by Dr. Zepeda. Hospital Medicine was consulted to assist with medical management. Pt's blood pressure controlled today, oxygenation appropriate and labs stable. Glucose 167 and sliding scale insulin ordered. Pt seen and examined and sitting in chair at bedside. He reports working with PT/OT. He describes left hip pain and is now taking oral analgesic. Other symptoms are denied. He denies weakness and paresthesias to legs.       Overview/Hospital Course:  6/30/22 - increasing left hip and lower back pain. Primary is attempting to obtain further CT and MRI imaging but patient intolerant due to pain. Given Dilaudid prn. Given Mag Citrate and Enema for c/o constipation with small response. On Vancomycin, no further fevers. Blood cultures NGTD and aerobic culture obtained during surgery has isolated Staph Epi. Neurosurgery following S/P I & D L spine hematoma. Linear bruise to the left nasal fold. To left cheek there is indurated area oozing clear fluid. Wound care is applying bactroban. As of 7/1/2022, vital signs and labs stable. Glucose elevated, probable related to IV steroid. Pt reports back pain improving. Neurosurgery following, no surgery plan for today. Patient reports left facial blister improving as well.  As of 7/2/2022, patient feeling much better, vital signs and labs stable. He reports back pain has greatly decreased. Patient is currently sitting up in chair and walking with  walker with Physical Therapy. As of 7/3/2022, patient continue to do well, vital signs stable. Will need 2 weeks of IV antibiotic for Bacteremia, follow repeat Blood cultures. As of 7/4/2022, patient continues feel well, vital signs and labs stable. Will need 2 weeks of IV antibiotic for bacteremia. Case management unable to arranged IV antibiotic due to holiday. PICC line placed today. As of 7/5/2022, patient doing well, vital signs stable, pain well control. Awaiting final insurance approval for home IV antibiotic for Bacteremia. Left facial blister continues to improve. As of 7/6/2022, patient doing well, vital signs stable, awaiting D/C      Long discussion with patient before discharge, he insist on going to daughter wedding in [place] and will be leaving on 7/11/2022. Pt current has PICC line in place and receiving home IV antibiotics. Case reviewed with Dr Villarreal, patient given prescription for Augmentin, He is to keep PICC line in place and revived IV antibiotic. Start Augmentin when PICC line remove before going to [facility] on 7/11/2022. He will have PICC line removed by IV infusion team.            Interval History: Stable ready for discharge     Review of Systems   Constitutional:  Negative for chills, diaphoresis, fatigue and fever.   HENT:  Negative for congestion, ear discharge, rhinorrhea, sinus pressure, sore throat and trouble swallowing.    Eyes:  Negative for discharge and visual disturbance.   Respiratory:  Negative for apnea, cough, choking, chest tightness, shortness of breath, wheezing and stridor.    Cardiovascular:  Negative for chest pain, palpitations and leg swelling.   Gastrointestinal:  Negative for abdominal distention, abdominal pain, diarrhea, nausea and vomiting.   Endocrine: Negative for cold intolerance and heat intolerance.   Genitourinary:  Negative for dysuria, frequency and hematuria.   Musculoskeletal:  Positive for gait problem (ambulating with walker). Negative for  arthralgias, back pain, myalgias and neck pain.   Skin:  Negative for pallor and rash.   Neurological:  Negative for dizziness, seizures, syncope, weakness and headaches.   Psychiatric/Behavioral:  Negative for agitation, confusion and sleep disturbance.    Objective:     Vital Signs (Most Recent):  Temp: 98.2 °F (36.8 °C) (07/06/22 1115)  Pulse: 74 (07/06/22 1540)  Resp: 18 (07/06/22 1209)  BP: (!) 117/56 (07/06/22 1115)  SpO2: 97 % (07/06/22 1115)   Vital Signs (24h Range):  Temp:  [98.2 °F (36.8 °C)-98.9 °F (37.2 °C)] 98.2 °F (36.8 °C)  Pulse:  [54-96] 74  Resp:  [17-18] 18  SpO2:  [94 %-98 %] 97 %  BP: (105-124)/(51-67) 117/56     Weight: 109.5 kg (241 lb 6.5 oz)  Body mass index is 38.96 kg/m².    Intake/Output Summary (Last 24 hours) at 7/6/2022 1636  Last data filed at 7/6/2022 1245  Gross per 24 hour   Intake 572.32 ml   Output --   Net 572.32 ml      Physical Exam  Vitals and nursing note reviewed.   Constitutional:       General: He is not in acute distress.     Appearance: He is not ill-appearing, toxic-appearing or diaphoretic.   HENT:      Head: Normocephalic and atraumatic.      Comments: Left facial blister continues to improve   Eyes:      General:         Right eye: No discharge.         Left eye: No discharge.   Cardiovascular:      Heart sounds: No murmur heard.    No friction rub. No gallop.   Pulmonary:      Effort: No respiratory distress.      Breath sounds: No stridor. No wheezing, rhonchi or rales.   Chest:      Chest wall: No tenderness.   Abdominal:      General: There is no distension.      Palpations: There is no mass.      Tenderness: There is no abdominal tenderness. There is no right CVA tenderness, left CVA tenderness, guarding or rebound.      Hernia: No hernia is present.   Musculoskeletal:         General: Tenderness (lower back pain decreased) present. No swelling, deformity or signs of injury.      Right lower leg: No edema.      Left lower leg: No edema.      Comments: Left  lower back dressing intact    Skin:     Coloration: Skin is not jaundiced or pale.      Findings: No bruising, erythema, lesion or rash.   Neurological:      Cranial Nerves: No cranial nerve deficit.      Sensory: No sensory deficit.      Motor: No weakness.      Coordination: Coordination normal.      Gait: Gait normal.      Deep Tendon Reflexes: Reflexes normal.       Significant Labs: All pertinent labs within the past 24 hours have been reviewed.  POCT Glucose:   Recent Labs   Lab 07/06/22  0553 07/06/22  1145 07/06/22  1631   POCTGLUCOSE 236* 130* 188*       Significant Imaging:     Imaging Results              MRI Lumbar Spine Without Contrast (Final result)  Result time 06/27/22 16:24:14      Final result by Ramon Mead Jr., MD (06/27/22 16:24:14)                   Impression:      1. Interval left hemilaminectomy at L2-L3.  There is ventral epidural tissue that likely relates to small hematoma and less likely extruded disc or phlegmonous material.  This distorts the thecal sac toward the right and results in flattening to 5 mm.  There is severe lateral recess stenosis that could affect the L3 spinal nerve roots that are descending at this level.  2. No definitive signs of spondylodiscitis.  No definite rosette abscess.  Increased T2 signal within the paraspinal muscles about the laminectomy site is not necessarily unusual given the postoperative state.  3. Unchanged degenerative findings elsewhere as described above.  Persistent mild/moderate left lateral recess stenosis at L1-L2 a could affect the left L2 spinal nerve roots.  These findings were discussed with Dr. Alford at 16:24.      Electronically signed by: Ramon Mead Jr., MD  Date:    06/27/2022  Time:    16:24               Narrative:    EXAMINATION:  MRI LUMBAR SPINE WITHOUT CONTRAST    CLINICAL HISTORY:  back pain;    TECHNIQUE:  MR Lumbar spine without contrast. Sagittal T1, T2, STIR. Axial T1, T2. Coronal T1.    COMPARISON:  Intraoperative  fluoroscopy views from 06/22/2022 were reviewed.  CT of the lumbar spine from 05/03/2022 was also reviewed.  MRI of the lumbar spine from 01/18/2022 was reviewed as well.    FINDINGS:  There are postoperative findings from interval left hemilaminectomy at L2-L3.  There is small granulation tissue within the dorsal laminotomy site.  There is abnormal material within the ventral epidural spinal canal that flattens the thecal sac toward the right at the level of mid L3.  The thecal sac measures 5 mm in transverse dimension as demonstrated on axial T2 series 6, image 18.  The abnormal ventral soft tissue/hematoma/extruded disc measures 57 mm craniocaudal approximately by 19 mm transverse by 8 mm in AP dimension.  There is also abnormal tissue within the bilateral lateral recesses at this level, left greater than right, that could affect the descending L3 spinal nerve roots.  Normal appearing marrow signal without definite signs of spondylodiscitis.  There is increased T2 signal within the bilateral paraspinal muscles adjacent to the operative site extending inferiorly to the level of L4-L5.  The conus medullaris terminates at the level of L1-L2.  No abnormal signal  within the conus. Intervertebral disc levels are as follows:    T12-L1 disc: Unchanged minimal disc bulge with mild facet hypertrophy.  The thecal sac measures 11 mm with no significant foraminal stenosis.    L1-L2 disc : Unchanged left paracentral disc protrusion with annular fissure that causes mild left lateral recess stenosis.  Mild degenerative facet hypertrophy.  The thecal sac measures 7 mm AP which is unchanged.  No significant foraminal stenosis.    L2-L3 disc: Level of prior surgery with epidural abnormal tissue that flattens the thecal sac dorsally and toward the right where it measures as small as 5 mm.  Lateral recess stenosis could affect the descending L3 spinal nerve roots.  Abnormal tissue also encroaches into the left exit foramen at L2-L3  surrounding the exiting left L2 spinal nerve.    L3-L4 disc: Broad-based posterior disc bulge.  Mild degenerative facet hypertrophy bilaterally.  The thecal sac measures 8 mm in AP dimension.  Mild bilateral neural foraminal stenosis due to bulging disc which is similar to the previous exam.    L4-L5 disc: Circumferentially bulging disc that encroaches into the floors of the exit foramina.  Anterior osteophytes.  Severe degenerative facet hypertrophy with facet joint effusions.  The thecal sac measures 10 mm.  Mild bilateral foraminal stenosis.    L5-S1 disc: Normal disc space height with severe degenerative facet hypertrophy.  No significant spinal or foraminal stenosis.  The thecal sac measures 11 mm.                                         Assessment/Plan:      * Postoperative hematoma involving nervous system following nervous system procedure  Continue care by primary team    7/2/2022  Neurology Surgery managing   Continue Steroids       Gram-positive bacteremia  Blood and wound culture positive for Staphylococcus epidermidis  Sensitivity results noted   Stop Vanco  Start Ancef     7/3/2022  Repeat BC in progress   Continue Ancef   Will need 2 weeks of IV antibiotic for negative cultures       7/4/2022  Repeat BC negative   Continue Ancef   Will need 2 weeks of IV antibiotic for negative cultures   PICC line placed     7/5/2022  Repeat BC negative   Continue Ancef   Will need 2 weeks of IV antibiotic for negative cultures   PICC line placed   Awaiting insurance approval for home IV antibiotic     Long discussion with patient before discharge, he insist on going to daughter wedding in [place] and will be leaving on 7/11/2022. Pt current has PICC line in place and receiving home IV antibiotics. Case reviewed with Dr Villarreal, patient given prescription for Augmentin, He is to keep PICC line in place and revived IV antibiotic. Start Augmentin when PICC line remove before going to [place] on 7/11/2022. He will have  PICC line removed by IV infusion team.          Fever  CXR and U/A negative  Empiric Vancomycin started  Aerobic culture taken from surgery notes Staph Epi    7/1/2022  Aerobic culture from surgery positive for Staph  BC, 2 days ago, Gram positive cocci in clusters resembling Staph    Continue Vanco       7/2/2022  Blood cultures and wound cultures positive for Staphylococcus epidermidis  Sensitivities noted   Stop Vanco   Start Ancef         Hypertension associated with diabetes  B/P controlled - 135/63, 147/63  Resume antihypertensives      Diabetes mellitus without complication  accuchecks  Sliding scale insulin  Hold oral diabetic medications    7/1/2022  Diabetic diet   Glucose elevated, probable related to IV steroids  Continue moderated dose SSI  Start detemir 10 units BID     7/2/2022  Diabetic diet   Glucose elevated, probable related to IV steroids  Continue moderated dose SSI  Continue detemir 10 units BID       7/4/2022  Diabetic diet   Continue moderated dose SSI  Continue detemir 10 units BID      7/5/2022  Glucose elevated due to IV steroids   Diabetic diet   Continue moderated dose SSI  Continue detemir 10 units BID        VTE Risk Mitigation (From admission, onward)         Ordered     Place sequential compression device  Until discontinued         06/29/22 0710     IP VTE HIGH RISK PATIENT  Once         06/28/22 2146                Discharge Planning   MATEUS: 7/6/2022     Code Status: Prior   Is the patient medically ready for discharge?:     Reason for patient still in hospital (select all that apply): Patient trending condition and Treatment  Discharge Plan A: Home with family                  Kamron Suero NP  Department of Hospital Medicine   [facility]

## 2022-07-06 NOTE — SUBJECTIVE & OBJECTIVE
Interval History: Stable ready for discharge     Review of Systems   Constitutional:  Negative for chills, diaphoresis, fatigue and fever.   HENT:  Negative for congestion, ear discharge, rhinorrhea, sinus pressure, sore throat and trouble swallowing.    Eyes:  Negative for discharge and visual disturbance.   Respiratory:  Negative for apnea, cough, choking, chest tightness, shortness of breath, wheezing and stridor.    Cardiovascular:  Negative for chest pain, palpitations and leg swelling.   Gastrointestinal:  Negative for abdominal distention, abdominal pain, diarrhea, nausea and vomiting.   Endocrine: Negative for cold intolerance and heat intolerance.   Genitourinary:  Negative for dysuria, frequency and hematuria.   Musculoskeletal:  Positive for gait problem (ambulating with walker). Negative for arthralgias, back pain, myalgias and neck pain.   Skin:  Negative for pallor and rash.   Neurological:  Negative for dizziness, seizures, syncope, weakness and headaches.   Psychiatric/Behavioral:  Negative for agitation, confusion and sleep disturbance.    Objective:     Vital Signs (Most Recent):  Temp: 98.2 °F (36.8 °C) (07/06/22 1115)  Pulse: 74 (07/06/22 1540)  Resp: 18 (07/06/22 1209)  BP: (!) 117/56 (07/06/22 1115)  SpO2: 97 % (07/06/22 1115)   Vital Signs (24h Range):  Temp:  [98.2 °F (36.8 °C)-98.9 °F (37.2 °C)] 98.2 °F (36.8 °C)  Pulse:  [54-96] 74  Resp:  [17-18] 18  SpO2:  [94 %-98 %] 97 %  BP: (105-124)/(51-67) 117/56     Weight: 109.5 kg (241 lb 6.5 oz)  Body mass index is 38.96 kg/m².    Intake/Output Summary (Last 24 hours) at 7/6/2022 1636  Last data filed at 7/6/2022 1245  Gross per 24 hour   Intake 572.32 ml   Output --   Net 572.32 ml      Physical Exam  Vitals and nursing note reviewed.   Constitutional:       General: He is not in acute distress.     Appearance: He is not ill-appearing, toxic-appearing or diaphoretic.   HENT:      Head: Normocephalic and atraumatic.      Comments: Left facial  blister continues to improve   Eyes:      General:         Right eye: No discharge.         Left eye: No discharge.   Cardiovascular:      Heart sounds: No murmur heard.    No friction rub. No gallop.   Pulmonary:      Effort: No respiratory distress.      Breath sounds: No stridor. No wheezing, rhonchi or rales.   Chest:      Chest wall: No tenderness.   Abdominal:      General: There is no distension.      Palpations: There is no mass.      Tenderness: There is no abdominal tenderness. There is no right CVA tenderness, left CVA tenderness, guarding or rebound.      Hernia: No hernia is present.   Musculoskeletal:         General: Tenderness (lower back pain decreased) present. No swelling, deformity or signs of injury.      Right lower leg: No edema.      Left lower leg: No edema.      Comments: Left lower back dressing intact    Skin:     Coloration: Skin is not jaundiced or pale.      Findings: No bruising, erythema, lesion or rash.   Neurological:      Cranial Nerves: No cranial nerve deficit.      Sensory: No sensory deficit.      Motor: No weakness.      Coordination: Coordination normal.      Gait: Gait normal.      Deep Tendon Reflexes: Reflexes normal.       Significant Labs: All pertinent labs within the past 24 hours have been reviewed.  POCT Glucose:   Recent Labs   Lab 07/06/22  0553 07/06/22  1145 07/06/22  1631   POCTGLUCOSE 236* 130* 188*       Significant Imaging:     Imaging Results              MRI Lumbar Spine Without Contrast (Final result)  Result time 06/27/22 16:24:14      Final result by Ramon Mead Jr., MD (06/27/22 16:24:14)                   Impression:      1. Interval left hemilaminectomy at L2-L3.  There is ventral epidural tissue that likely relates to small hematoma and less likely extruded disc or phlegmonous material.  This distorts the thecal sac toward the right and results in flattening to 5 mm.  There is severe lateral recess stenosis that could affect the L3 spinal nerve  roots that are descending at this level.  2. No definitive signs of spondylodiscitis.  No definite rosette abscess.  Increased T2 signal within the paraspinal muscles about the laminectomy site is not necessarily unusual given the postoperative state.  3. Unchanged degenerative findings elsewhere as described above.  Persistent mild/moderate left lateral recess stenosis at L1-L2 a could affect the left L2 spinal nerve roots.  These findings were discussed with Dr. Alford at 16:24.      Electronically signed by: Ramon Mead Jr., MD  Date:    06/27/2022  Time:    16:24               Narrative:    EXAMINATION:  MRI LUMBAR SPINE WITHOUT CONTRAST    CLINICAL HISTORY:  back pain;    TECHNIQUE:  MR Lumbar spine without contrast. Sagittal T1, T2, STIR. Axial T1, T2. Coronal T1.    COMPARISON:  Intraoperative fluoroscopy views from 06/22/2022 were reviewed.  CT of the lumbar spine from 05/03/2022 was also reviewed.  MRI of the lumbar spine from 01/18/2022 was reviewed as well.    FINDINGS:  There are postoperative findings from interval left hemilaminectomy at L2-L3.  There is small granulation tissue within the dorsal laminotomy site.  There is abnormal material within the ventral epidural spinal canal that flattens the thecal sac toward the right at the level of mid L3.  The thecal sac measures 5 mm in transverse dimension as demonstrated on axial T2 series 6, image 18.  The abnormal ventral soft tissue/hematoma/extruded disc measures 57 mm craniocaudal approximately by 19 mm transverse by 8 mm in AP dimension.  There is also abnormal tissue within the bilateral lateral recesses at this level, left greater than right, that could affect the descending L3 spinal nerve roots.  Normal appearing marrow signal without definite signs of spondylodiscitis.  There is increased T2 signal within the bilateral paraspinal muscles adjacent to the operative site extending inferiorly to the level of L4-L5.  The conus medullaris terminates  at the level of L1-L2.  No abnormal signal  within the conus. Intervertebral disc levels are as follows:    T12-L1 disc: Unchanged minimal disc bulge with mild facet hypertrophy.  The thecal sac measures 11 mm with no significant foraminal stenosis.    L1-L2 disc : Unchanged left paracentral disc protrusion with annular fissure that causes mild left lateral recess stenosis.  Mild degenerative facet hypertrophy.  The thecal sac measures 7 mm AP which is unchanged.  No significant foraminal stenosis.    L2-L3 disc: Level of prior surgery with epidural abnormal tissue that flattens the thecal sac dorsally and toward the right where it measures as small as 5 mm.  Lateral recess stenosis could affect the descending L3 spinal nerve roots.  Abnormal tissue also encroaches into the left exit foramen at L2-L3 surrounding the exiting left L2 spinal nerve.    L3-L4 disc: Broad-based posterior disc bulge.  Mild degenerative facet hypertrophy bilaterally.  The thecal sac measures 8 mm in AP dimension.  Mild bilateral neural foraminal stenosis due to bulging disc which is similar to the previous exam.    L4-L5 disc: Circumferentially bulging disc that encroaches into the floors of the exit foramina.  Anterior osteophytes.  Severe degenerative facet hypertrophy with facet joint effusions.  The thecal sac measures 10 mm.  Mild bilateral foraminal stenosis.    L5-S1 disc: Normal disc space height with severe degenerative facet hypertrophy.  No significant spinal or foraminal stenosis.  The thecal sac measures 11 mm.

## 2022-07-06 NOTE — PROGRESS NOTES
[facility]  Neurosurgery  Progress Note    Subjective:     Interval History:   No new issues this morning.  Pain is controlled.  Facial blisters on L side of face healing.  Still awaiting insurance approval for IV Abx- insurance will only approve [facility] infusion services.  Repeat blood cx show NGTD  Spoke with  regarding possible PO Abx.  NP will reach out to ID in [place] regarding possible PO tx.      History of Present Illness:  See H&P.    Post-Op Info:  Procedure(s) (LRB):  INCISION AND DRAINAGE, HEMATOMA (N/A)  LAMINECTOMY, SPINE, LUMBAR, WITH DISCECTOMY (N/A)   8 Days Post-Op      Medications:  Continuous Infusions:  Scheduled Meds:   atorvastatin  10 mg Oral QHS    ceFAZolin (ANCEF) IVPB  2 g Intravenous Q8H    docusate sodium  100 mg Oral BID    gabapentin  300 mg Oral TID    insulin detemir U-100  10 Units Subcutaneous BID    losartan  50 mg Oral Daily    multivitamin  1 tablet Oral Daily    polyethylene glycol  17 g Oral BID     PRN Meds:acetaminophen, aluminum-magnesium hydroxide-simethicone, dextrose 10%, dextrose 10%, diazePAM, diphenhydrAMINE, glucagon (human recombinant), glucose, glucose, HYDROmorphone, insulin aspart U-100, melatonin, naloxone, ondansetron, prochlorperazine, senna-docusate 8.6-50 mg, sodium chloride 0.9%, sodium chloride 0.9%, sodium chloride 0.9%       Objective:     Weight: 109.5 kg (241 lb 6.5 oz)  Body mass index is 38.96 kg/m².  Vital Signs (Most Recent):  Temp: 98.5 °F (36.9 °C) (07/06/22 0815)  Pulse: 69 (07/06/22 0815)  Resp: 18 (07/06/22 0815)  BP: 123/67 (07/06/22 0815)  SpO2: 98 % (07/06/22 0815) Vital Signs (24h Range):  Temp:  [97.9 °F (36.6 °C)-98.9 °F (37.2 °C)] 98.5 °F (36.9 °C)  Pulse:  [54-96] 69  Resp:  [17-18] 18  SpO2:  [94 %-99 %] 98 %  BP: (105-148)/(51-86) 123/67                       Neurosurgery Physical Exam  Vitals reviewed  Labs reviewed  Moves all 4 ext well  Dressing changed earlier this morning by nurse    Significant Labs:  No results  for input(s): GLU, NA, K, CL, CO2, BUN, CREATININE, CALCIUM, MG in the last 48 hours.  No results for input(s): WBC, HGB, HCT, PLT in the last 48 hours.  No results for input(s): LABPT, INR, APTT in the last 48 hours.  Microbiology Results (last 7 days)     Procedure Component Value Units Date/Time    Blood culture [[id number]] Collected: 07/03/22 1015    Order Status: Completed Specimen: Blood from Antecubital, Left Arm Updated: 07/05/22 1422     Blood Culture, Routine No Growth to date      No Growth to date      No Growth to date    Blood culture [[id number]] Collected: 07/03/22 1015    Order Status: Completed Specimen: Blood Updated: 07/05/22 1422     Blood Culture, Routine No Growth to date      No Growth to date      No Growth to date    Fungus culture [[id number]] Collected: 06/28/22 1950    Order Status: Completed Specimen: Abscess from Back Updated: 07/04/22 1301     Fungus (Mycology) Culture Culture in progress    Narrative:      superficial    Fungus culture [[id number]] Collected: 06/28/22 1950    Order Status: Completed Specimen: Abscess from Back Updated: 07/04/22 1301     Fungus (Mycology) Culture Culture in progress    Narrative:      Deep    Culture, Anaerobe [[id number]]  (Abnormal) Collected: 06/28/22 1950    Order Status: Completed Specimen: Abscess from Back Updated: 07/04/22 1232     Anaerobic Culture CUTIBACTERIUM ACNES  Few      Narrative:      Deep    Culture, Anaerobe [[id number]]  (Abnormal) Collected: 06/28/22 1950    Order Status: Completed Specimen: Abscess from Back Updated: 07/04/22 1208     Anaerobic Culture BACTEROIDES FRAGILIS  From broth only      Narrative:      superficial    Blood culture [[id number]]  (Abnormal)  (Susceptibility) Collected: 06/29/22 1657    Order Status: Completed Specimen: Blood Updated: 07/04/22 0930     Blood Culture, Routine Gram stain nico bottle: Gram positive cocci in clusters resembling Staph       Results called to and read back by: Soheila You 07/01/2022   05:47      STAPHYLOCOCCUS EPIDERMIDIS    Blood culture [[id number]]  (Abnormal)  (Susceptibility) Collected: 06/29/22 1439    Order Status: Completed Specimen: Blood Updated: 07/04/22 0930     Blood Culture, Routine Gram stain aer bottle: Gram positive cocci in clusters resembling Staph      Results called to and read back by: Alisha Eller RN. 07/02/2022  00:17      STAPHYLOCOCCUS EPIDERMIDIS    Narrative:      Aerobic, anaerobic cx    Aerobic culture [[id number]]  (Abnormal)  (Susceptibility) Collected: 06/28/22 1950    Order Status: Completed Specimen: Abscess from Back Updated: 07/02/22 1208     Aerobic Bacterial Culture STAPHYLOCOCCUS EPIDERMIDIS  Moderate      Narrative:      Deep    Aerobic culture [[id number]] Collected: 06/28/22 1950    Order Status: Completed Specimen: Abscess from Back Updated: 07/02/22 1122     Aerobic Bacterial Culture No growth    Narrative:      Superficial    AFB Culture & Smear [[id number]] Collected: 06/28/22 1950    Order Status: Completed Specimen: Abscess from Back Updated: 06/30/22 0927     AFB Culture & Smear Culture in progress     AFB CULTURE STAIN No acid fast bacilli seen.    Narrative:      superficial    AFB Culture & Smear [[id number]] Collected: 06/28/22 1950    Order Status: Completed Specimen: Abscess from Back Updated: 06/30/22 0927     AFB Culture & Smear Culture in progress     AFB CULTURE STAIN No acid fast bacilli seen.    Narrative:      Deep        All pertinent labs from the last 24 hours have been reviewed.  Significant Diagnostics:  I have reviewed all pertinent imaging results/findings within the past 24 hours.    Assessment/Plan:     Active Diagnoses:    Diagnosis Date Noted POA    PRINCIPAL PROBLEM:  Postoperative hematoma involving nervous system following nervous system procedure [G97.61] 06/28/2022 Yes    Gram-positive bacteremia [R78.81] 07/02/2022 Yes    Fever [R50.9] 06/30/2022 Yes    Hypertension associated with diabetes [E11.59, I15.2]  Yes     Diabetes mellitus without complication [E11.9]  Yes      Problems Resolved During this Admission:    POD #8    Await input from ID (NO) regarding Abx (PO vs IV).  Repeat blood cx NGTD  LSO- DME will bring this morning  Amb several times daily  Up in chair with all meals  Possible d/c to home later today    Oliver Clifton PA-C  Neurosurgery  [facility]

## 2022-07-06 NOTE — DISCHARGE INSTRUCTIONS
PLEASE READ BELOW:    No NSAIDS (Aleve, Advil, Voltaren, etc. ) for 14 days.    Minimize BLTs- bending, lifting and twisting. No lifting anything greater than 5-10 lbs for the first two weeks.    Dressing change:    1 time per day using Mepilex. With each dressing change, apply thin layer of Bacitracin ointment over staples.    Shower/Bath: You may shower . Do not submerge in water until you are evaluated  for staple / suture removal.

## 2022-07-06 NOTE — PLAN OF CARE
Problem: Adult Inpatient Plan of Care  Goal: Plan of Care Review  Outcome: Ongoing, Progressing  Goal: Patient-Specific Goal (Individualized)  Outcome: Ongoing, Progressing  Goal: Absence of Hospital-Acquired Illness or Injury  Outcome: Ongoing, Progressing  Goal: Optimal Comfort and Wellbeing  Outcome: Ongoing, Progressing  Goal: Readiness for Transition of Care  Outcome: Ongoing, Progressing     Problem: Diabetes Comorbidity  Goal: Blood Glucose Level Within Targeted Range  Outcome: Ongoing, Progressing     Problem: Pain Acute  Goal: Acceptable Pain Control and Functional Ability  Outcome: Ongoing, Progressing     Problem: Fall Injury Risk  Goal: Absence of Fall and Fall-Related Injury  Outcome: Ongoing, Progressing     Problem: Impaired Wound Healing  Goal: Optimal Wound Healing  Outcome: Ongoing, Progressing     Problem: Infection  Goal: Absence of Infection Signs and Symptoms  Outcome: Ongoing, Progressing

## 2022-07-06 NOTE — NURSING
Discharge instructions reviewed with patient and son at bedside. Questions answered. Paperwork handed to patient. DME supplies at bedside. PICC line in place for at home IV abx therapy.

## 2022-07-07 ENCOUNTER — PATIENT OUTREACH (OUTPATIENT)
Dept: ADMINISTRATIVE | Facility: CLINIC | Age: 67
End: 2022-07-07
Payer: MEDICARE

## 2022-07-07 DIAGNOSIS — G97.61 POSTOPERATIVE HEMATOMA INVOLVING NERVOUS SYSTEM FOLLOWING NERVOUS SYSTEM PROCEDURE: Primary | ICD-10-CM

## 2022-07-07 NOTE — PROGRESS NOTES
Please forward this important TCC information to your provider in order to maximize the post discharge care delivery of this patient.    C3 nurse spoke with Friday ROGELIO Blake for a TCC post hospital discharge follow up call. The patient does not have a scheduled HOSFU appointment with Sid Elizabeth MD within 7 days post hospital discharge date 07/06/2022 C3 nurse was unable to schedule HOSFU appointment in [facility].  Please contact patient and schedule follow up appointment using HOSFU visit type on or before 07/13/2022    NP referral sent.

## 2022-07-08 ENCOUNTER — OFFICE VISIT (OUTPATIENT)
Dept: NEUROSURGERY | Facility: CLINIC | Age: 67
End: 2022-07-08
Payer: MEDICARE

## 2022-07-08 VITALS
RESPIRATION RATE: 18 BRPM | DIASTOLIC BLOOD PRESSURE: 79 MMHG | WEIGHT: 241 LBS | HEIGHT: 66 IN | SYSTOLIC BLOOD PRESSURE: 131 MMHG | BODY MASS INDEX: 38.73 KG/M2 | HEART RATE: 86 BPM

## 2022-07-08 DIAGNOSIS — Z48.89 ENCOUNTER FOR POSTOPERATIVE WOUND CHECK: Primary | ICD-10-CM

## 2022-07-08 DIAGNOSIS — G97.61 POSTOPERATIVE HEMATOMA INVOLVING NERVOUS SYSTEM FOLLOWING NERVOUS SYSTEM PROCEDURE: ICD-10-CM

## 2022-07-08 DIAGNOSIS — Z98.890 STATUS POST LUMBAR SURGERY: ICD-10-CM

## 2022-07-08 LAB
BACTERIA BLD CULT: NORMAL
BACTERIA BLD CULT: NORMAL

## 2022-07-08 PROCEDURE — 3066F NEPHROPATHY DOC TX: CPT | Mod: CPTII,S$GLB,, | Performed by: PHYSICIAN ASSISTANT

## 2022-07-08 PROCEDURE — 1159F MED LIST DOCD IN RCRD: CPT | Mod: CPTII,S$GLB,, | Performed by: PHYSICIAN ASSISTANT

## 2022-07-08 PROCEDURE — 99024 POSTOP FOLLOW-UP VISIT: CPT | Mod: S$GLB,,, | Performed by: PHYSICIAN ASSISTANT

## 2022-07-08 PROCEDURE — 1126F PR PAIN SEVERITY QUANTIFIED, NO PAIN PRESENT: ICD-10-PCS | Mod: CPTII,S$GLB,, | Performed by: PHYSICIAN ASSISTANT

## 2022-07-08 PROCEDURE — 3008F BODY MASS INDEX DOCD: CPT | Mod: CPTII,S$GLB,, | Performed by: PHYSICIAN ASSISTANT

## 2022-07-08 PROCEDURE — 3061F NEG MICROALBUMINURIA REV: CPT | Mod: CPTII,S$GLB,, | Performed by: PHYSICIAN ASSISTANT

## 2022-07-08 PROCEDURE — 99024 PR POST-OP FOLLOW-UP VISIT: ICD-10-PCS | Mod: S$GLB,,, | Performed by: PHYSICIAN ASSISTANT

## 2022-07-08 PROCEDURE — 99999 PR PBB SHADOW E&M-EST. PATIENT-LVL IV: ICD-10-PCS | Mod: PBBFAC,,, | Performed by: PHYSICIAN ASSISTANT

## 2022-07-08 PROCEDURE — 3052F HG A1C>EQUAL 8.0%<EQUAL 9.0%: CPT | Mod: CPTII,S$GLB,, | Performed by: PHYSICIAN ASSISTANT

## 2022-07-08 PROCEDURE — 3066F PR DOCUMENTATION OF TREATMENT FOR NEPHROPATHY: ICD-10-PCS | Mod: CPTII,S$GLB,, | Performed by: PHYSICIAN ASSISTANT

## 2022-07-08 PROCEDURE — 3072F LOW RISK FOR RETINOPATHY: CPT | Mod: CPTII,S$GLB,, | Performed by: PHYSICIAN ASSISTANT

## 2022-07-08 PROCEDURE — 3288F FALL RISK ASSESSMENT DOCD: CPT | Mod: CPTII,S$GLB,, | Performed by: PHYSICIAN ASSISTANT

## 2022-07-08 PROCEDURE — 3052F PR MOST RECENT HEMOGLOBIN A1C LEVEL 8.0 - < 9.0%: ICD-10-PCS | Mod: CPTII,S$GLB,, | Performed by: PHYSICIAN ASSISTANT

## 2022-07-08 PROCEDURE — 3288F PR FALLS RISK ASSESSMENT DOCUMENTED: ICD-10-PCS | Mod: CPTII,S$GLB,, | Performed by: PHYSICIAN ASSISTANT

## 2022-07-08 PROCEDURE — 3008F PR BODY MASS INDEX (BMI) DOCUMENTED: ICD-10-PCS | Mod: CPTII,S$GLB,, | Performed by: PHYSICIAN ASSISTANT

## 2022-07-08 PROCEDURE — 3061F PR NEG MICROALBUMINURIA RESULT DOCUMENTED/REVIEW: ICD-10-PCS | Mod: CPTII,S$GLB,, | Performed by: PHYSICIAN ASSISTANT

## 2022-07-08 PROCEDURE — 3078F PR MOST RECENT DIASTOLIC BLOOD PRESSURE < 80 MM HG: ICD-10-PCS | Mod: CPTII,S$GLB,, | Performed by: PHYSICIAN ASSISTANT

## 2022-07-08 PROCEDURE — 3078F DIAST BP <80 MM HG: CPT | Mod: CPTII,S$GLB,, | Performed by: PHYSICIAN ASSISTANT

## 2022-07-08 PROCEDURE — 1126F AMNT PAIN NOTED NONE PRSNT: CPT | Mod: CPTII,S$GLB,, | Performed by: PHYSICIAN ASSISTANT

## 2022-07-08 PROCEDURE — 4010F ACE/ARB THERAPY RXD/TAKEN: CPT | Mod: CPTII,S$GLB,, | Performed by: PHYSICIAN ASSISTANT

## 2022-07-08 PROCEDURE — 1101F PT FALLS ASSESS-DOCD LE1/YR: CPT | Mod: CPTII,S$GLB,, | Performed by: PHYSICIAN ASSISTANT

## 2022-07-08 PROCEDURE — 3075F SYST BP GE 130 - 139MM HG: CPT | Mod: CPTII,S$GLB,, | Performed by: PHYSICIAN ASSISTANT

## 2022-07-08 PROCEDURE — 99999 PR PBB SHADOW E&M-EST. PATIENT-LVL IV: CPT | Mod: PBBFAC,,, | Performed by: PHYSICIAN ASSISTANT

## 2022-07-08 PROCEDURE — 3075F PR MOST RECENT SYSTOLIC BLOOD PRESS GE 130-139MM HG: ICD-10-PCS | Mod: CPTII,S$GLB,, | Performed by: PHYSICIAN ASSISTANT

## 2022-07-08 PROCEDURE — 3072F PR LOW RISK FOR RETINOPATHY: ICD-10-PCS | Mod: CPTII,S$GLB,, | Performed by: PHYSICIAN ASSISTANT

## 2022-07-08 PROCEDURE — 1101F PR PT FALLS ASSESS DOC 0-1 FALLS W/OUT INJ PAST YR: ICD-10-PCS | Mod: CPTII,S$GLB,, | Performed by: PHYSICIAN ASSISTANT

## 2022-07-08 PROCEDURE — 4010F PR ACE/ARB THEARPY RXD/TAKEN: ICD-10-PCS | Mod: CPTII,S$GLB,, | Performed by: PHYSICIAN ASSISTANT

## 2022-07-08 PROCEDURE — 1159F PR MEDICATION LIST DOCUMENTED IN MEDICAL RECORD: ICD-10-PCS | Mod: CPTII,S$GLB,, | Performed by: PHYSICIAN ASSISTANT

## 2022-07-08 NOTE — TELEPHONE ENCOUNTER
Returned pt call. Stated he was just discharged from hospital. Advise pt to schedule appointment. Pt stated he do not at this present time.

## 2022-07-08 NOTE — PROGRESS NOTES
"Subjective:      Patient ID: Friday ROGELIO Blake is a 66 y.o. male.    Chief Complaint: Post-op Evaluation (Pt is here today for wound check and suture/staple removal. Pt states that he is no pain. He is taking gabapentin for the pain.)    HPI   The patient is here today for postop evaluation #1.  He is accompanied by his son.   Currently 10 days out.  Patient states that he has thought about his situation and will change his flight to later next week (7/14) and plan to have  infusion nurse come out early next week to remove PICC line.   Patient states that the [facility] nurse provided him with a Rx for PO Abx (Augmentin for 14 days) to start on 7/10 in the event he had to remove PICC over the weekend but the  infusion nurse cannot come out on the weekend.     Patient states he has very "minor" pain in his hips but overall continues to improve.   He is utilizing a cane.   Repeat blood cx NGTD last resulted 7/8/22.  He denies fevers, headaches, dizziness, shortness of breath, chest pain, numbness and tingling.   Rates his pain 0/10 today.     Date of Procedure: 6/28/2022    Procedure: Procedure(s) (LRB):  INCISION AND DRAINAGE, HEMATOMA (N/A)  LAMINECTOMY, SPINE, LUMBAR, WITH DISCECTOMY (N/A)   L2-3    Operative Findings (including complications, if any):   Fluid collection ventral and lateral consistent with epidural epidural hematoma  compression   Retained disc fragment    Description of Technical Procedures:  Removal of spinous process as well as bilateral laminectomy L2-3 with foraminotomy bilateral  Exploration of disc space  Removal of disc fragment  Drainage of epidural hematoma  Wound cultures sent for analysis              Objective:            General    Constitutional: He is oriented to person, place, and time. He appears well-developed and well-nourished.   Cardiovascular: Normal rate and regular rhythm.    Pulmonary/Chest: Effort normal.   Abdominal: Soft.   Neurological: He is alert and oriented to " person, place, and time.   Psychiatric: He has a normal mood and affect. His behavior is normal.           Neurosurgery exam:  Vitals reviewed  Labs reviewed    Moves all 4 ext well  Sensation intact to light touch  Incision- healing well  Sutures intact  There is no erythema, swelling or fluctuance    Incision cleaned in the office. Dermabond applied.            Component 9 d ago    Blood Culture, Routine No growth after 5 days.    Resulting Agency [facility]              Specimen Collected: 07/03/22 10:15 Last Resulted: 07/08/22 14:22               Assessment:       Encounter Diagnoses   Name Primary?    Postoperative hematoma involving nervous system following nervous system procedure     Status post lumbar surgery     Encounter for postoperative wound check Yes          Plan:       Friday was seen today for post-op evaluation.    Diagnoses and all orders for this visit:    Encounter for postoperative wound check    Postoperative hematoma involving nervous system following nervous system procedure    Status post lumbar surgery      Mr. Blake is doing well following recent surgery.   He will continue IV Abx until next week, possibly transition to PO Abx.   Will consider Zyvox for 10-14 days.     Patient will follow-up on 7/12/22 for wound check and suture removal.   Please call with any changes.  Letter issued to patient/son for travel arrangements.              Oliver Clifton PA-C

## 2022-07-08 NOTE — LETTER
July 8, 2022      [facility] Neurosurgery 1st Fl  [address]  [place] [address]  Phone: [phone]  Fax: [phone]       Patient: Friday Friday Lou   YOB: 1955  Date of Visit: 07/08/2022    To Whom It May Concern:  Friday Lou  was at [facility] on 07/08/2022. The patient is currently under our care following lumbar surgery. He requires further medical treatment until 7/14/22 and this is a request to allow the patient to change his flight date.   The patient requires PICC line for antibiotics and is set to have this line removed early next week (prior to 7/14/22) by home health infusion nurse as well as suture removal on 7/12/22 in our office.   In addition to this request, please allow his son, Esequiel Blake, to change his flight date since he is required to travel with his father (Friday Jasonikema) to assist with any needs.    If you have any questions or concerns, or if I can be of further assistance, please do not hesitate to contact me.    Sincerely,    Oliver Clifton PA-C

## 2022-07-08 NOTE — LETTER
July 8, 2022      [facility] Neurosurgery 1st Fl  [address]  [place] [address]  Phone: [phone]  Fax: [phone]       Patient: Friday Friday Lou   YOB: 1955  Date of Visit: 07/08/2022    To Whom It May Concern:    Friday Lou  was at [facility] on 07/08/2022. The patient is currently under our care following lumbar surgery. He requires further medical treatment until 7/14/22 and this is a request to allow the patient to change his flight date.   The patient requires PICC line for antibiotics and is set to have this line removed early next week (prior to 7/14/22) by home health infusion nurse as well as suture removal on 7/12/22 in our office.     If you have any questions or concerns, or if I can be of further assistance, please do not hesitate to contact me.    Sincerely,    Oliver Clifton PA-C

## 2022-07-12 ENCOUNTER — INFUSION (OUTPATIENT)
Dept: INFUSION THERAPY | Facility: HOSPITAL | Age: 67
End: 2022-07-12
Payer: MEDICARE

## 2022-07-12 ENCOUNTER — OFFICE VISIT (OUTPATIENT)
Dept: NEUROSURGERY | Facility: CLINIC | Age: 67
End: 2022-07-12
Payer: MEDICARE

## 2022-07-12 ENCOUNTER — LAB VISIT (OUTPATIENT)
Dept: LAB | Facility: HOSPITAL | Age: 67
End: 2022-07-12
Attending: INTERNAL MEDICINE
Payer: MEDICARE

## 2022-07-12 VITALS
RESPIRATION RATE: 20 BRPM | SYSTOLIC BLOOD PRESSURE: 115 MMHG | TEMPERATURE: 98 F | HEART RATE: 78 BPM | OXYGEN SATURATION: 98 % | DIASTOLIC BLOOD PRESSURE: 78 MMHG

## 2022-07-12 VITALS
SYSTOLIC BLOOD PRESSURE: 118 MMHG | HEART RATE: 80 BPM | WEIGHT: 241 LBS | RESPIRATION RATE: 18 BRPM | DIASTOLIC BLOOD PRESSURE: 71 MMHG | HEIGHT: 66 IN | BODY MASS INDEX: 38.73 KG/M2

## 2022-07-12 DIAGNOSIS — Z98.890 STATUS POST LUMBAR SURGERY: ICD-10-CM

## 2022-07-12 DIAGNOSIS — Z79.2 ENCOUNTER FOR LONG-TERM (CURRENT) USE OF ANTIBIOTICS: Primary | ICD-10-CM

## 2022-07-12 DIAGNOSIS — Z79.2 ENCOUNTER FOR LONG-TERM (CURRENT) USE OF ANTIBIOTICS: ICD-10-CM

## 2022-07-12 DIAGNOSIS — G97.61 POSTOPERATIVE HEMATOMA INVOLVING NERVOUS SYSTEM FOLLOWING NERVOUS SYSTEM PROCEDURE: Primary | ICD-10-CM

## 2022-07-12 LAB
ALBUMIN SERPL BCP-MCNC: 3.3 G/DL (ref 3.5–5.2)
ALP SERPL-CCNC: 72 U/L (ref 55–135)
ALT SERPL W/O P-5'-P-CCNC: 10 U/L (ref 10–44)
ANION GAP SERPL CALC-SCNC: 8 MMOL/L (ref 8–16)
AST SERPL-CCNC: 13 U/L (ref 10–40)
BASOPHILS # BLD AUTO: 0.05 K/UL (ref 0–0.2)
BASOPHILS NFR BLD: 0.8 % (ref 0–1.9)
BILIRUB SERPL-MCNC: 0.3 MG/DL (ref 0.1–1)
BUN SERPL-MCNC: 14 MG/DL (ref 8–23)
CALCIUM SERPL-MCNC: 9.2 MG/DL (ref 8.7–10.5)
CHLORIDE SERPL-SCNC: 101 MMOL/L (ref 95–110)
CO2 SERPL-SCNC: 28 MMOL/L (ref 23–29)
CREAT SERPL-MCNC: 0.8 MG/DL (ref 0.5–1.4)
CRP SERPL-MCNC: 13.4 MG/L (ref 0–8.2)
DIFFERENTIAL METHOD: ABNORMAL
EOSINOPHIL # BLD AUTO: 0.2 K/UL (ref 0–0.5)
EOSINOPHIL NFR BLD: 3 % (ref 0–8)
ERYTHROCYTE [DISTWIDTH] IN BLOOD BY AUTOMATED COUNT: 14.4 % (ref 11.5–14.5)
ERYTHROCYTE [SEDIMENTATION RATE] IN BLOOD BY PHOTOMETRIC METHOD: 87 MM/HR (ref 0–23)
EST. GFR  (AFRICAN AMERICAN): >60 ML/MIN/1.73 M^2
EST. GFR  (NON AFRICAN AMERICAN): >60 ML/MIN/1.73 M^2
GLUCOSE SERPL-MCNC: 129 MG/DL (ref 70–110)
HCT VFR BLD AUTO: 37 % (ref 40–54)
HGB BLD-MCNC: 11.4 G/DL (ref 14–18)
IMM GRANULOCYTES # BLD AUTO: 0.01 K/UL (ref 0–0.04)
IMM GRANULOCYTES NFR BLD AUTO: 0.2 % (ref 0–0.5)
LYMPHOCYTES # BLD AUTO: 2 K/UL (ref 1–4.8)
LYMPHOCYTES NFR BLD: 32.8 % (ref 18–48)
MCH RBC QN AUTO: 28.7 PG (ref 27–31)
MCHC RBC AUTO-ENTMCNC: 30.8 G/DL (ref 32–36)
MCV RBC AUTO: 93 FL (ref 82–98)
MONOCYTES # BLD AUTO: 0.4 K/UL (ref 0.3–1)
MONOCYTES NFR BLD: 7.2 % (ref 4–15)
NEUTROPHILS # BLD AUTO: 3.4 K/UL (ref 1.8–7.7)
NEUTROPHILS NFR BLD: 56 % (ref 38–73)
NRBC BLD-RTO: 0 /100 WBC
PLATELET # BLD AUTO: 200 K/UL (ref 150–450)
PMV BLD AUTO: 11.7 FL (ref 9.2–12.9)
POTASSIUM SERPL-SCNC: 4.5 MMOL/L (ref 3.5–5.1)
PROT SERPL-MCNC: 6.5 G/DL (ref 6–8.4)
RBC # BLD AUTO: 3.97 M/UL (ref 4.6–6.2)
SODIUM SERPL-SCNC: 137 MMOL/L (ref 136–145)
WBC # BLD AUTO: 6.1 K/UL (ref 3.9–12.7)

## 2022-07-12 PROCEDURE — 4010F ACE/ARB THERAPY RXD/TAKEN: CPT | Mod: CPTII,S$GLB,, | Performed by: NEUROLOGICAL SURGERY

## 2022-07-12 PROCEDURE — 3072F LOW RISK FOR RETINOPATHY: CPT | Mod: CPTII,S$GLB,, | Performed by: NEUROLOGICAL SURGERY

## 2022-07-12 PROCEDURE — 3066F PR DOCUMENTATION OF TREATMENT FOR NEPHROPATHY: ICD-10-PCS | Mod: CPTII,S$GLB,, | Performed by: NEUROLOGICAL SURGERY

## 2022-07-12 PROCEDURE — 3078F DIAST BP <80 MM HG: CPT | Mod: CPTII,S$GLB,, | Performed by: NEUROLOGICAL SURGERY

## 2022-07-12 PROCEDURE — 3061F NEG MICROALBUMINURIA REV: CPT | Mod: CPTII,S$GLB,, | Performed by: NEUROLOGICAL SURGERY

## 2022-07-12 PROCEDURE — 1125F PR PAIN SEVERITY QUANTIFIED, PAIN PRESENT: ICD-10-PCS | Mod: CPTII,S$GLB,, | Performed by: NEUROLOGICAL SURGERY

## 2022-07-12 PROCEDURE — 99024 POSTOP FOLLOW-UP VISIT: CPT | Mod: S$GLB,,, | Performed by: NEUROLOGICAL SURGERY

## 2022-07-12 PROCEDURE — 80053 COMPREHEN METABOLIC PANEL: CPT | Performed by: INTERNAL MEDICINE

## 2022-07-12 PROCEDURE — 1159F MED LIST DOCD IN RCRD: CPT | Mod: CPTII,S$GLB,, | Performed by: NEUROLOGICAL SURGERY

## 2022-07-12 PROCEDURE — 3008F PR BODY MASS INDEX (BMI) DOCUMENTED: ICD-10-PCS | Mod: CPTII,S$GLB,, | Performed by: NEUROLOGICAL SURGERY

## 2022-07-12 PROCEDURE — 1159F PR MEDICATION LIST DOCUMENTED IN MEDICAL RECORD: ICD-10-PCS | Mod: CPTII,S$GLB,, | Performed by: NEUROLOGICAL SURGERY

## 2022-07-12 PROCEDURE — 3066F NEPHROPATHY DOC TX: CPT | Mod: CPTII,S$GLB,, | Performed by: NEUROLOGICAL SURGERY

## 2022-07-12 PROCEDURE — 1101F PR PT FALLS ASSESS DOC 0-1 FALLS W/OUT INJ PAST YR: ICD-10-PCS | Mod: CPTII,S$GLB,, | Performed by: NEUROLOGICAL SURGERY

## 2022-07-12 PROCEDURE — 1125F AMNT PAIN NOTED PAIN PRSNT: CPT | Mod: CPTII,S$GLB,, | Performed by: NEUROLOGICAL SURGERY

## 2022-07-12 PROCEDURE — 3072F PR LOW RISK FOR RETINOPATHY: ICD-10-PCS | Mod: CPTII,S$GLB,, | Performed by: NEUROLOGICAL SURGERY

## 2022-07-12 PROCEDURE — 99999 PR PBB SHADOW E&M-EST. PATIENT-LVL IV: ICD-10-PCS | Mod: PBBFAC,,, | Performed by: NEUROLOGICAL SURGERY

## 2022-07-12 PROCEDURE — 99999 PR PBB SHADOW E&M-EST. PATIENT-LVL IV: CPT | Mod: PBBFAC,,, | Performed by: NEUROLOGICAL SURGERY

## 2022-07-12 PROCEDURE — 3288F FALL RISK ASSESSMENT DOCD: CPT | Mod: CPTII,S$GLB,, | Performed by: NEUROLOGICAL SURGERY

## 2022-07-12 PROCEDURE — 3074F SYST BP LT 130 MM HG: CPT | Mod: CPTII,S$GLB,, | Performed by: NEUROLOGICAL SURGERY

## 2022-07-12 PROCEDURE — 85652 RBC SED RATE AUTOMATED: CPT | Performed by: INTERNAL MEDICINE

## 2022-07-12 PROCEDURE — 3052F PR MOST RECENT HEMOGLOBIN A1C LEVEL 8.0 - < 9.0%: ICD-10-PCS | Mod: CPTII,S$GLB,, | Performed by: NEUROLOGICAL SURGERY

## 2022-07-12 PROCEDURE — 85025 COMPLETE CBC W/AUTO DIFF WBC: CPT | Performed by: INTERNAL MEDICINE

## 2022-07-12 PROCEDURE — 99024 PR POST-OP FOLLOW-UP VISIT: ICD-10-PCS | Mod: S$GLB,,, | Performed by: NEUROLOGICAL SURGERY

## 2022-07-12 PROCEDURE — 1101F PT FALLS ASSESS-DOCD LE1/YR: CPT | Mod: CPTII,S$GLB,, | Performed by: NEUROLOGICAL SURGERY

## 2022-07-12 PROCEDURE — 3074F PR MOST RECENT SYSTOLIC BLOOD PRESSURE < 130 MM HG: ICD-10-PCS | Mod: CPTII,S$GLB,, | Performed by: NEUROLOGICAL SURGERY

## 2022-07-12 PROCEDURE — 3052F HG A1C>EQUAL 8.0%<EQUAL 9.0%: CPT | Mod: CPTII,S$GLB,, | Performed by: NEUROLOGICAL SURGERY

## 2022-07-12 PROCEDURE — 4010F PR ACE/ARB THEARPY RXD/TAKEN: ICD-10-PCS | Mod: CPTII,S$GLB,, | Performed by: NEUROLOGICAL SURGERY

## 2022-07-12 PROCEDURE — 3078F PR MOST RECENT DIASTOLIC BLOOD PRESSURE < 80 MM HG: ICD-10-PCS | Mod: CPTII,S$GLB,, | Performed by: NEUROLOGICAL SURGERY

## 2022-07-12 PROCEDURE — 3061F PR NEG MICROALBUMINURIA RESULT DOCUMENTED/REVIEW: ICD-10-PCS | Mod: CPTII,S$GLB,, | Performed by: NEUROLOGICAL SURGERY

## 2022-07-12 PROCEDURE — 3288F PR FALLS RISK ASSESSMENT DOCUMENTED: ICD-10-PCS | Mod: CPTII,S$GLB,, | Performed by: NEUROLOGICAL SURGERY

## 2022-07-12 PROCEDURE — 3008F BODY MASS INDEX DOCD: CPT | Mod: CPTII,S$GLB,, | Performed by: NEUROLOGICAL SURGERY

## 2022-07-12 PROCEDURE — 86140 C-REACTIVE PROTEIN: CPT | Performed by: INTERNAL MEDICINE

## 2022-07-12 RX ORDER — AMOXICILLIN AND CLAVULANATE POTASSIUM 875; 125 MG/1; MG/1
1 TABLET, FILM COATED ORAL 2 TIMES DAILY
Qty: 20 TABLET | Refills: 0 | Status: ON HOLD | OUTPATIENT
Start: 2022-07-12 | End: 2022-08-12 | Stop reason: HOSPADM

## 2022-07-12 NOTE — DISCHARGE INSTRUCTIONS
[facility]  [address] [address] [facility]  [id number] [place] [address] Drive  [phone] phone     [phone] fax  Hours of Operation: Monday- Friday 8:00am- 5:00pm  After hours phone  [phone]  Hematology / Oncology Physicians on call:    Dr. Alireza Medina      Nurse Practitioners:    NUVIA Olivas NP Jessica Porter, PA Phaon Dunbar, NP      Please don't hesitate to call if you have any concerns.

## 2022-07-12 NOTE — PROGRESS NOTES
Subjective:      Patient ID: Friday ROGELIO Blake is a 66 y.o. male.    Chief Complaint: Post-op Evaluation (Pt is here today for wound check pt is doing well just some soreness in his back rating 3/10. Pt denies PT & currently takes Oxycodone & Robaxin to help ease pain. )    HPI   Pain is 3/10 today  Has his PICC line in place and is getting this out later today  No headaches fevers redness swelling or drainage from his incision  Home health nurses coming out in taking care the his incision  Ambulates with a cane  Overall continues to improve  Day 14 postop wound washout   States that his home nurse that there is a tiny amount of drainage at the inferior aspect of his incision however did not have any till today                Objective:            General    Constitutional: He is oriented to person, place, and time. He appears well-developed and well-nourished.   Cardiovascular: Normal rate and regular rhythm.    Pulmonary/Chest: Effort normal.   Abdominal: Soft.   Neurological: He is alert and oriented to person, place, and time.   Psychiatric: He has a normal mood and affect. His behavior is normal.           Neurosurgery exam:  Vitals reviewed  Labs reviewed    Moves all 4 ext well  Sensation intact to light touch  Incision- healing   Sutures intact  There is no erythema, swelling or fluctuance    I removed his running locked nylon suture  Several running intermittent and nylon sutures were placed side an abundance of caution    Dermabond place don incision superficial            Component 9 d ago    Blood Culture, Routine No growth after 5 days.    Resulting Agency [facility]              Specimen Collected: 07/03/22 10:15 Last Resulted: 07/08/22 14:22               Assessment:       Encounter Diagnoses   Name Primary?    Postoperative hematoma involving nervous system following nervous system procedure Yes    Status post lumbar surgery           Plan:       Friday was seen today for post-op evaluation.    Diagnoses  and all orders for this visit:    Postoperative hematoma involving nervous system following nervous system procedure  -     amoxicillin-clavulanate 875-125mg (AUGMENTIN) 875-125 mg per tablet; Take 1 tablet by mouth 2 (two) times daily.    Status post lumbar surgery  -     amoxicillin-clavulanate 875-125mg (AUGMENTIN) 875-125 mg per tablet; Take 1 tablet by mouth 2 (two) times daily.        Patient continues to do well he is done with his IV antibiotics  His sutures were removed given his situation when traveling to [place] I have placed interrupted nylon sutures and covered with derma bond  He will get PICC line out today   Augmentin 10 days after   He is returning from his trip in 10 days and we are going to see him in clinic after   He understanding that he is traveling out of the country and there is no way to provide care urgently if he were to develop any issues he has access to medical facilities abroad.  Our office and staff are available with any questions in the interim     Marily instructed to call or go to nearest ER with any redness swelling drainage from incision  New neurologic symptoms shows she has worsening back pain or weakness in the extremities or any change in condition.  Temperature greater than 100.4    Thank you for the referral   Please call with any questions    Shaggy Zepeda MD  Neurosurgery     Disclaimer: This note was prepared using a voice recognition system and is likely to have sound alike errors within the text.

## 2022-07-12 NOTE — NURSING
Patient here today to remove his PICC from his left arm.  Discontinued antibiotic pump since he is switching to oral per pt to travel.  Site has no redness, warmth, tenderness, swelling or drainage.  Catheter lumens clamped. Had pt hold breath during removal. Catheter removed easily and tip intact.  Applied drsg with gauze and Tegaderm; held pressure for 5 minutes.  Pt tolerated procedure well.  NAD noted upon discharge with family member.

## 2022-07-13 ENCOUNTER — PES CALL (OUTPATIENT)
Dept: ADMINISTRATIVE | Facility: CLINIC | Age: 67
End: 2022-07-13
Payer: MEDICARE

## 2022-07-13 RX ORDER — METFORMIN HYDROCHLORIDE 1000 MG/1
TABLET ORAL
Qty: 90 TABLET | Refills: 3 | Status: ON HOLD | OUTPATIENT
Start: 2022-07-13 | End: 2022-08-12 | Stop reason: HOSPADM

## 2022-07-13 NOTE — TELEPHONE ENCOUNTER
Called patient educated medication refill request sent to dr andre . Patient stated he is going out of country tomorrow

## 2022-07-13 NOTE — TELEPHONE ENCOUNTER
----- Message from More Mac sent at 7/13/2022  1:48 PM CDT -----  Please call pt @ [phone] regarding medication Metformin, pt states [facility] pharmacy waiting on approval, pt is at pharmacy, pt will come back to  today. Pt will be leaving out of Country tomorrow.

## 2022-07-15 NOTE — DISCHARGE SUMMARY
[facility]  Neurosurgery  Discharge Summary      Patient Name: Friday ROGELIO Blake  MRN: [id number]  Admission Date: 6/27/2022  Hospital Length of Stay: 6 days  Discharge Date and Time: 7/6/2022  6:25 PM  Attending Physician: No att. providers found   Discharging Provider: Oliver Clifton PA-C  Primary Care Provider: Sid Elizabeth MD     HPI: See H&P.    Procedure(s) (LRB):  INCISION AND DRAINAGE, HEMATOMA (N/A)  LAMINECTOMY, SPINE, LUMBAR, WITH DISCECTOMY (N/A)     Hospital Course: The patient underwent a wound exploration, irrigation and debridement of lumbar spine with laminectomy.   There were no complications. He was awakened, taken to recovery and admitted for postop care.   Patient was later discharged to home with  services/IV infusion services.      Consults:   Consults (From admission, onward)        Status Ordering Provider     Inpatient consult to Social Work  Once        Provider:  (Not yet assigned)    Completed VIGNESH JOEL     Inpatient consult to Spiritual Care  Once        Provider:  (Not yet assigned)    Completed RANDALL GILES          Significant Diagnostic Studies: See Epic.    Pending Diagnostic Studies:     None        Final Active Diagnoses:    Diagnosis Date Noted POA    PRINCIPAL PROBLEM:  Postoperative hematoma involving nervous system following nervous system procedure [G97.61] 06/28/2022 Yes     Chronic    Gram-positive bacteremia [R78.81] 07/02/2022 Yes     Chronic    Fever [R50.9] 06/30/2022 Yes     Chronic    Hypertension associated with diabetes [E11.59, I15.2]  Yes     Chronic    Diabetes mellitus without complication [E11.9]  Yes     Chronic      Problems Resolved During this Admission:      Discharged Condition: good    Disposition: Home or Self Care    Follow Up:   Follow-up Information     Tha Hodge MD Follow up in 2 week(s).    Specialties: Infectious Diseases, Hospitalist  Why: Hospital follow up for Bacteremia  Contact information:  [id number]  [facility]  [place] [address]  [phone]             Oliver Clifton PA-C. Go in 2 day(s).    Specialty: Neurosurgery  Why: For wound re-check, For suture removal  Contact information:  [address] [place] [address]  [phone]                       Patient Instructions:      Ambulatory referral/consult to Infectious Disease   Standing Status: Future   Referral Priority: Routine Referral Type: Consultation   Referral Reason: Specialty Services Required   Requested Specialty: Infectious Diseases   Number of Visits Requested: 1     Diet general     Change dressing (specify)   Order Comments: 1 time per day using Mepilex. With each dressing change, apply thin layer of Bacitracin ointment over staples or sutures.     Call MD for:  temperature >100.4     Call MD for:  persistent nausea and vomiting     Call MD for:  severe uncontrolled pain     Call MD for:  difficulty breathing, headache or visual disturbances     Call MD for:  redness, tenderness, or signs of infection (pain, swelling, redness, odor or green/yellow discharge around incision site)     Call MD for:  hives     Call MD for:  persistent dizziness or light-headedness     Call MD for:  extreme fatigue     Medications:  Reconciled Home Medications:      Medication List      START taking these medications    gabapentin 300 MG capsule  Commonly known as: NEURONTIN  Take 1 capsule (300 mg total) by mouth 3 (three) times daily.        CHANGE how you take these medications    oxyCODONE-acetaminophen  mg per tablet  Commonly known as: PERCOCET  Take 1 tablet by mouth every 4 to 6 hours as needed for Pain.  What changed: when to take this        CONTINUE taking these medications    aspirin 81 MG EC tablet  Commonly known as: ECOTRIN  Take 1 tablet (81 mg total) by mouth once daily.     atorvastatin 10 MG tablet  Commonly known as: LIPITOR  Take 1 tablet (10 mg total) by mouth every evening.     blood-glucose meter kit  To check BG 1  times daily, to use with insurance preferred meter     dapagliflozin 5 mg Tab tablet  Commonly known as: FARXIGA  Take 1 tablet (5 mg total) by mouth once daily.     docusate sodium 100 MG capsule  Commonly known as: COLACE  Take 1 capsule (100 mg total) by mouth 2 (two) times daily.     irbesartan 150 MG tablet  Commonly known as: AVAPRO  Take 1 tablet (150 mg total) by mouth once daily.     * lancets Misc  Commonly known as: ONETOUCH ULTRASOFT LANCETS  Check FS daily     * lancets Misc  To check BG 1 times daily, to use with insurance preferred meter     methocarbamoL 750 MG Tab  Commonly known as: ROBAXIN  Take 1 tablet (750 mg total) by mouth 3 (three) times daily as needed (muscle spasms).     * ONETOUCH VERIO TEST STRIPS Strp  Generic drug: blood sugar diagnostic  USE 1 STRIP TO CHECK GLUCOSE ONCE DAILY     * blood sugar diagnostic Strp  To check BG 1 times daily, to use with insurance preferred meter     STOOL SOFTENER-LAXATIVE 8.6-50 mg per tablet  Generic drug: senna-docusate 8.6-50 mg  Take 1 tablet by mouth once daily.         * This list has 4 medication(s) that are the same as other medications prescribed for you. Read the directions carefully, and ask your doctor or other care provider to review them with you.            STOP taking these medications    metFORMIN 1000 MG tablet  Commonly known as: GLUCOPHAGE            Oliver Clifton PA-C  Neurosurgery  [facility]

## 2022-07-18 ENCOUNTER — EXTERNAL HOME HEALTH (OUTPATIENT)
Dept: HOME HEALTH SERVICES | Facility: HOSPITAL | Age: 67
End: 2022-07-18
Payer: MEDICARE

## 2022-07-26 ENCOUNTER — TELEPHONE (OUTPATIENT)
Dept: NEUROSURGERY | Facility: CLINIC | Age: 67
End: 2022-07-26
Payer: MEDICARE

## 2022-07-26 LAB
FUNGUS SPEC CULT: NORMAL
FUNGUS SPEC CULT: NORMAL

## 2022-07-26 NOTE — TELEPHONE ENCOUNTER
I tried calling the pt and his spouse in regards to his appt today and both phones vm picked up..    VM left on spouse phone and pt phone vm is full no msg can be left

## 2022-07-28 ENCOUNTER — TELEPHONE (OUTPATIENT)
Dept: NEUROSURGERY | Facility: CLINIC | Age: 67
End: 2022-07-28
Payer: MEDICARE

## 2022-07-28 NOTE — TELEPHONE ENCOUNTER
I tried calling the pt in regards to his rescheduled post-op appt on 8/2 and was unsuccessful. The pt vm was full and not accepting any new messages at this time..    Tried calling the pt EC listed and was able to leave a voicemail in regards to pt upcoming appt.

## 2022-07-29 ENCOUNTER — NURSE TRIAGE (OUTPATIENT)
Dept: ADMINISTRATIVE | Facility: CLINIC | Age: 67
End: 2022-07-29
Payer: MEDICARE

## 2022-07-29 ENCOUNTER — TELEPHONE (OUTPATIENT)
Dept: NEUROSURGERY | Facility: CLINIC | Age: 67
End: 2022-07-29
Payer: MEDICARE

## 2022-07-29 ENCOUNTER — PATIENT MESSAGE (OUTPATIENT)
Dept: NEUROSURGERY | Facility: CLINIC | Age: 67
End: 2022-07-29
Payer: MEDICARE

## 2022-07-29 ENCOUNTER — HOSPITAL ENCOUNTER (INPATIENT)
Facility: HOSPITAL | Age: 67
LOS: 12 days | Discharge: HOME HEALTH | DRG: 856 | End: 2022-08-12
Attending: EMERGENCY MEDICINE | Admitting: EMERGENCY MEDICINE
Payer: MEDICARE

## 2022-07-29 DIAGNOSIS — M79.89 SWELLING OF LOWER EXTREMITY: ICD-10-CM

## 2022-07-29 DIAGNOSIS — R07.9 CHEST PAIN: ICD-10-CM

## 2022-07-29 DIAGNOSIS — T81.30XA WOUND DEHISCENCE: ICD-10-CM

## 2022-07-29 DIAGNOSIS — G97.82 POSTOPERATIVE CSF LEAK: Primary | ICD-10-CM

## 2022-07-29 DIAGNOSIS — G96.00 POSTOPERATIVE CSF LEAK: Primary | ICD-10-CM

## 2022-07-29 DIAGNOSIS — R29.898 DECREASED STRENGTH OF TRUNK AND BACK: ICD-10-CM

## 2022-07-29 PROBLEM — U07.1 COVID: Status: ACTIVE | Noted: 2022-07-29

## 2022-07-29 PROBLEM — G56.03 BILATERAL CARPAL TUNNEL SYNDROME: Status: RESOLVED | Noted: 2022-02-25 | Resolved: 2022-07-29

## 2022-07-29 PROBLEM — R50.9 FEVER: Chronic | Status: RESOLVED | Noted: 2022-06-30 | Resolved: 2022-07-29

## 2022-07-29 PROBLEM — M54.50 LOW BACK PAIN: Status: RESOLVED | Noted: 2021-09-28 | Resolved: 2022-07-29

## 2022-07-29 PROBLEM — R78.81 GRAM-POSITIVE BACTEREMIA: Chronic | Status: RESOLVED | Noted: 2022-07-02 | Resolved: 2022-07-29

## 2022-07-29 PROBLEM — I10 HYPERTENSION: Status: ACTIVE | Noted: 2022-07-29

## 2022-07-29 PROBLEM — E78.5 HLD (HYPERLIPIDEMIA): Status: ACTIVE | Noted: 2022-07-29

## 2022-07-29 PROBLEM — G56.02 LEFT CARPAL TUNNEL SYNDROME: Status: RESOLVED | Noted: 2021-03-23 | Resolved: 2022-07-29

## 2022-07-29 PROBLEM — G97.61 POSTOPERATIVE HEMATOMA INVOLVING NERVOUS SYSTEM FOLLOWING NERVOUS SYSTEM PROCEDURE: Chronic | Status: RESOLVED | Noted: 2022-06-28 | Resolved: 2022-07-29

## 2022-07-29 LAB
ALBUMIN SERPL BCP-MCNC: 3.6 G/DL (ref 3.5–5.2)
ALP SERPL-CCNC: 93 U/L (ref 55–135)
ALT SERPL W/O P-5'-P-CCNC: 15 U/L (ref 10–44)
ANION GAP SERPL CALC-SCNC: 11 MMOL/L (ref 8–16)
AST SERPL-CCNC: 18 U/L (ref 10–40)
BASOPHILS # BLD AUTO: 0.01 K/UL (ref 0–0.2)
BASOPHILS NFR BLD: 0.2 % (ref 0–1.9)
BILIRUB SERPL-MCNC: 0.3 MG/DL (ref 0.1–1)
BUN SERPL-MCNC: 17 MG/DL (ref 8–23)
CALCIUM SERPL-MCNC: 9.1 MG/DL (ref 8.7–10.5)
CHLORIDE SERPL-SCNC: 107 MMOL/L (ref 95–110)
CO2 SERPL-SCNC: 22 MMOL/L (ref 23–29)
CREAT SERPL-MCNC: 0.8 MG/DL (ref 0.5–1.4)
DIFFERENTIAL METHOD: ABNORMAL
EOSINOPHIL # BLD AUTO: 0.2 K/UL (ref 0–0.5)
EOSINOPHIL NFR BLD: 3.9 % (ref 0–8)
ERYTHROCYTE [DISTWIDTH] IN BLOOD BY AUTOMATED COUNT: 14.4 % (ref 11.5–14.5)
EST. GFR  (AFRICAN AMERICAN): >60 ML/MIN/1.73 M^2
EST. GFR  (NON AFRICAN AMERICAN): >60 ML/MIN/1.73 M^2
GLUCOSE SERPL-MCNC: 159 MG/DL (ref 70–110)
HCT VFR BLD AUTO: 36.9 % (ref 40–54)
HGB BLD-MCNC: 11.6 G/DL (ref 14–18)
IMM GRANULOCYTES # BLD AUTO: 0.01 K/UL (ref 0–0.04)
IMM GRANULOCYTES NFR BLD AUTO: 0.2 % (ref 0–0.5)
LYMPHOCYTES # BLD AUTO: 2.5 K/UL (ref 1–4.8)
LYMPHOCYTES NFR BLD: 47.9 % (ref 18–48)
MCH RBC QN AUTO: 27.8 PG (ref 27–31)
MCHC RBC AUTO-ENTMCNC: 31.4 G/DL (ref 32–36)
MCV RBC AUTO: 89 FL (ref 82–98)
MONOCYTES # BLD AUTO: 0.4 K/UL (ref 0.3–1)
MONOCYTES NFR BLD: 7.5 % (ref 4–15)
NEUTROPHILS # BLD AUTO: 2.1 K/UL (ref 1.8–7.7)
NEUTROPHILS NFR BLD: 40.3 % (ref 38–73)
NRBC BLD-RTO: 0 /100 WBC
PLATELET # BLD AUTO: 240 K/UL (ref 150–450)
PMV BLD AUTO: 10.8 FL (ref 9.2–12.9)
POTASSIUM SERPL-SCNC: 4.2 MMOL/L (ref 3.5–5.1)
PROT SERPL-MCNC: 6.9 G/DL (ref 6–8.4)
RBC # BLD AUTO: 4.17 M/UL (ref 4.6–6.2)
SARS-COV-2 RDRP RESP QL NAA+PROBE: POSITIVE
SODIUM SERPL-SCNC: 140 MMOL/L (ref 136–145)
WBC # BLD AUTO: 5.18 K/UL (ref 3.9–12.7)

## 2022-07-29 PROCEDURE — G0378 HOSPITAL OBSERVATION PER HR: HCPCS

## 2022-07-29 PROCEDURE — 85025 COMPLETE CBC W/AUTO DIFF WBC: CPT | Performed by: NURSE PRACTITIONER

## 2022-07-29 PROCEDURE — U0002 COVID-19 LAB TEST NON-CDC: HCPCS | Performed by: EMERGENCY MEDICINE

## 2022-07-29 PROCEDURE — 99285 EMERGENCY DEPT VISIT HI MDM: CPT | Mod: 25

## 2022-07-29 PROCEDURE — 96360 HYDRATION IV INFUSION INIT: CPT

## 2022-07-29 PROCEDURE — 25000003 PHARM REV CODE 250: Performed by: EMERGENCY MEDICINE

## 2022-07-29 PROCEDURE — 96361 HYDRATE IV INFUSION ADD-ON: CPT

## 2022-07-29 PROCEDURE — 80053 COMPREHEN METABOLIC PANEL: CPT | Performed by: EMERGENCY MEDICINE

## 2022-07-29 RX ORDER — DOCUSATE SODIUM 100 MG/1
100 CAPSULE, LIQUID FILLED ORAL 2 TIMES DAILY
Status: DISCONTINUED | OUTPATIENT
Start: 2022-07-30 | End: 2022-08-12 | Stop reason: HOSPADM

## 2022-07-29 RX ORDER — ACETAMINOPHEN 325 MG/1
650 TABLET ORAL EVERY 8 HOURS PRN
Status: DISCONTINUED | OUTPATIENT
Start: 2022-07-30 | End: 2022-08-03

## 2022-07-29 RX ORDER — ATORVASTATIN CALCIUM 10 MG/1
10 TABLET, FILM COATED ORAL NIGHTLY
Status: DISCONTINUED | OUTPATIENT
Start: 2022-07-30 | End: 2022-08-12 | Stop reason: HOSPADM

## 2022-07-29 RX ORDER — GABAPENTIN 300 MG/1
300 CAPSULE ORAL 3 TIMES DAILY
Status: DISCONTINUED | OUTPATIENT
Start: 2022-07-30 | End: 2022-08-12 | Stop reason: HOSPADM

## 2022-07-29 RX ORDER — LOPERAMIDE HYDROCHLORIDE 2 MG/1
2 CAPSULE ORAL EVERY 6 HOURS PRN
Status: DISCONTINUED | OUTPATIENT
Start: 2022-07-30 | End: 2022-08-12 | Stop reason: HOSPADM

## 2022-07-29 RX ORDER — TALC
6 POWDER (GRAM) TOPICAL NIGHTLY
Status: DISCONTINUED | OUTPATIENT
Start: 2022-07-29 | End: 2022-08-12 | Stop reason: HOSPADM

## 2022-07-29 RX ORDER — SODIUM CHLORIDE 0.9 % (FLUSH) 0.9 %
10 SYRINGE (ML) INJECTION EVERY 8 HOURS
Status: DISCONTINUED | OUTPATIENT
Start: 2022-07-30 | End: 2022-08-03

## 2022-07-29 RX ORDER — AMOXICILLIN 250 MG
1 CAPSULE ORAL DAILY
Status: DISCONTINUED | OUTPATIENT
Start: 2022-07-30 | End: 2022-07-29

## 2022-07-29 RX ORDER — NALOXONE HCL 0.4 MG/ML
0.02 VIAL (ML) INJECTION
Status: DISCONTINUED | OUTPATIENT
Start: 2022-07-30 | End: 2022-08-12 | Stop reason: HOSPADM

## 2022-07-29 RX ORDER — GUAIFENESIN/DEXTROMETHORPHAN 100-10MG/5
10 SYRUP ORAL EVERY 4 HOURS PRN
Status: DISCONTINUED | OUTPATIENT
Start: 2022-07-30 | End: 2022-08-12 | Stop reason: HOSPADM

## 2022-07-29 RX ORDER — ENOXAPARIN SODIUM 100 MG/ML
1 INJECTION SUBCUTANEOUS 2 TIMES DAILY
Status: DISCONTINUED | OUTPATIENT
Start: 2022-07-29 | End: 2022-08-04

## 2022-07-29 RX ORDER — CHOLECALCIFEROL (VITAMIN D3) 25 MCG
1000 TABLET ORAL DAILY
Status: DISCONTINUED | OUTPATIENT
Start: 2022-07-30 | End: 2022-08-12 | Stop reason: HOSPADM

## 2022-07-29 RX ORDER — PROMETHAZINE HYDROCHLORIDE 25 MG/1
25 TABLET ORAL EVERY 6 HOURS PRN
Status: DISCONTINUED | OUTPATIENT
Start: 2022-07-30 | End: 2022-08-12 | Stop reason: HOSPADM

## 2022-07-29 RX ORDER — KETOROLAC TROMETHAMINE 30 MG/ML
15 INJECTION, SOLUTION INTRAMUSCULAR; INTRAVENOUS EVERY 6 HOURS PRN
Status: ACTIVE | OUTPATIENT
Start: 2022-07-30 | End: 2022-08-02

## 2022-07-29 RX ORDER — IBUPROFEN 200 MG
24 TABLET ORAL
Status: DISCONTINUED | OUTPATIENT
Start: 2022-07-30 | End: 2022-08-08

## 2022-07-29 RX ORDER — ONDANSETRON 8 MG/1
8 TABLET, ORALLY DISINTEGRATING ORAL EVERY 8 HOURS PRN
Status: DISCONTINUED | OUTPATIENT
Start: 2022-07-30 | End: 2022-08-03 | Stop reason: SDUPTHER

## 2022-07-29 RX ORDER — ASCORBIC ACID 500 MG
500 TABLET ORAL 2 TIMES DAILY
Status: DISCONTINUED | OUTPATIENT
Start: 2022-07-29 | End: 2022-08-12 | Stop reason: HOSPADM

## 2022-07-29 RX ORDER — MAG HYDROX/ALUMINUM HYD/SIMETH 200-200-20
30 SUSPENSION, ORAL (FINAL DOSE FORM) ORAL 4 TIMES DAILY PRN
Status: DISCONTINUED | OUTPATIENT
Start: 2022-07-30 | End: 2022-08-03 | Stop reason: SDUPTHER

## 2022-07-29 RX ORDER — GLUCAGON 1 MG
1 KIT INJECTION
Status: DISCONTINUED | OUTPATIENT
Start: 2022-07-30 | End: 2022-08-08

## 2022-07-29 RX ORDER — SODIUM CHLORIDE 0.9 % (FLUSH) 0.9 %
2 SYRINGE (ML) INJECTION EVERY 6 HOURS PRN
Status: DISCONTINUED | OUTPATIENT
Start: 2022-07-30 | End: 2022-08-12 | Stop reason: HOSPADM

## 2022-07-29 RX ORDER — METHOCARBAMOL 750 MG/1
750 TABLET, FILM COATED ORAL 3 TIMES DAILY PRN
Status: DISCONTINUED | OUTPATIENT
Start: 2022-07-30 | End: 2022-08-12 | Stop reason: HOSPADM

## 2022-07-29 RX ORDER — IBUPROFEN 200 MG
16 TABLET ORAL
Status: DISCONTINUED | OUTPATIENT
Start: 2022-07-30 | End: 2022-08-08

## 2022-07-29 RX ORDER — ASPIRIN 81 MG/1
81 TABLET ORAL DAILY
Status: DISCONTINUED | OUTPATIENT
Start: 2022-07-30 | End: 2022-08-12 | Stop reason: HOSPADM

## 2022-07-29 RX ORDER — TALC
6 POWDER (GRAM) TOPICAL NIGHTLY PRN
Status: DISCONTINUED | OUTPATIENT
Start: 2022-07-30 | End: 2022-08-12 | Stop reason: HOSPADM

## 2022-07-29 RX ORDER — LOSARTAN POTASSIUM 50 MG/1
50 TABLET ORAL DAILY
Refills: 3 | Status: DISCONTINUED | OUTPATIENT
Start: 2022-07-30 | End: 2022-08-12 | Stop reason: HOSPADM

## 2022-07-29 RX ORDER — HEPARIN SODIUM 5000 [USP'U]/ML
5000 INJECTION, SOLUTION INTRAVENOUS; SUBCUTANEOUS EVERY 8 HOURS
Status: DISCONTINUED | OUTPATIENT
Start: 2022-07-30 | End: 2022-07-29

## 2022-07-29 RX ORDER — SIMETHICONE 80 MG
1 TABLET,CHEWABLE ORAL 4 TIMES DAILY PRN
Status: DISCONTINUED | OUTPATIENT
Start: 2022-07-30 | End: 2022-08-12 | Stop reason: HOSPADM

## 2022-07-29 RX ORDER — OXYCODONE AND ACETAMINOPHEN 10; 325 MG/1; MG/1
1 TABLET ORAL EVERY 6 HOURS PRN
Status: DISCONTINUED | OUTPATIENT
Start: 2022-07-30 | End: 2022-08-03 | Stop reason: SDUPTHER

## 2022-07-29 RX ORDER — ALBUTEROL SULFATE 90 UG/1
2 AEROSOL, METERED RESPIRATORY (INHALATION) EVERY 6 HOURS
Status: DISCONTINUED | OUTPATIENT
Start: 2022-07-30 | End: 2022-08-12 | Stop reason: HOSPADM

## 2022-07-29 RX ORDER — POLYETHYLENE GLYCOL 3350 17 G/17G
17 POWDER, FOR SOLUTION ORAL DAILY PRN
Status: DISCONTINUED | OUTPATIENT
Start: 2022-07-30 | End: 2022-08-05

## 2022-07-29 RX ORDER — INSULIN ASPART 100 [IU]/ML
0-5 INJECTION, SOLUTION INTRAVENOUS; SUBCUTANEOUS
Status: DISCONTINUED | OUTPATIENT
Start: 2022-07-30 | End: 2022-08-03

## 2022-07-29 RX ADMIN — SODIUM CHLORIDE 1000 ML: 0.9 INJECTION, SOLUTION INTRAVENOUS at 04:07

## 2022-07-29 NOTE — TELEPHONE ENCOUNTER
"    Reason for Disposition   Incision gaping open and length of opening > 1/2 inch (1 cm) and over 2 days since wound re-opened    Additional Information   Negative: Major abdominal surgical incision and wound gaping open with visible internal organs   Negative: Sounds like a life-threatening emergency to the triager   Negative: Bleeding from incision and won't stop after 10 minutes of direct pressure   Negative: Widespread rash and bright red, sunburn-like   Negative: Severe pain in the incision   Negative: Incision gaping open and < 2 days (48 hours) since wound re-opened   Negative: Incision gaping open and length of opening > 2 inches (5 cm)   Negative: Patient sounds very sick or weak to the triager   Negative: Sounds like a serious complication to the triager   Negative: Fever > 100.4 F (38.0 C)   Negative: Incision looks infected (spreading redness, pain)   Negative: Red streak runs from the incision and longer than 1 inch (2.5 cm)   Negative: Pus or bad-smelling fluid draining from incision   Negative: Dressing soaked with blood or body fluid (e.g., drainage)   Negative: Raised bruise and size > 2 inches (5 cm) and expanding   Negative: Caller has URGENT question and triager unable to answer question   Negative: Incision gaping open and length of opening > 1/4 inch (6 mm) and on the face and over 2 days since wound re-opened    Protocols used: POST-OP INCISION SYMPTOMS AND [id number]    Pt stated he had surgery on his back my Dr. Zepeda a month ago. Stated he was hospitalized eight days after the surgery because he developed an infection and a hematoma. Pt stated his incision line started to open, so he went in and it they restiched the area of the opening.    Pt stated he now has another opening in the incision that may be two inches and leaks clear blood tinged fluid. Stated a part of his incision looks like it has small amounts of "yellow mayonnaise" Stated he has not been able to " reach the Dr. Zepeda today.     Stated he is concerned about the wound not healing because he is a diabetic. Pt advised a message will be sent to the Provider and office staff to contact him within 1 hour. Advised to go to the ED if he does not get a call. Pt verbalized understanding.

## 2022-07-29 NOTE — FIRST PROVIDER EVALUATION
Medical screening exam completed.  I have conducted a focused provider triage encounter, findings are as follows:    Brief history of present illness:  Sent by  for back pain    There were no vitals filed for this visit.    Pertinent physical exam:  nad    Brief workup plan:  Labs, imaging     Preliminary workup initiated; this workup will be continued and followed by the physician or advanced practice provider that is assigned to the patient when roomed.

## 2022-07-29 NOTE — TELEPHONE ENCOUNTER
Pt instructed to report to the ED today after reviewing his message on the portal.  He has been in [place] for daughter's wedding and missed postop appointment earlier this week.    I called the ER to give a report.   They will order MRI of lumbar spine with and w/out contrast.    [facility]  Neurosurgery

## 2022-07-29 NOTE — ED PROVIDER NOTES
SCRIBE #1 NOTE: I, Vania Hackett, am scribing for, and in the presence of, Reynaldo Brooks MD. I have scribed the entire note.       History     Chief Complaint   Patient presents with    Back Pain     Recent back surgery with complications. Flew to [place] and back last night and having back pain and states the stiches arent healing properly      Review of patient's allergies indicates:  No Known Allergies      History of Present Illness     HPI    7/29/2022, 2:01 PM  History obtained from the patient      History of Present Illness: Friday ROGELIO Blake is a 66 y.o. male patient with a PMHx of DM, HTN, hypercalcemia, and hyperlipidemia who presents to the Emergency Department for evaluation of post-op complication.  Pt reports having back surgery and feels his incision is not healing properly / leaking Pt denies any back pain, in fact stating it has dramatically improved. Denies any headache. Pt flew back from [place] last night where he went for his daughter's wedding. Symptoms are constant and moderate in severity. No mitigating or exacerbating factors reported. Patient denies any HA, fever, chills, N/V, and all other sxs at this time. No further complaints or concerns at this time.       Arrival mode: Personal vehicle     PCP: Sid Elizabeth MD        Past Medical History:  Past Medical History:   Diagnosis Date    Bilateral carpal tunnel syndrome 2/25/2022    moderate demyelinating bilaterally    Carpal tunnel syndrome     Diabetes mellitus without complication     Fever 6/30/2022    Gram-positive bacteremia 7/2/2022    History of colon polyps     Hypercalcemia 3/23/2021    Hyperlipidemia associated with type 2 diabetes mellitus     Hypertension associated with diabetes     Left carpal tunnel syndrome 3/23/2021    Low back pain 9/28/2021    Lumbar disc disease with radiculopathy     Morbid obesity with BMI of 40.0-44.9, adult     Postoperative hematoma involving nervous system following nervous  system procedure 6/28/2022    S/P parathyroidectomy 7/29/2021    Vitamin D deficiency        Past Surgical History:  Past Surgical History:   Procedure Laterality Date    CARPAL TUNNEL RELEASE Left 4/1/2021    Procedure: RELEASE, CARPAL TUNNEL;  Surgeon: Yoandy David MD;  Location: [facility] OR;  Service: Orthopedics;  Laterality: Left;    HERNIA REPAIR      INCISION AND DRAINAGE OF HEMATOMA N/A 6/28/2022    Procedure: INCISION AND DRAINAGE, HEMATOMA;  Surgeon: Shaggy Zepeda MD;  Location: [facility] OR;  Service: Neurosurgery;  Laterality: N/A;    LUMBAR LAMINECTOMY WITH DISCECTOMY Left 6/22/2022    Procedure: LAMINECTOMY, SPINE, LUMBAR, WITH DISCECTOMY;  Surgeon: Shaggy Zepeda MD;  Location: [facility] OR;  Service: Neurosurgery;  Laterality: Left;  minimally invasive L2-3 discectomy and decompression     LUMBAR LAMINECTOMY WITH DISCECTOMY N/A 6/28/2022    Procedure: LAMINECTOMY, SPINE, LUMBAR, WITH DISCECTOMY;  Surgeon: Shaggy Zepeda MD;  Location: [facility] OR;  Service: Neurosurgery;  Laterality: N/A;    PARATHYROIDECTOMY Bilateral 7/27/2021    Procedure: PARATHYROIDECTOMY;  Surgeon: Tha Dial MD;  Location: [facility] OR;  Service: ENT;  Laterality: Bilateral;  with medialstinal exploration    SELECTIVE INJECTION OF ANESTHETIC AGENT AROUND LUMBAR SPINAL NERVE ROOT BY TRANSFORAMINAL APPROACH Left 3/23/2022    Procedure: BLOCK, SPINAL NERVE ROOT, LUMBAR, SELECTIVE, TRANSFORAMINAL APPROACH Left L2-3, L3-4 RN IV sedation;  Surgeon: Jay Hodges MD;  [facility];  Service: Pain Management;  Laterality: Left;    STERNOTOMY N/A 7/27/2021    Procedure: STERNOTOMY;  Surgeon: Tha Dial MD;  Location: [facility] OR;  Service: ENT;  Laterality: N/A;    ULNAR NERVE TRANSPOSITION Left 4/1/2021    Procedure: TRANSPOSITION, NERVE, ULNAR;  Surgeon: Yoandy David MD;  Location: [facility] OR;  Service: Orthopedics;  Laterality: Left;    ULNAR TUNNEL RELEASE Left 4/1/2021    Procedure: RELEASE, CUBITAL TUNNEL;   Surgeon: Yoandy David MD;  Location: [facility];  Service: Orthopedics;  Laterality: Left;    UMBILICAL HERNIA REPAIR           Family History:  Family History   Problem Relation Age of Onset    Asthma Mother     Hypertension Father     Diabetes Mellitus Father     Prostate cancer Brother        Social History:  Social History     Tobacco Use    Smoking status: Never Smoker    Smokeless tobacco: Never Used   Substance and Sexual Activity    Alcohol use: Never    Drug use: Never    Sexual activity: Yes     Partners: Female        Review of Systems     Review of Systems   Constitutional: Negative for fever.   HENT: Negative for sore throat.    Respiratory: Negative for shortness of breath.    Cardiovascular: Negative for chest pain.   Gastrointestinal: Negative for nausea.   Genitourinary: Negative for dysuria.   Musculoskeletal: Negative for back pain.   Skin: Negative for rash.   Neurological: Negative for weakness and headaches.        (+) incision drainage    Hematological: Does not bruise/bleed easily.   All other systems reviewed and are negative.       Physical Exam     Initial Vitals   BP Pulse Resp Temp SpO2   07/29/22 1344 07/29/22 1343 07/29/22 1343 07/29/22 1343 07/29/22 1343   (!) 157/85 69 18 98.9 °F (37.2 °C) 99 %      MAP       --                 Physical Exam  Nursing Notes and Vital Signs Reviewed.  Constitutional: Patient is in no acute distress. Well-developed and well-nourished.  Head: Atraumatic. Normocephalic.  Eyes: PERRL. EOM intact. Conjunctivae are not pale. No scleral icterus.  ENT: Mucous membranes are moist.    Neck: Supple. Full ROM.   Cardiovascular: Regular rate. Regular rhythm. No murmurs, rubs, or gallops. Distal pulses are 2+ and symmetric.  Pulmonary/Chest: No respiratory distress. Clear to auscultation bilaterally. No wheezing or rales.  Abdominal: Soft and non-distended.  There is no tenderness.  No rebound, guarding, or rigidity.   Musculoskeletal: Moves all  extremities. No obvious deformities. No edema.   Skin: Warm and dry.  Neurological:  Alert, awake, and appropriate.  Normal speech.  No acute focal neurological deficits are appreciated. Lumbar post-op incision appears chronically poorly healing (pt is diabetic) w/ mild amount of clear fluid drainage from inferior aspect of wound. No purulence or erythema.  Psychiatric: Normal affect. Good eye contact. Appropriate in content.     ED Course   Procedures  ED Vital Signs:  Vitals:    07/30/22 0024 07/30/22 0100 07/30/22 0442 07/30/22 0530   BP:   124/66    Pulse: 70 82 60 65   Resp: 18  18    Temp:   98.4 °F (36.9 °C)    TempSrc:   Oral    SpO2:   (!) 94%    Weight:       Height:        07/30/22 0714 07/30/22 0834 07/30/22 0952 07/30/22 1103   BP: 135/86   132/77   Pulse: 61 85 71 71   Resp: 20 (!) 21  20   Temp: 97.8 °F (36.6 °C)   98.2 °F (36.8 °C)   TempSrc: Oral   Oral   SpO2: 95% 95%  97%   Weight:       Height:        07/30/22 1251 07/30/22 1343 07/30/22 1552 07/30/22 1618   BP:    117/70   Pulse: 71 71 79 67   Resp: (!) 21   20   Temp:    98.2 °F (36.8 °C)   TempSrc:    Oral   SpO2:    97%   Weight:       Height:        07/30/22 1738 07/30/22 2009 07/30/22 2017   BP:   122/64   Pulse: 68 66 68   Resp:  16 18   Temp:   98 °F (36.7 °C)   TempSrc:   Oral   SpO2:  99% 98%   Weight:      Height:          Abnormal Lab Results:  None     All Lab Results:  Results for orders placed or performed during the hospital encounter of 07/29/22   CBC auto differential   Result Value Ref Range    WBC 5.18 3.90 - 12.70 K/uL    RBC 4.17 (L) 4.60 - 6.20 M/uL    Hemoglobin 11.6 (L) 14.0 - 18.0 g/dL    Hematocrit 36.9 (L) 40.0 - 54.0 %    MCV 89 82 - 98 fL    MCH 27.8 27.0 - 31.0 pg    MCHC 31.4 (L) 32.0 - 36.0 g/dL    RDW 14.4 11.5 - 14.5 %    Platelets 240 150 - 450 K/uL    MPV 10.8 9.2 - 12.9 fL    Immature Granulocytes 0.2 0.0 - 0.5 %    Gran # (ANC) 2.1 1.8 - 7.7 K/uL    Immature Grans (Abs) 0.01 0.00 - 0.04 K/uL    Lymph # 2.5  1.0 - 4.8 K/uL    Mono # 0.4 0.3 - 1.0 K/uL    Eos # 0.2 0.0 - 0.5 K/uL    Baso # 0.01 0.00 - 0.20 K/uL    nRBC 0 0 /100 WBC    Gran % 40.3 38.0 - 73.0 %    Lymph % 47.9 18.0 - 48.0 %    Mono % 7.5 4.0 - 15.0 %    Eosinophil % 3.9 0.0 - 8.0 %    Basophil % 0.2 0.0 - 1.9 %    Differential Method Automated    Comprehensive metabolic panel   Result Value Ref Range    Sodium 140 136 - 145 mmol/L    Potassium 4.2 3.5 - 5.1 mmol/L    Chloride 107 95 - 110 mmol/L    CO2 22 (L) 23 - 29 mmol/L    Glucose 159 (H) 70 - 110 mg/dL    BUN 17 8 - 23 mg/dL    Creatinine 0.8 0.5 - 1.4 mg/dL    Calcium 9.1 8.7 - 10.5 mg/dL    Total Protein 6.9 6.0 - 8.4 g/dL    Albumin 3.6 3.5 - 5.2 g/dL    Total Bilirubin 0.3 0.1 - 1.0 mg/dL    Alkaline Phosphatase 93 55 - 135 U/L    AST 18 10 - 40 U/L    ALT 15 10 - 44 U/L    Anion Gap 11 8 - 16 mmol/L    eGFR if African American >60 >60 mL/min/1.73 m^2    eGFR if non African American >60 >60 mL/min/1.73 m^2   COVID-19 Rapid Screening   Result Value Ref Range    SARS-CoV-2 RNA, Amplification, Qual Positive (A) Negative   PT/INR   Result Value Ref Range    Prothrombin Time 11.2 9.0 - 12.5 sec    INR 1.0 0.8 - 1.2   PTT   Result Value Ref Range    aPTT 25.6 21.0 - 32.0 sec   Sedimentation rate   Result Value Ref Range    Sed Rate 55 (H) 0 - 10 mm/Hr   CK   Result Value Ref Range     20 - 200 U/L   Lactate Dehydrogenase   Result Value Ref Range     110 - 260 U/L   Ferritin   Result Value Ref Range    Ferritin 154 20.0 - 300.0 ng/mL   Troponin I   Result Value Ref Range    Troponin I <0.006 0.000 - 0.026 ng/mL   D-Dimer, Quantitative   Result Value Ref Range    D-Dimer 1.22 (H) <0.50 mg/L FEU   Brain natriuretic peptide   Result Value Ref Range    BNP <10 0 - 99 pg/mL   Lactic Acid, Plasma   Result Value Ref Range    Lactate (Lactic Acid) 1.4 0.5 - 2.2 mmol/L   Basic Metabolic Panel (BMP)   Result Value Ref Range    Sodium 140 136 - 145 mmol/L    Potassium 4.4 3.5 - 5.1 mmol/L    Chloride  110 95 - 110 mmol/L    CO2 23 23 - 29 mmol/L    Glucose 176 (H) 70 - 110 mg/dL    BUN 13 8 - 23 mg/dL    Creatinine 0.7 0.5 - 1.4 mg/dL    Calcium 8.4 (L) 8.7 - 10.5 mg/dL    Anion Gap 7 (L) 8 - 16 mmol/L    eGFR if African American >60 >60 mL/min/1.73 m^2    eGFR if non African American >60 >60 mL/min/1.73 m^2   CBC with Automated Differential   Result Value Ref Range    WBC 5.33 3.90 - 12.70 K/uL    RBC 4.22 (L) 4.60 - 6.20 M/uL    Hemoglobin 11.3 (L) 14.0 - 18.0 g/dL    Hematocrit 37.6 (L) 40.0 - 54.0 %    MCV 89 82 - 98 fL    MCH 26.8 (L) 27.0 - 31.0 pg    MCHC 30.1 (L) 32.0 - 36.0 g/dL    RDW 14.8 (H) 11.5 - 14.5 %    Platelets 243 150 - 450 K/uL    MPV 11.3 9.2 - 12.9 fL    Immature Granulocytes 0.2 0.0 - 0.5 %    Gran # (ANC) 2.3 1.8 - 7.7 K/uL    Immature Grans (Abs) 0.01 0.00 - 0.04 K/uL    Lymph # 2.5 1.0 - 4.8 K/uL    Mono # 0.4 0.3 - 1.0 K/uL    Eos # 0.2 0.0 - 0.5 K/uL    Baso # 0.02 0.00 - 0.20 K/uL    nRBC 0 0 /100 WBC    Gran % 42.1 38.0 - 73.0 %    Lymph % 46.0 18.0 - 48.0 %    Mono % 7.7 4.0 - 15.0 %    Eosinophil % 3.6 0.0 - 8.0 %    Basophil % 0.4 0.0 - 1.9 %    Differential Method Automated    C-reactive protein   Result Value Ref Range    CRP 2.6 0.0 - 8.2 mg/L   POCT glucose   Result Value Ref Range    POCT Glucose 275 (H) 70 - 110 mg/dL   POCT glucose   Result Value Ref Range    POCT Glucose 197 (H) 70 - 110 mg/dL   POCT glucose   Result Value Ref Range    POCT Glucose 165 (H) 70 - 110 mg/dL   POCT glucose   Result Value Ref Range    POCT Glucose 148 (H) 70 - 110 mg/dL   POCT glucose   Result Value Ref Range    POCT Glucose 138 (H) 70 - 110 mg/dL           Imaging Results:  Imaging Results           MRI Lumbar Spine W WO Cont (Final result)  Result time 07/30/22 19:53:25    Final result by Dale Cotton MD (07/30/22 19:53:25)                 Impression:      Findings strongly concerning for discitis osteomyelitis at L2-3 as detailed above.  Probable epidural phlegmon or abscess extending  along the left aspect of the thecal sac and into the posterior soft tissues with the previously noted fluid collection now demonstrating significantly increased peripheral enhancement concerning for superimposed infection.  Neuro surgical consultation would be advised if not previously obtained.    This report was flagged in Epic as abnormal.      Electronically signed by: Dale Cotton  Date:    07/30/2022  Time:    19:53             Narrative:    EXAMINATION:  MRI LUMBAR SPINE W WO CONTRAST    CLINICAL HISTORY:  lower back pain;    TECHNIQUE:  Multiplanar, multisequence MR images were acquired from the thoracolumbar junction to the sacrum without the administration of contrast.    COMPARISON:  Multiple priors.    FINDINGS:  Patient status post hemilaminectomy and discectomy at L2-3 on the left.  Associated with the surgical area of there is fluid within the disc space, bone marrow edema, and abnormal postcontrast enhancement of the in plates about the disc space and disc which extends along the left aspect of the spinal canal and into the posterior soft tissues concerning for discitis osteomyelitis with epidural abscess extending into the posterior soft tissues.  The fluid collection in the posterior soft tissues is noted both within the laminectomy bed measuring a proximally 3.1 x 2.7 x 2.3 cm in this area, and with extension inferiorly along the L3 lamina and L1 inferior lamina, and also into the posterior soft tissues.  The fluid collection is significantly serpiginous and difficult to accurately measure.  The component in the posterior soft tissues measures on the order of 9.2 x 3.8 by 2.5 cm and also demonstrates some postcontrast enhancement concerning for infection.  Abscess is the diagnosis of  exclusion.  The fluid collection was present on the prior exam of 07/01/2022, but the surrounding peripheral enhancement has significantly increased in comparison to the prior exam, and the new disc space  abnormalities raise concern for superimposed infection.    Alignment: Normal.    Vertebrae: Aside from the surgical levels, there is normal marrow signal. No fracture.    Cord: Normal.  Conus terminates at L1    Degenerative findings:    At the nonsurgical levels the degenerative changes are stable from the prior.    The epidural collection extending such as seen on series 6, image 16 and series 9, image 17 now produces moderate spinal canal stenosis.    Paraspinal muscles & soft tissues: As above.                                          The Emergency Provider reviewed the vital signs and test results, which are outlined above.     ED Discussion   6:44 PM: Discussed pt's case with Dr. Amos (spine surgeon on call) who recommends admit for MRI in AM (MRI is currently broken but should be repaired by morning). Transfer to [facility] for MRI unable to be done before closure time at their facility due to other stacked up MRIs and transports for same reason.     7:59 PM: Discussed case with Maggie Mejia NP (Hospital Medicine). Dr. Hodge agrees with current care and management of pt and accepts admission.   Admitting Service: Hospital Medicine  Admitting Physician: Dr. Hodge  Admit to: Obs               Medical Decision Making:   Clinical Tests:   Lab Tests: Ordered and Reviewed           ED Medication(s):  Medications   aspirin EC tablet 81 mg (81 mg Oral Given 7/30/22 0843)   atorvastatin tablet 10 mg (10 mg Oral Given 7/30/22 2223)   docusate sodium capsule 100 mg (100 mg Oral Given 7/30/22 2223)   gabapentin capsule 300 mg (300 mg Oral Given 7/30/22 2223)   losartan tablet 50 mg (50 mg Oral Given 7/30/22 0843)   methocarbamoL tablet 750 mg (has no administration in time range)   oxyCODONE-acetaminophen  mg per tablet 1 tablet (has no administration in time range)   sodium chloride 0.9% flush 10 mL ( Intravenous Canceled Entry 7/30/22 2200)   melatonin tablet 6 mg (has no administration in time range)    acetaminophen tablet 650 mg (has no administration in time range)   naloxone 0.4 mg/mL injection 0.02 mg (has no administration in time range)   glucose chewable tablet 16 g (has no administration in time range)   glucose chewable tablet 24 g (has no administration in time range)   glucagon (human recombinant) injection 1 mg (has no administration in time range)   dextrose 10% bolus 125 mL (has no administration in time range)   dextrose 10% bolus 250 mL (has no administration in time range)   ketorolac injection 15 mg (has no administration in time range)   ondansetron disintegrating tablet 8 mg (has no administration in time range)   promethazine tablet 25 mg (has no administration in time range)   insulin aspart U-100 pen 0-5 Units (1 Units Subcutaneous Given 7/30/22 0029)   aluminum-magnesium hydroxide-simethicone 200-200-20 mg/5 mL suspension 30 mL (has no administration in time range)   simethicone chewable tablet 80 mg (has no administration in time range)   sodium chloride 0.9% flush 2 mL (has no administration in time range)   loperamide capsule 2 mg (has no administration in time range)   polyethylene glycol packet 17 g (has no administration in time range)   albuterol inhaler 2 puff (2 puffs Inhalation Given 7/30/22 2009)   dextromethorphan-guaiFENesin  mg/5 ml liquid 10 mL (has no administration in time range)   melatonin tablet 6 mg (6 mg Oral Not Given 7/30/22 2223)   ascorbic acid (vitamin C) tablet 500 mg (500 mg Oral Given 7/30/22 2223)   vitamin D 1000 units tablet 1,000 Units (1,000 Units Oral Given 7/30/22 0843)   multivitamin tablet (1 tablet Oral Given 7/30/22 0843)   enoxaparin injection 100 mg (100 mg Subcutaneous Given 7/30/22 2223)   DAPTOmycin (CUBICIN) 820 mg in sodium chloride 0.9% 50 mL IVPB (0 mg Intravenous Stopped 7/30/22 1148)   cefepime in dextrose 5 % IVPB 2 g (0 g Intravenous Stopped 7/30/22 1746)   metroNIDAZOLE tablet 500 mg (500 mg Oral Given 7/30/22 2223)    minocycline capsule 100 mg (100 mg Oral Given 7/30/22 2223)   sodium chloride 0.9% bolus 1,000 mL (0 mLs Intravenous Stopped 7/29/22 2019)   gadobutroL (GADAVIST) injection 10 mL (10 mLs Intravenous Given 7/30/22 1936)       Current Discharge Medication List                  Scribe Attestation:   Scribe #1: I performed the above scribed service and the documentation accurately describes the services I performed. I attest to the accuracy of the note.     Attending:   Physician Attestation Statement for Scribe #1: I, Reynaldo Brooks MD, personally performed the services described in this documentation, as scribed by Vania Hackett, in my presence, and it is both accurate and complete.           Clinical Impression       ICD-10-CM ICD-9-CM   1. Postoperative CSF leak  G97.82 997.09    G96.00    2. Chest pain  R07.9 786.50   3. Decreased strength of trunk and back  R29.898 729.89   4. Wound dehiscence  T81.30XA 998.30       Disposition:   Disposition: Admitted  Condition: Fair         Reynaldo Brooks MD  07/30/22 6894

## 2022-07-30 PROBLEM — G97.82 POSTOPERATIVE CSF LEAK: Status: ACTIVE | Noted: 2022-07-30

## 2022-07-30 PROBLEM — G96.00 POSTOPERATIVE CSF LEAK: Status: ACTIVE | Noted: 2022-07-30

## 2022-07-30 LAB
ANION GAP SERPL CALC-SCNC: 7 MMOL/L (ref 8–16)
APTT BLDCRRT: 25.6 SEC (ref 21–32)
BASOPHILS # BLD AUTO: 0.02 K/UL (ref 0–0.2)
BASOPHILS NFR BLD: 0.4 % (ref 0–1.9)
BNP SERPL-MCNC: <10 PG/ML (ref 0–99)
BUN SERPL-MCNC: 13 MG/DL (ref 8–23)
CALCIUM SERPL-MCNC: 8.4 MG/DL (ref 8.7–10.5)
CHLORIDE SERPL-SCNC: 110 MMOL/L (ref 95–110)
CK SERPL-CCNC: 165 U/L (ref 20–200)
CO2 SERPL-SCNC: 23 MMOL/L (ref 23–29)
CREAT SERPL-MCNC: 0.7 MG/DL (ref 0.5–1.4)
CRP SERPL-MCNC: 2.6 MG/L (ref 0–8.2)
D DIMER PPP IA.FEU-MCNC: 1.22 MG/L FEU
DIFFERENTIAL METHOD: ABNORMAL
EOSINOPHIL # BLD AUTO: 0.2 K/UL (ref 0–0.5)
EOSINOPHIL NFR BLD: 3.6 % (ref 0–8)
ERYTHROCYTE [DISTWIDTH] IN BLOOD BY AUTOMATED COUNT: 14.8 % (ref 11.5–14.5)
ERYTHROCYTE [SEDIMENTATION RATE] IN BLOOD BY WESTERGREN METHOD: 55 MM/HR (ref 0–10)
EST. GFR  (AFRICAN AMERICAN): >60 ML/MIN/1.73 M^2
EST. GFR  (NON AFRICAN AMERICAN): >60 ML/MIN/1.73 M^2
FERRITIN SERPL-MCNC: 154 NG/ML (ref 20–300)
GLUCOSE SERPL-MCNC: 176 MG/DL (ref 70–110)
HCT VFR BLD AUTO: 37.6 % (ref 40–54)
HGB BLD-MCNC: 11.3 G/DL (ref 14–18)
IMM GRANULOCYTES # BLD AUTO: 0.01 K/UL (ref 0–0.04)
IMM GRANULOCYTES NFR BLD AUTO: 0.2 % (ref 0–0.5)
INR PPP: 1 (ref 0.8–1.2)
LACTATE SERPL-SCNC: 1.4 MMOL/L (ref 0.5–2.2)
LDH SERPL L TO P-CCNC: 170 U/L (ref 110–260)
LYMPHOCYTES # BLD AUTO: 2.5 K/UL (ref 1–4.8)
LYMPHOCYTES NFR BLD: 46 % (ref 18–48)
MCH RBC QN AUTO: 26.8 PG (ref 27–31)
MCHC RBC AUTO-ENTMCNC: 30.1 G/DL (ref 32–36)
MCV RBC AUTO: 89 FL (ref 82–98)
MONOCYTES # BLD AUTO: 0.4 K/UL (ref 0.3–1)
MONOCYTES NFR BLD: 7.7 % (ref 4–15)
NEUTROPHILS # BLD AUTO: 2.3 K/UL (ref 1.8–7.7)
NEUTROPHILS NFR BLD: 42.1 % (ref 38–73)
NRBC BLD-RTO: 0 /100 WBC
PLATELET # BLD AUTO: 243 K/UL (ref 150–450)
PMV BLD AUTO: 11.3 FL (ref 9.2–12.9)
POCT GLUCOSE: 138 MG/DL (ref 70–110)
POCT GLUCOSE: 148 MG/DL (ref 70–110)
POCT GLUCOSE: 165 MG/DL (ref 70–110)
POCT GLUCOSE: 197 MG/DL (ref 70–110)
POCT GLUCOSE: 275 MG/DL (ref 70–110)
POTASSIUM SERPL-SCNC: 4.4 MMOL/L (ref 3.5–5.1)
PROTHROMBIN TIME: 11.2 SEC (ref 9–12.5)
RBC # BLD AUTO: 4.22 M/UL (ref 4.6–6.2)
SODIUM SERPL-SCNC: 140 MMOL/L (ref 136–145)
TROPONIN I SERPL DL<=0.01 NG/ML-MCNC: <0.006 NG/ML (ref 0–0.03)
WBC # BLD AUTO: 5.33 K/UL (ref 3.9–12.7)

## 2022-07-30 PROCEDURE — 63600175 PHARM REV CODE 636 W HCPCS: Performed by: INTERNAL MEDICINE

## 2022-07-30 PROCEDURE — 25000242 PHARM REV CODE 250 ALT 637 W/ HCPCS: Performed by: NURSE PRACTITIONER

## 2022-07-30 PROCEDURE — 87070 CULTURE OTHR SPECIMN AEROBIC: CPT | Performed by: NURSE PRACTITIONER

## 2022-07-30 PROCEDURE — G0378 HOSPITAL OBSERVATION PER HR: HCPCS

## 2022-07-30 PROCEDURE — 97165 OT EVAL LOW COMPLEX 30 MIN: CPT

## 2022-07-30 PROCEDURE — 36415 COLL VENOUS BLD VENIPUNCTURE: CPT | Performed by: INTERNAL MEDICINE

## 2022-07-30 PROCEDURE — 99204 OFFICE O/P NEW MOD 45 MIN: CPT | Mod: NSCH,,, | Performed by: INTERNAL MEDICINE

## 2022-07-30 PROCEDURE — 94640 AIRWAY INHALATION TREATMENT: CPT | Mod: XB

## 2022-07-30 PROCEDURE — A4216 STERILE WATER/SALINE, 10 ML: HCPCS | Performed by: NURSE PRACTITIONER

## 2022-07-30 PROCEDURE — 80048 BASIC METABOLIC PNL TOTAL CA: CPT | Performed by: NURSE PRACTITIONER

## 2022-07-30 PROCEDURE — 85651 RBC SED RATE NONAUTOMATED: CPT | Performed by: NURSE PRACTITIONER

## 2022-07-30 PROCEDURE — 85379 FIBRIN DEGRADATION QUANT: CPT | Performed by: NURSE PRACTITIONER

## 2022-07-30 PROCEDURE — 25500020 PHARM REV CODE 255: Performed by: EMERGENCY MEDICINE

## 2022-07-30 PROCEDURE — 83615 LACTATE (LD) (LDH) ENZYME: CPT | Performed by: NURSE PRACTITIONER

## 2022-07-30 PROCEDURE — 83605 ASSAY OF LACTIC ACID: CPT | Performed by: NURSE PRACTITIONER

## 2022-07-30 PROCEDURE — 63600175 PHARM REV CODE 636 W HCPCS: Performed by: NURSE PRACTITIONER

## 2022-07-30 PROCEDURE — A9585 GADOBUTROL INJECTION: HCPCS | Performed by: EMERGENCY MEDICINE

## 2022-07-30 PROCEDURE — 82728 ASSAY OF FERRITIN: CPT | Performed by: NURSE PRACTITIONER

## 2022-07-30 PROCEDURE — 97530 THERAPEUTIC ACTIVITIES: CPT

## 2022-07-30 PROCEDURE — 99204 PR OFFICE/OUTPT VISIT, NEW, LEVL IV, 45-59 MIN: ICD-10-PCS | Mod: NSCH,,, | Performed by: INTERNAL MEDICINE

## 2022-07-30 PROCEDURE — 85025 COMPLETE CBC W/AUTO DIFF WBC: CPT | Performed by: NURSE PRACTITIONER

## 2022-07-30 PROCEDURE — 94761 N-INVAS EAR/PLS OXIMETRY MLT: CPT

## 2022-07-30 PROCEDURE — 87077 CULTURE AEROBIC IDENTIFY: CPT | Performed by: NURSE PRACTITIONER

## 2022-07-30 PROCEDURE — 36415 COLL VENOUS BLD VENIPUNCTURE: CPT | Performed by: NURSE PRACTITIONER

## 2022-07-30 PROCEDURE — 87186 SC STD MICRODIL/AGAR DIL: CPT | Performed by: NURSE PRACTITIONER

## 2022-07-30 PROCEDURE — 82550 ASSAY OF CK (CPK): CPT | Performed by: NURSE PRACTITIONER

## 2022-07-30 PROCEDURE — 94640 AIRWAY INHALATION TREATMENT: CPT

## 2022-07-30 PROCEDURE — 86140 C-REACTIVE PROTEIN: CPT | Performed by: INTERNAL MEDICINE

## 2022-07-30 PROCEDURE — 97162 PT EVAL MOD COMPLEX 30 MIN: CPT

## 2022-07-30 PROCEDURE — 85730 THROMBOPLASTIN TIME PARTIAL: CPT | Performed by: NURSE PRACTITIONER

## 2022-07-30 PROCEDURE — 84484 ASSAY OF TROPONIN QUANT: CPT | Performed by: NURSE PRACTITIONER

## 2022-07-30 PROCEDURE — 25000003 PHARM REV CODE 250: Performed by: NURSE PRACTITIONER

## 2022-07-30 PROCEDURE — 85610 PROTHROMBIN TIME: CPT | Performed by: NURSE PRACTITIONER

## 2022-07-30 PROCEDURE — 96372 THER/PROPH/DIAG INJ SC/IM: CPT | Performed by: NURSE PRACTITIONER

## 2022-07-30 PROCEDURE — 83880 ASSAY OF NATRIURETIC PEPTIDE: CPT | Performed by: NURSE PRACTITIONER

## 2022-07-30 PROCEDURE — 25000003 PHARM REV CODE 250: Performed by: INTERNAL MEDICINE

## 2022-07-30 PROCEDURE — 97110 THERAPEUTIC EXERCISES: CPT

## 2022-07-30 RX ORDER — MINOCYCLINE HYDROCHLORIDE 50 MG/1
100 CAPSULE ORAL EVERY 12 HOURS
Status: COMPLETED | OUTPATIENT
Start: 2022-07-30 | End: 2022-08-02

## 2022-07-30 RX ORDER — METRONIDAZOLE 500 MG/1
500 TABLET ORAL EVERY 8 HOURS
Status: DISCONTINUED | OUTPATIENT
Start: 2022-07-30 | End: 2022-08-04

## 2022-07-30 RX ORDER — GADOBUTROL 604.72 MG/ML
10 INJECTION INTRAVENOUS
Status: COMPLETED | OUTPATIENT
Start: 2022-07-30 | End: 2022-07-30

## 2022-07-30 RX ORDER — CEFEPIME HYDROCHLORIDE 1 G/50ML
2 INJECTION, SOLUTION INTRAVENOUS
Status: DISCONTINUED | OUTPATIENT
Start: 2022-07-30 | End: 2022-08-04

## 2022-07-30 RX ADMIN — DOCUSATE SODIUM 100 MG: 100 CAPSULE, LIQUID FILLED ORAL at 08:07

## 2022-07-30 RX ADMIN — ASPIRIN 81 MG: 81 TABLET, COATED ORAL at 08:07

## 2022-07-30 RX ADMIN — MINOCYCLINE HYDROCHLORIDE 100 MG: 50 CAPSULE ORAL at 10:07

## 2022-07-30 RX ADMIN — DOCUSATE SODIUM 100 MG: 100 CAPSULE, LIQUID FILLED ORAL at 10:07

## 2022-07-30 RX ADMIN — ALBUTEROL SULFATE 2 PUFF: 90 AEROSOL, METERED RESPIRATORY (INHALATION) at 08:07

## 2022-07-30 RX ADMIN — ALBUTEROL SULFATE 2 PUFF: 90 AEROSOL, METERED RESPIRATORY (INHALATION) at 12:07

## 2022-07-30 RX ADMIN — Medication 10 ML: at 06:07

## 2022-07-30 RX ADMIN — THERA TABS 1 TABLET: TAB at 08:07

## 2022-07-30 RX ADMIN — LOSARTAN POTASSIUM 50 MG: 50 TABLET, FILM COATED ORAL at 08:07

## 2022-07-30 RX ADMIN — CEFEPIME 2 G: 2 INJECTION, POWDER, FOR SOLUTION INTRAVENOUS at 10:07

## 2022-07-30 RX ADMIN — OXYCODONE HYDROCHLORIDE AND ACETAMINOPHEN 500 MG: 500 TABLET ORAL at 10:07

## 2022-07-30 RX ADMIN — OXYCODONE HYDROCHLORIDE AND ACETAMINOPHEN 500 MG: 500 TABLET ORAL at 12:07

## 2022-07-30 RX ADMIN — GABAPENTIN 300 MG: 300 CAPSULE ORAL at 10:07

## 2022-07-30 RX ADMIN — DAPTOMYCIN 820 MG: 350 INJECTION, POWDER, LYOPHILIZED, FOR SOLUTION INTRAVENOUS at 11:07

## 2022-07-30 RX ADMIN — GABAPENTIN 300 MG: 300 CAPSULE ORAL at 08:07

## 2022-07-30 RX ADMIN — INSULIN ASPART 1 UNITS: 100 INJECTION, SOLUTION INTRAVENOUS; SUBCUTANEOUS at 12:07

## 2022-07-30 RX ADMIN — CEFEPIME 2 G: 2 INJECTION, POWDER, FOR SOLUTION INTRAVENOUS at 05:07

## 2022-07-30 RX ADMIN — OXYCODONE HYDROCHLORIDE AND ACETAMINOPHEN 500 MG: 500 TABLET ORAL at 08:07

## 2022-07-30 RX ADMIN — ENOXAPARIN SODIUM 100 MG: 100 INJECTION SUBCUTANEOUS at 08:07

## 2022-07-30 RX ADMIN — METRONIDAZOLE 500 MG: 500 TABLET ORAL at 10:07

## 2022-07-30 RX ADMIN — ENOXAPARIN SODIUM 100 MG: 100 INJECTION SUBCUTANEOUS at 10:07

## 2022-07-30 RX ADMIN — ATORVASTATIN CALCIUM 10 MG: 10 TABLET, FILM COATED ORAL at 10:07

## 2022-07-30 RX ADMIN — GABAPENTIN 300 MG: 300 CAPSULE ORAL at 03:07

## 2022-07-30 RX ADMIN — ENOXAPARIN SODIUM 100 MG: 100 INJECTION SUBCUTANEOUS at 12:07

## 2022-07-30 RX ADMIN — Medication 1000 UNITS: at 08:07

## 2022-07-30 RX ADMIN — GADOBUTROL 10 ML: 604.72 INJECTION INTRAVENOUS at 07:07

## 2022-07-30 NOTE — HPI
65 y/o male with PMHx of HTN, HLD, DM, and obesity who presented tot he ED for eval of post op incision leaking.  Sx are constant and moderate in severity. No mitigating or exacerbating factors reported. Pt had surgery on 7/26, then took a trip to [facility]. He flew back last night. Pt denies fever, chills, pain, HA, N/V, and all other sx at this time.  ED workup shows: H/H 11.6/36.9, COVID +  He will be kept on OBS for CSF leak under the care of hospital medicine  He is a full code and his SDM is his wife

## 2022-07-30 NOTE — ASSESSMENT & PLAN NOTE
--wound cx pending  --Neuro surgery consulted  --ID consulted      7/30/2022  Neurosurgery following    Possible washout vs wound vac placement   ID following, continue empiric IV antibiotic

## 2022-07-30 NOTE — PROGRESS NOTES
[facility] - Telemetry ([facility])  Neurosurgery  Progress Note    Subjective:     Seen for eval   [place] back in [place]   Sp travel to [place] for daughters wedding   No fever   No other complaints   Back pain controlled   No leg pain   + COVID   + drainage from incision   No other complaints   He is doing quiet well otherwise     Incision with minimal drainage from inferior aspect      AP   Labs   MR orders   Wound consult    possible washout vs wound vac placement     Thank you for the referral   Please call with any questions    Shaggy Zepeda MD  Neurosurgery     Disclaimer: This note was prepared using a voice recognition system and is likely to have sound alike errors within the text.          Post-Op Info:  * No surgery found *          Medications:  Continuous Infusions:  Scheduled Meds:   albuterol  2 puff Inhalation Q6H    ascorbic acid (vitamin C)  500 mg Oral BID    aspirin  81 mg Oral Daily    atorvastatin  10 mg Oral QHS    ceFEPime (MAXIPIME) IVPB  2 g Intravenous Q12H    DAPTOmycin (CUBICIN) IV (PEDS and ADULTS)  8 mg/kg Intravenous Q24H    docusate sodium  100 mg Oral BID    enoxaparin  1 mg/kg Subcutaneous BID    gabapentin  300 mg Oral TID    losartan  50 mg Oral Daily    melatonin  6 mg Oral Nightly    multivitamin  1 tablet Oral Daily    sodium chloride 0.9%  10 mL Intravenous Q8H    vitamin D  1,000 Units Oral Daily     PRN Meds:acetaminophen, aluminum-magnesium hydroxide-simethicone, dextromethorphan-guaiFENesin  mg/5 ml, dextrose 10%, dextrose 10%, glucagon (human recombinant), glucose, glucose, insulin aspart U-100, ketorolac, loperamide, melatonin, methocarbamoL, naloxone, ondansetron, oxyCODONE-acetaminophen, polyethylene glycol, promethazine, simethicone, sodium chloride 0.9%     Review of Systems  Objective:     Weight: 102.2 kg (225 lb 5 oz)  Body mass index is 36.37 kg/m².  Vital Signs (Most Recent):  Temp: 97.8 °F (36.6 °C) (07/30/22 0714)  Pulse: 85 (07/30/22 0834)  Resp:  (!) 21 (07/30/22 0834)  BP: 135/86 (07/30/22 0714)  SpO2: 95 % (07/30/22 0714) Vital Signs (24h Range):  Temp:  [97.8 °F (36.6 °C)-98.9 °F (37.2 °C)] 97.8 °F (36.6 °C)  Pulse:  [60-85] 85  Resp:  [17-21] 21  SpO2:  [94 %-99 %] 95 %  BP: (114-157)/(59-86) 135/86                          Neurosurgery Physical Exam    Significant Labs:  Recent Labs   Lab 07/29/22  1724 07/30/22  0605   * 176*    140   K 4.2 4.4    110   CO2 22* 23   BUN 17 13   CREATININE 0.8 0.7   CALCIUM 9.1 8.4*     Recent Labs   Lab 07/29/22  1628 07/30/22  0605   WBC 5.18 5.33   HGB 11.6* 11.3*   HCT 36.9* 37.6*    243     Recent Labs   Lab 07/30/22  0039   INR 1.0   APTT 25.6     Microbiology Results (last 7 days)     Procedure Component Value Units Date/Time    Aerobic culture [[id number]] Collected: 07/30/22 0055    Order Status: Sent Specimen: Incision site from Back Updated: 07/30/22 0110            Assessment/Plan:     Active Diagnoses:    Diagnosis Date Noted POA    PRINCIPAL PROBLEM:  Wound dehiscence [T81.30XA] 07/29/2022 Yes    Hypertension [I10] 07/29/2022 Yes    HLD (hyperlipidemia) [E78.5] 07/29/2022 Yes    COVID [U07.1] 07/29/2022 Yes    Degenerative disc disease, lumbar [M51.36] 06/22/2022 Yes     Chronic    Diabetes mellitus without complication [E11.9]  Yes     Chronic      Problems Resolved During this Admission:       Shaggy Zepeda MD  Neurosurgery  [facility])

## 2022-07-30 NOTE — PLAN OF CARE
Initial PT eval completed. Patient did not have back brace in the room, so eval was limited to bed mobility only. Patient Mod I with bed mobility. Recommend home with HH.

## 2022-07-30 NOTE — SUBJECTIVE & OBJECTIVE
Interval History: Neurosurgery and ID following recommendations noted.     Review of Systems   Constitutional:  Negative for activity change, appetite change, chills, fatigue and fever.   HENT:  Negative for dental problem, ear pain, hearing loss, mouth sores, nosebleeds, sinus pressure, sore throat, tinnitus and trouble swallowing.    Eyes:  Negative for pain, discharge and visual disturbance.   Respiratory:  Negative for cough, choking, chest tightness, shortness of breath and wheezing.    Cardiovascular:  Negative for chest pain, palpitations and leg swelling.   Gastrointestinal:  Negative for abdominal distention, abdominal pain, anal bleeding, blood in stool, constipation, diarrhea, nausea and vomiting.   Endocrine: Negative for cold intolerance, heat intolerance, polydipsia, polyphagia and polyuria.   Genitourinary:  Negative for decreased urine volume, difficulty urinating, flank pain, frequency, hematuria and urgency.   Musculoskeletal:  Negative for arthralgias, back pain, gait problem, myalgias, neck pain and neck stiffness.   Skin:  Positive for wound (lower back surgical wound). Negative for color change and rash.   Allergic/Immunologic: Negative.    Neurological:  Negative for dizziness, tremors, seizures, syncope, speech difficulty, weakness, light-headedness and headaches.   Hematological: Negative.    Psychiatric/Behavioral:  Negative for agitation, behavioral problems, confusion and sleep disturbance. The patient is not nervous/anxious.    All other systems reviewed and are negative.  Objective:     Vital Signs (Most Recent):  Temp: 98.2 °F (36.8 °C) (07/30/22 1103)  Pulse: 71 (07/30/22 1343)  Resp: (!) 21 (07/30/22 1251)  BP: 132/77 (07/30/22 1103)  SpO2: 97 % (07/30/22 1103)   Vital Signs (24h Range):  Temp:  [97.8 °F (36.6 °C)-98.4 °F (36.9 °C)] 98.2 °F (36.8 °C)  Pulse:  [60-85] 71  Resp:  [17-21] 21  SpO2:  [94 %-98 %] 97 %  BP: (114-135)/(59-86) 132/77     Weight: 102.2 kg (225 lb 5 oz)  Body  mass index is 36.37 kg/m².    Intake/Output Summary (Last 24 hours) at 7/30/2022 1520  Last data filed at 7/29/2022 2019  Gross per 24 hour   Intake 1000 ml   Output --   Net 1000 ml      Physical Exam  Vitals and nursing note reviewed.   Constitutional:       General: He is not in acute distress.     Appearance: He is well-developed. He is not diaphoretic.   HENT:      Head: Normocephalic and atraumatic.      Nose: Nose normal.   Eyes:      General:         Right eye: No discharge.         Left eye: No discharge.      Conjunctiva/sclera: Conjunctivae normal.      Pupils: Pupils are equal, round, and reactive to light.   Neck:      Thyroid: No thyromegaly.      Vascular: No JVD.      Trachea: No tracheal deviation.   Cardiovascular:      Rate and Rhythm: Normal rate and regular rhythm.      Heart sounds: Normal heart sounds. No murmur heard.    No friction rub. No gallop.   Pulmonary:      Effort: Pulmonary effort is normal. No respiratory distress.      Breath sounds: Normal breath sounds. No wheezing or rales.   Chest:      Chest wall: No tenderness.   Abdominal:      General: Bowel sounds are normal. There is no distension.      Palpations: Abdomen is soft. There is no mass.      Tenderness: There is no abdominal tenderness.   Genitourinary:     Comments: deferred  Musculoskeletal:         General: No tenderness or deformity. Normal range of motion.      Cervical back: Normal range of motion and neck supple.      Comments: Lower back surgical wound with dressing    Skin:     General: Skin is warm and dry.      Capillary Refill: Capillary refill takes less than 2 seconds.      Findings: No erythema or rash.   Neurological:      Mental Status: He is alert and oriented to person, place, and time.      Motor: No abnormal muscle tone.      Coordination: Coordination normal.   Psychiatric:         Behavior: Behavior normal.       Significant Labs: All pertinent labs within the past 24 hours have been reviewed.  CBC:    Recent Labs   Lab 07/29/22  1628 07/30/22  0605   WBC 5.18 5.33   HGB 11.6* 11.3*   HCT 36.9* 37.6*    243     CMP:   Recent Labs   Lab 07/29/22  1724 07/30/22  0605    140   K 4.2 4.4    110   CO2 22* 23   * 176*   BUN 17 13   CREATININE 0.8 0.7   CALCIUM 9.1 8.4*   PROT 6.9  --    ALBUMIN 3.6  --    BILITOT 0.3  --    ALKPHOS 93  --    AST 18  --    ALT 15  --    ANIONGAP 11 7*   EGFRNONAA >60 >60     Coagulation:   Recent Labs   Lab 07/30/22  0039   INR 1.0   APTT 25.6     Troponin:   Recent Labs   Lab 07/30/22  0038   TROPONINI <0.006       Significant Imaging:     Imaging Results    None

## 2022-07-30 NOTE — PROGRESS NOTES
Chart reviewed. Pt s/p lumbar decompression. +wound drainage - possible CSF leak. + COVID. No headache or any other sx related to drainage. Cont mgmt per HMS. Await MRI scan.

## 2022-07-30 NOTE — H&P
[facility] Medicine  History & Physical    Patient Name: Friday ROGELIO Blake  MRN: [id number]  Patient Class: OP- Observation  Admission Date: 7/29/2022  Attending Physician: Tha Hodge MD   Primary Care Provider: Sid Elizabeth MD         Patient information was obtained from patient, past medical records and ER records.     Subjective:     Principal Problem:Wound dehiscence    Chief Complaint:   Chief Complaint   Patient presents with    Back Pain     Recent back surgery with complications. Flew to [place] and back last night and having back pain and states the stiches arent healing properly         HPI: 67 y/o male with PMHx of HTN, HLD, DM, and obesity who presented tot he ED for eval of post op incision leaking.  Sx are constant and moderate in severity. No mitigating or exacerbating factors reported. Pt had surgery on 7/26, then took a trip to [place]. He flew back last night. Pt denies fever, chills, pain, HA, N/V, and all other sx at this time.  ED workup shows: H/H 11.6/36.9, COVID +  He will be kept on OBS for CSF leak under the care of [facility] medicine  He is a full code and his SDM is his wife             Past Medical History:   Diagnosis Date    Carpal tunnel syndrome     Diabetes mellitus without complication     History of colon polyps     Hypercalcemia 3/23/2021    Hyperlipidemia associated with type 2 diabetes mellitus     Hypertension associated with diabetes     Lumbar disc disease with radiculopathy     Morbid obesity with BMI of 40.0-44.9, adult     S/P parathyroidectomy 7/29/2021    Vitamin D deficiency        Past Surgical History:   Procedure Laterality Date    CARPAL TUNNEL RELEASE Left 4/1/2021    Procedure: RELEASE, CARPAL TUNNEL;  Surgeon: Yoandy David MD;  Location: [facility];  Service: Orthopedics;  Laterality: Left;    HERNIA REPAIR      INCISION AND DRAINAGE OF HEMATOMA N/A 6/28/2022    Procedure: INCISION AND DRAINAGE, HEMATOMA;   Surgeon: Shaggy Zepeda MD;  Location: [facility] OR;  Service: Neurosurgery;  Laterality: N/A;    LUMBAR LAMINECTOMY WITH DISCECTOMY Left 6/22/2022    Procedure: LAMINECTOMY, SPINE, LUMBAR, WITH DISCECTOMY;  Surgeon: Shaggy Zepeda MD;  Location: [facility] OR;  Service: Neurosurgery;  Laterality: Left;  minimally invasive L2-3 discectomy and decompression     LUMBAR LAMINECTOMY WITH DISCECTOMY N/A 6/28/2022    Procedure: LAMINECTOMY, SPINE, LUMBAR, WITH DISCECTOMY;  Surgeon: Shaggy Zepeda MD;  Location: [facility] OR;  Service: Neurosurgery;  Laterality: N/A;    PARATHYROIDECTOMY Bilateral 7/27/2021    Procedure: PARATHYROIDECTOMY;  Surgeon: Tha Dial MD;  Location: [facility] OR;  Service: ENT;  Laterality: Bilateral;  with medialstinal exploration    SELECTIVE INJECTION OF ANESTHETIC AGENT AROUND LUMBAR SPINAL NERVE ROOT BY TRANSFORAMINAL APPROACH Left 3/23/2022    Procedure: BLOCK, SPINAL NERVE ROOT, LUMBAR, SELECTIVE, TRANSFORAMINAL APPROACH Left L2-3, L3-4 RN IV sedation;  Surgeon: Jay Hodges MD;  Location: [facility];  Service: Pain Management;  Laterality: Left;    STERNOTOMY N/A 7/27/2021    Procedure: STERNOTOMY;  Surgeon: Tha Dial MD;  Location: [facility] OR;  Service: ENT;  Laterality: N/A;    ULNAR NERVE TRANSPOSITION Left 4/1/2021    Procedure: TRANSPOSITION, NERVE, ULNAR;  Surgeon: Yoandy David MD;  Location: [facility];  Service: Orthopedics;  Laterality: Left;    ULNAR TUNNEL RELEASE Left 4/1/2021    Procedure: RELEASE, CUBITAL TUNNEL;  Surgeon: Yoandy David MD;  Location: [facility];  Service: Orthopedics;  Laterality: Left;    UMBILICAL HERNIA REPAIR         Review of patient's allergies indicates:  No Known Allergies    No current facility-administered medications on file prior to encounter.     Current Outpatient Medications on File Prior to Encounter   Medication Sig    amoxicillin-clavulanate 875-125mg (AUGMENTIN) 875-125 mg per tablet Take 1 tablet by mouth 2 (two) times daily.     aspirin (ECOTRIN) 81 MG EC tablet Take 1 tablet (81 mg total) by mouth once daily.    atorvastatin (LIPITOR) 10 MG tablet Take 1 tablet (10 mg total) by mouth every evening.    blood sugar diagnostic Strp To check BG 1 times daily, to use with insurance preferred meter    blood-glucose meter kit To check BG 1 times daily, to use with insurance preferred meter    dapagliflozin (FARXIGA) 5 mg Tab tablet Take 1 tablet (5 mg total) by mouth once daily.    docusate sodium (COLACE) 100 MG capsule Take 1 capsule (100 mg total) by mouth 2 (two) times daily.    gabapentin (NEURONTIN) 300 MG capsule Take 1 capsule (300 mg total) by mouth 3 (three) times daily.    irbesartan (AVAPRO) 150 MG tablet Take 1 tablet (150 mg total) by mouth once daily.    lancets (ONETOUCH ULTRASOFT LANCETS) Misc Check FS daily    lancets Misc To check BG 1 times daily, to use with insurance preferred meter    metFORMIN (GLUCOPHAGE) 1000 MG tablet TAKE 1 TABLET BY MOUTH TWICE DAILY WITH MEALS    methocarbamoL (ROBAXIN) 750 MG Tab Take 1 tablet (750 mg total) by mouth 3 (three) times daily as needed (muscle spasms).    ONETOUCH VERIO TEST STRIPS Strp USE 1 STRIP TO CHECK GLUCOSE ONCE DAILY    oxyCODONE-acetaminophen (PERCOCET)  mg per tablet Take 1 tablet by mouth every 4 to 6 hours as needed for Pain.    senna-docusate 8.6-50 mg (SENNA LAXATIVE-STOOL SOFTENER) 8.6-50 mg per tablet Take 1 tablet by mouth once daily.     Family History       Problem Relation (Age of Onset)    Asthma Mother    Diabetes Mellitus Father    Hypertension Father    Prostate cancer Brother          Tobacco Use    Smoking status: Never Smoker    Smokeless tobacco: Never Used   Substance and Sexual Activity    Alcohol use: Never    Drug use: Never    Sexual activity: Yes     Partners: Female     Review of Systems   Constitutional:  Negative for activity change, appetite change, chills, fatigue and fever.   HENT:  Negative for dental problem, ear  pain, hearing loss, mouth sores, nosebleeds, sinus pressure, sore throat, tinnitus and trouble swallowing.    Eyes:  Negative for pain, discharge and visual disturbance.   Respiratory:  Negative for cough, choking, chest tightness, shortness of breath and wheezing.    Cardiovascular:  Negative for chest pain, palpitations and leg swelling.   Gastrointestinal:  Negative for abdominal distention, abdominal pain, anal bleeding, blood in stool, constipation, diarrhea, nausea and vomiting.   Endocrine: Negative for cold intolerance, heat intolerance, polydipsia, polyphagia and polyuria.   Genitourinary:  Negative for decreased urine volume, difficulty urinating, flank pain, frequency, hematuria and urgency.   Musculoskeletal:  Negative for arthralgias, back pain, gait problem, myalgias, neck pain and neck stiffness.   Skin:  Positive for wound (dehisance of surgical wound). Negative for color change and rash.   Allergic/Immunologic: Negative.    Neurological:  Negative for dizziness, tremors, seizures, syncope, speech difficulty, weakness, light-headedness and headaches.   Hematological: Negative.    Psychiatric/Behavioral:  Negative for agitation, behavioral problems, confusion and sleep disturbance. The patient is not nervous/anxious.    All other systems reviewed and are negative.  Objective:     Vital Signs (Most Recent):  Temp: 98.9 °F (37.2 °C) (07/29/22 1343)  Pulse: 62 (07/29/22 1902)  Resp: 17 (07/29/22 1902)  BP: 124/64 (07/29/22 1902)  SpO2: 98 % (07/29/22 1902) Vital Signs (24h Range):  Temp:  [98.9 °F (37.2 °C)] 98.9 °F (37.2 °C)  Pulse:  [62-69] 62  Resp:  [17-18] 17  SpO2:  [98 %-99 %] 98 %  BP: (124-157)/(64-85) 124/64        There is no height or weight on file to calculate BMI.    Physical Exam  Vitals and nursing note reviewed.   Constitutional:       General: He is not in acute distress.     Appearance: He is well-developed. He is not diaphoretic.   HENT:      Head: Normocephalic and atraumatic.       Nose: Nose normal.   Eyes:      General:         Right eye: No discharge.         Left eye: No discharge.      Conjunctiva/sclera: Conjunctivae normal.      Pupils: Pupils are equal, round, and reactive to light.   Neck:      Thyroid: No thyromegaly.      Vascular: No JVD.      Trachea: No tracheal deviation.   Cardiovascular:      Rate and Rhythm: Normal rate and regular rhythm.      Heart sounds: Normal heart sounds. No murmur heard.    No friction rub. No gallop.   Pulmonary:      Effort: Pulmonary effort is normal. No respiratory distress.      Breath sounds: Normal breath sounds. No wheezing or rales.   Chest:      Chest wall: No tenderness.   Abdominal:      General: Bowel sounds are normal. There is no distension.      Palpations: Abdomen is soft. There is no mass.      Tenderness: There is no abdominal tenderness.   Genitourinary:     Comments: deferred  Musculoskeletal:         General: No tenderness or deformity. Normal range of motion.      Cervical back: Normal range of motion and neck supple.   Skin:     General: Skin is warm and dry.      Capillary Refill: Capillary refill takes less than 2 seconds.      Findings: No erythema or rash.   Neurological:      Mental Status: He is alert and oriented to person, place, and time.      Motor: No abnormal muscle tone.      Coordination: Coordination normal.   Psychiatric:         Behavior: Behavior normal.            CRANIAL NERVES     CN III, IV, VI   Pupils are equal, round, and reactive to light.     Significant Labs: All pertinent labs within the past 24 hours have been reviewed.  Recent Lab Results         07/29/22 2015 07/29/22  1724   07/29/22  1628        Albumin   3.6         Alkaline Phosphatase   93         ALT   15         Anion Gap   11         AST   18         Baso #     0.01       Basophil %     0.2       BILIRUBIN TOTAL   0.3  Comment: For infants and newborns, interpretation of results should be based  on gestational age, weight and in  agreement with clinical  observations.    Premature Infant recommended reference ranges:  Up to 24 hours.............<8.0 mg/dL  Up to 48 hours............<12.0 mg/dL  3-5 days..................<15.0 mg/dL  6-29 days.................<15.0 mg/dL           BUN   17         Calcium   9.1         Chloride   107         CO2   22         Creatinine   0.8         Differential Method     Automated       eGFR if    >60         eGFR if non    >60  Comment: Calculation used to obtain the estimated glomerular filtration  rate (eGFR) is the CKD-EPI equation.            Eos #     0.2       Eosinophil %     3.9       Glucose   159         Gran # (ANC)     2.1       Gran %     40.3       Hematocrit     36.9       Hemoglobin     11.6       Immature Grans (Abs)     0.01  Comment: Mild elevation in immature granulocytes is non specific and   can be seen in a variety of conditions including stress response,   acute inflammation, trauma and pregnancy. Correlation with other   laboratory and clinical findings is essential.         Immature Granulocytes     0.2       Lymph #     2.5       Lymph %     47.9       MCH     27.8       MCHC     31.4       MCV     89       Mono #     0.4       Mono %     7.5       MPV     10.8       nRBC     0       Platelets     240       Potassium   4.2         PROTEIN TOTAL   6.9         RBC     4.17       RDW     14.4       SARS-CoV-2 RNA, Amplification, Qual Positive  Comment: This test utilizes isothermal nucleic acid amplification   technology to detect the SARS-CoV-2 RdRp nucleic acid segment.   The analytical sensitivity (limit of detection) is 125 genome   equivalents/mL.     A POSITIVE result implies infection with the SARS-CoV-2 virus;  the patient is presumed to be contagious.    A NEGATIVE result means that SARS-CoV-2 nucleic acids are not  present above the limit of detection. A NEGATIVE result should be   treated as presumptive. It does not rule out the possibility  of   COVID-19 and should not be the sole basis for treatment decisions.   If COVID-19 is strongly suspected based on clinical and exposure   history, re-testing using an alternate molecular assay should be   considered.       This test is only for use under the Food and Drug   Administration s Emergency Use Authorization (EUA).   Commercial kits are provided by [employer].   Performance characteristics of the EUA have been independently  verified by [facility] Department of  Pathology and Laboratory Medicine.   _________________________________________________________________  The ID NOW COVID-19 Letter of Authorization, along with the   authorized Fact Sheet for Healthcare Providers, the authorized Fact  Sheet for Patients, and authorized labeling are available on the FDA   website:  www.fda.gov/MedicalDevices/Safety/EmergencySituations/zsk354115.htm             Sodium   140         WBC     5.18               Significant Imaging: I have reviewed all pertinent imaging results/findings within the past 24 hours.    Assessment/Plan:     * Wound dehiscence  --wound cx pending  --Neuro surgery consulted  --ID consulted      COVID  Patient is identified as High risk for severe complications of COVID 19 based on COVID risk score of 5   Initiate standard COVID protocols; COVID-19 testing ,Infection Control notification  and isolation- respiratory, contact and droplet per protocol    Diagnostics: (leukopenia, hyponatremia, hyperferritinemia, elevated troponin, elevated d-dimer, age, and comorbidities are significant predictors of poor clinical outcome)  CBC, CMP, Ferritin, CRP and Portable CXR    Management: Maintain oxygen saturations 92-96% via Nasal Cannula  LPM and monitor with continuous/intermittent pulse oximetry.  and Inhaled bronchodilators as needed for shortness of breath.    HLD (hyperlipidemia)  --continue atorvastatin  --cardiac diet      Hypertension  --continue losartan  --cardiac  diet      Degenerative disc disease, lumbar  --supportive care  --PT/OT consulted      Diabetes mellitus without complication  Patient's FSGs are controlled on current medication regimen.  Last A1c reviewed-   Lab Results   Component Value Date    HGBA1C 8.3 (H) 05/18/2022     Most recent fingerstick glucose reviewed- No results for input(s): POCTGLUCOSE in the last 24 hours.  Current correctional scale  Low  Maintain anti-hyperglycemic dose as follows-   Antihyperglycemics (From admission, onward)            Start     Stop Route Frequency Ordered    07/30/22 0032  insulin aspart U-100 pen 0-5 Units         -- SubQ Before meals & nightly PRN 07/29/22 2333        Hold Oral hypoglycemics while patient is in the hospital.    VTE Risk Mitigation (From admission, onward)         Ordered     enoxaparin injection 1 mg/kg (Dosing Weight)  2 times daily         07/29/22 2336     IP VTE HIGH RISK PATIENT  Once         07/29/22 2333     Place sequential compression device  Until discontinued         07/29/22 2333                   KAILEY Richardson-C  Department of Hospital Medicine   [facility] - Telemetry ([facility]

## 2022-07-30 NOTE — ASSESSMENT & PLAN NOTE
Patient's FSGs are controlled on current medication regimen.  Last A1c reviewed-   Lab Results   Component Value Date    HGBA1C 8.3 (H) 05/18/2022     Most recent fingerstick glucose reviewed-   Recent Labs   Lab 07/30/22  0028 07/30/22  0535 07/30/22  1113   POCTGLUCOSE 275* 197* 165*     Current correctional scale  Low  Maintain anti-hyperglycemic dose as follows-   Antihyperglycemics (From admission, onward)            Start     Stop Route Frequency Ordered    07/30/22 0032  insulin aspart U-100 pen 0-5 Units         -- SubQ Before meals & nightly PRN 07/29/22 2333        Hold Oral hypoglycemics while patient is in the hospital.

## 2022-07-30 NOTE — PLAN OF CARE
Plan of Care: RN Shift Summary & Bedside Handover Report  07/30/2022 5:39 PM    Pt admitted on 7/29/2022 for Wound dehiscence under Kuldeep Bermudez MD service   Code Status Full Code    Past Medical/ Surgical Hx Past Medical History:   Diagnosis Date    Bilateral carpal tunnel syndrome 2/25/2022    moderate demyelinating bilaterally    Carpal tunnel syndrome     Diabetes mellitus without complication     Fever 6/30/2022    Gram-positive bacteremia 7/2/2022    History of colon polyps     Hypercalcemia 3/23/2021    Hyperlipidemia associated with type 2 diabetes mellitus     Hypertension associated with diabetes     Left carpal tunnel syndrome 3/23/2021    Low back pain 9/28/2021    Lumbar disc disease with radiculopathy     Morbid obesity with BMI of 40.0-44.9, adult     Postoperative hematoma involving nervous system following nervous system procedure 6/28/2022    S/P parathyroidectomy 7/29/2021    Vitamin D deficiency       Past Surgical History:   Procedure Laterality Date    CARPAL TUNNEL RELEASE Left 4/1/2021    Procedure: RELEASE, CARPAL TUNNEL;  Surgeon: Yoandy David MD;  Location: [facility] OR;  Service: Orthopedics;  Laterality: Left;    HERNIA REPAIR      INCISION AND DRAINAGE OF HEMATOMA N/A 6/28/2022    Procedure: INCISION AND DRAINAGE, HEMATOMA;  Surgeon: Shaggy Zepeda MD;  Location: [facility] OR;  Service: Neurosurgery;  Laterality: N/A;    LUMBAR LAMINECTOMY WITH DISCECTOMY Left 6/22/2022    Procedure: LAMINECTOMY, SPINE, LUMBAR, WITH DISCECTOMY;  Surgeon: Shaggy Zepeda MD;  Location: [facility] OR;  Service: Neurosurgery;  Laterality: Left;  minimally invasive L2-3 discectomy and decompression     LUMBAR LAMINECTOMY WITH DISCECTOMY N/A 6/28/2022    Procedure: LAMINECTOMY, SPINE, LUMBAR, WITH DISCECTOMY;  Surgeon: Shaggy Zepeda MD;  Location: [facility] OR;  Service: Neurosurgery;  Laterality: N/A;    PARATHYROIDECTOMY Bilateral 7/27/2021    Procedure: PARATHYROIDECTOMY;  Surgeon: Tha Dial MD;   Location: [facility] OR;  Service: ENT;  Laterality: Bilateral;  with medialstinal exploration    SELECTIVE INJECTION OF ANESTHETIC AGENT AROUND LUMBAR SPINAL NERVE ROOT BY TRANSFORAMINAL APPROACH Left 3/23/2022    Procedure: BLOCK, SPINAL NERVE ROOT, LUMBAR, SELECTIVE, TRANSFORAMINAL APPROACH Left L2-3, L3-4 RN IV sedation;  Surgeon: Jay Hodges MD;  Location: [facility] MGT;  Service: Pain Management;  Laterality: Left;    STERNOTOMY N/A 7/27/2021    Procedure: STERNOTOMY;  Surgeon: Tha Dial MD;  Location: [facility] OR;  Service: ENT;  Laterality: N/A;    ULNAR NERVE TRANSPOSITION Left 4/1/2021    Procedure: TRANSPOSITION, NERVE, ULNAR;  Surgeon: Yoandy David MD;  Location: [facility] OR;  Service: Orthopedics;  Laterality: Left;    ULNAR TUNNEL RELEASE Left 4/1/2021    Procedure: RELEASE, CUBITAL TUNNEL;  Surgeon: Yoandy David MD;  Location: [facility] OR;  Service: Orthopedics;  Laterality: Left;    UMBILICAL HERNIA REPAIR         HEENT HEENT WDL: WDL   Cognitive/ Neuro Cognitive/Neuro/Behavioral WDL: WDL  Level of Consciousness (AVPU): alert     Clay Coma Scale Score: 15  Additional Documentation: Clay Coma Scale (Group)   Resp Respiratory WDL: WDL except, breath sounds  All Lung Fields Breath Sounds: Anterior:, Lateral:, diminished        O2 Device (Oxygen Therapy): room air   Cardiac Cardiac WDL: WDL  Lead Monitored: Lead II  Rhythm: normal sinus rhythm     Cardiac/Telemetry Box Number: [id number]   Peripheral/ Neurovascular Peripheral Neurovascular WDL: WDL   VTE core VTE Required Core Measure: Pharmacological prophylaxis initiated/maintained   GI GI WDL: WDL     Last Bowel Movement: 07/29/22   Diet Diet diabetic [facility]; 2000 Calorie; Cardiac (Low Na/Chol)    Genitourinary WDL: WDL         Skin Skin WDL: WDL except, all  Skin Integrity: wound (med lower back)   Oskar Score Oskar Score: 22   Musculoskeletal  Musculoskeletal WDL: WDL      Safety Safety WDL: WDL  Safety Factors: ID band on, upper  side rails raised x 2, call light in reach, wheels locked, bed in low position      Fall Risk Score Fall Risk Score: 3   LDA(s) Lines/Drains/Airways       Peripheral Intravenous Line  Duration                  Peripheral IV - Single Lumen 07/29/22 1629 20 G Right;Lateral Antecubital 1 day                   Consults Consults (From admission, onward)          Status Ordering Provider     Inpatient consult to Neurosurgery  Once        Provider:  Shaggy Zepeda MD    Acknowledged LIOR RUBIN     Inpatient consult to Infectious Diseases  Once        Provider:  Tha Hodge MD    Acknowledged LIOR RUBIN            Shift events  uneventful   Plan of Care for RN report  Continue care as ordered, COVID management, DM management    Discharge Planning   TBD   Patient updated on plan of care, all questions answered up to now regarding plan of care, will continue to monitor per hourly rounding & unit practice/ policy    Note: Other exception details noted in flowsheet if not present in summary

## 2022-07-30 NOTE — PROGRESS NOTES
Pharmacist Renal Dose Adjustment Note    Friday ROGELIO Blake is a 66 y.o. male being treated with the medication cefepime for bone/joint infection.     Patient Data:    Vital Signs (Most Recent):  Temp: 97.8 °F (36.6 °C) (07/30/22 0714)  Pulse: 85 (07/30/22 0834)  Resp: (!) 21 (07/30/22 0834)  BP: 135/86 (07/30/22 0714)  SpO2: 95 % (07/30/22 0714)   Vital Signs (72h Range):  Temp:  [97.8 °F (36.6 °C)-98.9 °F (37.2 °C)]   Pulse:  [60-85]   Resp:  [17-21]   BP: (114-157)/(59-86)   SpO2:  [94 %-99 %]      Recent Labs   Lab 07/29/22  1724 07/30/22  0605   CREATININE 0.8 0.7     Serum creatinine: 0.7 mg/dL 07/30/22 0605  Estimated creatinine clearance: 116.3 mL/min    Per protocol for severe infection & for CrCl > 60 ml/min, dose will be increased from 2 gm IV every 12 hours to 2 gm IV every 8 hours.     Pharmacist's Name: Katherine E Mcardle  Pharmacist's Extension: [phone]

## 2022-07-30 NOTE — PLAN OF CARE
Pt aaox4, VSS currently on RA. Poc reviewed with pt, verbalizes understanding. Dressing in place to lower back, CDI. Safety measure in place, bed in lowest position, wheels locked, call bell within reach. Pt has no complaints, will cont with Poc.    Problem: Impaired Wound Healing  Goal: Optimal Wound Healing  Outcome: Ongoing, Progressing     Problem: Diabetes Comorbidity  Goal: Blood Glucose Level Within Targeted Range  Outcome: Ongoing, Progressing     Problem: Adult Inpatient Plan of Care  Goal: Optimal Comfort and Wellbeing  Outcome: Ongoing, Progressing

## 2022-07-30 NOTE — SUBJECTIVE & OBJECTIVE
Past Medical History:   Diagnosis Date    Carpal tunnel syndrome     Diabetes mellitus without complication     History of colon polyps     Hypercalcemia 3/23/2021    Hyperlipidemia associated with type 2 diabetes mellitus     Hypertension associated with diabetes     Lumbar disc disease with radiculopathy     Morbid obesity with BMI of 40.0-44.9, adult     S/P parathyroidectomy 7/29/2021    Vitamin D deficiency        Past Surgical History:   Procedure Laterality Date    CARPAL TUNNEL RELEASE Left 4/1/2021    Procedure: RELEASE, CARPAL TUNNEL;  Surgeon: Yoandy David MD;  Location: [facility] OR;  Service: Orthopedics;  Laterality: Left;    HERNIA REPAIR      INCISION AND DRAINAGE OF HEMATOMA N/A 6/28/2022    Procedure: INCISION AND DRAINAGE, HEMATOMA;  Surgeon: Shaggy Zepeda MD;  Location: [facility] OR;  Service: Neurosurgery;  Laterality: N/A;    LUMBAR LAMINECTOMY WITH DISCECTOMY Left 6/22/2022    Procedure: LAMINECTOMY, SPINE, LUMBAR, WITH DISCECTOMY;  Surgeon: Shaggy Zepeda MD;  Location: [facility] OR;  Service: Neurosurgery;  Laterality: Left;  minimally invasive L2-3 discectomy and decompression     LUMBAR LAMINECTOMY WITH DISCECTOMY N/A 6/28/2022    Procedure: LAMINECTOMY, SPINE, LUMBAR, WITH DISCECTOMY;  Surgeon: Shaggy Zepeda MD;  Location: [facility] OR;  Service: Neurosurgery;  Laterality: N/A;    PARATHYROIDECTOMY Bilateral 7/27/2021    Procedure: PARATHYROIDECTOMY;  Surgeon: Tha Dial MD;  Location: [facility] OR;  Service: ENT;  Laterality: Bilateral;  with medialstinal exploration    SELECTIVE INJECTION OF ANESTHETIC AGENT AROUND LUMBAR SPINAL NERVE ROOT BY TRANSFORAMINAL APPROACH Left 3/23/2022    Procedure: BLOCK, SPINAL NERVE ROOT, LUMBAR, SELECTIVE, TRANSFORAMINAL APPROACH Left L2-3, L3-4 RN IV sedation;  Surgeon: Jay Hodges MD;  Location: [facility] PAIN MGT;  Service: Pain Management;  Laterality: Left;    STERNOTOMY N/A 7/27/2021    Procedure: STERNOTOMY;  Surgeon: Tha Dial MD;  Location: [facility] ER;   Service: ENT;  Laterality: N/A;    ULNAR NERVE TRANSPOSITION Left 4/1/2021    Procedure: TRANSPOSITION, NERVE, ULNAR;  Surgeon: Yoandy David MD;  Location: [facility] OR;  Service: Orthopedics;  Laterality: Left;    ULNAR TUNNEL RELEASE Left 4/1/2021    Procedure: RELEASE, CUBITAL TUNNEL;  Surgeon: Yoandy David MD;  Location: [facility] OR;  Service: Orthopedics;  Laterality: Left;    UMBILICAL HERNIA REPAIR         Review of patient's allergies indicates:  No Known Allergies    No current facility-administered medications on file prior to encounter.     Current Outpatient Medications on File Prior to Encounter   Medication Sig    amoxicillin-clavulanate 875-125mg (AUGMENTIN) 875-125 mg per tablet Take 1 tablet by mouth 2 (two) times daily.    aspirin (ECOTRIN) 81 MG EC tablet Take 1 tablet (81 mg total) by mouth once daily.    atorvastatin (LIPITOR) 10 MG tablet Take 1 tablet (10 mg total) by mouth every evening.    blood sugar diagnostic Strp To check BG 1 times daily, to use with insurance preferred meter    blood-glucose meter kit To check BG 1 times daily, to use with insurance preferred meter    dapagliflozin (FARXIGA) 5 mg Tab tablet Take 1 tablet (5 mg total) by mouth once daily.    docusate sodium (COLACE) 100 MG capsule Take 1 capsule (100 mg total) by mouth 2 (two) times daily.    gabapentin (NEURONTIN) 300 MG capsule Take 1 capsule (300 mg total) by mouth 3 (three) times daily.    irbesartan (AVAPRO) 150 MG tablet Take 1 tablet (150 mg total) by mouth once daily.    lancets (ONETOUCH ULTRASOFT LANCETS) Misc Check FS daily    lancets Misc To check BG 1 times daily, to use with insurance preferred meter    metFORMIN (GLUCOPHAGE) 1000 MG tablet TAKE 1 TABLET BY MOUTH TWICE DAILY WITH MEALS    methocarbamoL (ROBAXIN) 750 MG Tab Take 1 tablet (750 mg total) by mouth 3 (three) times daily as needed (muscle spasms).    ONETOUCH VERIO TEST STRIPS Strp USE 1 STRIP TO CHECK GLUCOSE ONCE DAILY     oxyCODONE-acetaminophen (PERCOCET)  mg per tablet Take 1 tablet by mouth every 4 to 6 hours as needed for Pain.    senna-docusate 8.6-50 mg (SENNA LAXATIVE-STOOL SOFTENER) 8.6-50 mg per tablet Take 1 tablet by mouth once daily.     Family History       Problem Relation (Age of Onset)    Asthma Mother    Diabetes Mellitus Father    Hypertension Father    Prostate cancer Brother          Tobacco Use    Smoking status: Never Smoker    Smokeless tobacco: Never Used   Substance and Sexual Activity    Alcohol use: Never    Drug use: Never    Sexual activity: Yes     Partners: Female     Review of Systems   Constitutional:  Negative for activity change, appetite change, chills, fatigue and fever.   HENT:  Negative for dental problem, ear pain, hearing loss, mouth sores, nosebleeds, sinus pressure, sore throat, tinnitus and trouble swallowing.    Eyes:  Negative for pain, discharge and visual disturbance.   Respiratory:  Negative for cough, choking, chest tightness, shortness of breath and wheezing.    Cardiovascular:  Negative for chest pain, palpitations and leg swelling.   Gastrointestinal:  Negative for abdominal distention, abdominal pain, anal bleeding, blood in stool, constipation, diarrhea, nausea and vomiting.   Endocrine: Negative for cold intolerance, heat intolerance, polydipsia, polyphagia and polyuria.   Genitourinary:  Negative for decreased urine volume, difficulty urinating, flank pain, frequency, hematuria and urgency.   Musculoskeletal:  Negative for arthralgias, back pain, gait problem, myalgias, neck pain and neck stiffness.   Skin:  Positive for wound (dehisance of surgical wound). Negative for color change and rash.   Allergic/Immunologic: Negative.    Neurological:  Negative for dizziness, tremors, seizures, syncope, speech difficulty, weakness, light-headedness and headaches.   Hematological: Negative.    Psychiatric/Behavioral:  Negative for agitation, behavioral problems, confusion and sleep  disturbance. The patient is not nervous/anxious.    All other systems reviewed and are negative.  Objective:     Vital Signs (Most Recent):  Temp: 98.9 °F (37.2 °C) (07/29/22 1343)  Pulse: 62 (07/29/22 1902)  Resp: 17 (07/29/22 1902)  BP: 124/64 (07/29/22 1902)  SpO2: 98 % (07/29/22 1902) Vital Signs (24h Range):  Temp:  [98.9 °F (37.2 °C)] 98.9 °F (37.2 °C)  Pulse:  [62-69] 62  Resp:  [17-18] 17  SpO2:  [98 %-99 %] 98 %  BP: (124-157)/(64-85) 124/64        There is no height or weight on file to calculate BMI.    Physical Exam  Vitals and nursing note reviewed.   Constitutional:       General: He is not in acute distress.     Appearance: He is well-developed. He is not diaphoretic.   HENT:      Head: Normocephalic and atraumatic.      Nose: Nose normal.   Eyes:      General:         Right eye: No discharge.         Left eye: No discharge.      Conjunctiva/sclera: Conjunctivae normal.      Pupils: Pupils are equal, round, and reactive to light.   Neck:      Thyroid: No thyromegaly.      Vascular: No JVD.      Trachea: No tracheal deviation.   Cardiovascular:      Rate and Rhythm: Normal rate and regular rhythm.      Heart sounds: Normal heart sounds. No murmur heard.    No friction rub. No gallop.   Pulmonary:      Effort: Pulmonary effort is normal. No respiratory distress.      Breath sounds: Normal breath sounds. No wheezing or rales.   Chest:      Chest wall: No tenderness.   Abdominal:      General: Bowel sounds are normal. There is no distension.      Palpations: Abdomen is soft. There is no mass.      Tenderness: There is no abdominal tenderness.   Genitourinary:     Comments: deferred  Musculoskeletal:         General: No tenderness or deformity. Normal range of motion.      Cervical back: Normal range of motion and neck supple.   Skin:     General: Skin is warm and dry.      Capillary Refill: Capillary refill takes less than 2 seconds.      Findings: No erythema or rash.   Neurological:      Mental Status:  He is alert and oriented to person, place, and time.      Motor: No abnormal muscle tone.      Coordination: Coordination normal.   Psychiatric:         Behavior: Behavior normal.            CRANIAL NERVES     CN III, IV, VI   Pupils are equal, round, and reactive to light.     Significant Labs: All pertinent labs within the past 24 hours have been reviewed.  Recent Lab Results         07/29/22 2015 07/29/22  1724   07/29/22  1628        Albumin   3.6         Alkaline Phosphatase   93         ALT   15         Anion Gap   11         AST   18         Baso #     0.01       Basophil %     0.2       BILIRUBIN TOTAL   0.3  Comment: For infants and newborns, interpretation of results should be based  on gestational age, weight and in agreement with clinical  observations.    Premature Infant recommended reference ranges:  Up to 24 hours.............<8.0 mg/dL  Up to 48 hours............<12.0 mg/dL  3-5 days..................<15.0 mg/dL  6-29 days.................<15.0 mg/dL           BUN   17         Calcium   9.1         Chloride   107         CO2   22         Creatinine   0.8         Differential Method     Automated       eGFR if    >60         eGFR if non    >60  Comment: Calculation used to obtain the estimated glomerular filtration  rate (eGFR) is the CKD-EPI equation.            Eos #     0.2       Eosinophil %     3.9       Glucose   159         Gran # (ANC)     2.1       Gran %     40.3       Hematocrit     36.9       Hemoglobin     11.6       Immature Grans (Abs)     0.01  Comment: Mild elevation in immature granulocytes is non specific and   can be seen in a variety of conditions including stress response,   acute inflammation, trauma and pregnancy. Correlation with other   laboratory and clinical findings is essential.         Immature Granulocytes     0.2       Lymph #     2.5       Lymph %     47.9       MCH     27.8       MCHC     31.4       MCV     89       Mono #      0.4       Mono %     7.5       MPV     10.8       nRBC     0       Platelets     240       Potassium   4.2         PROTEIN TOTAL   6.9         RBC     4.17       RDW     14.4       SARS-CoV-2 RNA, Amplification, Qual Positive  Comment: This test utilizes isothermal nucleic acid amplification   technology to detect the SARS-CoV-2 RdRp nucleic acid segment.   The analytical sensitivity (limit of detection) is 125 genome   equivalents/mL.     A POSITIVE result implies infection with the SARS-CoV-2 virus;  the patient is presumed to be contagious.    A NEGATIVE result means that SARS-CoV-2 nucleic acids are not  present above the limit of detection. A NEGATIVE result should be   treated as presumptive. It does not rule out the possibility of   COVID-19 and should not be the sole basis for treatment decisions.   If COVID-19 is strongly suspected based on clinical and exposure   history, re-testing using an alternate molecular assay should be   considered.       This test is only for use under the Food and Drug   Administration s Emergency Use Authorization (EUA).   Commercial kits are provided by "[employer].   Performance characteristics of the EUA have been independently  verified by [facility] Department of  Pathology and Laboratory Medicine.   _________________________________________________________________  The ID NOW COVID-19 Letter of Authorization, along with the   authorized Fact Sheet for Healthcare Providers, the authorized Fact  Sheet for Patients, and authorized labeling are available on the FDA   website:  www.fda.gov/MedicalDevices/Safety/EmergencySituations/enc106027.htm             Sodium   140         WBC     5.18               Significant Imaging: I have reviewed all pertinent imaging results/findings within the past 24 hours.

## 2022-07-30 NOTE — HOSPITAL COURSE
Mr Blake is a 66 year old male who presented to [facility] Emergency Room of evaluation of wound dehiscence. Patient underwent Laminectomy of lumbar spine on 6/22/2022 and I & D of hematoma on 6/28/2022. Traveled to [place] for daughter's wedding. Positive for drainage from low back surgical wound. Neurosurgery following, plan possible washout vs wound vac placement.  Infectious Disease following, continue empiric IV antibiotics. COVID positive. As of 7/31/2022, patient feeling well, vital signs and labs stable. MRI lumbar spine strongly concerning for discitis osteomyelitis at L2-3, probable epidural phlegmon or abscess. Neurosurgery following, will need revision with washout. Will follow.     8/1- cheerful, resting comfortably in bed on RA, no fever, chills or cough. Still has drainage from the Lumbar wound. Dr. Zepeda plans revision with wound washout/exploration with the help of Plastic Surgery, await confirmation date. Pt agreeable with the plan. Continue wound care, IV Abx, no wound vac yet.     8/2- comfortable on RA, wound Cx growing Pseudomonas, earlier Cx grew Staph Epi- so now on Vanc, Cefepime and Flagyl. Dr. Zepeda plans Surgery- Washout with possible drain placement tomorrow, pt agreeable.     8/3- seen post op in the ICU, doing well, c/o back pain at the surgical site, just got oxycodone. Dr. Zepeda put him on Dexa 4 mg IV qid post op, he continues to received Cefepime, Flagyl and Vanc. Fluid sent to labs for C/S.     8/4- looks and feels better, lying flat on his side, back pain improved, no NV, dizziness or HA. Lumbar Drain output 11 cc. Dr. Zepeda d/mónica the foleys and is weaning the steroids. All Cx from Surgery remain NGDT. Getting Vanc, Cefepime and oral Flagyl. Last wound Cx grew Pseudomonas.   WBC 8.2, H/H 10.3/32. BS high due to Dexa.     8/5 - Patient with clinical improvement. Patient sitting up in bed. Plan to increase mobility today per NS. No CP/SOB. Patient tolerating diet.    8/6 -  Patient improving steadily. Plan for OOB to chair today per NS. No cp / SOB. Patient is in good spirits. Discussed with CC Team.    8/7 - Patient with continued progress. Patient with more energy today. Sitting on bedside. No events. Decreased drainage today. Patient expressing his desire to transition to home. Await NS clearance. No CP / SOB    8/8 - Patient stable improving steadily. Much stronger today sitting in chair at bedside. Patient tolerating therapies / diet.    8/9 - Patient improved - Awaiting N/S recs for d/c . Possibly the next 24 hrs. No events.    08/10 -No neurologic complaints overnight. Left arm swelling without evidence of DVT on doppler     08/11 -Elevated Ddimer -CTA - negative for PE, await surgery clearance       08/12 -Patient seen and examined stable for discharge                Lumbar drains removed ,                 PICC line in place,                  Zosyn and doxycycline for 6 weeks

## 2022-07-30 NOTE — PT/OT/SLP EVAL
Occupational Therapy   Evaluation and Discharge Note    Name: Friday ROGELIO Blake  MRN: [id number]  Admitting Diagnosis:  Wound dehiscence   Recent Surgery: * No surgery found *      Recommendations:     Discharge Recommendations: home health OT  Discharge Equipment Recommendations:  none  Barriers to discharge:       Assessment:     Friday ROGELIO Blake is a 66 y.o. male with a medical diagnosis of Wound dehiscence. At this time, patient is functioning at their prior level of function and does not require further acute OT services.     Plan:     During this hospitalization, patient does not require further acute OT services.  Please re-consult if situation changes.    · Plan of Care Reviewed with: patient    Subjective     Chief Complaint:   Patient/Family Comments/goals:     Occupational Profile:  Living Environment: LIVES  WITH SPOUSE IN 1 STORY HOUSE AND NO STEPS  Previous level of function: (I) WITH ADL'S AND FUNCTIONAL MOBILITY  Roles and Routines: OCCUPATIONAL THERAPY  Equipment Used at home:  walker, rolling, cane, quad  Assistance upon Discharge:     Pain/Comfort:  · Pain Rating 1: 0/10    Patients cultural, spiritual, Yarsanism conflicts given the current situation:      Objective:     Communicated with:  prior to session.  Patient found HOB elevated with telemetry, peripheral IV upon OT entry to room.    General Precautions: Standard, droplet, airborne, contact   Orthopedic Precautions:N/A   Braces: N/A  Respiratory Status: Room air     Occupational Performance:    Bed Mobility:    · Patient completed Rolling/Turning to Left with  modified independence  · Patient completed Rolling/Turning to Right with modified independence  · Patient completed Scooting/Bridging with modified independence  · Patient completed Supine to Sit with modified independence  · Patient completed Sit to Supine with modified independence      Activities of Daily Living:  · Lower Body Dressing: supervision .    Cognitive/Visual  Perceptual:  Cognitive/Psychosocial Skills:     -       Oriented to: Person, Place, Time and Situation   -       Follows Commands/attention:Follows multistep  commands  -       Communication: clear/fluent  -       Memory: No Deficits noted  -       Safety awareness/insight to disability: impaired     Physical Exam:  Upper Extremity Range of Motion:     -       Right Upper Extremity: WFL  -       Left Upper Extremity: WFL  Upper Extremity Strength:    -       Right Upper Extremity: WFL  -       Left Upper Extremity: WFL   Strength:    -       Right Upper Extremity: WFL  -       Left Upper Extremity: WFL    AMPAC 6 Click ADL:  AMPAC Total Score: 24    Treatment & Education:  Patient educated on role of OT in acute setting. Encouraged OOB throughout the course of hospitalization to decrease risk of hospital related debility WITH LSO Patient stated understanding and in agreement with POC. Pt referred to P.T. for continue gait training.      Education:    Patient left HOB elevated with all lines intact, call button in reach and nurse notified    GOALS:   Multidisciplinary Problems     Occupational Therapy Goals     Not on file                History:     Past Medical History:   Diagnosis Date    Bilateral carpal tunnel syndrome 2/25/2022    moderate demyelinating bilaterally    Carpal tunnel syndrome     Diabetes mellitus without complication     Fever 6/30/2022    Gram-positive bacteremia 7/2/2022    History of colon polyps     Hypercalcemia 3/23/2021    Hyperlipidemia associated with type 2 diabetes mellitus     Hypertension associated with diabetes     Left carpal tunnel syndrome 3/23/2021    Low back pain 9/28/2021    Lumbar disc disease with radiculopathy     Morbid obesity with BMI of 40.0-44.9, adult     Postoperative hematoma involving nervous system following nervous system procedure 6/28/2022    S/P parathyroidectomy 7/29/2021    Vitamin D deficiency        Past Surgical History:   Procedure  Laterality Date    CARPAL TUNNEL RELEASE Left 4/1/2021    Procedure: RELEASE, CARPAL TUNNEL;  Surgeon: Yoandy David MD;  Location: [facility] OR;  Service: Orthopedics;  Laterality: Left;    HERNIA REPAIR      INCISION AND DRAINAGE OF HEMATOMA N/A 6/28/2022    Procedure: INCISION AND DRAINAGE, HEMATOMA;  Surgeon: Shaggy Zepeda MD;  Location: [facility] OR;  Service: Neurosurgery;  Laterality: N/A;    LUMBAR LAMINECTOMY WITH DISCECTOMY Left 6/22/2022    Procedure: LAMINECTOMY, SPINE, LUMBAR, WITH DISCECTOMY;  Surgeon: Shaggy Zepeda MD;  Location: [facility] OR;  Service: Neurosurgery;  Laterality: Left;  minimally invasive L2-3 discectomy and decompression     LUMBAR LAMINECTOMY WITH DISCECTOMY N/A 6/28/2022    Procedure: LAMINECTOMY, SPINE, LUMBAR, WITH DISCECTOMY;  Surgeon: Shaggy Zepeda MD;  Location: [facility] OR;  Service: Neurosurgery;  Laterality: N/A;    PARATHYROIDECTOMY Bilateral 7/27/2021    Procedure: PARATHYROIDECTOMY;  Surgeon: Tha Dial MD;  Location: [facility] OR;  Service: ENT;  Laterality: Bilateral;  with medialstinal exploration    SELECTIVE INJECTION OF ANESTHETIC AGENT AROUND LUMBAR SPINAL NERVE ROOT BY TRANSFORAMINAL APPROACH Left 3/23/2022    Procedure: BLOCK, SPINAL NERVE ROOT, LUMBAR, SELECTIVE, TRANSFORAMINAL APPROACH Left L2-3, L3-4 RN IV sedation;  Surgeon: Jay Hodges MD;  Location: [facility] PAIN MGT;  Service: Pain Management;  Laterality: Left;    STERNOTOMY N/A 7/27/2021    Procedure: STERNOTOMY;  Surgeon: Tha Dial MD;  Location: [facility] OR;  Service: ENT;  Laterality: N/A;    ULNAR NERVE TRANSPOSITION Left 4/1/2021    Procedure: TRANSPOSITION, NERVE, ULNAR;  Surgeon: Yoandy David MD;  Location: [facility] OR;  Service: Orthopedics;  Laterality: Left;    ULNAR TUNNEL RELEASE Left 4/1/2021    Procedure: RELEASE, CUBITAL TUNNEL;  Surgeon: Yoandy David MD;  Location: [facility] OR;  Service: Orthopedics;  Laterality: Left;    UMBILICAL HERNIA REPAIR         Time Tracking:      OT Date of Treatment: 07/30/22  OT Start Time: 1500  OT Stop Time: 1530  OT Total Time (min): 30 min    Billable Minutes:Evaluation 15 minutes  Therapeutic Activity 15 minutes    7/30/2022

## 2022-07-30 NOTE — PT/OT/SLP EVAL
Physical Therapy Evaluation    Patient Name:  Friday ROGELIO Blake   MRN:  [id number]    Recommendations:     Discharge Recommendations:  home health PT   Discharge Equipment Recommendations: none   Barriers to discharge: None    Assessment:     Friday ROGELIO Blake is a 66 y.o. male admitted with a medical diagnosis of Wound dehiscence.  He presents with the following impairments/functional limitations:  impaired balance, impaired endurance, impaired functional mobility, impaired self care skills.    Rehab Prognosis: Good; patient would benefit from acute skilled PT services to address these deficits and reach maximum level of function.    Recent Surgery: * No surgery found *      Plan:     During this hospitalization, patient to be seen 3 x/week to address the identified rehab impairments via gait training, therapeutic activities, therapeutic exercises and progress toward the following goals:    · Plan of Care Expires:  08/13/22    Subjective     Chief Complaint: None stated.  Patient/Family Comments/goals: Get better and return home.  Pain/Comfort:  · Pain Rating 1: 0/10    Patients cultural, spiritual, Adventism conflicts given the current situation: no    Living Environment:  Patient lives with spouse and kids in a single level home with no steps to enter.   Prior to admission, patients level of function was Mod I with use of quad cane, independent with ADLs.  Equipment used at home: walker, rolling, cane, quad.  DME owned (not currently used): rolling walker.  Upon discharge, patient will have assistance from unknown.    Objective:     Communicated with MELANI Anguiano prior to session.  Patient found supine with telemetry, peripheral IV  upon PT entry to room.    General Precautions: Standard, airborne, contact, droplet   Orthopedic Precautions: (None)   Braces:  Back brace needs to be brought from home  Respiratory Status: Room air    Exams:  · RLE ROM: WFL  · RLE Strength: WFL  · LLE ROM: WFL  · LLE Strength:  WFL      Therapeutic Activities and Exercises:     Patient found supine in bed upon PT entrance into room. PT provided education on role of PT and POC.    Bed mobility preformed only due to patient not having back brace in room from recent surgery.    Rolling R to L: Mod I    Sup<>sit: Mod I    Seated EOB balance: SBA    Patient educated on HEP: Marching, APs, LAQ, quad sets     AM-PAC 6 CLICK MOBILITY  Total Score:12     Patient left supine with call button in reach.    GOALS:   Multidisciplinary Problems     Physical Therapy Goals        Problem: Physical Therapy    Goal Priority Disciplines Outcome Goal Variances Interventions   Physical Therapy Goal     PT, PT/OT                      History:     Past Medical History:   Diagnosis Date    Bilateral carpal tunnel syndrome 2/25/2022    moderate demyelinating bilaterally    Carpal tunnel syndrome     Diabetes mellitus without complication     Fever 6/30/2022    Gram-positive bacteremia 7/2/2022    History of colon polyps     Hypercalcemia 3/23/2021    Hyperlipidemia associated with type 2 diabetes mellitus     Hypertension associated with diabetes     Left carpal tunnel syndrome 3/23/2021    Low back pain 9/28/2021    Lumbar disc disease with radiculopathy     Morbid obesity with BMI of 40.0-44.9, adult     Postoperative hematoma involving nervous system following nervous system procedure 6/28/2022    S/P parathyroidectomy 7/29/2021    Vitamin D deficiency        Past Surgical History:   Procedure Laterality Date    CARPAL TUNNEL RELEASE Left 4/1/2021    Procedure: RELEASE, CARPAL TUNNEL;  Surgeon: Yoandy David MD;  Location: [facility] OR;  Service: Orthopedics;  Laterality: Left;    HERNIA REPAIR      INCISION AND DRAINAGE OF HEMATOMA N/A 6/28/2022    Procedure: INCISION AND DRAINAGE, HEMATOMA;  Surgeon: Shaggy Zepeda MD;  Location: [facility] OR;  Service: Neurosurgery;  Laterality: N/A;    LUMBAR LAMINECTOMY WITH DISCECTOMY Left 6/22/2022     Procedure: LAMINECTOMY, SPINE, LUMBAR, WITH DISCECTOMY;  Surgeon: Shaggy Zepeda MD;  Location: [facility] OR;  Service: Neurosurgery;  Laterality: Left;  minimally invasive L2-3 discectomy and decompression     LUMBAR LAMINECTOMY WITH DISCECTOMY N/A 6/28/2022    Procedure: LAMINECTOMY, SPINE, LUMBAR, WITH DISCECTOMY;  Surgeon: Shaggy Zepeda MD;  Location: [facility] OR;  Service: Neurosurgery;  Laterality: N/A;    PARATHYROIDECTOMY Bilateral 7/27/2021    Procedure: PARATHYROIDECTOMY;  Surgeon: Tha Dial MD;  Location: [facility] OR;  Service: ENT;  Laterality: Bilateral;  with medialstinal exploration    SELECTIVE INJECTION OF ANESTHETIC AGENT AROUND LUMBAR SPINAL NERVE ROOT BY TRANSFORAMINAL APPROACH Left 3/23/2022    Procedure: BLOCK, SPINAL NERVE ROOT, LUMBAR, SELECTIVE, TRANSFORAMINAL APPROACH Left L2-3, L3-4 RN IV sedation;  Surgeon: Jay Hodges MD;  [facility];  Service: Pain Management;  Laterality: Left;    STERNOTOMY N/A 7/27/2021    Procedure: STERNOTOMY;  Surgeon: Tha Dial MD;  Location: [facility] OR;  Service: ENT;  Laterality: N/A;    ULNAR NERVE TRANSPOSITION Left 4/1/2021    Procedure: TRANSPOSITION, NERVE, ULNAR;  Surgeon: Yoandy David MD;  Location: [facility] OR;  Service: Orthopedics;  Laterality: Left;    ULNAR TUNNEL RELEASE Left 4/1/2021    Procedure: RELEASE, CUBITAL TUNNEL;  Surgeon: Yoandy David MD;  Location[facility];  Service: Orthopedics;  Laterality: Left;    UMBILICAL HERNIA REPAIR         Time Tracking:     PT Received On: 07/30/22  PT Start Time: 0250     PT Stop Time: 0313  PT Total Time (min): 23 min     Billable Minutes: Evaluation 10 and Therapeutic Exercise 13      07/30/2022

## 2022-07-30 NOTE — PROGRESS NOTES
[facility] Medicine  Progress Note    Patient Name: Friday ROGELIO Blake  MRN: [id number]  Patient Class: OP- Observation   Admission Date: 7/29/2022  Length of Stay: 0 days  Attending Physician: Kuldeep Bermudez MD  Primary Care Provider: Sid Elizabeth MD        Subjective:     Principal Problem:Wound dehiscence        HPI:  67 y/o male with PMHx of HTN, HLD, DM, and obesity who presented tot he ED for eval of post op incision leaking.  Sx are constant and moderate in severity. No mitigating or exacerbating factors reported. Pt had surgery on 7/26, then took a trip to [facility]. He flew back last night. Pt denies fever, chills, pain, HA, N/V, and all other sx at this time.  ED workup shows: H/H 11.6/36.9, COVID +  He will be kept on OBS for CSF leak under the care of hospital medicine  He is a full code and his SDM is his wife             Overview/Hospital Course:  Mr Blake is a 66 year old male who presented to [facility] Emergency Room of evaluation of wound dehiscence. Patient underwent Laminectomy of lumbar spine on 6/22/2022 and I & D of hematoma on 6/28/2022. Traveled to [facility] for daughter's wedding. Positive for drainage from low back surgical wound. Neurosurgery following, plan possible washout vs wound vac placement.  Infectious Disease following, continue empiric IV antibiotics. COVID positive.       Interval History: Neurosurgery and ID following recommendations noted.     Review of Systems   Constitutional:  Negative for activity change, appetite change, chills, fatigue and fever.   HENT:  Negative for dental problem, ear pain, hearing loss, mouth sores, nosebleeds, sinus pressure, sore throat, tinnitus and trouble swallowing.    Eyes:  Negative for pain, discharge and visual disturbance.   Respiratory:  Negative for cough, choking, chest tightness, shortness of breath and wheezing.    Cardiovascular:  Negative for chest pain, palpitations and leg swelling.   Gastrointestinal:   Negative for abdominal distention, abdominal pain, anal bleeding, blood in stool, constipation, diarrhea, nausea and vomiting.   Endocrine: Negative for cold intolerance, heat intolerance, polydipsia, polyphagia and polyuria.   Genitourinary:  Negative for decreased urine volume, difficulty urinating, flank pain, frequency, hematuria and urgency.   Musculoskeletal:  Negative for arthralgias, back pain, gait problem, myalgias, neck pain and neck stiffness.   Skin:  Positive for wound (lower back surgical wound). Negative for color change and rash.   Allergic/Immunologic: Negative.    Neurological:  Negative for dizziness, tremors, seizures, syncope, speech difficulty, weakness, light-headedness and headaches.   Hematological: Negative.    Psychiatric/Behavioral:  Negative for agitation, behavioral problems, confusion and sleep disturbance. The patient is not nervous/anxious.    All other systems reviewed and are negative.  Objective:     Vital Signs (Most Recent):  Temp: 98.2 °F (36.8 °C) (07/30/22 1103)  Pulse: 71 (07/30/22 1343)  Resp: (!) 21 (07/30/22 1251)  BP: 132/77 (07/30/22 1103)  SpO2: 97 % (07/30/22 1103)   Vital Signs (24h Range):  Temp:  [97.8 °F (36.6 °C)-98.4 °F (36.9 °C)] 98.2 °F (36.8 °C)  Pulse:  [60-85] 71  Resp:  [17-21] 21  SpO2:  [94 %-98 %] 97 %  BP: (114-135)/(59-86) 132/77     Weight: 102.2 kg (225 lb 5 oz)  Body mass index is 36.37 kg/m².    Intake/Output Summary (Last 24 hours) at 7/30/2022 1520  Last data filed at 7/29/2022 2019  Gross per 24 hour   Intake 1000 ml   Output --   Net 1000 ml      Physical Exam  Vitals and nursing note reviewed.   Constitutional:       General: He is not in acute distress.     Appearance: He is well-developed. He is not diaphoretic.   HENT:      Head: Normocephalic and atraumatic.      Nose: Nose normal.   Eyes:      General:         Right eye: No discharge.         Left eye: No discharge.      Conjunctiva/sclera: Conjunctivae normal.      Pupils: Pupils are  equal, round, and reactive to light.   Neck:      Thyroid: No thyromegaly.      Vascular: No JVD.      Trachea: No tracheal deviation.   Cardiovascular:      Rate and Rhythm: Normal rate and regular rhythm.      Heart sounds: Normal heart sounds. No murmur heard.    No friction rub. No gallop.   Pulmonary:      Effort: Pulmonary effort is normal. No respiratory distress.      Breath sounds: Normal breath sounds. No wheezing or rales.   Chest:      Chest wall: No tenderness.   Abdominal:      General: Bowel sounds are normal. There is no distension.      Palpations: Abdomen is soft. There is no mass.      Tenderness: There is no abdominal tenderness.   Genitourinary:     Comments: deferred  Musculoskeletal:         General: No tenderness or deformity. Normal range of motion.      Cervical back: Normal range of motion and neck supple.      Comments: Lower back surgical wound with dressing    Skin:     General: Skin is warm and dry.      Capillary Refill: Capillary refill takes less than 2 seconds.      Findings: No erythema or rash.   Neurological:      Mental Status: He is alert and oriented to person, place, and time.      Motor: No abnormal muscle tone.      Coordination: Coordination normal.   Psychiatric:         Behavior: Behavior normal.       Significant Labs: All pertinent labs within the past 24 hours have been reviewed.  CBC:   Recent Labs   Lab 07/29/22  1628 07/30/22  0605   WBC 5.18 5.33   HGB 11.6* 11.3*   HCT 36.9* 37.6*    243     CMP:   Recent Labs   Lab 07/29/22  1724 07/30/22  0605    140   K 4.2 4.4    110   CO2 22* 23   * 176*   BUN 17 13   CREATININE 0.8 0.7   CALCIUM 9.1 8.4*   PROT 6.9  --    ALBUMIN 3.6  --    BILITOT 0.3  --    ALKPHOS 93  --    AST 18  --    ALT 15  --    ANIONGAP 11 7*   EGFRNONAA >60 >60     Coagulation:   Recent Labs   Lab 07/30/22  0039   INR 1.0   APTT 25.6     Troponin:   Recent Labs   Lab 07/30/22  0038   TROPONINI <0.006       Significant  Imaging:     Imaging Results    None            Assessment/Plan:      * Wound dehiscence  --wound cx pending  --Neuro surgery consulted  --ID consulted      7/30/2022  Neurosurgery following    Possible washout vs wound vac placement   ID following, continue empiric IV antibiotic        COVID  Patient is identified as High risk for severe complications of COVID 19 based on COVID risk score of 5   Initiate standard COVID protocols; COVID-19 testing ,Infection Control notification  and isolation- respiratory, contact and droplet per protocol    Diagnostics: (leukopenia, hyponatremia, hyperferritinemia, elevated troponin, elevated d-dimer, age, and comorbidities are significant predictors of poor clinical outcome)  CBC, CMP, Ferritin, CRP and Portable CXR    Management: Maintain oxygen saturations 92-96% via Nasal Cannula  LPM and monitor with continuous/intermittent pulse oximetry.  and Inhaled bronchodilators as needed for shortness of breath.    HLD (hyperlipidemia)  --continue atorvastatin  --cardiac diet      Hypertension  --continue losartan  --cardiac diet      Degenerative disc disease, lumbar  --supportive care  --PT/OT consulted      Diabetes mellitus without complication  Patient's FSGs are controlled on current medication regimen.  Last A1c reviewed-   Lab Results   Component Value Date    HGBA1C 8.3 (H) 05/18/2022     Most recent fingerstick glucose reviewed-   Recent Labs   Lab 07/30/22  0028 07/30/22  0535 07/30/22  1113   POCTGLUCOSE 275* 197* 165*     Current correctional scale  Low  Maintain anti-hyperglycemic dose as follows-   Antihyperglycemics (From admission, onward)            Start     Stop Route Frequency Ordered    07/30/22 0032  insulin aspart U-100 pen 0-5 Units         -- SubQ Before meals & nightly PRN 07/29/22 2333        Hold Oral hypoglycemics while patient is in the hospital.      VTE Risk Mitigation (From admission, onward)         Ordered     enoxaparin injection 100 mg  2 times  daily         07/29/22 2336     IP VTE HIGH RISK PATIENT  Once         07/29/22 2333     Place sequential compression device  Until discontinued         07/29/22 2333                Discharge Planning   MATEUS:      Code Status: Full Code   Is the patient medically ready for discharge?:     Reason for patient still in hospital (select all that apply): Patient trending condition and Treatment                     Kamron Suero NP  Department of Hospital Medicine   [facility] ([facility])

## 2022-07-30 NOTE — ASSESSMENT & PLAN NOTE
Patient is identified as High risk for severe complications of COVID 19 based on COVID risk score of 5   Initiate standard COVID protocols; COVID-19 testing ,Infection Control notification  and isolation- respiratory, contact and droplet per protocol    Diagnostics: (leukopenia, hyponatremia, hyperferritinemia, elevated troponin, elevated d-dimer, age, and comorbidities are significant predictors of poor clinical outcome)  CBC, CMP, Ferritin, CRP and Portable CXR    Management: Maintain oxygen saturations 92-96% via Nasal Cannula  LPM and monitor with continuous/intermittent pulse oximetry.  and Inhaled bronchodilators as needed for shortness of breath.

## 2022-07-30 NOTE — CARE UPDATE
Patient seen and examined  S/p back surgery   ESR -55  Will start dapto/Cefepime empiric  Full note to follow

## 2022-07-30 NOTE — ASSESSMENT & PLAN NOTE
Patient's FSGs are controlled on current medication regimen.  Last A1c reviewed-   Lab Results   Component Value Date    HGBA1C 8.3 (H) 05/18/2022     Most recent fingerstick glucose reviewed- No results for input(s): POCTGLUCOSE in the last 24 hours.  Current correctional scale  Low  Maintain anti-hyperglycemic dose as follows-   Antihyperglycemics (From admission, onward)            Start     Stop Route Frequency Ordered    07/30/22 0032  insulin aspart U-100 pen 0-5 Units         -- SubQ Before meals & nightly PRN 07/29/22 2333        Hold Oral hypoglycemics while patient is in the hospital.

## 2022-07-31 LAB
ANION GAP SERPL CALC-SCNC: 8 MMOL/L (ref 8–16)
BASOPHILS # BLD AUTO: 0.02 K/UL (ref 0–0.2)
BASOPHILS NFR BLD: 0.4 % (ref 0–1.9)
BUN SERPL-MCNC: 11 MG/DL (ref 8–23)
CALCIUM SERPL-MCNC: 9.1 MG/DL (ref 8.7–10.5)
CHLORIDE SERPL-SCNC: 109 MMOL/L (ref 95–110)
CO2 SERPL-SCNC: 24 MMOL/L (ref 23–29)
CREAT SERPL-MCNC: 0.7 MG/DL (ref 0.5–1.4)
D DIMER PPP IA.FEU-MCNC: 1.04 MG/L FEU
DIFFERENTIAL METHOD: ABNORMAL
EOSINOPHIL # BLD AUTO: 0.2 K/UL (ref 0–0.5)
EOSINOPHIL NFR BLD: 3.1 % (ref 0–8)
ERYTHROCYTE [DISTWIDTH] IN BLOOD BY AUTOMATED COUNT: 14.9 % (ref 11.5–14.5)
EST. GFR  (AFRICAN AMERICAN): >60 ML/MIN/1.73 M^2
EST. GFR  (NON AFRICAN AMERICAN): >60 ML/MIN/1.73 M^2
GLUCOSE SERPL-MCNC: 132 MG/DL (ref 70–110)
HCT VFR BLD AUTO: 38.6 % (ref 40–54)
HGB BLD-MCNC: 12 G/DL (ref 14–18)
IMM GRANULOCYTES # BLD AUTO: 0.02 K/UL (ref 0–0.04)
IMM GRANULOCYTES NFR BLD AUTO: 0.4 % (ref 0–0.5)
LDH SERPL L TO P-CCNC: 153 U/L (ref 110–260)
LYMPHOCYTES # BLD AUTO: 2.7 K/UL (ref 1–4.8)
LYMPHOCYTES NFR BLD: 50.6 % (ref 18–48)
MCH RBC QN AUTO: 27.8 PG (ref 27–31)
MCHC RBC AUTO-ENTMCNC: 31.1 G/DL (ref 32–36)
MCV RBC AUTO: 89 FL (ref 82–98)
MONOCYTES # BLD AUTO: 0.3 K/UL (ref 0.3–1)
MONOCYTES NFR BLD: 6.3 % (ref 4–15)
NEUTROPHILS # BLD AUTO: 2.1 K/UL (ref 1.8–7.7)
NEUTROPHILS NFR BLD: 39.2 % (ref 38–73)
NRBC BLD-RTO: 0 /100 WBC
PLATELET # BLD AUTO: 235 K/UL (ref 150–450)
PMV BLD AUTO: 11.5 FL (ref 9.2–12.9)
POCT GLUCOSE: 123 MG/DL (ref 70–110)
POCT GLUCOSE: 128 MG/DL (ref 70–110)
POCT GLUCOSE: 130 MG/DL (ref 70–110)
POCT GLUCOSE: 145 MG/DL (ref 70–110)
POTASSIUM SERPL-SCNC: 4.5 MMOL/L (ref 3.5–5.1)
RBC # BLD AUTO: 4.32 M/UL (ref 4.6–6.2)
SODIUM SERPL-SCNC: 141 MMOL/L (ref 136–145)
WBC # BLD AUTO: 5.24 K/UL (ref 3.9–12.7)

## 2022-07-31 PROCEDURE — 94761 N-INVAS EAR/PLS OXIMETRY MLT: CPT

## 2022-07-31 PROCEDURE — 82728 ASSAY OF FERRITIN: CPT | Performed by: NURSE PRACTITIONER

## 2022-07-31 PROCEDURE — 25000003 PHARM REV CODE 250: Performed by: INTERNAL MEDICINE

## 2022-07-31 PROCEDURE — 96372 THER/PROPH/DIAG INJ SC/IM: CPT | Performed by: NURSE PRACTITIONER

## 2022-07-31 PROCEDURE — A4216 STERILE WATER/SALINE, 10 ML: HCPCS | Performed by: NURSE PRACTITIONER

## 2022-07-31 PROCEDURE — 80048 BASIC METABOLIC PNL TOTAL CA: CPT | Performed by: NURSE PRACTITIONER

## 2022-07-31 PROCEDURE — 85025 COMPLETE CBC W/AUTO DIFF WBC: CPT | Performed by: NURSE PRACTITIONER

## 2022-07-31 PROCEDURE — 63600175 PHARM REV CODE 636 W HCPCS: Performed by: NURSE PRACTITIONER

## 2022-07-31 PROCEDURE — 21400001 HC TELEMETRY ROOM

## 2022-07-31 PROCEDURE — 99233 PR SUBSEQUENT HOSPITAL CARE,LEVL III: ICD-10-PCS | Mod: NSCH,,, | Performed by: INTERNAL MEDICINE

## 2022-07-31 PROCEDURE — 27000207 HC ISOLATION

## 2022-07-31 PROCEDURE — 25000003 PHARM REV CODE 250: Performed by: NURSE PRACTITIONER

## 2022-07-31 PROCEDURE — 85379 FIBRIN DEGRADATION QUANT: CPT | Performed by: NURSE PRACTITIONER

## 2022-07-31 PROCEDURE — 63600175 PHARM REV CODE 636 W HCPCS: Performed by: INTERNAL MEDICINE

## 2022-07-31 PROCEDURE — 94640 AIRWAY INHALATION TREATMENT: CPT

## 2022-07-31 PROCEDURE — 36415 COLL VENOUS BLD VENIPUNCTURE: CPT | Performed by: NURSE PRACTITIONER

## 2022-07-31 PROCEDURE — 83615 LACTATE (LD) (LDH) ENZYME: CPT | Performed by: NURSE PRACTITIONER

## 2022-07-31 PROCEDURE — 99233 SBSQ HOSP IP/OBS HIGH 50: CPT | Mod: NSCH,,, | Performed by: INTERNAL MEDICINE

## 2022-07-31 RX ORDER — FAMOTIDINE 20 MG/1
20 TABLET, FILM COATED ORAL 2 TIMES DAILY
Status: DISCONTINUED | OUTPATIENT
Start: 2022-07-31 | End: 2022-08-12 | Stop reason: HOSPADM

## 2022-07-31 RX ADMIN — DAPTOMYCIN 820 MG: 350 INJECTION, POWDER, LYOPHILIZED, FOR SOLUTION INTRAVENOUS at 01:07

## 2022-07-31 RX ADMIN — METRONIDAZOLE 500 MG: 500 TABLET ORAL at 10:07

## 2022-07-31 RX ADMIN — CEFEPIME 2 G: 2 INJECTION, POWDER, FOR SOLUTION INTRAVENOUS at 09:07

## 2022-07-31 RX ADMIN — CEFEPIME 2 G: 2 INJECTION, POWDER, FOR SOLUTION INTRAVENOUS at 05:07

## 2022-07-31 RX ADMIN — Medication 1000 UNITS: at 09:07

## 2022-07-31 RX ADMIN — Medication 10 ML: at 05:07

## 2022-07-31 RX ADMIN — METRONIDAZOLE 500 MG: 500 TABLET ORAL at 01:07

## 2022-07-31 RX ADMIN — DOCUSATE SODIUM 100 MG: 100 CAPSULE, LIQUID FILLED ORAL at 10:07

## 2022-07-31 RX ADMIN — DOCUSATE SODIUM 100 MG: 100 CAPSULE, LIQUID FILLED ORAL at 09:07

## 2022-07-31 RX ADMIN — GABAPENTIN 300 MG: 300 CAPSULE ORAL at 10:07

## 2022-07-31 RX ADMIN — FAMOTIDINE 20 MG: 20 TABLET ORAL at 10:07

## 2022-07-31 RX ADMIN — ATORVASTATIN CALCIUM 10 MG: 10 TABLET, FILM COATED ORAL at 10:07

## 2022-07-31 RX ADMIN — METRONIDAZOLE 500 MG: 500 TABLET ORAL at 05:07

## 2022-07-31 RX ADMIN — ALBUTEROL SULFATE 2 PUFF: 90 AEROSOL, METERED RESPIRATORY (INHALATION) at 07:07

## 2022-07-31 RX ADMIN — GABAPENTIN 300 MG: 300 CAPSULE ORAL at 09:07

## 2022-07-31 RX ADMIN — OXYCODONE HYDROCHLORIDE AND ACETAMINOPHEN 500 MG: 500 TABLET ORAL at 10:07

## 2022-07-31 RX ADMIN — OXYCODONE HYDROCHLORIDE AND ACETAMINOPHEN 500 MG: 500 TABLET ORAL at 09:07

## 2022-07-31 RX ADMIN — MINOCYCLINE HYDROCHLORIDE 100 MG: 50 CAPSULE ORAL at 09:07

## 2022-07-31 RX ADMIN — ALBUTEROL SULFATE 2 PUFF: 90 AEROSOL, METERED RESPIRATORY (INHALATION) at 01:07

## 2022-07-31 RX ADMIN — CEFEPIME 2 G: 2 INJECTION, POWDER, FOR SOLUTION INTRAVENOUS at 02:07

## 2022-07-31 RX ADMIN — ASPIRIN 81 MG: 81 TABLET, COATED ORAL at 09:07

## 2022-07-31 RX ADMIN — LOSARTAN POTASSIUM 50 MG: 50 TABLET, FILM COATED ORAL at 09:07

## 2022-07-31 RX ADMIN — FAMOTIDINE 20 MG: 20 TABLET ORAL at 01:07

## 2022-07-31 RX ADMIN — ENOXAPARIN SODIUM 100 MG: 100 INJECTION SUBCUTANEOUS at 10:07

## 2022-07-31 RX ADMIN — ALBUTEROL SULFATE 2 PUFF: 90 AEROSOL, METERED RESPIRATORY (INHALATION) at 12:07

## 2022-07-31 RX ADMIN — Medication 2 ML: at 01:07

## 2022-07-31 RX ADMIN — MINOCYCLINE HYDROCHLORIDE 100 MG: 50 CAPSULE ORAL at 10:07

## 2022-07-31 RX ADMIN — GABAPENTIN 300 MG: 300 CAPSULE ORAL at 03:07

## 2022-07-31 RX ADMIN — ENOXAPARIN SODIUM 100 MG: 100 INJECTION SUBCUTANEOUS at 09:07

## 2022-07-31 RX ADMIN — THERA TABS 1 TABLET: TAB at 09:07

## 2022-07-31 NOTE — PLAN OF CARE
Plan of care reviewed with pt, verbalized understanding. A&O x4. IV intact, dry, and clean. Medications given with no complications, IV antibiotics given per order. On room air. Pt ambulates and turns in bed independently. BG monitored. No pain reported. NS to SB on monitor. Instructed to call for assistance. Hourly rounding completed.

## 2022-07-31 NOTE — HPI
66 year old man with PMHx of DM, HTN, hypercalcemia, and hyperlipidemia who presents to the Emergency Department for evaluation of post-op complication.    His surgery history-    06/22/22-  LAMINECTOMY, SPINE, LUMBAR, WITH DISCECTOMY (Left) L2-3      06/26/22  Hemilaminectomy, partial medial facetectomy and foraminotomy L2-3 on left   with discectomy using microscopic dissection   06/28-22-  Fluid collection ventral and lateral consistent with epidural epidural hematoma  compression   Retained disc fragment   Cultures=06/29- aerobic culture- MSSE  CUTIBACTERIUM ACNES   06/28-BACTEROIDES FRAGILIS   06/28- staph epi -blood    He still has persistent leakage from the back and had recent travel to [place].  MRI of the spine -pending   ESR -55, crp-normal

## 2022-07-31 NOTE — SUBJECTIVE & OBJECTIVE
Past Medical History:   Diagnosis Date    Bilateral carpal tunnel syndrome 2/25/2022    moderate demyelinating bilaterally    Carpal tunnel syndrome     Diabetes mellitus without complication     Fever 6/30/2022    Gram-positive bacteremia 7/2/2022    History of colon polyps     Hypercalcemia 3/23/2021    Hyperlipidemia associated with type 2 diabetes mellitus     Hypertension associated with diabetes     Left carpal tunnel syndrome 3/23/2021    Low back pain 9/28/2021    Lumbar disc disease with radiculopathy     Morbid obesity with BMI of 40.0-44.9, adult     Postoperative hematoma involving nervous system following nervous system procedure 6/28/2022    S/P parathyroidectomy 7/29/2021    Vitamin D deficiency        Past Surgical History:   Procedure Laterality Date    CARPAL TUNNEL RELEASE Left 4/1/2021    Procedure: RELEASE, CARPAL TUNNEL;  Surgeon: Yoandy David MD;  Location: [facility] OR;  Service: Orthopedics;  Laterality: Left;    HERNIA REPAIR      INCISION AND DRAINAGE OF HEMATOMA N/A 6/28/2022    Procedure: INCISION AND DRAINAGE, HEMATOMA;  Surgeon: Shaggy Zepeda MD;  Location: [facility] OR;  Service: Neurosurgery;  Laterality: N/A;    LUMBAR LAMINECTOMY WITH DISCECTOMY Left 6/22/2022    Procedure: LAMINECTOMY, SPINE, LUMBAR, WITH DISCECTOMY;  Surgeon: Shaggy Zepeda MD;  Location: [facility] OR;  Service: Neurosurgery;  Laterality: Left;  minimally invasive L2-3 discectomy and decompression     LUMBAR LAMINECTOMY WITH DISCECTOMY N/A 6/28/2022    Procedure: LAMINECTOMY, SPINE, LUMBAR, WITH DISCECTOMY;  Surgeon: Shaggy Zepeda MD;  Location: [facility] OR;  Service: Neurosurgery;  Laterality: N/A;    PARATHYROIDECTOMY Bilateral 7/27/2021    Procedure: PARATHYROIDECTOMY;  Surgeon: Tha Dial MD;  Location: [facility] OR;  Service: ENT;  Laterality: Bilateral;  with medialstinal exploration    SELECTIVE INJECTION OF ANESTHETIC AGENT AROUND LUMBAR SPINAL NERVE ROOT BY TRANSFORAMINAL APPROACH Left 3/23/2022     Procedure: BLOCK, SPINAL NERVE ROOT, LUMBAR, SELECTIVE, TRANSFORAMINAL APPROACH Left L2-3, L3-4 RN IV sedation;  Surgeon: Jay Hodges MD;  Location: [facility] PAIN MGT;  Service: Pain Management;  Laterality: Left;    STERNOTOMY N/A 7/27/2021    Procedure: STERNOTOMY;  Surgeon: Tha Dial MD;  Location: [facility] OR;  Service: ENT;  Laterality: N/A;    ULNAR NERVE TRANSPOSITION Left 4/1/2021    Procedure: TRANSPOSITION, NERVE, ULNAR;  Surgeon: Yoandy David MD;  Location: [facility] OR;  Service: Orthopedics;  Laterality: Left;    ULNAR TUNNEL RELEASE Left 4/1/2021    Procedure: RELEASE, CUBITAL TUNNEL;  Surgeon: Yoandy David MD;  Location: [facility] OR;  Service: Orthopedics;  Laterality: Left;    UMBILICAL HERNIA REPAIR         Review of patient's allergies indicates:  No Known Allergies    Medications:  Medications Prior to Admission   Medication Sig    amoxicillin-clavulanate 875-125mg (AUGMENTIN) 875-125 mg per tablet Take 1 tablet by mouth 2 (two) times daily.    aspirin (ECOTRIN) 81 MG EC tablet Take 1 tablet (81 mg total) by mouth once daily.    atorvastatin (LIPITOR) 10 MG tablet Take 1 tablet (10 mg total) by mouth every evening.    blood sugar diagnostic Strp To check BG 1 times daily, to use with insurance preferred meter    blood-glucose meter kit To check BG 1 times daily, to use with insurance preferred meter    dapagliflozin (FARXIGA) 5 mg Tab tablet Take 1 tablet (5 mg total) by mouth once daily.    docusate sodium (COLACE) 100 MG capsule Take 1 capsule (100 mg total) by mouth 2 (two) times daily.    gabapentin (NEURONTIN) 300 MG capsule Take 1 capsule (300 mg total) by mouth 3 (three) times daily.    irbesartan (AVAPRO) 150 MG tablet Take 1 tablet (150 mg total) by mouth once daily.    lancets (ONETOUCH ULTRASOFT LANCETS) Misc Check FS daily    lancets Misc To check BG 1 times daily, to use with insurance preferred meter    metFORMIN (GLUCOPHAGE) 1000 MG tablet TAKE 1 TABLET BY MOUTH TWICE DAILY  WITH MEALS    methocarbamoL (ROBAXIN) 750 MG Tab Take 1 tablet (750 mg total) by mouth 3 (three) times daily as needed (muscle spasms).    ONETOUCH VERIO TEST STRIPS Strp USE 1 STRIP TO CHECK GLUCOSE ONCE DAILY    oxyCODONE-acetaminophen (PERCOCET)  mg per tablet Take 1 tablet by mouth every 4 to 6 hours as needed for Pain.    senna-docusate 8.6-50 mg (SENNA LAXATIVE-STOOL SOFTENER) 8.6-50 mg per tablet Take 1 tablet by mouth once daily.     Antibiotics (From admission, onward)                Start     Stop Route Frequency Ordered    07/30/22 1100  DAPTOmycin (CUBICIN) 820 mg in sodium chloride 0.9% 50 mL IVPB         -- IV Every 24 hours (non-standard times) 07/30/22 0836    07/30/22 1015  cefepime in dextrose 5 % IVPB 2 g         -- IV Every 8 hours (non-standard times) 07/30/22 0838          Antifungals (From admission, onward)                None          Antivirals (From admission, onward)      None             Immunization History   Administered Date(s) Administered    COVID-19, MRNA, LN-S, PF (Pfizer) (Purple Cap) 03/04/2021, 03/25/2021, 11/05/2021, 07/13/2022    Hepatitis A, Adult 02/26/2020    Hepatitis B, Adult 02/26/2020    Influenza (FLUAD) - Quadrivalent - Adjuvanted - PF *Preferred* (65+) 11/05/2021    Influenza (FLUBLOK) - Quadrivalent - Recombinant - PF *Preferred* (egg allergy) 10/18/2020    Influenza - Quadrivalent - PF *Preferred* (6 months and older) 12/09/2014, 11/17/2015, 11/06/2018, 12/10/2019, 10/18/2020    Influenza Split 10/05/2016    Meningococcal Conjugate (MCV4P) 02/26/2020    Pneumococcal Conjugate - 13 Valent 11/06/2018, 01/04/2021    Pneumococcal Polysaccharide - 23 Valent 11/17/2015, 01/29/2019    Tdap 11/17/2015    Zoster Recombinant 09/12/2018, 11/06/2018       Family History       Problem Relation (Age of Onset)    Asthma Mother    Diabetes Mellitus Father    Hypertension Father    Prostate cancer Brother          Social History     Socioeconomic History    Marital status:     Tobacco Use    Smoking status: Never Smoker    Smokeless tobacco: Never Used   Substance and Sexual Activity    Alcohol use: Never    Drug use: Never    Sexual activity: Yes     Partners: Female     Review of Systems   Constitutional:  Negative for activity change, appetite change, chills, diaphoresis and fatigue.   HENT:  Negative for congestion, dental problem, ear discharge, ear pain and facial swelling.    Eyes:  Negative for pain, discharge and itching.   Respiratory:  Negative for apnea, cough, chest tightness and shortness of breath.    Cardiovascular:  Negative for chest pain and leg swelling.   Gastrointestinal:  Negative for abdominal distention and abdominal pain.   Endocrine: Negative for cold intolerance, heat intolerance and polydipsia.   Genitourinary:  Negative for difficulty urinating, dysuria and enuresis.   Musculoskeletal:  Positive for back pain. Negative for arthralgias.        Leakage of fluid from op site   Skin:  Negative for color change and pallor.   Allergic/Immunologic: Negative for environmental allergies and food allergies.   Neurological:  Negative for dizziness, facial asymmetry, light-headedness and headaches.   Hematological:  Negative for adenopathy. Does not bruise/bleed easily.   Psychiatric/Behavioral:  Negative for agitation and behavioral problems.    Objective:     Vital Signs (Most Recent):  Temp: 98.2 °F (36.8 °C) (07/30/22 1618)  Pulse: 68 (07/30/22 1738)  Resp: 20 (07/30/22 1618)  BP: 117/70 (07/30/22 1618)  SpO2: 97 % (07/30/22 1618) Vital Signs (24h Range):  Temp:  [97.8 °F (36.6 °C)-98.4 °F (36.9 °C)] 98.2 °F (36.8 °C)  Pulse:  [60-85] 68  Resp:  [18-21] 20  SpO2:  [94 %-98 %] 97 %  BP: (114-135)/(59-86) 117/70     Weight: 102.2 kg (225 lb 5 oz)  Body mass index is 36.37 kg/m².    Estimated Creatinine Clearance: 116.3 mL/min (based on SCr of 0.7 mg/dL).    Physical Exam  Vitals and nursing note reviewed.   Constitutional:       Appearance: He is  well-developed.   HENT:      Head: Normocephalic and atraumatic.      Nose: Nose normal.   Eyes:      Comments: PERRL   Cardiovascular:      Rate and Rhythm: Normal rate and regular rhythm.      Heart sounds: Normal heart sounds.   Pulmonary:      Effort: Pulmonary effort is normal. No respiratory distress.      Breath sounds: Normal breath sounds. No wheezing or rales.   Abdominal:      General: Abdomen is flat.      Tenderness: There is no abdominal tenderness.   Musculoskeletal:         General: Normal range of motion.      Cervical back: Normal range of motion and neck supple.      Comments: Dressing noted over the spinal region    Skin:     General: Skin is dry.   Neurological:      Mental Status: He is alert and oriented to person, place, and time.       Significant Labs: BMP:   Recent Labs   Lab 07/30/22  0605   *      K 4.4      CO2 23   BUN 13   CREATININE 0.7   CALCIUM 8.4*     CBC:   Recent Labs   Lab 07/29/22  1628 07/30/22  0605   WBC 5.18 5.33   HGB 11.6* 11.3*   HCT 36.9* 37.6*    243     CMP:   Recent Labs   Lab 07/29/22  1724 07/30/22  0605    140   K 4.2 4.4    110   CO2 22* 23   * 176*   BUN 17 13   CREATININE 0.8 0.7   CALCIUM 9.1 8.4*   PROT 6.9  --    ALBUMIN 3.6  --    BILITOT 0.3  --    ALKPHOS 93  --    AST 18  --    ALT 15  --    ANIONGAP 11 7*   EGFRNONAA >60 >60       Significant Imaging: I have reviewed all pertinent imaging results/findings within the past 24 hours.

## 2022-07-31 NOTE — ASSESSMENT & PLAN NOTE
Patient's FSGs are controlled on current medication regimen.  Last A1c reviewed-   Lab Results   Component Value Date    HGBA1C 8.3 (H) 05/18/2022     Most recent fingerstick glucose reviewed-   Recent Labs   Lab 07/30/22  1623 07/30/22  2222 07/31/22  0540 07/31/22  1140   POCTGLUCOSE 148* 138* 145* 123*     Current correctional scale  Low  Maintain anti-hyperglycemic dose as follows-   Antihyperglycemics (From admission, onward)            Start     Stop Route Frequency Ordered    07/30/22 0032  insulin aspart U-100 pen 0-5 Units         -- SubQ Before meals & nightly PRN 07/29/22 2333        Hold Oral hypoglycemics while patient is in the hospital.

## 2022-07-31 NOTE — ASSESSMENT & PLAN NOTE
--wound cx pending  --Neuro surgery consulted  --ID consulted      7/30/2022  Neurosurgery following    Possible washout vs wound vac placement   ID following, continue empiric IV antibiotic        7/31/2022  MRI of lumbar spine strongly concerning for discitis osteomyelitis at L2-3, probable epidural phlegmon or abscess.   Neurosurgery following, will need revision with washout. Will follow.   Continue IV antibiotics  Wound cultures in progress

## 2022-07-31 NOTE — PROGRESS NOTES
Patient resting comfortably   MRI reviewed   Superficial as well as deep fluid collection noted   S/p discectomy and then washout     AP   Given imaging will need revision with wahsout   Doing well otherwise without significant p[ain/weakness   Ambulating and doing well otherwise   Will contact plastic surgery for assistance with closure of complex wound   Next week   Discussed with pt and he is onboard with this treatment plan

## 2022-07-31 NOTE — ASSESSMENT & PLAN NOTE
All previous cultures reviewed.  Will follow MRI of the spine and new cultures.    Cultures=06/29- aerobic culture- [facility]  CUTIBACTERIUM ACNES   06/28-BACTEROIDES FRAGILIS   06/28- staph epi -blood      Is this CSF leak ? Will send fluid for   Beta (?)-2 transferrin testing  For now will use IV daptomycin/cefepime , add empiric doxycycline/flagyl  Follow repeat cultures .  If no pseudomonas, plan will be 6 weeks of Daptomycin/Rocephin and then 3-6 months of doxycycline

## 2022-07-31 NOTE — PROGRESS NOTES
[facility] Medicine  Progress Note    Patient Name: Friday ROGELIO Blake  MRN: [id number]  Patient Class: IP- Inpatient   Admission Date: 7/29/2022  Length of Stay: 0 days  Attending Physician: Kuldeep Bermudez MD  Primary Care Provider: Sid Elizabeth MD        Subjective:     Principal Problem:Wound dehiscence        HPI:  67 y/o male with PMHx of HTN, HLD, DM, and obesity who presented tot he ED for eval of post op incision leaking.  Sx are constant and moderate in severity. No mitigating or exacerbating factors reported. Pt had surgery on 7/26, then took a trip to [facility]. He flew back last night. Pt denies fever, chills, pain, HA, N/V, and all other sx at this time.  ED workup shows: H/H 11.6/36.9, COVID +  He will be kept on OBS for CSF leak under the care of hospital medicine  He is a full code and his SDM is his wife             Overview/Hospital Course:  Mr Blake is a 66 year old male who presented to [facility] of evaluation of wound dehiscence. Patient underwent Laminectomy of lumbar spine on 6/22/2022 and I & D of hematoma on 6/28/2022. Traveled to [facility] for daughter's wedding. Positive for drainage from low back surgical wound. Neurosurgery following, plan possible washout vs wound vac placement.  Infectious Disease following, continue empiric IV antibiotics. COVID positive. As of 7/31/2022, patient feeling well, vital signs and labs stable. MRI lumbar spine strongly concerning for discitis osteomyelitis at L2-3, probable epidural phlegmon or abscess. Neurosurgery following, will need revision with washout. Will follow.       Interval History: MRI of lumbar spine strongly concerning for discitis osteomyelitis at L2-3, probable epidural phlegmon or abscess. Neurosurgery following, will need revision with washout.     Review of Systems   Constitutional:  Negative for activity change, appetite change, chills, fatigue and fever.   HENT:  Negative for dental problem, ear  pain, hearing loss, mouth sores, nosebleeds, sinus pressure, sore throat, tinnitus and trouble swallowing.    Eyes:  Negative for pain, discharge and visual disturbance.   Respiratory:  Negative for cough, choking, chest tightness, shortness of breath and wheezing.    Cardiovascular:  Negative for chest pain, palpitations and leg swelling.   Gastrointestinal:  Negative for abdominal distention, abdominal pain, anal bleeding, blood in stool, constipation, diarrhea, nausea and vomiting.   Endocrine: Negative for cold intolerance, heat intolerance, polydipsia, polyphagia and polyuria.   Genitourinary:  Negative for decreased urine volume, difficulty urinating, flank pain, frequency, hematuria and urgency.   Musculoskeletal:  Negative for arthralgias, back pain, gait problem, myalgias, neck pain and neck stiffness.   Skin:  Positive for wound (lower back surgical wound). Negative for color change and rash.   Allergic/Immunologic: Negative.    Neurological:  Negative for dizziness, tremors, seizures, syncope, speech difficulty, weakness, light-headedness and headaches.   Hematological: Negative.    Psychiatric/Behavioral:  Negative for agitation, behavioral problems, confusion and sleep disturbance. The patient is not nervous/anxious.    All other systems reviewed and are negative.  Objective:     Vital Signs (Most Recent):  Temp: 98.1 °F (36.7 °C) (07/31/22 1600)  Pulse: 63 (07/31/22 1600)  Resp: 12 (07/31/22 1600)  BP: 132/77 (07/31/22 1600)  SpO2: 97 % (07/31/22 1600) Vital Signs (24h Range):  Temp:  [97.8 °F (36.6 °C)-98.4 °F (36.9 °C)] 98.1 °F (36.7 °C)  Pulse:  [54-72] 63  Resp:  [11-20] 12  SpO2:  [96 %-99 %] 97 %  BP: (110-138)/(62-84) 132/77     Weight: 104.9 kg (231 lb 4.2 oz)  Body mass index is 37.33 kg/m².    Intake/Output Summary (Last 24 hours) at 7/31/2022 1609  Last data filed at 7/31/2022 0957  Gross per 24 hour   Intake 150.18 ml   Output --   Net 150.18 ml      Physical Exam  Vitals and nursing note  reviewed.   Constitutional:       General: He is not in acute distress.     Appearance: He is well-developed. He is not diaphoretic.   HENT:      Head: Normocephalic and atraumatic.      Nose: Nose normal.   Eyes:      General:         Right eye: No discharge.         Left eye: No discharge.      Conjunctiva/sclera: Conjunctivae normal.      Pupils: Pupils are equal, round, and reactive to light.   Neck:      Thyroid: No thyromegaly.      Vascular: No JVD.      Trachea: No tracheal deviation.   Cardiovascular:      Rate and Rhythm: Normal rate and regular rhythm.      Heart sounds: Normal heart sounds. No murmur heard.    No friction rub. No gallop.   Pulmonary:      Effort: Pulmonary effort is normal. No respiratory distress.      Breath sounds: Normal breath sounds. No wheezing or rales.   Chest:      Chest wall: No tenderness.   Abdominal:      General: Bowel sounds are normal. There is no distension.      Palpations: Abdomen is soft. There is no mass.      Tenderness: There is no abdominal tenderness.   Genitourinary:     Comments: deferred  Musculoskeletal:         General: No tenderness or deformity. Normal range of motion.      Cervical back: Normal range of motion and neck supple.      Comments: Lower back surgical wound with dressing    Skin:     General: Skin is warm and dry.      Capillary Refill: Capillary refill takes less than 2 seconds.      Findings: No erythema or rash.   Neurological:      Mental Status: He is alert and oriented to person, place, and time.      Motor: No abnormal muscle tone.      Coordination: Coordination normal.   Psychiatric:         Behavior: Behavior normal.       Significant Labs: All pertinent labs within the past 24 hours have been reviewed.  BMP:   Recent Labs   Lab 07/31/22  0607   *      K 4.5      CO2 24   BUN 11   CREATININE 0.7   CALCIUM 9.1     CBC:   Recent Labs   Lab 07/29/22  1628 07/30/22  0605 07/31/22  0607   WBC 5.18 5.33 5.24   HGB 11.6*  11.3* 12.0*   HCT 36.9* 37.6* 38.6*    243 235       Significant Imaging:     Imaging Results               MRI Lumbar Spine W WO Cont (Final result)  Result time 07/30/22 19:53:25      Final result by Dale Cotton MD (07/30/22 19:53:25)                   Impression:      Findings strongly concerning for discitis osteomyelitis at L2-3 as detailed above.  Probable epidural phlegmon or abscess extending along the left aspect of the thecal sac and into the posterior soft tissues with the previously noted fluid collection now demonstrating significantly increased peripheral enhancement concerning for superimposed infection.  Neuro surgical consultation would be advised if not previously obtained.    This report was flagged in Epic as abnormal.      Electronically signed by: Dale Cotton  Date:    07/30/2022  Time:    19:53               Narrative:    EXAMINATION:  MRI LUMBAR SPINE W WO CONTRAST    CLINICAL HISTORY:  lower back pain;    TECHNIQUE:  Multiplanar, multisequence MR images were acquired from the thoracolumbar junction to the sacrum without the administration of contrast.    COMPARISON:  Multiple priors.    FINDINGS:  Patient status post hemilaminectomy and discectomy at L2-3 on the left.  Associated with the surgical area of there is fluid within the disc space, bone marrow edema, and abnormal postcontrast enhancement of the in plates about the disc space and disc which extends along the left aspect of the spinal canal and into the posterior soft tissues concerning for discitis osteomyelitis with epidural abscess extending into the posterior soft tissues.  The fluid collection in the posterior soft tissues is noted both within the laminectomy bed measuring a proximally 3.1 x 2.7 x 2.3 cm in this area, and with extension inferiorly along the L3 lamina and L1 inferior lamina, and also into the posterior soft tissues.  The fluid collection is significantly serpiginous and difficult to accurately  measure.  The component in the posterior soft tissues measures on the order of 9.2 x 3.8 by 2.5 cm and also demonstrates some postcontrast enhancement concerning for infection.  Abscess is the diagnosis of  exclusion.  The fluid collection was present on the prior exam of 07/01/2022, but the surrounding peripheral enhancement has significantly increased in comparison to the prior exam, and the new disc space abnormalities raise concern for superimposed infection.    Alignment: Normal.    Vertebrae: Aside from the surgical levels, there is normal marrow signal. No fracture.    Cord: Normal.  Conus terminates at L1    Degenerative findings:    At the nonsurgical levels the degenerative changes are stable from the prior.    The epidural collection extending such as seen on series 6, image 16 and series 9, image 17 now produces moderate spinal canal stenosis.    Paraspinal muscles & soft tissues: As above.                                         Assessment/Plan:      * Wound dehiscence  --wound cx pending  --Neuro surgery consulted  --ID consulted      7/30/2022  Neurosurgery following    Possible washout vs wound vac placement   ID following, continue empiric IV antibiotic        7/31/2022  MRI of lumbar spine strongly concerning for discitis osteomyelitis at L2-3, probable epidural phlegmon or abscess.   Neurosurgery following, will need revision with washout. Will follow.   Continue IV antibiotics  Wound cultures in progress        Postoperative CSF leak        COVID  Patient is identified as High risk for severe complications of COVID 19 based on COVID risk score of 5   Initiate standard COVID protocols; COVID-19 testing ,Infection Control notification  and isolation- respiratory, contact and droplet per protocol    Diagnostics: (leukopenia, hyponatremia, hyperferritinemia, elevated troponin, elevated d-dimer, age, and comorbidities are significant predictors of poor clinical outcome)  CBC, CMP, Ferritin, CRP and  Portable CXR    Management: Maintain oxygen saturations 92-96% via Nasal Cannula  LPM and monitor with continuous/intermittent pulse oximetry.  and Inhaled bronchodilators as needed for shortness of breath.    HLD (hyperlipidemia)  --continue atorvastatin  --cardiac diet      Hypertension  --continue losartan  --cardiac diet      Degenerative disc disease, lumbar  --supportive care  --PT/OT consulted      Morbid obesity with BMI of 40.0-44.9, adult  Body mass index is 37.33 kg/m². Morbid obesity complicates all aspects of disease management from diagnostic modalities to treatment. Weight loss encouraged and health benefits explained to patient.         Diabetes mellitus without complication  Patient's FSGs are controlled on current medication regimen.  Last A1c reviewed-   Lab Results   Component Value Date    HGBA1C 8.3 (H) 05/18/2022     Most recent fingerstick glucose reviewed-   Recent Labs   Lab 07/30/22  1623 07/30/22  2222 07/31/22  0540 07/31/22  1140   POCTGLUCOSE 148* 138* 145* 123*     Current correctional scale  Low  Maintain anti-hyperglycemic dose as follows-   Antihyperglycemics (From admission, onward)            Start     Stop Route Frequency Ordered    07/30/22 0032  insulin aspart U-100 pen 0-5 Units         -- SubQ Before meals & nightly PRN 07/29/22 2333        Hold Oral hypoglycemics while patient is in the hospital.      VTE Risk Mitigation (From admission, onward)         Ordered     enoxaparin injection 100 mg  2 times daily         07/29/22 2336     IP VTE HIGH RISK PATIENT  Once         07/29/22 2333     Place sequential compression device  Until discontinued         07/29/22 2333                Discharge Planning   MATEUS:      Code Status: Full Code   Is the patient medically ready for discharge?:     Reason for patient still in hospital (select all that apply): Patient trending condition and Treatment                     Kamron Suero NP  Department of Hospital Medicine   [facility] -  Telemetry ([facility])

## 2022-07-31 NOTE — ASSESSMENT & PLAN NOTE
Body mass index is 37.33 kg/m². Morbid obesity complicates all aspects of disease management from diagnostic modalities to treatment. Weight loss encouraged and health benefits explained to patient.

## 2022-07-31 NOTE — ASSESSMENT & PLAN NOTE
- COVID-19 testing   - Infection Control notified     - Isolation:   - Airborne, Contact and Droplet Precautions  - Cohort patients into COVID units              - Limit visitors per hospital policy              - Consolidating lab draws, nursing care, provider visits, and interventions      - Management:  Supplemental O2 to maintain SpO2 >92%  Continuous/intermittent Pulse Ox  Albuterol treatment PRN    Advance Care Planning

## 2022-07-31 NOTE — CONSULTS
Елена - Telemetry ([facility])  Infectious Disease  Consult Note    Patient Name: Friday ROGELIO Blake  MRN: [id number]  Admission Date: 7/29/2022  Hospital Length of Stay: 0 days  Attending Physician: Kuldeep Bermudez MD  Primary Care Provider: Sid Elizabeth MD     Isolation Status: Airborne and Contact and Droplet    Patient information was obtained from patient, past medical records and ER records.      Consults  Assessment/Plan:     * Wound dehiscence  All previous cultures reviewed.  Will follow MRI of the spine and new cultures.    Cultures=06/29- aerobic culture- MSSE  CUTIBACTERIUM ACNES   06/28-BACTEROIDES FRAGILIS   06/28- staph epi -blood      Is this CSF leak ? Will send fluid for   Beta (?)-2 transferrin testing  For now will use IV daptomycin/cefepime , add empiric doxycycline/flagyl  Follow repeat cultures .  If no pseudomonas, plan will be 6 weeks of Daptomycin/Rocephin and then 3-6 months of doxycycline       COVID  - COVID-19 testing   - Infection Control notified     - Isolation:   - Airborne, Contact and Droplet Precautions  - Cohort patients into COVID units              - Limit visitors per hospital policy              - Consolidating lab draws, nursing care, provider visits, and interventions      - Management:  Supplemental O2 to maintain SpO2 >92%  Continuous/intermittent Pulse Ox  Albuterol treatment PRN    Advance Care Planning            Hypertension  BP control as per primary team    Degenerative disc disease, lumbar  S/p laminectomy for disc herniation     Morbid obesity with BMI of 40.0-44.9, adult  Out patient follow up fpr weight loss regime     Diabetes mellitus without complication  Insulin sliding scale , diabetic diet      Post op CSF leak -Will send beta 2 transferrin , follow cultures  On empiric Dapto,Cefepime       Thank you for your consult. I will follow-up with patient. Please contact us if you have any additional questions.    Tha Hodge MD  Infectious Disease  [facility] -  Telemetry (Hospital)    Subjective:     Principal Problem: Wound dehiscence    HPI:  66 year old man with PMHx of DM, HTN, hypercalcemia, and hyperlipidemia who presents to the Emergency Department for evaluation of post-op complication.    His surgery history-    06/22/22-  LAMINECTOMY, SPINE, LUMBAR, WITH DISCECTOMY (Left) L2-3      06/26/22  Hemilaminectomy, partial medial facetectomy and foraminotomy L2-3 on left   with discectomy using microscopic dissection   06/28-22-  Fluid collection ventral and lateral consistent with epidural epidural hematoma  compression   Retained disc fragment   Cultures=06/29- aerobic culture- [facility]  CUTIBACTERIUM ACNES   06/28-BACTEROIDES FRAGILIS   06/28- staph epi -blood    He still has persistent leakage from the back and had recent travel to [facility].  MRI of the spine -pending   ESR -55, crp-normal        Past Medical History:   Diagnosis Date    Bilateral carpal tunnel syndrome 2/25/2022    moderate demyelinating bilaterally    Carpal tunnel syndrome     Diabetes mellitus without complication     Fever 6/30/2022    Gram-positive bacteremia 7/2/2022    History of colon polyps     Hypercalcemia 3/23/2021    Hyperlipidemia associated with type 2 diabetes mellitus     Hypertension associated with diabetes     Left carpal tunnel syndrome 3/23/2021    Low back pain 9/28/2021    Lumbar disc disease with radiculopathy     Morbid obesity with BMI of 40.0-44.9, adult     Postoperative hematoma involving nervous system following nervous system procedure 6/28/2022    S/P parathyroidectomy 7/29/2021    Vitamin D deficiency        Past Surgical History:   Procedure Laterality Date    CARPAL TUNNEL RELEASE Left 4/1/2021    Procedure: RELEASE, CARPAL TUNNEL;  Surgeon: Yoandy David MD;  Location: [facility];  Service: Orthopedics;  Laterality: Left;    HERNIA REPAIR      INCISION AND DRAINAGE OF HEMATOMA N/A 6/28/2022    Procedure: INCISION AND DRAINAGE, HEMATOMA;   Surgeon: Shaggy Zepeda MD;  Location: [facility] OR;  Service: Neurosurgery;  Laterality: N/A;    LUMBAR LAMINECTOMY WITH DISCECTOMY Left 6/22/2022    Procedure: LAMINECTOMY, SPINE, LUMBAR, WITH DISCECTOMY;  Surgeon: Shaggy Zepeda MD;  Location: [facility] OR;  Service: Neurosurgery;  Laterality: Left;  minimally invasive L2-3 discectomy and decompression     LUMBAR LAMINECTOMY WITH DISCECTOMY N/A 6/28/2022    Procedure: LAMINECTOMY, SPINE, LUMBAR, WITH DISCECTOMY;  Surgeon: Shaggy Zepeda MD;  Location: [facility] OR;  Service: Neurosurgery;  Laterality: N/A;    PARATHYROIDECTOMY Bilateral 7/27/2021    Procedure: PARATHYROIDECTOMY;  Surgeon: Tha Dial MD;  Location: [facility] OR;  Service: ENT;  Laterality: Bilateral;  with medialstinal exploration    SELECTIVE INJECTION OF ANESTHETIC AGENT AROUND LUMBAR SPINAL NERVE ROOT BY TRANSFORAMINAL APPROACH Left 3/23/2022    Procedure: BLOCK, SPINAL NERVE ROOT, LUMBAR, SELECTIVE, TRANSFORAMINAL APPROACH Left L2-3, L3-4 RN IV sedation;  Surgeon: Jay Hodges MD;  Location: [facility] PAIN [facility]GT;  Service: Pain Management;  Laterality: Left;    STERNOTOMY N/A 7/27/2021    Procedure: STERNOTOMY;  Surgeon: Tha Dial MD;  Location: [facility] OR;  Service: ENT;  Laterality: N/A;    ULNAR NERVE TRANSPOSITION Left 4/1/2021    Procedure: TRANSPOSITION, NERVE, ULNAR;  Surgeon: Yoandy David MD;  Location: [facility];  Service: Orthopedics;  Laterality: Left;    ULNAR TUNNEL RELEASE Left 4/1/2021    Procedure: RELEASE, CUBITAL TUNNEL;  Surgeon: Yoandy David MD;  Location: [facility];  Service: Orthopedics;  Laterality: Left;    UMBILICAL HERNIA REPAIR         Review of patient's allergies indicates:  No Known Allergies    Medications:  Medications Prior to Admission   Medication Sig    amoxicillin-clavulanate 875-125mg (AUGMENTIN) 875-125 mg per tablet Take 1 tablet by mouth 2 (two) times daily.    aspirin (ECOTRIN) 81 MG EC tablet Take 1 tablet (81 mg total) by mouth once daily.     atorvastatin (LIPITOR) 10 MG tablet Take 1 tablet (10 mg total) by mouth every evening.    blood sugar diagnostic Strp To check BG 1 times daily, to use with insurance preferred meter    blood-glucose meter kit To check BG 1 times daily, to use with insurance preferred meter    dapagliflozin (FARXIGA) 5 mg Tab tablet Take 1 tablet (5 mg total) by mouth once daily.    docusate sodium (COLACE) 100 MG capsule Take 1 capsule (100 mg total) by mouth 2 (two) times daily.    gabapentin (NEURONTIN) 300 MG capsule Take 1 capsule (300 mg total) by mouth 3 (three) times daily.    irbesartan (AVAPRO) 150 MG tablet Take 1 tablet (150 mg total) by mouth once daily.    lancets (ONETOUCH ULTRASOFT LANCETS) Misc Check FS daily    lancets Misc To check BG 1 times daily, to use with insurance preferred meter    metFORMIN (GLUCOPHAGE) 1000 MG tablet TAKE 1 TABLET BY MOUTH TWICE DAILY WITH MEALS    methocarbamoL (ROBAXIN) 750 MG Tab Take 1 tablet (750 mg total) by mouth 3 (three) times daily as needed (muscle spasms).    ONETOUCH VERIO TEST STRIPS Strp USE 1 STRIP TO CHECK GLUCOSE ONCE DAILY    oxyCODONE-acetaminophen (PERCOCET)  mg per tablet Take 1 tablet by mouth every 4 to 6 hours as needed for Pain.    senna-docusate 8.6-50 mg (SENNA LAXATIVE-STOOL SOFTENER) 8.6-50 mg per tablet Take 1 tablet by mouth once daily.     Antibiotics (From admission, onward)                Start     Stop Route Frequency Ordered    07/30/22 1100  DAPTOmycin (CUBICIN) 820 mg in sodium chloride 0.9% 50 mL IVPB         -- IV Every 24 hours (non-standard times) 07/30/22 0836    07/30/22 1015  cefepime in dextrose 5 % IVPB 2 g         -- IV Every 8 hours (non-standard times) 07/30/22 0838          Antifungals (From admission, onward)                None          Antivirals (From admission, onward)      None             Immunization History   Administered Date(s) Administered    COVID-19, MRNA, LN-S, PF (Pfizer) (Purple Cap) 03/04/2021,  03/25/2021, 11/05/2021, 07/13/2022    Hepatitis A, Adult 02/26/2020    Hepatitis B, Adult 02/26/2020    Influenza (FLUAD) - Quadrivalent - Adjuvanted - PF *Preferred* (65+) 11/05/2021    Influenza (FLUBLOK) - Quadrivalent - Recombinant - PF *Preferred* (egg allergy) 10/18/2020    Influenza - Quadrivalent - PF *Preferred* (6 months and older) 12/09/2014, 11/17/2015, 11/06/2018, 12/10/2019, 10/18/2020    Influenza Split 10/05/2016    Meningococcal Conjugate (MCV4P) 02/26/2020    Pneumococcal Conjugate - 13 Valent 11/06/2018, 01/04/2021    Pneumococcal Polysaccharide - 23 Valent 11/17/2015, 01/29/2019    Tdap 11/17/2015    Zoster Recombinant 09/12/2018, 11/06/2018       Family History       Problem Relation (Age of Onset)    Asthma Mother    Diabetes Mellitus Father    Hypertension Father    Prostate cancer Brother          Social History     Socioeconomic History    Marital status:    Tobacco Use    Smoking status: Never Smoker    Smokeless tobacco: Never Used   Substance and Sexual Activity    Alcohol use: Never    Drug use: Never    Sexual activity: Yes     Partners: Female     Review of Systems   Constitutional:  Negative for activity change, appetite change, chills, diaphoresis and fatigue.   HENT:  Negative for congestion, dental problem, ear discharge, ear pain and facial swelling.    Eyes:  Negative for pain, discharge and itching.   Respiratory:  Negative for apnea, cough, chest tightness and shortness of breath.    Cardiovascular:  Negative for chest pain and leg swelling.   Gastrointestinal:  Negative for abdominal distention and abdominal pain.   Endocrine: Negative for cold intolerance, heat intolerance and polydipsia.   Genitourinary:  Negative for difficulty urinating, dysuria and enuresis.   Musculoskeletal:  Positive for back pain. Negative for arthralgias.        Leakage of fluid from op site   Skin:  Negative for color change and pallor.   Allergic/Immunologic: Negative for  environmental allergies and food allergies.   Neurological:  Negative for dizziness, facial asymmetry, light-headedness and headaches.   Hematological:  Negative for adenopathy. Does not bruise/bleed easily.   Psychiatric/Behavioral:  Negative for agitation and behavioral problems.    Objective:     Vital Signs (Most Recent):  Temp: 98.2 °F (36.8 °C) (07/30/22 1618)  Pulse: 68 (07/30/22 1738)  Resp: 20 (07/30/22 1618)  BP: 117/70 (07/30/22 1618)  SpO2: 97 % (07/30/22 1618) Vital Signs (24h Range):  Temp:  [97.8 °F (36.6 °C)-98.4 °F (36.9 °C)] 98.2 °F (36.8 °C)  Pulse:  [60-85] 68  Resp:  [18-21] 20  SpO2:  [94 %-98 %] 97 %  BP: (114-135)/(59-86) 117/70     Weight: 102.2 kg (225 lb 5 oz)  Body mass index is 36.37 kg/m².    Estimated Creatinine Clearance: 116.3 mL/min (based on SCr of 0.7 mg/dL).    Physical Exam  Vitals and nursing note reviewed.   Constitutional:       Appearance: He is well-developed.   HENT:      Head: Normocephalic and atraumatic.      Nose: Nose normal.   Eyes:      Comments: PERRL   Cardiovascular:      Rate and Rhythm: Normal rate and regular rhythm.      Heart sounds: Normal heart sounds.   Pulmonary:      Effort: Pulmonary effort is normal. No respiratory distress.      Breath sounds: Normal breath sounds. No wheezing or rales.   Abdominal:      General: Abdomen is flat.      Tenderness: There is no abdominal tenderness.   Musculoskeletal:         General: Normal range of motion.      Cervical back: Normal range of motion and neck supple.      Comments: Dressing noted over the spinal region    Skin:     General: Skin is dry.   Neurological:      Mental Status: He is alert and oriented to person, place, and time.       Significant Labs: BMP:   Recent Labs   Lab 07/30/22  0605   *      K 4.4      CO2 23   BUN 13   CREATININE 0.7   CALCIUM 8.4*     CBC:   Recent Labs   Lab 07/29/22  1628 07/30/22  0605   WBC 5.18 5.33   HGB 11.6* 11.3*   HCT 36.9* 37.6*    243     CMP:    Recent Labs   Lab 07/29/22  1724 07/30/22  0605    140   K 4.2 4.4    110   CO2 22* 23   * 176*   BUN 17 13   CREATININE 0.8 0.7   CALCIUM 9.1 8.4*   PROT 6.9  --    ALBUMIN 3.6  --    BILITOT 0.3  --    ALKPHOS 93  --    AST 18  --    ALT 15  --    ANIONGAP 11 7*   EGFRNONAA >60 >60       Significant Imaging: I have reviewed all pertinent imaging results/findings within the past 24 hours.

## 2022-07-31 NOTE — PLAN OF CARE
Plan of Care: RN Shift Summary & Bedside Handover Report  07/31/2022 5:23 PM    Pt admitted on 7/29/2022 for Wound dehiscence under Kuldeep Bermudez MD service   Code Status Full Code    Past Medical/ Surgical Hx Past Medical History:   Diagnosis Date    Bilateral carpal tunnel syndrome 2/25/2022    moderate demyelinating bilaterally    Carpal tunnel syndrome     Diabetes mellitus without complication     Fever 6/30/2022    Gram-positive bacteremia 7/2/2022    History of colon polyps     Hypercalcemia 3/23/2021    Hyperlipidemia associated with type 2 diabetes mellitus     Hypertension associated with diabetes     Left carpal tunnel syndrome 3/23/2021    Low back pain 9/28/2021    Lumbar disc disease with radiculopathy     Morbid obesity with BMI of 40.0-44.9, adult     Postoperative hematoma involving nervous system following nervous system procedure 6/28/2022    S/P parathyroidectomy 7/29/2021    Vitamin D deficiency       Past Surgical History:   Procedure Laterality Date    CARPAL TUNNEL RELEASE Left 4/1/2021    Procedure: RELEASE, CARPAL TUNNEL;  Surgeon: Yoandy David MD;  Location: [facility] OR;  Service: Orthopedics;  Laterality: Left;    HERNIA REPAIR      INCISION AND DRAINAGE OF HEMATOMA N/A 6/28/2022    Procedure: INCISION AND DRAINAGE, HEMATOMA;  Surgeon: Shaggy Zepeda MD;  Location: [facility] OR;  Service: Neurosurgery;  Laterality: N/A;    LUMBAR LAMINECTOMY WITH DISCECTOMY Left 6/22/2022    Procedure: LAMINECTOMY, SPINE, LUMBAR, WITH DISCECTOMY;  Surgeon: Shaggy Zepeda MD;  Location: [facility] OR;  Service: Neurosurgery;  Laterality: Left;  minimally invasive L2-3 discectomy and decompression     LUMBAR LAMINECTOMY WITH DISCECTOMY N/A 6/28/2022    Procedure: LAMINECTOMY, SPINE, LUMBAR, WITH DISCECTOMY;  Surgeon: Shaggy Zepeda MD;  Location: [facility] OR;  Service: Neurosurgery;  Laterality: N/A;    PARATHYROIDECTOMY Bilateral 7/27/2021    Procedure: PARATHYROIDECTOMY;  Surgeon: Tha Dial MD;   Location: [facility] OR;  Service: ENT;  Laterality: Bilateral;  with medialstinal exploration    SELECTIVE INJECTION OF ANESTHETIC AGENT AROUND LUMBAR SPINAL NERVE ROOT BY TRANSFORAMINAL APPROACH Left 3/23/2022    Procedure: BLOCK, SPINAL NERVE ROOT, LUMBAR, SELECTIVE, TRANSFORAMINAL APPROACH Left L2-3, L3-4 RN IV sedation;  Surgeon: Jay Hodges MD;  Location: [facility] PAIN MGT;  Service: Pain Management;  Laterality: Left;    STERNOTOMY N/A 7/27/2021    Procedure: STERNOTOMY;  Surgeon: Tha Dial MD;  Location: [facility] OR;  Service: ENT;  Laterality: N/A;    ULNAR NERVE TRANSPOSITION Left 4/1/2021    Procedure: TRANSPOSITION, NERVE, ULNAR;  Surgeon: Yoandy David MD;  Location: [facility] OR;  Service: Orthopedics;  Laterality: Left;    ULNAR TUNNEL RELEASE Left 4/1/2021    Procedure: RELEASE, CUBITAL TUNNEL;  Surgeon: Yoandy David MD;  Location: [facility] OR;  Service: Orthopedics;  Laterality: Left;    UMBILICAL HERNIA REPAIR         HEENT HEENT WDL: WDL   Cognitive/ Neuro Cognitive/Neuro/Behavioral WDL: WDL  Level of Consciousness (AVPU): alert     [place] Coma Scale Score: 15  Additional Documentation: [place] Coma Scale (Group)   Resp Respiratory WDL: WDL except, cough, breath sounds  All Lung Fields Breath Sounds: Anterior:, Posterior:, Lateral:, diminished, clear     Oxygen Concentration (%): 21  O2 Device (Oxygen Therapy): room air   Cardiac Cardiac WDL: WDL  Lead Monitored: Lead II  Rhythm: normal sinus rhythm     Cardiac/Telemetry Box Number: [id number]   Peripheral/ Neurovascular Peripheral Neurovascular WDL: WDL   VTE core VTE Required Core Measure: Pharmacological prophylaxis initiated/maintained   GI GI WDL: WDL  All Quadrants Bowel Sounds: audible and active in all quadrants  Last Bowel Movement: 07/29/22   Diet Diet diabetic [facility]; 2000 Calorie; Cardiac (Low Na/Chol)    Genitourinary WDL: WDL  Voiding Characteristics: voids spontaneously without difficulty      Skin Skin WDL: WDL except,  all  Skin Integrity: wound (mid lower back)   Oskar Score Oskar Score: 22   Musculoskeletal  Musculoskeletal WDL: WDL      Safety Safety WDL: WDL  Safety Factors: ID band on, upper side rails raised x 2, call light in reach, wheels locked, bed in low position      Fall Risk Score Fall Risk Score: 6   LDA(s) Lines/Drains/Airways       Peripheral Intravenous Line  Duration                  Peripheral IV - Single Lumen 07/29/22 1629 20 G Right;Lateral Antecubital 2 days                   Consults Consults (From admission, onward)          Status Ordering Provider     Inpatient consult to Neurosurgery  Once        Provider:  Shaggy Zepeda MD    Acknowledged LIOR RUBIN     Inpatient consult to Infectious Diseases  Once        Provider:  Tha Hodge MD    Acknowledged LIOR RUBIN            Shift events uneventful   Plan of Care for RN report  Continue care as ordered, COVID management, plans for possible surgical intervention tomorrow in midlower back wound site    Discharge Planning   TBD   Patient/ family updated on plan of care, all questions answered up to now regarding plan of care, will continue to monitor per hourly rounding & unit practice/ policy    Note: Other exception details noted in flowsheet if not present in summary

## 2022-07-31 NOTE — SUBJECTIVE & OBJECTIVE
Interval History: MRI of lumbar spine strongly concerning for discitis osteomyelitis at L2-3, probable epidural phlegmon or abscess. Neurosurgery following, will need revision with washout.     Review of Systems   Constitutional:  Negative for activity change, appetite change, chills, fatigue and fever.   HENT:  Negative for dental problem, ear pain, hearing loss, mouth sores, nosebleeds, sinus pressure, sore throat, tinnitus and trouble swallowing.    Eyes:  Negative for pain, discharge and visual disturbance.   Respiratory:  Negative for cough, choking, chest tightness, shortness of breath and wheezing.    Cardiovascular:  Negative for chest pain, palpitations and leg swelling.   Gastrointestinal:  Negative for abdominal distention, abdominal pain, anal bleeding, blood in stool, constipation, diarrhea, nausea and vomiting.   Endocrine: Negative for cold intolerance, heat intolerance, polydipsia, polyphagia and polyuria.   Genitourinary:  Negative for decreased urine volume, difficulty urinating, flank pain, frequency, hematuria and urgency.   Musculoskeletal:  Negative for arthralgias, back pain, gait problem, myalgias, neck pain and neck stiffness.   Skin:  Positive for wound (lower back surgical wound). Negative for color change and rash.   Allergic/Immunologic: Negative.    Neurological:  Negative for dizziness, tremors, seizures, syncope, speech difficulty, weakness, light-headedness and headaches.   Hematological: Negative.    Psychiatric/Behavioral:  Negative for agitation, behavioral problems, confusion and sleep disturbance. The patient is not nervous/anxious.    All other systems reviewed and are negative.  Objective:     Vital Signs (Most Recent):  Temp: 98.1 °F (36.7 °C) (07/31/22 1600)  Pulse: 63 (07/31/22 1600)  Resp: 12 (07/31/22 1600)  BP: 132/77 (07/31/22 1600)  SpO2: 97 % (07/31/22 1600) Vital Signs (24h Range):  Temp:  [97.8 °F (36.6 °C)-98.4 °F (36.9 °C)] 98.1 °F (36.7 °C)  Pulse:  [54-72]  63  Resp:  [11-20] 12  SpO2:  [96 %-99 %] 97 %  BP: (110-138)/(62-84) 132/77     Weight: 104.9 kg (231 lb 4.2 oz)  Body mass index is 37.33 kg/m².    Intake/Output Summary (Last 24 hours) at 7/31/2022 1603  Last data filed at 7/31/2022 0957  Gross per 24 hour   Intake 150.18 ml   Output --   Net 150.18 ml      Physical Exam  Vitals and nursing note reviewed.   Constitutional:       General: He is not in acute distress.     Appearance: He is well-developed. He is not diaphoretic.   HENT:      Head: Normocephalic and atraumatic.      Nose: Nose normal.   Eyes:      General:         Right eye: No discharge.         Left eye: No discharge.      Conjunctiva/sclera: Conjunctivae normal.      Pupils: Pupils are equal, round, and reactive to light.   Neck:      Thyroid: No thyromegaly.      Vascular: No JVD.      Trachea: No tracheal deviation.   Cardiovascular:      Rate and Rhythm: Normal rate and regular rhythm.      Heart sounds: Normal heart sounds. No murmur heard.    No friction rub. No gallop.   Pulmonary:      Effort: Pulmonary effort is normal. No respiratory distress.      Breath sounds: Normal breath sounds. No wheezing or rales.   Chest:      Chest wall: No tenderness.   Abdominal:      General: Bowel sounds are normal. There is no distension.      Palpations: Abdomen is soft. There is no mass.      Tenderness: There is no abdominal tenderness.   Genitourinary:     Comments: deferred  Musculoskeletal:         General: No tenderness or deformity. Normal range of motion.      Cervical back: Normal range of motion and neck supple.      Comments: Lower back surgical wound with dressing    Skin:     General: Skin is warm and dry.      Capillary Refill: Capillary refill takes less than 2 seconds.      Findings: No erythema or rash.   Neurological:      Mental Status: He is alert and oriented to person, place, and time.      Motor: No abnormal muscle tone.      Coordination: Coordination normal.   Psychiatric:          Behavior: Behavior normal.       Significant Labs: All pertinent labs within the past 24 hours have been reviewed.  BMP:   Recent Labs   Lab 07/31/22  0607   *      K 4.5      CO2 24   BUN 11   CREATININE 0.7   CALCIUM 9.1     CBC:   Recent Labs   Lab 07/29/22  1628 07/30/22  0605 07/31/22  0607   WBC 5.18 5.33 5.24   HGB 11.6* 11.3* 12.0*   HCT 36.9* 37.6* 38.6*    243 235       Significant Imaging:     Imaging Results               MRI Lumbar Spine W WO Cont (Final result)  Result time 07/30/22 19:53:25      Final result by Dale Cotton MD (07/30/22 19:53:25)                   Impression:      Findings strongly concerning for discitis osteomyelitis at L2-3 as detailed above.  Probable epidural phlegmon or abscess extending along the left aspect of the thecal sac and into the posterior soft tissues with the previously noted fluid collection now demonstrating significantly increased peripheral enhancement concerning for superimposed infection.  Neuro surgical consultation would be advised if not previously obtained.    This report was flagged in Epic as abnormal.      Electronically signed by: Dale Cotton  Date:    07/30/2022  Time:    19:53               Narrative:    EXAMINATION:  MRI LUMBAR SPINE W WO CONTRAST    CLINICAL HISTORY:  lower back pain;    TECHNIQUE:  Multiplanar, multisequence MR images were acquired from the thoracolumbar junction to the sacrum without the administration of contrast.    COMPARISON:  Multiple priors.    FINDINGS:  Patient status post hemilaminectomy and discectomy at L2-3 on the left.  Associated with the surgical area of there is fluid within the disc space, bone marrow edema, and abnormal postcontrast enhancement of the in plates about the disc space and disc which extends along the left aspect of the spinal canal and into the posterior soft tissues concerning for discitis osteomyelitis with epidural abscess extending into the posterior soft  tissues.  The fluid collection in the posterior soft tissues is noted both within the laminectomy bed measuring a proximally 3.1 x 2.7 x 2.3 cm in this area, and with extension inferiorly along the L3 lamina and L1 inferior lamina, and also into the posterior soft tissues.  The fluid collection is significantly serpiginous and difficult to accurately measure.  The component in the posterior soft tissues measures on the order of 9.2 x 3.8 by 2.5 cm and also demonstrates some postcontrast enhancement concerning for infection.  Abscess is the diagnosis of  exclusion.  The fluid collection was present on the prior exam of 07/01/2022, but the surrounding peripheral enhancement has significantly increased in comparison to the prior exam, and the new disc space abnormalities raise concern for superimposed infection.    Alignment: Normal.    Vertebrae: Aside from the surgical levels, there is normal marrow signal. No fracture.    Cord: Normal.  Conus terminates at L1    Degenerative findings:    At the nonsurgical levels the degenerative changes are stable from the prior.    The epidural collection extending such as seen on series 6, image 16 and series 9, image 17 now produces moderate spinal canal stenosis.    Paraspinal muscles & soft tissues: As above.

## 2022-08-01 DIAGNOSIS — Z98.890 STATUS POST LUMBAR SURGERY: Primary | ICD-10-CM

## 2022-08-01 DIAGNOSIS — G97.82 POSTOPERATIVE CSF LEAK: ICD-10-CM

## 2022-08-01 DIAGNOSIS — G96.00 POSTOPERATIVE CSF LEAK: ICD-10-CM

## 2022-08-01 PROBLEM — G06.2 EPIDURAL ABSCESS: Status: ACTIVE | Noted: 2022-08-01

## 2022-08-01 PROBLEM — M46.46 DISCITIS OF LUMBAR REGION: Status: ACTIVE | Noted: 2022-08-01

## 2022-08-01 LAB
ANION GAP SERPL CALC-SCNC: 9 MMOL/L (ref 8–16)
BASOPHILS # BLD AUTO: 0.01 K/UL (ref 0–0.2)
BASOPHILS NFR BLD: 0.3 % (ref 0–1.9)
BUN SERPL-MCNC: 13 MG/DL (ref 8–23)
CALCIUM SERPL-MCNC: 9 MG/DL (ref 8.7–10.5)
CHLORIDE SERPL-SCNC: 108 MMOL/L (ref 95–110)
CO2 SERPL-SCNC: 23 MMOL/L (ref 23–29)
CREAT SERPL-MCNC: 0.8 MG/DL (ref 0.5–1.4)
DIFFERENTIAL METHOD: ABNORMAL
EOSINOPHIL # BLD AUTO: 0.2 K/UL (ref 0–0.5)
EOSINOPHIL NFR BLD: 4.5 % (ref 0–8)
ERYTHROCYTE [DISTWIDTH] IN BLOOD BY AUTOMATED COUNT: 15 % (ref 11.5–14.5)
EST. GFR  (NO RACE VARIABLE): >60 ML/MIN/1.73 M^2
FERRITIN SERPL-MCNC: 157 NG/ML (ref 20–300)
GLUCOSE SERPL-MCNC: 127 MG/DL (ref 70–110)
HCT VFR BLD AUTO: 37.4 % (ref 40–54)
HGB BLD-MCNC: 11.9 G/DL (ref 14–18)
IMM GRANULOCYTES # BLD AUTO: 0.03 K/UL (ref 0–0.04)
IMM GRANULOCYTES NFR BLD AUTO: 0.8 % (ref 0–0.5)
LYMPHOCYTES # BLD AUTO: 1.4 K/UL (ref 1–4.8)
LYMPHOCYTES NFR BLD: 36.3 % (ref 18–48)
MCH RBC QN AUTO: 27.8 PG (ref 27–31)
MCHC RBC AUTO-ENTMCNC: 31.8 G/DL (ref 32–36)
MCV RBC AUTO: 87 FL (ref 82–98)
MONOCYTES # BLD AUTO: 0.2 K/UL (ref 0.3–1)
MONOCYTES NFR BLD: 5.3 % (ref 4–15)
NEUTROPHILS # BLD AUTO: 2.1 K/UL (ref 1.8–7.7)
NEUTROPHILS NFR BLD: 52.8 % (ref 38–73)
NRBC BLD-RTO: 0 /100 WBC
PLATELET # BLD AUTO: 228 K/UL (ref 150–450)
PMV BLD AUTO: 11.4 FL (ref 9.2–12.9)
POCT GLUCOSE: 135 MG/DL (ref 70–110)
POCT GLUCOSE: 137 MG/DL (ref 70–110)
POCT GLUCOSE: 152 MG/DL (ref 70–110)
POCT GLUCOSE: 178 MG/DL (ref 70–110)
POTASSIUM SERPL-SCNC: 4.3 MMOL/L (ref 3.5–5.1)
RBC # BLD AUTO: 4.28 M/UL (ref 4.6–6.2)
SODIUM SERPL-SCNC: 140 MMOL/L (ref 136–145)
WBC # BLD AUTO: 3.97 K/UL (ref 3.9–12.7)

## 2022-08-01 PROCEDURE — 36415 COLL VENOUS BLD VENIPUNCTURE: CPT | Performed by: NURSE PRACTITIONER

## 2022-08-01 PROCEDURE — 85025 COMPLETE CBC W/AUTO DIFF WBC: CPT | Performed by: NURSE PRACTITIONER

## 2022-08-01 PROCEDURE — 25000003 PHARM REV CODE 250: Performed by: NURSE PRACTITIONER

## 2022-08-01 PROCEDURE — 99233 PR SUBSEQUENT HOSPITAL CARE,LEVL III: ICD-10-PCS | Mod: NSCH,,, | Performed by: INTERNAL MEDICINE

## 2022-08-01 PROCEDURE — 25000003 PHARM REV CODE 250: Performed by: INTERNAL MEDICINE

## 2022-08-01 PROCEDURE — 27000207 HC ISOLATION

## 2022-08-01 PROCEDURE — 36573 INSJ PICC RS&I 5 YR+: CPT

## 2022-08-01 PROCEDURE — 63600175 PHARM REV CODE 636 W HCPCS: Performed by: INTERNAL MEDICINE

## 2022-08-01 PROCEDURE — 99233 SBSQ HOSP IP/OBS HIGH 50: CPT | Mod: NSCH,,, | Performed by: INTERNAL MEDICINE

## 2022-08-01 PROCEDURE — 94640 AIRWAY INHALATION TREATMENT: CPT

## 2022-08-01 PROCEDURE — 97530 THERAPEUTIC ACTIVITIES: CPT | Mod: CQ

## 2022-08-01 PROCEDURE — 63600175 PHARM REV CODE 636 W HCPCS: Performed by: NURSE PRACTITIONER

## 2022-08-01 PROCEDURE — 21400001 HC TELEMETRY ROOM

## 2022-08-01 PROCEDURE — 94761 N-INVAS EAR/PLS OXIMETRY MLT: CPT

## 2022-08-01 PROCEDURE — 80048 BASIC METABOLIC PNL TOTAL CA: CPT | Performed by: NURSE PRACTITIONER

## 2022-08-01 PROCEDURE — 99024 POSTOP FOLLOW-UP VISIT: CPT | Mod: ,,, | Performed by: PHYSICIAN ASSISTANT

## 2022-08-01 PROCEDURE — 99024 PR POST-OP FOLLOW-UP VISIT: ICD-10-PCS | Mod: ,,, | Performed by: PHYSICIAN ASSISTANT

## 2022-08-01 PROCEDURE — C1751 CATH, INF, PER/CENT/MIDLINE: HCPCS

## 2022-08-01 PROCEDURE — A4216 STERILE WATER/SALINE, 10 ML: HCPCS | Performed by: NURSE PRACTITIONER

## 2022-08-01 RX ADMIN — LOSARTAN POTASSIUM 50 MG: 50 TABLET, FILM COATED ORAL at 09:08

## 2022-08-01 RX ADMIN — MINOCYCLINE HYDROCHLORIDE 100 MG: 50 CAPSULE ORAL at 09:08

## 2022-08-01 RX ADMIN — METRONIDAZOLE 500 MG: 500 TABLET ORAL at 09:08

## 2022-08-01 RX ADMIN — FAMOTIDINE 20 MG: 20 TABLET ORAL at 09:08

## 2022-08-01 RX ADMIN — Medication 10 ML: at 02:08

## 2022-08-01 RX ADMIN — OXYCODONE HYDROCHLORIDE AND ACETAMINOPHEN 500 MG: 500 TABLET ORAL at 09:08

## 2022-08-01 RX ADMIN — Medication 10 ML: at 09:08

## 2022-08-01 RX ADMIN — Medication 10 ML: at 06:08

## 2022-08-01 RX ADMIN — GABAPENTIN 300 MG: 300 CAPSULE ORAL at 02:08

## 2022-08-01 RX ADMIN — THERA TABS 1 TABLET: TAB at 09:08

## 2022-08-01 RX ADMIN — ALBUTEROL SULFATE 2 PUFF: 90 AEROSOL, METERED RESPIRATORY (INHALATION) at 07:08

## 2022-08-01 RX ADMIN — Medication 1000 UNITS: at 09:08

## 2022-08-01 RX ADMIN — ALBUTEROL SULFATE 2 PUFF: 90 AEROSOL, METERED RESPIRATORY (INHALATION) at 01:08

## 2022-08-01 RX ADMIN — ENOXAPARIN SODIUM 100 MG: 100 INJECTION SUBCUTANEOUS at 09:08

## 2022-08-01 RX ADMIN — METRONIDAZOLE 500 MG: 500 TABLET ORAL at 02:08

## 2022-08-01 RX ADMIN — CEFEPIME 2 G: 2 INJECTION, POWDER, FOR SOLUTION INTRAVENOUS at 02:08

## 2022-08-01 RX ADMIN — OXYCODONE HYDROCHLORIDE AND ACETAMINOPHEN 500 MG: 500 TABLET ORAL at 10:08

## 2022-08-01 RX ADMIN — ASPIRIN 81 MG: 81 TABLET, COATED ORAL at 10:08

## 2022-08-01 RX ADMIN — METRONIDAZOLE 500 MG: 500 TABLET ORAL at 05:08

## 2022-08-01 RX ADMIN — CEFEPIME 2 G: 2 INJECTION, POWDER, FOR SOLUTION INTRAVENOUS at 06:08

## 2022-08-01 RX ADMIN — GABAPENTIN 300 MG: 300 CAPSULE ORAL at 09:08

## 2022-08-01 RX ADMIN — CEFEPIME 2 G: 2 INJECTION, POWDER, FOR SOLUTION INTRAVENOUS at 10:08

## 2022-08-01 RX ADMIN — DOCUSATE SODIUM 100 MG: 100 CAPSULE, LIQUID FILLED ORAL at 09:08

## 2022-08-01 RX ADMIN — ATORVASTATIN CALCIUM 10 MG: 10 TABLET, FILM COATED ORAL at 09:08

## 2022-08-01 RX ADMIN — ALBUTEROL SULFATE 2 PUFF: 90 AEROSOL, METERED RESPIRATORY (INHALATION) at 12:08

## 2022-08-01 RX ADMIN — DAPTOMYCIN 820 MG: 350 INJECTION, POWDER, LYOPHILIZED, FOR SOLUTION INTRAVENOUS at 11:08

## 2022-08-01 NOTE — PLAN OF CARE
Plan of care reviewed with pt, verbalized understanding. A&O x4. IV intact, dry, and clean. Medications given with no complications. IV antibiotics given per order. On room air. BG monitored. Pt ambulates and turns in bed independently. No pain reported. NS to SB on monitor. Instructed to call for assistance. Hourly rounding completed.

## 2022-08-01 NOTE — PLAN OF CARE
08/01/22 1335   Readmission   Why were you hospitalized in the last 30 days? recent back surgery   Why were you readmitted? Related to previous admission   When you left the hospital where did you go? Home with Home Health   Did patient/caregiver refused recommended DC plan? No   Tell me about what happened between when you left the hospital and the day you returned. patient was directed by neurosurgeon clinic office to go to ED for back pain after flight to/from [place].   Did you try to manage your symptoms your self? No   Did you call anyone? Yes   Who did you call? Specialist   Did you have  a follow-up appointment on discharge? Yes   Did you go?   (missed surgeon f/u due to being out of country.)   Was this a planned readmission? No

## 2022-08-01 NOTE — PROGRESS NOTES
[facility] - Telemetry ([facility])  Neurosurgery  Progress Note    Subjective:     Interval History:   Patient was seen this morning.  No new issues to report.  He denies HA, dizziness, N/V, back pain.  Patient reports he is doing fine despite + COVID.    MRI reviewed   Superficial as well as deep fluid collection noted   S/p discectomy and then washout      6/29 blood cx + S. Epidermidis  Anaerobic cx (deep) + Cutibacterium acnes  - continue Daptomycin, Cefepime ,flagyl and doxycycline    History of Present Illness: See H&P.    Post-Op Info:  * No surgery found *          Medications:  Continuous Infusions:  Scheduled Meds:   albuterol  2 puff Inhalation Q6H    ascorbic acid (vitamin C)  500 mg Oral BID    aspirin  81 mg Oral Daily    atorvastatin  10 mg Oral QHS    ceFEPime (MAXIPIME) IVPB  2 g Intravenous Q8H    DAPTOmycin (CUBICIN) IV (PEDS and ADULTS)  8 mg/kg Intravenous Q24H    docusate sodium  100 mg Oral BID    enoxaparin  1 mg/kg Subcutaneous BID    famotidine  20 mg Oral BID    gabapentin  300 mg Oral TID    losartan  50 mg Oral Daily    melatonin  6 mg Oral Nightly    metroNIDAZOLE  500 mg Oral Q8H    minocycline  100 mg Oral Q12H    multivitamin  1 tablet Oral Daily    sodium chloride 0.9%  10 mL Intravenous Q8H    vitamin D  1,000 Units Oral Daily     PRN Meds:acetaminophen, aluminum-magnesium hydroxide-simethicone, dextromethorphan-guaiFENesin  mg/5 ml, dextrose 10%, dextrose 10%, glucagon (human recombinant), glucose, glucose, insulin aspart U-100, ketorolac, loperamide, melatonin, methocarbamoL, naloxone, ondansetron, oxyCODONE-acetaminophen, polyethylene glycol, promethazine, simethicone, sodium chloride 0.9%       Objective:     Weight: 104.8 kg (231 lb 0.7 oz)  Body mass index is 37.29 kg/m².  Vital Signs (Most Recent):  Temp: 98.2 °F (36.8 °C) (08/01/22 0740)  Pulse: 68 (08/01/22 0740)  Resp: 20 (08/01/22 0740)  BP: 107/67 (08/01/22 0740)  SpO2: 99 % (08/01/22 0740) Vital Signs  (24h Range):  Temp:  [98 °F (36.7 °C)-98.3 °F (36.8 °C)] 98.2 °F (36.8 °C)  Pulse:  [59-73] 68  Resp:  [11-20] 20  SpO2:  [94 %-99 %] 99 %  BP: ()/(54-77) 107/67                Oxygen Concentration (%):  [21] 21       Neurosurgery Physical Exam   Vitals and labs reviewed  Moves all 4 ext well  Amb around room  Incision on posterior back (lumbar)- wound dehiscence to upper and lower incison, clear drainage noted to inferior aspect of incision  No swelling or fluctuance  Dressing changed at bedside       Significant Labs:  Recent Labs   Lab 07/31/22 0607 08/01/22  0339   * 127*    140   K 4.5 4.3    108   CO2 24 23   BUN 11 13   CREATININE 0.7 0.8   CALCIUM 9.1 9.0     Recent Labs   Lab 07/31/22 0607 08/01/22  0339   WBC 5.24 3.97   HGB 12.0* 11.9*   HCT 38.6* 37.4*    228     No results for input(s): LABPT, INR, APTT in the last 48 hours.  Microbiology Results (last 7 days)     Procedure Component Value Units Date/Time    Aerobic culture [[id number]] Collected: 07/30/22 0055    Order Status: Sent Specimen: Incision site from Back Updated: 08/01/22 0148        All pertinent labs from the last 24 hours have been reviewed.  Significant Diagnostics:  I have reviewed all pertinent imaging results/findings within the past 24 hours.    Assessment/Plan:     Active Diagnoses:    Diagnosis Date Noted POA    PRINCIPAL PROBLEM:  Wound dehiscence [T81.30XA] 07/29/2022 Yes    Discitis of lumbar region [M46.46] 08/01/2022 Yes    Postoperative CSF leak [G97.82, G96.00] 07/30/2022 Yes    Hypertension [I10] 07/29/2022 Yes    HLD (hyperlipidemia) [E78.5] 07/29/2022 Yes    COVID [U07.1] 07/29/2022 Yes    Degenerative disc disease, lumbar [M51.36] 06/22/2022 Yes     Chronic    Diabetes mellitus without complication [E11.9]  Yes     Chronic    Morbid obesity with BMI of 40.0-44.9, adult [E66.01, Z68.41]  Not Applicable      Problems Resolved During this Admission:       Patient will need revision  with wound washout/exploration.  Request Plastics to assist with this surgery- messaged Dr. Falcon regarding availability this week.   Patient is aware and agreeable to this treatment plan.  Will continue to monitor.   Please call with any changes.    6/29 blood cx + S. Epidermidis  Anaerobic cx (deep) + Cutibacterium acnes  - continue Daptomycin, Cefepime ,flagyl and doxycycline    Oliver Clifton PA-C  Neurosurgery  Formerly [facility] - Telemetry ([facility])

## 2022-08-01 NOTE — PT/OT/SLP PROGRESS
Physical Therapy  Treatment    Friday ROGELIO Blake   MRN: [id number]   Admitting Diagnosis: Wound dehiscence    PT Received On: 08/01/22  PT Start Time: 1300     PT Stop Time: 1315    PT Total Time (min): 15 min       Billable Minutes:  Therapeutic Activity 15    Treatment Type: Treatment  PT/PTA: PTA     PTA Visit Number: 1       General Precautions: Standard, airborne, contact, droplet, fall  Orthopedic Precautions: spinal precautions   Braces: LSO  Respiratory Status: Room air    Spiritual, Cultural Beliefs, Islam Practices, Values that Affect Care: no    Subjective:  Communicated with patient's nurse, Tesha, and competed Epic chart review prior to session.  Patient agreed to PT session. Reports he is scheduled to have surgery on Wednesday and this will be his 3rd surgery in the same area.     Pain/Comfort  Pain Rating 1: 0/10    Objective:   Patient found with: telemetry, peripheral IV    Functional Mobility:  Patient found sitting EOB.    Donning/doffing LSO: Indep    STS from EOB: I    80ft No AD: I     80ft pushing IV pole: I    **Gait distances limited by isolation precautions allowing for in room ambulation only. No gross LOB noted and patient demonstrated excellent safety awareness**    Stand pivot T/F to EOB No AD: I    Educated patient on importance of increased tolerance to upright position and direct impact on CV endurance and strength. Patient encouraged to sit up in chair for a minimum of 2 consecutive hours per day. Encouraged patient to perform AROM TE to BLE throughout the day within all available planes of motion. Patient verbalized understanding and agreed to comply. Patient cleared to ambulate in room independently but encouraged to call for staff assistance if he feels unsteady or weak for any reason. Encouraged patient to ambulate in room multiple times per day in attempt to maintain current level of strength and CV endurance. Patient verbalized understanding.        AM-PAC 6 CLICK  MOBILITY  How much help from another person does this patient currently need?   1 = Unable, Total/Dependent Assistance  2 = A lot, Maximum/Moderate Assistance  3 = A little, Minimum/Contact Guard/Supervision  4 = None, Modified [place]/Independent    Turning over in bed (including adjusting bedclothes, sheets and blankets)?: 4  Sitting down on and standing up from a chair with arms (e.g., wheelchair, bedside commode, etc.): 4  Moving from lying on back to sitting on the side of the bed?: 4  Moving to and from a bed to a chair (including a wheelchair)?: 4  Need to walk in hospital room?: 4  Climbing 3-5 steps with a railing?: 1  Basic Mobility Total Score: 21    AM-PAC Raw Score CMS G-Code Modifier Level of Impairment Assistance   6 % Total / Unable   7 - 9 CM 80 - 100% Maximal Assist   10 - 14 CL 60 - 80% Moderate Assist   15 - 19 CK 40 - 60% Moderate Assist   20 - 22 CJ 20 - 40% Minimal Assist   23 CI 1-20% SBA / CGA   24 CH 0% Independent/ Mod I     Patient left sitting edge of bed with all lines intact and call button in reach. Patient's aide and nurse notified of patient clearance to ambulate independently in room.     Assessment:  Friday ROGELIO Blake is a 66 y.o. male with a medical diagnosis of Wound dehiscence and presents with overall decline in functional mobility. Plan to communicate w/ [facility] PT to determine if patient is appropriate for D/C from PT services [facility]     Rehab identified problem list/impairments: Rehab identified problem list/impairments: weakness, impaired endurance, orthopedic precautions    Rehab potential is excellent.    Activity tolerance: Excellent    Discharge recommendations: Discharge Facility/Level of Care Needs: home health PT     Barriers to discharge:      Equipment recommendations: Equipment Needed After Discharge: none     GOALS:   Multidisciplinary Problems     Physical Therapy Goals        Problem: Physical Therapy    Goal  Priority Disciplines Outcome Goal Variances Interventions   Physical Therapy Goal     PT, PT/OT                      PLAN:    Patient to be seen 3 x/week  to address the above listed problems via gait training, therapeutic activities, therapeutic exercises  Plan of Care expires: 08/13/22  Plan of Care reviewed with: patient         08/01/2022

## 2022-08-01 NOTE — SUBJECTIVE & OBJECTIVE
Interval History:   66 year old man with complicated post op course.  06/22/22-  LAMINECTOMY, SPINE, LUMBAR, WITH DISCECTOMY (Left) L2-3      06/26/22  Hemilaminectomy, partial medial facetectomy and foraminotomy L2-3 on left   with discectomy using microscopic dissection   06/28-22-  Fluid collection ventral and lateral consistent with epidural epidural hematoma  compression   Retained disc fragment   Cultures=06/29- aerobic culture- MS[facility]  CUTIBACTERIUM ACNES   06/28-BACTEROIDES FRAGILIS   06/28- staph epi -blood      MRI of the lumbar region -   Patient status post hemilaminectomy and discectomy at L2-3 on the left.  Associated with the surgical area of there is fluid within the disc space, bone marrow edema, and abnormal postcontrast enhancement of the in plates about the disc space and disc which extends along the left aspect of the spinal canal and into the posterior soft tissues concerning for discitis osteomyelitis with epidural abscess extending into the posterior soft tissues.  The fluid collection in the posterior soft tissues is noted both within the laminectomy bed measuring a proximally 3.1 x 2.7 x 2.3 cm in this area, and with extension inferiorly along the L3 lamina and L1 inferior lamina, and also into the posterior soft tissues.  The fluid collection is significantly serpiginous and difficult to accurately measure.  The component in the posterior soft tissues measures on the order of 9.2 x 3.8 by 2.5 cm and also demonstrates some postcontrast enhancement concerning for infection.  Abscess is the diagnosis of  exclusion.  The fluid collection was present on the prior exam of 07/01/2022, but the surrounding peripheral enhancement has significantly increased in comparison to the prior exam, and the new disc space abnormalities raise concern for superimposed infection.  Findings strongly concerning for discitis osteomyelitis at L2-3 as detailed above.     Patient reports less pain.    Review of Systems    Constitutional:  Negative for activity change, appetite change, chills, diaphoresis and fatigue.   HENT:  Negative for congestion, dental problem, ear discharge, ear pain and facial swelling.    Eyes:  Negative for pain, discharge and itching.   Respiratory:  Negative for apnea, cough, chest tightness and shortness of breath.    Cardiovascular:  Negative for chest pain and leg swelling.   Gastrointestinal:  Negative for abdominal distention and abdominal pain.   Endocrine: Negative for cold intolerance, heat intolerance and polydipsia.   Genitourinary:  Negative for difficulty urinating, dysuria and enuresis.   Musculoskeletal:  Positive for back pain. Negative for arthralgias.        Leakage of fluid from op site   Skin:  Negative for color change and pallor.   Allergic/Immunologic: Negative for environmental allergies and food allergies.   Neurological:  Negative for dizziness, facial asymmetry, light-headedness and headaches.   Hematological:  Negative for adenopathy. Does not bruise/bleed easily.   Psychiatric/Behavioral:  Negative for agitation and behavioral problems.    Objective:     Vital Signs (Most Recent):  Temp: 98 °F (36.7 °C) (08/01/22 0353)  Pulse: (!) 59 (08/01/22 0353)  Resp: 18 (08/01/22 0353)  BP: (!) 98/54 (08/01/22 0353)  SpO2: 98 % (08/01/22 0353)   Vital Signs (24h Range):  Temp:  [98 °F (36.7 °C)-98.3 °F (36.8 °C)] 98 °F (36.7 °C)  Pulse:  [54-73] 59  Resp:  [11-20] 18  SpO2:  [94 %-98 %] 98 %  BP: ()/(54-84) 98/54     Weight: 104.8 kg (231 lb 0.7 oz)  Body mass index is 37.29 kg/m².    Estimated Creatinine Clearance: 103 mL/min (based on SCr of 0.8 mg/dL).    Physical Exam  Vitals and nursing note reviewed.   Constitutional:       General: He is not in acute distress.     Appearance: He is well-developed. He is not diaphoretic.   HENT:      Head: Normocephalic and atraumatic.      Nose: Nose normal.   Eyes:      General:         Right eye: No discharge.         Left eye: No  discharge.      Conjunctiva/sclera: Conjunctivae normal.      Pupils: Pupils are equal, round, and reactive to light.   Neck:      Thyroid: No thyromegaly.      Vascular: No JVD.      Trachea: No tracheal deviation.   Cardiovascular:      Rate and Rhythm: Normal rate and regular rhythm.      Heart sounds: Normal heart sounds. No murmur heard.    No friction rub. No gallop.   Pulmonary:      Effort: Pulmonary effort is normal. No respiratory distress.      Breath sounds: Normal breath sounds. No wheezing or rales.   Chest:      Chest wall: No tenderness.   Abdominal:      General: Bowel sounds are normal. There is no distension.      Palpations: Abdomen is soft. There is no mass.      Tenderness: There is no abdominal tenderness.   Genitourinary:     Comments: deferred  Musculoskeletal:         General: No tenderness or deformity. Normal range of motion.      Cervical back: Normal range of motion and neck supple.      Comments: Lower back surgical wound with dressing    Skin:     General: Skin is warm and dry.      Capillary Refill: Capillary refill takes less than 2 seconds.      Findings: No erythema or rash.   Neurological:      Mental Status: He is alert and oriented to person, place, and time.      Motor: No abnormal muscle tone.      Coordination: Coordination normal.   Psychiatric:         Behavior: Behavior normal.       Significant Labs: Blood Culture:   Recent Labs   Lab 06/29/22  1439 06/29/22  1657 07/03/22  1015   LABBLOO Gram stain aer bottle: Gram positive cocci in clusters resembling Staph  Results called to and read back by: Alisha Eller RN. 07/02/2022  00:17  STAPHYLOCOCCUS EPIDERMIDIS* Gram stain nico bottle: Gram positive cocci in clusters resembling Staph   Results called to and read back by: Soheila You 07/01/2022  05:47  STAPHYLOCOCCUS EPIDERMIDIS* No growth after 5 days.  No growth after 5 days.     BMP:   Recent Labs   Lab 08/01/22  0339   *      K 4.3      CO2 23    BUN 13   CREATININE 0.8   CALCIUM 9.0     CBC:   Recent Labs   Lab 07/30/22  0605 07/31/22  0607 08/01/22  0339   WBC 5.33 5.24 3.97   HGB 11.3* 12.0* 11.9*   HCT 37.6* 38.6* 37.4*    235 228     CMP:   Recent Labs   Lab 07/30/22  0605 07/31/22  0607 08/01/22  0339    141 140   K 4.4 4.5 4.3    109 108   CO2 23 24 23   * 132* 127*   BUN 13 11 13   CREATININE 0.7 0.7 0.8   CALCIUM 8.4* 9.1 9.0   ANIONGAP 7* 8 9   EGFRNONAA >60 >60  --      Wound Culture:   Recent Labs   Lab 06/28/22  1950   LABAERO STAPHYLOCOCCUS EPIDERMIDIS  Moderate  *  No growth     All pertinent labs within the past 24 hours have been reviewed.    Significant Imaging: I have reviewed all pertinent imaging results/findings within the past 24 hours.

## 2022-08-01 NOTE — PROCEDURES
"Friday ROGELIO Blake is a 66 y.o. male patient.    Temp: 98.5 °F (36.9 °C) (08/01/22 1159)  Pulse: 77 (08/01/22 1345)  Resp: 18 (08/01/22 1345)  BP: 130/77 (08/01/22 1159)  SpO2: 98 % (08/01/22 1347)  Weight: 104.8 kg (231 lb 0.7 oz) (08/01/22 0353)  Height: 5' 6" (167.6 cm) (07/29/22 2319)    PICC  Date/Time: 8/1/2022 3:40 PM  Performed by: Jean Rabago RN  Consent Done: Yes  Time out: Immediately prior to procedure a time out was called to verify the correct patient, procedure, equipment, support staff and site/side marked as required  Indications: med administration  Anesthesia: local infiltration  Local anesthetic: lidocaine 1% without epinephrine  Anesthetic Total (mL): 2  Preparation: skin prepped with chlorhexidine (without alcohol)  Skin prep agent dried: skin prep agent completely dried prior to procedure  Sterile barriers: all five maximum sterile barriers used - cap, mask, sterile gown, sterile gloves, and large sterile sheet  Hand hygiene: hand hygiene performed prior to central venous catheter insertion  Location details: left brachial  Catheter type: double lumen  Catheter size: 4 Fr  Catheter Length: 40cm    Ultrasound guidance: yes  Vessel Caliber: medium and patent, compressibility normal  Needle advanced into vessel with real time Ultrasound guidance.  Guidewire confirmed in vessel.  Sterile sheath used.  Number of attempts: 1  Post-procedure: blood return through all ports, chlorhexidine patch and sterile dressing applied  Estimated blood loss (mL): 0  Specimens: No  Implants: No  Comments: Awaiting xray for confirmation of placement         Jean Rabago RN    8/1/2022    "

## 2022-08-01 NOTE — PROGRESS NOTES
[facility] - Tele[facility] ([facility])  Infectious Disease  Progress Note    Patient Name: Friday ROGELIO Blake  MRN: [id number]  Admission Date: 7/29/2022  Length of Stay: 1 days  Attending Physician: Kuldeep Bermudez MD  Primary Care Provider: Sid Elizabeth MD    Isolation Status: Airborne and Contact and Droplet  Assessment/Plan:      Discitis of lumbar region  MRI of the lumbar spine-  Findings strongly concerning for discitis osteomyelitis at L2-3-  . The fluid collection in the posterior soft tissues is noted both within the laminectomy bed measuring a proximally 3.1 x 2.7 x 2.3 cm in this area, and with extension inferiorly along the L3 lamina and L1 inferior lamina, and also into the posterior soft tissues.  The fluid collection is significantly serpiginous and difficult to accurately measure.  The component in the posterior soft tissues measures on the order of 9.2 x 3.8 by 2.5 cm and also demonstrates some postcontrast enhancement concerning for infection    Will need cultures to guide regime, will discuss with Dr Zepeda  Will continue Daptomycin, Cefepime ,flagyl and doxycycline .  Previous cultures-  Cultures=06/29- aerobic culture- [facility]  CUTIBACTERIUM ACNES   06/28-BACTEROIDES FRAGILIS   06/28- staph epi -blood      Postoperative CSF leak  Will send beta 2 transferrin , follow cultures  On empiric Dapto,Cefepime     COVID  - COVID-19 testing   - Infection Control notified     - Isolation:   - Airborne, Contact and Droplet Precautions  - Cohort patients into COVID units              - Limit visitors per hospital policy              - Consolidating lab draws, nursing care, provider visits, and interventions      - Management:  Supplemental O2 to maintain SpO2 >92%  Continuous/intermittent Pulse Ox  Albuterol treatment PRN    Advance Care Planning            Hypertension  BP control as per primary team    Diabetes mellitus without complication  Insulin sliding scale , diabetic diet          Anticipated Disposition:      Thank you for your consult. I will follow-up with patient. Please contact us if you have any additional questions.    Tha Hodge MD  Infectious Disease  [facility] - Tele[facility] ([facility])    Subjective:     Principal Problem:Wound dehiscence    HPI:  66 year old man with PMHx of DM, HTN, hypercalcemia, and hyperlipidemia who presents to the Emergency Department for evaluation of post-op complication.    His surgery history-    06/22/22-  LAMINECTOMY, SPINE, LUMBAR, WITH DISCECTOMY (Left) L2-3      06/26/22  Hemilaminectomy, partial medial facetectomy and foraminotomy L2-3 on left   with discectomy using microscopic dissection   06/28-22-  Fluid collection ventral and lateral consistent with epidural epidural hematoma  compression   Retained disc fragment   Cultures=06/29- aerobic culture- [facility]  CUTIBACTERIUM ACNES   06/28-BACTEROIDES FRAGILIS   06/28- staph epi -blood    He still has persistent leakage from the back and had recent travel to [place].  MRI of the spine -pending   ESR -55, crp-normal      Interval History:   66 year old man with complicated post op course.  06/22/22-  LAMINECTOMY, SPINE, LUMBAR, WITH DISCECTOMY (Left) L2-3      06/26/22  Hemilaminectomy, partial medial facetectomy and foraminotomy L2-3 on left   with discectomy using microscopic dissection   06/28-22-  Fluid collection ventral and lateral consistent with epidural epidural hematoma  compression   Retained disc fragment   Cultures=06/29- aerobic culture- [facility]  CUTIBACTERIUM ACNES   06/28-BACTEROIDES FRAGILIS   06/28- staph epi -blood      MRI of the lumbar region -   Patient status post hemilaminectomy and discectomy at L2-3 on the left.  Associated with the surgical area of there is fluid within the disc space, bone marrow edema, and abnormal postcontrast enhancement of the in plates about the disc space and disc which extends along the left aspect of the spinal canal and into the posterior soft tissues concerning for discitis  osteomyelitis with epidural abscess extending into the posterior soft tissues.  The fluid collection in the posterior soft tissues is noted both within the laminectomy bed measuring a proximally 3.1 x 2.7 x 2.3 cm in this area, and with extension inferiorly along the L3 lamina and L1 inferior lamina, and also into the posterior soft tissues.  The fluid collection is significantly serpiginous and difficult to accurately measure.  The component in the posterior soft tissues measures on the order of 9.2 x 3.8 by 2.5 cm and also demonstrates some postcontrast enhancement concerning for infection.  Abscess is the diagnosis of  exclusion.  The fluid collection was present on the prior exam of 07/01/2022, but the surrounding peripheral enhancement has significantly increased in comparison to the prior exam, and the new disc space abnormalities raise concern for superimposed infection.  Findings strongly concerning for discitis osteomyelitis at L2-3 as detailed above.     Patient reports less pain.    Review of Systems   Constitutional:  Negative for activity change, appetite change, chills, diaphoresis and fatigue.   HENT:  Negative for congestion, dental problem, ear discharge, ear pain and facial swelling.    Eyes:  Negative for pain, discharge and itching.   Respiratory:  Negative for apnea, cough, chest tightness and shortness of breath.    Cardiovascular:  Negative for chest pain and leg swelling.   Gastrointestinal:  Negative for abdominal distention and abdominal pain.   Endocrine: Negative for cold intolerance, heat intolerance and polydipsia.   Genitourinary:  Negative for difficulty urinating, dysuria and enuresis.   Musculoskeletal:  Positive for back pain. Negative for arthralgias.        Leakage of fluid from op site   Skin:  Negative for color change and pallor.   Allergic/Immunologic: Negative for environmental allergies and food allergies.   Neurological:  Negative for dizziness, facial asymmetry,  light-headedness and headaches.   Hematological:  Negative for adenopathy. Does not bruise/bleed easily.   Psychiatric/Behavioral:  Negative for agitation and behavioral problems.    Objective:     Vital Signs (Most Recent):  Temp: 98 °F (36.7 °C) (08/01/22 0353)  Pulse: (!) 59 (08/01/22 0353)  Resp: 18 (08/01/22 0353)  BP: (!) 98/54 (08/01/22 0353)  SpO2: 98 % (08/01/22 0353)   Vital Signs (24h Range):  Temp:  [98 °F (36.7 °C)-98.3 °F (36.8 °C)] 98 °F (36.7 °C)  Pulse:  [54-73] 59  Resp:  [11-20] 18  SpO2:  [94 %-98 %] 98 %  BP: ()/(54-84) 98/54     Weight: 104.8 kg (231 lb 0.7 oz)  Body mass index is 37.29 kg/m².    Estimated Creatinine Clearance: 103 mL/min (based on SCr of 0.8 mg/dL).    Physical Exam  Vitals and nursing note reviewed.   Constitutional:       General: He is not in acute distress.     Appearance: He is well-developed. He is not diaphoretic.   HENT:      Head: Normocephalic and atraumatic.      Nose: Nose normal.   Eyes:      General:         Right eye: No discharge.         Left eye: No discharge.      Conjunctiva/sclera: Conjunctivae normal.      Pupils: Pupils are equal, round, and reactive to light.   Neck:      Thyroid: No thyromegaly.      Vascular: No JVD.      Trachea: No tracheal deviation.   Cardiovascular:      Rate and Rhythm: Normal rate and regular rhythm.      Heart sounds: Normal heart sounds. No murmur heard.    No friction rub. No gallop.   Pulmonary:      Effort: Pulmonary effort is normal. No respiratory distress.      Breath sounds: Normal breath sounds. No wheezing or rales.   Chest:      Chest wall: No tenderness.   Abdominal:      General: Bowel sounds are normal. There is no distension.      Palpations: Abdomen is soft. There is no mass.      Tenderness: There is no abdominal tenderness.   Genitourinary:     Comments: deferred  Musculoskeletal:         General: No tenderness or deformity. Normal range of motion.      Cervical back: Normal range of motion and neck  supple.      Comments: Lower back surgical wound with dressing    Skin:     General: Skin is warm and dry.      Capillary Refill: Capillary refill takes less than 2 seconds.      Findings: No erythema or rash.   Neurological:      Mental Status: He is alert and oriented to person, place, and time.      Motor: No abnormal muscle tone.      Coordination: Coordination normal.   Psychiatric:         Behavior: Behavior normal.       Significant Labs: Blood Culture:   Recent Labs   Lab 06/29/22  1439 06/29/22  1657 07/03/22  1015   LABBLOO Gram stain aer bottle: Gram positive cocci in clusters resembling Staph  Results called to and read back by: Alisha Eller RN. 07/02/2022  00:17  STAPHYLOCOCCUS EPIDERMIDIS* Gram stain nico bottle: Gram positive cocci in clusters resembling Staph   Results called to and read back by: Soheila You 07/01/2022  05:47  STAPHYLOCOCCUS EPIDERMIDIS* No growth after 5 days.  No growth after 5 days.     BMP:   Recent Labs   Lab 08/01/22  0339   *      K 4.3      CO2 23   BUN 13   CREATININE 0.8   CALCIUM 9.0     CBC:   Recent Labs   Lab 07/30/22  0605 07/31/22  0607 08/01/22  0339   WBC 5.33 5.24 3.97   HGB 11.3* 12.0* 11.9*   HCT 37.6* 38.6* 37.4*    235 228     CMP:   Recent Labs   Lab 07/30/22  0605 07/31/22  0607 08/01/22  0339    141 140   K 4.4 4.5 4.3    109 108   CO2 23 24 23   * 132* 127*   BUN 13 11 13   CREATININE 0.7 0.7 0.8   CALCIUM 8.4* 9.1 9.0   ANIONGAP 7* 8 9   EGFRNONAA >60 >60  --      Wound Culture:   Recent Labs   Lab 06/28/22  1950   LABAERO STAPHYLOCOCCUS EPIDERMIDIS  Moderate  *  No growth     All pertinent labs within the past 24 hours have been reviewed.    Significant Imaging: I have reviewed all pertinent imaging results/findings within the past 24 hours.

## 2022-08-01 NOTE — PLAN OF CARE
[facility] - Tele[facility] (H[facility])  Initial Discharge Assessment       Primary Care Provider: Sid Elizabeth MD    Admission Diagnosis: Postoperative CSF leak [G97.82, G96.00]    Admission Date: 7/29/2022  Expected Discharge Date:     Discharge Barriers Identified: None    Payor: HU[employer] MANAGED MEDICARE / Plan: HU[employer]NA TOTAL CARE ADVANTAGE / Product Type: Medicare Advantage /     Extended Emergency Contact Information  Primary Emergency Contact: Cyndi Blake  Mobile Phone: [phone]  Relation: Spouse  Preferred language: English   needed? No    Discharge Plan A: [facility] [facility]         [facility] [address] [place] [id number]  Phone: [phone] Fax: [phone]      Initial Assessment (most recent)     Adult Discharge Assessment - 08/01/22 1326        Discharge Assessment    Assessment Type Discharge Planning Assessment     Confirmed/corrected address, phone number and insurance Yes     Confirmed Demographics Correct on Facesheet     Source of Information health record     Communicated MATEUS with patient/caregiver Date not available/Unable to determine     Reason For Admission wound dehiscence     Lives With spouse     Facility Arrived From: home     Do you expect to return to your current living situation? Yes     Do you have help at home or someone to help you manage your care at home? Yes     Who are your caregiver(s) and their phone number(s)? lives with spouse     Prior to hospitilization cognitive status: Alert/Oriented     Current cognitive status: Alert/Oriented     Walking or Climbing Stairs Difficulty ambulation difficulty, requires equipment     Dressing/Bathing Difficulty none     Equipment Currently Used at Home walker, rolling;cane, straight     Readmission within 30 days? Yes     Patient currently being followed by outpatient case management? Unable to determine (comments)     Do you currently have service(s) that help you manage your care at  home? No     Do you take prescription medications? Yes     Do you have prescription coverage? Yes     Do you have any problems affording any of your prescribed medications? No     Is the patient taking medications as prescribed? yes     Who is going to help you get home at discharge? family     How do you get to doctors appointments? family or friend will provide     Are you on dialysis? No     Discharge Plan A Home Health     DME Needed Upon Discharge  none     Discharge Barriers Identified None               Anticipated DC Dispo: Home with spouse and Home Health  Prior Level of Function: independent, owns cane and walker  PCP: Dr Elizabeth   Comments:  Patient recently discharged with [facility], home health services discontinued 7/18/22. CM following for needs.

## 2022-08-01 NOTE — CONSULTS
[facility] - Tele[facility] ([facility])  Wound Care    Patient Name:  Friday ROGELIO Blake   MRN:  [id number]  Date: 8/1/2022  Diagnosis: Wound dehiscence    History:     Past Medical History:   Diagnosis Date    Bilateral carpal tunnel syndrome 2/25/2022    moderate demyelinating bilaterally    Carpal tunnel syndrome     Diabetes mellitus without complication     Fever 6/30/2022    Gram-positive bacteremia 7/2/2022    History of colon polyps     Hypercalcemia 3/23/2021    Hyperlipidemia associated with type 2 diabetes mellitus     Hypertension associated with diabetes     Left carpal tunnel syndrome 3/23/2021    Low back pain 9/28/2021    Lumbar disc disease with radiculopathy     Morbid obesity with BMI of 40.0-44.9, adult     Postoperative hematoma involving nervous system following nervous system procedure 6/28/2022    S/P parathyroidectomy 7/29/2021    Vitamin D deficiency        Social History     Socioeconomic History    Marital status:    Tobacco Use    Smoking status: Never Smoker    Smokeless tobacco: Never Used   Substance and Sexual Activity    Alcohol use: Never    Drug use: Never    Sexual activity: Yes     Partners: Female       Precautions:     Allergies as of 07/29/2022    (No Known Allergies)       [facility] Assessment Details/Treatment     Consulted on Mr. Sebastian for wound evaluation and consideration of wound vac to thoracic incision site from prior surgery. He is awake and alert, denies pain at present. He is incidentally COVID+.  Patient with areas of hyperpigmentation noted to nose and left cheek he reports from initial surgery.  Patient lay prone independently. Dressing removed from back for assessment. Incision noted healed except for 2 open areas with + serosanguinous drainage. Proximal opening measures [id number] with 1cm undermining noted circumferentially, yellow slough to wound bed. Distal opening measures [id number] with 3cm undermining noted circumferentially, probes to bone.  Cleansed all with saline and patted dry. Filled wounds including undermined spaces with aquacel ag rope, and covered with foam dressing. Discussed with DINA Frank via secure chat, woudn vac not recommended at this time due to slough in wound and inability to visualize majority of wound bed, plans per chart are to return to OR Wednesday. Recommend change dressing every MWF and as needed for excess drainage. Will follow.          08/01/22 1040   WOCN Assessment   WOCN Total Time (mins) 30   Visit Date 08/01/22   Visit Time 1040   Consult Type New   WOCN Speciality Wound   Wound surgical   Number of Wounds 1   Intervention assessed;applied;coordination of care;chart review;team conference;orders        Altered Skin Integrity 07/29/22 2330 lower Thoracic spine Other (comment)   Date First Assessed/Time First Assessed: 07/29/22 2330   Altered Skin Integrity Present on Admission: yes  Orientation: lower  Location: Thoracic spine  Is this injury device related?: No  Primary Wound Type: (c) Other (comment)   Wound Image    Dressing Appearance Intact;Moist drainage   Drainage Amount Small   Drainage Characteristics/Odor Serosanguineous   Appearance Yellow;Slough;Moist   Tissue loss description Full thickness   Periwound Area Intact;Scar tissue   Wound Edges Dehisced   Wound Length (cm) 2 cm  (proximal open portion of wound)   Wound Width (cm) 1 cm   Wound Depth (cm) 1 cm   Wound Volume (cm^3) 2 cm^3   Wound Surface Area (cm^2) 2 cm^2   Undermining (depth (cm)/location) 1cm circumferentially   Care Cleansed with:;Sterile normal saline;Applied:;Skin Barrier   Dressing Hydrofiber;Silver;Foam   Packing packed with;hydrofiber/alginate   Packing Inserted  1   Periwound Care Absorptive dressing applied;Dry periwound area maintained;Skin barrier film applied   Dressing Change Due 08/03/22 08/01/2022

## 2022-08-01 NOTE — ASSESSMENT & PLAN NOTE
MRI of the lumbar spine-  Findings strongly concerning for discitis osteomyelitis at L2-3-  . The fluid collection in the posterior soft tissues is noted both within the laminectomy bed measuring a proximally 3.1 x 2.7 x 2.3 cm in this area, and with extension inferiorly along the L3 lamina and L1 inferior lamina, and also into the posterior soft tissues.  The fluid collection is significantly serpiginous and difficult to accurately measure.  The component in the posterior soft tissues measures on the order of 9.2 x 3.8 by 2.5 cm and also demonstrates some postcontrast enhancement concerning for infection    Will need cultures to guide regime, will discuss with Dr Zepeda  Will continue Daptomycin, Cefepime ,flagyl and doxycycline .  Previous cultures-  Cultures=06/29- aerobic culture- [facility]  CUTIBACTERIUM ACNES   06/28-BACTEROIDES FRAGILIS   06/28- staph epi -blood

## 2022-08-02 ENCOUNTER — ANESTHESIA EVENT (OUTPATIENT)
Dept: SURGERY | Facility: HOSPITAL | Age: 67
DRG: 856 | End: 2022-08-02
Payer: MEDICARE

## 2022-08-02 LAB
CREAT SERPL-MCNC: 0.9 MG/DL (ref 0.5–1.4)
D DIMER PPP IA.FEU-MCNC: 0.93 MG/L FEU
EST. GFR  (NO RACE VARIABLE): >60 ML/MIN/1.73 M^2
FERRITIN SERPL-MCNC: 200 NG/ML (ref 20–300)
LDH SERPL L TO P-CCNC: 238 U/L (ref 110–260)
POCT GLUCOSE: 137 MG/DL (ref 70–110)
POCT GLUCOSE: 138 MG/DL (ref 70–110)
POCT GLUCOSE: 215 MG/DL (ref 70–110)

## 2022-08-02 PROCEDURE — 63600175 PHARM REV CODE 636 W HCPCS: Performed by: EMERGENCY MEDICINE

## 2022-08-02 PROCEDURE — 83615 LACTATE (LD) (LDH) ENZYME: CPT | Performed by: NURSE PRACTITIONER

## 2022-08-02 PROCEDURE — A4216 STERILE WATER/SALINE, 10 ML: HCPCS | Performed by: NURSE PRACTITIONER

## 2022-08-02 PROCEDURE — 25000003 PHARM REV CODE 250: Performed by: NURSE PRACTITIONER

## 2022-08-02 PROCEDURE — 27000207 HC ISOLATION

## 2022-08-02 PROCEDURE — 94640 AIRWAY INHALATION TREATMENT: CPT

## 2022-08-02 PROCEDURE — 21400001 HC TELEMETRY ROOM

## 2022-08-02 PROCEDURE — 99024 PR POST-OP FOLLOW-UP VISIT: ICD-10-PCS | Mod: ,,, | Performed by: PHYSICIAN ASSISTANT

## 2022-08-02 PROCEDURE — 85379 FIBRIN DEGRADATION QUANT: CPT | Performed by: NURSE PRACTITIONER

## 2022-08-02 PROCEDURE — 82728 ASSAY OF FERRITIN: CPT | Performed by: NURSE PRACTITIONER

## 2022-08-02 PROCEDURE — 99024 POSTOP FOLLOW-UP VISIT: CPT | Mod: ,,, | Performed by: PHYSICIAN ASSISTANT

## 2022-08-02 PROCEDURE — 63600175 PHARM REV CODE 636 W HCPCS: Performed by: INTERNAL MEDICINE

## 2022-08-02 PROCEDURE — 36415 COLL VENOUS BLD VENIPUNCTURE: CPT | Performed by: EMERGENCY MEDICINE

## 2022-08-02 PROCEDURE — 25000003 PHARM REV CODE 250: Performed by: INTERNAL MEDICINE

## 2022-08-02 PROCEDURE — 82565 ASSAY OF CREATININE: CPT | Performed by: EMERGENCY MEDICINE

## 2022-08-02 PROCEDURE — 94761 N-INVAS EAR/PLS OXIMETRY MLT: CPT

## 2022-08-02 PROCEDURE — 25000003 PHARM REV CODE 250: Performed by: EMERGENCY MEDICINE

## 2022-08-02 PROCEDURE — 36415 COLL VENOUS BLD VENIPUNCTURE: CPT | Performed by: NURSE PRACTITIONER

## 2022-08-02 PROCEDURE — 63600175 PHARM REV CODE 636 W HCPCS: Performed by: NURSE PRACTITIONER

## 2022-08-02 RX ADMIN — Medication 6 MG: at 09:08

## 2022-08-02 RX ADMIN — FAMOTIDINE 20 MG: 20 TABLET ORAL at 09:08

## 2022-08-02 RX ADMIN — GABAPENTIN 300 MG: 300 CAPSULE ORAL at 10:08

## 2022-08-02 RX ADMIN — METRONIDAZOLE 500 MG: 500 TABLET ORAL at 06:08

## 2022-08-02 RX ADMIN — OXYCODONE HYDROCHLORIDE AND ACETAMINOPHEN 500 MG: 500 TABLET ORAL at 10:08

## 2022-08-02 RX ADMIN — Medication 1000 UNITS: at 10:08

## 2022-08-02 RX ADMIN — THERA TABS 1 TABLET: TAB at 10:08

## 2022-08-02 RX ADMIN — METRONIDAZOLE 500 MG: 500 TABLET ORAL at 01:08

## 2022-08-02 RX ADMIN — MINOCYCLINE HYDROCHLORIDE 100 MG: 50 CAPSULE ORAL at 10:08

## 2022-08-02 RX ADMIN — DOCUSATE SODIUM 100 MG: 100 CAPSULE, LIQUID FILLED ORAL at 09:08

## 2022-08-02 RX ADMIN — ALBUTEROL SULFATE 2 PUFF: 90 AEROSOL, METERED RESPIRATORY (INHALATION) at 01:08

## 2022-08-02 RX ADMIN — CEFEPIME 2 G: 2 INJECTION, POWDER, FOR SOLUTION INTRAVENOUS at 06:08

## 2022-08-02 RX ADMIN — CEFEPIME 2 G: 2 INJECTION, POWDER, FOR SOLUTION INTRAVENOUS at 02:08

## 2022-08-02 RX ADMIN — ALBUTEROL SULFATE 2 PUFF: 90 AEROSOL, METERED RESPIRATORY (INHALATION) at 07:08

## 2022-08-02 RX ADMIN — DOCUSATE SODIUM 100 MG: 100 CAPSULE, LIQUID FILLED ORAL at 10:08

## 2022-08-02 RX ADMIN — ATORVASTATIN CALCIUM 10 MG: 10 TABLET, FILM COATED ORAL at 09:08

## 2022-08-02 RX ADMIN — ASPIRIN 81 MG: 81 TABLET, COATED ORAL at 10:08

## 2022-08-02 RX ADMIN — GABAPENTIN 300 MG: 300 CAPSULE ORAL at 03:08

## 2022-08-02 RX ADMIN — Medication 10 ML: at 02:08

## 2022-08-02 RX ADMIN — ALBUTEROL SULFATE 2 PUFF: 90 AEROSOL, METERED RESPIRATORY (INHALATION) at 12:08

## 2022-08-02 RX ADMIN — ENOXAPARIN SODIUM 100 MG: 100 INJECTION SUBCUTANEOUS at 10:08

## 2022-08-02 RX ADMIN — ENOXAPARIN SODIUM 100 MG: 100 INJECTION SUBCUTANEOUS at 09:08

## 2022-08-02 RX ADMIN — FAMOTIDINE 20 MG: 20 TABLET ORAL at 10:08

## 2022-08-02 RX ADMIN — LOSARTAN POTASSIUM 50 MG: 50 TABLET, FILM COATED ORAL at 10:08

## 2022-08-02 RX ADMIN — CEFEPIME 2 G: 2 INJECTION, POWDER, FOR SOLUTION INTRAVENOUS at 10:08

## 2022-08-02 RX ADMIN — GABAPENTIN 300 MG: 300 CAPSULE ORAL at 09:08

## 2022-08-02 RX ADMIN — VANCOMYCIN HYDROCHLORIDE 2000 MG: 500 INJECTION, POWDER, LYOPHILIZED, FOR SOLUTION INTRAVENOUS at 01:08

## 2022-08-02 RX ADMIN — Medication 10 ML: at 09:08

## 2022-08-02 RX ADMIN — OXYCODONE HYDROCHLORIDE AND ACETAMINOPHEN 500 MG: 500 TABLET ORAL at 09:08

## 2022-08-02 RX ADMIN — Medication 10 ML: at 06:08

## 2022-08-02 RX ADMIN — METRONIDAZOLE 500 MG: 500 TABLET ORAL at 09:08

## 2022-08-02 NOTE — CONSULTS
Pharmacokinetic Initial Assessment: IV Vancomycin    Assessment/Plan:    Initiate intravenous vancomycin with loading dose of 2000 mg once followed by a maintenance dose of vancomycin 1500mg IV every 12 hours  Desired empiric serum trough concentration is 15 to 20 mcg/mL  Draw vancomycin trough level 60 min prior to third dose on 8/3 at approximately 1200  Pharmacy will continue to follow and monitor vancomycin.      Please contact pharmacy at extension [phone]    Thank you for the consult,   Maxabraham Aman       Patient brief summary:  Friday ROGELIO Blake is a 66 y.o. male initiated on antimicrobial therapy with IV Vancomycin for treatment of suspected bone/joint infection    Drug Allergies:   Review of patient's allergies indicates:  No Known Allergies    Actual Body Weight:   105.3    Renal Function:   Estimated Creatinine Clearance: 91.8 mL/min (based on SCr of 0.9 mg/dL).,     Dialysis Method (if applicable):  N/A    CBC (last 72 hours):  Recent Labs   Lab Result Units 07/31/22  0607 08/01/22  0339   WBC K/uL 5.24 3.97   Hemoglobin g/dL 12.0* 11.9*   Hematocrit % 38.6* 37.4*   Platelets K/uL 235 228   Gran % % 39.2 52.8   Lymph % % 50.6* 36.3   Mono % % 6.3 5.3   Eosinophil % % 3.1 4.5   Basophil % % 0.4 0.3   Differential Method  Automated Automated       Metabolic Panel (last 72 hours):  Recent Labs   Lab Result Units 07/31/22  0607 08/01/22  0339 08/02/22  1034   Sodium mmol/L 141 140  --    Potassium mmol/L 4.5 4.3  --    Chloride mmol/L 109 108  --    CO2 mmol/L 24 23  --    Glucose mg/dL 132* 127*  --    BUN mg/dL 11 13  --    Creatinine mg/dL 0.7 0.8 0.9       Drug levels (last 3 results):  No results for input(s): VANCOMYCINRA, VANCORANDOM, VANCOMYCINPE, VANCOPEAK, VANCOMYCINTR, VANCOTROUGH in the last 72 hours.    Microbiologic Results:  Microbiology Results (last 7 days)       Procedure Component Value Units Date/Time    Aerobic culture [[id number]]  (Abnormal) Collected: 07/30/22 0055    Order  Status: Completed Specimen: Incision site from Back Updated: 08/02/22 0889     Aerobic Bacterial Culture PRESUMPTIVE PSEUDOMONAS SPECIES  Moderate  Identification and susceptibility pending

## 2022-08-02 NOTE — PROGRESS NOTES
[facility] Medicine  Progress Note    Patient Name: Friday ROGELIO Blake  MRN: [id number]  Patient Class: IP- Inpatient   Admission Date: 7/29/2022  Length of Stay: 1 days  Attending Physician: Kuldeep Bermudez MD  Primary Care Provider: Sid Elizabeth MD        Subjective:     Principal Problem:Wound dehiscence        HPI:  65 y/o male with PMHx of HTN, HLD, DM, and obesity who presented tot he ED for eval of post op incision leaking.  Sx are constant and moderate in severity. No mitigating or exacerbating factors reported. Pt had surgery on 7/26, then took a trip to [facility]. He flew back last night. Pt denies fever, chills, pain, HA, N/V, and all other sx at this time.  ED workup shows: H/H 11.6/36.9, COVID +  He will be kept on OBS for CSF leak under the care of hospital medicine  He is a full code and his SDM is his wife             Overview/Hospital Course:  Mr Blake is a 66 year old male who presented to [facility] Emergency Room of evaluation of wound dehiscence. Patient underwent Laminectomy of lumbar spine on 6/22/2022 and I & D of hematoma on 6/28/2022. Traveled to [facility] for daughter's wedding. Positive for drainage from low back surgical wound. Neurosurgery following, plan possible washout vs wound vac placement.  Infectious Disease following, continue empiric IV antibiotics. COVID positive. As of 7/31/2022, patient feeling well, vital signs and labs stable. MRI lumbar spine strongly concerning for discitis osteomyelitis at L2-3, probable epidural phlegmon or abscess. Neurosurgery following, will need revision with washout. Will follow.     8/1- cheerful, resting comfortably in bed on RA, no fever, chills or cough. Still has drainage from the Lumbar wound. Dr. Zepeda plans revision with wound washout/exploration with the help of Plastic Surgery, await confirmation date. Pt agreeable with the plan. Continue wound care, IV Abx, no wound vac yet.       Interval History: cheerful,  resting comfortably in bed on RA, no fever, chills or cough. Still has drainage from the Lumbar wound. Dr. Zepeda plans revision with wound washout/exploration with the help of Plastic Surgery, await confirmation date. Pt agreeable with the plan. Continue wound care, IV Abx, no wound vac yet.     Review of Systems   Constitutional:  Negative for activity change, appetite change, chills, fatigue and fever.   HENT:  Negative for dental problem, ear pain, hearing loss, mouth sores, nosebleeds, sinus pressure, sore throat, tinnitus and trouble swallowing.    Eyes:  Negative for pain, discharge and visual disturbance.   Respiratory:  Negative for cough, choking, chest tightness, shortness of breath and wheezing.    Cardiovascular:  Negative for chest pain, palpitations and leg swelling.   Gastrointestinal:  Negative for abdominal distention, abdominal pain, anal bleeding, blood in stool, constipation, diarrhea, nausea and vomiting.   Endocrine: Negative for cold intolerance, heat intolerance, polydipsia, polyphagia and polyuria.   Genitourinary:  Negative for decreased urine volume, difficulty urinating, flank pain, frequency, hematuria and urgency.   Musculoskeletal:  Negative for arthralgias, back pain, gait problem, myalgias, neck pain and neck stiffness.   Skin:  Positive for wound (lower back surgical wound). Negative for color change and rash.   Allergic/Immunologic: Negative.    Neurological:  Negative for dizziness, tremors, seizures, syncope, speech difficulty, weakness, light-headedness and headaches.   Hematological: Negative.    Psychiatric/Behavioral:  Negative for agitation, behavioral problems, confusion and sleep disturbance. The patient is not nervous/anxious.    All other systems reviewed and are negative.  Objective:     Vital Signs (Most Recent):  Temp: 96.9 °F (36.1 °C) (08/01/22 1946)  Pulse: 63 (08/01/22 1946)  Resp: 20 (08/01/22 1946)  BP: (!) 102/56 (08/01/22 1946)  SpO2: (!) 94 % (08/01/22  1946)   Vital Signs (24h Range):  Temp:  [96.9 °F (36.1 °C)-98.5 °F (36.9 °C)] 96.9 °F (36.1 °C)  Pulse:  [59-88] 63  Resp:  [16-20] 20  SpO2:  [94 %-99 %] 94 %  BP: ()/(54-77) 102/56     Weight: 104.8 kg (231 lb 0.7 oz)  Body mass index is 37.29 kg/m².    Intake/Output Summary (Last 24 hours) at 8/1/2022 2052  Last data filed at 8/1/2022 2000  Gross per 24 hour   Intake 203.29 ml   Output 300 ml   Net -96.71 ml      Physical Exam  Vitals and nursing note reviewed.   Constitutional:       General: He is not in acute distress.     Appearance: He is well-developed. He is not diaphoretic.   HENT:      Head: Normocephalic and atraumatic.      Nose: Nose normal.   Eyes:      General:         Right eye: No discharge.         Left eye: No discharge.      Conjunctiva/sclera: Conjunctivae normal.      Pupils: Pupils are equal, round, and reactive to light.   Neck:      Thyroid: No thyromegaly.      Vascular: No JVD.      Trachea: No tracheal deviation.   Cardiovascular:      Rate and Rhythm: Normal rate and regular rhythm.      Heart sounds: Normal heart sounds. No murmur heard.    No friction rub. No gallop.   Pulmonary:      Effort: Pulmonary effort is normal. No respiratory distress.      Breath sounds: Normal breath sounds. No wheezing or rales.   Chest:      Chest wall: No tenderness.   Abdominal:      General: Bowel sounds are normal. There is no distension.      Palpations: Abdomen is soft. There is no mass.      Tenderness: There is no abdominal tenderness.   Genitourinary:     Comments: deferred  Musculoskeletal:         General: No tenderness or deformity. Normal range of motion.      Cervical back: Normal range of motion and neck supple.      Comments: Lower back surgical wound with dressing    Skin:     General: Skin is warm and dry.      Capillary Refill: Capillary refill takes less than 2 seconds.      Findings: No erythema or rash.   Neurological:      Mental Status: He is alert and oriented to person,  place, and time.      Motor: No abnormal muscle tone.      Coordination: Coordination normal.   Psychiatric:         Behavior: Behavior normal.       Significant Labs: All pertinent labs within the past 24 hours have been reviewed.  Blood Culture: No results for input(s): LABBLOO in the last 48 hours.  BMP:   Recent Labs   Lab 08/01/22  0339   *      K 4.3      CO2 23   BUN 13   CREATININE 0.8   CALCIUM 9.0     CBC:   Recent Labs   Lab 07/31/22  0607 08/01/22  0339   WBC 5.24 3.97   HGB 12.0* 11.9*   HCT 38.6* 37.4*    228     CMP:   Recent Labs   Lab 07/31/22  0607 08/01/22  0339    140   K 4.5 4.3    108   CO2 24 23   * 127*   BUN 11 13   CREATININE 0.7 0.8   CALCIUM 9.1 9.0   ANIONGAP 8 9   EGFRNONAA >60  --        Significant Imaging: I have reviewed all pertinent imaging results/findings within the past 24 hours.      Assessment/Plan:      * Wound dehiscence  --wound cx pending  --Neuro surgery consulted  --ID consulted      7/30/2022  Neurosurgery following    Possible washout vs wound vac placement   ID following, continue empiric IV antibiotic        7/31/2022  MRI of lumbar spine strongly concerning for discitis osteomyelitis at L2-3, probable epidural phlegmon or abscess.   Neurosurgery following, will need revision with washout. Will follow.   Continue IV antibiotics  Wound cultures in progress      8/1- continue IV Abx, local wound care  Await revision and wound exploration with plastic surgery      Degenerative disc disease, lumbar  --supportive care  --PT/OT consulted    S/p laminectomy- complicated by wound dehiscence- see above    Diabetes mellitus without complication  Patient's FSGs are controlled on current medication regimen.  Last A1c reviewed-   Lab Results   Component Value Date    HGBA1C 8.3 (H) 05/18/2022     Most recent fingerstick glucose reviewed-   Recent Labs   Lab 08/01/22  0559 08/01/22  1159 08/01/22  1600 08/01/22 2032   POCTGLUCOSE 135*  152* 137* 178*     Current correctional scale  Low  Maintain anti-hyperglycemic dose as follows-   Antihyperglycemics (From admission, onward)            Start     Stop Route Frequency Ordered    07/30/22 0032  insulin aspart U-100 pen 0-5 Units         -- SubQ Before meals & nightly PRN 07/29/22 2333        Hold Oral hypoglycemics while patient is in the hospital.    Continue present care, BS generally well controlled    Morbid obesity with BMI of 40.0-44.9, adult  Body mass index is 37.29 kg/m². Morbid obesity complicates all aspects of disease management from diagnostic modalities to treatment. Weight loss encouraged and health benefits explained to patient.             COVID  Patient is identified as High risk for severe complications of COVID 19 based on COVID risk score of 5   Initiate standard COVID protocols; COVID-19 testing ,Infection Control notification  and isolation- respiratory, contact and droplet per protocol    Diagnostics: (leukopenia, hyponatremia, hyperferritinemia, elevated troponin, elevated d-dimer, age, and comorbidities are significant predictors of poor clinical outcome)  CBC, CMP, Ferritin, CRP and Portable CXR    Management: Maintain oxygen saturations 92-96% via Nasal Cannula  LPM and monitor with continuous/intermittent pulse oximetry.  and Inhaled bronchodilators as needed for shortness of breath.    Asymptomatic, pt on RA    Discitis of lumbar region  See 1 wound dehiscence      Postoperative CSF leak        HLD (hyperlipidemia)  --continue atorvastatin  --cardiac diet      Hypertension  --continue losartan  --cardiac diet    BP under control        VTE Risk Mitigation (From admission, onward)         Ordered     enoxaparin injection 100 mg  2 times daily         07/29/22 2336     IP VTE HIGH RISK PATIENT  Once         07/29/22 2333     Place sequential compression device  Until discontinued         07/29/22 2333                Discharge Planning   MATEUS:      Code Status: Full Code    Is the patient medically ready for discharge?:     Reason for patient still in hospital (select all that apply): Patient trending condition, Laboratory test, Treatment, Imaging and Consult recommendations  Discharge Plan A: Home Health        Kuldeep Bermudez MD  Department of Hospital Medicine   '[place] - Telemetry ([facility])

## 2022-08-02 NOTE — ASSESSMENT & PLAN NOTE
--wound cx pending  --Neuro surgery consulted  --ID consulted      7/30/2022  Neurosurgery following    Possible washout vs wound vac placement   ID following, continue empiric IV antibiotic        7/31/2022  MRI of lumbar spine strongly concerning for discitis osteomyelitis at L2-3, probable epidural phlegmon or abscess.   Neurosurgery following, will need revision with washout. Will follow.   Continue IV antibiotics  Wound cultures in progress      8/1- continue IV Abx, local wound care  Await revision and wound exploration with plastic surgery    8/2- surgery tomorrow, cont iV abx

## 2022-08-02 NOTE — ASSESSMENT & PLAN NOTE
--supportive care  --PT/OT consulted    S/p laminectomy- complicated by wound dehiscence- see above

## 2022-08-02 NOTE — PLAN OF CARE
Plan of Care: RN Shift Summary & Bedside Handover Report  08/02/2022 1:59 AM    Pt admitted on 7/29/2022 for Wound dehiscence under Kuldeep Bermudez MD service   Code Status Full Code    Past Medical/ Surgical Hx Past Medical History:   Diagnosis Date    Bilateral carpal tunnel syndrome 2/25/2022    moderate demyelinating bilaterally    Carpal tunnel syndrome     Diabetes mellitus without complication     Fever 6/30/2022    Gram-positive bacteremia 7/2/2022    History of colon polyps     Hypercalcemia 3/23/2021    Hyperlipidemia associated with type 2 diabetes mellitus     Hypertension associated with diabetes     Left carpal tunnel syndrome 3/23/2021    Low back pain 9/28/2021    Lumbar disc disease with radiculopathy     Morbid obesity with BMI of 40.0-44.9, adult     Postoperative hematoma involving nervous system following nervous system procedure 6/28/2022    S/P parathyroidectomy 7/29/2021    Vitamin D deficiency       Past Surgical History:   Procedure Laterality Date    CARPAL TUNNEL RELEASE Left 4/1/2021    Procedure: RELEASE, CARPAL TUNNEL;  Surgeon: Yoandy David MD;  Location: [facility] OR;  Service: Orthopedics;  Laterality: Left;    HERNIA REPAIR      INCISION AND DRAINAGE OF HEMATOMA N/A 6/28/2022    Procedure: INCISION AND DRAINAGE, HEMATOMA;  Surgeon: Shaggy Zepeda MD;  Location: [facility] OR;  Service: Neurosurgery;  Laterality: N/A;    LUMBAR LAMINECTOMY WITH DISCECTOMY Left 6/22/2022    Procedure: LAMINECTOMY, SPINE, LUMBAR, WITH DISCECTOMY;  Surgeon: Shaggy Zepeda MD;  Location: [facility] OR;  Service: Neurosurgery;  Laterality: Left;  minimally invasive L2-3 discectomy and decompression     LUMBAR LAMINECTOMY WITH DISCECTOMY N/A 6/28/2022    Procedure: LAMINECTOMY, SPINE, LUMBAR, WITH DISCECTOMY;  Surgeon: Shaggy Zepeda MD;  Location: [facility] OR;  Service: Neurosurgery;  Laterality: N/A;    PARATHYROIDECTOMY Bilateral 7/27/2021    Procedure: PARATHYROIDECTOMY;  Surgeon: Tha Dial MD;   Location: [facility] OR;  Service: ENT;  Laterality: Bilateral;  with medialstinal exploration    SELECTIVE INJECTION OF ANESTHETIC AGENT AROUND LUMBAR SPINAL NERVE ROOT BY TRANSFORAMINAL APPROACH Left 3/23/2022    Procedure: BLOCK, SPINAL NERVE ROOT, LUMBAR, SELECTIVE, TRANSFORAMINAL APPROACH Left L2-3, L3-4 RN IV sedation;  Surgeon: Jay Hodges MD;  Location: [facility];  Service: Pain Management;  Laterality: Left;    STERNOTOMY N/A 7/27/2021    Procedure: STERNOTOMY;  Surgeon: Tha Dial MD;  Location: [facility] OR;  Service: ENT;  Laterality: N/A;    ULNAR NERVE TRANSPOSITION Left 4/1/2021    Procedure: TRANSPOSITION, NERVE, ULNAR;  Surgeon: Yoandy David MD;  Location: [facility] OR;  Service: Orthopedics;  Laterality: Left;    ULNAR TUNNEL RELEASE Left 4/1/2021    Procedure: RELEASE, CUBITAL TUNNEL;  Surgeon: Yoandy David MD;  Location: [facility] OR;  Service: Orthopedics;  Laterality: Left;    UMBILICAL HERNIA REPAIR         HEENT HEENT WDL: WDL   Cognitive/ Neuro Cognitive/Neuro/Behavioral WDL: WDL  Level of Consciousness (AVPU): alert     [place] Coma Scale Score: 15  Additional Documentation: [place] Coma Scale (Group)   Resp Respiratory WDL: WDL except, breath sounds  All Lung Fields Breath Sounds: clear, diminished     Oxygen Concentration (%): 21  O2 Device (Oxygen Therapy): room air   Cardiac Cardiac WDL: WDL  Lead Monitored: Lead II  Rhythm: sinus bradycardia     Cardiac/Telemetry Box Number: [id number]   Peripheral/ Neurovascular Peripheral Neurovascular WDL: WDL   VTE core VTE Required Core Measure: Pharmacological prophylaxis initiated/maintained   GI GI WDL: WDL except, appearance/characteristics  All Quadrants Bowel Sounds: audible and active in all quadrants  Last Bowel Movement: 08/01/22   Diet Diet diabetic [facility]; 2000 Calorie; Cardiac (Low Na/Chol)    Genitourinary WDL: WDL  Voiding Characteristics: voids spontaneously without difficulty      Skin Skin WDL: WDL except, all  Skin  Integrity: wound (Mid lower back CDI dressing)   Oskar Score Oskar Score: 22   Musculoskeletal  Musculoskeletal WDL: WDL      Safety Safety WDL: WDL  Safety Factors: ID band on, call light in reach, wheels locked, bed in low position, upper side rails raised x 2      Fall Risk Score Fall Risk Score: 6   LDA(s) Lines/Drains/Airways       Peripherally Inserted Central Catheter Line  Duration             PICC Double Lumen 08/01/22 1540 left brachial <1 day                   Consults Consults (From admission, onward)          Status Ordering Provider     Inpatient consult to Neurosurgery  Once        Provider:  Shaggy Zepeda MD    Acknowledged LIOR RUBIN     Inpatient consult to Infectious Diseases  Once        Provider:  Tha Hodge MD    Acknowledged LIOR RUBIN            Shift events uneventful   Plan of Care for RN report  Continue care as ordered, COVID management, DM management    Discharge Planning Discharge Plan A: Home Health    Patient updated on plan of care, all questions answered up to now regarding plan of care, will continue to monitor per hourly rounding & unit practice/ policy    Note: Other exception details noted in flowsheet if not present in summary

## 2022-08-02 NOTE — ASSESSMENT & PLAN NOTE
Patient's FSGs are controlled on current medication regimen.  Last A1c reviewed-   Lab Results   Component Value Date    HGBA1C 8.3 (H) 05/18/2022     Most recent fingerstick glucose reviewed-   Recent Labs   Lab 08/01/22  2032 08/02/22  0534 08/02/22  1124 08/02/22  1643   POCTGLUCOSE 178* 138* 137* 215*     Current correctional scale  Low  Maintain anti-hyperglycemic dose as follows-   Antihyperglycemics (From admission, onward)            Start     Stop Route Frequency Ordered    07/30/22 0032  insulin aspart U-100 pen 0-5 Units         -- SubQ Before meals & nightly PRN 07/29/22 2333        Hold Oral hypoglycemics while patient is in the hospital.    Continue present care, BS generally well controlled

## 2022-08-02 NOTE — ASSESSMENT & PLAN NOTE
Patient is identified as High risk for severe complications of COVID 19 based on COVID risk score of 5   Initiate standard COVID protocols; COVID-19 testing ,Infection Control notification  and isolation- respiratory, contact and droplet per protocol    Diagnostics: (leukopenia, hyponatremia, hyperferritinemia, elevated troponin, elevated d-dimer, age, and comorbidities are significant predictors of poor clinical outcome)  CBC, CMP, Ferritin, CRP and Portable CXR    Management: Maintain oxygen saturations 92-96% via Nasal Cannula  LPM and monitor with continuous/intermittent pulse oximetry.  and Inhaled bronchodilators as needed for shortness of breath.    Asymptomatic, pt on RA

## 2022-08-02 NOTE — SUBJECTIVE & OBJECTIVE
Interval History:   66 year old man with complicated post op course.  06/22/22-  LAMINECTOMY, SPINE, LUMBAR, WITH DISCECTOMY (Left) L2-3      06/26/22  Hemilaminectomy, partial medial facetectomy and foraminotomy L2-3 on left   with discectomy using microscopic dissection   06/28-22-  Fluid collection ventral and lateral consistent with epidural epidural hematoma  compression   Retained disc fragment   Cultures=06/29- aerobic culture- [facility]  CUTIBACTERIUM ACNES   06/28-BACTEROIDES FRAGILIS   06/28- staph epi -blood      MRI of the lumbar region -   Patient status post hemilaminectomy and discectomy at L2-3 on the left.  Associated with the surgical area of there is fluid within the disc space, bone marrow edema, and abnormal postcontrast enhancement of the in plates about the disc space and disc which extends along the left aspect of the spinal canal and into the posterior soft tissues concerning for discitis osteomyelitis with epidural abscess extending into the posterior soft tissues.  The fluid collection in the posterior soft tissues is noted both within the laminectomy bed measuring a proximally 3.1 x 2.7 x 2.3 cm in this area, and with extension inferiorly along the L3 lamina and L1 inferior lamina, and also into the posterior soft tissues.  The fluid collection is significantly serpiginous and difficult to accurately measure.  The component in the posterior soft tissues measures on the order of 9.2 x 3.8 by 2.5 cm and also demonstrates some postcontrast enhancement concerning for infection.  Abscess is the diagnosis of  exclusion.  The fluid collection was present on the prior exam of 07/01/2022, but the surrounding peripheral enhancement has significantly increased in comparison to the prior exam, and the new disc space abnormalities raise concern for superimposed infection.  Findings strongly concerning for discitis osteomyelitis at L2-3 as detailed above.     Patient reports less pain.  08/01/22- he is  afebrile   Less back pain noted .  Review of Systems   Constitutional:  Negative for activity change, appetite change, chills, diaphoresis and fatigue.   HENT:  Negative for congestion, dental problem, ear discharge, ear pain and facial swelling.    Eyes:  Negative for pain, discharge and itching.   Respiratory:  Negative for apnea, cough, chest tightness and shortness of breath.    Cardiovascular:  Negative for chest pain and leg swelling.   Gastrointestinal:  Negative for abdominal distention and abdominal pain.   Endocrine: Negative for cold intolerance, heat intolerance and polydipsia.   Genitourinary:  Negative for difficulty urinating, dysuria and enuresis.   Musculoskeletal:  Positive for back pain. Negative for arthralgias.        Leakage of fluid from op site   Skin:  Negative for color change and pallor.   Allergic/Immunologic: Negative for environmental allergies and food allergies.   Neurological:  Negative for dizziness, facial asymmetry, light-headedness and headaches.   Hematological:  Negative for adenopathy. Does not bruise/bleed easily.   Psychiatric/Behavioral:  Negative for agitation and behavioral problems.    Objective:     Vital Signs (Most Recent):  Temp: 96.9 °F (36.1 °C) (08/01/22 1946)  Pulse: 61 (08/01/22 2144)  Resp: 20 (08/01/22 1946)  BP: (!) 102/56 (08/01/22 1946)  SpO2: (!) 94 % (08/01/22 1946)   Vital Signs (24h Range):  Temp:  [96.9 °F (36.1 °C)-98.5 °F (36.9 °C)] 96.9 °F (36.1 °C)  Pulse:  [59-88] 61  Resp:  [16-20] 20  SpO2:  [94 %-99 %] 94 %  BP: ()/(54-77) 102/56     Weight: 104.8 kg (231 lb 0.7 oz)  Body mass index is 37.29 kg/m².    Estimated Creatinine Clearance: 103 mL/min (based on SCr of 0.8 mg/dL).    Physical Exam  Vitals and nursing note reviewed.   Constitutional:       General: He is not in acute distress.     Appearance: He is well-developed. He is not diaphoretic.   HENT:      Head: Normocephalic and atraumatic.      Nose: Nose normal.   Eyes:      General:          Right eye: No discharge.         Left eye: No discharge.      Conjunctiva/sclera: Conjunctivae normal.      Pupils: Pupils are equal, round, and reactive to light.   Neck:      Thyroid: No thyromegaly.      Vascular: No JVD.      Trachea: No tracheal deviation.   Cardiovascular:      Rate and Rhythm: Normal rate and regular rhythm.      Heart sounds: Normal heart sounds. No murmur heard.    No friction rub. No gallop.   Pulmonary:      Effort: Pulmonary effort is normal. No respiratory distress.      Breath sounds: Normal breath sounds. No wheezing or rales.   Chest:      Chest wall: No tenderness.   Abdominal:      General: Bowel sounds are normal. There is no distension.      Palpations: Abdomen is soft. There is no mass.      Tenderness: There is no abdominal tenderness.   Genitourinary:     Comments: deferred  Musculoskeletal:         General: No tenderness or deformity. Normal range of motion.      Cervical back: Normal range of motion and neck supple.      Comments: Lower back surgical wound with dressing    Skin:     General: Skin is warm and dry.      Capillary Refill: Capillary refill takes less than 2 seconds.      Findings: No erythema or rash.   Neurological:      Mental Status: He is alert and oriented to person, place, and time.      Motor: No abnormal muscle tone.      Coordination: Coordination normal.   Psychiatric:         Behavior: Behavior normal.       Significant Labs: Blood Culture:   Recent Labs   Lab 06/29/22  1439 06/29/22  1657 07/03/22  1015   LABBLOO Gram stain aer bottle: Gram positive cocci in clusters resembling Staph  Results called to and read back by: Alisha Eller RN. 07/02/2022  00:17  STAPHYLOCOCCUS EPIDERMIDIS* Gram stain nico bottle: Gram positive cocci in clusters resembling Staph   Results called to and read back by: Soheila You 07/01/2022  05:47  STAPHYLOCOCCUS EPIDERMIDIS* No growth after 5 days.  No growth after 5 days.       BMP:   Recent Labs   Lab  08/01/22  0339   *      K 4.3      CO2 23   BUN 13   CREATININE 0.8   CALCIUM 9.0       CBC:   Recent Labs   Lab 07/31/22  0607 08/01/22  0339   WBC 5.24 3.97   HGB 12.0* 11.9*   HCT 38.6* 37.4*    228       CMP:   Recent Labs   Lab 07/31/22  0607 08/01/22  0339    140   K 4.5 4.3    108   CO2 24 23   * 127*   BUN 11 13   CREATININE 0.7 0.8   CALCIUM 9.1 9.0   ANIONGAP 8 9   EGFRNONAA >60  --        Wound Culture:   Recent Labs   Lab 06/28/22  1950   LABAERO STAPHYLOCOCCUS EPIDERMIDIS  Moderate  *  No growth       All pertinent labs within the past 24 hours have been reviewed.    Significant Imaging: I have reviewed all pertinent imaging results/findings within the past 24 hours.

## 2022-08-02 NOTE — PROGRESS NOTES
[facility] - Telemetry ([facility])  Neurosurgery  Progress Note    Subjective:     Interval History:  Patient was seen earlier this morning.  No new issues to report.  Yesterday he was eval by WC. Dressing changes every MWF.   He participated with therapy and has excellent rehab potential.  Yesterday PICC placed.    He denies HA, dizziness, N/V, back pain.  Patient reports he is doing fine despite + COVID.     MRI reviewed   Superficial as well as deep fluid collection noted   S/p discectomy and then washout        History of Present Illness: See H&P.    Post-Op Info:  Procedure(s) (LRB):  EXPLORATION, WOUND (Bilateral)  REPAIR-DEFECT (Bilateral)          Medications:  Continuous Infusions:  Scheduled Meds:   albuterol  2 puff Inhalation Q6H    ascorbic acid (vitamin C)  500 mg Oral BID    aspirin  81 mg Oral Daily    atorvastatin  10 mg Oral QHS    ceFEPime (MAXIPIME) IVPB  2 g Intravenous Q8H    docusate sodium  100 mg Oral BID    enoxaparin  1 mg/kg Subcutaneous BID    famotidine  20 mg Oral BID    gabapentin  300 mg Oral TID    losartan  50 mg Oral Daily    melatonin  6 mg Oral Nightly    metroNIDAZOLE  500 mg Oral Q8H    multivitamin  1 tablet Oral Daily    sodium chloride 0.9%  10 mL Intravenous Q8H    vancomycin (VANCOCIN) IVPB  2,000 mg Intravenous Once    vitamin D  1,000 Units Oral Daily     PRN Meds:acetaminophen, aluminum-magnesium hydroxide-simethicone, dextromethorphan-guaiFENesin  mg/5 ml, dextrose 10%, dextrose 10%, glucagon (human recombinant), glucose, glucose, insulin aspart U-100, loperamide, melatonin, methocarbamoL, naloxone, ondansetron, oxyCODONE-acetaminophen, polyethylene glycol, promethazine, simethicone, sodium chloride 0.9%, Pharmacy to dose Vancomycin consult **AND** vancomycin - pharmacy to dose       Objective:     Weight: 105.3 kg (232 lb 2.3 oz)  Body mass index is 37.47 kg/m².  Vital Signs (Most Recent):  Temp: 97.5 °F (36.4 °C) (08/02/22 1122)  Pulse: 67 (08/02/22  1122)  Resp: 18 (08/02/22 1122)  BP: 130/66 (08/02/22 1122)  SpO2: 97 % (08/02/22 1122) Vital Signs (24h Range):  Temp:  [96.9 °F (36.1 °C)-98.7 °F (37.1 °C)] 97.5 °F (36.4 °C)  Pulse:  [58-88] 67  Resp:  [16-20] 18  SpO2:  [94 %-98 %] 97 %  BP: (102-132)/(55-92) 130/66                Oxygen Concentration (%):  [21] 21         Neurosurgery Physical Exam  Vitals and labs reviewed  Moves all 4 ext  Dressing on lumbar spine- changed yesterday by WC    Significant Labs:  Recent Labs   Lab 08/01/22  0339 08/02/22  1034   *  --      --    K 4.3  --      --    CO2 23  --    BUN 13  --    CREATININE 0.8 0.9   CALCIUM 9.0  --      Recent Labs   Lab 08/01/22  0339   WBC 3.97   HGB 11.9*   HCT 37.4*        No results for input(s): LABPT, INR, APTT in the last 48 hours.  Microbiology Results (last 7 days)     Procedure Component Value Units Date/Time    Aerobic culture [[id number]]  (Abnormal) Collected: 07/30/22 0055    Order Status: Completed Specimen: Incision site from Back Updated: 08/02/22 0848     Aerobic Bacterial Culture PRESUMPTIVE PSEUDOMONAS SPECIES  Moderate  Identification and susceptibility pending          All pertinent labs from the last 24 hours have been reviewed.  Significant Diagnostics:  I have reviewed all pertinent imaging results/findings within the past 24 hours.    Assessment/Plan:     Active Diagnoses:    Diagnosis Date Noted POA    PRINCIPAL PROBLEM:  Wound dehiscence [T81.30XA] 07/29/2022 Yes    Discitis of lumbar region [M46.46] 08/01/2022 Yes    Postoperative CSF leak [G97.82, G96.00] 07/30/2022 Yes    Hypertension [I10] 07/29/2022 Yes    HLD (hyperlipidemia) [E78.5] 07/29/2022 Yes    COVID [U07.1] 07/29/2022 Yes    Degenerative disc disease, lumbar [M51.36] 06/22/2022 Yes     Chronic    Diabetes mellitus without complication [E11.9]  Yes     Chronic    Morbid obesity with BMI of 40.0-44.9, adult [E66.01, Z68.41]  Not Applicable      Problems Resolved During this  Admission:       Patient will need revision with wound washout/exploration, possible lumbar drain placement.  Scheduled for 7 AM 8/3/22.  Consent signed and placed in chart.    Patient is aware and agreeable to this treatment plan.  Will continue to monitor.   Please call with any changes.     6/29 blood cx + S. Epidermidis  Anaerobic cx (deep) + Cutibacterium acnes  - Continue Cefepime , switch to vancomycin , doxycycline and flagyl- will use operative cultures to guide regime per NICOLASA Clifton PA-C  Neurosurgery  [facility] - Telemetry ([facility])

## 2022-08-02 NOTE — SUBJECTIVE & OBJECTIVE
Interval History: comfortable on RA, wound Cx growing Pseudomonas, earlier Cx grew Staph Epi- so now on Vanc, Cefepime and Flagyl. Dr. Zepeda plans Surgery- Washout with possible drain placement tomorrow, pt agreeable.     Review of Systems   Constitutional:  Negative for activity change, appetite change, chills, fatigue and fever.   HENT:  Negative for dental problem, ear pain, hearing loss, mouth sores, nosebleeds, sinus pressure, sore throat, tinnitus and trouble swallowing.    Eyes:  Negative for pain, discharge and visual disturbance.   Respiratory:  Negative for cough, choking, chest tightness, shortness of breath and wheezing.    Cardiovascular:  Negative for chest pain, palpitations and leg swelling.   Gastrointestinal:  Negative for abdominal distention, abdominal pain, anal bleeding, blood in stool, constipation, diarrhea, nausea and vomiting.   Endocrine: Negative for cold intolerance, heat intolerance, polydipsia, polyphagia and polyuria.   Genitourinary:  Negative for decreased urine volume, difficulty urinating, flank pain, frequency, hematuria and urgency.   Musculoskeletal:  Negative for arthralgias, back pain, gait problem, myalgias, neck pain and neck stiffness.   Skin:  Positive for wound (lower back surgical wound). Negative for color change and rash.   Allergic/Immunologic: Negative.    Neurological:  Negative for dizziness, tremors, seizures, syncope, speech difficulty, weakness, light-headedness and headaches.   Hematological: Negative.    Psychiatric/Behavioral:  Negative for agitation, behavioral problems, confusion and sleep disturbance. The patient is not nervous/anxious.    All other systems reviewed and are negative.  Objective:     Vital Signs (Most Recent):  Temp: 98.4 °F (36.9 °C) (08/02/22 1645)  Pulse: 69 (08/02/22 1645)  Resp: 18 (08/02/22 1645)  BP: (!) 106/54 (08/02/22 1645)  SpO2: 99 % (08/02/22 1645)   Vital Signs (24h Range):  Temp:  [96.9 °F (36.1 °C)-98.7 °F (37.1 °C)] 98.4  °F (36.9 °C)  Pulse:  [58-77] 69  Resp:  [16-20] 18  SpO2:  [94 %-99 %] 99 %  BP: (102-132)/(54-92) 106/54     Weight: 105.3 kg (232 lb 2.3 oz)  Body mass index is 37.47 kg/m².    Intake/Output Summary (Last 24 hours) at 8/2/2022 1736  Last data filed at 8/2/2022 1600  Gross per 24 hour   Intake 678.9 ml   Output 400 ml   Net 278.9 ml      Physical Exam  Vitals and nursing note reviewed.   Constitutional:       General: He is not in acute distress.     Appearance: He is well-developed. He is not diaphoretic.   HENT:      Head: Normocephalic and atraumatic.      Nose: Nose normal.   Eyes:      General:         Right eye: No discharge.         Left eye: No discharge.      Conjunctiva/sclera: Conjunctivae normal.      Pupils: Pupils are equal, round, and reactive to light.   Neck:      Thyroid: No thyromegaly.      Vascular: No JVD.      Trachea: No tracheal deviation.   Cardiovascular:      Rate and Rhythm: Normal rate and regular rhythm.      Heart sounds: Normal heart sounds. No murmur heard.    No friction rub. No gallop.   Pulmonary:      Effort: Pulmonary effort is normal. No respiratory distress.      Breath sounds: Normal breath sounds. No wheezing or rales.   Chest:      Chest wall: No tenderness.   Abdominal:      General: Bowel sounds are normal. There is no distension.      Palpations: Abdomen is soft. There is no mass.      Tenderness: There is no abdominal tenderness.   Genitourinary:     Comments: deferred  Musculoskeletal:         General: No tenderness or deformity. Normal range of motion.      Cervical back: Normal range of motion and neck supple.      Comments: Lower back surgical wound with dressing    Skin:     General: Skin is warm and dry.      Capillary Refill: Capillary refill takes less than 2 seconds.      Findings: No erythema or rash.   Neurological:      Mental Status: He is alert and oriented to person, place, and time.      Motor: No abnormal muscle tone.      Coordination: Coordination  normal.   Psychiatric:         Behavior: Behavior normal.       Significant Labs: All pertinent labs within the past 24 hours have been reviewed.  BMP:   Recent Labs   Lab 08/01/22  0339 08/02/22  1034   *  --      --    K 4.3  --      --    CO2 23  --    BUN 13  --    CREATININE 0.8 0.9   CALCIUM 9.0  --        Significant Imaging: I have reviewed all pertinent imaging results/findings within the past 24 hours.

## 2022-08-02 NOTE — PROGRESS NOTES
[facility]  Progress Note    Patient Name: Friday ROGELIO Blake  MRN: [id number]  Patient Class: IP- Inpatient   Admission Date: 7/29/2022  Length of Stay: 2 days  Attending Physician: Kuldeep Bermudez MD  Primary Care Provider: Sid Elizabeth MD        Subjective:     Principal Problem:Wound dehiscence        HPI:  65 y/o male with PMHx of HTN, HLD, DM, and obesity who presented tot he ED for eval of post op incision leaking.  Sx are constant and moderate in severity. No mitigating or exacerbating factors reported. Pt had surgery on 7/26, then took a trip to [place]. He flew back last night. Pt denies fever, chills, pain, HA, N/V, and all other sx at this time.  ED workup shows: H/H 11.6/36.9, COVID +  He will be kept on OBS for CSF leak under the care of hospital medicine  He is a full code and his SDM is his wife             Overview/Hospital Course:  Mr Blake is a 66 year old male who presented to [facility] Emergency Room of evaluation of wound dehiscence. Patient underwent Laminectomy of lumbar spine on 6/22/2022 and I & D of hematoma on 6/28/2022. Traveled to [place] for daughter's wedding. Positive for drainage from low back surgical wound. Neurosurgery following, plan possible washout vs wound vac placement.  Infectious Disease following, continue empiric IV antibiotics. COVID positive. As of 7/31/2022, patient feeling well, vital signs and labs stable. MRI lumbar spine strongly concerning for discitis osteomyelitis at L2-3, probable epidural phlegmon or abscess. Neurosurgery following, will need revision with washout. Will follow.     8/1- cheerful, resting comfortably in bed on RA, no fever, chills or cough. Still has drainage from the Lumbar wound. Dr. Zepeda plans revision with wound washout/exploration with the help of Plastic Surgery, await confirmation date. Pt agreeable with the plan. Continue wound care, IV Abx, no wound vac yet.     8/2- comfortable on RA, wound Cx  growing Pseudomonas, earlier Cx grew Staph Epi- so now on Vanc, Cefepime and Flagyl. Dr. Zepeda plans Surgery- Washout with possible drain placement tomorrow, pt agreeable.       Interval History: comfortable on RA, wound Cx growing Pseudomonas, earlier Cx grew Staph Epi- so now on Vanc, Cefepime and Flagyl. Dr. Zepeda plans Surgery- Washout with possible drain placement tomorrow, pt agreeable.     Review of Systems   Constitutional:  Negative for activity change, appetite change, chills, fatigue and fever.   HENT:  Negative for dental problem, ear pain, hearing loss, mouth sores, nosebleeds, sinus pressure, sore throat, tinnitus and trouble swallowing.    Eyes:  Negative for pain, discharge and visual disturbance.   Respiratory:  Negative for cough, choking, chest tightness, shortness of breath and wheezing.    Cardiovascular:  Negative for chest pain, palpitations and leg swelling.   Gastrointestinal:  Negative for abdominal distention, abdominal pain, anal bleeding, blood in stool, constipation, diarrhea, nausea and vomiting.   Endocrine: Negative for cold intolerance, heat intolerance, polydipsia, polyphagia and polyuria.   Genitourinary:  Negative for decreased urine volume, difficulty urinating, flank pain, frequency, hematuria and urgency.   Musculoskeletal:  Negative for arthralgias, back pain, gait problem, myalgias, neck pain and neck stiffness.   Skin:  Positive for wound (lower back surgical wound). Negative for color change and rash.   Allergic/Immunologic: Negative.    Neurological:  Negative for dizziness, tremors, seizures, syncope, speech difficulty, weakness, light-headedness and headaches.   Hematological: Negative.    Psychiatric/Behavioral:  Negative for agitation, behavioral problems, confusion and sleep disturbance. The patient is not nervous/anxious.    All other systems reviewed and are negative.  Objective:     Vital Signs (Most Recent):  Temp: 98.4 °F (36.9 °C) (08/02/22 1645)  Pulse: 69  (08/02/22 1645)  Resp: 18 (08/02/22 1645)  BP: (!) 106/54 (08/02/22 1645)  SpO2: 99 % (08/02/22 1645)   Vital Signs (24h Range):  Temp:  [96.9 °F (36.1 °C)-98.7 °F (37.1 °C)] 98.4 °F (36.9 °C)  Pulse:  [58-77] 69  Resp:  [16-20] 18  SpO2:  [94 %-99 %] 99 %  BP: (102-132)/(54-92) 106/54     Weight: 105.3 kg (232 lb 2.3 oz)  Body mass index is 37.47 kg/m².    Intake/Output Summary (Last 24 hours) at 8/2/2022 1736  Last data filed at 8/2/2022 1600  Gross per 24 hour   Intake 678.9 ml   Output 400 ml   Net 278.9 ml      Physical Exam  Vitals and nursing note reviewed.   Constitutional:       General: He is not in acute distress.     Appearance: He is well-developed. He is not diaphoretic.   HENT:      Head: Normocephalic and atraumatic.      Nose: Nose normal.   Eyes:      General:         Right eye: No discharge.         Left eye: No discharge.      Conjunctiva/sclera: Conjunctivae normal.      Pupils: Pupils are equal, round, and reactive to light.   Neck:      Thyroid: No thyromegaly.      Vascular: No JVD.      Trachea: No tracheal deviation.   Cardiovascular:      Rate and Rhythm: Normal rate and regular rhythm.      Heart sounds: Normal heart sounds. No murmur heard.    No friction rub. No gallop.   Pulmonary:      Effort: Pulmonary effort is normal. No respiratory distress.      Breath sounds: Normal breath sounds. No wheezing or rales.   Chest:      Chest wall: No tenderness.   Abdominal:      General: Bowel sounds are normal. There is no distension.      Palpations: Abdomen is soft. There is no mass.      Tenderness: There is no abdominal tenderness.   Genitourinary:     Comments: deferred  Musculoskeletal:         General: No tenderness or deformity. Normal range of motion.      Cervical back: Normal range of motion and neck supple.      Comments: Lower back surgical wound with dressing    Skin:     General: Skin is warm and dry.      Capillary Refill: Capillary refill takes less than 2 seconds.      Findings:  No erythema or rash.   Neurological:      Mental Status: He is alert and oriented to person, place, and time.      Motor: No abnormal muscle tone.      Coordination: Coordination normal.   Psychiatric:         Behavior: Behavior normal.       Significant Labs: All pertinent labs within the past 24 hours have been reviewed.  BMP:   Recent Labs   Lab 08/01/22  0339 08/02/22  1034   *  --      --    K 4.3  --      --    CO2 23  --    BUN 13  --    CREATININE 0.8 0.9   CALCIUM 9.0  --        Significant Imaging: I have reviewed all pertinent imaging results/findings within the past 24 hours.      Assessment/Plan:      * Wound dehiscence  --wound cx pending  --Neuro surgery consulted  --ID consulted      7/30/2022  Neurosurgery following    Possible washout vs wound vac placement   ID following, continue empiric IV antibiotic        7/31/2022  MRI of lumbar spine strongly concerning for discitis osteomyelitis at L2-3, probable epidural phlegmon or abscess.   Neurosurgery following, will need revision with washout. Will follow.   Continue IV antibiotics  Wound cultures in progress      8/1- continue IV Abx, local wound care  Await revision and wound exploration with plastic surgery    8/2- surgery tomorrow, cont iV abx    Degenerative disc disease, lumbar  --supportive care  --PT/OT consulted    S/p laminectomy- complicated by wound dehiscence- see above        Diabetes mellitus without complication  Patient's FSGs are controlled on current medication regimen.  Last A1c reviewed-   Lab Results   Component Value Date    HGBA1C 8.3 (H) 05/18/2022     Most recent fingerstick glucose reviewed-   Recent Labs   Lab 08/01/22  2032 08/02/22  0534 08/02/22  1124 08/02/22  1643   POCTGLUCOSE 178* 138* 137* 215*     Current correctional scale  Low  Maintain anti-hyperglycemic dose as follows-   Antihyperglycemics (From admission, onward)            Start     Stop Route Frequency Ordered    07/30/22 0032  insulin  aspart U-100 pen 0-5 Units         -- SubQ Before meals & nightly PRN 07/29/22 2333        Hold Oral hypoglycemics while patient is in the hospital.    Continue present care, BS generally well controlled    Morbid obesity with BMI of 40.0-44.9, adult  Body mass index is 37.29 kg/m². Morbid obesity complicates all aspects of disease management from diagnostic modalities to treatment. Weight loss encouraged and health benefits explained to patient.             COVID  Patient is identified as High risk for severe complications of COVID 19 based on COVID risk score of 5   Initiate standard COVID protocols; COVID-19 testing ,Infection Control notification  and isolation- respiratory, contact and droplet per protocol    Diagnostics: (leukopenia, hyponatremia, hyperferritinemia, elevated troponin, elevated d-dimer, age, and comorbidities are significant predictors of poor clinical outcome)  CBC, CMP, Ferritin, CRP and Portable CXR    Management: Maintain oxygen saturations 92-96% via Nasal Cannula  LPM and monitor with continuous/intermittent pulse oximetry.  and Inhaled bronchodilators as needed for shortness of breath.    Asymptomatic, pt on RA    Discitis of lumbar region  See 1 wound dehiscence    On IV Abx, await washout surgery with Plastic Surgery tomorrow      Postoperative CSF leak  Expect drain placement with surgery tomorrow      HLD (hyperlipidemia)  --continue atorvastatin  --cardiac diet      Hypertension  --continue losartan  --cardiac diet    BP under control        VTE Risk Mitigation (From admission, onward)         Ordered     enoxaparin injection 100 mg  2 times daily         07/29/22 2336     IP VTE HIGH RISK PATIENT  Once         07/29/22 2333     Place sequential compression device  Until discontinued         07/29/22 2333                Discharge Planning   MATEUS:      Code Status: Full Code   Is the patient medically ready for discharge?:     Reason for patient still in hospital (select all that  apply): Patient trending condition, Laboratory test, Treatment, Imaging and Consult recommendations  Discharge Plan A: Home Health        Kuldeep Bermudez MD  Department of Hospital Medicine   [facility] ([facility])

## 2022-08-02 NOTE — ASSESSMENT & PLAN NOTE
--wound cx pending  --Neuro surgery consulted  --ID consulted      7/30/2022  Neurosurgery following    Possible washout vs wound vac placement   ID following, continue empiric IV antibiotic        7/31/2022  MRI of lumbar spine strongly concerning for discitis osteomyelitis at L2-3, probable epidural phlegmon or abscess.   Neurosurgery following, will need revision with washout. Will follow.   Continue IV antibiotics  Wound cultures in progress      8/1- continue IV Abx, local wound care  Await revision and wound exploration with plastic surgery

## 2022-08-02 NOTE — PROGRESS NOTES
[facility] ([facility])  Infectious Disease  Progress Note    Patient Name: Friday ROGELIO Blake  MRN: [id number]  Admission Date: 7/29/2022  Length of Stay: 1 days  Attending Physician: Kuldeep Bermudez MD  Primary Care Provider: Sid Elizabeth MD    Isolation Status: Airborne and Contact and Droplet  Assessment/Plan:      * Wound dehiscence  All previous cultures reviewed.  Will follow MRI of the spine and new cultures.    Cultures=06/29- aerobic culture- [facility]  CUTIBACTERIUM ACNES   06/28-BACTEROIDES FRAGILIS   06/28- staph epi -blood      Is this CSF leak ? Will send fluid for   Beta (?)-2 transferrin testing  For now will use IV daptomycin/cefepime , add empiric doxycycline/flagyl  Follow repeat cultures .  If no pseudomonas, plan will be 6 weeks of Daptomycin/Rocephin and then 3-6 months of doxycycline     08/01- will switch to vancomycin as we anticipate longer hospital stay , continue cefepime, doxycycline/flagyl.  Will plan I and D as per neurosurgery    Discitis of lumbar region  MRI of the lumbar spine-  Findings strongly concerning for discitis osteomyelitis at L2-3-  . The fluid collection in the posterior soft tissues is noted both within the laminectomy bed measuring a proximally 3.1 x 2.7 x 2.3 cm in this area, and with extension inferiorly along the L3 lamina and L1 inferior lamina, and also into the posterior soft tissues.  The fluid collection is significantly serpiginous and difficult to accurately measure.  The component in the posterior soft tissues measures on the order of 9.2 x 3.8 by 2.5 cm and also demonstrates some postcontrast enhancement concerning for infection    Will need cultures to guide regime, will discuss with Dr Zepeda  Will continue Daptomycin, Cefepime ,flagyl and doxycycline .  Previous cultures-  Cultures=06/29- aerobic culture- [facility]  CUTIBACTERIUM ACNES   06/28-BACTEROIDES FRAGILIS   06/28- staph epi -blood      08/01- case discussed with Dr Zepeda- will get operative  cultures this week.  Continue Cefepime , switch to vancomycin , doxycycline and flagyl- will use operative cultures to guide regime    COVID  - COVID-19 testing   - Infection Control notified     - Isolation:   - Airborne, Contact and Droplet Precautions  - Cohort patients into COVID units              - Limit visitors per hospital policy              - Consolidating lab draws, nursing care, provider visits, and interventions      - Management:  Supplemental O2 to maintain SpO2 >92%  Continuous/intermittent Pulse Ox  Albuterol treatment PRN    Advance Care Planning            Hypertension  BP control as per primary team    Morbid obesity with BMI of 40.0-44.9, adult  Out patient follow up fpr weight loss regime     Diabetes mellitus without complication  Insulin sliding scale , diabetic diet          Anticipated Disposition:     Thank you for your consult. I will follow-up with patient. Please contact us if you have any additional questions.    Tha Hodge MD  Infectious Disease  [facility] ([facility])    Subjective:     Principal Problem:Wound dehiscence    HPI:  66 year old man with PMHx of DM, HTN, hypercalcemia, and hyperlipidemia who presents to the Emergency Department for evaluation of post-op complication.    His surgery history-    06/22/22-  LAMINECTOMY, SPINE, LUMBAR, WITH DISCECTOMY (Left) L2-3      06/26/22  Hemilaminectomy, partial medial facetectomy and foraminotomy L2-3 on left   with discectomy using microscopic dissection   06/28-22-  Fluid collection ventral and lateral consistent with epidural epidural hematoma  compression   Retained disc fragment   Cultures=06/29- aerobic culture- [facility]  CUTIBACTERIUM ACNES   06/28-BACTEROIDES FRAGILIS   06/28- staph epi -blood    He still has persistent leakage from the back and had recent travel to [place].  MRI of the spine -pending   ESR -55, crp-normal      Interval History:   66 year old man with complicated post op  course.  06/22/22-  LAMINECTOMY, SPINE, LUMBAR, WITH DISCECTOMY (Left) L2-3      06/26/22  Hemilaminectomy, partial medial facetectomy and foraminotomy L2-3 on left   with discectomy using microscopic dissection   06/28-22-  Fluid collection ventral and lateral consistent with epidural epidural hematoma  compression   Retained disc fragment   Cultures=06/29- aerobic culture- [facility]  CUTIBACTERIUM ACNES   06/28-BACTEROIDES FRAGILIS   06/28- staph epi -blood      MRI of the lumbar region -   Patient status post hemilaminectomy and discectomy at L2-3 on the left.  Associated with the surgical area of there is fluid within the disc space, bone marrow edema, and abnormal postcontrast enhancement of the in plates about the disc space and disc which extends along the left aspect of the spinal canal and into the posterior soft tissues concerning for discitis osteomyelitis with epidural abscess extending into the posterior soft tissues.  The fluid collection in the posterior soft tissues is noted both within the laminectomy bed measuring a proximally 3.1 x 2.7 x 2.3 cm in this area, and with extension inferiorly along the L3 lamina and L1 inferior lamina, and also into the posterior soft tissues.  The fluid collection is significantly serpiginous and difficult to accurately measure.  The component in the posterior soft tissues measures on the order of 9.2 x 3.8 by 2.5 cm and also demonstrates some postcontrast enhancement concerning for infection.  Abscess is the diagnosis of  exclusion.  The fluid collection was present on the prior exam of 07/01/2022, but the surrounding peripheral enhancement has significantly increased in comparison to the prior exam, and the new disc space abnormalities raise concern for superimposed infection.  Findings strongly concerning for discitis osteomyelitis at L2-3 as detailed above.     Patient reports less pain.  08/01/22- he is afebrile   Less back pain noted .  Review of Systems    Constitutional:  Negative for activity change, appetite change, chills, diaphoresis and fatigue.   HENT:  Negative for congestion, dental problem, ear discharge, ear pain and facial swelling.    Eyes:  Negative for pain, discharge and itching.   Respiratory:  Negative for apnea, cough, chest tightness and shortness of breath.    Cardiovascular:  Negative for chest pain and leg swelling.   Gastrointestinal:  Negative for abdominal distention and abdominal pain.   Endocrine: Negative for cold intolerance, heat intolerance and polydipsia.   Genitourinary:  Negative for difficulty urinating, dysuria and enuresis.   Musculoskeletal:  Positive for back pain. Negative for arthralgias.        Leakage of fluid from op site   Skin:  Negative for color change and pallor.   Allergic/Immunologic: Negative for environmental allergies and food allergies.   Neurological:  Negative for dizziness, facial asymmetry, light-headedness and headaches.   Hematological:  Negative for adenopathy. Does not bruise/bleed easily.   Psychiatric/Behavioral:  Negative for agitation and behavioral problems.    Objective:     Vital Signs (Most Recent):  Temp: 96.9 °F (36.1 °C) (08/01/22 1946)  Pulse: 61 (08/01/22 2144)  Resp: 20 (08/01/22 1946)  BP: (!) 102/56 (08/01/22 1946)  SpO2: (!) 94 % (08/01/22 1946)   Vital Signs (24h Range):  Temp:  [96.9 °F (36.1 °C)-98.5 °F (36.9 °C)] 96.9 °F (36.1 °C)  Pulse:  [59-88] 61  Resp:  [16-20] 20  SpO2:  [94 %-99 %] 94 %  BP: ()/(54-77) 102/56     Weight: 104.8 kg (231 lb 0.7 oz)  Body mass index is 37.29 kg/m².    Estimated Creatinine Clearance: 103 mL/min (based on SCr of 0.8 mg/dL).    Physical Exam  Vitals and nursing note reviewed.   Constitutional:       General: He is not in acute distress.     Appearance: He is well-developed. He is not diaphoretic.   HENT:      Head: Normocephalic and atraumatic.      Nose: Nose normal.   Eyes:      General:         Right eye: No discharge.         Left eye: No  discharge.      Conjunctiva/sclera: Conjunctivae normal.      Pupils: Pupils are equal, round, and reactive to light.   Neck:      Thyroid: No thyromegaly.      Vascular: No JVD.      Trachea: No tracheal deviation.   Cardiovascular:      Rate and Rhythm: Normal rate and regular rhythm.      Heart sounds: Normal heart sounds. No murmur heard.    No friction rub. No gallop.   Pulmonary:      Effort: Pulmonary effort is normal. No respiratory distress.      Breath sounds: Normal breath sounds. No wheezing or rales.   Chest:      Chest wall: No tenderness.   Abdominal:      General: Bowel sounds are normal. There is no distension.      Palpations: Abdomen is soft. There is no mass.      Tenderness: There is no abdominal tenderness.   Genitourinary:     Comments: deferred  Musculoskeletal:         General: No tenderness or deformity. Normal range of motion.      Cervical back: Normal range of motion and neck supple.      Comments: Lower back surgical wound with dressing    Skin:     General: Skin is warm and dry.      Capillary Refill: Capillary refill takes less than 2 seconds.      Findings: No erythema or rash.   Neurological:      Mental Status: He is alert and oriented to person, place, and time.      Motor: No abnormal muscle tone.      Coordination: Coordination normal.   Psychiatric:         Behavior: Behavior normal.       Significant Labs: Blood Culture:   Recent Labs   Lab 06/29/22  1439 06/29/22  1657 07/03/22  1015   LABBLOO Gram stain aer bottle: Gram positive cocci in clusters resembling Staph  Results called to and read back by: Alisha Eller RN. 07/02/2022  00:17  STAPHYLOCOCCUS EPIDERMIDIS* Gram stain nico bottle: Gram positive cocci in clusters resembling Staph   Results called to and read back by: Soheila You 07/01/2022  05:47  STAPHYLOCOCCUS EPIDERMIDIS* No growth after 5 days.  No growth after 5 days.       BMP:   Recent Labs   Lab 08/01/22  0339   *      K 4.3      CO2 23    BUN 13   CREATININE 0.8   CALCIUM 9.0       CBC:   Recent Labs   Lab 07/31/22  0607 08/01/22  0339   WBC 5.24 3.97   HGB 12.0* 11.9*   HCT 38.6* 37.4*    228       CMP:   Recent Labs   Lab 07/31/22  0607 08/01/22  0339    140   K 4.5 4.3    108   CO2 24 23   * 127*   BUN 11 13   CREATININE 0.7 0.8   CALCIUM 9.1 9.0   ANIONGAP 8 9   EGFRNONAA >60  --        Wound Culture:   Recent Labs   Lab 06/28/22  1950   LABAERO STAPHYLOCOCCUS EPIDERMIDIS  Moderate  *  No growth       All pertinent labs within the past 24 hours have been reviewed.    Significant Imaging: I have reviewed all pertinent imaging results/findings within the past 24 hours.

## 2022-08-02 NOTE — ANESTHESIA PREPROCEDURE EVALUATION
08/02/2022 Friday ROGELIO Blake is a 66 y.o., male.      Pre-op Assessment    I have reviewed the Patient Summary Reports.    I have reviewed the NPO Status.   I have reviewed the Medications.     Review of Systems  Anesthesia Hx:  No problems with previous Anesthesia  Denies Family Hx of Anesthesia complications.   Denies Personal Hx of Anesthesia complications.   Hematology/Oncology:  Hematology Normal   Oncology Normal     EENT/Dental:EENT/Dental Normal   Cardiovascular:   Hypertension ECG has been reviewed.    Pulmonary:   Asymptomatic COVID   Renal/:  Renal/ Normal     Hepatic/GI:  Hepatic/GI Normal    Musculoskeletal:  Spine Disorders: lumbar    Neurological:  Neurology Normal Wound dehiscence and CSF leak post Lumbar discectomy   Endocrine:   Diabetes  Obesity / BMI > 30  Dermatological:   FACIAL SWELLING AND BLISTERS AFTER RECENT SPINE SURGERY   Psych:  Psychiatric Normal           Physical Exam  General: Alert and Oriented    Airway:  Mallampati: II   Mouth Opening: Normal  TM Distance: Normal  Tongue: Normal  Neck ROM: Normal ROM    Skin:LEFT CHEEK HYPERPIGMENTATION OR EPIDERMAL NECROSIS 1X3CM      Anesthesia Plan  Type of Anesthesia, risks & benefits discussed:    Anesthesia Type: Gen ETT  Intra-op Monitoring Plan: Standard ASA Monitors  Induction:  IV  Informed Consent: Informed consent signed with the Patient and all parties understand the risks and agree with anesthesia plan.  All questions answered.   ASA Score: 3  Day of Surgery Review of History & Physical: I have interviewed and examined the patient. I have reviewed the patient's H&P dated:     Ready For Surgery From Anesthesia Perspective.     .

## 2022-08-02 NOTE — ASSESSMENT & PLAN NOTE
Patient's FSGs are controlled on current medication regimen.  Last A1c reviewed-   Lab Results   Component Value Date    HGBA1C 8.3 (H) 05/18/2022     Most recent fingerstick glucose reviewed-   Recent Labs   Lab 08/01/22  0559 08/01/22  1159 08/01/22  1600 08/01/22 2032   POCTGLUCOSE 135* 152* 137* 178*     Current correctional scale  Low  Maintain anti-hyperglycemic dose as follows-   Antihyperglycemics (From admission, onward)            Start     Stop Route Frequency Ordered    07/30/22 0032  insulin aspart U-100 pen 0-5 Units         -- SubQ Before meals & nightly PRN 07/29/22 2333        Hold Oral hypoglycemics while patient is in the hospital.    Continue present care, BS generally well controlled

## 2022-08-02 NOTE — ASSESSMENT & PLAN NOTE
All previous cultures reviewed.  Will follow MRI of the spine and new cultures.    Cultures=06/29- aerobic culture- [facility]  CUTIBACTERIUM ACNES   06/28-BACTEROIDES FRAGILIS   06/28- staph epi -blood      Is this CSF leak ? Will send fluid for   Beta (?)-2 transferrin testing  For now will use IV daptomycin/cefepime , add empiric doxycycline/flagyl  Follow repeat cultures .  If no pseudomonas, plan will be 6 weeks of Daptomycin/Rocephin and then 3-6 months of doxycycline     08/01- will switch to vancomycin as we anticipate longer hospital stay , continue cefepime, doxycycline/flagyl.  Will plan I and D as per neurosurgery

## 2022-08-02 NOTE — ASSESSMENT & PLAN NOTE
MRI of the lumbar spine-  Findings strongly concerning for discitis osteomyelitis at L2-3-  . The fluid collection in the posterior soft tissues is noted both within the laminectomy bed measuring a proximally 3.1 x 2.7 x 2.3 cm in this area, and with extension inferiorly along the L3 lamina and L1 inferior lamina, and also into the posterior soft tissues.  The fluid collection is significantly serpiginous and difficult to accurately measure.  The component in the posterior soft tissues measures on the order of 9.2 x 3.8 by 2.5 cm and also demonstrates some postcontrast enhancement concerning for infection    Will need cultures to guide regime, will discuss with Dr Zepeda  Will continue Daptomycin, Cefepime ,flagyl and doxycycline .  Previous cultures-  Cultures=06/29- aerobic culture- [facility]  CUTIBACTERIUM ACNES   06/28-BACTEROIDES FRAGILIS   06/28- staph epi -blood      08/01- case discussed with Dr Zepeda- will get operative cultures this week.  Continue Cefepime , switch to vancomycin , doxycycline and flagyl- will use operative cultures to guide regime

## 2022-08-02 NOTE — SUBJECTIVE & OBJECTIVE
Interval History: cheerful, resting comfortably in bed on RA, no fever, chills or cough. Still has drainage from the Lumbar wound. Dr. Zepeda plans revision with wound washout/exploration with the help of Plastic Surgery, await confirmation date. Pt agreeable with the plan. Continue wound care, IV Abx, no wound vac yet.     Review of Systems   Constitutional:  Negative for activity change, appetite change, chills, fatigue and fever.   HENT:  Negative for dental problem, ear pain, hearing loss, mouth sores, nosebleeds, sinus pressure, sore throat, tinnitus and trouble swallowing.    Eyes:  Negative for pain, discharge and visual disturbance.   Respiratory:  Negative for cough, choking, chest tightness, shortness of breath and wheezing.    Cardiovascular:  Negative for chest pain, palpitations and leg swelling.   Gastrointestinal:  Negative for abdominal distention, abdominal pain, anal bleeding, blood in stool, constipation, diarrhea, nausea and vomiting.   Endocrine: Negative for cold intolerance, heat intolerance, polydipsia, polyphagia and polyuria.   Genitourinary:  Negative for decreased urine volume, difficulty urinating, flank pain, frequency, hematuria and urgency.   Musculoskeletal:  Negative for arthralgias, back pain, gait problem, myalgias, neck pain and neck stiffness.   Skin:  Positive for wound (lower back surgical wound). Negative for color change and rash.   Allergic/Immunologic: Negative.    Neurological:  Negative for dizziness, tremors, seizures, syncope, speech difficulty, weakness, light-headedness and headaches.   Hematological: Negative.    Psychiatric/Behavioral:  Negative for agitation, behavioral problems, confusion and sleep disturbance. The patient is not nervous/anxious.    All other systems reviewed and are negative.  Objective:     Vital Signs (Most Recent):  Temp: 96.9 °F (36.1 °C) (08/01/22 1946)  Pulse: 63 (08/01/22 1946)  Resp: 20 (08/01/22 1946)  BP: (!) 102/56 (08/01/22  1946)  SpO2: (!) 94 % (08/01/22 1946)   Vital Signs (24h Range):  Temp:  [96.9 °F (36.1 °C)-98.5 °F (36.9 °C)] 96.9 °F (36.1 °C)  Pulse:  [59-88] 63  Resp:  [16-20] 20  SpO2:  [94 %-99 %] 94 %  BP: ()/(54-77) 102/56     Weight: 104.8 kg (231 lb 0.7 oz)  Body mass index is 37.29 kg/m².    Intake/Output Summary (Last 24 hours) at 8/1/2022 2052  Last data filed at 8/1/2022 2000  Gross per 24 hour   Intake 203.29 ml   Output 300 ml   Net -96.71 ml      Physical Exam  Vitals and nursing note reviewed.   Constitutional:       General: He is not in acute distress.     Appearance: He is well-developed. He is not diaphoretic.   HENT:      Head: Normocephalic and atraumatic.      Nose: Nose normal.   Eyes:      General:         Right eye: No discharge.         Left eye: No discharge.      Conjunctiva/sclera: Conjunctivae normal.      Pupils: Pupils are equal, round, and reactive to light.   Neck:      Thyroid: No thyromegaly.      Vascular: No JVD.      Trachea: No tracheal deviation.   Cardiovascular:      Rate and Rhythm: Normal rate and regular rhythm.      Heart sounds: Normal heart sounds. No murmur heard.    No friction rub. No gallop.   Pulmonary:      Effort: Pulmonary effort is normal. No respiratory distress.      Breath sounds: Normal breath sounds. No wheezing or rales.   Chest:      Chest wall: No tenderness.   Abdominal:      General: Bowel sounds are normal. There is no distension.      Palpations: Abdomen is soft. There is no mass.      Tenderness: There is no abdominal tenderness.   Genitourinary:     Comments: deferred  Musculoskeletal:         General: No tenderness or deformity. Normal range of motion.      Cervical back: Normal range of motion and neck supple.      Comments: Lower back surgical wound with dressing    Skin:     General: Skin is warm and dry.      Capillary Refill: Capillary refill takes less than 2 seconds.      Findings: No erythema or rash.   Neurological:      Mental Status: He is  alert and oriented to person, place, and time.      Motor: No abnormal muscle tone.      Coordination: Coordination normal.   Psychiatric:         Behavior: Behavior normal.       Significant Labs: All pertinent labs within the past 24 hours have been reviewed.  Blood Culture: No results for input(s): LABBLOO in the last 48 hours.  BMP:   Recent Labs   Lab 08/01/22  0339   *      K 4.3      CO2 23   BUN 13   CREATININE 0.8   CALCIUM 9.0     CBC:   Recent Labs   Lab 07/31/22 0607 08/01/22 0339   WBC 5.24 3.97   HGB 12.0* 11.9*   HCT 38.6* 37.4*    228     CMP:   Recent Labs   Lab 07/31/22 0607 08/01/22 0339    140   K 4.5 4.3    108   CO2 24 23   * 127*   BUN 11 13   CREATININE 0.7 0.8   CALCIUM 9.1 9.0   ANIONGAP 8 9   EGFRNONAA >60  --        Significant Imaging: I have reviewed all pertinent imaging results/findings within the past 24 hours.

## 2022-08-02 NOTE — ASSESSMENT & PLAN NOTE
Body mass index is 37.29 kg/m². Morbid obesity complicates all aspects of disease management from diagnostic modalities to treatment. Weight loss encouraged and health benefits explained to patient.

## 2022-08-03 ENCOUNTER — ANESTHESIA (OUTPATIENT)
Dept: SURGERY | Facility: HOSPITAL | Age: 67
DRG: 856 | End: 2022-08-03
Payer: MEDICARE

## 2022-08-03 PROBLEM — U07.1 COVID: Chronic | Status: ACTIVE | Noted: 2022-07-29

## 2022-08-03 PROBLEM — G96.00 POSTOPERATIVE CSF LEAK: Chronic | Status: ACTIVE | Noted: 2022-07-30

## 2022-08-03 PROBLEM — T81.30XA WOUND DEHISCENCE: Chronic | Status: ACTIVE | Noted: 2022-07-29

## 2022-08-03 PROBLEM — G97.82 POSTOPERATIVE CSF LEAK: Chronic | Status: ACTIVE | Noted: 2022-07-30

## 2022-08-03 LAB
BACTERIA SPEC AEROBE CULT: ABNORMAL
CREAT SERPL-MCNC: 0.9 MG/DL (ref 0.5–1.4)
EST. GFR  (NO RACE VARIABLE): >60 ML/MIN/1.73 M^2
GRAM STN SPEC: NORMAL
POCT GLUCOSE: 233 MG/DL (ref 70–110)
POCT GLUCOSE: 234 MG/DL (ref 70–110)
VANCOMYCIN TROUGH SERPL-MCNC: 9.9 UG/ML (ref 10–22)

## 2022-08-03 PROCEDURE — 87076 CULTURE ANAEROBE IDENT EACH: CPT | Performed by: NEUROLOGICAL SURGERY

## 2022-08-03 PROCEDURE — 25000003 PHARM REV CODE 250: Performed by: PHYSICIAN ASSISTANT

## 2022-08-03 PROCEDURE — 94761 N-INVAS EAR/PLS OXIMETRY MLT: CPT

## 2022-08-03 PROCEDURE — 71000039 HC RECOVERY, EACH ADD'L HOUR: Performed by: NEUROLOGICAL SURGERY

## 2022-08-03 PROCEDURE — 82565 ASSAY OF CREATININE: CPT | Performed by: EMERGENCY MEDICINE

## 2022-08-03 PROCEDURE — 99900035 HC TECH TIME PER 15 MIN (STAT)

## 2022-08-03 PROCEDURE — 27000207 HC ISOLATION

## 2022-08-03 PROCEDURE — A4216 STERILE WATER/SALINE, 10 ML: HCPCS | Performed by: NURSE PRACTITIONER

## 2022-08-03 PROCEDURE — 63600175 PHARM REV CODE 636 W HCPCS: Performed by: NEUROLOGICAL SURGERY

## 2022-08-03 PROCEDURE — 27201423 OPTIME MED/SURG SUP & DEVICES STERILE SUPPLY: Performed by: NEUROLOGICAL SURGERY

## 2022-08-03 PROCEDURE — 99291 CRITICAL CARE FIRST HOUR: CPT | Mod: ,,,

## 2022-08-03 PROCEDURE — 37000009 HC ANESTHESIA EA ADD 15 MINS: Performed by: NEUROLOGICAL SURGERY

## 2022-08-03 PROCEDURE — 63600175 PHARM REV CODE 636 W HCPCS: Performed by: EMERGENCY MEDICINE

## 2022-08-03 PROCEDURE — 63600175 PHARM REV CODE 636 W HCPCS: Performed by: NURSE PRACTITIONER

## 2022-08-03 PROCEDURE — 63600175 PHARM REV CODE 636 W HCPCS: Performed by: INTERNAL MEDICINE

## 2022-08-03 PROCEDURE — 36000714: Performed by: NEUROLOGICAL SURGERY

## 2022-08-03 PROCEDURE — 63600175 PHARM REV CODE 636 W HCPCS: Performed by: NURSE ANESTHETIST, CERTIFIED REGISTERED

## 2022-08-03 PROCEDURE — 25000003 PHARM REV CODE 250: Performed by: EMERGENCY MEDICINE

## 2022-08-03 PROCEDURE — 37000008 HC ANESTHESIA 1ST 15 MINUTES: Performed by: NEUROLOGICAL SURGERY

## 2022-08-03 PROCEDURE — 87075 CULTR BACTERIA EXCEPT BLOOD: CPT | Mod: 59 | Performed by: NEUROLOGICAL SURGERY

## 2022-08-03 PROCEDURE — 94640 AIRWAY INHALATION TREATMENT: CPT

## 2022-08-03 PROCEDURE — 63600175 PHARM REV CODE 636 W HCPCS

## 2022-08-03 PROCEDURE — 87070 CULTURE OTHR SPECIMN AEROBIC: CPT | Performed by: NEUROLOGICAL SURGERY

## 2022-08-03 PROCEDURE — 27000221 HC OXYGEN, UP TO 24 HOURS

## 2022-08-03 PROCEDURE — 25000003 PHARM REV CODE 250: Performed by: NURSE PRACTITIONER

## 2022-08-03 PROCEDURE — 63709 PR REPR,DURAL/CSF LEAK,W/LAMINECTOMY: ICD-10-PCS | Mod: ,,, | Performed by: NEUROLOGICAL SURGERY

## 2022-08-03 PROCEDURE — C1729 CATH, DRAINAGE: HCPCS | Performed by: NEUROLOGICAL SURGERY

## 2022-08-03 PROCEDURE — 63600175 PHARM REV CODE 636 W HCPCS: Performed by: PHYSICIAN ASSISTANT

## 2022-08-03 PROCEDURE — 87116 MYCOBACTERIA CULTURE: CPT | Mod: 59 | Performed by: NEUROLOGICAL SURGERY

## 2022-08-03 PROCEDURE — 99291 PR CRITICAL CARE, E/M 30-74 MINUTES: ICD-10-PCS | Mod: ,,,

## 2022-08-03 PROCEDURE — 25000003 PHARM REV CODE 250: Performed by: NEUROLOGICAL SURGERY

## 2022-08-03 PROCEDURE — 87206 SMEAR FLUORESCENT/ACID STAI: CPT | Mod: 91 | Performed by: NEUROLOGICAL SURGERY

## 2022-08-03 PROCEDURE — 94799 UNLISTED PULMONARY SVC/PX: CPT

## 2022-08-03 PROCEDURE — 71000033 HC RECOVERY, INTIAL HOUR: Performed by: NEUROLOGICAL SURGERY

## 2022-08-03 PROCEDURE — 87102 FUNGUS ISOLATION CULTURE: CPT | Performed by: NEUROLOGICAL SURGERY

## 2022-08-03 PROCEDURE — 20000000 HC ICU ROOM

## 2022-08-03 PROCEDURE — 80202 ASSAY OF VANCOMYCIN: CPT | Performed by: EMERGENCY MEDICINE

## 2022-08-03 PROCEDURE — 87205 SMEAR GRAM STAIN: CPT | Performed by: NEUROLOGICAL SURGERY

## 2022-08-03 PROCEDURE — 63709 REPAIR SPINAL FLUID LEAKAGE: CPT | Mod: ,,, | Performed by: NEUROLOGICAL SURGERY

## 2022-08-03 PROCEDURE — 25000003 PHARM REV CODE 250: Performed by: INTERNAL MEDICINE

## 2022-08-03 PROCEDURE — 36000715: Performed by: NEUROLOGICAL SURGERY

## 2022-08-03 PROCEDURE — 36415 COLL VENOUS BLD VENIPUNCTURE: CPT | Performed by: EMERGENCY MEDICINE

## 2022-08-03 PROCEDURE — 25000003 PHARM REV CODE 250: Performed by: NURSE ANESTHETIST, CERTIFIED REGISTERED

## 2022-08-03 RX ORDER — SUCCINYLCHOLINE CHLORIDE 20 MG/ML
INJECTION INTRAMUSCULAR; INTRAVENOUS
Status: DISCONTINUED | OUTPATIENT
Start: 2022-08-03 | End: 2022-08-03

## 2022-08-03 RX ORDER — HYDROMORPHONE HYDROCHLORIDE 1 MG/ML
2 INJECTION, SOLUTION INTRAMUSCULAR; INTRAVENOUS; SUBCUTANEOUS
Status: DISCONTINUED | OUTPATIENT
Start: 2022-08-03 | End: 2022-08-12 | Stop reason: HOSPADM

## 2022-08-03 RX ORDER — ACETAMINOPHEN 325 MG/1
650 TABLET ORAL EVERY 8 HOURS PRN
Status: DISCONTINUED | OUTPATIENT
Start: 2022-08-03 | End: 2022-08-12 | Stop reason: HOSPADM

## 2022-08-03 RX ORDER — PROCHLORPERAZINE EDISYLATE 5 MG/ML
5 INJECTION INTRAMUSCULAR; INTRAVENOUS EVERY 6 HOURS PRN
Status: DISCONTINUED | OUTPATIENT
Start: 2022-08-03 | End: 2022-08-12 | Stop reason: HOSPADM

## 2022-08-03 RX ORDER — ONDANSETRON 2 MG/ML
4 INJECTION INTRAMUSCULAR; INTRAVENOUS DAILY PRN
Status: DISCONTINUED | OUTPATIENT
Start: 2022-08-03 | End: 2022-08-03

## 2022-08-03 RX ORDER — CEFAZOLIN SODIUM 1 G/3ML
INJECTION, POWDER, FOR SOLUTION INTRAMUSCULAR; INTRAVENOUS
Status: DISCONTINUED | OUTPATIENT
Start: 2022-08-03 | End: 2022-08-03

## 2022-08-03 RX ORDER — AMOXICILLIN 250 MG
2 CAPSULE ORAL NIGHTLY PRN
Status: DISCONTINUED | OUTPATIENT
Start: 2022-08-03 | End: 2022-08-12 | Stop reason: HOSPADM

## 2022-08-03 RX ORDER — KETOROLAC TROMETHAMINE 30 MG/ML
15 INJECTION, SOLUTION INTRAMUSCULAR; INTRAVENOUS EVERY 8 HOURS PRN
Status: DISCONTINUED | OUTPATIENT
Start: 2022-08-03 | End: 2022-08-03

## 2022-08-03 RX ORDER — SODIUM CHLORIDE 0.9 % (FLUSH) 0.9 %
3 SYRINGE (ML) INJECTION
Status: DISCONTINUED | OUTPATIENT
Start: 2022-08-03 | End: 2022-08-12 | Stop reason: HOSPADM

## 2022-08-03 RX ORDER — BACITRACIN ZINC 500 UNIT/G
OINTMENT (GRAM) TOPICAL
Status: DISCONTINUED | OUTPATIENT
Start: 2022-08-03 | End: 2022-08-03 | Stop reason: HOSPADM

## 2022-08-03 RX ORDER — ACETAMINOPHEN 325 MG/1
650 TABLET ORAL EVERY 4 HOURS PRN
Status: DISCONTINUED | OUTPATIENT
Start: 2022-08-03 | End: 2022-08-12 | Stop reason: HOSPADM

## 2022-08-03 RX ORDER — VANCOMYCIN HYDROCHLORIDE 1 G/20ML
INJECTION, POWDER, LYOPHILIZED, FOR SOLUTION INTRAVENOUS
Status: DISCONTINUED | OUTPATIENT
Start: 2022-08-03 | End: 2022-08-03 | Stop reason: HOSPADM

## 2022-08-03 RX ORDER — MIDAZOLAM HYDROCHLORIDE 1 MG/ML
INJECTION, SOLUTION INTRAMUSCULAR; INTRAVENOUS
Status: DISCONTINUED | OUTPATIENT
Start: 2022-08-03 | End: 2022-08-03

## 2022-08-03 RX ORDER — INSULIN ASPART 100 [IU]/ML
1-10 INJECTION, SOLUTION INTRAVENOUS; SUBCUTANEOUS EVERY 6 HOURS PRN
Status: DISCONTINUED | OUTPATIENT
Start: 2022-08-03 | End: 2022-08-05

## 2022-08-03 RX ORDER — HYDROMORPHONE HYDROCHLORIDE 2 MG/ML
0.2 INJECTION, SOLUTION INTRAMUSCULAR; INTRAVENOUS; SUBCUTANEOUS EVERY 5 MIN PRN
Status: DISCONTINUED | OUTPATIENT
Start: 2022-08-03 | End: 2022-08-03

## 2022-08-03 RX ORDER — PROPOFOL 10 MG/ML
VIAL (ML) INTRAVENOUS
Status: DISCONTINUED | OUTPATIENT
Start: 2022-08-03 | End: 2022-08-03

## 2022-08-03 RX ORDER — DIPHENHYDRAMINE HYDROCHLORIDE 50 MG/ML
25 INJECTION INTRAMUSCULAR; INTRAVENOUS EVERY 6 HOURS PRN
Status: DISCONTINUED | OUTPATIENT
Start: 2022-08-03 | End: 2022-08-03

## 2022-08-03 RX ORDER — PROCHLORPERAZINE EDISYLATE 5 MG/ML
5 INJECTION INTRAMUSCULAR; INTRAVENOUS EVERY 30 MIN PRN
Status: DISCONTINUED | OUTPATIENT
Start: 2022-08-03 | End: 2022-08-03

## 2022-08-03 RX ORDER — MUPIROCIN 20 MG/G
OINTMENT TOPICAL 2 TIMES DAILY
Status: DISPENSED | OUTPATIENT
Start: 2022-08-03 | End: 2022-08-08

## 2022-08-03 RX ORDER — SODIUM CHLORIDE 9 MG/ML
INJECTION, SOLUTION INTRAVENOUS CONTINUOUS
Status: DISCONTINUED | OUTPATIENT
Start: 2022-08-03 | End: 2022-08-08

## 2022-08-03 RX ORDER — ONDANSETRON 2 MG/ML
INJECTION INTRAMUSCULAR; INTRAVENOUS
Status: DISCONTINUED | OUTPATIENT
Start: 2022-08-03 | End: 2022-08-03

## 2022-08-03 RX ORDER — OXYCODONE AND ACETAMINOPHEN 10; 325 MG/1; MG/1
1 TABLET ORAL EVERY 4 HOURS PRN
Status: DISCONTINUED | OUTPATIENT
Start: 2022-08-03 | End: 2022-08-12 | Stop reason: HOSPADM

## 2022-08-03 RX ORDER — PHENYLEPHRINE HYDROCHLORIDE 10 MG/ML
INJECTION INTRAVENOUS
Status: DISCONTINUED | OUTPATIENT
Start: 2022-08-03 | End: 2022-08-03

## 2022-08-03 RX ORDER — MEPERIDINE HYDROCHLORIDE 25 MG/ML
12.5 INJECTION INTRAMUSCULAR; INTRAVENOUS; SUBCUTANEOUS ONCE
Status: DISCONTINUED | OUTPATIENT
Start: 2022-08-03 | End: 2022-08-03

## 2022-08-03 RX ORDER — OXYCODONE AND ACETAMINOPHEN 5; 325 MG/1; MG/1
1 TABLET ORAL EVERY 4 HOURS PRN
Status: DISCONTINUED | OUTPATIENT
Start: 2022-08-03 | End: 2022-08-12 | Stop reason: HOSPADM

## 2022-08-03 RX ORDER — LIDOCAINE HYDROCHLORIDE 20 MG/ML
INJECTION INTRAVENOUS
Status: DISCONTINUED | OUTPATIENT
Start: 2022-08-03 | End: 2022-08-03

## 2022-08-03 RX ORDER — DEXAMETHASONE SODIUM PHOSPHATE 4 MG/ML
4 INJECTION, SOLUTION INTRA-ARTICULAR; INTRALESIONAL; INTRAMUSCULAR; INTRAVENOUS; SOFT TISSUE EVERY 6 HOURS
Status: DISCONTINUED | OUTPATIENT
Start: 2022-08-03 | End: 2022-08-04

## 2022-08-03 RX ORDER — FENTANYL CITRATE 50 UG/ML
INJECTION, SOLUTION INTRAMUSCULAR; INTRAVENOUS
Status: DISCONTINUED | OUTPATIENT
Start: 2022-08-03 | End: 2022-08-03

## 2022-08-03 RX ORDER — OXYCODONE HYDROCHLORIDE 5 MG/1
5 TABLET ORAL
Status: DISCONTINUED | OUTPATIENT
Start: 2022-08-03 | End: 2022-08-03

## 2022-08-03 RX ORDER — OXYCODONE AND ACETAMINOPHEN 7.5; 325 MG/1; MG/1
1 TABLET ORAL EVERY 4 HOURS PRN
Status: DISCONTINUED | OUTPATIENT
Start: 2022-08-03 | End: 2022-08-12 | Stop reason: HOSPADM

## 2022-08-03 RX ORDER — MAG HYDROX/ALUMINUM HYD/SIMETH 200-200-20
30 SUSPENSION, ORAL (FINAL DOSE FORM) ORAL EVERY 4 HOURS PRN
Status: DISCONTINUED | OUTPATIENT
Start: 2022-08-03 | End: 2022-08-12 | Stop reason: HOSPADM

## 2022-08-03 RX ORDER — ONDANSETRON 8 MG/1
8 TABLET, ORALLY DISINTEGRATING ORAL EVERY 6 HOURS PRN
Status: DISCONTINUED | OUTPATIENT
Start: 2022-08-03 | End: 2022-08-12 | Stop reason: HOSPADM

## 2022-08-03 RX ORDER — DEXAMETHASONE SODIUM PHOSPHATE 4 MG/ML
INJECTION, SOLUTION INTRA-ARTICULAR; INTRALESIONAL; INTRAMUSCULAR; INTRAVENOUS; SOFT TISSUE
Status: DISCONTINUED | OUTPATIENT
Start: 2022-08-03 | End: 2022-08-03

## 2022-08-03 RX ORDER — ROCURONIUM BROMIDE 10 MG/ML
INJECTION, SOLUTION INTRAVENOUS
Status: DISCONTINUED | OUTPATIENT
Start: 2022-08-03 | End: 2022-08-03

## 2022-08-03 RX ADMIN — SUGAMMADEX 200 MG: 100 INJECTION, SOLUTION INTRAVENOUS at 12:08

## 2022-08-03 RX ADMIN — SODIUM CHLORIDE, SODIUM LACTATE, POTASSIUM CHLORIDE, AND CALCIUM CHLORIDE: .6; .31; .03; .02 INJECTION, SOLUTION INTRAVENOUS at 11:08

## 2022-08-03 RX ADMIN — ROCURONIUM BROMIDE 50 MG: 10 INJECTION, SOLUTION INTRAVENOUS at 07:08

## 2022-08-03 RX ADMIN — MIDAZOLAM 2 MG: 1 INJECTION INTRAMUSCULAR; INTRAVENOUS at 07:08

## 2022-08-03 RX ADMIN — FENTANYL CITRATE 50 MCG: 50 INJECTION, SOLUTION INTRAMUSCULAR; INTRAVENOUS at 07:08

## 2022-08-03 RX ADMIN — CEFAZOLIN 2 G: 1 INJECTION, POWDER, FOR SOLUTION INTRAMUSCULAR; INTRAVENOUS at 12:08

## 2022-08-03 RX ADMIN — GABAPENTIN 300 MG: 300 CAPSULE ORAL at 08:08

## 2022-08-03 RX ADMIN — INSULIN ASPART 4 UNITS: 100 INJECTION, SOLUTION INTRAVENOUS; SUBCUTANEOUS at 06:08

## 2022-08-03 RX ADMIN — ROCURONIUM BROMIDE 20 MG: 10 INJECTION, SOLUTION INTRAVENOUS at 08:08

## 2022-08-03 RX ADMIN — LIDOCAINE HYDROCHLORIDE 50 MG: 20 INJECTION, SOLUTION INTRAVENOUS at 07:08

## 2022-08-03 RX ADMIN — Medication 6 MG: at 08:08

## 2022-08-03 RX ADMIN — CEFEPIME 2 G: 2 INJECTION, POWDER, FOR SOLUTION INTRAVENOUS at 05:08

## 2022-08-03 RX ADMIN — DEXAMETHASONE SODIUM PHOSPHATE 4 MG: 4 INJECTION INTRA-ARTICULAR; INTRALESIONAL; INTRAMUSCULAR; INTRAVENOUS; SOFT TISSUE at 05:08

## 2022-08-03 RX ADMIN — Medication 10 ML: at 05:08

## 2022-08-03 RX ADMIN — METRONIDAZOLE 500 MG: 500 TABLET ORAL at 09:08

## 2022-08-03 RX ADMIN — GABAPENTIN 300 MG: 300 CAPSULE ORAL at 04:08

## 2022-08-03 RX ADMIN — FENTANYL CITRATE 50 MCG: 50 INJECTION, SOLUTION INTRAMUSCULAR; INTRAVENOUS at 11:08

## 2022-08-03 RX ADMIN — CEFEPIME 2 G: 2 INJECTION, POWDER, FOR SOLUTION INTRAVENOUS at 03:08

## 2022-08-03 RX ADMIN — FENTANYL CITRATE 25 MCG: 50 INJECTION, SOLUTION INTRAMUSCULAR; INTRAVENOUS at 01:08

## 2022-08-03 RX ADMIN — ROCURONIUM BROMIDE 30 MG: 10 INJECTION, SOLUTION INTRAVENOUS at 09:08

## 2022-08-03 RX ADMIN — DEXAMETHASONE SODIUM PHOSPHATE 4 MG: 4 INJECTION INTRA-ARTICULAR; INTRALESIONAL; INTRAMUSCULAR; INTRAVENOUS; SOFT TISSUE at 11:08

## 2022-08-03 RX ADMIN — PHENYLEPHRINE HYDROCHLORIDE 100 MCG: 10 INJECTION INTRAVENOUS at 08:08

## 2022-08-03 RX ADMIN — DEXAMETHASONE SODIUM PHOSPHATE 4 MG: 4 INJECTION, SOLUTION INTRAMUSCULAR; INTRAVENOUS at 12:08

## 2022-08-03 RX ADMIN — VANCOMYCIN HYDROCHLORIDE 1500 MG: 1.5 INJECTION, POWDER, LYOPHILIZED, FOR SOLUTION INTRAVENOUS at 03:08

## 2022-08-03 RX ADMIN — OXYCODONE HYDROCHLORIDE AND ACETAMINOPHEN 500 MG: 500 TABLET ORAL at 08:08

## 2022-08-03 RX ADMIN — OXYCODONE AND ACETAMINOPHEN 1 TABLET: 10; 325 TABLET ORAL at 04:08

## 2022-08-03 RX ADMIN — ALBUTEROL SULFATE 2 PUFF: 90 AEROSOL, METERED RESPIRATORY (INHALATION) at 12:08

## 2022-08-03 RX ADMIN — METRONIDAZOLE 500 MG: 500 TABLET ORAL at 05:08

## 2022-08-03 RX ADMIN — ENOXAPARIN SODIUM 100 MG: 100 INJECTION SUBCUTANEOUS at 08:08

## 2022-08-03 RX ADMIN — ONDANSETRON 4 MG: 2 INJECTION, SOLUTION INTRAMUSCULAR; INTRAVENOUS at 12:08

## 2022-08-03 RX ADMIN — FENTANYL CITRATE 50 MCG: 50 INJECTION, SOLUTION INTRAMUSCULAR; INTRAVENOUS at 02:08

## 2022-08-03 RX ADMIN — PROPOFOL 160 MG: 10 INJECTION, EMULSION INTRAVENOUS at 07:08

## 2022-08-03 RX ADMIN — FAMOTIDINE 20 MG: 20 TABLET ORAL at 08:08

## 2022-08-03 RX ADMIN — ALBUTEROL SULFATE 2 PUFF: 90 AEROSOL, METERED RESPIRATORY (INHALATION) at 07:08

## 2022-08-03 RX ADMIN — ATORVASTATIN CALCIUM 10 MG: 10 TABLET, FILM COATED ORAL at 08:08

## 2022-08-03 RX ADMIN — FENTANYL CITRATE 25 MCG: 50 INJECTION, SOLUTION INTRAMUSCULAR; INTRAVENOUS at 12:08

## 2022-08-03 RX ADMIN — VANCOMYCIN HYDROCHLORIDE 1500 MG: 1.5 INJECTION, POWDER, LYOPHILIZED, FOR SOLUTION INTRAVENOUS at 01:08

## 2022-08-03 RX ADMIN — SODIUM CHLORIDE: 0.9 INJECTION, SOLUTION INTRAVENOUS at 04:08

## 2022-08-03 RX ADMIN — DOCUSATE SODIUM 100 MG: 100 CAPSULE, LIQUID FILLED ORAL at 08:08

## 2022-08-03 RX ADMIN — THERA TABS 1 TABLET: TAB at 04:08

## 2022-08-03 RX ADMIN — ROCURONIUM BROMIDE 30 MG: 10 INJECTION, SOLUTION INTRAVENOUS at 11:08

## 2022-08-03 RX ADMIN — FENTANYL CITRATE 50 MCG: 50 INJECTION, SOLUTION INTRAMUSCULAR; INTRAVENOUS at 09:08

## 2022-08-03 RX ADMIN — CEFAZOLIN 2 G: 1 INJECTION, POWDER, FOR SOLUTION INTRAMUSCULAR; INTRAVENOUS at 08:08

## 2022-08-03 RX ADMIN — SODIUM CHLORIDE, SODIUM LACTATE, POTASSIUM CHLORIDE, AND CALCIUM CHLORIDE: .6; .31; .03; .02 INJECTION, SOLUTION INTRAVENOUS at 07:08

## 2022-08-03 NOTE — ASSESSMENT & PLAN NOTE
- Day 0: s/p wound revision and washout with lumbar drain placement  - wound C&S sent intra-op  - Doxycycline (6 doses) completed 8/2  - Cont cefepime, vanc, flagyl   - I&D following

## 2022-08-03 NOTE — PT/OT/SLP PROGRESS
Physical Therapy      Patient Name:  Friday ROGELIO Blake   MRN:  [id number]    ATTEMPTED P.T. TX THIS AM, PT AWAY FOR SURGERY, WILL ASSESS PT NEXT VISIT, CONT PER POC    Dominique Bolaños, PT  8/3/2022  1100

## 2022-08-03 NOTE — CONSULTS
[facility] - Intensive Care ([facility])  Critical Care Medicine  Consult Note    Patient Name: Friday ROGELIO Blake  MRN: [id number]  Admission Date: 7/29/2022  Hospital Length of Stay: 3 days  Code Status: Full Code  Attending Physician: Kuldeep Bermudez MD   Primary Care Provider: Sid Elizabeth MD   Principal Problem: Wound dehiscence    [unfilled]  Subjective:     HPI:  66 year old male with PMH including DM; HTN; obesity; parathyroidectomy in 7/2021; and in June of 2022 he underwent lumbar laminectomy and then had hematoma removal about a week later, during that admission he developed a gram positive bacteremia and ultimately discharged with PICC line and abx with plan to transition to oral augmentin later for a trip to [facility] for his daughter's wedding.    On 7/29 he presented to ED after returning from his trip to [facility] with report of wound drainage  Evaluation revealed +COVID with no symptoms; +drainage from back incision; no wbc elevation; no fever; glucose 159  Admitted for lumbar wound dehiscence  MRI showed Findings strongly concerning for discitis osteomyelitis at L2-3 as detailed above.  Probable epidural phlegmon or abscess extending along the left aspect of the thecal sac and into the posterior soft tissues with the previously noted fluid collection now demonstrating significantly increased peripheral enhancement concerning for superimposed infection.  ID and neuro surgery follow   Started broad spectrum abx; wound cultures have grown psuedomonas and abx adjusted with plan for prolonged abx course  Taken to OR 8/3 by NeuroSurgery for wound exploration, repair of CSF leak, and lumbar drain placement      Hospital/ICU Course:  8/3: Transferred to ICU after anesthesia recovery for monitoring and lumbar drain management. C/S samples sent. Pt c/o mild pressure to back. Vital signs stable.       Past Medical History:   Diagnosis Date    Bilateral carpal tunnel syndrome 2/25/2022    moderate demyelinating  bilaterally    Carpal tunnel syndrome     Diabetes mellitus without complication     Fever 6/30/2022    Gram-positive bacteremia 7/2/2022    History of colon polyps     Hypercalcemia 3/23/2021    Hyperlipidemia associated with type 2 diabetes mellitus     Hypertension associated with diabetes     Left carpal tunnel syndrome 3/23/2021    Low back pain 9/28/2021    Lumbar disc disease with radiculopathy     Morbid obesity with BMI of 40.0-44.9, adult     Postoperative hematoma involving nervous system following nervous system procedure 6/28/2022    S/P parathyroidectomy 7/29/2021    Vitamin D deficiency        Past Surgical History:   Procedure Laterality Date    CARPAL TUNNEL RELEASE Left 4/1/2021    Procedure: RELEASE, CARPAL TUNNEL;  Surgeon: Yoandy David MD;  Location: [facility] OR;  Service: Orthopedics;  Laterality: Left;    HERNIA REPAIR      INCISION AND DRAINAGE OF HEMATOMA N/A 6/28/2022    Procedure: INCISION AND DRAINAGE, HEMATOMA;  Surgeon: Shaggy Zepeda MD;  Location: [facility] OR;  Service: Neurosurgery;  Laterality: N/A;    LUMBAR LAMINECTOMY WITH DISCECTOMY Left 6/22/2022    Procedure: LAMINECTOMY, SPINE, LUMBAR, WITH DISCECTOMY;  Surgeon: Shaggy Zepeda MD;  Location: [facility] OR;  Service: Neurosurgery;  Laterality: Left;  minimally invasive L2-3 discectomy and decompression     LUMBAR LAMINECTOMY WITH DISCECTOMY N/A 6/28/2022    Procedure: LAMINECTOMY, SPINE, LUMBAR, WITH DISCECTOMY;  Surgeon: Shaggy Zepeda MD;  Location: [facility] OR;  Service: Neurosurgery;  Laterality: N/A;    PARATHYROIDECTOMY Bilateral 7/27/2021    Procedure: PARATHYROIDECTOMY;  Surgeon: Tha Dial MD;  Location: [facility] OR;  Service: ENT;  Laterality: Bilateral;  with medialstinal exploration    SELECTIVE INJECTION OF ANESTHETIC AGENT AROUND LUMBAR SPINAL NERVE ROOT BY TRANSFORAMINAL APPROACH Left 3/23/2022    Procedure: BLOCK, SPINAL NERVE ROOT, LUMBAR, SELECTIVE, TRANSFORAMINAL APPROACH Left L2-3, L3-4  RN IV sedation;  Surgeon: Jay Hodges MD;  Location: [facility] PAIN MGT;  Service: Pain Management;  Laterality: Left;    STERNOTOMY N/A 7/27/2021    Procedure: STERNOTOMY;  Surgeon: Tha Dial MD;  Location: [facility] OR;  Service: ENT;  Laterality: N/A;    ULNAR NERVE TRANSPOSITION Left 4/1/2021    Procedure: TRANSPOSITION, NERVE, ULNAR;  Surgeon: Yoandy David MD;  Location: [facility] OR;  Service: Orthopedics;  Laterality: Left;    ULNAR TUNNEL RELEASE Left 4/1/2021    Procedure: RELEASE, CUBITAL TUNNEL;  Surgeon: Yoandy David MD;  Location: [facility] OR;  Service: Orthopedics;  Laterality: Left;    UMBILICAL HERNIA REPAIR         Review of patient's allergies indicates:  No Known Allergies    Family History       Problem Relation (Age of Onset)    Asthma Mother    Diabetes Mellitus Father    Hypertension Father    Prostate cancer Brother          Tobacco Use    Smoking status: Never Smoker    Smokeless tobacco: Never Used   Substance and Sexual Activity    Alcohol use: Never    Drug use: Never    Sexual activity: Yes     Partners: Female         Review of Systems   Constitutional:  Negative for chills, diaphoresis, fatigue and fever.   Respiratory:  Negative for shortness of breath.    Cardiovascular:  Negative for palpitations and leg swelling.   Gastrointestinal:  Negative for abdominal pain, diarrhea, nausea and vomiting.   Genitourinary:  Negative for difficulty urinating and dysuria.   Musculoskeletal:  Positive for back pain (surgical site pressure). Negative for neck pain and neck stiffness.   Skin:  Negative for rash.   Neurological:  Negative for dizziness, weakness, numbness and headaches.   Objective:     Vital Signs (Most Recent):  Temp: 97.8 °F (36.6 °C) (08/03/22 1554)  Pulse: 77 (08/03/22 1700)  Resp: 17 (08/03/22 1700)  BP: (!) 175/92 (08/03/22 1700)  SpO2: 100 % (08/03/22 1700)   Vital Signs (24h Range):  Temp:  [97.3 °F (36.3 °C)-98.2 °F (36.8 °C)] 97.8 °F (36.6 °C)  Pulse:  [57-94]  77  Resp:  [12-21] 17  SpO2:  [95 %-100 %] 100 %  BP: (100-175)/(51-92) 175/92     Weight: 104.3 kg (229 lb 15 oz)  Body mass index is 37.11 kg/m².      Intake/Output Summary (Last 24 hours) at 8/3/2022 1806  Last data filed at 8/3/2022 1700  Gross per 24 hour   Intake 2544.92 ml   Output 1200 ml   Net 1344.92 ml       Physical Exam  Vitals and nursing note reviewed.   Constitutional:       General: He is not in acute distress.     Appearance: Normal appearance. He is not ill-appearing, toxic-appearing or diaphoretic.       Cardiovascular:      Rate and Rhythm: Normal rate and regular rhythm.      Pulses:           Dorsalis pedis pulses are 2+ on the right side and 2+ on the left side.   Pulmonary:      Effort: No tachypnea or respiratory distress.      Breath sounds: Normal breath sounds.   Abdominal:      General: Bowel sounds are normal. There is no distension.   Neurological:      Mental Status: He is alert and oriented to person, place, and time.      GCS: GCS eye subscore is 4. GCS verbal subscore is 5. GCS motor subscore is 6.      Sensory: Sensation is intact.      Motor: No weakness or atrophy.   Psychiatric:         Behavior: Behavior is cooperative.       Vents:  Oxygen Concentration (%): 98 (08/03/22 1430)    Lines/Drains/Airways       Peripherally Inserted Central Catheter Line  Duration             PICC Double Lumen 08/01/22 1540 left brachial 2 days              Drain  Duration                  Closed/Suction Drain 08/03/22 1144 Right Back Accordion 10 Fr. <1 day         Closed/Suction Drain 08/03/22 1145 Left Back Accordion 10 Fr. <1 day         Drain/Device  08/03/22 1154 Right midline lumbar spine gravity <1 day         Urethral Catheter 08/03/22 0745 Straight-tip;Silicone 16 Fr. <1 day                    Significant Labs:    CBC/Anemia Profile:  Recent Labs   Lab 08/02/22  2236   FERRITIN 200        Chemistries:  Recent Labs   Lab 08/02/22  1034 08/03/22  1438   CREATININE 0.9 0.9       Bilirubin:    Recent Labs   Lab 07/12/22  0911 07/29/22  1724   BILITOT 0.3 0.3               Significant Imaging:   I have reviewed all pertinent imaging results/findings within the past 24 hours.      ABG  No results for input(s): PH, PO2, PCO2, HCO3, BE in the last 168 hours.  Assessment/Plan:     Neuro  Postoperative CSF leak  - lumbar drain in place  - neurosx following; monitor drainage output per recs  - neuro checks q4    Cardiac/Vascular  HLD (hyperlipidemia)  - Cont statin    Hypertension  - Controlled  - Cont home losartan  - Hemodynamic monitoring     ID  COVID  - currently asymptomatic  - fully vaccinated +booster  - diagnosed 7/29  - lovenox dose adjusted  - albuterol & supplemental O2 PRN    Endocrine  Diabetes mellitus without complication  - monitor POCT gluc q6h d/t steroid use   - mod SSI added     Orthopedic  * Wound dehiscence with CSF leak  - Day 0: s/p wound revision and washout with lumbar drain placement  - wound C&S sent intra-op  - Doxycycline (6 doses) completed 8/2  - Cont cefepime, vanc, flagyl   - I&D following        Critical Care Daily Checklist:    A: Awake: RASS Goal/Actual Goal:    Actual:     B: Spontaneous Breathing Trial Performed?     C: SAT & SBT Coordinated?  n/a                      D: Delirium: CAM-ICU     E: Early Mobility Performed? No- s/p surgery. Will coordinate   F: Feeding Goal:    Status:     Current Diet Order   Procedures    Diet Adult Regular (IDDSI Level 7)      AS: Analgesia/Sedation Reviewed   T: Thromboembolic Prophylaxis Lovenox   H: HOB > 300 No- s/p sx    U: Stress Ulcer Prophylaxis (if needed) Pepcid   G: Glucose Control Mod SSI   B: Bowel Function     I: Indwelling Catheter (Lines & Roche) Necessity Reviewed   D: De-escalation of Antimicrobials/Pharmacotherapies Reviewed    Plan for the day/ETD Monitor s/p lumbar drain placement    Code Status:  Family/Goals of Care: Full Code  Discussed directly with patient and daughter at bedside   I have discussed case and  plan of care in detail with Dr. Knox and Dr. Bermudez; Status and plan of care to be further discussed with team on multidisciplinary rounds.    Critical Care Time: 66 minutes  Critical secondary to Patient has a condition that poses threat to life and bodily function: s/p wound revision and washout with lumbar drain placement     Critical care was time spent personally by me on the following activities: development of treatment plan with patient or surrogate and bedside caregivers, discussions with consultants, evaluation of patient's response to treatment, examination of patient, ordering and performing treatments and interventions, ordering and review of laboratory studies, ordering and review of radiographic studies, pulse oximetry, re-evaluation of patient's condition. This critical care time did not overlap with that of any other provider or involve time for any procedures.    Thank you for your consult. I will follow-up with patient. Please contact us if you have any additional questions.     Mer Brink NP  Critical Care Medicine  [facility] - Intensive Care ([facility])

## 2022-08-03 NOTE — ASSESSMENT & PLAN NOTE
Patient is identified as High risk for severe complications of COVID 19 based on COVID risk score of 5   Initiate standard COVID protocols; COVID-19 testing ,Infection Control notification  and isolation- respiratory, contact and droplet per protocol    Diagnostics: (leukopenia, hyponatremia, hyperferritinemia, elevated troponin, elevated d-dimer, age, and comorbidities are significant predictors of poor clinical outcome)  CBC, CMP, Ferritin, CRP and Portable CXR    Management: Maintain oxygen saturations 92-96% via Nasal Cannula  LPM and monitor with continuous/intermittent pulse oximetry.  and Inhaled bronchodilators as needed for shortness of breath.    Asymptomatic, pt on RA    asymptomatic

## 2022-08-03 NOTE — ASSESSMENT & PLAN NOTE
- currently asymptomatic  - fully vaccinated +booster  - diagnosed 7/29  - lovenox dose adjusted  - albuterol & supplemental O2 PRN

## 2022-08-03 NOTE — ANESTHESIA POSTPROCEDURE EVALUATION
Anesthesia Post Evaluation    Patient: Friday ROGELIO Blake    Procedure(s) Performed: Procedure(s) (LRB):  EXPLORATION, WOUND (Bilateral)  REPAIR-DEFECT (Bilateral)  PLACEMENT (N/A)    Final Anesthesia Type: general      Patient location during evaluation: PACU  Patient participation: Yes- Able to Participate  Level of consciousness: awake  Post-procedure vital signs: reviewed and stable  Pain management: adequate  Airway patency: patent    PONV status at discharge: No PONV  Anesthetic complications: no      Cardiovascular status: hemodynamically stable  Respiratory status: unassisted  Hydration status: euvolemic  Follow-up not needed.          Vitals Value Taken Time   /92 08/03/22 1702   Temp 36.6 °C (97.8 °F) 08/03/22 1554   Pulse 83 08/03/22 1757   Resp 11 08/03/22 1757   SpO2 99 % 08/03/22 1757   Vitals shown include unvalidated device data.      Event Time   Out of Recovery 08/03/2022 15:42:18         Pain/Javid Score: Pain Rating Prior to Med Admin: 9 (8/3/2022  4:27 PM)  Pain Rating Post Med Admin: 4 (8/3/2022  5:27 PM)  Javid Score: 9 (8/3/2022  3:15 PM)

## 2022-08-03 NOTE — ASSESSMENT & PLAN NOTE
--supportive care  --PT/OT consulted    S/p laminectomy- complicated by wound dehiscence- see above    S/p revision and washout today

## 2022-08-03 NOTE — HPI
66 year old male with PMH including DM; HTN; obesity; parathyroidectomy in 7/2021; and in June of 2022 he underwent lumbar laminectomy and then had hematoma removal about a week later, during that admission he developed a gram positive bacteremia and ultimately discharged with PICC line and abx with plan to transition to oral augmentin later for a trip to [facility] for his daughter's wedding.    On 7/29 he presented to ED after returning from his trip to [facility] with report of wound drainage  Evaluation revealed +COVID with no symptoms; +drainage from back incision; no wbc elevation; no fever; glucose 159  Admitted for lumbar wound dehiscence  MRI showed Findings strongly concerning for discitis osteomyelitis at L2-3 as detailed above.  Probable epidural phlegmon or abscess extending along the left aspect of the thecal sac and into the posterior soft tissues with the previously noted fluid collection now demonstrating significantly increased peripheral enhancement concerning for superimposed infection.  ID and neuro surgery follow   Started broad spectrum abx; wound cultures have grown psuedomonas and abx adjusted with plan for prolonged abx course  Taken to OR 8/3 by NeuroSurgery for wound exploration, repair of CSF leak, and lumbar drain placement

## 2022-08-03 NOTE — CONSULTS
Pharmacokinetic Assessment Follow Up: IV Vancomycin    Vancomycin serum concentration assessment(s):    The trough level was drawn correctly and can be used to guide therapy at this time. The measurement is below the desired definitive target range of 15 to 20 mcg/mL.    Vancomycin Regimen Plan:    Continue regimen to Vancomycin 1500 mg IV every 12 hours with next serum trough concentration measured at 8/4 @ 1400 prior to 5th dose on 8/4    Drug levels (last 3 results):  Recent Labs   Lab Result Units 08/03/22  1438   Vancomycin-Trough ug/mL 9.9*       Pharmacy will continue to follow and monitor vancomycin.    Thank you for the consult,   Fabrizio Newton       Patient brief summary:  Friday ROGELIO Blake is a 66 y.o. male initiated on antimicrobial therapy with IV Vancomycin for treatment of bone/joint infection    The patient's current regimen is 1500mg q12h    Drug Allergies:   Review of patient's allergies indicates:  No Known Allergies    Actual Body Weight:   105.7kg    Renal Function:   Estimated Creatinine Clearance: 92 mL/min (based on SCr of 0.9 mg/dL).,     Dialysis Method (if applicable):  N/A    CBC (last 72 hours):  Recent Labs   Lab Result Units 08/01/22  0339   WBC K/uL 3.97   Hemoglobin g/dL 11.9*   Hematocrit % 37.4*   Platelets K/uL 228   Gran % % 52.8   Lymph % % 36.3   Mono % % 5.3   Eosinophil % % 4.5   Basophil % % 0.3   Differential Method  Automated       Metabolic Panel (last 72 hours):  Recent Labs   Lab Result Units 08/01/22  0339 08/02/22  1034 08/03/22  1438   Sodium mmol/L 140  --   --    Potassium mmol/L 4.3  --   --    Chloride mmol/L 108  --   --    CO2 mmol/L 23  --   --    Glucose mg/dL 127*  --   --    BUN mg/dL 13  --   --    Creatinine mg/dL 0.8 0.9 0.9       Vancomycin Administrations:  vancomycin given in the last 96 hours                   vancomycin 1.5 g in dextrose 5 % 250 mL IVPB (ready to mix) (mg) 1,500 mg New Bag 08/03/22 1527     1,500 mg New Bag  0112    vancomycin  injection (g) 1 g Given 08/03/22 0700    vancomycin injection (g) 1 g Given 08/03/22 0700    vancomycin 2 g in dextrose 5 % 500 mL IVPB (mg) 2,000 mg New Bag 08/02/22 1302                Microbiologic Results:  Microbiology Results (last 7 days)     Procedure Component Value Units Date/Time    Fungus culture [[id number]] Collected: 08/03/22 0937    Order Status: Sent Specimen: Wound from Back Updated: 08/03/22 1534    Gram stain [[id number]] Collected: 08/03/22 0937    Order Status: Sent Specimen: Wound from Back Updated: 08/03/22 1534    Culture, Anaerobe [[id number]] Collected: 08/03/22 0937    Order Status: Sent Specimen: Wound from Back Updated: 08/03/22 1534    Aerobic culture [[id number]] Collected: 08/03/22 0937    Order Status: Sent Specimen: Wound from Back Updated: 08/03/22 1534    Fungus culture [[id number]] Collected: 08/03/22 0937    Order Status: Sent Specimen: Wound from Back Updated: 08/03/22 1534    Culture, Anaerobe [[id number]] Collected: 08/03/22 0937    Order Status: Sent Specimen: Wound from Back Updated: 08/03/22 1534    Aerobic culture [[id number]] Collected: 08/03/22 0937    Order Status: Sent Specimen: Wound from Back Updated: 08/03/22 1534    AFB Culture & Smear [[id number]] Collected: 08/03/22 0937    Order Status: Sent Specimen: Wound from Back Updated: 08/03/22 1534    Gram stain [[id number]] Collected: 08/03/22 0937    Order Status: Sent Specimen: Wound from Back Updated: 08/03/22 1534    AFB Culture & Smear [[id number]] Collected: 08/03/22 0937    Order Status: Sent Specimen: Wound from Back Updated: 08/03/22 1534    Aerobic culture [[id number]]  (Abnormal)  (Susceptibility) Collected: 07/30/22 0055    Order Status: Completed Specimen: Incision site from Back Updated: 08/03/22 1035     Aerobic Bacterial Culture PSEUDOMONAS AERUGINOSA  Moderate

## 2022-08-03 NOTE — SUBJECTIVE & OBJECTIVE
Past Medical History:   Diagnosis Date    Bilateral carpal tunnel syndrome 2/25/2022    moderate demyelinating bilaterally    Carpal tunnel syndrome     Diabetes mellitus without complication     Fever 6/30/2022    Gram-positive bacteremia 7/2/2022    History of colon polyps     Hypercalcemia 3/23/2021    Hyperlipidemia associated with type 2 diabetes mellitus     Hypertension associated with diabetes     Left carpal tunnel syndrome 3/23/2021    Low back pain 9/28/2021    Lumbar disc disease with radiculopathy     Morbid obesity with BMI of 40.0-44.9, adult     Postoperative hematoma involving nervous system following nervous system procedure 6/28/2022    S/P parathyroidectomy 7/29/2021    Vitamin D deficiency        Past Surgical History:   Procedure Laterality Date    CARPAL TUNNEL RELEASE Left 4/1/2021    Procedure: RELEASE, CARPAL TUNNEL;  Surgeon: Yoandy David MD;  Location: [facility] OR;  Service: Orthopedics;  Laterality: Left;    HERNIA REPAIR      INCISION AND DRAINAGE OF HEMATOMA N/A 6/28/2022    Procedure: INCISION AND DRAINAGE, HEMATOMA;  Surgeon: Shaggy Zepeda MD;  Location: [facility] OR;  Service: Neurosurgery;  Laterality: N/A;    LUMBAR LAMINECTOMY WITH DISCECTOMY Left 6/22/2022    Procedure: LAMINECTOMY, SPINE, LUMBAR, WITH DISCECTOMY;  Surgeon: Shaggy Zepeda MD;  Location: [facility] OR;  Service: Neurosurgery;  Laterality: Left;  minimally invasive L2-3 discectomy and decompression     LUMBAR LAMINECTOMY WITH DISCECTOMY N/A 6/28/2022    Procedure: LAMINECTOMY, SPINE, LUMBAR, WITH DISCECTOMY;  Surgeon: Shaggy Zepeda MD;  Location: [facility] OR;  Service: Neurosurgery;  Laterality: N/A;    PARATHYROIDECTOMY Bilateral 7/27/2021    Procedure: PARATHYROIDECTOMY;  Surgeon: Tha Dial MD;  Location: [facility] OR;  Service: ENT;  Laterality: Bilateral;  with medialstinal exploration    SELECTIVE INJECTION OF ANESTHETIC AGENT AROUND LUMBAR SPINAL NERVE ROOT BY TRANSFORAMINAL APPROACH Left 3/23/2022     Procedure: BLOCK, SPINAL NERVE ROOT, LUMBAR, SELECTIVE, TRANSFORAMINAL APPROACH Left L2-3, L3-4 RN IV sedation;  Surgeon: Jay Hodgse MD;  Location: [facility] MGT;  Service: Pain Management;  Laterality: Left;    STERNOTOMY N/A 7/27/2021    Procedure: STERNOTOMY;  Surgeon: Tha Dial MD;  Location: [facility] OR;  Service: ENT;  Laterality: N/A;    ULNAR NERVE TRANSPOSITION Left 4/1/2021    Procedure: TRANSPOSITION, NERVE, ULNAR;  Surgeon: Yoandy David MD;  Location: [facility] OR;  Service: Orthopedics;  Laterality: Left;    ULNAR TUNNEL RELEASE Left 4/1/2021    Procedure: RELEASE, CUBITAL TUNNEL;  Surgeon: Yoandy David MD;  Location: [facility] OR;  Service: Orthopedics;  Laterality: Left;    UMBILICAL HERNIA REPAIR         Review of patient's allergies indicates:  No Known Allergies    Family History       Problem Relation (Age of Onset)    Asthma Mother    Diabetes Mellitus Father    Hypertension Father    Prostate cancer Brother          Tobacco Use    Smoking status: Never Smoker    Smokeless tobacco: Never Used   Substance and Sexual Activity    Alcohol use: Never    Drug use: Never    Sexual activity: Yes     Partners: Female         Review of Systems   Constitutional:  Negative for chills, diaphoresis, fatigue and fever.   Respiratory:  Negative for shortness of breath.    Cardiovascular:  Negative for palpitations and leg swelling.   Gastrointestinal:  Negative for abdominal pain, diarrhea, nausea and vomiting.   Genitourinary:  Negative for difficulty urinating and dysuria.   Musculoskeletal:  Positive for back pain (surgical site pressure). Negative for neck pain and neck stiffness.   Skin:  Negative for rash.   Neurological:  Negative for dizziness, weakness, numbness and headaches.   Objective:     Vital Signs (Most Recent):  Temp: 97.8 °F (36.6 °C) (08/03/22 1554)  Pulse: 77 (08/03/22 1700)  Resp: 17 (08/03/22 1700)  BP: (!) 175/92 (08/03/22 1700)  SpO2: 100 % (08/03/22 1700)   Vital Signs (24h  Range):  Temp:  [97.3 °F (36.3 °C)-98.2 °F (36.8 °C)] 97.8 °F (36.6 °C)  Pulse:  [57-94] 77  Resp:  [12-21] 17  SpO2:  [95 %-100 %] 100 %  BP: (100-175)/(51-92) 175/92     Weight: 104.3 kg (229 lb 15 oz)  Body mass index is 37.11 kg/m².      Intake/Output Summary (Last 24 hours) at 8/3/2022 1806  Last data filed at 8/3/2022 1700  Gross per 24 hour   Intake 2544.92 ml   Output 1200 ml   Net 1344.92 ml       Physical Exam  Vitals and nursing note reviewed.   Constitutional:       General: He is not in acute distress.     Appearance: Normal appearance. He is not ill-appearing, toxic-appearing or diaphoretic.       Cardiovascular:      Rate and Rhythm: Normal rate and regular rhythm.      Pulses:           Dorsalis pedis pulses are 2+ on the right side and 2+ on the left side.   Pulmonary:      Effort: No tachypnea or respiratory distress.      Breath sounds: Normal breath sounds.   Abdominal:      General: Bowel sounds are normal. There is no distension.   Neurological:      Mental Status: He is alert and oriented to person, place, and time.      GCS: GCS eye subscore is 4. GCS verbal subscore is 5. GCS motor subscore is 6.      Sensory: Sensation is intact.      Motor: No weakness or atrophy.   Psychiatric:         Behavior: Behavior is cooperative.       Vents:  Oxygen Concentration (%): 98 (08/03/22 1430)    Lines/Drains/Airways       Peripherally Inserted Central Catheter Line  Duration             PICC Double Lumen 08/01/22 1540 left brachial 2 days              Drain  Duration                  Closed/Suction Drain 08/03/22 1144 Right Back Accordion 10 Fr. <1 day         Closed/Suction Drain 08/03/22 1145 Left Back Accordion 10 Fr. <1 day         Drain/Device  08/03/22 1154 Right midline lumbar spine gravity <1 day         Urethral Catheter 08/03/22 0745 Straight-tip;Silicone 16 Fr. <1 day                    Significant Labs:    CBC/Anemia Profile:  Recent Labs   Lab 08/02/22  2236   FERRITIN 200         Chemistries:  Recent Labs   Lab 08/02/22  1034 08/03/22  1438   CREATININE 0.9 0.9       Bilirubin:   Recent Labs   Lab 07/12/22  0911 07/29/22  1724   BILITOT 0.3 0.3               Significant Imaging:   I have reviewed all pertinent imaging results/findings within the past 24 hours.

## 2022-08-03 NOTE — NURSING
Pt arrived from recovery with 2 hemovac drains and a lumbar drain; lumbar drain taped to side of bed with 13 cc of clear drainage.  Pt on room air laying on his side.

## 2022-08-03 NOTE — TRANSFER OF CARE
"Anesthesia Transfer of Care Note    Patient: Friday ROGELIO Blake    Procedure(s) Performed: Procedure(s) (LRB):  EXPLORATION, WOUND (Bilateral)  REPAIR-DEFECT (Bilateral)  PLACEMENT (N/A)    Patient location: PACU    Anesthesia Type: general    Transport from OR: Transported from OR on room air with adequate spontaneous ventilation    Post pain: adequate analgesia    Post assessment: no apparent anesthetic complications    Post vital signs: stable    Level of consciousness: responds to stimulation    Nausea/Vomiting: no nausea/vomiting    Complications: none    Transfer of care protocol was followed      Last vitals:   Visit Vitals  /75 (BP Location: Right arm, Patient Position: Lying)   Pulse 64   Temp 36.7 °C (98 °F) (Oral)   Resp 18   Ht 5' 6" (1.676 m)   Wt 105.7 kg (233 lb 0.4 oz)   SpO2 98%   BMI 37.61 kg/m²     "

## 2022-08-03 NOTE — PROGRESS NOTES
[facility])  Neurosurgery  Progress Note    Subjective:     History of Present Illness: No notes on file    Post-Op Info:  Procedure(s) (LRB):  EXPLORATION, WOUND (Bilateral)  REPAIR-DEFECT (Bilateral)   Day of Surgery   Consents signed   Patient prepped and ready for OR for wound exploration and washout   Placement of lumbar drain     The patient was informed of all benefits and potential risk of the operation including but not limited to:  Pain, infection, bleeding, coma, paralysis, death.  Cerebrospinal fluid leak,the need for additional procedures in the future. No guarantee was given that this procedure would alleviate all of the symptoms.    Assessment/Plan:   To OR for wound washout and exploration         Shaggy Zepeda MD  Neurosurgery  [facility])

## 2022-08-03 NOTE — ASSESSMENT & PLAN NOTE
--wound cx pending  --Neuro surgery consulted  --ID consulted      7/30/2022  Neurosurgery following    Possible washout vs wound vac placement   ID following, continue empiric IV antibiotic        7/31/2022  MRI of lumbar spine strongly concerning for discitis osteomyelitis at L2-3, probable epidural phlegmon or abscess.   Neurosurgery following, will need revision with washout. Will follow.   Continue IV antibiotics  Wound cultures in progress      8/1- continue IV Abx, local wound care  Await revision and wound exploration with plastic surgery    8/2- surgery tomorrow, cont iV abx    8/3- s/p Wound revision and washout with Lumbar Drain placement

## 2022-08-03 NOTE — BRIEF OP NOTE
[facility] - Telemetry ([facility])  Brief Operative Note    SUMMARY     Surgery Date: 8/3/2022     Surgeon(s) and Role:     * Shaggy Zepeda MD - Primary    Assisting Surgeon: None    Pre-op Diagnosis:  Status post lumbar surgery [Z98.890]  Postoperative CSF leak [G97.82, G96.00]    Post-op Diagnosis:  Post-Op Diagnosis Codes:     * Status post lumbar surgery [Z98.890]     * Postoperative CSF leak [G97.82, G96.00]    Procedure(s) (LRB):  EXPLORATION, WOUND (Bilateral)  REPAIR-DEFECT (Bilateral)  PLACEMENT (N/A)  Lumbar drain placement   Anesthesia: General    Operative Findings: fluid consistent with CSF       Estimated Blood Loss: * No values recorded between 8/3/2022  8:13 AM and 8/3/2022  1:42 PM *    Estimated Blood Loss has been documented.         Specimens:   Specimen (24h ago, onward)            None      cultures sent for analysis     [id number]

## 2022-08-03 NOTE — PROGRESS NOTES
[place] - Intensive Care ([facility])  [facility] Medicine  Progress Note    Patient Name: Friday ROGELIO Blake  MRN: [id number]  Patient Class: IP- Inpatient   Admission Date: 7/29/2022  Length of Stay: 3 days  Attending Physician: Kuldeep Bermudez MD  Primary Care Provider: Sid Elizabeth MD        Subjective:     Principal Problem:Wound dehiscence        HPI:  65 y/o male with PMHx of HTN, HLD, DM, and obesity who presented tot he ED for eval of post op incision leaking.  Sx are constant and moderate in severity. No mitigating or exacerbating factors reported. Pt had surgery on 7/26, then took a trip to [facility]. He flew back last night. Pt denies fever, chills, pain, HA, N/V, and all other sx at this time.  ED workup shows: H/H 11.6/36.9, COVID +  He will be kept on OBS for CSF leak under the care of [facility] [place] medicine  He is a full code and his SDM is his wife             Overview/Hospital Course:  Mr Blake is a 66 year old male who presented to [facility] Emergency Room of evaluation of wound dehiscence. Patient underwent Laminectomy of lumbar spine on 6/22/2022 and I & D of hematoma on 6/28/2022. Traveled to [facility] for daughter's wedding. Positive for drainage from low back surgical wound. Neurosurgery following, plan possible washout vs wound vac placement.  Infectious Disease following, continue empiric IV antibiotics. COVID positive. As of 7/31/2022, patient feeling well, vital signs and labs stable. MRI lumbar spine strongly concerning for discitis osteomyelitis at L2-3, probable epidural phlegmon or abscess. Neurosurgery following, will need revision with washout. Will follow.     8/1- cheerful, resting comfortably in bed on RA, no fever, chills or cough. Still has drainage from the Lumbar wound. Dr. Zepeda plans revision with wound washout/exploration with the help of Plastic Surgery, await confirmation date. Pt agreeable with the plan. Continue wound care, IV Abx, no wound vac yet.     8/2- comfortable on RA, wound  Cx growing Pseudomonas, earlier Cx grew Staph Epi- so now on Vanc, Cefepime and Flagyl. Dr. Zepeda plans Surgery- Washout with possible drain placement tomorrow, pt agreeable.     8/3- seen post op in the ICU, doing well, c/o back pain at the surgical site, just got oxycodone. Dr. Zepeda put him on Dexa 4 mg IV qid post op, he continues to received Cefepime, Flagyl and Vanc. Fluid sent to labs for C/S.       Interval History: seen post op in the ICU, doing well, c/o back pain at the surgical site, just got oxycodone. Dr. Zepeda put him on Dexa 4 mg IV qid post op, he continues to received Cefepime, Flagyl and Vanc. Fluid sent to labs for C/S.     Review of Systems   Constitutional:  Positive for activity change. Negative for appetite change, chills, fatigue and fever.   HENT:  Negative for dental problem, ear pain, hearing loss, mouth sores, nosebleeds, sinus pressure, sore throat, tinnitus and trouble swallowing.    Eyes:  Negative for pain, discharge and visual disturbance.   Respiratory:  Negative for cough, choking, chest tightness, shortness of breath and wheezing.    Cardiovascular:  Negative for chest pain, palpitations and leg swelling.   Gastrointestinal:  Negative for abdominal distention, abdominal pain, anal bleeding, blood in stool, constipation, diarrhea, nausea and vomiting.   Endocrine: Negative for cold intolerance, heat intolerance, polydipsia, polyphagia and polyuria.   Genitourinary:  Negative for decreased urine volume, difficulty urinating, flank pain, frequency, hematuria and urgency.   Musculoskeletal:  Positive for back pain and gait problem. Negative for arthralgias, myalgias, neck pain and neck stiffness.   Skin:  Positive for wound (lower back surgical wound). Negative for color change and rash.   Allergic/Immunologic: Negative.    Neurological:  Negative for dizziness, tremors, seizures, syncope, speech difficulty, weakness, light-headedness and headaches.   Hematological: Negative.     Psychiatric/Behavioral:  Negative for agitation, behavioral problems, confusion and sleep disturbance. The patient is not nervous/anxious.    All other systems reviewed and are negative.  Objective:     Vital Signs (Most Recent):  Temp: 97.8 °F (36.6 °C) (08/03/22 1554)  Pulse: 77 (08/03/22 1700)  Resp: 17 (08/03/22 1700)  BP: (!) 175/92 (08/03/22 1700)  SpO2: 100 % (08/03/22 1700)   Vital Signs (24h Range):  Temp:  [97.3 °F (36.3 °C)-98.2 °F (36.8 °C)] 97.8 °F (36.6 °C)  Pulse:  [57-94] 77  Resp:  [12-21] 17  SpO2:  [95 %-100 %] 100 %  BP: (100-175)/(51-92) 175/92     Weight: 104.3 kg (229 lb 15 oz)  Body mass index is 37.11 kg/m².    Intake/Output Summary (Last 24 hours) at 8/3/2022 1750  Last data filed at 8/3/2022 1700  Gross per 24 hour   Intake 2544.92 ml   Output 1200 ml   Net 1344.92 ml      Physical Exam  Vitals and nursing note reviewed.   Constitutional:       General: He is not in acute distress.     Appearance: Normal appearance. He is well-developed. He is not diaphoretic.   HENT:      Head: Normocephalic and atraumatic.      Right Ear: External ear normal.      Left Ear: External ear normal.      Nose: Nose normal.      Mouth/Throat:      Mouth: Mucous membranes are moist.      Pharynx: Oropharynx is clear.   Eyes:      General: No scleral icterus.        Right eye: No discharge.         Left eye: No discharge.      Conjunctiva/sclera: Conjunctivae normal.      Pupils: Pupils are equal, round, and reactive to light.   Neck:      Thyroid: No thyromegaly.      Vascular: No JVD.      Trachea: No tracheal deviation.   Cardiovascular:      Rate and Rhythm: Normal rate and regular rhythm.      Pulses: Normal pulses.      Heart sounds: Normal heart sounds. No murmur heard.    No friction rub. No gallop.   Pulmonary:      Effort: Pulmonary effort is normal. No respiratory distress.      Breath sounds: Normal breath sounds. No wheezing or rales.   Chest:      Chest wall: No tenderness.   Abdominal:       General: Bowel sounds are normal. There is no distension.      Palpations: Abdomen is soft. There is no mass.      Tenderness: There is no abdominal tenderness.   Genitourinary:     Comments: deferred  Musculoskeletal:         General: No tenderness or deformity. Normal range of motion.      Cervical back: Normal range of motion and neck supple.      Comments: Lower back surgical wound with dressing    Skin:     General: Skin is warm and dry.      Capillary Refill: Capillary refill takes less than 2 seconds.      Findings: No erythema or rash.   Neurological:      General: No focal deficit present.      Mental Status: He is alert and oriented to person, place, and time.      Motor: No abnormal muscle tone.      Coordination: Coordination normal.   Psychiatric:         Behavior: Behavior normal.       Significant Labs: All pertinent labs within the past 24 hours have been reviewed.  BMP:   Recent Labs   Lab 08/03/22  1438   CREATININE 0.9       Significant Imaging: I have reviewed all pertinent imaging results/findings within the past 24 hours.      Assessment/Plan:      * Wound dehiscence with CSF leak  --wound cx pending  --Neuro surgery consulted  --ID consulted      7/30/2022  Neurosurgery following    Possible washout vs wound vac placement   ID following, continue empiric IV antibiotic        7/31/2022  MRI of lumbar spine strongly concerning for discitis osteomyelitis at L2-3, probable epidural phlegmon or abscess.   Neurosurgery following, will need revision with washout. Will follow.   Continue IV antibiotics  Wound cultures in progress      8/1- continue IV Abx, local wound care  Await revision and wound exploration with plastic surgery    8/2- surgery tomorrow, cont iV abx    8/3- s/p Wound revision and washout with Lumbar Drain placement    Degenerative disc disease, lumbar  --supportive care  --PT/OT consulted    S/p laminectomy- complicated by wound dehiscence- see above    S/p revision and washout  today      Diabetes mellitus without complication  Patient's FSGs are controlled on current medication regimen.  Last A1c reviewed-   Lab Results   Component Value Date    HGBA1C 8.3 (H) 05/18/2022     Most recent fingerstick glucose reviewed-   Recent Labs   Lab 08/03/22  1459   POCTGLUCOSE 234*     Current correctional scale  Low  Maintain anti-hyperglycemic dose as follows-   Antihyperglycemics (From admission, onward)            Start     Stop Route Frequency Ordered    08/03/22 1844  insulin aspart U-100 pen 1-10 Units         -- SubQ Every 6 hours PRN 08/03/22 1744        Hold Oral hypoglycemics while patient is in the hospital.    Continue present care, BS generally well controlled    Pt now on Dexa IV- BS may rise- needs tighter BS control    COVID  Patient is identified as High risk for severe complications of COVID 19 based on COVID risk score of 5   Initiate standard COVID protocols; COVID-19 testing ,Infection Control notification  and isolation- respiratory, contact and droplet per protocol    Diagnostics: (leukopenia, hyponatremia, hyperferritinemia, elevated troponin, elevated d-dimer, age, and comorbidities are significant predictors of poor clinical outcome)  CBC, CMP, Ferritin, CRP and Portable CXR    Management: Maintain oxygen saturations 92-96% via Nasal Cannula  LPM and monitor with continuous/intermittent pulse oximetry.  and Inhaled bronchodilators as needed for shortness of breath.    Asymptomatic, pt on RA    asymptomatic    Discitis of lumbar region  See 1 wound dehiscence    On IV Abx, await washout surgery with Plastic Surgery tomorrow      Postoperative CSF leak  Expect drain placement with surgery tomorrow      Morbid obesity with BMI of 40.0-44.9, adult  Body mass index is 37.29 kg/m². Morbid obesity complicates all aspects of disease management from diagnostic modalities to treatment. Weight loss encouraged and health benefits explained to patient.             LITZYD  (hyperlipidemia)  --continue atorvastatin  --cardiac diet      Hypertension  --continue losartan  --cardiac diet    BP under control        VTE Risk Mitigation (From admission, onward)         Ordered     enoxaparin injection 100 mg  2 times daily         07/29/22 2336     IP VTE HIGH RISK PATIENT  Once         07/29/22 2333     Place sequential compression device  Until discontinued         07/29/22 2333                Discharge Planning   MATEUS:      Code Status: Full Code   Is the patient medically ready for discharge?:     Reason for patient still in hospital (select all that apply): Patient new problem, Patient trending condition, Laboratory test, Treatment, Imaging, Consult recommendations and PT / OT recommendations  Discharge Plan A: Home Health      Seen and discussed with Dr. Knox and the ICU team  Condition: Fair  Prognosis: Guarded     Critical care time spent on the evaluation and treatment of severe organ dysfunction, review of pertinent labs and imaging studies, discussions with consulting providers and discussions with patient/family: 45 minutes.      Kuldeep Bermudez MD  Department of Hospital Medicine   '[place] - Intensive Care ([facility])

## 2022-08-03 NOTE — ASSESSMENT & PLAN NOTE
Patient's FSGs are controlled on current medication regimen.  Last A1c reviewed-   Lab Results   Component Value Date    HGBA1C 8.3 (H) 05/18/2022     Most recent fingerstick glucose reviewed-   Recent Labs   Lab 08/03/22  1459   POCTGLUCOSE 234*     Current correctional scale  Low  Maintain anti-hyperglycemic dose as follows-   Antihyperglycemics (From admission, onward)            Start     Stop Route Frequency Ordered    08/03/22 1844  insulin aspart U-100 pen 1-10 Units         -- SubQ Every 6 hours PRN 08/03/22 1744        Hold Oral hypoglycemics while patient is in the hospital.    Continue present care, BS generally well controlled    Pt now on Dexa IV- BS may rise- needs tighter BS control

## 2022-08-03 NOTE — HOSPITAL COURSE
8/3: Transferred to ICU after anesthesia recovery for monitoring and lumbar drain management. C/S samples sent. Pt c/o mild pressure to back. Vital signs stable.   8/4: Resting in bed watching TV in no apparent distress in right lateral recumbent position. VSS. Lumbar drain with ~110ccs. Pt states back pain improved. Labs reviewed  8/5: Patient resting in no distress, vital signs stable. No swelling noted to lumbar site. Lumbar drain ~150ccs. Labs reviewed

## 2022-08-03 NOTE — ANESTHESIA PROCEDURE NOTES
Intubation    Date/Time: 8/3/2022 7:43 AM  Performed by: Eric Tena CRNA  Authorized by: Kiran Reyes MD     Intubation:     Induction:  Intravenous    Intubated:  Postinduction    Mask Ventilation:  Easy mask    Attempts:  1    Attempted By:  Student and CRNA    Method of Intubation:  Video laryngoscopy    Blade:  Rhodes 3    Laryngeal View Grade: Grade I - full view of cords      Difficult Airway Encountered?: No      Complications:  None    Airway Device:  Oral endotracheal tube    Airway Device Size:  7.5    Style/Cuff Inflation:  Cuffed (inflated to minimal occlusive pressure)    Tube secured:  22    Secured at:  The lips    Placement Verified By:  Capnometry    Complicating Factors:  None    Findings Post-Intubation:  BS equal bilateral

## 2022-08-03 NOTE — PLAN OF CARE
Pt AAOx4. No signs of distress. Able to verbalize needs and follow commands. Calm and Cooperative. Denies any pain at this time. Vital signs stable. Sinus Rhythm on tele monitor. On room air.  PIV is patent. ABT therapy in progress. No adverse reactions noted. Pt continent of b/b. independent. Pt remains free of falls. Meds given as prescribed.  POC reviewed with pt and pt verbalized understanding. Pt educated on safety and fall risk. Verbalized understanding. Interventions implemented as appropriate. Pt able to turn self. Encouraged pt  to turn frequently. Resting quietly in bed.Bed low. Side rails up x2, wheels locked, call light within reach.

## 2022-08-03 NOTE — SUBJECTIVE & OBJECTIVE
Interval History: seen post op in the ICU, doing well, c/o back pain at the surgical site, just got oxycodone. Dr. Zepeda put him on Dexa 4 mg IV qid post op, he continues to received Cefepime, Flagyl and Vanc. Fluid sent to labs for C/S.     Review of Systems   Constitutional:  Positive for activity change. Negative for appetite change, chills, fatigue and fever.   HENT:  Negative for dental problem, ear pain, hearing loss, mouth sores, nosebleeds, sinus pressure, sore throat, tinnitus and trouble swallowing.    Eyes:  Negative for pain, discharge and visual disturbance.   Respiratory:  Negative for cough, choking, chest tightness, shortness of breath and wheezing.    Cardiovascular:  Negative for chest pain, palpitations and leg swelling.   Gastrointestinal:  Negative for abdominal distention, abdominal pain, anal bleeding, blood in stool, constipation, diarrhea, nausea and vomiting.   Endocrine: Negative for cold intolerance, heat intolerance, polydipsia, polyphagia and polyuria.   Genitourinary:  Negative for decreased urine volume, difficulty urinating, flank pain, frequency, hematuria and urgency.   Musculoskeletal:  Positive for back pain and gait problem. Negative for arthralgias, myalgias, neck pain and neck stiffness.   Skin:  Positive for wound (lower back surgical wound). Negative for color change and rash.   Allergic/Immunologic: Negative.    Neurological:  Negative for dizziness, tremors, seizures, syncope, speech difficulty, weakness, light-headedness and headaches.   Hematological: Negative.    Psychiatric/Behavioral:  Negative for agitation, behavioral problems, confusion and sleep disturbance. The patient is not nervous/anxious.    All other systems reviewed and are negative.  Objective:     Vital Signs (Most Recent):  Temp: 97.8 °F (36.6 °C) (08/03/22 1554)  Pulse: 77 (08/03/22 1700)  Resp: 17 (08/03/22 1700)  BP: (!) 175/92 (08/03/22 1700)  SpO2: 100 % (08/03/22 1700)   Vital Signs (24h  Range):  Temp:  [97.3 °F (36.3 °C)-98.2 °F (36.8 °C)] 97.8 °F (36.6 °C)  Pulse:  [57-94] 77  Resp:  [12-21] 17  SpO2:  [95 %-100 %] 100 %  BP: (100-175)/(51-92) 175/92     Weight: 104.3 kg (229 lb 15 oz)  Body mass index is 37.11 kg/m².    Intake/Output Summary (Last 24 hours) at 8/3/2022 1750  Last data filed at 8/3/2022 1700  Gross per 24 hour   Intake 2544.92 ml   Output 1200 ml   Net 1344.92 ml      Physical Exam  Vitals and nursing note reviewed.   Constitutional:       General: He is not in acute distress.     Appearance: Normal appearance. He is well-developed. He is not diaphoretic.   HENT:      Head: Normocephalic and atraumatic.      Right Ear: External ear normal.      Left Ear: External ear normal.      Nose: Nose normal.      Mouth/Throat:      Mouth: Mucous membranes are moist.      Pharynx: Oropharynx is clear.   Eyes:      General: No scleral icterus.        Right eye: No discharge.         Left eye: No discharge.      Conjunctiva/sclera: Conjunctivae normal.      Pupils: Pupils are equal, round, and reactive to light.   Neck:      Thyroid: No thyromegaly.      Vascular: No JVD.      Trachea: No tracheal deviation.   Cardiovascular:      Rate and Rhythm: Normal rate and regular rhythm.      Pulses: Normal pulses.      Heart sounds: Normal heart sounds. No murmur heard.    No friction rub. No gallop.   Pulmonary:      Effort: Pulmonary effort is normal. No respiratory distress.      Breath sounds: Normal breath sounds. No wheezing or rales.   Chest:      Chest wall: No tenderness.   Abdominal:      General: Bowel sounds are normal. There is no distension.      Palpations: Abdomen is soft. There is no mass.      Tenderness: There is no abdominal tenderness.   Genitourinary:     Comments: deferred  Musculoskeletal:         General: No tenderness or deformity. Normal range of motion.      Cervical back: Normal range of motion and neck supple.      Comments: Lower back surgical wound with dressing     Skin:     General: Skin is warm and dry.      Capillary Refill: Capillary refill takes less than 2 seconds.      Findings: No erythema or rash.   Neurological:      General: No focal deficit present.      Mental Status: He is alert and oriented to person, place, and time.      Motor: No abnormal muscle tone.      Coordination: Coordination normal.   Psychiatric:         Behavior: Behavior normal.       Significant Labs: All pertinent labs within the past 24 hours have been reviewed.  BMP:   Recent Labs   Lab 08/03/22  1438   CREATININE 0.9       Significant Imaging: I have reviewed all pertinent imaging results/findings within the past 24 hours.

## 2022-08-04 LAB
ALBUMIN SERPL BCP-MCNC: 2.9 G/DL (ref 3.5–5.2)
ALP SERPL-CCNC: 73 U/L (ref 55–135)
ALT SERPL W/O P-5'-P-CCNC: 89 U/L (ref 10–44)
ANION GAP SERPL CALC-SCNC: 8 MMOL/L (ref 8–16)
AST SERPL-CCNC: 71 U/L (ref 10–40)
BASOPHILS # BLD AUTO: 0.01 K/UL (ref 0–0.2)
BASOPHILS NFR BLD: 0.1 % (ref 0–1.9)
BILIRUB SERPL-MCNC: 0.3 MG/DL (ref 0.1–1)
BUN SERPL-MCNC: 13 MG/DL (ref 8–23)
CALCIUM SERPL-MCNC: 8.7 MG/DL (ref 8.7–10.5)
CHLORIDE SERPL-SCNC: 106 MMOL/L (ref 95–110)
CO2 SERPL-SCNC: 25 MMOL/L (ref 23–29)
CREAT SERPL-MCNC: 0.9 MG/DL (ref 0.5–1.4)
DIFFERENTIAL METHOD: ABNORMAL
EOSINOPHIL # BLD AUTO: 0 K/UL (ref 0–0.5)
EOSINOPHIL NFR BLD: 0.1 % (ref 0–8)
ERYTHROCYTE [DISTWIDTH] IN BLOOD BY AUTOMATED COUNT: 15.3 % (ref 11.5–14.5)
EST. GFR  (NO RACE VARIABLE): >60 ML/MIN/1.73 M^2
GLUCOSE SERPL-MCNC: 225 MG/DL (ref 70–110)
HCT VFR BLD AUTO: 33.2 % (ref 40–54)
HGB BLD-MCNC: 10.3 G/DL (ref 14–18)
IMM GRANULOCYTES # BLD AUTO: 0.06 K/UL (ref 0–0.04)
IMM GRANULOCYTES NFR BLD AUTO: 0.7 % (ref 0–0.5)
LYMPHOCYTES # BLD AUTO: 1.2 K/UL (ref 1–4.8)
LYMPHOCYTES NFR BLD: 14.3 % (ref 18–48)
MCH RBC QN AUTO: 27.4 PG (ref 27–31)
MCHC RBC AUTO-ENTMCNC: 31 G/DL (ref 32–36)
MCV RBC AUTO: 88 FL (ref 82–98)
MONOCYTES # BLD AUTO: 0.3 K/UL (ref 0.3–1)
MONOCYTES NFR BLD: 3.8 % (ref 4–15)
NEUTROPHILS # BLD AUTO: 6.7 K/UL (ref 1.8–7.7)
NEUTROPHILS NFR BLD: 81 % (ref 38–73)
NRBC BLD-RTO: 0 /100 WBC
PLATELET # BLD AUTO: 185 K/UL (ref 150–450)
PMV BLD AUTO: 11.2 FL (ref 9.2–12.9)
POCT GLUCOSE: 215 MG/DL (ref 70–110)
POCT GLUCOSE: 224 MG/DL (ref 70–110)
POCT GLUCOSE: 228 MG/DL (ref 70–110)
POCT GLUCOSE: 275 MG/DL (ref 70–110)
POCT GLUCOSE: 277 MG/DL (ref 70–110)
POTASSIUM SERPL-SCNC: 4.6 MMOL/L (ref 3.5–5.1)
PROT SERPL-MCNC: 5.4 G/DL (ref 6–8.4)
RBC # BLD AUTO: 3.76 M/UL (ref 4.6–6.2)
SODIUM SERPL-SCNC: 139 MMOL/L (ref 136–145)
VANCOMYCIN TROUGH SERPL-MCNC: 13.7 UG/ML (ref 10–22)
WBC # BLD AUTO: 8.23 K/UL (ref 3.9–12.7)

## 2022-08-04 PROCEDURE — 80053 COMPREHEN METABOLIC PANEL: CPT

## 2022-08-04 PROCEDURE — 99900035 HC TECH TIME PER 15 MIN (STAT)

## 2022-08-04 PROCEDURE — 25000003 PHARM REV CODE 250: Performed by: PHYSICIAN ASSISTANT

## 2022-08-04 PROCEDURE — 20000000 HC ICU ROOM

## 2022-08-04 PROCEDURE — 99291 PR CRITICAL CARE, E/M 30-74 MINUTES: ICD-10-PCS | Mod: ,,,

## 2022-08-04 PROCEDURE — 27000207 HC ISOLATION

## 2022-08-04 PROCEDURE — 63600175 PHARM REV CODE 636 W HCPCS: Performed by: INTERNAL MEDICINE

## 2022-08-04 PROCEDURE — 99291 CRITICAL CARE FIRST HOUR: CPT | Mod: ,,,

## 2022-08-04 PROCEDURE — 63600175 PHARM REV CODE 636 W HCPCS: Performed by: PHYSICIAN ASSISTANT

## 2022-08-04 PROCEDURE — 25000003 PHARM REV CODE 250: Performed by: NURSE PRACTITIONER

## 2022-08-04 PROCEDURE — 82728 ASSAY OF FERRITIN: CPT | Performed by: NURSE PRACTITIONER

## 2022-08-04 PROCEDURE — 85379 FIBRIN DEGRADATION QUANT: CPT | Performed by: NURSE PRACTITIONER

## 2022-08-04 PROCEDURE — 94640 AIRWAY INHALATION TREATMENT: CPT

## 2022-08-04 PROCEDURE — 99233 PR SUBSEQUENT HOSPITAL CARE,LEVL III: ICD-10-PCS | Mod: NSCH,,, | Performed by: INTERNAL MEDICINE

## 2022-08-04 PROCEDURE — 25000003 PHARM REV CODE 250: Performed by: INTERNAL MEDICINE

## 2022-08-04 PROCEDURE — 83615 LACTATE (LD) (LDH) ENZYME: CPT | Performed by: NURSE PRACTITIONER

## 2022-08-04 PROCEDURE — 25000003 PHARM REV CODE 250

## 2022-08-04 PROCEDURE — 80202 ASSAY OF VANCOMYCIN: CPT | Performed by: EMERGENCY MEDICINE

## 2022-08-04 PROCEDURE — 99233 SBSQ HOSP IP/OBS HIGH 50: CPT | Mod: NSCH,,, | Performed by: INTERNAL MEDICINE

## 2022-08-04 PROCEDURE — 97530 THERAPEUTIC ACTIVITIES: CPT

## 2022-08-04 PROCEDURE — 85025 COMPLETE CBC W/AUTO DIFF WBC: CPT

## 2022-08-04 PROCEDURE — 27000221 HC OXYGEN, UP TO 24 HOURS

## 2022-08-04 PROCEDURE — 94799 UNLISTED PULMONARY SVC/PX: CPT

## 2022-08-04 PROCEDURE — C9399 UNCLASSIFIED DRUGS OR BIOLOG: HCPCS

## 2022-08-04 RX ORDER — DEXAMETHASONE SODIUM PHOSPHATE 4 MG/ML
4 INJECTION, SOLUTION INTRA-ARTICULAR; INTRALESIONAL; INTRAMUSCULAR; INTRAVENOUS; SOFT TISSUE
Status: COMPLETED | OUTPATIENT
Start: 2022-08-04 | End: 2022-08-05

## 2022-08-04 RX ORDER — DOXYCYCLINE HYCLATE 100 MG
100 TABLET ORAL EVERY 12 HOURS
Status: DISCONTINUED | OUTPATIENT
Start: 2022-08-04 | End: 2022-08-12 | Stop reason: HOSPADM

## 2022-08-04 RX ORDER — ENOXAPARIN SODIUM 100 MG/ML
40 INJECTION SUBCUTANEOUS EVERY 24 HOURS
Status: DISCONTINUED | OUTPATIENT
Start: 2022-08-05 | End: 2022-08-05

## 2022-08-04 RX ADMIN — VANCOMYCIN HYDROCHLORIDE 1500 MG: 1.5 INJECTION, POWDER, LYOPHILIZED, FOR SOLUTION INTRAVENOUS at 03:08

## 2022-08-04 RX ADMIN — OXYCODONE HYDROCHLORIDE AND ACETAMINOPHEN 500 MG: 500 TABLET ORAL at 10:08

## 2022-08-04 RX ADMIN — INSULIN DETEMIR 10 UNITS: 100 INJECTION, SOLUTION SUBCUTANEOUS at 08:08

## 2022-08-04 RX ADMIN — OXYCODONE HYDROCHLORIDE AND ACETAMINOPHEN 500 MG: 500 TABLET ORAL at 08:08

## 2022-08-04 RX ADMIN — ALBUTEROL SULFATE 2 PUFF: 90 AEROSOL, METERED RESPIRATORY (INHALATION) at 07:08

## 2022-08-04 RX ADMIN — ALBUTEROL SULFATE 2 PUFF: 90 AEROSOL, METERED RESPIRATORY (INHALATION) at 01:08

## 2022-08-04 RX ADMIN — METRONIDAZOLE 500 MG: 500 TABLET ORAL at 02:08

## 2022-08-04 RX ADMIN — MUPIROCIN: 20 OINTMENT TOPICAL at 08:08

## 2022-08-04 RX ADMIN — METRONIDAZOLE 500 MG: 500 TABLET ORAL at 06:08

## 2022-08-04 RX ADMIN — GABAPENTIN 300 MG: 300 CAPSULE ORAL at 02:08

## 2022-08-04 RX ADMIN — ALBUTEROL SULFATE 2 PUFF: 90 AEROSOL, METERED RESPIRATORY (INHALATION) at 08:08

## 2022-08-04 RX ADMIN — DEXAMETHASONE SODIUM PHOSPHATE 4 MG: 4 INJECTION INTRA-ARTICULAR; INTRALESIONAL; INTRAMUSCULAR; INTRAVENOUS; SOFT TISSUE at 06:08

## 2022-08-04 RX ADMIN — GABAPENTIN 300 MG: 300 CAPSULE ORAL at 08:08

## 2022-08-04 RX ADMIN — PIPERACILLIN SODIUM AND TAZOBACTAM SODIUM 4.5 G: 4; .5 INJECTION, POWDER, LYOPHILIZED, FOR SOLUTION INTRAVENOUS at 08:08

## 2022-08-04 RX ADMIN — ATORVASTATIN CALCIUM 10 MG: 10 TABLET, FILM COATED ORAL at 08:08

## 2022-08-04 RX ADMIN — DOCUSATE SODIUM 100 MG: 100 CAPSULE, LIQUID FILLED ORAL at 10:08

## 2022-08-04 RX ADMIN — GABAPENTIN 300 MG: 300 CAPSULE ORAL at 10:08

## 2022-08-04 RX ADMIN — INSULIN ASPART 6 UNITS: 100 INJECTION, SOLUTION INTRAVENOUS; SUBCUTANEOUS at 11:08

## 2022-08-04 RX ADMIN — OXYCODONE AND ACETAMINOPHEN 1 TABLET: 10; 325 TABLET ORAL at 11:08

## 2022-08-04 RX ADMIN — CEFEPIME 2 G: 2 INJECTION, POWDER, FOR SOLUTION INTRAVENOUS at 05:08

## 2022-08-04 RX ADMIN — CEFEPIME 2 G: 2 INJECTION, POWDER, FOR SOLUTION INTRAVENOUS at 01:08

## 2022-08-04 RX ADMIN — INSULIN ASPART 4 UNITS: 100 INJECTION, SOLUTION INTRAVENOUS; SUBCUTANEOUS at 05:08

## 2022-08-04 RX ADMIN — INSULIN ASPART 4 UNITS: 100 INJECTION, SOLUTION INTRAVENOUS; SUBCUTANEOUS at 06:08

## 2022-08-04 RX ADMIN — DOXYCYCLINE HYCLATE 100 MG: 100 TABLET, COATED ORAL at 08:08

## 2022-08-04 RX ADMIN — FAMOTIDINE 20 MG: 20 TABLET ORAL at 10:08

## 2022-08-04 RX ADMIN — DEXAMETHASONE SODIUM PHOSPHATE 4 MG: 4 INJECTION INTRA-ARTICULAR; INTRALESIONAL; INTRAMUSCULAR; INTRAVENOUS; SOFT TISSUE at 11:08

## 2022-08-04 RX ADMIN — INSULIN ASPART 3 UNITS: 100 INJECTION, SOLUTION INTRAVENOUS; SUBCUTANEOUS at 11:08

## 2022-08-04 RX ADMIN — Medication 1000 UNITS: at 10:08

## 2022-08-04 RX ADMIN — SODIUM CHLORIDE 100 ML/HR: 0.9 INJECTION, SOLUTION INTRAVENOUS at 10:08

## 2022-08-04 RX ADMIN — OXYCODONE AND ACETAMINOPHEN 1 TABLET: 10; 325 TABLET ORAL at 08:08

## 2022-08-04 RX ADMIN — OXYCODONE AND ACETAMINOPHEN 1 TABLET: 10; 325 TABLET ORAL at 01:08

## 2022-08-04 RX ADMIN — THERA TABS 1 TABLET: TAB at 10:08

## 2022-08-04 RX ADMIN — FAMOTIDINE 20 MG: 20 TABLET ORAL at 08:08

## 2022-08-04 RX ADMIN — ASPIRIN 81 MG: 81 TABLET, COATED ORAL at 10:08

## 2022-08-04 RX ADMIN — INSULIN ASPART 2 UNITS: 100 INJECTION, SOLUTION INTRAVENOUS; SUBCUTANEOUS at 12:08

## 2022-08-04 RX ADMIN — MUPIROCIN: 20 OINTMENT TOPICAL at 10:08

## 2022-08-04 RX ADMIN — LOSARTAN POTASSIUM 50 MG: 50 TABLET, FILM COATED ORAL at 10:08

## 2022-08-04 RX ADMIN — DOCUSATE SODIUM 100 MG: 100 CAPSULE, LIQUID FILLED ORAL at 08:08

## 2022-08-04 RX ADMIN — SODIUM CHLORIDE: 0.9 INJECTION, SOLUTION INTRAVENOUS at 01:08

## 2022-08-04 RX ADMIN — Medication 6 MG: at 08:08

## 2022-08-04 RX ADMIN — CEFEPIME 2 G: 2 INJECTION, POWDER, FOR SOLUTION INTRAVENOUS at 10:08

## 2022-08-04 NOTE — ASSESSMENT & PLAN NOTE
- monitor POCT gluc q6h d/t steroid use   - mod SSI added     8/4:  - detemir 10u qHS added  - mod SSI  - goal glucose <180

## 2022-08-04 NOTE — SUBJECTIVE & OBJECTIVE
Interval History: looks and feels better, lying flat on his side, back pain improved, no NV, dizziness or HA. Lumbar Drain output 11 cc. Dr. Zepeda d/mónica the foleys and is weaning the steroids. All Cx from Surgery remain NGDT. Getting Vanc, Cefepime and oral Flagyl. Last wound Cx grew Pseudomonas.   WBC 8.2, H/H 10.3/32. BS high due to Dexa.     Review of Systems   Constitutional:  Positive for activity change. Negative for appetite change, chills, fatigue and fever.   HENT:  Negative for dental problem, ear pain, hearing loss, mouth sores, nosebleeds, sinus pressure, sore throat, tinnitus and trouble swallowing.    Eyes:  Negative for pain, discharge and visual disturbance.   Respiratory:  Negative for cough, choking, chest tightness, shortness of breath and wheezing.    Cardiovascular:  Negative for chest pain, palpitations and leg swelling.   Gastrointestinal:  Negative for abdominal distention, abdominal pain, anal bleeding, blood in stool, constipation, diarrhea, nausea and vomiting.   Endocrine: Negative for cold intolerance, heat intolerance, polydipsia, polyphagia and polyuria.   Genitourinary:  Negative for decreased urine volume, difficulty urinating, flank pain, frequency, hematuria and urgency.   Musculoskeletal:  Positive for back pain and gait problem. Negative for arthralgias, myalgias, neck pain and neck stiffness.   Skin:  Positive for wound (lower back surgical wound). Negative for color change and rash.   Allergic/Immunologic: Negative.    Neurological:  Negative for dizziness, tremors, seizures, syncope, speech difficulty, weakness, light-headedness and headaches.   Hematological: Negative.    Psychiatric/Behavioral:  Negative for agitation, behavioral problems, confusion and sleep disturbance. The patient is not nervous/anxious.    All other systems reviewed and are negative.  Objective:     Vital Signs (Most Recent):  Temp: 98.8 °F (37.1 °C) (08/04/22 1600)  Pulse: (!) 58 (08/04/22 1600)  Resp:  20 (08/04/22 1600)  BP: 128/71 (08/04/22 1600)  SpO2: 100 % (08/04/22 1600)   Vital Signs (24h Range):  Temp:  [98.4 °F (36.9 °C)-100.8 °F (38.2 °C)] 98.8 °F (37.1 °C)  Pulse:  [56-86] 58  Resp:  [12-25] 20  SpO2:  [95 %-100 %] 100 %  BP: (102-175)/() 128/71     Weight: 104.3 kg (229 lb 15 oz)  Body mass index is 37.11 kg/m².    Intake/Output Summary (Last 24 hours) at 8/4/2022 1659  Last data filed at 8/4/2022 1639  Gross per 24 hour   Intake 4204.43 ml   Output 1868 ml   Net 2336.43 ml      Physical Exam  Vitals and nursing note reviewed.   Constitutional:       General: He is not in acute distress.     Appearance: Normal appearance. He is not ill-appearing, toxic-appearing or diaphoretic.       HENT:      Head: Normocephalic.      Mouth/Throat:      Mouth: Mucous membranes are moist.   Eyes:      Pupils: Pupils are equal, round, and reactive to light.   Cardiovascular:      Rate and Rhythm: Normal rate and regular rhythm.      Pulses:           Dorsalis pedis pulses are 2+ on the right side and 2+ on the left side.   Pulmonary:      Effort: No tachypnea or respiratory distress.      Breath sounds: Normal breath sounds.   Abdominal:      General: Bowel sounds are normal. There is no distension.   Neurological:      Mental Status: He is alert and oriented to person, place, and time.      GCS: GCS eye subscore is 4. GCS verbal subscore is 5. GCS motor subscore is 6.      Sensory: Sensation is intact.      Motor: No weakness or atrophy.   Psychiatric:         Behavior: Behavior is cooperative.       Significant Labs: All pertinent labs within the past 24 hours have been reviewed.  CBC:   Recent Labs   Lab 08/04/22  0359   WBC 8.23   HGB 10.3*   HCT 33.2*        CMP:   Recent Labs   Lab 08/03/22  1438 08/04/22  0359   NA  --  139   K  --  4.6   CL  --  106   CO2  --  25   GLU  --  225*   BUN  --  13   CREATININE 0.9 0.9   CALCIUM  --  8.7   PROT  --  5.4*   ALBUMIN  --  2.9*   BILITOT  --  0.3   ALKPHOS   --  73   AST  --  71*   ALT  --  89*   ANIONGAP  --  8     Magnesium: No results for input(s): MG in the last 48 hours.  POCT Glucose:   Recent Labs   Lab 08/03/22  1834 08/04/22  0023 08/04/22  0601   POCTGLUCOSE 233* 215* 228*       Significant Imaging: I have reviewed all pertinent imaging results/findings within the past 24 hours.

## 2022-08-04 NOTE — PT/OT/SLP PROGRESS
Physical Therapy Treatment    Patient Name:  Friday ROGELIO Blake   MRN:  [id number]    Recommendations:     Discharge Recommendations:  home health PT   Discharge Equipment Recommendations: none   Barriers to discharge: None    Assessment:     Friday ROGELIO Blake is a 66 y.o. male admitted with a medical diagnosis of Wound dehiscence.  He presents with the following impairments/functional limitations:  weakness, impaired endurance, impaired functional mobility, gait instability.    Rehab Prognosis: Good; patient would benefit from acute skilled PT services to address these deficits and reach maximum level of function.    Recent Surgery: Procedure(s) (LRB):  EXPLORATION, WOUND (Bilateral)  REPAIR, CSF LEAK, USING COMPUTER-ASSISTED NAVIGATION (Bilateral)  PLACEMENT (N/A) 1 Day Post-Op    Plan:     During this hospitalization, patient to be seen 3 x/week to address the identified rehab impairments via gait training, therapeutic activities, therapeutic exercises and progress toward the following goals:    · Plan of Care Expires:  08/13/22    Subjective     Chief Complaint: TIRED OR LYING FLAT IN BED, PT STATES NO PAIN, NO NUMBNESS OR TINGLING TO BLE, NO FEELING OF DECLINE IN BLE STRENGTH  Patient/Family Comments/goals:   Pain/Comfort:  · Pain Rating 1: 0/10      Objective:     Communicated with NURSE PEREIRA prior to session.  Patient found supine with telemetry, peripheral IV, blood pressure cuff, pulse ox (continuous) (CSF DRAIN) upon PT entry to room.     General Precautions: Standard, airborne, contact, droplet, fall   Orthopedic Precautions:spinal precautions   Braces: LSO  Respiratory Status: Room air     Functional Mobility:    AM-PAC 6 CLICK MOBILITY  Turning over in bed (including adjusting bedclothes, sheets and blankets)?: 1  Sitting down on and standing up from a chair with arms (e.g., wheelchair, bedside commode, etc.): 1  Moving from lying on back to sitting on the side of the bed?: 1  Moving to and from a bed to a  chair (including a wheelchair)?: 1  Need to walk in hospital room?: 1  Climbing 3-5 steps with a railing?: 1  Basic Mobility Total Score: 6     Therapeutic Activities and Exercises:  PT TO REMAIN LYING FLAT TODAY PER NEURO SURGERY, PT TOLERATED BLE BED EX. REVIEW. REVIEW BLE THEREX TO PERFORM WHILE SUPINE IN BED TO TOLERANCE THROUGHOUT THE DAY: HIP FLEX/EXT, QUAD SET, HIP ABD/ADD, AP'S.  PT AGREEABLE    Patient left supine with all lines intact, call button in reach and NURSE notified..    GOALS:   Multidisciplinary Problems     Physical Therapy Goals        Problem: Physical Therapy    Goal Priority Disciplines Outcome Goal Variances Interventions   Physical Therapy Goal     PT, PT/OT Ongoing, Progressing                     Time Tracking:     PT Received On: 08/04/22  PT Start Time: 0755     PT Stop Time: 0803  PT Total Time (min): 8 min     Billable Minutes: Therapeutic Activity 8     Treatment Type: Treatment  PT/PTA: PT     PTA Visit Number: 0     08/04/2022

## 2022-08-04 NOTE — ASSESSMENT & PLAN NOTE
- currently asymptomatic  - fully vaccinated +booster  - diagnosed 7/29  - albuterol & supplemental O2 PRN    8/4:  - remains asymptomatic with no hypoxia  - lovenox adjusted to prophylactic dose  - pulse ox monitoring

## 2022-08-04 NOTE — PROGRESS NOTES
[facility] - Intensive [facility] ([facility])  Neurosurgery  Progress Note    Subjective:     History of Present Illness: No notes on file    Post-Op Info:  Procedure(s) (LRB):  EXPLORATION, WOUND (Bilateral)  REPAIR, CSF LEAK, USING COMPUTER-ASSISTED NAVIGATION (Bilateral)  PLACEMENT (N/A)   1 Day Post-Op   Doing well no headaches,DZ, NV, weakness N/T   No leg pain   Back pain at site of drains   Remains flat   Mendiola in place   abx per ID     MAEW   Sites clean   HV x 2   LD x 1       Assessment/Plan:     Wean steroids   D/C mendiola     abx per ID   Remain flat today   Begin to mobilize milly Zepeda MD  Neurosurgery  [facility] - [facility] ([facility])

## 2022-08-04 NOTE — PLAN OF CARE
Plan of care reviewed with patient and daughter, both verbalized understanding. Pt complains of moderate pain to medial back, PRN pain medication given per order. Pt remains AAOx4. IV vanc and cefepime. Pt turned q2h to prevent skin breakdown. HOB remains at 30 degrees. L UA PICC remains patent and in place with NS infusing at 100mL/hr. Pt placed on 2L O2 via NC, desats to upper 80s when he falls asleep. Vanc trough due today at 1430. Pt able to make needs known. NAD noted at this time. VSS, remains afebrile.

## 2022-08-04 NOTE — PROGRESS NOTES
[facility] - Intensive Care ([facility])  Critical Care Medicine  Progress Note    Patient Name: Friday ROGELIO Blake  MRN: [id number]  Admission Date: 7/29/2022  Hospital Length of Stay: 4 days  Code Status: Full Code  Attending Provider: Kuldeep Bermudez MD  Primary Care Provider: Sid Elizabeth MD   Principal Problem: Wound dehiscence    Subjective:     HPI:  66 year old male with PMH including DM; HTN; obesity; parathyroidectomy in 7/2021; and in June of 2022 he underwent lumbar laminectomy and then had hematoma removal about a week later, during that admission he developed a gram positive bacteremia and ultimately discharged with PICC line and abx with plan to transition to oral augmentin later for a trip to [facility] for his daughter's wedding.    On 7/29 he presented to ED after returning from his trip to [facility] with report of wound drainage  Evaluation revealed +COVID with no symptoms; +drainage from back incision; no wbc elevation; no fever; glucose 159  Admitted for lumbar wound dehiscence  MRI showed Findings strongly concerning for discitis osteomyelitis at L2-3 as detailed above.  Probable epidural phlegmon or abscess extending along the left aspect of the thecal sac and into the posterior soft tissues with the previously noted fluid collection now demonstrating significantly increased peripheral enhancement concerning for superimposed infection.  ID and neuro surgery follow   Started broad spectrum abx; wound cultures have grown psuedomonas and abx adjusted with plan for prolonged abx course  Taken to OR 8/3 by NeuroSurgery for wound exploration, repair of CSF leak, and lumbar drain placement      Hospital/ICU Course:  8/3: Transferred to ICU after anesthesia recovery for monitoring and lumbar drain management. C/S samples sent. Pt c/o mild pressure to back. Vital signs stable.   8/4: Resting in bed watching TV in no apparent distress in right lateral recumbent position. VSS. Lumbar drain with ~11ccs. Pt states  back pain improved. Labs reviewed      Review of Systems   Constitutional:  Negative for chills, fever, malaise/fatigue and weight loss.   Respiratory:  Negative for cough and shortness of breath.    Cardiovascular:  Negative for chest pain, palpitations and leg swelling.   Gastrointestinal:  Negative for abdominal pain, nausea and vomiting.   Genitourinary:  Negative for dysuria and flank pain.   Musculoskeletal:  Negative for back pain and neck pain.   Skin:  Negative for rash.   Neurological:  Negative for dizziness and headaches.   Psychiatric/Behavioral:  Negative for hallucinations. The patient is not nervous/anxious.       Objective:     Vital Signs (Most Recent):  Temp: 99 °F (37.2 °C) (08/04/22 0715)  Pulse: 65 (08/04/22 1000)  Resp: (!) 22 (08/04/22 1000)  BP: (!) 155/113 (08/04/22 1000)  SpO2: 100 % (08/04/22 1000)   Vital Signs (24h Range):  Temp:  [97.7 °F (36.5 °C)-100.8 °F (38.2 °C)] 99 °F (37.2 °C)  Pulse:  [56-94] 65  Resp:  [12-23] 22  SpO2:  [95 %-100 %] 100 %  BP: (103-175)/() 155/113     Weight: 104.3 kg (229 lb 15 oz)  Body mass index is 37.11 kg/m².      Intake/Output Summary (Last 24 hours) at 8/4/2022 1039  Last data filed at 8/4/2022 1000  Gross per 24 hour   Intake 4504.81 ml   Output 2638 ml   Net 1866.81 ml       Physical Exam  Vitals and nursing note reviewed.   Constitutional:       General: He is not in acute distress.     Appearance: Normal appearance. He is not ill-appearing, toxic-appearing or diaphoretic.       HENT:      Head: Normocephalic.      Mouth/Throat:      Mouth: Mucous membranes are moist.   Eyes:      Pupils: Pupils are equal, round, and reactive to light.   Cardiovascular:      Rate and Rhythm: Normal rate and regular rhythm.      Pulses:           Dorsalis pedis pulses are 2+ on the right side and 2+ on the left side.   Pulmonary:      Effort: No tachypnea or respiratory distress.      Breath sounds: Normal breath sounds.   Abdominal:      General: Bowel sounds  are normal. There is no distension.   Neurological:      Mental Status: He is alert and oriented to person, place, and time.      GCS: GCS eye subscore is 4. GCS verbal subscore is 5. GCS motor subscore is 6.      Sensory: Sensation is intact.      Motor: No weakness or atrophy.   Psychiatric:         Behavior: Behavior is cooperative.       Vents:  Oxygen Concentration (%): 24 (08/04/22 0817)    Lines/Drains/Airways       Peripherally Inserted Central Catheter Line  Duration             PICC Double Lumen 08/01/22 1540 left brachial 2 days              Drain  Duration                  Urethral Catheter 08/03/22 0745 Straight-tip;Silicone 16 Fr. 1 day         Closed/Suction Drain 08/03/22 1144 Right Back Accordion 10 Fr. <1 day         Closed/Suction Drain 08/03/22 1145 Left Back Accordion 10 Fr. <1 day         Drain/Device  08/03/22 1154 Right midline lumbar spine gravity <1 day                    Significant Labs:    CBC/Anemia Profile:  Recent Labs   Lab 08/02/22  2236 08/04/22  0359   WBC  --  8.23   HGB  --  10.3*   HCT  --  33.2*   PLT  --  185   MCV  --  88   RDW  --  15.3*   FERRITIN 200  --         Chemistries:  Recent Labs   Lab 08/03/22  1438 08/04/22  0359   NA  --  139   K  --  4.6   CL  --  106   CO2  --  25   BUN  --  13   CREATININE 0.9 0.9   CALCIUM  --  8.7   ALBUMIN  --  2.9*   PROT  --  5.4*   BILITOT  --  0.3   ALKPHOS  --  73   ALT  --  89*   AST  --  71*       Bilirubin:   Recent Labs   Lab 07/12/22  0911 07/29/22  1724 08/04/22  0359   BILITOT 0.3 0.3 0.3       All pertinent labs within the past 24 hours have been reviewed.    Significant Imaging:  I have reviewed all pertinent imaging results/findings within the past 24 hours.      ABG  No results for input(s): PH, PO2, PCO2, HCO3, BE in the last 168 hours.  Assessment/Plan:     Neuro  Postoperative CSF leak  - lumbar drain in place  - neurosx following; monitor drainage output per recs  - neuro checks q4    Cardiac/Vascular  HLD  (hyperlipidemia)  - Cont statin    Hypertension  - Controlled  - Cont home losartan  - Hemodynamic monitoring     8/4:  - per hospital med mgmt  - pain control  - suggest PRN if not controlled with resuming home meds    ID  COVID  - currently asymptomatic  - fully vaccinated +booster  - diagnosed 7/29  - albuterol & supplemental O2 PRN    8/4:  - remains asymptomatic with no hypoxia  - lovenox adjusted to prophylactic dose  - pulse ox monitoring     Endocrine  Diabetes mellitus without complication  - monitor POCT gluc q6h d/t steroid use   - mod SSI added     8/4:  - detemir 10u qHS added  - mod SSI  - goal glucose <180        Orthopedic  * Wound dehiscence with CSF leak  - Day 0: s/p wound revision and washout with lumbar drain placement  - wound C&S sent intra-op  - Doxycycline (6 doses) completed 8/2  - Cont cefepime, vanc, flagyl   - I&D following    8/4  - Day 1: s/p wound revision and washout with lumbar drain placement  - Wound C&S pending  - Cont cefepime, vanc, flagyl course per recs  - I&D appreciated and following       Critical Care Daily Checklist:    A: Awake: RASS Goal/Actual Goal:    Actual:     B: Spontaneous Breathing Trial Performed?     C: SAT & SBT Coordinated?  n/a                      D: Delirium: CAM-ICU     E: Early Mobility Performed? Yes   F: Feeding Goal:    Status:     Current Diet Order   Procedures    Diet Adult Regular (IDDSI Level 7) [facility]; Consistent Carbohydrate; Isolation Tray - Styrofoam     Order Specific Question:   Indicate patient location for additional diet options:     [facility]     Order Specific Question:   Additional Diet Options:     Answer:   Consistent Carbohydrate     Order Specific Question:   Tray type:     Answer:   Isolation Tray - Styrofoam      AS: Analgesia/Sedation Reviewed   T: Thromboembolic Prophylaxis Lovenox   H: HOB > 300 No- per neurosx recs   U: Stress Ulcer Prophylaxis (if needed) Pepcid   G: Glucose Control Levemir/mod  SSI   B: Bowel Function     I: Indwelling Catheter (Lines & Mendiola) Necessity Reviewed- discontinue mendiola today   D: De-escalation of Antimicrobials/Pharmacotherapies Reviewed    Plan for the day/ETD Monitor lumbar drainage    Code Status:  Family/Goals of Care: Full Code  Discussed directly with patient at bedside   I have discussed case and plan of care in detail with Dr. Knox and ADELITA Rooeny; Status and plan of care were discussed with team on multidisciplinary rounds.    Critical Care Time: 51 minutes  Critical secondary to Patient has a condition that poses threat to life and bodily function: s/p lumbar drain placement      Critical care was time spent personally by me on the following activities: development of treatment plan with patient or surrogate and bedside caregivers, discussions with consultants, evaluation of patient's response to treatment, examination of patient, ordering and performing treatments and interventions, ordering and review of laboratory studies, ordering and review of radiographic studies, pulse oximetry, re-evaluation of patient's condition. This critical care time did not overlap with that of any other provider or involve time for any procedures.     Mer Brink NP  Critical Care Medicine  '[facility] - [facility])

## 2022-08-04 NOTE — CONSULTS
Pharmacokinetic Assessment Follow Up: IV Vancomycin    Vancomycin serum concentration assessment(s):    The trough level was drawn correctly and can be used to guide therapy at this time. The measurement is below the desired definitive target range of 15 to 20 mcg/mL.    Vancomycin Regimen Plan:    Change regimen to Vancomycin 1750 mg IV every 12 hours with next serum trough concentration measured at 1430 prior to 4th new dose on 8/6    Drug levels (last 3 results):  Recent Labs   Lab Result Units 08/03/22  1438 08/04/22  1451   Vancomycin-Trough ug/mL 9.9* 13.7     Patient brief summary:  Friday ROGELIO Blake is a 66 y.o. male initiated on antimicrobial therapy with IV Vancomycin for treatment of bone/joint infection    The patient's new regimen is 1750mg q12h    Drug Allergies:   Review of patient's allergies indicates:  No Known Allergies    Actual Body Weight:   104.3 kg    Renal Function:   Estimated Creatinine Clearance: 91.4 mL/min (based on SCr of 0.9 mg/dL).,     Dialysis Method (if applicable):  N/A    CBC (last 72 hours):  Recent Labs   Lab Result Units 08/04/22  0359   WBC K/uL 8.23   Hemoglobin g/dL 10.3*   Hematocrit % 33.2*   Platelets K/uL 185   Gran % % 81.0*   Lymph % % 14.3*   Mono % % 3.8*   Eosinophil % % 0.1   Basophil % % 0.1   Differential Method  Automated       Metabolic Panel (last 72 hours):  Recent Labs   Lab Result Units 08/02/22  1034 08/03/22  1438 08/04/22  0359   Sodium mmol/L  --   --  139   Potassium mmol/L  --   --  4.6   Chloride mmol/L  --   --  106   CO2 mmol/L  --   --  25   Glucose mg/dL  --   --  225*   BUN mg/dL  --   --  13   Creatinine mg/dL 0.9 0.9 0.9   Albumin g/dL  --   --  2.9*   Total Bilirubin mg/dL  --   --  0.3   Alkaline Phosphatase U/L  --   --  73   AST U/L  --   --  71*   ALT U/L  --   --  89*       Vancomycin Administrations:  vancomycin given in the last 96 hours                     vancomycin 1.5 g in dextrose 5 % 250 mL IVPB (ready to mix) (mg) 1,500 mg New  Bag 08/04/22 1538      Restarted  0403     1,500 mg New Bag  0323     1,500 mg New Bag 08/03/22 1527     1,500 mg New Bag  0112    vancomycin injection (g) 1 g Given 08/03/22 0700    vancomycin injection (g) 1 g Given 08/03/22 0700    vancomycin 2 g in dextrose 5 % 500 mL IVPB (mg) 2,000 mg New Bag 08/02/22 1302                    Microbiologic Results:  Microbiology Results (last 7 days)       Procedure Component Value Units Date/Time    Aerobic culture [[id number]] Collected: 08/03/22 0937    Order Status: Completed Specimen: Wound from Back Updated: 08/04/22 0951     Aerobic Bacterial Culture No growth    Narrative:      Superficial    Aerobic culture [[id number]] Collected: 08/03/22 0937    Order Status: Completed Specimen: Wound from Back Updated: 08/04/22 0920     Aerobic Bacterial Culture No growth    Narrative:      Deep    Culture, Anaerobe [[id number]] Collected: 08/03/22 0937    Order Status: Completed Specimen: Wound from Back Updated: 08/04/22 0741     Anaerobic Culture Culture in progress    Narrative:      Superficial    Culture, Anaerobe [[id number]] Collected: 08/03/22 0937    Order Status: Completed Specimen: Wound from Back Updated: 08/04/22 0741     Anaerobic Culture Culture in progress    Narrative:      Deep    Gram stain [[id number]] Collected: 08/03/22 0937    Order Status: Completed Specimen: Wound from Back Updated: 08/03/22 2021     Gram Stain Result No WBC's      No organisms seen    Narrative:      Superficial    Gram stain [[id number]] Collected: 08/03/22 0937    Order Status: Completed Specimen: Wound from Back Updated: 08/03/22 1807     Gram Stain Result No WBC's      No organisms seen    Narrative:      Deep    Fungus culture [[id number]] Collected: 08/03/22 0937    Order Status: Sent Specimen: Wound from Back Updated: 08/03/22 1534    Fungus culture [[id number]] Collected: 08/03/22 0937    Order Status: Sent Specimen: Wound from Back Updated: 08/03/22 1534    AFB Culture & Smear [[id number]]  Collected: 08/03/22 0937    Order Status: Sent Specimen: Wound from Back Updated: 08/03/22 1534    AFB Culture & Smear [[id number]] Collected: 08/03/22 0937    Order Status: Sent Specimen: Wound from Back Updated: 08/03/22 1534    Aerobic culture [[id number]]  (Abnormal)  (Susceptibility) Collected: 07/30/22 0055    Order Status: Completed Specimen: Incision site from Back Updated: 08/03/22 1035     Aerobic Bacterial Culture PSEUDOMONAS AERUGINOSA  Moderate

## 2022-08-04 NOTE — PROGRESS NOTES
[facility] ([facility])  [facility] Medicine  Progress Note    Patient Name: Friday ROGELIO Blake  MRN: [id number]  Patient Class: IP- Inpatient   Admission Date: 7/29/2022  Length of Stay: 4 days  Attending Physician: Kuldeep Bermudez MD  Primary Care Provider: Sid Elizabeth MD        Subjective:     Principal Problem:Wound dehiscence        HPI:  65 y/o male with PMHx of HTN, HLD, DM, and obesity who presented tot he ED for eval of post op incision leaking.  Sx are constant and moderate in severity. No mitigating or exacerbating factors reported. Pt had surgery on 7/26, then took a trip to [facility]. He flew back last night. Pt denies fever, chills, pain, HA, N/V, and all other sx at this time.  ED workup shows: H/H 11.6/36.9, COVID +  He will be kept on OBS for CSF leak under the care of [facility] medicine  He is a full code and his SDM is his wife             Overview/Hospital Course:  Mr Blake is a 66 year old male who presented to [facility] Emergency Room of evaluation of wound dehiscence. Patient underwent Laminectomy of lumbar spine on 6/22/2022 and I & D of hematoma on 6/28/2022. Traveled to [facility] for daughter's wedding. Positive for drainage from low back surgical wound. Neurosurgery following, plan possible washout vs wound vac placement.  Infectious Disease following, continue empiric IV antibiotics. COVID positive. As of 7/31/2022, patient feeling well, vital signs and labs stable. MRI lumbar spine strongly concerning for discitis osteomyelitis at L2-3, probable epidural phlegmon or abscess. Neurosurgery following, will need revision with washout. Will follow.     8/1- cheerful, resting comfortably in bed on RA, no fever, chills or cough. Still has drainage from the Lumbar wound. Dr. Zepeda plans revision with wound washout/exploration with the help of Plastic Surgery, await confirmation date. Pt agreeable with the plan. Continue wound care, IV Abx, no wound vac yet.     8/2- comfortable on RA, wound  Cx growing Pseudomonas, earlier Cx grew Staph Epi- so now on Vanc, Cefepime and Flagyl. Dr. Zepeda plans Surgery- Washout with possible drain placement tomorrow, pt agreeable.     8/3- seen post op in the ICU, doing well, c/o back pain at the surgical site, just got oxycodone. Dr. Zepeda put him on Dexa 4 mg IV qid post op, he continues to received Cefepime, Flagyl and Vanc. Fluid sent to labs for C/S.     8/4- looks and feels better, lying flat on his side, back pain improved, no NV, dizziness or HA. Lumbar Drain output 11 cc. Dr. Zepeda d/mónica the foleys and is weaning the steroids. All Cx from Surgery remain NGDT. Getting Vanc, Cefepime and oral Flagyl. Last wound Cx grew Pseudomonas.   WBC 8.2, H/H 10.3/32. BS high due to Dexa.       Interval History: looks and feels better, lying flat on his side, back pain improved, no NV, dizziness or HA. Lumbar Drain output 11 cc. Dr. Zepeda d/mónica the foleys and is weaning the steroids. All Cx from Surgery remain NGDT. Getting Vanc, Cefepime and oral Flagyl. Last wound Cx grew Pseudomonas.   WBC 8.2, H/H 10.3/32. BS high due to Dexa.     Review of Systems   Constitutional:  Positive for activity change. Negative for appetite change, chills, fatigue and fever.   HENT:  Negative for dental problem, ear pain, hearing loss, mouth sores, nosebleeds, sinus pressure, sore throat, tinnitus and trouble swallowing.    Eyes:  Negative for pain, discharge and visual disturbance.   Respiratory:  Negative for cough, choking, chest tightness, shortness of breath and wheezing.    Cardiovascular:  Negative for chest pain, palpitations and leg swelling.   Gastrointestinal:  Negative for abdominal distention, abdominal pain, anal bleeding, blood in stool, constipation, diarrhea, nausea and vomiting.   Endocrine: Negative for cold intolerance, heat intolerance, polydipsia, polyphagia and polyuria.   Genitourinary:  Negative for decreased urine volume, difficulty urinating, flank pain, frequency,  hematuria and urgency.   Musculoskeletal:  Positive for back pain and gait problem. Negative for arthralgias, myalgias, neck pain and neck stiffness.   Skin:  Positive for wound (lower back surgical wound). Negative for color change and rash.   Allergic/Immunologic: Negative.    Neurological:  Negative for dizziness, tremors, seizures, syncope, speech difficulty, weakness, light-headedness and headaches.   Hematological: Negative.    Psychiatric/Behavioral:  Negative for agitation, behavioral problems, confusion and sleep disturbance. The patient is not nervous/anxious.    All other systems reviewed and are negative.  Objective:     Vital Signs (Most Recent):  Temp: 98.8 °F (37.1 °C) (08/04/22 1600)  Pulse: (!) 58 (08/04/22 1600)  Resp: 20 (08/04/22 1600)  BP: 128/71 (08/04/22 1600)  SpO2: 100 % (08/04/22 1600)   Vital Signs (24h Range):  Temp:  [98.4 °F (36.9 °C)-100.8 °F (38.2 °C)] 98.8 °F (37.1 °C)  Pulse:  [56-86] 58  Resp:  [12-25] 20  SpO2:  [95 %-100 %] 100 %  BP: (102-175)/() 128/71     Weight: 104.3 kg (229 lb 15 oz)  Body mass index is 37.11 kg/m².    Intake/Output Summary (Last 24 hours) at 8/4/2022 1659  Last data filed at 8/4/2022 1639  Gross per 24 hour   Intake 4204.43 ml   Output 1868 ml   Net 2336.43 ml      Physical Exam  Vitals and nursing note reviewed.   Constitutional:       General: He is not in acute distress.     Appearance: Normal appearance. He is not ill-appearing, toxic-appearing or diaphoretic.       HENT:      Head: Normocephalic.      Mouth/Throat:      Mouth: Mucous membranes are moist.   Eyes:      Pupils: Pupils are equal, round, and reactive to light.   Cardiovascular:      Rate and Rhythm: Normal rate and regular rhythm.      Pulses:           Dorsalis pedis pulses are 2+ on the right side and 2+ on the left side.   Pulmonary:      Effort: No tachypnea or respiratory distress.      Breath sounds: Normal breath sounds.   Abdominal:      General: Bowel sounds are normal.  There is no distension.   Neurological:      Mental Status: He is alert and oriented to person, place, and time.      GCS: GCS eye subscore is 4. GCS verbal subscore is 5. GCS motor subscore is 6.      Sensory: Sensation is intact.      Motor: No weakness or atrophy.   Psychiatric:         Behavior: Behavior is cooperative.       Significant Labs: All pertinent labs within the past 24 hours have been reviewed.  CBC:   Recent Labs   Lab 08/04/22  0359   WBC 8.23   HGB 10.3*   HCT 33.2*        CMP:   Recent Labs   Lab 08/03/22  1438 08/04/22  0359   NA  --  139   K  --  4.6   CL  --  106   CO2  --  25   GLU  --  225*   BUN  --  13   CREATININE 0.9 0.9   CALCIUM  --  8.7   PROT  --  5.4*   ALBUMIN  --  2.9*   BILITOT  --  0.3   ALKPHOS  --  73   AST  --  71*   ALT  --  89*   ANIONGAP  --  8     Magnesium: No results for input(s): MG in the last 48 hours.  POCT Glucose:   Recent Labs   Lab 08/03/22  1834 08/04/22  0023 08/04/22  0601   POCTGLUCOSE 233* 215* 228*       Significant Imaging: I have reviewed all pertinent imaging results/findings within the past 24 hours.      Assessment/Plan:      * Wound dehiscence with CSF leak s/p Washout and closure with Lumbar drain  --wound cx pending  --Neuro surgery consulted  --ID consulted      7/30/2022  Neurosurgery following    Possible washout vs wound vac placement   ID following, continue empiric IV antibiotic        7/31/2022  MRI of lumbar spine strongly concerning for discitis osteomyelitis at L2-3, probable epidural phlegmon or abscess.   Neurosurgery following, will need revision with washout. Will follow.   Continue IV antibiotics  Wound cultures in progress      8/1- continue IV Abx, local wound care  Await revision and wound exploration with plastic surgery    8/2- surgery tomorrow, cont iV abx    8/3- s/p Wound revision and washout with Lumbar Drain placement  8/4- improving- continue same tx    Degenerative disc disease, lumbar  --supportive care  --PT/OT  consulted    S/p laminectomy- complicated by wound dehiscence- see above    S/p revision and washout today      Diabetes mellitus without complication  Patient's FSGs are controlled on current medication regimen.  Last A1c reviewed-   Lab Results   Component Value Date    HGBA1C 8.3 (H) 05/18/2022     Most recent fingerstick glucose reviewed-   Recent Labs   Lab 08/03/22  1834 08/04/22  0023 08/04/22  0601   POCTGLUCOSE 233* 215* 228*     Current correctional scale  Low  Maintain anti-hyperglycemic dose as follows-   Antihyperglycemics (From admission, onward)            Start     Stop Route Frequency Ordered    08/04/22 2100  insulin detemir U-100 pen 10 Units         -- SubQ Nightly 08/04/22 1054    08/03/22 1844  insulin aspart U-100 pen 1-10 Units         -- SubQ Every 6 hours PRN 08/03/22 1744        Hold Oral hypoglycemics while patient is in the hospital.    Continue present care, BS generally well controlled    Pt now on Dexa IV- BS may rise- needs tighter BS control    BS high sec to SSI-  Weaning steroids now, cont SSI    COVID  Patient is identified as High risk for severe complications of COVID 19 based on COVID risk score of 5   Initiate standard COVID protocols; COVID-19 testing ,Infection Control notification  and isolation- respiratory, contact and droplet per protocol    Diagnostics: (leukopenia, hyponatremia, hyperferritinemia, elevated troponin, elevated d-dimer, age, and comorbidities are significant predictors of poor clinical outcome)  CBC, CMP, Ferritin, CRP and Portable CXR    Management: Maintain oxygen saturations 92-96% via Nasal Cannula  LPM and monitor with continuous/intermittent pulse oximetry.  and Inhaled bronchodilators as needed for shortness of breath.    Asymptomatic, pt on RA    asymptomatic    Discitis of lumbar region  See 1 wound dehiscence    On IV Abx, await washout surgery with Plastic Surgery tomorrow    See above      Postoperative CSF leak  Expect drain placement with  surgery tomorrow    S/p wound revision and closure with LD placement    Morbid obesity with BMI of 40.0-44.9, adult  Body mass index is 37.29 kg/m². Morbid obesity complicates all aspects of disease management from diagnostic modalities to treatment. Weight loss encouraged and health benefits explained to patient.             HLD (hyperlipidemia)  --continue atorvastatin  --cardiac diet      Hypertension  --continue losartan  --cardiac diet    BP under control        VTE Risk Mitigation (From admission, onward)         Ordered     enoxaparin injection 40 mg  Daily         08/04/22 0929     IP VTE HIGH RISK PATIENT  Once         07/29/22 2333     Place sequential compression device  Until discontinued         07/29/22 2333                Discharge Planning   MATEUS:      Code Status: Full Code   Is the patient medically ready for discharge?:     Reason for patient still in hospital (select all that apply): Patient trending condition, Laboratory test, Treatment, Imaging and Consult recommendations  Discharge Plan A: Home Health        Seen and discussed with Dr. Zepeda and the ICU team  Condition: Fair  Prognosis: Guarded       Critical care time spent on the evaluation and treatment of severe organ dysfunction, review of pertinent labs and imaging studies, discussions with consulting providers and discussions with patient/family: 41 minutes.      Kuldeep Bermudez MD  Department of Hospital Medicine   [facility] ([facility])

## 2022-08-04 NOTE — ASSESSMENT & PLAN NOTE
- Day 0: s/p wound revision and washout with lumbar drain placement  - wound C&S sent intra-op  - Doxycycline (6 doses) completed 8/2  - Cont cefepime, vanc, flagyl   - I&D following    8/4  - Day 1: s/p wound revision and washout with lumbar drain placement  - Wound C&S pending  - Cont cefepime, vanc, flagyl course per recs  - I&D appreciated and following

## 2022-08-04 NOTE — ASSESSMENT & PLAN NOTE
--wound cx pending  --Neuro surgery consulted  --ID consulted      7/30/2022  Neurosurgery following    Possible washout vs wound vac placement   ID following, continue empiric IV antibiotic        7/31/2022  MRI of lumbar spine strongly concerning for discitis osteomyelitis at L2-3, probable epidural phlegmon or abscess.   Neurosurgery following, will need revision with washout. Will follow.   Continue IV antibiotics  Wound cultures in progress      8/1- continue IV Abx, local wound care  Await revision and wound exploration with plastic surgery    8/2- surgery tomorrow, cont iV abx    8/3- s/p Wound revision and washout with Lumbar Drain placement  8/4- improving- continue same tx

## 2022-08-04 NOTE — PLAN OF CARE
VSS. No issues with drains. To remain in ICU until CSF drain no longer needed per Dr Zepeda.    Quality 431: Preventive Care And Screening: Unhealthy Alcohol Use - Screening: Patient screened for unhealthy alcohol use using a single question and scores less than 2 times per year Detail Level: Detailed Quality 131: Pain Assessment And Follow-Up: Pain assessment using a standardized tool is documented as negative, no follow-up plan required Quality 130: Documentation Of Current Medications In The Medical Record: Current Medications Documented Quality 226: Preventive Care And Screening: Tobacco Use: Screening And Cessation Intervention: Patient screened for tobacco and never smoked

## 2022-08-04 NOTE — SUBJECTIVE & OBJECTIVE
Review of Systems   Constitutional:  Negative for chills, fever, malaise/fatigue and weight loss.   Respiratory:  Negative for cough and shortness of breath.    Cardiovascular:  Negative for chest pain, palpitations and leg swelling.   Gastrointestinal:  Negative for abdominal pain, nausea and vomiting.   Genitourinary:  Negative for dysuria and flank pain.   Musculoskeletal:  Negative for back pain and neck pain.   Skin:  Negative for rash.   Neurological:  Negative for dizziness and headaches.   Psychiatric/Behavioral:  Negative for hallucinations. The patient is not nervous/anxious.       Objective:     Vital Signs (Most Recent):  Temp: 99 °F (37.2 °C) (08/04/22 0715)  Pulse: 65 (08/04/22 1000)  Resp: (!) 22 (08/04/22 1000)  BP: (!) 155/113 (08/04/22 1000)  SpO2: 100 % (08/04/22 1000)   Vital Signs (24h Range):  Temp:  [97.7 °F (36.5 °C)-100.8 °F (38.2 °C)] 99 °F (37.2 °C)  Pulse:  [56-94] 65  Resp:  [12-23] 22  SpO2:  [95 %-100 %] 100 %  BP: (103-175)/() 155/113     Weight: 104.3 kg (229 lb 15 oz)  Body mass index is 37.11 kg/m².      Intake/Output Summary (Last 24 hours) at 8/4/2022 1039  Last data filed at 8/4/2022 1000  Gross per 24 hour   Intake 4504.81 ml   Output 2638 ml   Net 1866.81 ml       Physical Exam  Vitals and nursing note reviewed.   Constitutional:       General: He is not in acute distress.     Appearance: Normal appearance. He is not ill-appearing, toxic-appearing or diaphoretic.       HENT:      Head: Normocephalic.      Mouth/Throat:      Mouth: Mucous membranes are moist.   Eyes:      Pupils: Pupils are equal, round, and reactive to light.   Cardiovascular:      Rate and Rhythm: Normal rate and regular rhythm.      Pulses:           Dorsalis pedis pulses are 2+ on the right side and 2+ on the left side.   Pulmonary:      Effort: No tachypnea or respiratory distress.      Breath sounds: Normal breath sounds.   Abdominal:      General: Bowel sounds are normal. There is no distension.    Neurological:      Mental Status: He is alert and oriented to person, place, and time.      GCS: GCS eye subscore is 4. GCS verbal subscore is 5. GCS motor subscore is 6.      Sensory: Sensation is intact.      Motor: No weakness or atrophy.   Psychiatric:         Behavior: Behavior is cooperative.       Vents:  Oxygen Concentration (%): 24 (08/04/22 0817)    Lines/Drains/Airways       Peripherally Inserted Central Catheter Line  Duration             PICC Double Lumen 08/01/22 1540 left brachial 2 days              Drain  Duration                  Urethral Catheter 08/03/22 0745 Straight-tip;Silicone 16 Fr. 1 day         Closed/Suction Drain 08/03/22 1144 Right Back Accordion 10 Fr. <1 day         Closed/Suction Drain 08/03/22 1145 Left Back Accordion 10 Fr. <1 day         Drain/Device  08/03/22 1154 Right midline lumbar spine gravity <1 day                    Significant Labs:    CBC/Anemia Profile:  Recent Labs   Lab 08/02/22  2236 08/04/22  0359   WBC  --  8.23   HGB  --  10.3*   HCT  --  33.2*   PLT  --  185   MCV  --  88   RDW  --  15.3*   FERRITIN 200  --         Chemistries:  Recent Labs   Lab 08/03/22  1438 08/04/22  0359   NA  --  139   K  --  4.6   CL  --  106   CO2  --  25   BUN  --  13   CREATININE 0.9 0.9   CALCIUM  --  8.7   ALBUMIN  --  2.9*   PROT  --  5.4*   BILITOT  --  0.3   ALKPHOS  --  73   ALT  --  89*   AST  --  71*       Bilirubin:   Recent Labs   Lab 07/12/22  0911 07/29/22  1724 08/04/22  0359   BILITOT 0.3 0.3 0.3       All pertinent labs within the past 24 hours have been reviewed.    Significant Imaging:  I have reviewed all pertinent imaging results/findings within the past 24 hours.

## 2022-08-04 NOTE — ASSESSMENT & PLAN NOTE
- Controlled  - Cont home losartan  - Hemodynamic monitoring     8/4:  - per hospital med mgmt  - pain control  - suggest PRN if not controlled with resuming home meds

## 2022-08-04 NOTE — ASSESSMENT & PLAN NOTE
Patient's FSGs are controlled on current medication regimen.  Last A1c reviewed-   Lab Results   Component Value Date    HGBA1C 8.3 (H) 05/18/2022     Most recent fingerstick glucose reviewed-   Recent Labs   Lab 08/03/22  1834 08/04/22  0023 08/04/22  0601   POCTGLUCOSE 233* 215* 228*     Current correctional scale  Low  Maintain anti-hyperglycemic dose as follows-   Antihyperglycemics (From admission, onward)            Start     Stop Route Frequency Ordered    08/04/22 2100  insulin detemir U-100 pen 10 Units         -- SubQ Nightly 08/04/22 1054    08/03/22 1844  insulin aspart U-100 pen 1-10 Units         -- SubQ Every 6 hours PRN 08/03/22 1744        Hold Oral hypoglycemics while patient is in the hospital.    Continue present care, BS generally well controlled    Pt now on Dexa IV- BS may rise- needs tighter BS control    BS high sec to SSI-  Weaning steroids now, cont SSI

## 2022-08-04 NOTE — ASSESSMENT & PLAN NOTE
See 1 wound dehiscence    On IV Abx, await washout surgery with Plastic Surgery tomorrow    See above

## 2022-08-05 LAB
ALBUMIN SERPL BCP-MCNC: 2.8 G/DL (ref 3.5–5.2)
ALP SERPL-CCNC: 73 U/L (ref 55–135)
ALT SERPL W/O P-5'-P-CCNC: 69 U/L (ref 10–44)
ANION GAP SERPL CALC-SCNC: 7 MMOL/L (ref 8–16)
AST SERPL-CCNC: 43 U/L (ref 10–40)
BASOPHILS # BLD AUTO: 0.02 K/UL (ref 0–0.2)
BASOPHILS NFR BLD: 0.2 % (ref 0–1.9)
BILIRUB SERPL-MCNC: 0.2 MG/DL (ref 0.1–1)
BUN SERPL-MCNC: 11 MG/DL (ref 8–23)
CALCIUM SERPL-MCNC: 8.7 MG/DL (ref 8.7–10.5)
CHLORIDE SERPL-SCNC: 108 MMOL/L (ref 95–110)
CO2 SERPL-SCNC: 25 MMOL/L (ref 23–29)
CREAT SERPL-MCNC: 0.9 MG/DL (ref 0.5–1.4)
D DIMER PPP IA.FEU-MCNC: 0.64 MG/L FEU
DIFFERENTIAL METHOD: ABNORMAL
EOSINOPHIL # BLD AUTO: 0.1 K/UL (ref 0–0.5)
EOSINOPHIL NFR BLD: 0.6 % (ref 0–8)
ERYTHROCYTE [DISTWIDTH] IN BLOOD BY AUTOMATED COUNT: 15.4 % (ref 11.5–14.5)
EST. GFR  (NO RACE VARIABLE): >60 ML/MIN/1.73 M^2
FERRITIN SERPL-MCNC: 165 NG/ML (ref 20–300)
GLUCOSE SERPL-MCNC: 228 MG/DL (ref 70–110)
HCT VFR BLD AUTO: 34.2 % (ref 40–54)
HGB BLD-MCNC: 10.7 G/DL (ref 14–18)
IMM GRANULOCYTES # BLD AUTO: 0.18 K/UL (ref 0–0.04)
IMM GRANULOCYTES NFR BLD AUTO: 1.6 % (ref 0–0.5)
LDH SERPL L TO P-CCNC: 171 U/L (ref 110–260)
LYMPHOCYTES # BLD AUTO: 2.2 K/UL (ref 1–4.8)
LYMPHOCYTES NFR BLD: 19.6 % (ref 18–48)
MCH RBC QN AUTO: 27.7 PG (ref 27–31)
MCHC RBC AUTO-ENTMCNC: 31.3 G/DL (ref 32–36)
MCV RBC AUTO: 89 FL (ref 82–98)
MONOCYTES # BLD AUTO: 0.6 K/UL (ref 0.3–1)
MONOCYTES NFR BLD: 4.9 % (ref 4–15)
NEUTROPHILS # BLD AUTO: 8.2 K/UL (ref 1.8–7.7)
NEUTROPHILS NFR BLD: 73.1 % (ref 38–73)
NRBC BLD-RTO: 0 /100 WBC
PLATELET # BLD AUTO: 201 K/UL (ref 150–450)
PMV BLD AUTO: 11.6 FL (ref 9.2–12.9)
POCT GLUCOSE: 236 MG/DL (ref 70–110)
POCT GLUCOSE: 248 MG/DL (ref 70–110)
POCT GLUCOSE: 264 MG/DL (ref 70–110)
POCT GLUCOSE: 268 MG/DL (ref 70–110)
POTASSIUM SERPL-SCNC: 4.5 MMOL/L (ref 3.5–5.1)
PROT SERPL-MCNC: 5.9 G/DL (ref 6–8.4)
RBC # BLD AUTO: 3.86 M/UL (ref 4.6–6.2)
SODIUM SERPL-SCNC: 140 MMOL/L (ref 136–145)
WBC # BLD AUTO: 11.27 K/UL (ref 3.9–12.7)

## 2022-08-05 PROCEDURE — 25000003 PHARM REV CODE 250: Performed by: NURSE PRACTITIONER

## 2022-08-05 PROCEDURE — 99900035 HC TECH TIME PER 15 MIN (STAT)

## 2022-08-05 PROCEDURE — 85025 COMPLETE CBC W/AUTO DIFF WBC: CPT

## 2022-08-05 PROCEDURE — 99233 PR SUBSEQUENT HOSPITAL CARE,LEVL III: ICD-10-PCS | Mod: ,,,

## 2022-08-05 PROCEDURE — 63600175 PHARM REV CODE 636 W HCPCS: Performed by: INTERNAL MEDICINE

## 2022-08-05 PROCEDURE — 80053 COMPREHEN METABOLIC PANEL: CPT

## 2022-08-05 PROCEDURE — 99233 SBSQ HOSP IP/OBS HIGH 50: CPT | Mod: ,,,

## 2022-08-05 PROCEDURE — 20000000 HC ICU ROOM

## 2022-08-05 PROCEDURE — 27000207 HC ISOLATION

## 2022-08-05 PROCEDURE — 99024 PR POST-OP FOLLOW-UP VISIT: ICD-10-PCS | Mod: ,,, | Performed by: PHYSICIAN ASSISTANT

## 2022-08-05 PROCEDURE — 99024 POSTOP FOLLOW-UP VISIT: CPT | Mod: ,,, | Performed by: PHYSICIAN ASSISTANT

## 2022-08-05 PROCEDURE — 63600175 PHARM REV CODE 636 W HCPCS: Performed by: PHYSICIAN ASSISTANT

## 2022-08-05 PROCEDURE — 25000003 PHARM REV CODE 250: Performed by: PHYSICIAN ASSISTANT

## 2022-08-05 PROCEDURE — 94761 N-INVAS EAR/PLS OXIMETRY MLT: CPT

## 2022-08-05 PROCEDURE — 25000003 PHARM REV CODE 250: Performed by: INTERNAL MEDICINE

## 2022-08-05 PROCEDURE — 94640 AIRWAY INHALATION TREATMENT: CPT

## 2022-08-05 PROCEDURE — 94799 UNLISTED PULMONARY SVC/PX: CPT

## 2022-08-05 PROCEDURE — 97110 THERAPEUTIC EXERCISES: CPT | Mod: CQ

## 2022-08-05 PROCEDURE — 25000003 PHARM REV CODE 250

## 2022-08-05 RX ORDER — ENOXAPARIN SODIUM 100 MG/ML
40 INJECTION SUBCUTANEOUS EVERY 24 HOURS
Status: DISCONTINUED | OUTPATIENT
Start: 2022-08-05 | End: 2022-08-12 | Stop reason: HOSPADM

## 2022-08-05 RX ORDER — DEXAMETHASONE SODIUM PHOSPHATE 4 MG/ML
2 INJECTION, SOLUTION INTRA-ARTICULAR; INTRALESIONAL; INTRAMUSCULAR; INTRAVENOUS; SOFT TISSUE EVERY 12 HOURS
Status: DISCONTINUED | OUTPATIENT
Start: 2022-08-05 | End: 2022-08-06

## 2022-08-05 RX ORDER — POLYETHYLENE GLYCOL 3350 17 G/17G
17 POWDER, FOR SOLUTION ORAL DAILY
Status: DISCONTINUED | OUTPATIENT
Start: 2022-08-05 | End: 2022-08-12 | Stop reason: HOSPADM

## 2022-08-05 RX ORDER — INSULIN ASPART 100 [IU]/ML
1-10 INJECTION, SOLUTION INTRAVENOUS; SUBCUTANEOUS EVERY 4 HOURS PRN
Status: DISCONTINUED | OUTPATIENT
Start: 2022-08-05 | End: 2022-08-08

## 2022-08-05 RX ADMIN — PIPERACILLIN SODIUM AND TAZOBACTAM SODIUM 4.5 G: 4; .5 INJECTION, POWDER, LYOPHILIZED, FOR SOLUTION INTRAVENOUS at 12:08

## 2022-08-05 RX ADMIN — ASPIRIN 81 MG: 81 TABLET, COATED ORAL at 10:08

## 2022-08-05 RX ADMIN — DOCUSATE SODIUM 100 MG: 100 CAPSULE, LIQUID FILLED ORAL at 08:08

## 2022-08-05 RX ADMIN — MUPIROCIN: 20 OINTMENT TOPICAL at 10:08

## 2022-08-05 RX ADMIN — THERA TABS 1 TABLET: TAB at 10:08

## 2022-08-05 RX ADMIN — LOSARTAN POTASSIUM 50 MG: 50 TABLET, FILM COATED ORAL at 10:08

## 2022-08-05 RX ADMIN — ALBUTEROL SULFATE 2 PUFF: 90 AEROSOL, METERED RESPIRATORY (INHALATION) at 08:08

## 2022-08-05 RX ADMIN — INSULIN ASPART 6 UNITS: 100 INJECTION, SOLUTION INTRAVENOUS; SUBCUTANEOUS at 05:08

## 2022-08-05 RX ADMIN — OXYCODONE AND ACETAMINOPHEN 1 TABLET: 10; 325 TABLET ORAL at 10:08

## 2022-08-05 RX ADMIN — MUPIROCIN: 20 OINTMENT TOPICAL at 08:08

## 2022-08-05 RX ADMIN — FAMOTIDINE 20 MG: 20 TABLET ORAL at 10:08

## 2022-08-05 RX ADMIN — ATORVASTATIN CALCIUM 10 MG: 10 TABLET, FILM COATED ORAL at 08:08

## 2022-08-05 RX ADMIN — DEXAMETHASONE SODIUM PHOSPHATE 4 MG: 4 INJECTION INTRA-ARTICULAR; INTRALESIONAL; INTRAMUSCULAR; INTRAVENOUS; SOFT TISSUE at 10:08

## 2022-08-05 RX ADMIN — OXYCODONE HYDROCHLORIDE AND ACETAMINOPHEN 500 MG: 500 TABLET ORAL at 08:08

## 2022-08-05 RX ADMIN — OXYCODONE HYDROCHLORIDE AND ACETAMINOPHEN 500 MG: 500 TABLET ORAL at 10:08

## 2022-08-05 RX ADMIN — FAMOTIDINE 20 MG: 20 TABLET ORAL at 08:08

## 2022-08-05 RX ADMIN — ALBUTEROL SULFATE 2 PUFF: 90 AEROSOL, METERED RESPIRATORY (INHALATION) at 01:08

## 2022-08-05 RX ADMIN — DEXAMETHASONE SODIUM PHOSPHATE 2 MG: 4 INJECTION INTRA-ARTICULAR; INTRALESIONAL; INTRAMUSCULAR; INTRAVENOUS; SOFT TISSUE at 08:08

## 2022-08-05 RX ADMIN — INSULIN ASPART 4 UNITS: 100 INJECTION, SOLUTION INTRAVENOUS; SUBCUTANEOUS at 11:08

## 2022-08-05 RX ADMIN — DOCUSATE SODIUM 100 MG: 100 CAPSULE, LIQUID FILLED ORAL at 10:08

## 2022-08-05 RX ADMIN — POLYETHYLENE GLYCOL 3350 17 G: 17 POWDER, FOR SOLUTION ORAL at 10:08

## 2022-08-05 RX ADMIN — OXYCODONE AND ACETAMINOPHEN 1 TABLET: 10; 325 TABLET ORAL at 09:08

## 2022-08-05 RX ADMIN — PIPERACILLIN SODIUM AND TAZOBACTAM SODIUM 4.5 G: 4; .5 INJECTION, POWDER, LYOPHILIZED, FOR SOLUTION INTRAVENOUS at 04:08

## 2022-08-05 RX ADMIN — GABAPENTIN 300 MG: 300 CAPSULE ORAL at 10:08

## 2022-08-05 RX ADMIN — INSULIN ASPART 4 UNITS: 100 INJECTION, SOLUTION INTRAVENOUS; SUBCUTANEOUS at 05:08

## 2022-08-05 RX ADMIN — DOXYCYCLINE HYCLATE 100 MG: 100 TABLET, COATED ORAL at 08:08

## 2022-08-05 RX ADMIN — PIPERACILLIN SODIUM AND TAZOBACTAM SODIUM 4.5 G: 4; .5 INJECTION, POWDER, LYOPHILIZED, FOR SOLUTION INTRAVENOUS at 08:08

## 2022-08-05 RX ADMIN — ENOXAPARIN SODIUM 40 MG: 100 INJECTION SUBCUTANEOUS at 05:08

## 2022-08-05 RX ADMIN — DOXYCYCLINE HYCLATE 100 MG: 100 TABLET, COATED ORAL at 10:08

## 2022-08-05 RX ADMIN — Medication 1000 UNITS: at 10:08

## 2022-08-05 RX ADMIN — INSULIN DETEMIR 10 UNITS: 100 INJECTION, SOLUTION SUBCUTANEOUS at 08:08

## 2022-08-05 RX ADMIN — GABAPENTIN 300 MG: 300 CAPSULE ORAL at 03:08

## 2022-08-05 RX ADMIN — Medication 6 MG: at 08:08

## 2022-08-05 RX ADMIN — GABAPENTIN 300 MG: 300 CAPSULE ORAL at 08:08

## 2022-08-05 RX ADMIN — ALBUTEROL SULFATE 2 PUFF: 90 AEROSOL, METERED RESPIRATORY (INHALATION) at 07:08

## 2022-08-05 NOTE — PLAN OF CARE
Plan of care reviewed with patient, he verbalizes understanding. Lumbar drain remains in place with approx 15-20mL output every hour. Bilateral hemovac drains remain in place with little to no output this shift. Pt is on room air. Per neurosurg, ok to clamp lumbar drain when patient wants to sit up to eat and perform bed mobility. Roche removed this shift. Room air, afebrile. VSS all shift. NAD noted. Pt remains in good spirits and states that he is feeling better every day.

## 2022-08-05 NOTE — PT/OT/SLP PROGRESS
Physical Therapy  Treatment    Friday ROGELIO Blake   MRN: [id number]   Admitting Diagnosis: Wound dehiscence    PT Received On: 08/05/22  PT Start Time: 1325     PT Stop Time: 1340    PT Total Time (min): 15 min       Billable Minutes:  Therapeutic Exercise 15    Treatment Type: Treatment  PT/PTA: PTA     PTA Visit Number: 1       General Precautions: Standard, airborne, contact, droplet, fall  Orthopedic Precautions: spinal precautions   Braces: LSO  Respiratory Status: Room air    Spiritual, Cultural Beliefs, Orthodoxy Practices, Values that Affect Care: no    Subjective:  Communicated with patient's nurse, Koko, and completed Epic chart review prior to session.  Patient agreed to PT session.    Pain/Comfort  Pain Rating 1: 0/10     Objective:   Patient found with: telemetry, peripheral IV, blood pressure cuff, pulse ox (continuous), Other (comments) (CSF DRAIN)    Functional Mobility:  **PER MD REQUEST, PATIENT IS NOT TO GET OOB WITH THERAPY. MAY PERFORM SUPINE TE ONLY AT THIS TIME. **    Completed x20 reps AROM TE to BLE: Heel slides, quad sets, SAQ, AP, glute sets  Rest breaks given as needed.    AM-PAC 6 CLICK MOBILITY  How much help from another person does this patient currently need?   1 = Unable, Total/Dependent Assistance  2 = A lot, Maximum/Moderate Assistance  3 = A little, Minimum/Contact Guard/Supervision  4 = None, Modified [place]/Independent    Turning over in bed (including adjusting bedclothes, sheets and blankets)?: 1 (DUE TO MD RESTRICTIONS)  Sitting down on and standing up from a chair with arms (e.g., wheelchair, bedside commode, etc.): 1 (DUE TO MD RESTRICTIONS)  Moving from lying on back to sitting on the side of the bed?: 1 (DUE TO MD RESTRICTIONS)  Moving to and from a bed to a chair (including a wheelchair)?: 1 (DUE TO MD RESTRICTIONS)  Need to walk in hospital room?: 1 (DUE TO MD RESTRICTIONS)  Climbing 3-5 steps with a railing?: 1  Basic Mobility Total Score: 6    AM-PAC Raw Score CMS  G-Code Modifier Level of Impairment Assistance   6 % Total / Unable   7 - 9 CM 80 - 100% Maximal Assist   10 - 14 CL 60 - 80% Moderate Assist   15 - 19 CK 40 - 60% Moderate Assist   20 - 22 CJ 20 - 40% Minimal Assist   23 CI 1-20% SBA / CGA   24 CH 0% Independent/ Mod I     Patient left supine with all lines intact and call button in reach.    Assessment:  Friday ROGELIO Blake is a 66 y.o. male with a medical diagnosis of Wound dehiscence and presents with overall decline in functional mobility. Patient would continue to benefit from skilled PT to address functional limitations listed below in order to return to PLOF/decrease caregiver burden.    Rehab identified problem list/impairments: Rehab identified problem list/impairments: weakness, impaired endurance, impaired functional mobility, gait instability    Rehab potential is good.    Activity tolerance: Good    Discharge recommendations: Discharge Facility/Level of Care Needs: home health PT     Barriers to discharge:      Equipment recommendations: Equipment Needed After Discharge: none     GOALS:   Multidisciplinary Problems     Physical Therapy Goals        Problem: Physical Therapy    Goal Priority Disciplines Outcome Goal Variances Interventions   Physical Therapy Goal     PT, PT/OT Ongoing, Progressing                     PLAN:    Patient to be seen 3 x/week  to address the above listed problems via gait training, therapeutic activities, therapeutic exercises  Plan of Care expires: 08/13/22  Plan of Care reviewed with: patient         08/05/2022

## 2022-08-05 NOTE — PROGRESS NOTES
[facility] - Intensive Care ([facility])  Neurosurgery  Progress Note    Subjective:     Interval History:   Patient was seen earlier this morning.  He is doing well.  Denies HA, dizziness, N/V, weakness, leg pain.  Has back pain from surgery site.  No BM yet.    History of Present Illness:  See H&P.    Post-Op Info:  Procedure(s) (LRB):  EXPLORATION, WOUND (Bilateral)  REPAIR, CSF LEAK, USING COMPUTER-ASSISTED NAVIGATION (Bilateral)  PLACEMENT (N/A)   2 Days Post-Op      Medications:  Continuous Infusions:   sodium chloride 0.9% 100 mL/hr at 08/05/22 1200     Scheduled Meds:   albuterol  2 puff Inhalation Q6H    ascorbic acid (vitamin C)  500 mg Oral BID    aspirin  81 mg Oral Daily    atorvastatin  10 mg Oral QHS    docusate sodium  100 mg Oral BID    doxycycline  100 mg Oral Q12H    enoxaparin  40 mg Subcutaneous Daily    famotidine  20 mg Oral BID    gabapentin  300 mg Oral TID    insulin detemir U-100  10 Units Subcutaneous BID    losartan  50 mg Oral Daily    melatonin  6 mg Oral Nightly    multivitamin  1 tablet Oral Daily    mupirocin   Nasal BID    piperacillin-tazobactam (ZOSYN) IVPB  4.5 g Intravenous Q8H    polyethylene glycol  17 g Oral Daily    vitamin D  1,000 Units Oral Daily     PRN Meds:acetaminophen, acetaminophen, aluminum-magnesium hydroxide-simethicone, dextromethorphan-guaiFENesin  mg/5 ml, dextrose 10%, glucagon (human recombinant), glucose, glucose, HYDROmorphone, insulin aspart U-100, loperamide, melatonin, methocarbamoL, naloxone, ondansetron, oxyCODONE-acetaminophen, oxyCODONE-acetaminophen, oxyCODONE-acetaminophen, prochlorperazine, promethazine, senna-docusate 8.6-50 mg, simethicone, sodium chloride 0.9%, sodium chloride 0.9%, sodium chloride 0.9%     Review of Systems  Objective:     Weight: 106.3 kg (234 lb 5.6 oz)  Body mass index is 37.82 kg/m².  Vital Signs (Most Recent):  Temp: 98.7 °F (37.1 °C) (08/05/22 1115)  Pulse: 62 (08/05/22 1200)  Resp: (!) 22 (08/05/22  1200)  BP: 131/62 (08/05/22 1200)  SpO2: 100 % (08/05/22 1200) Vital Signs (24h Range):  Temp:  [97.7 °F (36.5 °C)-98.8 °F (37.1 °C)] 98.7 °F (37.1 °C)  Pulse:  [46-67] 62  Resp:  [10-25] 22  SpO2:  [96 %-100 %] 100 %  BP: ()/(55-86) 131/62     Date 08/05/22 0700 - 08/06/22 0659   Shift 7983-5400 7595-5392 9161-6467 24 Hour Total   INTAKE   I.V.(mL/kg) 597.6(5.6)   597.6(5.6)   IV Piggyback 74.1   74.1   Shift Total(mL/kg) 671.7(6.3)   671.7(6.3)   OUTPUT   Urine(mL/kg/hr) 285   285   Drains 0   0   Other 35   35   Shift Total(mL/kg) 320(3)   320(3)   Weight (kg) 106.3 106.3 106.3 106.3                        Closed/Suction Drain 08/03/22 1144 Right Back Accordion 10 Fr. (Active)   Site Description Unable to view 08/05/22 0305   Dressing Type Transparent (Tegaderm) 08/05/22 1115   Dressing Status Clean;Dry;Intact 08/05/22 1115   Dressing Intervention Integrity maintained 08/05/22 1115   Drainage Serosanguineous 08/05/22 1115   Status Other (Comment) 08/05/22 1115   Output (mL) 0 mL 08/05/22 0715            Closed/Suction Drain 08/03/22 1145 Left Back Accordion 10 Fr. (Active)   Site Description Unable to view 08/05/22 0305   Dressing Type Transparent (Tegaderm) 08/05/22 1115   Dressing Status Clean;Dry;Intact 08/05/22 1115   Dressing Intervention Integrity maintained 08/05/22 1115   Drainage Serosanguineous 08/05/22 1115   Status Other (Comment) 08/05/22 1115   Output (mL) 0 mL 08/05/22 0715            Drain/Device  08/03/22 1154 Right midline lumbar spine gravity (Active)   Insertion Site clean and dry;dressing intact;no hematoma 08/05/22 1115   Drainage Characteristics/Odor Clear 08/05/22 1115   Drainage Amount Small 08/05/22 1115   General Intake (mL) 0 08/03/22 1915   General Output (mL) 18 08/05/22 0800       Neurosurgery Physical Exam  Vitals and labs reviewed  MAEW  Dressing in place  LD x1  HV x2    Significant Labs:  Recent Labs   Lab 08/03/22  1438 08/04/22  0359 08/05/22  0450   GLU  --  225* 228*    NA  --  139 140   K  --  4.6 4.5   CL  --  106 108   CO2  --  25 25   BUN  --  13 11   CREATININE 0.9 0.9 0.9   CALCIUM  --  8.7 8.7     Recent Labs   Lab 08/04/22  0359 08/05/22  0450   WBC 8.23 11.27   HGB 10.3* 10.7*   HCT 33.2* 34.2*    201     No results for input(s): LABPT, INR, APTT in the last 48 hours.  Microbiology Results (last 7 days)     Procedure Component Value Units Date/Time    Culture, Anaerobe [[id number]] Collected: 08/03/22 0937    Order Status: Completed Specimen: Wound from Back Updated: 08/05/22 0901     Anaerobic Culture Culture in progress    Narrative:      Deep    Culture, Anaerobe [[id number]] Collected: 08/03/22 0937    Order Status: Completed Specimen: Wound from Back Updated: 08/05/22 0900     Anaerobic Culture Culture in progress    Narrative:      Superficial    AFB Culture & Smear [[id number]] Collected: 08/03/22 0937    Order Status: Completed Specimen: Wound from Back Updated: 08/04/22 2127     AFB Culture & Smear Culture in progress     AFB CULTURE STAIN No acid fast bacilli seen.    Narrative:      Superficial    AFB Culture & Smear [[id number]] Collected: 08/03/22 0937    Order Status: Completed Specimen: Wound from Back Updated: 08/04/22 2127     AFB Culture & Smear Culture in progress     AFB CULTURE STAIN No acid fast bacilli seen.    Narrative:      Deep    Aerobic culture [[id number]] Collected: 08/03/22 0937    Order Status: Completed Specimen: Wound from Back Updated: 08/04/22 0951     Aerobic Bacterial Culture No growth    Narrative:      Superficial    Aerobic culture [[id number]] Collected: 08/03/22 0937    Order Status: Completed Specimen: Wound from Back Updated: 08/04/22 0920     Aerobic Bacterial Culture No growth    Narrative:      Deep    Gram stain [[id number]] Collected: 08/03/22 0937    Order Status: Completed Specimen: Wound from Back Updated: 08/03/22 2021     Gram Stain Result No WBC's      No organisms seen    Narrative:      Superficial    Gram stain  [[id number]] Collected: 08/03/22 0937    Order Status: Completed Specimen: Wound from Back Updated: 08/03/22 1807     Gram Stain Result No WBC's      No organisms seen    Narrative:      Deep    Fungus culture [[id number]] Collected: 08/03/22 0937    Order Status: Sent Specimen: Wound from Back Updated: 08/03/22 1534    Fungus culture [[id number]] Collected: 08/03/22 0937    Order Status: Sent Specimen: Wound from Back Updated: 08/03/22 1534    Aerobic culture [[id number]]  (Abnormal)  (Susceptibility) Collected: 07/30/22 0055    Order Status: Completed Specimen: Incision site from Back Updated: 08/03/22 1035     Aerobic Bacterial Culture PSEUDOMONAS AERUGINOSA  Moderate          All pertinent labs from the last 24 hours have been reviewed.  Significant Diagnostics:  I have reviewed all pertinent imaging results/findings within the past 24 hours.    Assessment/Plan:     Active Diagnoses:    Diagnosis Date Noted POA    PRINCIPAL PROBLEM:  Wound dehiscence with CSF leak s/p Washout and closure with Lumbar drain [T81.30XA] 07/29/2022 Yes     Chronic    Discitis of lumbar region [M46.46] 08/01/2022 Yes    Postoperative CSF leak [G97.82, G96.00] 07/30/2022 Yes     Chronic    Hypertension [I10] 07/29/2022 Yes    HLD (hyperlipidemia) [E78.5] 07/29/2022 Yes    COVID [U07.1] 07/29/2022 Yes     Chronic    Degenerative disc disease, lumbar [M51.36] 06/22/2022 Yes     Chronic    Diabetes mellitus without complication [E11.9]  Yes     Chronic    Morbid obesity with BMI of 40.0-44.9, adult [E66.01, Z68.41]  Not Applicable      Problems Resolved During this Admission:   POD #2    - Will continue to wean steroids. Decadron 2 mg Q12 (after 2 doses, will end steroids).  - D/c mendiola  - Okay to clamp drain whenever patient is sitting up/eating  Patient can get up to use the restroom in room.    Okay to start OT today.   PT daily starting tomorrow.   Start prophylactic dose of Lovenox.  Dulcolax BID.  Abx per ID.  Will continue to  monitor. Please call with any changes.    Oliver Clifton PA-C  Neurosurgery  [facility] - Intensive Care ([facility])

## 2022-08-05 NOTE — ASSESSMENT & PLAN NOTE
MRI of the lumbar spine-  Findings strongly concerning for discitis osteomyelitis at L2-3-  . The fluid collection in the posterior soft tissues is noted both within the laminectomy bed measuring a proximally 3.1 x 2.7 x 2.3 cm in this area, and with extension inferiorly along the L3 lamina and L1 inferior lamina, and also into the posterior soft tissues.  The fluid collection is significantly serpiginous and difficult to accurately measure.  The component in the posterior soft tissues measures on the order of 9.2 x 3.8 by 2.5 cm and also demonstrates some postcontrast enhancement concerning for infection    Will need cultures to guide regime, will discuss with Dr Zepeda  Will continue Daptomycin, Cefepime ,flagyl and doxycycline .  Previous cultures-  Cultures=06/29- aerobic culture- [facility]  CUTIBACTERIUM ACNES   06/28-BACTEROIDES FRAGILIS   06/28- staph epi -blood      08/01- case discussed with Dr Zepeda- will get operative cultures this week.  Continue Cefepime , switch to vancomycin , doxycycline and flagyl- will use operative cultures to guide regime    08/04/22- will use 8 weeks of IV Zosyn, doxycycline based on all cultures dating back to 06/28/22  Will follow weekly ESR , CRP

## 2022-08-05 NOTE — ASSESSMENT & PLAN NOTE
- monitor POCT gluc q6h d/t steroid use   - mod SSI added     8/4:  - detemir 10u qHS added  - mod SSI  - goal glucose <180      8/5:  - Detemir increased from 10u daily to BID  - mod SSI   - POCT from q6 to q4h for tighter glycemic control

## 2022-08-05 NOTE — ASSESSMENT & PLAN NOTE
All previous cultures reviewed.  Will follow MRI of the spine and new cultures.    Cultures=06/29- aerobic culture- [facility]  CUTIBACTERIUM ACNES   06/28-BACTEROIDES FRAGILIS   06/28- staph epi -blood      Is this CSF leak ? Will send fluid for   Beta (?)-2 transferrin testing  For now will use IV daptomycin/cefepime , add empiric doxycycline/flagyl  Follow repeat cultures .  If no pseudomonas, plan will be 6 weeks of Daptomycin/Rocephin and then 3-6 months of doxycycline     08/01- will switch to vancomycin as we anticipate longer hospital stay , continue cefepime, doxycycline/flagyl.  Will plan I and D as per neurosurgery    08/04- cultures reviewed -All cultures reviewed.  Wound culture -07/30 Pseudomonas   Blood cultures- 06/29- Staph epidermidis  06/28- Back -bacteroides   Cutibacterium acnes     Will plan to use 8  weeks of IV Zosyn and po doxycycline   Will follow final operative  cultures   Will check weekly ESR, CRP   Will need to check repeat MRI of the spine before stopping regime .  EOC -09/30/22

## 2022-08-05 NOTE — ASSESSMENT & PLAN NOTE
- currently asymptomatic  - fully vaccinated +booster  - diagnosed 7/29  - albuterol & supplemental O2 PRN    8/4:  - remains asymptomatic with no hypoxia  - lovenox adjusted to prophylactic dose  - pulse ox monitoring     8/5:  - unchanged, remains asymptomatic with no evidence of hypoxia or c/o SOB  - pulse ox monitoring

## 2022-08-05 NOTE — ASSESSMENT & PLAN NOTE
Patient's FSGs are controlled on current medication regimen.  Last A1c reviewed-   Lab Results   Component Value Date    HGBA1C 8.3 (H) 05/18/2022     Most recent fingerstick glucose reviewed-   Recent Labs   Lab 08/04/22  1112 08/04/22  1747 08/04/22  2346 08/05/22  0456   POCTGLUCOSE 275* 224* 277* 236*     Current correctional scale  Low  Maintain anti-hyperglycemic dose as follows-   Antihyperglycemics (From admission, onward)            Start     Stop Route Frequency Ordered    08/04/22 2100  insulin detemir U-100 pen 10 Units         -- SubQ Nightly 08/04/22 1054    08/03/22 1844  insulin aspart U-100 pen 1-10 Units         -- SubQ Every 6 hours PRN 08/03/22 1744        Hold Oral hypoglycemics while patient is in the hospital.    Continue present care, BS generally well controlled    Pt now on Dexa IV- BS may rise- needs tighter BS control    BS high sec to SSI-  Weaning steroids now, cont SSI    Increase Levemir to BID dosing

## 2022-08-05 NOTE — SUBJECTIVE & OBJECTIVE
Interval History:   66 year old man with complicated post op course.  06/22/22-  LAMINECTOMY, SPINE, LUMBAR, WITH DISCECTOMY (Left) L2-3      06/26/22  Hemilaminectomy, partial medial facetectomy and foraminotomy L2-3 on left   with discectomy using microscopic dissection   06/28-22-  Fluid collection ventral and lateral consistent with epidural epidural hematoma  compression   Retained disc fragment   Cultures=06/29- aerobic culture- [facility]  CUTIBACTERIUM ACNES   06/28-BACTEROIDES FRAGILIS   06/28- staph epi -blood      MRI of the lumbar region -   Patient status post hemilaminectomy and discectomy at L2-3 on the left.  Associated with the surgical area of there is fluid within the disc space, bone marrow edema, and abnormal postcontrast enhancement of the in plates about the disc space and disc which extends along the left aspect of the spinal canal and into the posterior soft tissues concerning for discitis osteomyelitis with epidural abscess extending into the posterior soft tissues.  The fluid collection in the posterior soft tissues is noted both within the laminectomy bed measuring a proximally 3.1 x 2.7 x 2.3 cm in this area, and with extension inferiorly along the L3 lamina and L1 inferior lamina, and also into the posterior soft tissues.  The fluid collection is significantly serpiginous and difficult to accurately measure.  The component in the posterior soft tissues measures on the order of 9.2 x 3.8 by 2.5 cm and also demonstrates some postcontrast enhancement concerning for infection.  Abscess is the diagnosis of  exclusion.  The fluid collection was present on the prior exam of 07/01/2022, but the surrounding peripheral enhancement has significantly increased in comparison to the prior exam, and the new disc space abnormalities raise concern for superimposed infection.  Findings strongly concerning for discitis osteomyelitis at L2-3 as detailed above.     Patient reports less pain.  08/01/22- he is  afebrile   Less back pain noted .  08/04/22- he had interval I and D done and is now in the ICU.  All cultures reviewed.  Wound culture -07/30 Pseudomonas   Blood cultures- 06/29- Staph epidermidis  06/28- Back -bacteroides   Cutibacterium acnes       Review of Systems   Constitutional:  Negative for activity change, appetite change, chills, diaphoresis and fatigue.   HENT:  Negative for congestion, dental problem, ear discharge, ear pain and facial swelling.    Eyes:  Negative for pain, discharge and itching.   Respiratory:  Negative for apnea, cough, chest tightness and shortness of breath.    Cardiovascular:  Negative for chest pain and leg swelling.   Gastrointestinal:  Negative for abdominal distention and abdominal pain.   Endocrine: Negative for cold intolerance, heat intolerance and polydipsia.   Genitourinary:  Negative for difficulty urinating, dysuria and enuresis.   Musculoskeletal:  Positive for back pain. Negative for arthralgias.        Now has lumbar drian    Skin:  Negative for color change and pallor.   Allergic/Immunologic: Negative for environmental allergies and food allergies.   Neurological:  Negative for dizziness, facial asymmetry, light-headedness and headaches.   Hematological:  Negative for adenopathy. Does not bruise/bleed easily.   Psychiatric/Behavioral:  Negative for agitation and behavioral problems.    Objective:     Vital Signs (Most Recent):  Temp: 98.8 °F (37.1 °C) (08/04/22 1600)  Pulse: (!) 59 (08/04/22 1942)  Resp: 18 (08/04/22 1942)  BP: (!) 117/57 (08/04/22 1900)  SpO2: 100 % (08/04/22 1942)   Vital Signs (24h Range):  Temp:  [98.4 °F (36.9 °C)-99.5 °F (37.5 °C)] 98.8 °F (37.1 °C)  Pulse:  [55-81] 59  Resp:  [10-25] 18  SpO2:  [95 %-100 %] 100 %  BP: (102-155)/() 117/57     Weight: 104.3 kg (229 lb 15 oz)  Body mass index is 37.11 kg/m².    Estimated Creatinine Clearance: 91.4 mL/min (based on SCr of 0.9 mg/dL).    Physical Exam  Vitals and nursing note reviewed.    Constitutional:       General: He is not in acute distress.     Appearance: He is well-developed. He is not diaphoretic.   HENT:      Head: Normocephalic and atraumatic.      Nose: Nose normal.   Eyes:      General:         Right eye: No discharge.         Left eye: No discharge.      Conjunctiva/sclera: Conjunctivae normal.      Pupils: Pupils are equal, round, and reactive to light.   Neck:      Thyroid: No thyromegaly.      Vascular: No JVD.      Trachea: No tracheal deviation.   Cardiovascular:      Rate and Rhythm: Normal rate and regular rhythm.      Heart sounds: Normal heart sounds. No murmur heard.    No friction rub. No gallop.   Pulmonary:      Effort: Pulmonary effort is normal. No respiratory distress.      Breath sounds: Normal breath sounds. No wheezing or rales.   Chest:      Chest wall: No tenderness.   Abdominal:      General: Bowel sounds are normal. There is no distension.      Palpations: Abdomen is soft. There is no mass.      Tenderness: There is no abdominal tenderness.   Genitourinary:     Comments: deferred  Musculoskeletal:         General: No tenderness or deformity. Normal range of motion.      Cervical back: Normal range of motion and neck supple.      Comments: Lumbar drains noted    Skin:     General: Skin is warm and dry.      Capillary Refill: Capillary refill takes less than 2 seconds.      Findings: No erythema or rash.   Neurological:      Mental Status: He is alert and oriented to person, place, and time.      Motor: No abnormal muscle tone.      Coordination: Coordination normal.   Psychiatric:         Behavior: Behavior normal.       Significant Labs: Blood Culture:   Recent Labs   Lab 06/29/22  1439 06/29/22  1657 07/03/22  1015   LABBLOO Gram stain aer bottle: Gram positive cocci in clusters resembling Staph  Results called to and read back by: Alisha Eller RN. 07/02/2022  00:17  STAPHYLOCOCCUS EPIDERMIDIS* Gram stain nico bottle: Gram positive cocci in clusters  resembling Staph   Results called to and read back by: Soheila You 07/01/2022  05:47  STAPHYLOCOCCUS EPIDERMIDIS* No growth after 5 days.  No growth after 5 days.       BMP:   Recent Labs   Lab 08/04/22  0359   *      K 4.6      CO2 25   BUN 13   CREATININE 0.9   CALCIUM 8.7       CBC:   Recent Labs   Lab 08/04/22  0359   WBC 8.23   HGB 10.3*   HCT 33.2*          CMP:   Recent Labs   Lab 08/03/22  1438 08/04/22 0359   NA  --  139   K  --  4.6   CL  --  106   CO2  --  25   GLU  --  225*   BUN  --  13   CREATININE 0.9 0.9   CALCIUM  --  8.7   PROT  --  5.4*   ALBUMIN  --  2.9*   BILITOT  --  0.3   ALKPHOS  --  73   AST  --  71*   ALT  --  89*   ANIONGAP  --  8       Wound Culture:   Recent Labs   Lab 06/28/22  1950 07/30/22  0055 08/03/22  0937   LABAERO STAPHYLOCOCCUS EPIDERMIDIS  Moderate  *  No growth PSEUDOMONAS AERUGINOSA  Moderate  * No growth  No growth       All pertinent labs within the past 24 hours have been reviewed.    Significant Imaging: I have reviewed all pertinent imaging results/findings within the past 24 hours.

## 2022-08-05 NOTE — ASSESSMENT & PLAN NOTE
Will send beta 2 transferrin , follow cultures  On empiric Dapto,Cefepime     08/04/22- s/p I and D with Neurosurgery .  Will use 8 weeks of IV Zosyn and po doxycycline

## 2022-08-05 NOTE — ASSESSMENT & PLAN NOTE
- Day 0: s/p wound revision and washout with lumbar drain placement  - wound C&S sent intra-op  - Doxycycline (6 doses) completed 8/2  - Cont cefepime, vanc, flagyl   - I&D following    8/4  - Day 1: s/p wound revision and washout with lumbar drain placement  - Wound C&S pending  - Cont cefepime, vanc, flagyl course per recs  - I&D appreciated and following    8/5:  - Draining appropriately, no erythema or drainage surrounding drain sites  - Okay to temporarily clamp and have pt sit to eat and then unclamp and place back in lying position per neurosurgery recs

## 2022-08-05 NOTE — SUBJECTIVE & OBJECTIVE
Interval History: Patient with clinical improvement. Patient sitting up in bed. Plan to increase mobility today per NS. No CP/SOB. Patient tolerating diet.    Review of Systems   Constitutional:  Positive for fatigue. Negative for activity change, appetite change, chills and diaphoresis.   HENT:  Negative for congestion, dental problem, ear discharge, ear pain and facial swelling.    Eyes:  Negative for pain, discharge and itching.   Respiratory:  Negative for apnea, cough, chest tightness and shortness of breath.    Cardiovascular:  Negative for chest pain and leg swelling.   Gastrointestinal:  Negative for abdominal distention and abdominal pain.   Endocrine: Negative for cold intolerance, heat intolerance and polydipsia.   Genitourinary:  Negative for difficulty urinating, dysuria and enuresis.   Musculoskeletal:  Positive for back pain and myalgias. Negative for arthralgias.        Now has lumbar drian    Skin:  Negative for color change and pallor.   Allergic/Immunologic: Negative for environmental allergies and food allergies.   Neurological:  Positive for weakness. Negative for dizziness, facial asymmetry, light-headedness and headaches.   Hematological:  Negative for adenopathy. Does not bruise/bleed easily.   Psychiatric/Behavioral:  Negative for agitation and behavioral problems.    Objective:     Vital Signs (Most Recent):  Temp: 98.1 °F (36.7 °C) (08/05/22 0715)  Pulse: (!) 55 (08/05/22 0900)  Resp: (!) 24 (08/05/22 0900)  BP: (!) 156/86 (08/05/22 0900)  SpO2: 100 % (08/05/22 0900)   Vital Signs (24h Range):  Temp:  [97.7 °F (36.5 °C)-99.5 °F (37.5 °C)] 98.1 °F (36.7 °C)  Pulse:  [46-78] 55  Resp:  [10-25] 24  SpO2:  [95 %-100 %] 100 %  BP: ()/(55-86) 156/86     Weight: 106.3 kg (234 lb 5.6 oz)  Body mass index is 37.82 kg/m².    Intake/Output Summary (Last 24 hours) at 8/5/2022 1040  Last data filed at 8/5/2022 0900  Gross per 24 hour   Intake 3719.36 ml   Output 2461 ml   Net 1258.36 ml       Physical Exam  Vitals and nursing note reviewed.   Constitutional:       General: He is not in acute distress.     Appearance: Normal appearance. He is not ill-appearing, toxic-appearing or diaphoretic.       HENT:      Head: Normocephalic.      Mouth/Throat:      Mouth: Mucous membranes are moist.   Eyes:      Pupils: Pupils are equal, round, and reactive to light.   Cardiovascular:      Rate and Rhythm: Normal rate and regular rhythm.      Pulses: Normal pulses.           Dorsalis pedis pulses are 2+ on the right side and 2+ on the left side.      Heart sounds: No murmur heard.  Pulmonary:      Effort: No tachypnea or respiratory distress.      Breath sounds: Normal breath sounds.   Abdominal:      General: Bowel sounds are normal. There is no distension.   Musculoskeletal:         General: No tenderness.   Neurological:      Mental Status: He is alert and oriented to person, place, and time.      GCS: GCS eye subscore is 4. GCS verbal subscore is 5. GCS motor subscore is 6.      Sensory: Sensation is intact.      Motor: No weakness or atrophy.   Psychiatric:         Behavior: Behavior is cooperative.       Significant Labs: All pertinent labs within the past 24 hours have been reviewed.  Blood Culture: No results for input(s): LABBLOO in the last 48 hours.  BMP:   Recent Labs   Lab 08/05/22  0450   *      K 4.5      CO2 25   BUN 11   CREATININE 0.9   CALCIUM 8.7     CBC:   Recent Labs   Lab 08/04/22  0359 08/05/22  0450   WBC 8.23 11.27   HGB 10.3* 10.7*   HCT 33.2* 34.2*    201     CMP:   Recent Labs   Lab 08/03/22  1438 08/04/22  0359 08/05/22  0450   NA  --  139 140   K  --  4.6 4.5   CL  --  106 108   CO2  --  25 25   GLU  --  225* 228*   BUN  --  13 11   CREATININE 0.9 0.9 0.9   CALCIUM  --  8.7 8.7   PROT  --  5.4* 5.9*   ALBUMIN  --  2.9* 2.8*   BILITOT  --  0.3 0.2   ALKPHOS  --  73 73   AST  --  71* 43*   ALT  --  89* 69*   ANIONGAP  --  8 7*       POCT Glucose:   Recent Labs    Lab 08/04/22  1747 08/04/22  2346 08/05/22  0456   POCTGLUCOSE 224* 277* 236*     Imaging Results               MRI Lumbar Spine W WO Cont (Final result)  Result time 07/30/22 19:53:25      Final result by Dale Cotton MD (07/30/22 19:53:25)                   Impression:      Findings strongly concerning for discitis osteomyelitis at L2-3 as detailed above.  Probable epidural phlegmon or abscess extending along the left aspect of the thecal sac and into the posterior soft tissues with the previously noted fluid collection now demonstrating significantly increased peripheral enhancement concerning for superimposed infection.  Neuro surgical consultation would be advised if not previously obtained.    This report was flagged in Epic as abnormal.      Electronically signed by: Dale Cotton  Date:    07/30/2022  Time:    19:53               Narrative:    EXAMINATION:  MRI LUMBAR SPINE W WO CONTRAST    CLINICAL HISTORY:  lower back pain;    TECHNIQUE:  Multiplanar, multisequence MR images were acquired from the thoracolumbar junction to the sacrum without the administration of contrast.    COMPARISON:  Multiple priors.    FINDINGS:  Patient status post hemilaminectomy and discectomy at L2-3 on the left.  Associated with the surgical area of there is fluid within the disc space, bone marrow edema, and abnormal postcontrast enhancement of the in plates about the disc space and disc which extends along the left aspect of the spinal canal and into the posterior soft tissues concerning for discitis osteomyelitis with epidural abscess extending into the posterior soft tissues.  The fluid collection in the posterior soft tissues is noted both within the laminectomy bed measuring a proximally 3.1 x 2.7 x 2.3 cm in this area, and with extension inferiorly along the L3 lamina and L1 inferior lamina, and also into the posterior soft tissues.  The fluid collection is significantly serpiginous and difficult to accurately  measure.  The component in the posterior soft tissues measures on the order of 9.2 x 3.8 by 2.5 cm and also demonstrates some postcontrast enhancement concerning for infection.  Abscess is the diagnosis of  exclusion.  The fluid collection was present on the prior exam of 07/01/2022, but the surrounding peripheral enhancement has significantly increased in comparison to the prior exam, and the new disc space abnormalities raise concern for superimposed infection.    Alignment: Normal.    Vertebrae: Aside from the surgical levels, there is normal marrow signal. No fracture.    Cord: Normal.  Conus terminates at L1    Degenerative findings:    At the nonsurgical levels the degenerative changes are stable from the prior.    The epidural collection extending such as seen on series 6, image 16 and series 9, image 17 now produces moderate spinal canal stenosis.    Paraspinal muscles & soft tissues: As above.                                        Significant Imaging: I have reviewed all pertinent imaging results/findings within the past 24 hours.

## 2022-08-05 NOTE — PHYSICIAN QUERY
PT Name: Friday ROGELIO Blake  MR #: [id number]     Documentation Clarification      CDS/: Marisela Short RN, CDS               Contact information: [email or web address]    This form is a permanent document in the medical record.     Query Date: August 5, 2022    By submitting this query, we are merely seeking further clarification of documentation. Please utilize your independent clinical judgment when addressing the question(s) below.    The Medical Record reflects the following:    Supporting Clinical Findings Location in Medical Record     --Laminectomy of lumbar spine on 6/22/2022 and I & D of hematoma on 6/28/2022.  --Wound dehiscence      -As of 7/31/2022, patient feeling well, vital signs and labs stable. MRI lumbar spine strongly concerning for discitis osteomyelitis at L2-3, probable epidural phlegmon or abscess       -Continue IV antibiotics    --Discitis of lumbar region           -See 1 wound dehiscence       -8/3- s/p Wound revision and washout with Lumbar Drain placement      Hosp med Note 7/31              Hosp med Note 8/5     Aerobic Bacterial Culture  Abnormal     PSEUDOMONAS AERUGINOSA   Moderate     --will switch to vancomycin as we anticipate longer hospital stay , continue cefepime, doxycycline/flagyl     Back Incision 7/30      Infectious Disease Note 8/4                                                                            Provider, please further specify the discitis diagnosis.    [  x ] Pyogenic/Infectious discitis of lumbar region   [   ] Nonpyogenic/Noninfectious discitis of lumbar region   [   ] Other (please specify): ____________   [  ] Clinically undetermined                                                                                                                        Stop Irbesartan and start Lisinopril 40 mg daily    Continue Chlorthalidone.    Start lexapro for anxiety and depression.  Start with half a tab x 1 week, then increase to 1 tab daily.    Bring in BP machine next visit.

## 2022-08-05 NOTE — ASSESSMENT & PLAN NOTE
--wound cx pending  --Neuro surgery consulted  --ID consulted      7/30/2022  Neurosurgery following    Possible washout vs wound vac placement   ID following, continue empiric IV antibiotic        7/31/2022  MRI of lumbar spine strongly concerning for discitis osteomyelitis at L2-3, probable epidural phlegmon or abscess.   Neurosurgery following, will need revision with washout. Will follow.   Continue IV antibiotics  Wound cultures in progress      8/1- continue IV Abx, local wound care  Await revision and wound exploration with plastic surgery    8/2- surgery tomorrow, cont iV abx    8/3- s/p Wound revision and washout with Lumbar Drain placement  8/4- improving- continue same tx  8/5 Stable - Per NS

## 2022-08-05 NOTE — CONSULTS
Pharmacy consult sign off    Therapy with vancomycin is complete and/or consult has been discontinued by the provider.     Pharmacy will sign off. Please re-consult as needed.    Thank you for allowing us to participate in this patient's care.   Fabrizio Newton, PharmD 8/4/2022 8:03 PM

## 2022-08-05 NOTE — ASSESSMENT & PLAN NOTE
See 1 wound dehiscence    On IV Abx, await washout surgery with Plastic Surgery tomorrow    See above    8/5  POD#1  Per N/s

## 2022-08-05 NOTE — ASSESSMENT & PLAN NOTE
Body mass index is 37.82 kg/m². Morbid obesity complicates all aspects of disease management from diagnostic modalities to treatment. Weight loss encouraged and health benefits explained to patient.

## 2022-08-05 NOTE — PLAN OF CARE
Patient AAO x 4, VSS.  Hourly monitoring of CSF fluid from lumbar drain. No issues.  Roche and PICC line remain in place.   Bed in lowest position, bed alarm on, call light in reach.

## 2022-08-05 NOTE — PROGRESS NOTES
[facility] ([facility])  Critical Care Medicine  Progress Note    Patient Name: Friday ROGELIO Blake  MRN: [id number]  Admission Date: 7/29/2022  Hospital Length of Stay: 5 days  Code Status: Full Code  Attending Provider: Mika Bonilla MD  Primary Care Provider: Sid Elizabeth MD   Principal Problem: Wound dehiscence    Subjective:     HPI:  66 year old male with PMH including DM; HTN; obesity; parathyroidectomy in 7/2021; and in June of 2022 he underwent lumbar laminectomy and then had hematoma removal about a week later, during that admission he developed a gram positive bacteremia and ultimately discharged with PICC line and abx with plan to transition to oral augmentin later for a trip to [facility] for his daughter's wedding.    On 7/29 he presented to ED after returning from his trip to [facility] with report of wound drainage  Evaluation revealed +COVID with no symptoms; +drainage from back incision; no wbc elevation; no fever; glucose 159  Admitted for lumbar wound dehiscence  MRI showed Findings strongly concerning for discitis osteomyelitis at L2-3 as detailed above.  Probable epidural phlegmon or abscess extending along the left aspect of the thecal sac and into the posterior soft tissues with the previously noted fluid collection now demonstrating significantly increased peripheral enhancement concerning for superimposed infection.  ID and neuro surgery follow   Started broad spectrum abx; wound cultures have grown psuedomonas and abx adjusted with plan for prolonged abx course  Taken to OR 8/3 by NeuroSurgery for wound exploration, repair of CSF leak, and lumbar drain placement      Hospital/ICU Course:  8/3: Transferred to ICU after anesthesia recovery for monitoring and lumbar drain management. C/S samples sent. Pt c/o mild pressure to back. Vital signs stable.   8/4: Resting in bed watching TV in no apparent distress in right lateral recumbent position. VSS. Lumbar drain with ~110ccs. Pt states  back pain improved. Labs reviewed  8/5: Patient resting in no distress, vital signs stable. No swelling noted to lumbar site. Lumbar drain ~150ccs. Labs reviewed       Review of Systems   Constitutional:  Positive for malaise/fatigue. Negative for fever.   Respiratory:  Negative for cough, hemoptysis, sputum production and shortness of breath.    Cardiovascular:  Negative for chest pain and palpitations.   Gastrointestinal:  Negative for abdominal pain, nausea and vomiting.   Genitourinary:  Negative for flank pain.   Musculoskeletal:  Positive for back pain (tenderness- lumbar site). Negative for myalgias and neck pain.   Skin:  Negative for itching and rash.   Neurological:  Negative for dizziness, tingling, sensory change, focal weakness, seizures, weakness and headaches.      Objective:     Vital Signs (Most Recent):  Temp: 98.1 °F (36.7 °C) (08/05/22 0715)  Pulse: (!) 53 (08/05/22 0808)  Resp: 16 (08/05/22 0808)  BP: (!) 157/78 (08/05/22 0800)  SpO2: 100 % (08/05/22 0808)   Vital Signs (24h Range):  Temp:  [97.7 °F (36.5 °C)-99.5 °F (37.5 °C)] 98.1 °F (36.7 °C)  Pulse:  [46-78] 53  Resp:  [10-25] 16  SpO2:  [95 %-100 %] 100 %  BP: ()/() 157/78     Weight: 106.3 kg (234 lb 5.6 oz)  Body mass index is 37.82 kg/m².      Intake/Output Summary (Last 24 hours) at 8/5/2022 0825  Last data filed at 8/5/2022 0800  Gross per 24 hour   Intake 3776.69 ml   Output 2460 ml   Net 1316.69 ml       Physical Exam  Vitals and nursing note reviewed.   Constitutional:       General: He is not in acute distress.     Appearance: Normal appearance. He is not ill-appearing, toxic-appearing or diaphoretic.       HENT:      Head: Normocephalic.      Mouth/Throat:      Mouth: Mucous membranes are moist.   Eyes:      Pupils: Pupils are equal, round, and reactive to light.   Cardiovascular:      Rate and Rhythm: Normal rate and regular rhythm.      Pulses: Normal pulses.           Dorsalis pedis pulses are 2+ on the right side  and 2+ on the left side.      Heart sounds: No murmur heard.  Pulmonary:      Effort: No tachypnea or respiratory distress.      Breath sounds: Normal breath sounds.   Abdominal:      General: Bowel sounds are normal. There is no distension.   Musculoskeletal:         General: No tenderness.   Neurological:      Mental Status: He is alert and oriented to person, place, and time.      GCS: GCS eye subscore is 4. GCS verbal subscore is 5. GCS motor subscore is 6.      Sensory: Sensation is intact.      Motor: No weakness or atrophy.   Psychiatric:         Behavior: Behavior is cooperative.       Vents:  Oxygen Concentration (%): 24 (08/04/22 0817)    Lines/Drains/Airways       Peripherally Inserted Central Catheter Line  Duration             PICC Double Lumen 08/01/22 1540 left brachial 3 days              Drain  Duration                  Urethral Catheter 08/03/22 0745 Straight-tip;Silicone 16 Fr. 2 days         Closed/Suction Drain 08/03/22 1144 Right Back Accordion 10 Fr. 1 day         Closed/Suction Drain 08/03/22 1145 Left Back Accordion 10 Fr. 1 day         Drain/Device  08/03/22 1154 Right midline lumbar spine gravity 1 day              Peripheral Intravenous Line  Duration                  Peripheral IV - Single Lumen 07/29/22 1900 20 G Left;Posterior Hand 6 days                    Significant Labs:    CBC/Anemia Profile:  Recent Labs   Lab 08/04/22  0359 08/04/22  2339 08/05/22  0450   WBC 8.23  --  11.27   HGB 10.3*  --  10.7*   HCT 33.2*  --  34.2*     --  201   MCV 88  --  89   RDW 15.3*  --  15.4*   FERRITIN  --  165  --         Chemistries:  Recent Labs   Lab 08/03/22  1438 08/04/22  0359 08/05/22  0450   NA  --  139 140   K  --  4.6 4.5   CL  --  106 108   CO2  --  25 25   BUN  --  13 11   CREATININE 0.9 0.9 0.9   CALCIUM  --  8.7 8.7   ALBUMIN  --  2.9* 2.8*   PROT  --  5.4* 5.9*   BILITOT  --  0.3 0.2   ALKPHOS  --  73 73   ALT  --  89* 69*   AST  --  71* 43*       Bilirubin:   Recent Labs    Lab 07/12/22  0911 07/29/22  1724 08/04/22  0359 08/05/22  0450   BILITOT 0.3 0.3 0.3 0.2       All pertinent labs within the past 24 hours have been reviewed.    Significant Imaging:  I have reviewed all pertinent imaging results/findings within the past 24 hours.      ABG  No results for input(s): PH, PO2, PCO2, HCO3, BE in the last 168 hours.  Assessment/Plan:     Neuro  Postoperative CSF leak  - lumbar drain in place  - neurosx following; monitor drainage output per recs  - neuro checks q4    Cardiac/Vascular  HLD (hyperlipidemia)  - Cont statin    Hypertension  - Controlled  - Cont home losartan  - Hemodynamic monitoring     8/4:  - per hospital med mgmt  - pain control  - suggest PRN if not controlled with resuming home meds    ID  COVID  - currently asymptomatic  - fully vaccinated +booster  - diagnosed 7/29  - albuterol & supplemental O2 PRN    8/4:  - remains asymptomatic with no hypoxia  - lovenox adjusted to prophylactic dose  - pulse ox monitoring     8/5:  - unchanged, remains asymptomatic with no evidence of hypoxia or c/o SOB  - pulse ox monitoring    Endocrine  Diabetes mellitus without complication  - monitor POCT gluc q6h d/t steroid use   - mod SSI added     8/4:  - detemir 10u qHS added  - mod SSI  - goal glucose <180      8/5:  - Detemir increased from 10u daily to BID  - mod SSI   - POCT from q6 to q4h for tighter glycemic control      Orthopedic  * Wound dehiscence with CSF leak s/p Washout and closure with Lumbar drain  - Day 0: s/p wound revision and washout with lumbar drain placement  - wound C&S sent intra-op  - Doxycycline (6 doses) completed 8/2  - Cont cefepime, vanc, flagyl   - I&D following    8/4  - Day 1: s/p wound revision and washout with lumbar drain placement  - Wound C&S pending  - Cont cefepime, vanc, flagyl course per recs  - I&D appreciated and following    8/5:  - Draining appropriately, no erythema or drainage surrounding drain sites  - Okay to temporarily clamp and  have pt sit to eat and then unclamp and place back in lying position per neurosurgery recs     Critical Care Daily Checklist:    A: Awake: RASS Goal/Actual Goal:    Actual: Perez Agitation Sedation Scale (RASS): Alert and calm   B: Spontaneous Breathing Trial Performed?     C: SAT & SBT Coordinated?  n/a                      D: Delirium: CAM-ICU Overall CAM-ICU: Negative   E: Early Mobility Performed? Yes per neuro   F: Feeding Goal:    Status:     Current Diet Order   Procedures    Diet Adult Regular (IDDSI Level 7) [facility]; Consistent Carbohydrate; Isolation Tray - Styrofoam     Order Specific Question:   Indicate patient location for additional diet options:     Answer:   [facility]     Order Specific Question:   Additional Diet Options:     Answer:   Consistent Carbohydrate     Order Specific Question:   Tray type:     Answer:   Isolation Tray - Styrofoam      AS: Analgesia/Sedation Reviewed   T: Thromboembolic Prophylaxis Lovenox   H: HOB > 300 Intermittently per neuro rec   U: Stress Ulcer Prophylaxis (if needed) Pepcid   G: Glucose Control Levemir/mod SSI   B: Bowel Function     I: Indwelling Catheter (Lines & Mendiola) Necessity Reviewed- discontinue mendiola today   D: De-escalation of Antimicrobials/Pharmacotherapies Reviewed    Plan for the day/ETD Monitor lumbar drainage    Code Status:  Family/Goals of Care: Full Code  Discussed directly with patient at bedside   I have discussed case and plan of care in detail with Dr. Knox and ADELITA Rooney; Status and plan of care were discussed with team on multidisciplinary rounds.    Nothing to contribute from critical care or pulmonary standpoint; plan to follow intermittently with multidisciplinary rounds, discussed with Dr. Zepeda and agreed. Please call or contact if needed. Thank you for your consult     Mer Brink NP  Critical Care Medicine  '[place] - [facility] ([facility])

## 2022-08-05 NOTE — SUBJECTIVE & OBJECTIVE
Review of Systems   Constitutional:  Positive for malaise/fatigue. Negative for fever.   Respiratory:  Negative for cough, hemoptysis, sputum production and shortness of breath.    Cardiovascular:  Negative for chest pain and palpitations.   Gastrointestinal:  Negative for abdominal pain, nausea and vomiting.   Genitourinary:  Negative for flank pain.   Musculoskeletal:  Positive for back pain (tenderness- lumbar site). Negative for myalgias and neck pain.   Skin:  Negative for itching and rash.   Neurological:  Negative for dizziness, tingling, sensory change, focal weakness, seizures, weakness and headaches.      Objective:     Vital Signs (Most Recent):  Temp: 98.1 °F (36.7 °C) (08/05/22 0715)  Pulse: (!) 53 (08/05/22 0808)  Resp: 16 (08/05/22 0808)  BP: (!) 157/78 (08/05/22 0800)  SpO2: 100 % (08/05/22 0808)   Vital Signs (24h Range):  Temp:  [97.7 °F (36.5 °C)-99.5 °F (37.5 °C)] 98.1 °F (36.7 °C)  Pulse:  [46-78] 53  Resp:  [10-25] 16  SpO2:  [95 %-100 %] 100 %  BP: ()/() 157/78     Weight: 106.3 kg (234 lb 5.6 oz)  Body mass index is 37.82 kg/m².      Intake/Output Summary (Last 24 hours) at 8/5/2022 0825  Last data filed at 8/5/2022 0800  Gross per 24 hour   Intake 3776.69 ml   Output 2460 ml   Net 1316.69 ml       Physical Exam  Vitals and nursing note reviewed.   Constitutional:       General: He is not in acute distress.     Appearance: Normal appearance. He is not ill-appearing, toxic-appearing or diaphoretic.       HENT:      Head: Normocephalic.      Mouth/Throat:      Mouth: Mucous membranes are moist.   Eyes:      Pupils: Pupils are equal, round, and reactive to light.   Cardiovascular:      Rate and Rhythm: Normal rate and regular rhythm.      Pulses: Normal pulses.           Dorsalis pedis pulses are 2+ on the right side and 2+ on the left side.      Heart sounds: No murmur heard.  Pulmonary:      Effort: No tachypnea or respiratory distress.      Breath sounds: Normal breath sounds.    Abdominal:      General: Bowel sounds are normal. There is no distension.   Musculoskeletal:         General: No tenderness.   Neurological:      Mental Status: He is alert and oriented to person, place, and time.      GCS: GCS eye subscore is 4. GCS verbal subscore is 5. GCS motor subscore is 6.      Sensory: Sensation is intact.      Motor: No weakness or atrophy.   Psychiatric:         Behavior: Behavior is cooperative.       Vents:  Oxygen Concentration (%): 24 (08/04/22 0817)    Lines/Drains/Airways       Peripherally Inserted Central Catheter Line  Duration             PICC Double Lumen 08/01/22 1540 left brachial 3 days              Drain  Duration                  Urethral Catheter 08/03/22 0745 Straight-tip;Silicone 16 Fr. 2 days         Closed/Suction Drain 08/03/22 1144 Right Back Accordion 10 Fr. 1 day         Closed/Suction Drain 08/03/22 1145 Left Back Accordion 10 Fr. 1 day         Drain/Device  08/03/22 1154 Right midline lumbar spine gravity 1 day              Peripheral Intravenous Line  Duration                  Peripheral IV - Single Lumen 07/29/22 1900 20 G Left;Posterior Hand 6 days                    Significant Labs:    CBC/Anemia Profile:  Recent Labs   Lab 08/04/22  0359 08/04/22  2339 08/05/22  0450   WBC 8.23  --  11.27   HGB 10.3*  --  10.7*   HCT 33.2*  --  34.2*     --  201   MCV 88  --  89   RDW 15.3*  --  15.4*   FERRITIN  --  165  --         Chemistries:  Recent Labs   Lab 08/03/22  1438 08/04/22  0359 08/05/22  0450   NA  --  139 140   K  --  4.6 4.5   CL  --  106 108   CO2  --  25 25   BUN  --  13 11   CREATININE 0.9 0.9 0.9   CALCIUM  --  8.7 8.7   ALBUMIN  --  2.9* 2.8*   PROT  --  5.4* 5.9*   BILITOT  --  0.3 0.2   ALKPHOS  --  73 73   ALT  --  89* 69*   AST  --  71* 43*       Bilirubin:   Recent Labs   Lab 07/12/22  0911 07/29/22  1724 08/04/22  0359 08/05/22  0450   BILITOT 0.3 0.3 0.3 0.2       All pertinent labs within the past 24 hours have been  reviewed.    Significant Imaging:  I have reviewed all pertinent imaging results/findings within the past 24 hours.

## 2022-08-05 NOTE — PROGRESS NOTES
[facility] ([facility])  Infectious Disease  Progress Note    Patient Name: Friday ROGELIO Blake  MRN: [id number]  Admission Date: 7/29/2022  Length of Stay: 4 days  Attending Physician: Kuldeep Bermudez MD  Primary Care Provider: Sid Elizabeth MD    Isolation Status: Airborne and Contact and Droplet  Assessment/Plan:      * Wound dehiscence with CSF leak s/p Washout and closure with Lumbar drain  All previous cultures reviewed.  Will follow MRI of the spine and new cultures.    Cultures=06/29- aerobic culture- [facility]  CUTIBACTERIUM ACNES   06/28-BACTEROIDES FRAGILIS   06/28- staph epi -blood      Is this CSF leak ? Will send fluid for   Beta (?)-2 transferrin testing  For now will use IV daptomycin/cefepime , add empiric doxycycline/flagyl  Follow repeat cultures .  If no pseudomonas, plan will be 6 weeks of Daptomycin/Rocephin and then 3-6 months of doxycycline     08/01- will switch to vancomycin as we anticipate longer hospital stay , continue cefepime, doxycycline/flagyl.  Will plan I and D as per neurosurgery    08/04- cultures reviewed -All cultures reviewed.  Wound culture -07/30 Pseudomonas   Blood cultures- 06/29- Staph epidermidis  06/28- Back -bacteroides   Cutibacterium acnes     Will plan to use 8  weeks of IV Zosyn and po doxycycline   Will follow final operative  cultures   Will check weekly ESR, CRP   Will need to check repeat MRI of the spine before stopping regime .  EOC -09/30/22    Discitis of lumbar region  MRI of the lumbar spine-  Findings strongly concerning for discitis osteomyelitis at L2-3-  . The fluid collection in the posterior soft tissues is noted both within the laminectomy bed measuring a proximally 3.1 x 2.7 x 2.3 cm in this area, and with extension inferiorly along the L3 lamina and L1 inferior lamina, and also into the posterior soft tissues.  The fluid collection is significantly serpiginous and difficult to accurately measure.  The component in the posterior soft tissues  measures on the order of 9.2 x 3.8 by 2.5 cm and also demonstrates some postcontrast enhancement concerning for infection    Will need cultures to guide regime, will discuss with Dr Zepeda  Will continue Daptomycin, Cefepime ,flagyl and doxycycline .  Previous cultures-  Cultures=06/29- aerobic culture- [facility]  CUTIBACTERIUM ACNES   06/28-BACTEROIDES FRAGILIS   06/28- staph epi -blood      08/01- case discussed with Dr Zepeda- will get operative cultures this week.  Continue Cefepime , switch to vancomycin , doxycycline and flagyl- will use operative cultures to guide regime    08/04/22- will use 8 weeks of IV Zosyn, doxycycline based on all cultures dating back to 06/28/22  Will follow weekly ESR , CRP    Postoperative CSF leak  Will send beta 2 transferrin , follow cultures  On empiric Dapto,Cefepime     08/04/22- s/p I and D with Neurosurgery .  Will use 8 weeks of IV Zosyn and po doxycycline     COVID  - COVID-19 testing   - Infection Control notified     - Isolation:   - Airborne, Contact and Droplet Precautions  - Cohort patients into COVID units              - Limit visitors per hospital policy              - Consolidating lab draws, nursing care, provider visits, and interventions      - Management:  Supplemental O2 to maintain SpO2 >92%  Continuous/intermittent Pulse Ox  Albuterol treatment PRN    Advance Care Planning            Hypertension  BP control as per primary team    Morbid obesity with BMI of 40.0-44.9, adult  Out patient follow up fpr weight loss regime     Diabetes mellitus without complication  Insulin sliding scale , diabetic diet          Anticipated Disposition:     Thank you for your consult. I will follow-up with patient. Please contact us if you have any additional questions.    Tha Hodge MD  Infectious Disease  [facility])    Subjective:     Principal Problem:Wound dehiscence    HPI:  66 year old man with PMHx of DM, HTN, hypercalcemia, and hyperlipidemia who  presents to the Emergency Department for evaluation of post-op complication.    His surgery history-    06/22/22-  LAMINECTOMY, SPINE, LUMBAR, WITH DISCECTOMY (Left) L2-3      06/26/22  Hemilaminectomy, partial medial facetectomy and foraminotomy L2-3 on left   with discectomy using microscopic dissection   06/28-22-  Fluid collection ventral and lateral consistent with epidural epidural hematoma  compression   Retained disc fragment   Cultures=06/29- aerobic culture- [facility]  CUTIBACTERIUM ACNES   06/28-BACTEROIDES FRAGILIS   06/28- staph epi -blood    He still has persistent leakage from the back and had recent travel to [facility] [place].  MRI of the spine -pending   ESR -55, crp-normal      Interval History:   66 year old man with complicated post op course.  06/22/22-  LAMINECTOMY, SPINE, LUMBAR, WITH DISCECTOMY (Left) L2-3      06/26/22  Hemilaminectomy, partial medial facetectomy and foraminotomy L2-3 on left   with discectomy using microscopic dissection   06/28-22-  Fluid collection ventral and lateral consistent with epidural epidural hematoma  compression   Retained disc fragment   Cultures=06/29- aerobic culture- [facility]  CUTIBACTERIUM ACNES   06/28-BACTEROIDES FRAGILIS   06/28- staph epi -blood      MRI of the lumbar region -   Patient status post hemilaminectomy and discectomy at L2-3 on the left.  Associated with the surgical area of there is fluid within the disc space, bone marrow edema, and abnormal postcontrast enhancement of the in plates about the disc space and disc which extends along the left aspect of the spinal canal and into the posterior soft tissues concerning for discitis osteomyelitis with epidural abscess extending into the posterior soft tissues.  The fluid collection in the posterior soft tissues is noted both within the laminectomy bed measuring a proximally 3.1 x 2.7 x 2.3 cm in this area, and with extension inferiorly along the L3 lamina and L1 inferior lamina, and also into the posterior soft  tissues.  The fluid collection is significantly serpiginous and difficult to accurately measure.  The component in the posterior soft tissues measures on the order of 9.2 x 3.8 by 2.5 cm and also demonstrates some postcontrast enhancement concerning for infection.  Abscess is the diagnosis of  exclusion.  The fluid collection was present on the prior exam of 07/01/2022, but the surrounding peripheral enhancement has significantly increased in comparison to the prior exam, and the new disc space abnormalities raise concern for superimposed infection.  Findings strongly concerning for discitis osteomyelitis at L2-3 as detailed above.     Patient reports less pain.  08/01/22- he is afebrile   Less back pain noted .  08/04/22- he had interval I and D done and is now in the ICU.  All cultures reviewed.  Wound culture -07/30 Pseudomonas   Blood cultures- 06/29- Staph epidermidis  06/28- Back -bacteroides   Cutibacterium acnes       Review of Systems   Constitutional:  Negative for activity change, appetite change, chills, diaphoresis and fatigue.   HENT:  Negative for congestion, dental problem, ear discharge, ear pain and facial swelling.    Eyes:  Negative for pain, discharge and itching.   Respiratory:  Negative for apnea, cough, chest tightness and shortness of breath.    Cardiovascular:  Negative for chest pain and leg swelling.   Gastrointestinal:  Negative for abdominal distention and abdominal pain.   Endocrine: Negative for cold intolerance, heat intolerance and polydipsia.   Genitourinary:  Negative for difficulty urinating, dysuria and enuresis.   Musculoskeletal:  Positive for back pain. Negative for arthralgias.        Now has lumbar drian    Skin:  Negative for color change and pallor.   Allergic/Immunologic: Negative for environmental allergies and food allergies.   Neurological:  Negative for dizziness, facial asymmetry, light-headedness and headaches.   Hematological:  Negative for adenopathy. Does not  bruise/bleed easily.   Psychiatric/Behavioral:  Negative for agitation and behavioral problems.    Objective:     Vital Signs (Most Recent):  Temp: 98.8 °F (37.1 °C) (08/04/22 1600)  Pulse: (!) 59 (08/04/22 1942)  Resp: 18 (08/04/22 1942)  BP: (!) 117/57 (08/04/22 1900)  SpO2: 100 % (08/04/22 1942)   Vital Signs (24h Range):  Temp:  [98.4 °F (36.9 °C)-99.5 °F (37.5 °C)] 98.8 °F (37.1 °C)  Pulse:  [55-81] 59  Resp:  [10-25] 18  SpO2:  [95 %-100 %] 100 %  BP: (102-155)/() 117/57     Weight: 104.3 kg (229 lb 15 oz)  Body mass index is 37.11 kg/m².    Estimated Creatinine Clearance: 91.4 mL/min (based on SCr of 0.9 mg/dL).    Physical Exam  Vitals and nursing note reviewed.   Constitutional:       General: He is not in acute distress.     Appearance: He is well-developed. He is not diaphoretic.   HENT:      Head: Normocephalic and atraumatic.      Nose: Nose normal.   Eyes:      General:         Right eye: No discharge.         Left eye: No discharge.      Conjunctiva/sclera: Conjunctivae normal.      Pupils: Pupils are equal, round, and reactive to light.   Neck:      Thyroid: No thyromegaly.      Vascular: No JVD.      Trachea: No tracheal deviation.   Cardiovascular:      Rate and Rhythm: Normal rate and regular rhythm.      Heart sounds: Normal heart sounds. No murmur heard.    No friction rub. No gallop.   Pulmonary:      Effort: Pulmonary effort is normal. No respiratory distress.      Breath sounds: Normal breath sounds. No wheezing or rales.   Chest:      Chest wall: No tenderness.   Abdominal:      General: Bowel sounds are normal. There is no distension.      Palpations: Abdomen is soft. There is no mass.      Tenderness: There is no abdominal tenderness.   Genitourinary:     Comments: deferred  Musculoskeletal:         General: No tenderness or deformity. Normal range of motion.      Cervical back: Normal range of motion and neck supple.      Comments: Lumbar drains noted    Skin:     General: Skin is  warm and dry.      Capillary Refill: Capillary refill takes less than 2 seconds.      Findings: No erythema or rash.   Neurological:      Mental Status: He is alert and oriented to person, place, and time.      Motor: No abnormal muscle tone.      Coordination: Coordination normal.   Psychiatric:         Behavior: Behavior normal.       Significant Labs: Blood Culture:   Recent Labs   Lab 06/29/22  1439 06/29/22  1657 07/03/22  1015   LABBLOO Gram stain aer bottle: Gram positive cocci in clusters resembling Staph  Results called to and read back by: Alisha Eller RN. 07/02/2022  00:17  STAPHYLOCOCCUS EPIDERMIDIS* Gram stain nico bottle: Gram positive cocci in clusters resembling Staph   Results called to and read back by: Soheila You 07/01/2022  05:47  STAPHYLOCOCCUS EPIDERMIDIS* No growth after 5 days.  No growth after 5 days.       BMP:   Recent Labs   Lab 08/04/22  0359   *      K 4.6      CO2 25   BUN 13   CREATININE 0.9   CALCIUM 8.7       CBC:   Recent Labs   Lab 08/04/22  0359   WBC 8.23   HGB 10.3*   HCT 33.2*          CMP:   Recent Labs   Lab 08/03/22  1438 08/04/22  0359   NA  --  139   K  --  4.6   CL  --  106   CO2  --  25   GLU  --  225*   BUN  --  13   CREATININE 0.9 0.9   CALCIUM  --  8.7   PROT  --  5.4*   ALBUMIN  --  2.9*   BILITOT  --  0.3   ALKPHOS  --  73   AST  --  71*   ALT  --  89*   ANIONGAP  --  8       Wound Culture:   Recent Labs   Lab 06/28/22  1950 07/30/22  0055 08/03/22  0937   LABAERO STAPHYLOCOCCUS EPIDERMIDIS  Moderate  *  No growth PSEUDOMONAS AERUGINOSA  Moderate  * No growth  No growth       All pertinent labs within the past 24 hours have been reviewed.    Significant Imaging: I have reviewed all pertinent imaging results/findings within the past 24 hours.

## 2022-08-05 NOTE — PROGRESS NOTES
[facility] Intensive Care ([facility])  [facility] Medicine  Progress Note    Patient Name: Friday ROGELIO Blake  MRN: [id number]  Patient Class: IP- Inpatient   Admission Date: 7/29/2022  Length of Stay: 5 days  Attending Physician: Mika Bonilla MD  Primary Care Provider: Sid Elizabeth MD        Subjective:     Principal Problem:Wound dehiscence        HPI:  65 y/o male with PMHx of HTN, HLD, DM, and obesity who presented tot he ED for eval of post op incision leaking.  Sx are constant and moderate in severity. No mitigating or exacerbating factors reported. Pt had surgery on 7/26, then took a trip to [facility]. He flew back last night. Pt denies fever, chills, pain, HA, N/V, and all other sx at this time.  ED workup shows: H/H 11.6/36.9, COVID +  He will be kept on OBS for CSF leak under the care of [facility] medicine  He is a full code and his SDM is his wife             Overview/Hospital Course:  Mr Blake is a 66 year old male who presented to [facility] Emergency Room of evaluation of wound dehiscence. Patient underwent Laminectomy of lumbar spine on 6/22/2022 and I & D of hematoma on 6/28/2022. Traveled to [facility] for daughter's wedding. Positive for drainage from low back surgical wound. Neurosurgery following, plan possible washout vs wound vac placement.  Infectious Disease following, continue empiric IV antibiotics. COVID positive. As of 7/31/2022, patient feeling well, vital signs and labs stable. MRI lumbar spine strongly concerning for discitis osteomyelitis at L2-3, probable epidural phlegmon or abscess. Neurosurgery following, will need revision with washout. Will follow.     8/1- cheerful, resting comfortably in bed on RA, no fever, chills or cough. Still has drainage from the Lumbar wound. Dr. Zepeda plans revision with wound washout/exploration with the help of Plastic Surgery, await confirmation date. Pt agreeable with the plan. Continue wound care, IV Abx, no wound vac yet.     8/2- comfortable on RA,  wound Cx growing Pseudomonas, earlier Cx grew Staph Epi- so now on Vanc, Cefepime and Flagyl. Dr. Zepeda plans Surgery- Washout with possible drain placement tomorrow, pt agreeable.     8/3- seen post op in the ICU, doing well, c/o back pain at the surgical site, just got oxycodone. Dr. Zepeda put him on Dexa 4 mg IV qid post op, he continues to received Cefepime, Flagyl and Vanc. Fluid sent to labs for C/S.     8/4- looks and feels better, lying flat on his side, back pain improved, no NV, dizziness or HA. Lumbar Drain output 11 cc. Dr. Zepeda d/mónica the foleys and is weaning the steroids. All Cx from Surgery remain NGDT. Getting Vanc, Cefepime and oral Flagyl. Last wound Cx grew Pseudomonas.   WBC 8.2, H/H 10.3/32. BS high due to Dexa.     8/5 - Patient with clinical improvement. Patient sitting up in bed. Plan to increase mobility today per NS. No CP/SOB. Patient tolerating diet.      Interval History: Patient with clinical improvement. Patient sitting up in bed. Plan to increase mobility today per NS. No CP/SOB. Patient tolerating diet.    Review of Systems   Constitutional:  Positive for fatigue. Negative for activity change, appetite change, chills and diaphoresis.   HENT:  Negative for congestion, dental problem, ear discharge, ear pain and facial swelling.    Eyes:  Negative for pain, discharge and itching.   Respiratory:  Negative for apnea, cough, chest tightness and shortness of breath.    Cardiovascular:  Negative for chest pain and leg swelling.   Gastrointestinal:  Negative for abdominal distention and abdominal pain.   Endocrine: Negative for cold intolerance, heat intolerance and polydipsia.   Genitourinary:  Negative for difficulty urinating, dysuria and enuresis.   Musculoskeletal:  Positive for back pain and myalgias. Negative for arthralgias.        Now has lumbar drian    Skin:  Negative for color change and pallor.   Allergic/Immunologic: Negative for environmental allergies and food allergies.    Neurological:  Positive for weakness. Negative for dizziness, facial asymmetry, light-headedness and headaches.   Hematological:  Negative for adenopathy. Does not bruise/bleed easily.   Psychiatric/Behavioral:  Negative for agitation and behavioral problems.    Objective:     Vital Signs (Most Recent):  Temp: 98.1 °F (36.7 °C) (08/05/22 0715)  Pulse: (!) 55 (08/05/22 0900)  Resp: (!) 24 (08/05/22 0900)  BP: (!) 156/86 (08/05/22 0900)  SpO2: 100 % (08/05/22 0900)   Vital Signs (24h Range):  Temp:  [97.7 °F (36.5 °C)-99.5 °F (37.5 °C)] 98.1 °F (36.7 °C)  Pulse:  [46-78] 55  Resp:  [10-25] 24  SpO2:  [95 %-100 %] 100 %  BP: ()/(55-86) 156/86     Weight: 106.3 kg (234 lb 5.6 oz)  Body mass index is 37.82 kg/m².    Intake/Output Summary (Last 24 hours) at 8/5/2022 1040  Last data filed at 8/5/2022 0900  Gross per 24 hour   Intake 3719.36 ml   Output 2461 ml   Net 1258.36 ml      Physical Exam  Vitals and nursing note reviewed.   Constitutional:       General: He is not in acute distress.     Appearance: Normal appearance. He is not ill-appearing, toxic-appearing or diaphoretic.       HENT:      Head: Normocephalic.      Mouth/Throat:      Mouth: Mucous membranes are moist.   Eyes:      Pupils: Pupils are equal, round, and reactive to light.   Cardiovascular:      Rate and Rhythm: Normal rate and regular rhythm.      Pulses: Normal pulses.           Dorsalis pedis pulses are 2+ on the right side and 2+ on the left side.      Heart sounds: No murmur heard.  Pulmonary:      Effort: No tachypnea or respiratory distress.      Breath sounds: Normal breath sounds.   Abdominal:      General: Bowel sounds are normal. There is no distension.   Musculoskeletal:         General: No tenderness.   Neurological:      Mental Status: He is alert and oriented to person, place, and time.      GCS: GCS eye subscore is 4. GCS verbal subscore is 5. GCS motor subscore is 6.      Sensory: Sensation is intact.      Motor: No weakness or  atrophy.   Psychiatric:         Behavior: Behavior is cooperative.       Significant Labs: All pertinent labs within the past 24 hours have been reviewed.  Blood Culture: No results for input(s): LABBLOO in the last 48 hours.  BMP:   Recent Labs   Lab 08/05/22  0450   *      K 4.5      CO2 25   BUN 11   CREATININE 0.9   CALCIUM 8.7     CBC:   Recent Labs   Lab 08/04/22  0359 08/05/22  0450   WBC 8.23 11.27   HGB 10.3* 10.7*   HCT 33.2* 34.2*    201     CMP:   Recent Labs   Lab 08/03/22  1438 08/04/22  0359 08/05/22  0450   NA  --  139 140   K  --  4.6 4.5   CL  --  106 108   CO2  --  25 25   GLU  --  225* 228*   BUN  --  13 11   CREATININE 0.9 0.9 0.9   CALCIUM  --  8.7 8.7   PROT  --  5.4* 5.9*   ALBUMIN  --  2.9* 2.8*   BILITOT  --  0.3 0.2   ALKPHOS  --  73 73   AST  --  71* 43*   ALT  --  89* 69*   ANIONGAP  --  8 7*       POCT Glucose:   Recent Labs   Lab 08/04/22  1747 08/04/22  2346 08/05/22  0456   POCTGLUCOSE 224* 277* 236*     Imaging Results               MRI Lumbar Spine W WO Cont (Final result)  Result time 07/30/22 19:53:25      Final result by Dale Cotton MD (07/30/22 19:53:25)                   Impression:      Findings strongly concerning for discitis osteomyelitis at L2-3 as detailed above.  Probable epidural phlegmon or abscess extending along the left aspect of the thecal sac and into the posterior soft tissues with the previously noted fluid collection now demonstrating significantly increased peripheral enhancement concerning for superimposed infection.  Neuro surgical consultation would be advised if not previously obtained.    This report was flagged in Epic as abnormal.      Electronically signed by: Dale Cotton  Date:    07/30/2022  Time:    19:53               Narrative:    EXAMINATION:  MRI LUMBAR SPINE W WO CONTRAST    CLINICAL HISTORY:  lower back pain;    TECHNIQUE:  Multiplanar, multisequence MR images were acquired from the thoracolumbar junction to  the sacrum without the administration of contrast.    COMPARISON:  Multiple priors.    FINDINGS:  Patient status post hemilaminectomy and discectomy at L2-3 on the left.  Associated with the surgical area of there is fluid within the disc space, bone marrow edema, and abnormal postcontrast enhancement of the in plates about the disc space and disc which extends along the left aspect of the spinal canal and into the posterior soft tissues concerning for discitis osteomyelitis with epidural abscess extending into the posterior soft tissues.  The fluid collection in the posterior soft tissues is noted both within the laminectomy bed measuring a proximally 3.1 x 2.7 x 2.3 cm in this area, and with extension inferiorly along the L3 lamina and L1 inferior lamina, and also into the posterior soft tissues.  The fluid collection is significantly serpiginous and difficult to accurately measure.  The component in the posterior soft tissues measures on the order of 9.2 x 3.8 by 2.5 cm and also demonstrates some postcontrast enhancement concerning for infection.  Abscess is the diagnosis of  exclusion.  The fluid collection was present on the prior exam of 07/01/2022, but the surrounding peripheral enhancement has significantly increased in comparison to the prior exam, and the new disc space abnormalities raise concern for superimposed infection.    Alignment: Normal.    Vertebrae: Aside from the surgical levels, there is normal marrow signal. No fracture.    Cord: Normal.  Conus terminates at L1    Degenerative findings:    At the nonsurgical levels the degenerative changes are stable from the prior.    The epidural collection extending such as seen on series 6, image 16 and series 9, image 17 now produces moderate spinal canal stenosis.    Paraspinal muscles & soft tissues: As above.                                        Significant Imaging: I have reviewed all pertinent imaging results/findings within the past 24  hours.      Assessment/Plan:      * Wound dehiscence with CSF leak s/p Washout and closure with Lumbar drain  --wound cx pending  --Neuro surgery consulted  --ID consulted      7/30/2022  Neurosurgery following    Possible washout vs wound vac placement   ID following, continue empiric IV antibiotic        7/31/2022  MRI of lumbar spine strongly concerning for discitis osteomyelitis at L2-3, probable epidural phlegmon or abscess.   Neurosurgery following, will need revision with washout. Will follow.   Continue IV antibiotics  Wound cultures in progress      8/1- continue IV Abx, local wound care  Await revision and wound exploration with plastic surgery    8/2- surgery tomorrow, cont iV abx    8/3- s/p Wound revision and washout with Lumbar Drain placement  8/4- improving- continue same tx  8/5 Stable - Per NS    Discitis of lumbar region  See 1 wound dehiscence    On IV Abx, await washout surgery with Plastic Surgery tomorrow    See above    8/5  POD#1  Per N/s      Postoperative CSF leak  Expect drain placement with surgery tomorrow    S/p wound revision and closure with LD placement    COVID  Patient is identified as High risk for severe complications of COVID 19 based on COVID risk score of 5   Initiate standard COVID protocols; COVID-19 testing ,Infection Control notification  and isolation- respiratory, contact and droplet per protocol    Diagnostics: (leukopenia, hyponatremia, hyperferritinemia, elevated troponin, elevated d-dimer, age, and comorbidities are significant predictors of poor clinical outcome)  CBC, CMP, Ferritin, CRP and Portable CXR    Management: Maintain oxygen saturations 92-96% via Nasal Cannula  LPM and monitor with continuous/intermittent pulse oximetry.  and Inhaled bronchodilators as needed for shortness of breath.    Asymptomatic, pt on RA    asymptomatic    HLD (hyperlipidemia)  --continue atorvastatin  --cardiac diet      Hypertension  --continue losartan  --cardiac diet    BP under  control      Degenerative disc disease, lumbar  --supportive care  --PT/OT consulted    S/p laminectomy- complicated by wound dehiscence- see above    S/p revision and washout POD#1      Morbid obesity with BMI of 40.0-44.9, adult  Body mass index is 37.82 kg/m². Morbid obesity complicates all aspects of disease management from diagnostic modalities to treatment. Weight loss encouraged and health benefits explained to patient.             Diabetes mellitus without complication  Patient's FSGs are controlled on current medication regimen.  Last A1c reviewed-   Lab Results   Component Value Date    HGBA1C 8.3 (H) 05/18/2022     Most recent fingerstick glucose reviewed-   Recent Labs   Lab 08/04/22  1112 08/04/22  1747 08/04/22  2346 08/05/22  0456   POCTGLUCOSE 275* 224* 277* 236*     Current correctional scale  Low  Maintain anti-hyperglycemic dose as follows-   Antihyperglycemics (From admission, onward)            Start     Stop Route Frequency Ordered    08/04/22 2100  insulin detemir U-100 pen 10 Units         -- SubQ Nightly 08/04/22 1054    08/03/22 1844  insulin aspart U-100 pen 1-10 Units         -- SubQ Every 6 hours PRN 08/03/22 1744        Hold Oral hypoglycemics while patient is in the hospital.    Continue present care, BS generally well controlled    Pt now on Dexa IV- BS may rise- needs tighter BS control    BS high sec to SSI-  Weaning steroids now, cont SSI    Increase Levemir to BID dosing        VTE Risk Mitigation (From admission, onward)         Ordered     enoxaparin injection 40 mg  Daily         08/04/22 0929     IP VTE HIGH RISK PATIENT  Once         07/29/22 2333     Place sequential compression device  Until discontinued         07/29/22 2333                Discharge Planning   MATEUS:      Code Status: Full Code   Is the patient medically ready for discharge?:     Reason for patient still in hospital (select all that apply): Patient trending condition, Consult recommendations and Pending  disposition  Discharge Plan A: Home Health            Critical care time spent on the evaluation and treatment of severe organ dysfunction, review of pertinent labs and imaging studies, discussions with consulting providers and discussions with patient/family: 38 minutes.      Mika Bonilla MD  Department of Hospital Medicine   [facility] - Intensive Care ([facility])

## 2022-08-05 NOTE — ASSESSMENT & PLAN NOTE
--supportive care  --PT/OT consulted    S/p laminectomy- complicated by wound dehiscence- see above    S/p revision and washout POD#1

## 2022-08-06 LAB
ALBUMIN SERPL BCP-MCNC: 2.5 G/DL (ref 3.5–5.2)
ALP SERPL-CCNC: 64 U/L (ref 55–135)
ALT SERPL W/O P-5'-P-CCNC: 63 U/L (ref 10–44)
ANION GAP SERPL CALC-SCNC: 6 MMOL/L (ref 8–16)
AST SERPL-CCNC: 35 U/L (ref 10–40)
BACTERIA SPEC AEROBE CULT: NO GROWTH
BACTERIA SPEC AEROBE CULT: NO GROWTH
BASOPHILS # BLD AUTO: 0.03 K/UL (ref 0–0.2)
BASOPHILS NFR BLD: 0.3 % (ref 0–1.9)
BILIRUB SERPL-MCNC: 0.2 MG/DL (ref 0.1–1)
BUN SERPL-MCNC: 11 MG/DL (ref 8–23)
CALCIUM SERPL-MCNC: 8.3 MG/DL (ref 8.7–10.5)
CHLORIDE SERPL-SCNC: 109 MMOL/L (ref 95–110)
CO2 SERPL-SCNC: 27 MMOL/L (ref 23–29)
CREAT SERPL-MCNC: 0.8 MG/DL (ref 0.5–1.4)
D DIMER PPP IA.FEU-MCNC: 1.7 MG/L FEU
DIFFERENTIAL METHOD: ABNORMAL
EOSINOPHIL # BLD AUTO: 0.1 K/UL (ref 0–0.5)
EOSINOPHIL NFR BLD: 1.3 % (ref 0–8)
ERYTHROCYTE [DISTWIDTH] IN BLOOD BY AUTOMATED COUNT: 15.3 % (ref 11.5–14.5)
EST. GFR  (NO RACE VARIABLE): >60 ML/MIN/1.73 M^2
GLUCOSE SERPL-MCNC: 201 MG/DL (ref 70–110)
HCT VFR BLD AUTO: 31.4 % (ref 40–54)
HGB BLD-MCNC: 9.9 G/DL (ref 14–18)
IMM GRANULOCYTES # BLD AUTO: 0.18 K/UL (ref 0–0.04)
IMM GRANULOCYTES NFR BLD AUTO: 1.8 % (ref 0–0.5)
LYMPHOCYTES # BLD AUTO: 3 K/UL (ref 1–4.8)
LYMPHOCYTES NFR BLD: 28.8 % (ref 18–48)
MCH RBC QN AUTO: 27.8 PG (ref 27–31)
MCHC RBC AUTO-ENTMCNC: 31.5 G/DL (ref 32–36)
MCV RBC AUTO: 88 FL (ref 82–98)
MONOCYTES # BLD AUTO: 0.5 K/UL (ref 0.3–1)
MONOCYTES NFR BLD: 5.3 % (ref 4–15)
NEUTROPHILS # BLD AUTO: 6.4 K/UL (ref 1.8–7.7)
NEUTROPHILS NFR BLD: 62.5 % (ref 38–73)
NRBC BLD-RTO: 0 /100 WBC
PLATELET # BLD AUTO: 168 K/UL (ref 150–450)
PMV BLD AUTO: 11.4 FL (ref 9.2–12.9)
POCT GLUCOSE: 177 MG/DL (ref 70–110)
POCT GLUCOSE: 185 MG/DL (ref 70–110)
POCT GLUCOSE: 190 MG/DL (ref 70–110)
POCT GLUCOSE: 190 MG/DL (ref 70–110)
POCT GLUCOSE: 198 MG/DL (ref 70–110)
POCT GLUCOSE: 222 MG/DL (ref 70–110)
POCT GLUCOSE: 266 MG/DL (ref 70–110)
POTASSIUM SERPL-SCNC: 4 MMOL/L (ref 3.5–5.1)
PROT SERPL-MCNC: 5.3 G/DL (ref 6–8.4)
RBC # BLD AUTO: 3.56 M/UL (ref 4.6–6.2)
SODIUM SERPL-SCNC: 142 MMOL/L (ref 136–145)
WBC # BLD AUTO: 10.26 K/UL (ref 3.9–12.7)

## 2022-08-06 PROCEDURE — 63600175 PHARM REV CODE 636 W HCPCS: Performed by: INTERNAL MEDICINE

## 2022-08-06 PROCEDURE — 25000003 PHARM REV CODE 250: Performed by: PHYSICIAN ASSISTANT

## 2022-08-06 PROCEDURE — 94761 N-INVAS EAR/PLS OXIMETRY MLT: CPT

## 2022-08-06 PROCEDURE — 82728 ASSAY OF FERRITIN: CPT | Performed by: NURSE PRACTITIONER

## 2022-08-06 PROCEDURE — 25000003 PHARM REV CODE 250: Performed by: NURSE PRACTITIONER

## 2022-08-06 PROCEDURE — 80053 COMPREHEN METABOLIC PANEL: CPT

## 2022-08-06 PROCEDURE — 63600175 PHARM REV CODE 636 W HCPCS: Performed by: PHYSICIAN ASSISTANT

## 2022-08-06 PROCEDURE — 27000207 HC ISOLATION

## 2022-08-06 PROCEDURE — 20000000 HC ICU ROOM

## 2022-08-06 PROCEDURE — 25000003 PHARM REV CODE 250: Performed by: INTERNAL MEDICINE

## 2022-08-06 PROCEDURE — 85025 COMPLETE CBC W/AUTO DIFF WBC: CPT

## 2022-08-06 PROCEDURE — 94640 AIRWAY INHALATION TREATMENT: CPT

## 2022-08-06 PROCEDURE — 85379 FIBRIN DEGRADATION QUANT: CPT | Performed by: NURSE PRACTITIONER

## 2022-08-06 PROCEDURE — 83615 LACTATE (LD) (LDH) ENZYME: CPT | Performed by: NURSE PRACTITIONER

## 2022-08-06 PROCEDURE — 99024 POSTOP FOLLOW-UP VISIT: CPT | Mod: ,,, | Performed by: PHYSICIAN ASSISTANT

## 2022-08-06 PROCEDURE — 99024 PR POST-OP FOLLOW-UP VISIT: ICD-10-PCS | Mod: ,,, | Performed by: PHYSICIAN ASSISTANT

## 2022-08-06 RX ADMIN — INSULIN ASPART 3 UNITS: 100 INJECTION, SOLUTION INTRAVENOUS; SUBCUTANEOUS at 12:08

## 2022-08-06 RX ADMIN — INSULIN ASPART 4 UNITS: 100 INJECTION, SOLUTION INTRAVENOUS; SUBCUTANEOUS at 11:08

## 2022-08-06 RX ADMIN — DOXYCYCLINE HYCLATE 100 MG: 100 TABLET, COATED ORAL at 09:08

## 2022-08-06 RX ADMIN — PIPERACILLIN SODIUM AND TAZOBACTAM SODIUM 4.5 G: 4; .5 INJECTION, POWDER, LYOPHILIZED, FOR SOLUTION INTRAVENOUS at 01:08

## 2022-08-06 RX ADMIN — ALBUTEROL SULFATE 2 PUFF: 90 AEROSOL, METERED RESPIRATORY (INHALATION) at 07:08

## 2022-08-06 RX ADMIN — INSULIN ASPART 2 UNITS: 100 INJECTION, SOLUTION INTRAVENOUS; SUBCUTANEOUS at 09:08

## 2022-08-06 RX ADMIN — DOCUSATE SODIUM 100 MG: 100 CAPSULE, LIQUID FILLED ORAL at 09:08

## 2022-08-06 RX ADMIN — Medication 6 MG: at 09:08

## 2022-08-06 RX ADMIN — MUPIROCIN: 20 OINTMENT TOPICAL at 09:08

## 2022-08-06 RX ADMIN — PIPERACILLIN SODIUM AND TAZOBACTAM SODIUM 4.5 G: 4; .5 INJECTION, POWDER, LYOPHILIZED, FOR SOLUTION INTRAVENOUS at 04:08

## 2022-08-06 RX ADMIN — METHOCARBAMOL 750 MG: 750 TABLET ORAL at 09:08

## 2022-08-06 RX ADMIN — Medication 1000 UNITS: at 09:08

## 2022-08-06 RX ADMIN — THERA TABS 1 TABLET: TAB at 09:08

## 2022-08-06 RX ADMIN — GABAPENTIN 300 MG: 300 CAPSULE ORAL at 09:08

## 2022-08-06 RX ADMIN — ATORVASTATIN CALCIUM 10 MG: 10 TABLET, FILM COATED ORAL at 09:08

## 2022-08-06 RX ADMIN — OXYCODONE AND ACETAMINOPHEN 1 TABLET: 10; 325 TABLET ORAL at 09:08

## 2022-08-06 RX ADMIN — FAMOTIDINE 20 MG: 20 TABLET ORAL at 09:08

## 2022-08-06 RX ADMIN — GABAPENTIN 300 MG: 300 CAPSULE ORAL at 03:08

## 2022-08-06 RX ADMIN — PIPERACILLIN SODIUM AND TAZOBACTAM SODIUM 4.5 G: 4; .5 INJECTION, POWDER, LYOPHILIZED, FOR SOLUTION INTRAVENOUS at 09:08

## 2022-08-06 RX ADMIN — INSULIN DETEMIR 10 UNITS: 100 INJECTION, SOLUTION SUBCUTANEOUS at 09:08

## 2022-08-06 RX ADMIN — ASPIRIN 81 MG: 81 TABLET, COATED ORAL at 09:08

## 2022-08-06 RX ADMIN — LOSARTAN POTASSIUM 50 MG: 50 TABLET, FILM COATED ORAL at 09:08

## 2022-08-06 RX ADMIN — OXYCODONE HYDROCHLORIDE AND ACETAMINOPHEN 500 MG: 500 TABLET ORAL at 09:08

## 2022-08-06 RX ADMIN — INSULIN ASPART 2 UNITS: 100 INJECTION, SOLUTION INTRAVENOUS; SUBCUTANEOUS at 04:08

## 2022-08-06 RX ADMIN — SODIUM CHLORIDE 100 ML/HR: 0.9 INJECTION, SOLUTION INTRAVENOUS at 11:08

## 2022-08-06 RX ADMIN — POLYETHYLENE GLYCOL 3350 17 G: 17 POWDER, FOR SOLUTION ORAL at 09:08

## 2022-08-06 RX ADMIN — ENOXAPARIN SODIUM 40 MG: 100 INJECTION SUBCUTANEOUS at 05:08

## 2022-08-06 RX ADMIN — ALBUTEROL SULFATE 2 PUFF: 90 AEROSOL, METERED RESPIRATORY (INHALATION) at 12:08

## 2022-08-06 RX ADMIN — INSULIN ASPART 2 UNITS: 100 INJECTION, SOLUTION INTRAVENOUS; SUBCUTANEOUS at 03:08

## 2022-08-06 NOTE — PROGRESS NOTES
[facility] ([facility])  [facility] Medicine  Progress Note    Patient Name: Friday ROGELIO Blake  MRN: [id number]  Patient Class: IP- Inpatient   Admission Date: 7/29/2022  Length of Stay: 6 days  Attending Physician: Mika Bonilla MD  Primary Care Provider: Sid Elizabeth MD        Subjective:     Principal Problem:Wound dehiscence        HPI:  65 y/o male with PMHx of HTN, HLD, DM, and obesity who presented tot he ED for eval of post op incision leaking.  Sx are constant and moderate in severity. No mitigating or exacerbating factors reported. Pt had surgery on 7/26, then took a trip to [facility]. He flew back last night. Pt denies fever, chills, pain, HA, N/V, and all other sx at this time.  ED workup shows: H/H 11.6/36.9, COVID +  He will be kept on OBS for CSF leak under the care of [facility] medicine  He is a full code and his SDM is his wife             Overview/Hospital Course:  Mr Blake is a 66 year old male who presented to [facility] Emergency Room of evaluation of wound dehiscence. Patient underwent Laminectomy of lumbar spine on 6/22/2022 and I & D of hematoma on 6/28/2022. Traveled to [facility] for daughter's wedding. Positive for drainage from low back surgical wound. Neurosurgery following, plan possible washout vs wound vac placement.  Infectious Disease following, continue empiric IV antibiotics. COVID positive. As of 7/31/2022, patient feeling well, vital signs and labs stable. MRI lumbar spine strongly concerning for discitis osteomyelitis at L2-3, probable epidural phlegmon or abscess. Neurosurgery following, will need revision with washout. Will follow.     8/1- cheerful, resting comfortably in bed on RA, no fever, chills or cough. Still has drainage from the Lumbar wound. Dr. Zepeda plans revision with wound washout/exploration with the help of Plastic Surgery, await confirmation date. Pt agreeable with the plan. Continue wound care, IV Abx, no wound vac yet.     8/2- comfortable on RA,  wound Cx growing Pseudomonas, earlier Cx grew Staph Epi- so now on Vanc, Cefepime and Flagyl. Dr. Zepeda plans Surgery- Washout with possible drain placement tomorrow, pt agreeable.     8/3- seen post op in the ICU, doing well, c/o back pain at the surgical site, just got oxycodone. Dr. Zepeda put him on Dexa 4 mg IV qid post op, he continues to received Cefepime, Flagyl and Vanc. Fluid sent to labs for C/S.     8/4- looks and feels better, lying flat on his side, back pain improved, no NV, dizziness or HA. Lumbar Drain output 11 cc. Dr. Zepeda d/mónica the foleys and is weaning the steroids. All Cx from Surgery remain NGDT. Getting Vanc, Cefepime and oral Flagyl. Last wound Cx grew Pseudomonas.   WBC 8.2, H/H 10.3/32. BS high due to Dexa.     8/5 - Patient with clinical improvement. Patient sitting up in bed. Plan to increase mobility today per NS. No CP/SOB. Patient tolerating diet.    8/6 - Patient improving steadily. Plan for OOB to chair today per NS. No cp / SOB. Patient is in good spirits. Discussed with CC Team.      Interval History: Patient improving steadily. Plan for OOB to chair today per NS. No cp / SOB. Patient is in good spirits. Discussed with CC Team.    Review of Systems   Constitutional:  Positive for fatigue. Negative for activity change, appetite change, chills and diaphoresis.   HENT:  Negative for congestion, dental problem, ear discharge, ear pain and facial swelling.    Eyes:  Negative for pain, discharge and itching.   Respiratory:  Negative for apnea, cough, chest tightness and shortness of breath.    Cardiovascular:  Negative for chest pain and leg swelling.   Gastrointestinal:  Negative for abdominal distention and abdominal pain.   Endocrine: Negative for cold intolerance, heat intolerance and polydipsia.   Genitourinary:  Negative for difficulty urinating, dysuria and enuresis.   Musculoskeletal:  Positive for back pain and myalgias. Negative for arthralgias.        Now has lumbar jaxon     Skin:  Negative for color change and pallor.   Allergic/Immunologic: Negative for environmental allergies and food allergies.   Neurological:  Positive for weakness. Negative for dizziness, facial asymmetry, light-headedness and headaches.   Hematological:  Negative for adenopathy. Does not bruise/bleed easily.   Psychiatric/Behavioral:  Negative for agitation and behavioral problems.    Objective:     Vital Signs (Most Recent):  Temp: 97.9 °F (36.6 °C) (08/06/22 0400)  Pulse: (!) 52 (08/06/22 0940)  Resp: 18 (08/06/22 0940)  BP: (!) 149/91 (08/06/22 0940)  SpO2: 99 % (08/06/22 0940)   Vital Signs (24h Range):  Temp:  [97.9 °F (36.6 °C)-99.3 °F (37.4 °C)] 97.9 °F (36.6 °C)  Pulse:  [47-67] 52  Resp:  [11-29] 18  SpO2:  [96 %-100 %] 99 %  BP: (116-158)/(60-91) 149/91     Weight: 106.3 kg (234 lb 5.6 oz)  Body mass index is 37.82 kg/m².    Intake/Output Summary (Last 24 hours) at 8/6/2022 1033  Last data filed at 8/6/2022 0946  Gross per 24 hour   Intake 3195.48 ml   Output 1963 ml   Net 1232.48 ml      Physical Exam  Vitals and nursing note reviewed.   Constitutional:       General: He is not in acute distress.     Appearance: Normal appearance. He is not ill-appearing, toxic-appearing or diaphoretic.       HENT:      Head: Normocephalic.      Right Ear: External ear normal.      Left Ear: External ear normal.      Nose: Nose normal.      Mouth/Throat:      Mouth: Mucous membranes are moist.   Eyes:      Pupils: Pupils are equal, round, and reactive to light.   Cardiovascular:      Rate and Rhythm: Normal rate and regular rhythm.      Pulses: Normal pulses.           Dorsalis pedis pulses are 2+ on the right side and 2+ on the left side.      Heart sounds: No murmur heard.  Pulmonary:      Effort: No tachypnea or respiratory distress.      Breath sounds: Normal breath sounds.   Abdominal:      General: Bowel sounds are normal. There is no distension.   Musculoskeletal:         General: No tenderness.      Cervical  back: Normal range of motion and neck supple.      Right lower leg: No edema.      Left lower leg: No edema.   Skin:     Capillary Refill: Capillary refill takes 2 to 3 seconds.   Neurological:      Mental Status: He is alert and oriented to person, place, and time.      GCS: GCS eye subscore is 4. GCS verbal subscore is 5. GCS motor subscore is 6.      Sensory: Sensation is intact.      Motor: No weakness or atrophy.   Psychiatric:         Mood and Affect: Mood normal.         Behavior: Behavior normal. Behavior is cooperative.         Thought Content: Thought content normal.         Judgment: Judgment normal.       Significant Labs: All pertinent labs within the past 24 hours have been reviewed.  Blood Culture: No results for input(s): LABBLOO in the last 48 hours.  BMP:   Recent Labs   Lab 08/06/22  0433   *      K 4.0      CO2 27   BUN 11   CREATININE 0.8   CALCIUM 8.3*     CBC:   Recent Labs   Lab 08/05/22  0450 08/06/22  0433   WBC 11.27 10.26   HGB 10.7* 9.9*   HCT 34.2* 31.4*    168     CMP:   Recent Labs   Lab 08/05/22  0450 08/06/22  0433    142   K 4.5 4.0    109   CO2 25 27   * 201*   BUN 11 11   CREATININE 0.9 0.8   CALCIUM 8.7 8.3*   PROT 5.9* 5.3*   ALBUMIN 2.8* 2.5*   BILITOT 0.2 0.2   ALKPHOS 73 64   AST 43* 35   ALT 69* 63*   ANIONGAP 7* 6*     Lipase: No results for input(s): LIPASE in the last 48 hours.  POCT Glucose:   Recent Labs   Lab 08/06/22  0015 08/06/22  0427 08/06/22  0906   POCTGLUCOSE 266* 198* 185*     Imaging Results               MRI Lumbar Spine W WO Cont (Final result)  Result time 07/30/22 19:53:25      Final result by Dale Cotton MD (07/30/22 19:53:25)                   Impression:      Findings strongly concerning for discitis osteomyelitis at L2-3 as detailed above.  Probable epidural phlegmon or abscess extending along the left aspect of the thecal sac and into the posterior soft tissues with the previously noted fluid  collection now demonstrating significantly increased peripheral enhancement concerning for superimposed infection.  Neuro surgical consultation would be advised if not previously obtained.    This report was flagged in Epic as abnormal.      Electronically signed by: Dale Cotton  Date:    07/30/2022  Time:    19:53               Narrative:    EXAMINATION:  MRI LUMBAR SPINE W WO CONTRAST    CLINICAL HISTORY:  lower back pain;    TECHNIQUE:  Multiplanar, multisequence MR images were acquired from the thoracolumbar junction to the sacrum without the administration of contrast.    COMPARISON:  Multiple priors.    FINDINGS:  Patient status post hemilaminectomy and discectomy at L2-3 on the left.  Associated with the surgical area of there is fluid within the disc space, bone marrow edema, and abnormal postcontrast enhancement of the in plates about the disc space and disc which extends along the left aspect of the spinal canal and into the posterior soft tissues concerning for discitis osteomyelitis with epidural abscess extending into the posterior soft tissues.  The fluid collection in the posterior soft tissues is noted both within the laminectomy bed measuring a proximally 3.1 x 2.7 x 2.3 cm in this area, and with extension inferiorly along the L3 lamina and L1 inferior lamina, and also into the posterior soft tissues.  The fluid collection is significantly serpiginous and difficult to accurately measure.  The component in the posterior soft tissues measures on the order of 9.2 x 3.8 by 2.5 cm and also demonstrates some postcontrast enhancement concerning for infection.  Abscess is the diagnosis of  exclusion.  The fluid collection was present on the prior exam of 07/01/2022, but the surrounding peripheral enhancement has significantly increased in comparison to the prior exam, and the new disc space abnormalities raise concern for superimposed infection.    Alignment: Normal.    Vertebrae: Aside from the surgical  levels, there is normal marrow signal. No fracture.    Cord: Normal.  Conus terminates at L1    Degenerative findings:    At the nonsurgical levels the degenerative changes are stable from the prior.    The epidural collection extending such as seen on series 6, image 16 and series 9, image 17 now produces moderate spinal canal stenosis.    Paraspinal muscles & soft tissues: As above.                                      Significant Imaging: I have reviewed all pertinent imaging results/findings within the past 24 hours.      Assessment/Plan:      * Wound dehiscence with CSF leak s/p Washout and closure with Lumbar drain  --wound cx pending  --Neuro surgery consulted  --ID consulted      7/30/2022  Neurosurgery following    Possible washout vs wound vac placement   ID following, continue empiric IV antibiotic        7/31/2022  MRI of lumbar spine strongly concerning for discitis osteomyelitis at L2-3, probable epidural phlegmon or abscess.   Neurosurgery following, will need revision with washout. Will follow.   Continue IV antibiotics  Wound cultures in progress      8/1- continue IV Abx, local wound care  Await revision and wound exploration with plastic surgery    8/2- surgery tomorrow, cont iV abx    8/3- s/p Wound revision and washout with Lumbar Drain placement  8/4- improving- continue same tx  8/5 Stable - Per NS  8/6 - Stable, NS on board, Plan for drain removal per N/S    Discitis of lumbar region  See 1 wound dehiscence    On IV Abx, await washout surgery with Plastic Surgery tomorrow    See above    8/5, 8/6  POD#2  Per N/s      Postoperative CSF leak  Expect drain placement with surgery tomorrow    S/p wound revision and closure with LD placement    COVID  Patient is identified as High risk for severe complications of COVID 19 based on COVID risk score of 5   Initiate standard COVID protocols; COVID-19 testing ,Infection Control notification  and isolation- respiratory, contact and droplet per  protocol    Diagnostics: (leukopenia, hyponatremia, hyperferritinemia, elevated troponin, elevated d-dimer, age, and comorbidities are significant predictors of poor clinical outcome)  CBC, CMP, Ferritin, CRP and Portable CXR    Management: Maintain oxygen saturations 92-96% via Nasal Cannula  LPM and monitor with continuous/intermittent pulse oximetry.  and Inhaled bronchodilators as needed for shortness of breath.    Asymptomatic, pt on RA    Asymptomatic    HLD (hyperlipidemia)  --continue atorvastatin  --cardiac diet      Hypertension  --continue losartan  --cardiac diet    BP under control      Degenerative disc disease, lumbar  --supportive care  --PT/OT consulted    S/p laminectomy- complicated by wound dehiscence- see above    S/p revision and washout POD#2      Morbid obesity with BMI of 40.0-44.9, adult  Body mass index is 37.82 kg/m². Morbid obesity complicates all aspects of disease management from diagnostic modalities to treatment. Weight loss encouraged and health benefits explained to patient.             Diabetes mellitus without complication  Patient's FSGs are controlled on current medication regimen.  Last A1c reviewed-   Lab Results   Component Value Date    HGBA1C 8.3 (H) 05/18/2022     Most recent fingerstick glucose reviewed-   Recent Labs   Lab 08/05/22  2018 08/06/22  0015 08/06/22  0427 08/06/22  0906   POCTGLUCOSE 268* 266* 198* 185*     Current correctional scale  Low  Maintain anti-hyperglycemic dose as follows-   Antihyperglycemics (From admission, onward)            Start     Stop Route Frequency Ordered    08/05/22 1300  insulin aspart U-100 pen 1-10 Units         -- SubQ Every 4 hours PRN 08/05/22 1145    08/05/22 1200  insulin detemir U-100 pen 10 Units         -- SubQ 2 times daily 08/05/22 1147        Hold Oral hypoglycemics while patient is in the hospital.    Continue present care, BS generally well controlled    Pt now on Dexa IV- BS may rise- needs tighter BS control    BS  high sec to SSI-  Weaning steroids now, cont SSI    Increase Levemir to BID dosing        VTE Risk Mitigation (From admission, onward)         Ordered     enoxaparin injection 40 mg  Daily         08/05/22 1312     IP VTE HIGH RISK PATIENT  Once         07/29/22 2333     Place sequential compression device  Until discontinued         07/29/22 2333                Discharge Planning   MATEUS:      Code Status: Full Code   Is the patient medically ready for discharge?:     Reason for patient still in hospital (select all that apply): Patient trending condition, Consult recommendations and Pending disposition  Discharge Plan A: Home Health            Critical care time spent on the evaluation and treatment of severe organ dysfunction, review of pertinent labs and imaging studies, discussions with consulting providers and discussions with patient/family: 37 minutes.      Mika Bonilla MD  Department of Hospital Medicine   '[facility] - Intensive Care ([facility])

## 2022-08-06 NOTE — ASSESSMENT & PLAN NOTE
--supportive care  --PT/OT consulted    S/p laminectomy- complicated by wound dehiscence- see above    S/p revision and washout POD#2

## 2022-08-06 NOTE — PROGRESS NOTES
[facility] ([facility])  Neurosurgery  Progress Note    Subjective:     Interval History:   Patient was seen this morning.  No new issues to report.  Denies HA, dizziness, N/V, weakness, leg pain.  Has back pain from surgery site.  No BM yet however patient was offered medication and will try today since he is able to increase activity.   He participated with PT yesterday- supine exercises.    Drains-  R deep 10 mL output (8/4)  L superficial 85 mL (8/5)   (8/5)    History of Present Illness: See H&P.    Post-Op Info:  Procedure(s) (LRB):  EXPLORATION, WOUND (Bilateral)  REPAIR, CSF LEAK, USING COMPUTER-ASSISTED NAVIGATION (Bilateral)  PLACEMENT (N/A)   3 Days Post-Op      Medications:  Continuous Infusions:   sodium chloride 0.9% 100 mL/hr at 08/06/22 0603     Scheduled Meds:   albuterol  2 puff Inhalation Q6H    ascorbic acid (vitamin C)  500 mg Oral BID    aspirin  81 mg Oral Daily    atorvastatin  10 mg Oral QHS    dexamethasone  2 mg Intravenous Q12H    docusate sodium  100 mg Oral BID    doxycycline  100 mg Oral Q12H    enoxaparin  40 mg Subcutaneous Daily    famotidine  20 mg Oral BID    gabapentin  300 mg Oral TID    insulin detemir U-100  10 Units Subcutaneous BID    losartan  50 mg Oral Daily    melatonin  6 mg Oral Nightly    multivitamin  1 tablet Oral Daily    mupirocin   Nasal BID    piperacillin-tazobactam (ZOSYN) IVPB  4.5 g Intravenous Q8H    polyethylene glycol  17 g Oral Daily    vitamin D  1,000 Units Oral Daily     PRN Meds:acetaminophen, acetaminophen, aluminum-magnesium hydroxide-simethicone, dextromethorphan-guaiFENesin  mg/5 ml, dextrose 10%, glucagon (human recombinant), glucose, glucose, HYDROmorphone, insulin aspart U-100, loperamide, melatonin, methocarbamoL, naloxone, ondansetron, oxyCODONE-acetaminophen, oxyCODONE-acetaminophen, oxyCODONE-acetaminophen, prochlorperazine, promethazine, senna-docusate 8.6-50 mg, simethicone, sodium chloride 0.9%,  sodium chloride 0.9%, sodium chloride 0.9%     Review of Systems  Objective:     Weight: 106.3 kg (234 lb 5.6 oz)  Body mass index is 37.82 kg/m².  Vital Signs (Most Recent):  Temp: 97.9 °F (36.6 °C) (08/06/22 0400)  Pulse: (!) 50 (08/06/22 0741)  Resp: 16 (08/06/22 0741)  BP: 138/65 (08/06/22 0600)  SpO2: 100 % (08/06/22 0741) Vital Signs (24h Range):  Temp:  [97.9 °F (36.6 °C)-99.3 °F (37.4 °C)] 97.9 °F (36.6 °C)  Pulse:  [47-67] 50  Resp:  [11-26] 16  SpO2:  [96 %-100 %] 100 %  BP: (116-158)/(60-86) 138/65                          Closed/Suction Drain 08/03/22 1144 Right Back Accordion 10 Fr. (Active)   Site Description Unable to view 08/06/22 0400   Dressing Type Transparent (Tegaderm) 08/06/22 0400   Dressing Status Clean;Dry;Intact 08/06/22 0400   Dressing Intervention Integrity maintained 08/06/22 0400   Drainage Serosanguineous 08/06/22 0400   Status Other (Comment) 08/06/22 0400   Output (mL) 0 mL 08/06/22 0603            Closed/Suction Drain 08/03/22 1145 Left Back Accordion 10 Fr. (Active)   Site Description Unable to view 08/06/22 0400   Dressing Type Transparent (Tegaderm) 08/06/22 0400   Dressing Status Clean;Intact;Dry 08/06/22 0400   Dressing Intervention Integrity maintained 08/06/22 0400   Drainage Serosanguineous 08/06/22 0400   Status Other (Comment) 08/06/22 0400   Output (mL) 15 mL 08/06/22 0603            Drain/Device  08/03/22 1154 Right midline lumbar spine gravity (Active)   Insertion Site clean and dry;dressing intact;no hematoma 08/06/22 0400   Drainage Characteristics/Odor Clear 08/05/22 2000   Drainage Amount Small 08/05/22 2000   General Intake (mL) 0 08/03/22 1915   General Output (mL) 20 08/06/22 0603       Neurosurgery Physical Exam  Vitals and labs reviewed  MAEW  Incision CDI  Sutures intact  No drainage from incision   Dressing changed this morning  LD x1  HV x2     Significant Labs:  Recent Labs   Lab 08/05/22  0450 08/06/22  0433   * 201*    142   K 4.5 4.0   CL  108 109   CO2 25 27   BUN 11 11   CREATININE 0.9 0.8   CALCIUM 8.7 8.3*     Recent Labs   Lab 08/05/22  0450 08/06/22  0433   WBC 11.27 10.26   HGB 10.7* 9.9*   HCT 34.2* 31.4*    168     No results for input(s): LABPT, INR, APTT in the last 48 hours.  Microbiology Results (last 7 days)     Procedure Component Value Units Date/Time    Culture, Anaerobe [[id number]] Collected: 08/03/22 0937    Order Status: Completed Specimen: Wound from Back Updated: 08/05/22 0901     Anaerobic Culture Culture in progress    Narrative:      Deep    Culture, Anaerobe [[id number]] Collected: 08/03/22 0937    Order Status: Completed Specimen: Wound from Back Updated: 08/05/22 0900     Anaerobic Culture Culture in progress    Narrative:      Superficial    AFB Culture & Smear [[id number]] Collected: 08/03/22 0937    Order Status: Completed Specimen: Wound from Back Updated: 08/04/22 2127     AFB Culture & Smear Culture in progress     AFB CULTURE STAIN No acid fast bacilli seen.    Narrative:      Superficial    AFB Culture & Smear [[id number]] Collected: 08/03/22 0937    Order Status: Completed Specimen: Wound from Back Updated: 08/04/22 2127     AFB Culture & Smear Culture in progress     AFB CULTURE STAIN No acid fast bacilli seen.    Narrative:      Deep    Aerobic culture [[id number]] Collected: 08/03/22 0937    Order Status: Completed Specimen: Wound from Back Updated: 08/04/22 0951     Aerobic Bacterial Culture No growth    Narrative:      Superficial    Aerobic culture [[id number]] Collected: 08/03/22 0937    Order Status: Completed Specimen: Wound from Back Updated: 08/04/22 0920     Aerobic Bacterial Culture No growth    Narrative:      Deep    Gram stain [[id number]] Collected: 08/03/22 0937    Order Status: Completed Specimen: Wound from Back Updated: 08/03/22 2021     Gram Stain Result No WBC's      No organisms seen    Narrative:      Superficial    Gram stain [[id number]] Collected: 08/03/22 0937    Order Status: Completed  Specimen: Wound from Back Updated: 08/03/22 1807     Gram Stain Result No WBC's      No organisms seen    Narrative:      Deep    Fungus culture [[id number]] Collected: 08/03/22 0937    Order Status: Sent Specimen: Wound from Back Updated: 08/03/22 1534    Fungus culture [[id number]] Collected: 08/03/22 0937    Order Status: Sent Specimen: Wound from Back Updated: 08/03/22 1534    Aerobic culture [[id number]]  (Abnormal)  (Susceptibility) Collected: 07/30/22 0055    Order Status: Completed Specimen: Incision site from Back Updated: 08/03/22 1035     Aerobic Bacterial Culture PSEUDOMONAS AERUGINOSA  Moderate          All pertinent labs from the last 24 hours have been reviewed.  Significant Diagnostics:  I have reviewed all pertinent imaging results/findings within the past 24 hours.    Assessment/Plan:     Active Diagnoses:    Diagnosis Date Noted POA    PRINCIPAL PROBLEM:  Wound dehiscence with CSF leak s/p Washout and closure with Lumbar drain [T81.30XA] 07/29/2022 Yes     Chronic    Discitis of lumbar region [M46.46] 08/01/2022 Yes    Postoperative CSF leak [G97.82, G96.00] 07/30/2022 Yes     Chronic    Hypertension [I10] 07/29/2022 Yes    HLD (hyperlipidemia) [E78.5] 07/29/2022 Yes    COVID [U07.1] 07/29/2022 Yes     Chronic    Degenerative disc disease, lumbar [M51.36] 06/22/2022 Yes     Chronic    Diabetes mellitus without complication [E11.9]  Yes     Chronic    Morbid obesity with BMI of 40.0-44.9, adult [E66.01, Z68.41]  Not Applicable      Problems Resolved During this Admission:     POD #3     - Patient can sit up in bed, get out of bed and amb to chair.  (Okay to mobilize around room and to toilet). Okay to increase HOB.  - Continue to clamp drain whenever patient is sitting up/ambulating.  -  PT/OT daily  - Continue Lovenox.  - Will keep all drains in place.    - Dulcolax BID.  - Abx per ID.  - Will continue to monitor. Please call with any changes.       Oliver Clifton,  URI  Neurosurgery  '[place] - Intensive Care ([facility])

## 2022-08-06 NOTE — PLAN OF CARE
Problem: Adult Inpatient Plan of Care  Goal: Plan of Care Review  Outcome: Ongoing, Progressing  Flowsheets (Taken 8/6/2022 0450)  Plan of Care Reviewed With: patient  Goal: Patient-Specific Goal (Individualized)  Outcome: Ongoing, Progressing  Goal: Absence of Hospital-Acquired Illness or Injury  Outcome: Ongoing, Progressing  Intervention: Identify and Manage Fall Risk  Flowsheets (Taken 8/6/2022 0450)  Safety Promotion/Fall Prevention:   assistive device/personal item within reach   nonskid shoes/socks when out of bed   toileting scheduled  Intervention: Prevent Skin Injury  Flowsheets (Taken 8/6/2022 0450)  Body Position:   30 degrees   position changed independently  Skin Protection:   adhesive use limited   pulse oximeter probe site changed  Intervention: Prevent and Manage VTE (Venous Thromboembolism) Risk  Flowsheets (Taken 8/6/2022 0450)  Activity Management: Rolling - L1  VTE Prevention/Management:   remove, assess skin, and reapply sequential compression device   dorsiflexion/plantar flexion performed   fluids promoted   intravenous hydration  Range of Motion: ROM (range of motion) performed  Intervention: Prevent Infection  Flowsheets (Taken 8/6/2022 0450)  Infection Prevention:   cohorting utilized   single patient room provided   rest/sleep promoted   hand hygiene promoted  Goal: Optimal Comfort and Wellbeing  Outcome: Ongoing, Progressing  Intervention: Monitor Pain and Promote Comfort  Flowsheets (Taken 8/6/2022 0450)  Pain Management Interventions:   care clustered   medication offered but refused   relaxation techniques promoted   quiet environment facilitated  Intervention: Provide Person-Centered Care  Flowsheets (Taken 8/6/2022 0450)  Trust Relationship/Rapport:   care explained   choices provided   questions encouraged  Goal: Readiness for Transition of Care  Outcome: Ongoing, Progressing     Problem: Diabetes Comorbidity  Goal: Blood Glucose Level Within Targeted Range  Outcome: Ongoing,  Progressing  Intervention: Monitor and Manage Glycemia  Flowsheets (Taken 8/6/2022 0450)  Glycemic Management: blood glucose monitored     Problem: Impaired Wound Healing  Goal: Optimal Wound Healing  Outcome: Ongoing, Progressing  Intervention: Promote Wound Healing  Flowsheets (Taken 8/6/2022 0450)  Oral Nutrition Promotion:   rest periods promoted   medicated  Sleep/Rest Enhancement:   awakenings minimized   consistent schedule promoted   relaxation techniques promoted  Activity Management: Rolling - L1  Pain Management Interventions:   care clustered   medication offered but refused   relaxation techniques promoted   quiet environment facilitated     Problem: Fall Injury Risk  Goal: Absence of Fall and Fall-Related Injury  Outcome: Ongoing, Progressing  Intervention: Identify and Manage Contributors  Flowsheets (Taken 8/6/2022 0450)  Self-Care Promotion:   BADL personal objects within reach   independence encouraged  Medication Review/Management: medications reviewed  Intervention: Promote Injury-Free Environment  Flowsheets (Taken 8/6/2022 0450)  Safety Promotion/Fall Prevention:   assistive device/personal item within reach   nonskid shoes/socks when out of bed   toileting scheduled     Problem: Infection  Goal: Absence of Infection Signs and Symptoms  Outcome: Ongoing, Progressing  Intervention: Prevent or Manage Infection  Flowsheets (Taken 8/6/2022 0450)  Infection Management: aseptic technique maintained     Problem: Skin Injury Risk Increased  Goal: Skin Health and Integrity  Outcome: Ongoing, Progressing  Intervention: Optimize Skin Protection  Flowsheets (Taken 8/6/2022 0450)  Pressure Reduction Techniques: frequent weight shift encouraged  Pressure Reduction Devices: pressure-redistributing mattress utilized  Skin Protection:   adhesive use limited   pulse oximeter probe site changed  Head of Bed (HOB) Positioning: HOB at 30 degrees  Intervention: Promote and Optimize Oral Intake  Flowsheets (Taken  8/6/2022 6330)  Oral Nutrition Promotion:   rest periods promoted   medicated

## 2022-08-06 NOTE — ASSESSMENT & PLAN NOTE
Patient's FSGs are controlled on current medication regimen.  Last A1c reviewed-   Lab Results   Component Value Date    HGBA1C 8.3 (H) 05/18/2022     Most recent fingerstick glucose reviewed-   Recent Labs   Lab 08/05/22  2018 08/06/22  0015 08/06/22  0427 08/06/22  0906   POCTGLUCOSE 268* 266* 198* 185*     Current correctional scale  Low  Maintain anti-hyperglycemic dose as follows-   Antihyperglycemics (From admission, onward)            Start     Stop Route Frequency Ordered    08/05/22 1300  insulin aspart U-100 pen 1-10 Units         -- SubQ Every 4 hours PRN 08/05/22 1145    08/05/22 1200  insulin detemir U-100 pen 10 Units         -- SubQ 2 times daily 08/05/22 1147        Hold Oral hypoglycemics while patient is in the hospital.    Continue present care, BS generally well controlled    Pt now on Dexa IV- BS may rise- needs tighter BS control    BS high sec to SSI-  Weaning steroids now, cont SSI    Increase Levemir to BID dosing

## 2022-08-06 NOTE — SUBJECTIVE & OBJECTIVE
Interval History: Patient improving steadily. Plan for OOB to chair today per NS. No cp / SOB. Patient is in good spirits. Discussed with CC Team.    Review of Systems   Constitutional:  Positive for fatigue. Negative for activity change, appetite change, chills and diaphoresis.   HENT:  Negative for congestion, dental problem, ear discharge, ear pain and facial swelling.    Eyes:  Negative for pain, discharge and itching.   Respiratory:  Negative for apnea, cough, chest tightness and shortness of breath.    Cardiovascular:  Negative for chest pain and leg swelling.   Gastrointestinal:  Negative for abdominal distention and abdominal pain.   Endocrine: Negative for cold intolerance, heat intolerance and polydipsia.   Genitourinary:  Negative for difficulty urinating, dysuria and enuresis.   Musculoskeletal:  Positive for back pain and myalgias. Negative for arthralgias.        Now has lumbar drian    Skin:  Negative for color change and pallor.   Allergic/Immunologic: Negative for environmental allergies and food allergies.   Neurological:  Positive for weakness. Negative for dizziness, facial asymmetry, light-headedness and headaches.   Hematological:  Negative for adenopathy. Does not bruise/bleed easily.   Psychiatric/Behavioral:  Negative for agitation and behavioral problems.    Objective:     Vital Signs (Most Recent):  Temp: 97.9 °F (36.6 °C) (08/06/22 0400)  Pulse: (!) 52 (08/06/22 0940)  Resp: 18 (08/06/22 0940)  BP: (!) 149/91 (08/06/22 0940)  SpO2: 99 % (08/06/22 0940)   Vital Signs (24h Range):  Temp:  [97.9 °F (36.6 °C)-99.3 °F (37.4 °C)] 97.9 °F (36.6 °C)  Pulse:  [47-67] 52  Resp:  [11-29] 18  SpO2:  [96 %-100 %] 99 %  BP: (116-158)/(60-91) 149/91     Weight: 106.3 kg (234 lb 5.6 oz)  Body mass index is 37.82 kg/m².    Intake/Output Summary (Last 24 hours) at 8/6/2022 1033  Last data filed at 8/6/2022 0946  Gross per 24 hour   Intake 3195.48 ml   Output 1963 ml   Net 1232.48 ml      Physical  Exam  Vitals and nursing note reviewed.   Constitutional:       General: He is not in acute distress.     Appearance: Normal appearance. He is not ill-appearing, toxic-appearing or diaphoretic.       HENT:      Head: Normocephalic.      Right Ear: External ear normal.      Left Ear: External ear normal.      Nose: Nose normal.      Mouth/Throat:      Mouth: Mucous membranes are moist.   Eyes:      Pupils: Pupils are equal, round, and reactive to light.   Cardiovascular:      Rate and Rhythm: Normal rate and regular rhythm.      Pulses: Normal pulses.           Dorsalis pedis pulses are 2+ on the right side and 2+ on the left side.      Heart sounds: No murmur heard.  Pulmonary:      Effort: No tachypnea or respiratory distress.      Breath sounds: Normal breath sounds.   Abdominal:      General: Bowel sounds are normal. There is no distension.   Musculoskeletal:         General: No tenderness.      Cervical back: Normal range of motion and neck supple.      Right lower leg: No edema.      Left lower leg: No edema.   Skin:     Capillary Refill: Capillary refill takes 2 to 3 seconds.   Neurological:      Mental Status: He is alert and oriented to person, place, and time.      GCS: GCS eye subscore is 4. GCS verbal subscore is 5. GCS motor subscore is 6.      Sensory: Sensation is intact.      Motor: No weakness or atrophy.   Psychiatric:         Mood and Affect: Mood normal.         Behavior: Behavior normal. Behavior is cooperative.         Thought Content: Thought content normal.         Judgment: Judgment normal.       Significant Labs: All pertinent labs within the past 24 hours have been reviewed.  Blood Culture: No results for input(s): LABBLOO in the last 48 hours.  BMP:   Recent Labs   Lab 08/06/22  0433   *      K 4.0      CO2 27   BUN 11   CREATININE 0.8   CALCIUM 8.3*     CBC:   Recent Labs   Lab 08/05/22  0450 08/06/22  0433   WBC 11.27 10.26   HGB 10.7* 9.9*   HCT 34.2* 31.4*     168     CMP:   Recent Labs   Lab 08/05/22  0450 08/06/22  0433    142   K 4.5 4.0    109   CO2 25 27   * 201*   BUN 11 11   CREATININE 0.9 0.8   CALCIUM 8.7 8.3*   PROT 5.9* 5.3*   ALBUMIN 2.8* 2.5*   BILITOT 0.2 0.2   ALKPHOS 73 64   AST 43* 35   ALT 69* 63*   ANIONGAP 7* 6*     Lipase: No results for input(s): LIPASE in the last 48 hours.  POCT Glucose:   Recent Labs   Lab 08/06/22  0015 08/06/22  0427 08/06/22  0906   POCTGLUCOSE 266* 198* 185*     Imaging Results               MRI Lumbar Spine W WO Cont (Final result)  Result time 07/30/22 19:53:25      Final result by Dale Cotton MD (07/30/22 19:53:25)                   Impression:      Findings strongly concerning for discitis osteomyelitis at L2-3 as detailed above.  Probable epidural phlegmon or abscess extending along the left aspect of the thecal sac and into the posterior soft tissues with the previously noted fluid collection now demonstrating significantly increased peripheral enhancement concerning for superimposed infection.  Neuro surgical consultation would be advised if not previously obtained.    This report was flagged in Epic as abnormal.      Electronically signed by: Dale Cotton  Date:    07/30/2022  Time:    19:53               Narrative:    EXAMINATION:  MRI LUMBAR SPINE W WO CONTRAST    CLINICAL HISTORY:  lower back pain;    TECHNIQUE:  Multiplanar, multisequence MR images were acquired from the thoracolumbar junction to the sacrum without the administration of contrast.    COMPARISON:  Multiple priors.    FINDINGS:  Patient status post hemilaminectomy and discectomy at L2-3 on the left.  Associated with the surgical area of there is fluid within the disc space, bone marrow edema, and abnormal postcontrast enhancement of the in plates about the disc space and disc which extends along the left aspect of the spinal canal and into the posterior soft tissues concerning for discitis osteomyelitis with epidural abscess  extending into the posterior soft tissues.  The fluid collection in the posterior soft tissues is noted both within the laminectomy bed measuring a proximally 3.1 x 2.7 x 2.3 cm in this area, and with extension inferiorly along the L3 lamina and L1 inferior lamina, and also into the posterior soft tissues.  The fluid collection is significantly serpiginous and difficult to accurately measure.  The component in the posterior soft tissues measures on the order of 9.2 x 3.8 by 2.5 cm and also demonstrates some postcontrast enhancement concerning for infection.  Abscess is the diagnosis of  exclusion.  The fluid collection was present on the prior exam of 07/01/2022, but the surrounding peripheral enhancement has significantly increased in comparison to the prior exam, and the new disc space abnormalities raise concern for superimposed infection.    Alignment: Normal.    Vertebrae: Aside from the surgical levels, there is normal marrow signal. No fracture.    Cord: Normal.  Conus terminates at L1    Degenerative findings:    At the nonsurgical levels the degenerative changes are stable from the prior.    The epidural collection extending such as seen on series 6, image 16 and series 9, image 17 now produces moderate spinal canal stenosis.    Paraspinal muscles & soft tissues: As above.                                      Significant Imaging: I have reviewed all pertinent imaging results/findings within the past 24 hours.

## 2022-08-06 NOTE — ASSESSMENT & PLAN NOTE
--wound cx pending  --Neuro surgery consulted  --ID consulted      7/30/2022  Neurosurgery following    Possible washout vs wound vac placement   ID following, continue empiric IV antibiotic        7/31/2022  MRI of lumbar spine strongly concerning for discitis osteomyelitis at L2-3, probable epidural phlegmon or abscess.   Neurosurgery following, will need revision with washout. Will follow.   Continue IV antibiotics  Wound cultures in progress      8/1- continue IV Abx, local wound care  Await revision and wound exploration with plastic surgery    8/2- surgery tomorrow, cont iV abx    8/3- s/p Wound revision and washout with Lumbar Drain placement  8/4- improving- continue same tx  8/5 Stable - Per NS  8/6 - Stable, NS on board, Plan for drain removal per N/S

## 2022-08-06 NOTE — ASSESSMENT & PLAN NOTE
See 1 wound dehiscence    On IV Abx, await washout surgery with Plastic Surgery tomorrow    See above    8/5, 8/6  POD#2  Per N/s

## 2022-08-07 LAB
ALBUMIN SERPL BCP-MCNC: 2.4 G/DL (ref 3.5–5.2)
ALP SERPL-CCNC: 57 U/L (ref 55–135)
ALT SERPL W/O P-5'-P-CCNC: 57 U/L (ref 10–44)
ANION GAP SERPL CALC-SCNC: 7 MMOL/L (ref 8–16)
AST SERPL-CCNC: 32 U/L (ref 10–40)
BASOPHILS # BLD AUTO: 0.03 K/UL (ref 0–0.2)
BASOPHILS NFR BLD: 0.4 % (ref 0–1.9)
BILIRUB SERPL-MCNC: 0.3 MG/DL (ref 0.1–1)
BUN SERPL-MCNC: 10 MG/DL (ref 8–23)
CALCIUM SERPL-MCNC: 8.1 MG/DL (ref 8.7–10.5)
CHLORIDE SERPL-SCNC: 110 MMOL/L (ref 95–110)
CO2 SERPL-SCNC: 25 MMOL/L (ref 23–29)
CREAT SERPL-MCNC: 0.8 MG/DL (ref 0.5–1.4)
DIFFERENTIAL METHOD: ABNORMAL
EOSINOPHIL # BLD AUTO: 0.3 K/UL (ref 0–0.5)
EOSINOPHIL NFR BLD: 3.5 % (ref 0–8)
ERYTHROCYTE [DISTWIDTH] IN BLOOD BY AUTOMATED COUNT: 15.8 % (ref 11.5–14.5)
EST. GFR  (NO RACE VARIABLE): >60 ML/MIN/1.73 M^2
FERRITIN SERPL-MCNC: 141 NG/ML (ref 20–300)
GLUCOSE SERPL-MCNC: 139 MG/DL (ref 70–110)
HCT VFR BLD AUTO: 31 % (ref 40–54)
HGB BLD-MCNC: 9.3 G/DL (ref 14–18)
IMM GRANULOCYTES # BLD AUTO: 0.13 K/UL (ref 0–0.04)
IMM GRANULOCYTES NFR BLD AUTO: 1.6 % (ref 0–0.5)
LDH SERPL L TO P-CCNC: 180 U/L (ref 110–260)
LYMPHOCYTES # BLD AUTO: 4 K/UL (ref 1–4.8)
LYMPHOCYTES NFR BLD: 47.8 % (ref 18–48)
MCH RBC QN AUTO: 27 PG (ref 27–31)
MCHC RBC AUTO-ENTMCNC: 30 G/DL (ref 32–36)
MCV RBC AUTO: 90 FL (ref 82–98)
MONOCYTES # BLD AUTO: 0.5 K/UL (ref 0.3–1)
MONOCYTES NFR BLD: 6.1 % (ref 4–15)
NEUTROPHILS # BLD AUTO: 3.4 K/UL (ref 1.8–7.7)
NEUTROPHILS NFR BLD: 40.6 % (ref 38–73)
NRBC BLD-RTO: 1 /100 WBC
PLATELET # BLD AUTO: 165 K/UL (ref 150–450)
PMV BLD AUTO: 11.4 FL (ref 9.2–12.9)
POCT GLUCOSE: 136 MG/DL (ref 70–110)
POCT GLUCOSE: 150 MG/DL (ref 70–110)
POCT GLUCOSE: 150 MG/DL (ref 70–110)
POCT GLUCOSE: 152 MG/DL (ref 70–110)
POCT GLUCOSE: 207 MG/DL (ref 70–110)
POCT GLUCOSE: 274 MG/DL (ref 70–110)
POTASSIUM SERPL-SCNC: 3.7 MMOL/L (ref 3.5–5.1)
PROT SERPL-MCNC: 4.9 G/DL (ref 6–8.4)
RBC # BLD AUTO: 3.45 M/UL (ref 4.6–6.2)
SODIUM SERPL-SCNC: 142 MMOL/L (ref 136–145)
WBC # BLD AUTO: 8.3 K/UL (ref 3.9–12.7)

## 2022-08-07 PROCEDURE — 25000003 PHARM REV CODE 250: Performed by: PHYSICIAN ASSISTANT

## 2022-08-07 PROCEDURE — 85025 COMPLETE CBC W/AUTO DIFF WBC: CPT

## 2022-08-07 PROCEDURE — 94640 AIRWAY INHALATION TREATMENT: CPT

## 2022-08-07 PROCEDURE — 20000000 HC ICU ROOM

## 2022-08-07 PROCEDURE — 63600175 PHARM REV CODE 636 W HCPCS: Performed by: PHYSICIAN ASSISTANT

## 2022-08-07 PROCEDURE — 63600175 PHARM REV CODE 636 W HCPCS: Performed by: INTERNAL MEDICINE

## 2022-08-07 PROCEDURE — 25000003 PHARM REV CODE 250: Performed by: NURSE PRACTITIONER

## 2022-08-07 PROCEDURE — 94761 N-INVAS EAR/PLS OXIMETRY MLT: CPT

## 2022-08-07 PROCEDURE — 80053 COMPREHEN METABOLIC PANEL: CPT

## 2022-08-07 PROCEDURE — 27000207 HC ISOLATION

## 2022-08-07 PROCEDURE — 25000003 PHARM REV CODE 250

## 2022-08-07 PROCEDURE — 25000003 PHARM REV CODE 250: Performed by: INTERNAL MEDICINE

## 2022-08-07 RX ADMIN — ALBUTEROL SULFATE 2 PUFF: 90 AEROSOL, METERED RESPIRATORY (INHALATION) at 07:08

## 2022-08-07 RX ADMIN — Medication 6 MG: at 08:08

## 2022-08-07 RX ADMIN — GABAPENTIN 300 MG: 300 CAPSULE ORAL at 08:08

## 2022-08-07 RX ADMIN — DOXYCYCLINE HYCLATE 100 MG: 100 TABLET, COATED ORAL at 08:08

## 2022-08-07 RX ADMIN — THERA TABS 1 TABLET: TAB at 08:08

## 2022-08-07 RX ADMIN — INSULIN DETEMIR 10 UNITS: 100 INJECTION, SOLUTION SUBCUTANEOUS at 08:08

## 2022-08-07 RX ADMIN — INSULIN ASPART 1 UNITS: 100 INJECTION, SOLUTION INTRAVENOUS; SUBCUTANEOUS at 12:08

## 2022-08-07 RX ADMIN — INSULIN ASPART 2 UNITS: 100 INJECTION, SOLUTION INTRAVENOUS; SUBCUTANEOUS at 11:08

## 2022-08-07 RX ADMIN — DOCUSATE SODIUM 100 MG: 100 CAPSULE, LIQUID FILLED ORAL at 08:08

## 2022-08-07 RX ADMIN — PIPERACILLIN SODIUM AND TAZOBACTAM SODIUM 4.5 G: 4; .5 INJECTION, POWDER, LYOPHILIZED, FOR SOLUTION INTRAVENOUS at 01:08

## 2022-08-07 RX ADMIN — INSULIN ASPART 6 UNITS: 100 INJECTION, SOLUTION INTRAVENOUS; SUBCUTANEOUS at 11:08

## 2022-08-07 RX ADMIN — PIPERACILLIN SODIUM AND TAZOBACTAM SODIUM 4.5 G: 4; .5 INJECTION, POWDER, LYOPHILIZED, FOR SOLUTION INTRAVENOUS at 05:08

## 2022-08-07 RX ADMIN — FAMOTIDINE 20 MG: 20 TABLET ORAL at 08:08

## 2022-08-07 RX ADMIN — INSULIN ASPART 2 UNITS: 100 INJECTION, SOLUTION INTRAVENOUS; SUBCUTANEOUS at 05:08

## 2022-08-07 RX ADMIN — LOSARTAN POTASSIUM 50 MG: 50 TABLET, FILM COATED ORAL at 08:08

## 2022-08-07 RX ADMIN — ASPIRIN 81 MG: 81 TABLET, COATED ORAL at 08:08

## 2022-08-07 RX ADMIN — GABAPENTIN 300 MG: 300 CAPSULE ORAL at 02:08

## 2022-08-07 RX ADMIN — OXYCODONE AND ACETAMINOPHEN 1 TABLET: 10; 325 TABLET ORAL at 02:08

## 2022-08-07 RX ADMIN — METHOCARBAMOL 750 MG: 750 TABLET ORAL at 11:08

## 2022-08-07 RX ADMIN — ALBUTEROL SULFATE 2 PUFF: 90 AEROSOL, METERED RESPIRATORY (INHALATION) at 02:08

## 2022-08-07 RX ADMIN — ENOXAPARIN SODIUM 40 MG: 100 INJECTION SUBCUTANEOUS at 04:08

## 2022-08-07 RX ADMIN — INSULIN ASPART 4 UNITS: 100 INJECTION, SOLUTION INTRAVENOUS; SUBCUTANEOUS at 04:08

## 2022-08-07 RX ADMIN — PIPERACILLIN SODIUM AND TAZOBACTAM SODIUM 4.5 G: 4; .5 INJECTION, POWDER, LYOPHILIZED, FOR SOLUTION INTRAVENOUS at 08:08

## 2022-08-07 RX ADMIN — POLYETHYLENE GLYCOL 3350 17 G: 17 POWDER, FOR SOLUTION ORAL at 08:08

## 2022-08-07 RX ADMIN — MUPIROCIN: 20 OINTMENT TOPICAL at 08:08

## 2022-08-07 RX ADMIN — OXYCODONE HYDROCHLORIDE AND ACETAMINOPHEN 500 MG: 500 TABLET ORAL at 08:08

## 2022-08-07 RX ADMIN — ATORVASTATIN CALCIUM 10 MG: 10 TABLET, FILM COATED ORAL at 08:08

## 2022-08-07 RX ADMIN — Medication 1000 UNITS: at 08:08

## 2022-08-07 RX ADMIN — MUPIROCIN: 20 OINTMENT TOPICAL at 09:08

## 2022-08-07 RX ADMIN — SODIUM CHLORIDE: 0.9 INJECTION, SOLUTION INTRAVENOUS at 10:08

## 2022-08-07 RX ADMIN — OXYCODONE AND ACETAMINOPHEN 1 TABLET: 10; 325 TABLET ORAL at 11:08

## 2022-08-07 RX ADMIN — SODIUM CHLORIDE: 0.9 INJECTION, SOLUTION INTRAVENOUS at 08:08

## 2022-08-07 NOTE — OP NOTE
[facility] - Intensive Care ([facility])  Neurosurgery  Operative Note    SUMMARY      Date of Procedure: 8/3/2022     Procedure: Procedure(s) (LRB):  EXPLORATION, WOUND (Bilateral)  REPAIR, CSF LEAK, USING COMPUTER-ASSISTED NAVIGATION (Bilateral)  PLACEMENT (N/A)     Surgeon(s) and Role:     * Shaggy Zepeda MD - Primary      Pre-Operative Diagnosis: Status post lumbar surgery [Z98.890]  Postoperative CSF leak [G97.82, G96.00]    Post-Operative Diagnosis: Post-Op Diagnosis Codes:     * Status post lumbar surgery [Z98.890]     * Postoperative CSF leak [G97.82, G96.00]    Anesthesia: General    Operative Findings (including complications, if any):   Post operative fluid collection CSF vs abscess vs seroma     Description of Technical Procedures:  Wound washout and exploration superficial and deep  CSF leak repair   Placement of lumbar drain     Estimated Blood Loss (EBL): * No values recorded between 8/3/2022  8:13 AM and 8/3/2022  2:08 PM *           Specimens:   Specimen (24h ago, onward)            None           Implants: * No implants in log *           Condition: Good    Disposition: PACU - hemodynamically stable.    Attestation: I was present and scrubbed for the entire procedure.     Patient is 66-year-old gentleman who previously underwent  Lumbar microdiscectomy   Following surgery he was discharged subsequently returned with worsened pain and had a hematoma which was drained    Again patient was discharged he retuened to clinic and his sutures were removed.  Several days later he noticed some clear drainage coming from the inferior portion of his incision.   repeat imaging showed evident there was possible CSF leakage versus abscess versus postop seroma      After discussing the treatment options he did agree and elect to undergo posterior lumbar wound washout and exploration with placement.  Of lumbar drain.     The patient was informed of all benefits and potential risk of the operation including but not  limited to:  Pain, infection, bleeding, coma, paralysis, death.  Cerebrospinal fluid leak, failure of any instrumentation, the need for additional procedures in the future. No guarantee was given that this procedure would alleviate all of the symptoms.  .     Description of procedure  The patient was transferred to the operating room and was given preoperative prophylactic IV antibiotics.  The Anesthesia Service sedated and intubated the patient.  The eyes were taped shut after ointment was applied to prevent corneal abrasion.  A Alessandro Hugger was placed over the upper body to maintain control of the core body temperature.  Roche catheter was inserted.  The patient was turned prone on the Wally table.  All pressure points were carefully padded.       Operative Technique:  The patient was prepped and draped in the standard sterile fashion.  The C-arm fluoroscopy was draped and brought into the operative field and the prior incision was prepped and draped.  The skin was subsequently opened sharply with a #10 blade.  Dissection was carried down superiorly and inferiorly in the midline to expose the supraspinous ligament and the lamina.  The musculature was elevated subperiosteally to expose the facets on the bilaterally with the Bovie electrocautery as well as the Martinez elevator.  Hemostasis was achieved.  Self retraining retractors were then inserted.  After exposure was noted that there was a fluid collection at the operative bed.  Cultures were taken and sent for routine analysis.     The microscope was draped and prepped and then brought onto the field for better visualization of the thecal sac as well as the nerve roots        Next the L3-4  space was explored.  Additional lamina was removed to better visualize both the thecal sac and the exiting nerve roots at L3 as well as L4  Bilaterally    The L3 nerve root on the left side was intact however there was some additional compression going into the far lateral  foramen tracing out laterally    There was evidence of a small area of leakage of fluid from the lateral aspect of the thecal sac ventrally at this level between the L3 nerve root and the medial border of the thecal sac.  Once the edges of the leak were dissected out and there was no signs of nerve rootlets with in the lb of the dura then a fat graft was sutured to approximate the ends of the dural defect with a 4-0 Nurolon.     Next a dural onlay matrix was laid onto the thecal sac.  A piece of muscle graft was sutured to bolster the repair     Next a Touhy needle was placed to placed lumbar drain at the L4-5 space above where the operative laminectomy was performed.  This showed good drainage of CSF and was attached to the lumbar drainage system     DuraSeal was placed on top of the repaired construct     The wound was copiously irrigated with antibiotic saline solution.  A 1/8 Hemovac drain was placed and brought out through a separate stab incision x2. 1 g of vancomycin powder was placed to the surgical cavity.  The fascia was subsequently closed utilizing 0 Vicryl sutures.  Exparel was injected into the muscle for postoperative pain control.  The subcuticular layer was closed with a 2 0 Vicryl in an inverted fashion.  The skin was then approximated with running nylon locked.  The incision was dressed in a clean and dry dressing.     All sponge counts, needle counts and instrument counts were correct at the end of the case x 2.  The patient tolerated the procedure well without any complication and was transferred in a stable condition to the ICU        Shaggy Zepeda MD  Neurosurgery      Disclaimer: This note was prepared using a voice recognition system and is likely to have sound alike errors within the text.

## 2022-08-07 NOTE — ASSESSMENT & PLAN NOTE
Patient's FSGs are controlled on current medication regimen.  Last A1c reviewed-   Lab Results   Component Value Date    HGBA1C 8.3 (H) 05/18/2022     Most recent fingerstick glucose reviewed-   Recent Labs   Lab 08/06/22  2108 08/06/22  2329 08/07/22  0515 08/07/22  0803   POCTGLUCOSE 190* 177* 152* 136*     Current correctional scale  Low  Maintain anti-hyperglycemic dose as follows-   Antihyperglycemics (From admission, onward)            Start     Stop Route Frequency Ordered    08/05/22 1300  insulin aspart U-100 pen 1-10 Units         -- SubQ Every 4 hours PRN 08/05/22 1145    08/05/22 1200  insulin detemir U-100 pen 10 Units         -- SubQ 2 times daily 08/05/22 1147        Hold Oral hypoglycemics while patient is in the hospital.    Continue present care, BS generally well controlled    Pt now on Dexa IV- BS may rise- needs tighter BS control    BS high sec to SSI-  Weaning steroids now, cont SSI    Increase Levemir to BID dosing

## 2022-08-07 NOTE — PLAN OF CARE
Problem: Adult Inpatient Plan of Care  Goal: Plan of Care Review  Outcome: Ongoing, Progressing  Flowsheets (Taken 8/7/2022 0601)  Plan of Care Reviewed With: patient  Goal: Patient-Specific Goal (Individualized)  Outcome: Ongoing, Progressing  Goal: Absence of Hospital-Acquired Illness or Injury  Outcome: Ongoing, Progressing  Intervention: Identify and Manage Fall Risk  Flowsheets (Taken 8/7/2022 0601)  Safety Promotion/Fall Prevention:   assistive device/personal item within reach   nonskid shoes/socks when out of bed   lighting adjusted   side rails raised x 2   medications reviewed  Intervention: Prevent Skin Injury  Flowsheets (Taken 8/7/2022 0601)  Body Position:   30 degrees   position changed independently   position maintained  Skin Protection: adhesive use limited  Intervention: Prevent and Manage VTE (Venous Thromboembolism) Risk  Flowsheets (Taken 8/7/2022 0601)  Activity Management: Ambulated in room - L4  VTE Prevention/Management:   remove, assess skin, and reapply sequential compression device   ambulation promoted  Range of Motion: active ROM (range of motion) encouraged  Intervention: Prevent Infection  Flowsheets (Taken 8/7/2022 0601)  Infection Prevention:   hand hygiene promoted   environmental surveillance performed   rest/sleep promoted  Goal: Optimal Comfort and Wellbeing  Outcome: Ongoing, Progressing  Intervention: Monitor Pain and Promote Comfort  Flowsheets (Taken 8/7/2022 0601)  Pain Management Interventions:   position adjusted   quiet environment facilitated   relaxation techniques promoted   pillow support provided   care clustered  Intervention: Provide Person-Centered Care  Flowsheets (Taken 8/7/2022 0601)  Trust Relationship/Rapport:   care explained   questions answered   questions encouraged   choices provided  Goal: Readiness for Transition of Care  Outcome: Ongoing, Progressing     Problem: Diabetes Comorbidity  Goal: Blood Glucose Level Within Targeted Range  Outcome:  Ongoing, Progressing  Intervention: Monitor and Manage Glycemia  Flowsheets (Taken 8/7/2022 0601)  Glycemic Management:   blood glucose monitored   oral hydration promoted     Problem: Impaired Wound Healing  Goal: Optimal Wound Healing  Outcome: Ongoing, Progressing  Intervention: Promote Wound Healing  Flowsheets (Taken 8/7/2022 0601)  Oral Nutrition Promotion: rest periods promoted  Sleep/Rest Enhancement:   awakenings minimized   regular sleep/rest pattern promoted   relaxation techniques promoted  Activity Management: Ambulated in room - L4  Pain Management Interventions:   position adjusted   quiet environment facilitated   relaxation techniques promoted   pillow support provided   care clustered     Problem: Fall Injury Risk  Goal: Absence of Fall and Fall-Related Injury  Outcome: Ongoing, Progressing  Intervention: Identify and Manage Contributors  Flowsheets (Taken 8/7/2022 0601)  Self-Care Promotion:   independence encouraged   BADL personal objects within reach  Medication Review/Management: medications reviewed  Intervention: Promote Injury-Free Environment  Flowsheets (Taken 8/7/2022 0601)  Safety Promotion/Fall Prevention:   assistive device/personal item within reach   nonskid shoes/socks when out of bed   lighting adjusted   side rails raised x 2   medications reviewed     Problem: Infection  Goal: Absence of Infection Signs and Symptoms  Outcome: Ongoing, Progressing  Intervention: Prevent or Manage Infection  Flowsheets (Taken 8/7/2022 0601)  Infection Management: aseptic technique maintained     Problem: Skin Injury Risk Increased  Goal: Skin Health and Integrity  Outcome: Ongoing, Progressing  Intervention: Optimize Skin Protection  Flowsheets (Taken 8/7/2022 0601)  Pressure Reduction Techniques: frequent weight shift encouraged  Skin Protection: adhesive use limited  Head of Bed (HOB) Positioning: HOB at 30 degrees

## 2022-08-07 NOTE — SUBJECTIVE & OBJECTIVE
Interval History: Patient stable improving steadily. Much stronger today sitting in chair at bedside. Patient tolerating therapies / diet.    Review of Systems   Constitutional:  Positive for activity change and fatigue. Negative for appetite change and fever.   HENT: Negative.  Negative for congestion, sinus pressure and sore throat.    Eyes: Negative.  Negative for photophobia, discharge and visual disturbance.   Respiratory: Negative.  Negative for cough, chest tightness, shortness of breath and wheezing.    Cardiovascular: Negative.  Negative for chest pain and palpitations.   Gastrointestinal: Negative.  Negative for abdominal pain, blood in stool, constipation, diarrhea, nausea and vomiting.   Endocrine: Negative.    Genitourinary: Negative.    Musculoskeletal:  Positive for arthralgias, back pain and myalgias. Negative for neck pain and neck stiffness.   Skin:  Positive for wound.   Allergic/Immunologic: Negative.    Neurological:  Positive for weakness. Negative for seizures, syncope and headaches.   Hematological: Negative.    Psychiatric/Behavioral: Negative.  Negative for agitation, behavioral problems, hallucinations, self-injury and suicidal ideas.    Objective:     Vital Signs (Most Recent):  Temp: 98 °F (36.7 °C) (08/07/22 0400)  Pulse: (!) 51 (08/07/22 0758)  Resp: 16 (08/07/22 0758)  BP: (!) 139/95 (08/07/22 0600)  SpO2: 98 % (08/07/22 0758)   Vital Signs (24h Range):  Temp:  [98 °F (36.7 °C)-99.5 °F (37.5 °C)] 98 °F (36.7 °C)  Pulse:  [46-71] 51  Resp:  [9-32] 16  SpO2:  [94 %-100 %] 98 %  BP: (117-174)/() 139/95     Weight: 106.3 kg (234 lb 5.6 oz)  Body mass index is 37.82 kg/m².    Intake/Output Summary (Last 24 hours) at 8/7/2022 0905  Last data filed at 8/7/2022 0600  Gross per 24 hour   Intake 3266.8 ml   Output 2171 ml   Net 1095.8 ml      Physical Exam  Vitals and nursing note reviewed.   Constitutional:       General: He is not in acute distress.     Appearance: Normal appearance. He  is not ill-appearing, toxic-appearing or diaphoretic.       HENT:      Head: Normocephalic.      Right Ear: External ear normal.      Left Ear: External ear normal.      Nose: Nose normal.      Mouth/Throat:      Mouth: Mucous membranes are moist.   Eyes:      Pupils: Pupils are equal, round, and reactive to light.   Cardiovascular:      Rate and Rhythm: Normal rate and regular rhythm.      Pulses: Normal pulses.           Dorsalis pedis pulses are 2+ on the right side and 2+ on the left side.      Heart sounds: No murmur heard.  Pulmonary:      Effort: No tachypnea or respiratory distress.      Breath sounds: Normal breath sounds.   Abdominal:      General: Bowel sounds are normal. There is no distension.   Musculoskeletal:         General: No tenderness.      Cervical back: Normal range of motion and neck supple.      Right lower leg: No edema.      Left lower leg: No edema.   Skin:     Capillary Refill: Capillary refill takes 2 to 3 seconds.   Neurological:      Mental Status: He is alert and oriented to person, place, and time.      GCS: GCS eye subscore is 4. GCS verbal subscore is 5. GCS motor subscore is 6.      Sensory: Sensation is intact.      Motor: No weakness or atrophy.   Psychiatric:         Mood and Affect: Mood normal.         Behavior: Behavior normal. Behavior is cooperative.         Thought Content: Thought content normal.         Judgment: Judgment normal.       Significant Labs: All pertinent labs within the past 24 hours have been reviewed.  Blood Culture: No results for input(s): LABBLOO in the last 48 hours.  BMP:   Recent Labs   Lab 08/07/22  0511   *      K 3.7      CO2 25   BUN 10   CREATININE 0.8   CALCIUM 8.1*     CBC:   Recent Labs   Lab 08/06/22 0433 08/07/22  0511   WBC 10.26 8.30   HGB 9.9* 9.3*   HCT 31.4* 31.0*    165     CMP:   Recent Labs   Lab 08/06/22 0433 08/07/22  0511    142   K 4.0 3.7    110   CO2 27 25   * 139*   BUN 11 10    CREATININE 0.8 0.8   CALCIUM 8.3* 8.1*   PROT 5.3* 4.9*   ALBUMIN 2.5* 2.4*   BILITOT 0.2 0.3   ALKPHOS 64 57   AST 35 32   ALT 63* 57*   ANIONGAP 6* 7*       POCT Glucose:   Recent Labs   Lab 08/06/22  2329 08/07/22  0515 08/07/22  0803   POCTGLUCOSE 177* 152* 136*     Imaging Results               MRI Lumbar Spine W WO Cont (Final result)  Result time 07/30/22 19:53:25      Final result by Dale Cotton MD (07/30/22 19:53:25)                   Impression:      Findings strongly concerning for discitis osteomyelitis at L2-3 as detailed above.  Probable epidural phlegmon or abscess extending along the left aspect of the thecal sac and into the posterior soft tissues with the previously noted fluid collection now demonstrating significantly increased peripheral enhancement concerning for superimposed infection.  Neuro surgical consultation would be advised if not previously obtained.    This report was flagged in Epic as abnormal.      Electronically signed by: Dale Cotton  Date:    07/30/2022  Time:    19:53               Narrative:    EXAMINATION:  MRI LUMBAR SPINE W WO CONTRAST    CLINICAL HISTORY:  lower back pain;    TECHNIQUE:  Multiplanar, multisequence MR images were acquired from the thoracolumbar junction to the sacrum without the administration of contrast.    COMPARISON:  Multiple priors.    FINDINGS:  Patient status post hemilaminectomy and discectomy at L2-3 on the left.  Associated with the surgical area of there is fluid within the disc space, bone marrow edema, and abnormal postcontrast enhancement of the in plates about the disc space and disc which extends along the left aspect of the spinal canal and into the posterior soft tissues concerning for discitis osteomyelitis with epidural abscess extending into the posterior soft tissues.  The fluid collection in the posterior soft tissues is noted both within the laminectomy bed measuring a proximally 3.1 x 2.7 x 2.3 cm in this area, and with  extension inferiorly along the L3 lamina and L1 inferior lamina, and also into the posterior soft tissues.  The fluid collection is significantly serpiginous and difficult to accurately measure.  The component in the posterior soft tissues measures on the order of 9.2 x 3.8 by 2.5 cm and also demonstrates some postcontrast enhancement concerning for infection.  Abscess is the diagnosis of  exclusion.  The fluid collection was present on the prior exam of 07/01/2022, but the surrounding peripheral enhancement has significantly increased in comparison to the prior exam, and the new disc space abnormalities raise concern for superimposed infection.    Alignment: Normal.    Vertebrae: Aside from the surgical levels, there is normal marrow signal. No fracture.    Cord: Normal.  Conus terminates at L1    Degenerative findings:    At the nonsurgical levels the degenerative changes are stable from the prior.    The epidural collection extending such as seen on series 6, image 16 and series 9, image 17 now produces moderate spinal canal stenosis.    Paraspinal muscles & soft tissues: As above.                                        Significant Imaging: I have reviewed all pertinent imaging results/findings within the past 24 hours.

## 2022-08-07 NOTE — ASSESSMENT & PLAN NOTE
See 1 wound dehiscence    On IV Abx, await washout surgery with Plastic Surgery tomorrow    See above    8/5, 8/6  POD#3  Per N/s

## 2022-08-07 NOTE — PROGRESS NOTES
[facility] ([facility])  Neurosurgery  Progress Note    Subjective:     Interval History:   LD in place   Clamped when OOB  HV in place x 2  Wound + pseudomonas     Post-Op Info:  Procedure(s) (LRB):  EXPLORATION, WOUND (Bilateral)  REPAIR, CSF LEAK, USING COMPUTER-ASSISTED NAVIGATION (Bilateral)  PLACEMENT (N/A)   4 Days Post-Op      Medications:  Continuous Infusions:   sodium chloride 0.9% 100 mL/hr at 08/07/22 0900     Scheduled Meds:   albuterol  2 puff Inhalation Q6H    ascorbic acid (vitamin C)  500 mg Oral BID    aspirin  81 mg Oral Daily    atorvastatin  10 mg Oral QHS    docusate sodium  100 mg Oral BID    doxycycline  100 mg Oral Q12H    enoxaparin  40 mg Subcutaneous Daily    famotidine  20 mg Oral BID    gabapentin  300 mg Oral TID    insulin detemir U-100  10 Units Subcutaneous BID    losartan  50 mg Oral Daily    melatonin  6 mg Oral Nightly    multivitamin  1 tablet Oral Daily    mupirocin   Nasal BID    piperacillin-tazobactam (ZOSYN) IVPB  4.5 g Intravenous Q8H    polyethylene glycol  17 g Oral Daily    vitamin D  1,000 Units Oral Daily     PRN Meds:acetaminophen, acetaminophen, aluminum-magnesium hydroxide-simethicone, dextromethorphan-guaiFENesin  mg/5 ml, dextrose 10%, glucagon (human recombinant), glucose, glucose, HYDROmorphone, insulin aspart U-100, loperamide, melatonin, methocarbamoL, naloxone, ondansetron, oxyCODONE-acetaminophen, oxyCODONE-acetaminophen, oxyCODONE-acetaminophen, prochlorperazine, promethazine, senna-docusate 8.6-50 mg, simethicone, sodium chloride 0.9%, sodium chloride 0.9%, sodium chloride 0.9%     Review of Systems   Constitutional: Negative for fatigue and unexpected weight change.   HENT: Negative for ear pain, hearing loss, tinnitus and voice change.    Respiratory: Negative for shortness of breath.    Cardiovascular: Negative for chest pain.   Gastrointestinal: Negative for abdominal pain.   Musculoskeletal: Negative for back pain,  gait problem, myalgias, neck pain and neck stiffness.   Skin: Negative for color change.   Neurological: Negative for dizziness, tremors, numbness and headaches.   Psychiatric/Behavioral: Negative for agitation and confusion. The patient is not nervous/anxious.      Objective:     Weight: 106.3 kg (234 lb 5.6 oz)  Body mass index is 37.82 kg/m².  Vital Signs (Most Recent):  Temp: 98.2 °F (36.8 °C) (08/07/22 0905)  Pulse: 64 (08/07/22 0900)  Resp: (!) 24 (08/07/22 0900)  BP: 136/65 (08/07/22 0900)  SpO2: 96 % (08/07/22 0900) Vital Signs (24h Range):  Temp:  [98 °F (36.7 °C)-99.5 °F (37.5 °C)] 98.2 °F (36.8 °C)  Pulse:  [46-71] 64  Resp:  [9-32] 24  SpO2:  [94 %-100 %] 96 %  BP: (117-174)/() 136/65     Date 08/07/22 0700 - 08/08/22 0659   Shift 8436-9595 8991-1962 0994-1010 24 Hour Total   INTAKE   P.O. 0   0   I.V.(mL/kg) 299.8(2.8)   299.8(2.8)   IV Piggyback 74.9   74.9   Shift Total(mL/kg) 374.7(3.5)   374.7(3.5)   OUTPUT   Urine(mL/kg/hr) 250   250   Drains 35   35   Shift Total(mL/kg) 285(2.7)   285(2.7)   Weight (kg) 106.3 106.3 106.3 106.3                        Closed/Suction Drain 08/03/22 1144 Right Back Accordion 10 Fr. (Active)   Site Description Unable to view 08/07/22 0705   Dressing Type Transparent (Tegaderm) 08/07/22 0705   Dressing Status Clean;Dry;Intact 08/07/22 0705   Dressing Intervention Integrity maintained 08/07/22 0705   Drainage Bloody 08/07/22 0705   Status Other (Comment) 08/07/22 0705   Output (mL) 5 mL 08/07/22 0800            Closed/Suction Drain 08/03/22 1145 Left Back Accordion 10 Fr. (Active)   Site Description Unable to view 08/07/22 0705   Dressing Type Transparent (Tegaderm) 08/07/22 0705   Dressing Status Clean;Dry;Intact 08/07/22 0705   Dressing Intervention Integrity maintained 08/07/22 0705   Drainage Serosanguineous 08/07/22 0705   Status To bulb suction 08/07/22 0705   Output (mL) 0 mL 08/07/22 0900            Drain/Device  08/03/22 1154 Right midline lumbar spine  gravity (Active)   Insertion Site clean and dry;dressing intact 08/07/22 0705   Drainage Characteristics/Odor Clear 08/07/22 0705   Drainage Amount other (see comments) 08/07/22 0705   General Intake (mL) 0 08/03/22 1915   General Output (mL) 0 08/07/22 0600       Neurosurgery Physical Exam    Significant Labs:  Recent Labs   Lab 08/06/22  0433 08/07/22  0511   * 139*    142   K 4.0 3.7    110   CO2 27 25   BUN 11 10   CREATININE 0.8 0.8   CALCIUM 8.3* 8.1*     Recent Labs   Lab 08/06/22  0433 08/07/22  0511   WBC 10.26 8.30   HGB 9.9* 9.3*   HCT 31.4* 31.0*    165     No results for input(s): LABPT, INR, APTT in the last 48 hours.  Microbiology Results (last 7 days)     Procedure Component Value Units Date/Time    Aerobic culture [[id number]] Collected: 08/03/22 0937    Order Status: Completed Specimen: Wound from Back Updated: 08/06/22 0939     Aerobic Bacterial Culture No growth    Narrative:      Deep    Aerobic culture [[id number]] Collected: 08/03/22 0937    Order Status: Completed Specimen: Wound from Back Updated: 08/06/22 0939     Aerobic Bacterial Culture No growth    Narrative:      Superficial    Culture, Anaerobe [[id number]] Collected: 08/03/22 0937    Order Status: Completed Specimen: Wound from Back Updated: 08/05/22 0901     Anaerobic Culture Culture in progress    Narrative:      Deep    Culture, Anaerobe [[id number]] Collected: 08/03/22 0937    Order Status: Completed Specimen: Wound from Back Updated: 08/05/22 0900     Anaerobic Culture Culture in progress    Narrative:      Superficial    AFB Culture & Smear [[id number]] Collected: 08/03/22 0937    Order Status: Completed Specimen: Wound from Back Updated: 08/04/22 2127     AFB Culture & Smear Culture in progress     AFB CULTURE STAIN No acid fast bacilli seen.    Narrative:      Superficial    AFB Culture & Smear [[id number]] Collected: 08/03/22 0937    Order Status: Completed Specimen: Wound from Back Updated: 08/04/22 2127      AFB Culture & Smear Culture in progress     AFB CULTURE STAIN No acid fast bacilli seen.    Narrative:      Deep    Gram stain [[id number]] Collected: 08/03/22 0937    Order Status: Completed Specimen: Wound from Back Updated: 08/03/22 2021     Gram Stain Result No WBC's      No organisms seen    Narrative:      Superficial    Gram stain [[id number]] Collected: 08/03/22 0937    Order Status: Completed Specimen: Wound from Back Updated: 08/03/22 1807     Gram Stain Result No WBC's      No organisms seen    Narrative:      Deep    Fungus culture [[id number]] Collected: 08/03/22 0937    Order Status: Sent Specimen: Wound from Back Updated: 08/03/22 1534    Fungus culture [[id number]] Collected: 08/03/22 0937    Order Status: Sent Specimen: Wound from Back Updated: 08/03/22 1534    Aerobic culture [[id number]]  (Abnormal)  (Susceptibility) Collected: 07/30/22 0055    Order Status: Completed Specimen: Incision site from Back Updated: 08/03/22 1035     Aerobic Bacterial Culture PSEUDOMONAS AERUGINOSA  Moderate              Assessment/Plan:     Active Diagnoses:    Diagnosis Date Noted POA    PRINCIPAL PROBLEM:  Wound dehiscence with CSF leak s/p Washout and closure with Lumbar drain [T81.30XA] 07/29/2022 Yes     Chronic    Discitis of lumbar region [M46.46] 08/01/2022 Yes    Postoperative CSF leak [G97.82, G96.00] 07/30/2022 Yes     Chronic    Hypertension [I10] 07/29/2022 Yes    HLD (hyperlipidemia) [E78.5] 07/29/2022 Yes    COVID [U07.1] 07/29/2022 Yes     Chronic    Degenerative disc disease, lumbar [M51.36] 06/22/2022 Yes     Chronic    Diabetes mellitus without complication [E11.9]  Yes     Chronic    Morbid obesity with BMI of 40.0-44.9, adult [E66.01, Z68.41]  Not Applicable      Problems Resolved During this Admission:       Shaggy Zepeda MD  Neurosurgery  [facility] ([facility])

## 2022-08-07 NOTE — ASSESSMENT & PLAN NOTE
--supportive care  --PT/OT consulted    S/p laminectomy- complicated by wound dehiscence- see above    S/p revision and washout POD#3

## 2022-08-07 NOTE — ASSESSMENT & PLAN NOTE
--wound cx pending  --Neuro surgery consulted  --ID consulted      7/30/2022  Neurosurgery following    Possible washout vs wound vac placement   ID following, continue empiric IV antibiotic        7/31/2022  MRI of lumbar spine strongly concerning for discitis osteomyelitis at L2-3, probable epidural phlegmon or abscess.   Neurosurgery following, will need revision with washout. Will follow.   Continue IV antibiotics  Wound cultures in progress      8/1- continue IV Abx, local wound care  Await revision and wound exploration with plastic surgery    8/2- surgery tomorrow, cont iV abx    8/3- s/p Wound revision and washout with Lumbar Drain placement  8/4- improving- continue same tx  8/5 Stable - Per NS  8/6 , 8/7- Stable, NS on board, Plan for drain removal per N/S

## 2022-08-07 NOTE — PROGRESS NOTES
[facility] - [facility] Care ([facility])  [facility] Medicine  Progress Note    Patient Name: Friday ROGELIO Blake  MRN: [id number]  Patient Class: IP- Inpatient   Admission Date: 7/29/2022  Length of Stay: 7 days  Attending Physician: Mika Bonilla MD  Primary Care Provider: Sid Elizabeth MD        Subjective:     Principal Problem:Wound dehiscence        HPI:  67 y/o male with PMHx of HTN, HLD, DM, and obesity who presented tot he ED for eval of post op incision leaking.  Sx are constant and moderate in severity. No mitigating or exacerbating factors reported. Pt had surgery on 7/26, then took a trip to [facility]. He flew back last night. Pt denies fever, chills, pain, HA, N/V, and all other sx at this time.  ED workup shows: H/H 11.6/36.9, COVID +  He will be kept on OBS for CSF leak under the care of [facility] medicine  He is a full code and his SDM is his wife             Overview/Hospital Course:  Mr Blake is a 66 year old male who presented to [facility] Emergency Room of evaluation of wound dehiscence. Patient underwent Laminectomy of lumbar spine on 6/22/2022 and I & D of hematoma on 6/28/2022. Traveled to [facility] for daughter's wedding. Positive for drainage from low back surgical wound. Neurosurgery following, plan possible washout vs wound vac placement.  Infectious Disease following, continue empiric IV antibiotics. COVID positive. As of 7/31/2022, patient feeling well, vital signs and labs stable. MRI lumbar spine strongly concerning for discitis osteomyelitis at L2-3, probable epidural phlegmon or abscess. Neurosurgery following, will need revision with washout. Will follow.     8/1- cheerful, resting comfortably in bed on RA, no fever, chills or cough. Still has drainage from the Lumbar wound. Dr. Zepeda plans revision with wound washout/exploration with the help of Plastic Surgery, await confirmation date. Pt agreeable with the plan. Continue wound care, IV Abx, no wound vac yet.     8/2- comfortable on RA,  wound Cx growing Pseudomonas, earlier Cx grew Staph Epi- so now on Vanc, Cefepime and Flagyl. Dr. Zepeda plans Surgery- Washout with possible drain placement tomorrow, pt agreeable.     8/3- seen post op in the ICU, doing well, c/o back pain at the surgical site, just got oxycodone. Dr. Zepeda put him on Dexa 4 mg IV qid post op, he continues to received Cefepime, Flagyl and Vanc. Fluid sent to labs for C/S.     8/4- looks and feels better, lying flat on his side, back pain improved, no NV, dizziness or HA. Lumbar Drain output 11 cc. Dr. Zepeda d/mónica the foleys and is weaning the steroids. All Cx from Surgery remain NGDT. Getting Vanc, Cefepime and oral Flagyl. Last wound Cx grew Pseudomonas.   WBC 8.2, H/H 10.3/32. BS high due to Dexa.     8/5 - Patient with clinical improvement. Patient sitting up in bed. Plan to increase mobility today per NS. No CP/SOB. Patient tolerating diet.    8/6 - Patient improving steadily. Plan for OOB to chair today per NS. No cp / SOB. Patient is in good spirits. Discussed with CC Team.    8/7 - Patient stable improving steadily. Much stronger today sitting in chair at bedside. Patient tolerating therapies / diet.      Interval History: Patient stable improving steadily. Much stronger today sitting in chair at bedside. Patient tolerating therapies / diet.    Review of Systems   Constitutional:  Positive for activity change and fatigue. Negative for appetite change and fever.   HENT: Negative.  Negative for congestion, sinus pressure and sore throat.    Eyes: Negative.  Negative for photophobia, discharge and visual disturbance.   Respiratory: Negative.  Negative for cough, chest tightness, shortness of breath and wheezing.    Cardiovascular: Negative.  Negative for chest pain and palpitations.   Gastrointestinal: Negative.  Negative for abdominal pain, blood in stool, constipation, diarrhea, nausea and vomiting.   Endocrine: Negative.    Genitourinary: Negative.    Musculoskeletal:   Positive for arthralgias, back pain and myalgias. Negative for neck pain and neck stiffness.   Skin:  Positive for wound.   Allergic/Immunologic: Negative.    Neurological:  Positive for weakness. Negative for seizures, syncope and headaches.   Hematological: Negative.    Psychiatric/Behavioral: Negative.  Negative for agitation, behavioral problems, hallucinations, self-injury and suicidal ideas.    Objective:     Vital Signs (Most Recent):  Temp: 98 °F (36.7 °C) (08/07/22 0400)  Pulse: (!) 51 (08/07/22 0758)  Resp: 16 (08/07/22 0758)  BP: (!) 139/95 (08/07/22 0600)  SpO2: 98 % (08/07/22 0758)   Vital Signs (24h Range):  Temp:  [98 °F (36.7 °C)-99.5 °F (37.5 °C)] 98 °F (36.7 °C)  Pulse:  [46-71] 51  Resp:  [9-32] 16  SpO2:  [94 %-100 %] 98 %  BP: (117-174)/() 139/95     Weight: 106.3 kg (234 lb 5.6 oz)  Body mass index is 37.82 kg/m².    Intake/Output Summary (Last 24 hours) at 8/7/2022 0905  Last data filed at 8/7/2022 0600  Gross per 24 hour   Intake 3266.8 ml   Output 2171 ml   Net 1095.8 ml      Physical Exam  Vitals and nursing note reviewed.   Constitutional:       General: He is not in acute distress.     Appearance: Normal appearance. He is not ill-appearing, toxic-appearing or diaphoretic.       HENT:      Head: Normocephalic.      Right Ear: External ear normal.      Left Ear: External ear normal.      Nose: Nose normal.      Mouth/Throat:      Mouth: Mucous membranes are moist.   Eyes:      Pupils: Pupils are equal, round, and reactive to light.   Cardiovascular:      Rate and Rhythm: Normal rate and regular rhythm.      Pulses: Normal pulses.           Dorsalis pedis pulses are 2+ on the right side and 2+ on the left side.      Heart sounds: No murmur heard.  Pulmonary:      Effort: No tachypnea or respiratory distress.      Breath sounds: Normal breath sounds.   Abdominal:      General: Bowel sounds are normal. There is no distension.   Musculoskeletal:         General: No tenderness.       Cervical back: Normal range of motion and neck supple.      Right lower leg: No edema.      Left lower leg: No edema.   Skin:     Capillary Refill: Capillary refill takes 2 to 3 seconds.   Neurological:      Mental Status: He is alert and oriented to person, place, and time.      GCS: GCS eye subscore is 4. GCS verbal subscore is 5. GCS motor subscore is 6.      Sensory: Sensation is intact.      Motor: No weakness or atrophy.   Psychiatric:         Mood and Affect: Mood normal.         Behavior: Behavior normal. Behavior is cooperative.         Thought Content: Thought content normal.         Judgment: Judgment normal.       Significant Labs: All pertinent labs within the past 24 hours have been reviewed.  Blood Culture: No results for input(s): LABBLOO in the last 48 hours.  BMP:   Recent Labs   Lab 08/07/22  0511   *      K 3.7      CO2 25   BUN 10   CREATININE 0.8   CALCIUM 8.1*     CBC:   Recent Labs   Lab 08/06/22  0433 08/07/22  0511   WBC 10.26 8.30   HGB 9.9* 9.3*   HCT 31.4* 31.0*    165     CMP:   Recent Labs   Lab 08/06/22  0433 08/07/22  0511    142   K 4.0 3.7    110   CO2 27 25   * 139*   BUN 11 10   CREATININE 0.8 0.8   CALCIUM 8.3* 8.1*   PROT 5.3* 4.9*   ALBUMIN 2.5* 2.4*   BILITOT 0.2 0.3   ALKPHOS 64 57   AST 35 32   ALT 63* 57*   ANIONGAP 6* 7*       POCT Glucose:   Recent Labs   Lab 08/06/22  2329 08/07/22  0515 08/07/22  0803   POCTGLUCOSE 177* 152* 136*     Imaging Results               MRI Lumbar Spine W WO Cont (Final result)  Result time 07/30/22 19:53:25      Final result by Dale Cotton MD (07/30/22 19:53:25)                   Impression:      Findings strongly concerning for discitis osteomyelitis at L2-3 as detailed above.  Probable epidural phlegmon or abscess extending along the left aspect of the thecal sac and into the posterior soft tissues with the previously noted fluid collection now demonstrating significantly increased  peripheral enhancement concerning for superimposed infection.  Neuro surgical consultation would be advised if not previously obtained.    This report was flagged in Epic as abnormal.      Electronically signed by: Dale Cotton  Date:    07/30/2022  Time:    19:53               Narrative:    EXAMINATION:  MRI LUMBAR SPINE W WO CONTRAST    CLINICAL HISTORY:  lower back pain;    TECHNIQUE:  Multiplanar, multisequence MR images were acquired from the thoracolumbar junction to the sacrum without the administration of contrast.    COMPARISON:  Multiple priors.    FINDINGS:  Patient status post hemilaminectomy and discectomy at L2-3 on the left.  Associated with the surgical area of there is fluid within the disc space, bone marrow edema, and abnormal postcontrast enhancement of the in plates about the disc space and disc which extends along the left aspect of the spinal canal and into the posterior soft tissues concerning for discitis osteomyelitis with epidural abscess extending into the posterior soft tissues.  The fluid collection in the posterior soft tissues is noted both within the laminectomy bed measuring a proximally 3.1 x 2.7 x 2.3 cm in this area, and with extension inferiorly along the L3 lamina and L1 inferior lamina, and also into the posterior soft tissues.  The fluid collection is significantly serpiginous and difficult to accurately measure.  The component in the posterior soft tissues measures on the order of 9.2 x 3.8 by 2.5 cm and also demonstrates some postcontrast enhancement concerning for infection.  Abscess is the diagnosis of  exclusion.  The fluid collection was present on the prior exam of 07/01/2022, but the surrounding peripheral enhancement has significantly increased in comparison to the prior exam, and the new disc space abnormalities raise concern for superimposed infection.    Alignment: Normal.    Vertebrae: Aside from the surgical levels, there is normal marrow signal. No  fracture.    Cord: Normal.  Conus terminates at L1    Degenerative findings:    At the nonsurgical levels the degenerative changes are stable from the prior.    The epidural collection extending such as seen on series 6, image 16 and series 9, image 17 now produces moderate spinal canal stenosis.    Paraspinal muscles & soft tissues: As above.                                        Significant Imaging: I have reviewed all pertinent imaging results/findings within the past 24 hours.      Assessment/Plan:      * Wound dehiscence with CSF leak s/p Washout and closure with Lumbar drain  --wound cx pending  --Neuro surgery consulted  --ID consulted      7/30/2022  Neurosurgery following    Possible washout vs wound vac placement   ID following, continue empiric IV antibiotic        7/31/2022  MRI of lumbar spine strongly concerning for discitis osteomyelitis at L2-3, probable epidural phlegmon or abscess.   Neurosurgery following, will need revision with washout. Will follow.   Continue IV antibiotics  Wound cultures in progress      8/1- continue IV Abx, local wound care  Await revision and wound exploration with plastic surgery    8/2- surgery tomorrow, cont iV abx    8/3- s/p Wound revision and washout with Lumbar Drain placement  8/4- improving- continue same tx  8/5 Stable - Per NS  8/6 , 8/7- Stable, NS on board, Plan for drain removal per N/S    Discitis of lumbar region  See 1 wound dehiscence    On IV Abx, await washout surgery with Plastic Surgery tomorrow    See above    8/5, 8/6  POD#3  Per N/s      Postoperative CSF leak  Expect drain placement with surgery tomorrow    S/p wound revision and closure with LD placement    COVID  Patient is identified as High risk for severe complications of COVID 19 based on COVID risk score of 5   Initiate standard COVID protocols; COVID-19 testing ,Infection Control notification  and isolation- respiratory, contact and droplet per protocol    Diagnostics: (leukopenia,  hyponatremia, hyperferritinemia, elevated troponin, elevated d-dimer, age, and comorbidities are significant predictors of poor clinical outcome)  CBC, CMP, Ferritin, CRP and Portable CXR    Management: Maintain oxygen saturations 92-96% via Nasal Cannula  LPM and monitor with continuous/intermittent pulse oximetry.  and Inhaled bronchodilators as needed for shortness of breath.    Asymptomatic, pt on RA    Asymptomatic    HLD (hyperlipidemia)  --continue atorvastatin  --cardiac diet      Hypertension  --continue losartan  --cardiac diet    BP under control      Degenerative disc disease, lumbar  --supportive care  --PT/OT consulted    S/p laminectomy- complicated by wound dehiscence- see above    S/p revision and washout POD#3      Morbid obesity with BMI of 40.0-44.9, adult  Body mass index is 37.82 kg/m². Morbid obesity complicates all aspects of disease management from diagnostic modalities to treatment. Weight loss encouraged and health benefits explained to patient.             Diabetes mellitus without complication  Patient's FSGs are controlled on current medication regimen.  Last A1c reviewed-   Lab Results   Component Value Date    HGBA1C 8.3 (H) 05/18/2022     Most recent fingerstick glucose reviewed-   Recent Labs   Lab 08/06/22  2108 08/06/22  2329 08/07/22  0515 08/07/22  0803   POCTGLUCOSE 190* 177* 152* 136*     Current correctional scale  Low  Maintain anti-hyperglycemic dose as follows-   Antihyperglycemics (From admission, onward)            Start     Stop Route Frequency Ordered    08/05/22 1300  insulin aspart U-100 pen 1-10 Units         -- SubQ Every 4 hours PRN 08/05/22 1145    08/05/22 1200  insulin detemir U-100 pen 10 Units         -- SubQ 2 times daily 08/05/22 1147        Hold Oral hypoglycemics while patient is in the hospital.    Continue present care, BS generally well controlled    Pt now on Dexa IV- BS may rise- needs tighter BS control    BS high sec to SSI-  Weaning steroids now,  cont SSI    Increase Levemir to BID dosing        VTE Risk Mitigation (From admission, onward)         Ordered     enoxaparin injection 40 mg  Daily         08/05/22 1312     IP VTE HIGH RISK PATIENT  Once         07/29/22 2333     Place sequential compression device  Until discontinued         07/29/22 2333                Discharge Planning   MATEUS:      Code Status: Full Code   Is the patient medically ready for discharge?:     Reason for patient still in hospital (select all that apply): Patient trending condition, Treatment, Consult recommendations and Pending disposition  Discharge Plan A: Home Health            Critical care time spent on the evaluation and treatment of severe organ dysfunction, review of pertinent labs and imaging studies, discussions with consulting providers and discussions with patient/family: 36 minutes.      Mika Bonilla MD  Department of Hospital Medicine   [facility] - Intensive Care ([facility])

## 2022-08-08 ENCOUNTER — DOCUMENT SCAN (OUTPATIENT)
Dept: HOME HEALTH SERVICES | Facility: HOSPITAL | Age: 67
End: 2022-08-08
Payer: MEDICARE

## 2022-08-08 LAB
ALBUMIN SERPL BCP-MCNC: 2.6 G/DL (ref 3.5–5.2)
ALP SERPL-CCNC: 53 U/L (ref 55–135)
ALT SERPL W/O P-5'-P-CCNC: 50 U/L (ref 10–44)
ANION GAP SERPL CALC-SCNC: 5 MMOL/L (ref 8–16)
AST SERPL-CCNC: 20 U/L (ref 10–40)
BASOPHILS # BLD AUTO: 0.05 K/UL (ref 0–0.2)
BASOPHILS NFR BLD: 0.7 % (ref 0–1.9)
BILIRUB SERPL-MCNC: 0.3 MG/DL (ref 0.1–1)
BUN SERPL-MCNC: 11 MG/DL (ref 8–23)
CALCIUM SERPL-MCNC: 8.4 MG/DL (ref 8.7–10.5)
CHLORIDE SERPL-SCNC: 108 MMOL/L (ref 95–110)
CO2 SERPL-SCNC: 28 MMOL/L (ref 23–29)
CREAT SERPL-MCNC: 0.8 MG/DL (ref 0.5–1.4)
DIFFERENTIAL METHOD: ABNORMAL
EOSINOPHIL # BLD AUTO: 0.3 K/UL (ref 0–0.5)
EOSINOPHIL NFR BLD: 4.1 % (ref 0–8)
ERYTHROCYTE [DISTWIDTH] IN BLOOD BY AUTOMATED COUNT: 15.9 % (ref 11.5–14.5)
EST. GFR  (NO RACE VARIABLE): >60 ML/MIN/1.73 M^2
GLUCOSE SERPL-MCNC: 137 MG/DL (ref 70–110)
HCT VFR BLD AUTO: 30.8 % (ref 40–54)
HGB BLD-MCNC: 9.3 G/DL (ref 14–18)
IMM GRANULOCYTES # BLD AUTO: 0.12 K/UL (ref 0–0.04)
IMM GRANULOCYTES NFR BLD AUTO: 1.6 % (ref 0–0.5)
LYMPHOCYTES # BLD AUTO: 3.7 K/UL (ref 1–4.8)
LYMPHOCYTES NFR BLD: 49.6 % (ref 18–48)
MCH RBC QN AUTO: 27.1 PG (ref 27–31)
MCHC RBC AUTO-ENTMCNC: 30.2 G/DL (ref 32–36)
MCV RBC AUTO: 90 FL (ref 82–98)
MONOCYTES # BLD AUTO: 0.4 K/UL (ref 0.3–1)
MONOCYTES NFR BLD: 5.8 % (ref 4–15)
NEUTROPHILS # BLD AUTO: 2.8 K/UL (ref 1.8–7.7)
NEUTROPHILS NFR BLD: 38.2 % (ref 38–73)
NRBC BLD-RTO: 0 /100 WBC
PLATELET # BLD AUTO: 161 K/UL (ref 150–450)
PMV BLD AUTO: 10.8 FL (ref 9.2–12.9)
POCT GLUCOSE: 130 MG/DL (ref 70–110)
POCT GLUCOSE: 134 MG/DL (ref 70–110)
POCT GLUCOSE: 145 MG/DL (ref 70–110)
POCT GLUCOSE: 183 MG/DL (ref 70–110)
POCT GLUCOSE: 241 MG/DL (ref 70–110)
POTASSIUM SERPL-SCNC: 3.8 MMOL/L (ref 3.5–5.1)
PROT SERPL-MCNC: 5.2 G/DL (ref 6–8.4)
RBC # BLD AUTO: 3.43 M/UL (ref 4.6–6.2)
SODIUM SERPL-SCNC: 141 MMOL/L (ref 136–145)
WBC # BLD AUTO: 7.36 K/UL (ref 3.9–12.7)

## 2022-08-08 PROCEDURE — 25000003 PHARM REV CODE 250: Performed by: NURSE PRACTITIONER

## 2022-08-08 PROCEDURE — 82728 ASSAY OF FERRITIN: CPT | Performed by: NURSE PRACTITIONER

## 2022-08-08 PROCEDURE — 97116 GAIT TRAINING THERAPY: CPT

## 2022-08-08 PROCEDURE — 94799 UNLISTED PULMONARY SVC/PX: CPT

## 2022-08-08 PROCEDURE — 99024 POSTOP FOLLOW-UP VISIT: CPT | Mod: ,,, | Performed by: PHYSICIAN ASSISTANT

## 2022-08-08 PROCEDURE — 21400001 HC TELEMETRY ROOM

## 2022-08-08 PROCEDURE — 63600175 PHARM REV CODE 636 W HCPCS: Performed by: NURSE PRACTITIONER

## 2022-08-08 PROCEDURE — 99024 PR POST-OP FOLLOW-UP VISIT: ICD-10-PCS | Mod: ,,, | Performed by: PHYSICIAN ASSISTANT

## 2022-08-08 PROCEDURE — 99233 SBSQ HOSP IP/OBS HIGH 50: CPT | Mod: NSCH,,, | Performed by: INTERNAL MEDICINE

## 2022-08-08 PROCEDURE — 80053 COMPREHEN METABOLIC PANEL: CPT

## 2022-08-08 PROCEDURE — 63600175 PHARM REV CODE 636 W HCPCS: Performed by: INTERNAL MEDICINE

## 2022-08-08 PROCEDURE — C9399 UNCLASSIFIED DRUGS OR BIOLOG: HCPCS | Performed by: NURSE PRACTITIONER

## 2022-08-08 PROCEDURE — 25000003 PHARM REV CODE 250

## 2022-08-08 PROCEDURE — 94640 AIRWAY INHALATION TREATMENT: CPT

## 2022-08-08 PROCEDURE — 99233 PR SUBSEQUENT HOSPITAL CARE,LEVL III: ICD-10-PCS | Mod: NSCH,,, | Performed by: INTERNAL MEDICINE

## 2022-08-08 PROCEDURE — 25000003 PHARM REV CODE 250: Performed by: PHYSICIAN ASSISTANT

## 2022-08-08 PROCEDURE — 97530 THERAPEUTIC ACTIVITIES: CPT

## 2022-08-08 PROCEDURE — 63600175 PHARM REV CODE 636 W HCPCS: Performed by: PHYSICIAN ASSISTANT

## 2022-08-08 PROCEDURE — 99900035 HC TECH TIME PER 15 MIN (STAT)

## 2022-08-08 PROCEDURE — 85379 FIBRIN DEGRADATION QUANT: CPT | Performed by: NURSE PRACTITIONER

## 2022-08-08 PROCEDURE — 97168 OT RE-EVAL EST PLAN CARE: CPT

## 2022-08-08 PROCEDURE — 83615 LACTATE (LD) (LDH) ENZYME: CPT | Performed by: NURSE PRACTITIONER

## 2022-08-08 PROCEDURE — 25000003 PHARM REV CODE 250: Performed by: INTERNAL MEDICINE

## 2022-08-08 PROCEDURE — 85025 COMPLETE CBC W/AUTO DIFF WBC: CPT

## 2022-08-08 RX ORDER — GLUCAGON 1 MG
1 KIT INJECTION
Status: DISCONTINUED | OUTPATIENT
Start: 2022-08-08 | End: 2022-08-12 | Stop reason: HOSPADM

## 2022-08-08 RX ORDER — IBUPROFEN 200 MG
16 TABLET ORAL
Status: DISCONTINUED | OUTPATIENT
Start: 2022-08-08 | End: 2022-08-12 | Stop reason: HOSPADM

## 2022-08-08 RX ORDER — IBUPROFEN 200 MG
24 TABLET ORAL
Status: DISCONTINUED | OUTPATIENT
Start: 2022-08-08 | End: 2022-08-12 | Stop reason: HOSPADM

## 2022-08-08 RX ORDER — INSULIN ASPART 100 [IU]/ML
1-10 INJECTION, SOLUTION INTRAVENOUS; SUBCUTANEOUS
Status: DISCONTINUED | OUTPATIENT
Start: 2022-08-08 | End: 2022-08-12 | Stop reason: HOSPADM

## 2022-08-08 RX ORDER — INSULIN ASPART 100 [IU]/ML
1-10 INJECTION, SOLUTION INTRAVENOUS; SUBCUTANEOUS
Status: DISCONTINUED | OUTPATIENT
Start: 2022-08-08 | End: 2022-08-08

## 2022-08-08 RX ADMIN — THERA TABS 1 TABLET: TAB at 08:08

## 2022-08-08 RX ADMIN — DOXYCYCLINE HYCLATE 100 MG: 100 TABLET, COATED ORAL at 08:08

## 2022-08-08 RX ADMIN — Medication 6 MG: at 09:08

## 2022-08-08 RX ADMIN — LOSARTAN POTASSIUM 50 MG: 50 TABLET, FILM COATED ORAL at 08:08

## 2022-08-08 RX ADMIN — MUPIROCIN: 20 OINTMENT TOPICAL at 08:08

## 2022-08-08 RX ADMIN — OXYCODONE AND ACETAMINOPHEN 1 TABLET: 10; 325 TABLET ORAL at 09:08

## 2022-08-08 RX ADMIN — INSULIN ASPART 4 UNITS: 100 INJECTION, SOLUTION INTRAVENOUS; SUBCUTANEOUS at 04:08

## 2022-08-08 RX ADMIN — FAMOTIDINE 20 MG: 20 TABLET ORAL at 09:08

## 2022-08-08 RX ADMIN — ALBUTEROL SULFATE 2 PUFF: 90 AEROSOL, METERED RESPIRATORY (INHALATION) at 07:08

## 2022-08-08 RX ADMIN — DOXYCYCLINE HYCLATE 100 MG: 100 TABLET, COATED ORAL at 09:08

## 2022-08-08 RX ADMIN — INSULIN DETEMIR 10 UNITS: 100 INJECTION, SOLUTION SUBCUTANEOUS at 09:08

## 2022-08-08 RX ADMIN — POLYETHYLENE GLYCOL 3350 17 G: 17 POWDER, FOR SOLUTION ORAL at 08:08

## 2022-08-08 RX ADMIN — FAMOTIDINE 20 MG: 20 TABLET ORAL at 08:08

## 2022-08-08 RX ADMIN — PIPERACILLIN SODIUM AND TAZOBACTAM SODIUM 4.5 G: 4; .5 INJECTION, POWDER, LYOPHILIZED, FOR SOLUTION INTRAVENOUS at 10:08

## 2022-08-08 RX ADMIN — OXYCODONE HYDROCHLORIDE AND ACETAMINOPHEN 1 TABLET: 5; 325 TABLET ORAL at 09:08

## 2022-08-08 RX ADMIN — DOCUSATE SODIUM 100 MG: 100 CAPSULE, LIQUID FILLED ORAL at 08:08

## 2022-08-08 RX ADMIN — ENOXAPARIN SODIUM 40 MG: 100 INJECTION SUBCUTANEOUS at 04:08

## 2022-08-08 RX ADMIN — PIPERACILLIN SODIUM AND TAZOBACTAM SODIUM 4.5 G: 4; .5 INJECTION, POWDER, LYOPHILIZED, FOR SOLUTION INTRAVENOUS at 12:08

## 2022-08-08 RX ADMIN — ALBUTEROL SULFATE 2 PUFF: 90 AEROSOL, METERED RESPIRATORY (INHALATION) at 12:08

## 2022-08-08 RX ADMIN — METHOCARBAMOL 750 MG: 750 TABLET ORAL at 09:08

## 2022-08-08 RX ADMIN — DOCUSATE SODIUM 100 MG: 100 CAPSULE, LIQUID FILLED ORAL at 09:08

## 2022-08-08 RX ADMIN — ATORVASTATIN CALCIUM 10 MG: 10 TABLET, FILM COATED ORAL at 09:08

## 2022-08-08 RX ADMIN — GABAPENTIN 300 MG: 300 CAPSULE ORAL at 03:08

## 2022-08-08 RX ADMIN — ASPIRIN 81 MG: 81 TABLET, COATED ORAL at 08:08

## 2022-08-08 RX ADMIN — INSULIN DETEMIR 10 UNITS: 100 INJECTION, SOLUTION SUBCUTANEOUS at 08:08

## 2022-08-08 RX ADMIN — OXYCODONE HYDROCHLORIDE AND ACETAMINOPHEN 500 MG: 500 TABLET ORAL at 08:08

## 2022-08-08 RX ADMIN — Medication 1000 UNITS: at 08:08

## 2022-08-08 RX ADMIN — PIPERACILLIN SODIUM AND TAZOBACTAM SODIUM 4.5 G: 4; .5 INJECTION, POWDER, LYOPHILIZED, FOR SOLUTION INTRAVENOUS at 04:08

## 2022-08-08 RX ADMIN — GABAPENTIN 300 MG: 300 CAPSULE ORAL at 08:08

## 2022-08-08 RX ADMIN — GABAPENTIN 300 MG: 300 CAPSULE ORAL at 09:08

## 2022-08-08 RX ADMIN — OXYCODONE HYDROCHLORIDE AND ACETAMINOPHEN 500 MG: 500 TABLET ORAL at 09:08

## 2022-08-08 NOTE — ASSESSMENT & PLAN NOTE
--supportive care  --PT/OT consulted    S/p laminectomy- complicated by wound dehiscence- see above    S/p revision and washout POD#4

## 2022-08-08 NOTE — ASSESSMENT & PLAN NOTE
Patient's FSGs are controlled on current medication regimen.  Last A1c reviewed-   Lab Results   Component Value Date    HGBA1C 8.3 (H) 05/18/2022     Most recent fingerstick glucose reviewed-   Recent Labs   Lab 08/07/22 2052 08/07/22  2346 08/08/22  0424 08/08/22  0737   POCTGLUCOSE 150* 207* 145* 134*     Current correctional scale  Low  Maintain anti-hyperglycemic dose as follows-   Antihyperglycemics (From admission, onward)            Start     Stop Route Frequency Ordered    08/05/22 1300  insulin aspart U-100 pen 1-10 Units         -- SubQ Every 4 hours PRN 08/05/22 1145    08/05/22 1200  insulin detemir U-100 pen 10 Units         -- SubQ 2 times daily 08/05/22 1147        Hold Oral hypoglycemics while patient is in the hospital.    Continue present care, BS generally well controlled    Pt now on Dexa IV- BS may rise- needs tighter BS control    BS high sec to SSI-  Weaning steroids now, cont SSI    Increase Levemir to BID dosing

## 2022-08-08 NOTE — ASSESSMENT & PLAN NOTE
See 1 wound dehiscence    On IV Abx, await washout surgery with Plastic Surgery tomorrow    See above    8/5, 8/6  POD#4  Per N/s

## 2022-08-08 NOTE — ASSESSMENT & PLAN NOTE
--wound cx pending  --Neuro surgery consulted  --ID consulted      7/30/2022  Neurosurgery following    Possible washout vs wound vac placement   ID following, continue empiric IV antibiotic        7/31/2022  MRI of lumbar spine strongly concerning for discitis osteomyelitis at L2-3, probable epidural phlegmon or abscess.   Neurosurgery following, will need revision with washout. Will follow.   Continue IV antibiotics  Wound cultures in progress      8/1- continue IV Abx, local wound care  Await revision and wound exploration with plastic surgery    8/2- surgery tomorrow, cont iV abx    8/3- s/p Wound revision and washout with Lumbar Drain placement  8/4- improving- continue same tx  8/5 Stable - Per NS  8/6 , 8/7, 8/8- Stable, NS on board, Plan for drain removal per N/S

## 2022-08-08 NOTE — PROGRESS NOTES
Patient AA&OX3 up walking about in room in no distress with VSS.  No neuro deficits noted.  CSF drain clamped all morning and NS removing now and cleared for transfer to Med Surg and requested I place orders.  Will place transfer orders to Med Surg.     [facility]

## 2022-08-08 NOTE — PLAN OF CARE
Pt AAOx4. VSS during shift. Ambulates independently in room. Neurosurg removed lumbar drain. No concerns. Pt up in chair. All emergency equipment remains in room. All alarms active and audible. Pt and pt daughter informed of transfer to floor pending available bed. Will continue to monitor.   Problem: Adult Inpatient Plan of Care  Goal: Plan of Care Review  Outcome: Ongoing, Progressing  Goal: Patient-Specific Goal (Individualized)  Outcome: Ongoing, Progressing  Goal: Absence of Hospital-Acquired Illness or Injury  Outcome: Ongoing, Progressing  Goal: Optimal Comfort and Wellbeing  Outcome: Ongoing, Progressing  Goal: Readiness for Transition of Care  Outcome: Ongoing, Progressing     Problem: Diabetes Comorbidity  Goal: Blood Glucose Level Within Targeted Range  Outcome: Ongoing, Progressing     Problem: Impaired Wound Healing  Goal: Optimal Wound Healing  Outcome: Ongoing, Progressing     Problem: Fall Injury Risk  Goal: Absence of Fall and Fall-Related Injury  Outcome: Ongoing, Progressing     Problem: Infection  Goal: Absence of Infection Signs and Symptoms  Outcome: Ongoing, Progressing     Problem: Skin Injury Risk Increased  Goal: Skin Health and Integrity  Outcome: Ongoing, Progressing

## 2022-08-08 NOTE — PT/OT/SLP RE-EVAL
"Occupational Therapy   Re-evaluation    Name: Friday ROEGLIO Blake  MRN: [id number]  Admitting Diagnosis:  Wound dehiscence  Recent Surgery: Procedure(s) (LRB):  EXPLORATION, WOUND (Bilateral)  REPAIR, CSF LEAK, USING COMPUTER-ASSISTED NAVIGATION (Bilateral)  PLACEMENT (N/A) 5 Days Post-Op    Recommendations:     Discharge Recommendations: home health OT  Discharge Equipment Recommendations:  shower chair  Barriers to discharge:  None    Assessment:     Friday ROGELIO Blake is a 66 y.o. male with a medical diagnosis of Wound dehiscence.  He presents with the following performance deficits affecting function are weakness, impaired endurance, impaired sensation, impaired self care skills, impaired functional mobility, impaired balance, pain, orthopedic precautions.      Rehab Prognosis:  Good; patient would benefit from acute skilled OT services to address these deficits and reach maximum level of function.       Plan:     Patient to be seen 2 x/week to address the above listed problems via self-care/home management, therapeutic activities, therapeutic exercises  · Plan of Care Expires: 08/22/22  · Plan of Care Reviewed with: patient    Subjective     Chief Complaint: Reported "I have been having a terrible pain down my L leg."  Patient/Family stated goals: get stronger and go home  Communicated with: NurseAnupam, prior to session.  Pain/Comfort:  · Pain Rating 1: 6/10  · Location 1: back  · Pain Addressed 1: Pre-medicate for activity (activity pacing)    Objective:     Patient found sitting edge of bed with: telemetry, pulse ox (continuous), blood pressure cuff, peripheral IV, PICC line, hemovac (CSF drain, currently clamped) upon OT entry to room.    General Precautions: Standard, airborne, contact, droplet, fall   Orthopedic Precautions:spinal precautions   Braces: LSO (ill fitting, neurosx informed and requested larger brace)  Respiratory Status: Room air    Bed Mobility:    · Not assessed this date    Functional " Mobility/Transfers:  · Patient completed Sit <> Stand Transfer with contact guard assistance  with  rolling walker   · Patient completed Bed <> Chair Transfer using Step Transfer technique with contact guard assistance with rolling walker  · Functional Mobility: Patient completed x40ft functional mobility with RW and CGA to increase dynamic standing balance and activity tolerance needed for ADL completion.    Activities of Daily Living:  · Upper Body Dressing: moderate assistance attempting to don LSO while standing EOB  · Lower Body Dressing: maximal assistance .    Cognitive/Visual Perceptual:  Cognitive/Psychosocial Skills:  -       Oriented to: Person, Place, Time and Situation   -       Follows Commands/attention:Follows two-step commands    Physical Exam:  Balance:    -       sitting: WFL, static standing: fair +, dynamic standing: fair  Upper Extremity Range of Motion:     -       Right Upper Extremity: WFL except shdr to 90* 2/2 spinal precautions  -       Left Upper Extremity: WFL except shdr to 90* 2/2 spinal precautions  Upper Extremity Strength:    -       Right Upper Extremity: Deficits: grossly 4+/5 elbows distally  -       Left Upper Extremity: Deficits: grossly 4+/5 elbows distally   Strength: -       Right Upper Extremity: WFL  -       Left Upper Extremity: WFL    AMPA 6 Click:  [facility] Total Score: 17    Treatment & Education:  Education:  Patient educated on role of OT in acute setting and benefits of participation. Educated on techniques to use to increase independence and decrease fall risk with functional transfers. Educated on importance of OOB activity and calling for A to transfer back to bed. Encouraged completion of B UE AROM therex throughout the day to tolerance to increase functional strength and activity tolerance. Patient stated understanding and in agreement with POC.    Patient left up in chair with all lines intact, call button in reach and nurse notified    GOALS:    Multidisciplinary Problems     Occupational Therapy Goals        Problem: Occupational Therapy    Goal Priority Disciplines Outcome Interventions   Occupational Therapy Goal     OT, PT/OT     Description: Goals to be met by: 8/22/22     Patient will increase functional independence with ADLs by performing:    Toileting from toilet with Set-up Assistance for hygiene and clothing management.   Toilet transfer to toilet with Stand-by Assistance.  Increased functional strength in B UE elbows distally to WFL.                     History:     Past Medical History:   Diagnosis Date    Bilateral carpal tunnel syndrome 2/25/2022    moderate demyelinating bilaterally    Carpal tunnel syndrome     Diabetes mellitus without complication     Fever 6/30/2022    Gram-positive bacteremia 7/2/2022    History of colon polyps     Hypercalcemia 3/23/2021    Hyperlipidemia associated with type 2 diabetes mellitus     Hypertension associated with diabetes     Left carpal tunnel syndrome 3/23/2021    Low back pain 9/28/2021    Lumbar disc disease with radiculopathy     Morbid obesity with BMI of 40.0-44.9, adult     Postoperative hematoma involving nervous system following nervous system procedure 6/28/2022    S/P parathyroidectomy 7/29/2021    Vitamin D deficiency        Past Surgical History:   Procedure Laterality Date    CARPAL TUNNEL RELEASE Left 4/1/2021    Procedure: RELEASE, CARPAL TUNNEL;  Surgeon: Yoandy David MD;  Location: [facility] OR;  Service: Orthopedics;  Laterality: Left;    HERNIA REPAIR      INCISION AND DRAINAGE OF HEMATOMA N/A 6/28/2022    Procedure: INCISION AND DRAINAGE, HEMATOMA;  Surgeon: Shaggy Zepeda MD;  Location: [facility] OR;  Service: Neurosurgery;  Laterality: N/A;    LUMBAR LAMINECTOMY WITH DISCECTOMY Left 6/22/2022    Procedure: LAMINECTOMY, SPINE, LUMBAR, WITH DISCECTOMY;  Surgeon: Shaggy Zepeda MD;  Location: [facility] OR;  Service: Neurosurgery;  Laterality: Left;  minimally  invasive L2-3 discectomy and decompression     LUMBAR LAMINECTOMY WITH DISCECTOMY N/A 6/28/2022    Procedure: LAMINECTOMY, SPINE, LUMBAR, WITH DISCECTOMY;  Surgeon: Shaggy Zepeda MD;  Location: [facility] OR;  Service: Neurosurgery;  Laterality: N/A;    PARATHYROIDECTOMY Bilateral 7/27/2021    Procedure: PARATHYROIDECTOMY;  Surgeon: Tha Dial MD;  Location: [facility] OR;  Service: ENT;  Laterality: Bilateral;  with medialstinal exploration    REPAIR OF CEREBROSPINAL FLUID LEAK USING COMPUTER ASSISTED NAVIGATION Bilateral 8/3/2022    Procedure: REPAIR, CSF LEAK, USING COMPUTER-ASSISTED NAVIGATION;  Surgeon: Shaggy Zepeda MD;  Location: [facility] OR;  Service: Neurosurgery;  Laterality: Bilateral;    SELECTIVE INJECTION OF ANESTHETIC AGENT AROUND LUMBAR SPINAL NERVE ROOT BY TRANSFORAMINAL APPROACH Left 3/23/2022    Procedure: BLOCK, SPINAL NERVE ROOT, LUMBAR, SELECTIVE, TRANSFORAMINAL APPROACH Left L2-3, L3-4 RN IV sedation;  Surgeon: Jay Hodges MD;  Location: [facility];  Service: Pain Management;  Laterality: Left;    STERNOTOMY N/A 7/27/2021    Procedure: STERNOTOMY;  Surgeon: Tha Dial MD;  Location: [facility] OR;  Service: ENT;  Laterality: N/A;    ULNAR NERVE TRANSPOSITION Left 4/1/2021    Procedure: TRANSPOSITION, NERVE, ULNAR;  Surgeon: Yoandy David MD;  Location: [facility] OR;  Service: Orthopedics;  Laterality: Left;    ULNAR TUNNEL RELEASE Left 4/1/2021    Procedure: RELEASE, CUBITAL TUNNEL;  Surgeon: Yoandy David MD;  Location: [facility] OR;  Service: Orthopedics;  Laterality: Left;    UMBILICAL HERNIA REPAIR      WOUND EXPLORATION Bilateral 8/3/2022    Procedure: EXPLORATION, WOUND;  Surgeon: Shaggy Zepeda MD;  Location: [facility];  Service: Neurosurgery;  Laterality: Bilateral;       Time Tracking:     OT Date of Treatment: 08/08/22  OT Start Time: 0925  OT Stop Time: 0950  OT Total Time (min): 25 min    Billable Minutes:Re-eval 15  Therapeutic Activity 10    8/8/2022

## 2022-08-08 NOTE — PLAN OF CARE
Patient AAO x 4. Sinus Ron on the monitor. Afebrile.  All drains intact. PICC line in use, flushes with blood return.  Patient experienced discomfort in left leg. Preferred to sit in chair throughout the night. Oxycodone and Robaxin given to achieve some relief.   Chair locked, call light within reach. Urinal in reach. Safety measure in place.

## 2022-08-08 NOTE — PLAN OF CARE
Pt AAOx4, SB-NSR HR 50s-60s. Afebrile.   All drains (x3) monitored. Each dressing's integrity maintained.  Normal saline continued @ 100 mL/hr.  OOB to chair  formost  on shift. Patient ambulated in room to and from the bedside commode with assist x1.  BM x1.   Plan to remove Lumbar drain and hemovac drains tomorrow per Dr. Zepeda.   Plan of care reviewed with patient.

## 2022-08-08 NOTE — NURSING
Patient received from ICU via wheelchair on RA.  Report received from Antonieta. Continuous telemetry monitoring initiated, on monitor #[id number], VSS.  Patient oriented to room, given and instructed on call light system, fall precautions, hourly rounding, and room service.  Bed low and locked and alarm on.

## 2022-08-08 NOTE — PT/OT/SLP PROGRESS
Physical Therapy Treatment    Patient Name:  Friday ROGELIO Blake   MRN:  [id number]    Recommendations:     Discharge Recommendations:  home health PT   Discharge Equipment Recommendations: shower chair   Barriers to discharge: None    Assessment:     Friday ROGELIO Blake is a 66 y.o. male admitted with a medical diagnosis of Wound dehiscence.  He presents with the following impairments/functional limitations:  weakness, impaired endurance, impaired functional mobility, gait instability, impaired balance, pain, decreased safety awareness, decreased lower extremity function, decreased coordination.    Rehab Prognosis: Good; patient would benefit from acute skilled PT services to address these deficits and reach maximum level of function.    Recent Surgery: Procedure(s) (LRB):  EXPLORATION, WOUND (Bilateral)  REPAIR, CSF LEAK, USING COMPUTER-ASSISTED NAVIGATION (Bilateral)  PLACEMENT (N/A) 5 Days Post-Op    Plan:     During this hospitalization, patient to be seen 3 x/week to address the identified rehab impairments via therapeutic activities, therapeutic exercises, gait training and progress toward the following goals:    · Plan of Care Expires:  08/22/22    Subjective     Chief Complaint: TIRED OF BEING IN HOSPITAL  Patient/Family Comments/goals:   Pain/Comfort:  · Pain Rating 1: 6/10  · Location - Side 1: Left  · Location 1: hip (C/O NEW SHARP RADIATING PAIN TO L HIP)  · Pain Addressed 1: Reposition, Distraction      Objective:     Communicated with NURSE KONG prior to session.  Patient found SEATED AT EOB with telemetry, PICC line, hemovac, peripheral IV, blood pressure cuff, pulse ox (continuous) (CSF DRAIN CURRENTLY CLAMPED) upon PT entry to room.     General Precautions: Standard, airborne, contact, droplet, fall   Orthopedic Precautions:spinal precautions   Braces: LSO (LSO BRACE CURRENTLY TOO SMALL, SECURE CHAT TO REQUEST LARGER SIZE LSO)  Respiratory Status: Room air     Functional Mobility:  · Bed Mobility:    "  · Scooting: stand by assistance  · Transfers:     · Sit to Stand:  stand by assistance with rolling walker  · Bed to Chair: stand by assistance with  rolling walker  using  Step Transfer  · Gait: PT AMB 50' IN ROOM WITH RW AND SBA, NO LOB OR SOB ON ROOM AIR, C/O INCREASED L HIP PAIN WITH WBING  · Balance: GOOD    AM-PAC 6 CLICK MOBILITY  Turning over in bed (including adjusting bedclothes, sheets and blankets)?: 1  Sitting down on and standing up from a chair with arms (e.g., wheelchair, bedside commode, etc.): 4  Moving from lying on back to sitting on the side of the bed?: 1  Moving to and from a bed to a chair (including a wheelchair)?: 4  Need to walk in hospital room?: 4  Climbing 3-5 steps with a railing?: 1  Basic Mobility Total Score: 15     Therapeutic Activities and Exercises:  REVIEW ROLE OF P.T. AND POC IN ACUTE CARE HOSPITAL SETTING, EDUCATED IN RW USE AND SAFETY DURING TF'S AND GAIT, REVIEW SPINAL PRECAUTIONS, REQUIRED KYILE FOR DONNING BRACE BUT UNABLE TO REMAIN ON DUE TO LSO TOO SMALL,  ENCOURAGED TO INCREASE TIME OOB IN CHAIR TO TOLERANCE, EDUCATED IN AND ENCOURAGED TO PERFORM BLE THEREX WHILE SEATED OR SUPINE THROUGHOUT THE DAY TO TOLERANCE: HIP FLEX/EXT, QUAD SET, LAQ, HEEL SLIDES, AP'S.  PT AGREEABLE TO REQUESTS  PT EDUCATED ON RISK FOR FALLS DUE TO GENERALIZED WEAKNESS, EDUCATED ON "CALL DON'T FALL", ENCOURAGED TO CALL FOR ASSISTANCE WITH ALL NEEDS SUCH AS BED<>CHAIR TRANSFERS OR TRIPS TO BATHROOM, PT AGREEABLE TO SAFETY PRECAUTIONS    Patient left up in chair with all lines intact, call button in reach and NURSE notified..    GOALS:   Multidisciplinary Problems     Physical Therapy Goals        Problem: Physical Therapy    Goal Priority Disciplines Outcome Goal Variances Interventions   Physical Therapy Goal     PT, PT/OT Ongoing, Progressing     Description: LTG'S TO BE MET IN 14 DAYS (8-22-22)  PT WILL REQUIRE MANUEL FOR BED MOBILITY  PT WILL REQUIRE MANUEL FOR BED<>CHAIR TF'S  PT WILL  " FEET WITH RW AND MANUEL                   Time Tracking:     PT Received On: 08/08/22  PT Start Time: 0945     PT Stop Time: 1008  PT Total Time (min): 23 min     Billable Minutes: Gait Training 8 and Therapeutic Activity 15    Treatment Type: Treatment  PT/PTA: PT     PTA Visit Number: 0     08/08/2022

## 2022-08-08 NOTE — PROGRESS NOTES
Respiratory/Contact isolation discontinued due to 10 days out from + COVID test and asymptomatic with RA SAT 97%.  Discussed with Dr. Bonilla and radha.     ANTIONE [facility]

## 2022-08-08 NOTE — PROGRESS NOTES
[facility] ([facility])  [facility] Medicine  Progress Note    Patient Name: Friday ROGELIO Blake  MRN: [id number]  Patient Class: IP- Inpatient   Admission Date: 7/29/2022  Length of Stay: 8 days  Attending Physician: Mika Bonilla MD  Primary Care Provider: Sid Elizabeth MD        Subjective:     Principal Problem:Wound dehiscence        HPI:  65 y/o male with PMHx of HTN, HLD, DM, and obesity who presented tot he ED for eval of post op incision leaking.  Sx are constant and moderate in severity. No mitigating or exacerbating factors reported. Pt had surgery on 7/26, then took a trip to [facility]. He flew back last night. Pt denies fever, chills, pain, HA, N/V, and all other sx at this time.  ED workup shows: H/H 11.6/36.9, COVID +  He will be kept on OBS for CSF leak under the care of [facility] medicine  He is a full code and his SDM is his wife             Overview/Hospital Course:  Mr Blake is a 66 year old male who presented to [facility] Emergency Room of evaluation of wound dehiscence. Patient underwent Laminectomy of lumbar spine on 6/22/2022 and I & D of hematoma on 6/28/2022. Traveled to [facility] for daughter's wedding. Positive for drainage from low back surgical wound. Neurosurgery following, plan possible washout vs wound vac placement.  Infectious Disease following, continue empiric IV antibiotics. COVID positive. As of 7/31/2022, patient feeling well, vital signs and labs stable. MRI lumbar spine strongly concerning for discitis osteomyelitis at L2-3, probable epidural phlegmon or abscess. Neurosurgery following, will need revision with washout. Will follow.     8/1- cheerful, resting comfortably in bed on RA, no fever, chills or cough. Still has drainage from the Lumbar wound. Dr. Zepeda plans revision with wound washout/exploration with the help of Plastic Surgery, await confirmation date. Pt agreeable with the plan. Continue wound care, IV Abx, no wound vac yet.     8/2- comfortable on RA,  wound Cx growing Pseudomonas, earlier Cx grew Staph Epi- so now on Vanc, Cefepime and Flagyl. Dr. Zepeda plans Surgery- Washout with possible drain placement tomorrow, pt agreeable.     8/3- seen post op in the ICU, doing well, c/o back pain at the surgical site, just got oxycodone. Dr. Zepeda put him on Dexa 4 mg IV qid post op, he continues to received Cefepime, Flagyl and Vanc. Fluid sent to labs for C/S.     8/4- looks and feels better, lying flat on his side, back pain improved, no NV, dizziness or HA. Lumbar Drain output 11 cc. Dr. Zepeda d/mónica the foleys and is weaning the steroids. All Cx from Surgery remain NGDT. Getting Vanc, Cefepime and oral Flagyl. Last wound Cx grew Pseudomonas.   WBC 8.2, H/H 10.3/32. BS high due to Dexa.     8/5 - Patient with clinical improvement. Patient sitting up in bed. Plan to increase mobility today per NS. No CP/SOB. Patient tolerating diet.    8/6 - Patient improving steadily. Plan for OOB to chair today per NS. No cp / SOB. Patient is in good spirits. Discussed with CC Team.    8/7 - Patient with continued progress. Patient with more energy today. Sitting on bedside. No events. Decreased drainage today. Patient expressing his desire to transition to home. Await NS clearance. No CP / SOB    8/7 - Patient stable improving steadily. Much stronger today sitting in chair at bedside. Patient tolerating therapies / diet.      Interval History: Patient with continued progress. Patient with more energy today. Sitting on bedside. No events. Decreased drainage today. Patient expressing his desire to transition to home. Await NS clearance. No CP / SOB    Review of Systems   Constitutional:  Positive for activity change and fatigue. Negative for appetite change and fever.   HENT: Negative.  Negative for congestion, sinus pressure and sore throat.    Eyes: Negative.  Negative for photophobia, discharge and visual disturbance.   Respiratory: Negative.  Negative for cough, chest tightness,  shortness of breath and wheezing.    Cardiovascular: Negative.  Negative for chest pain and palpitations.   Gastrointestinal: Negative.  Negative for abdominal pain, blood in stool, constipation, diarrhea, nausea and vomiting.   Endocrine: Negative.    Genitourinary: Negative.    Musculoskeletal:  Positive for arthralgias, back pain and myalgias. Negative for neck pain and neck stiffness.   Skin:  Positive for wound.   Allergic/Immunologic: Negative.    Neurological:  Positive for weakness. Negative for seizures, syncope and headaches.   Hematological: Negative.    Psychiatric/Behavioral: Negative.  Negative for agitation, behavioral problems, hallucinations, self-injury and suicidal ideas.    Objective:     Vital Signs (Most Recent):  Temp: 97.1 °F (36.2 °C) (08/08/22 0715)  Pulse: 62 (08/08/22 0900)  Resp: 20 (08/08/22 0902)  BP: (!) 184/88 (08/08/22 0900)  SpO2: 100 % (08/08/22 0900)   Vital Signs (24h Range):  Temp:  [97.1 °F (36.2 °C)-98.6 °F (37 °C)] 97.1 °F (36.2 °C)  Pulse:  [48-64] 62  Resp:  [13-28] 20  SpO2:  [94 %-100 %] 100 %  BP: (124-191)/(57-93) 184/88     Weight: 106.3 kg (234 lb 5.6 oz)  Body mass index is 37.82 kg/m².    Intake/Output Summary (Last 24 hours) at 8/8/2022 1151  Last data filed at 8/8/2022 1100  Gross per 24 hour   Intake 3212.05 ml   Output 2107 ml   Net 1105.05 ml      Physical Exam  Vitals and nursing note reviewed.   Constitutional:       General: He is not in acute distress.     Appearance: Normal appearance. He is not ill-appearing, toxic-appearing or diaphoretic.       HENT:      Head: Normocephalic.      Right Ear: External ear normal.      Left Ear: External ear normal.      Nose: Nose normal.      Mouth/Throat:      Mouth: Mucous membranes are moist.   Eyes:      Pupils: Pupils are equal, round, and reactive to light.   Cardiovascular:      Rate and Rhythm: Normal rate and regular rhythm.      Pulses: Normal pulses.           Dorsalis pedis pulses are 2+ on the right side  and 2+ on the left side.      Heart sounds: No murmur heard.  Pulmonary:      Effort: No tachypnea or respiratory distress.      Breath sounds: Normal breath sounds.   Abdominal:      General: Bowel sounds are normal. There is no distension.   Musculoskeletal:         General: No tenderness.      Cervical back: Normal range of motion and neck supple.      Right lower leg: No edema.      Left lower leg: No edema.   Skin:     Capillary Refill: Capillary refill takes 2 to 3 seconds.   Neurological:      General: No focal deficit present.      Mental Status: He is alert and oriented to person, place, and time.      GCS: GCS eye subscore is 4. GCS verbal subscore is 5. GCS motor subscore is 6.      Sensory: Sensation is intact.      Motor: No weakness or atrophy.   Psychiatric:         Mood and Affect: Mood normal.         Behavior: Behavior normal. Behavior is cooperative.         Thought Content: Thought content normal.         Judgment: Judgment normal.       Significant Labs: All pertinent labs within the past 24 hours have been reviewed.  BMP:   Recent Labs   Lab 08/08/22  0415   *      K 3.8      CO2 28   BUN 11   CREATININE 0.8   CALCIUM 8.4*     CBC:   Recent Labs   Lab 08/07/22  0511 08/08/22  0415   WBC 8.30 7.36   HGB 9.3* 9.3*   HCT 31.0* 30.8*    161     CMP:   Recent Labs   Lab 08/07/22  0511 08/08/22  0415    141   K 3.7 3.8    108   CO2 25 28   * 137*   BUN 10 11   CREATININE 0.8 0.8   CALCIUM 8.1* 8.4*   PROT 4.9* 5.2*   ALBUMIN 2.4* 2.6*   BILITOT 0.3 0.3   ALKPHOS 57 53*   AST 32 20   ALT 57* 50*   ANIONGAP 7* 5*     POCT Glucose:   Recent Labs   Lab 08/07/22  2346 08/08/22  0424 08/08/22  0737   POCTGLUCOSE 207* 145* 134*     Imaging Results               MRI Lumbar Spine W WO Cont (Final result)  Result time 07/30/22 19:53:25      Final result by Dale Cotton MD (07/30/22 19:53:25)                   Impression:      Findings strongly concerning for  discitis osteomyelitis at L2-3 as detailed above.  Probable epidural phlegmon or abscess extending along the left aspect of the thecal sac and into the posterior soft tissues with the previously noted fluid collection now demonstrating significantly increased peripheral enhancement concerning for superimposed infection.  Neuro surgical consultation would be advised if not previously obtained.    This report was flagged in Epic as abnormal.      Electronically signed by: Dale Cotton  Date:    07/30/2022  Time:    19:53               Narrative:    EXAMINATION:  MRI LUMBAR SPINE W WO CONTRAST    CLINICAL HISTORY:  lower back pain;    TECHNIQUE:  Multiplanar, multisequence MR images were acquired from the thoracolumbar junction to the sacrum without the administration of contrast.    COMPARISON:  Multiple priors.    FINDINGS:  Patient status post hemilaminectomy and discectomy at L2-3 on the left.  Associated with the surgical area of there is fluid within the disc space, bone marrow edema, and abnormal postcontrast enhancement of the in plates about the disc space and disc which extends along the left aspect of the spinal canal and into the posterior soft tissues concerning for discitis osteomyelitis with epidural abscess extending into the posterior soft tissues.  The fluid collection in the posterior soft tissues is noted both within the laminectomy bed measuring a proximally 3.1 x 2.7 x 2.3 cm in this area, and with extension inferiorly along the L3 lamina and L1 inferior lamina, and also into the posterior soft tissues.  The fluid collection is significantly serpiginous and difficult to accurately measure.  The component in the posterior soft tissues measures on the order of 9.2 x 3.8 by 2.5 cm and also demonstrates some postcontrast enhancement concerning for infection.  Abscess is the diagnosis of  exclusion.  The fluid collection was present on the prior exam of 07/01/2022, but the surrounding peripheral  enhancement has significantly increased in comparison to the prior exam, and the new disc space abnormalities raise concern for superimposed infection.    Alignment: Normal.    Vertebrae: Aside from the surgical levels, there is normal marrow signal. No fracture.    Cord: Normal.  Conus terminates at L1    Degenerative findings:    At the nonsurgical levels the degenerative changes are stable from the prior.    The epidural collection extending such as seen on series 6, image 16 and series 9, image 17 now produces moderate spinal canal stenosis.    Paraspinal muscles & soft tissues: As above.                                        Significant Imaging: I have reviewed all pertinent imaging results/findings within the past 24 hours.      Assessment/Plan:      * Wound dehiscence with CSF leak s/p Washout and closure with Lumbar drain  --wound cx pending  --Neuro surgery consulted  --ID consulted      7/30/2022  Neurosurgery following    Possible washout vs wound vac placement   ID following, continue empiric IV antibiotic        7/31/2022  MRI of lumbar spine strongly concerning for discitis osteomyelitis at L2-3, probable epidural phlegmon or abscess.   Neurosurgery following, will need revision with washout. Will follow.   Continue IV antibiotics  Wound cultures in progress      8/1- continue IV Abx, local wound care  Await revision and wound exploration with plastic surgery    8/2- surgery tomorrow, cont iV abx    8/3- s/p Wound revision and washout with Lumbar Drain placement  8/4- improving- continue same tx  8/5 Stable - Per NS  8/6 , 8/7, 8/8- Stable, NS on board, Plan for drain removal per N/S    Discitis of lumbar region  See 1 wound dehiscence    On IV Abx, await washout surgery with Plastic Surgery tomorrow    See above    8/5, 8/6  POD#4  Per N/s      Postoperative CSF leak  Expect drain placement with surgery tomorrow    S/p wound revision and closure with LD placement    Stable decreased fluid  output    COVID  Patient is identified as High risk for severe complications of COVID 19 based on COVID risk score of 5   Initiate standard COVID protocols; COVID-19 testing ,Infection Control notification  and isolation- respiratory, contact and droplet per protocol    Diagnostics: (leukopenia, hyponatremia, hyperferritinemia, elevated troponin, elevated d-dimer, age, and comorbidities are significant predictors of poor clinical outcome)  CBC, CMP, Ferritin, CRP and Portable CXR    Management: Maintain oxygen saturations 92-96% via Nasal Cannula  LPM and monitor with continuous/intermittent pulse oximetry.  and Inhaled bronchodilators as needed for shortness of breath.    Asymptomatic, pt on RA    Asymptomatic    HLD (hyperlipidemia)  --continue atorvastatin  --cardiac diet      Hypertension  --continue losartan  --cardiac diet    BP under control      Degenerative disc disease, lumbar  --supportive care  --PT/OT consulted    S/p laminectomy- complicated by wound dehiscence- see above    S/p revision and washout POD#4      Morbid obesity with BMI of 40.0-44.9, adult  Body mass index is 37.82 kg/m². Morbid obesity complicates all aspects of disease management from diagnostic modalities to treatment. Weight loss encouraged and health benefits explained to patient.             Diabetes mellitus without complication  Patient's FSGs are controlled on current medication regimen.  Last A1c reviewed-   Lab Results   Component Value Date    HGBA1C 8.3 (H) 05/18/2022     Most recent fingerstick glucose reviewed-   Recent Labs   Lab 08/07/22  2052 08/07/22  2346 08/08/22  0424 08/08/22  0737   POCTGLUCOSE 150* 207* 145* 134*     Current correctional scale  Low  Maintain anti-hyperglycemic dose as follows-   Antihyperglycemics (From admission, onward)            Start     Stop Route Frequency Ordered    08/05/22 1300  insulin aspart U-100 pen 1-10 Units         -- SubQ Every 4 hours PRN 08/05/22 1145    08/05/22 1200  insulin  detemir U-100 pen 10 Units         -- SubQ 2 times daily 08/05/22 1147        Hold Oral hypoglycemics while patient is in the hospital.    Continue present care, BS generally well controlled    Pt now on Dexa IV- BS may rise- needs tighter BS control    BS high sec to SSI-  Weaning steroids now, cont SSI    Increase Levemir to BID dosing        VTE Risk Mitigation (From admission, onward)         Ordered     enoxaparin injection 40 mg  Daily         08/05/22 1312     IP VTE HIGH RISK PATIENT  Once         07/29/22 2333     Place sequential compression device  Until discontinued         07/29/22 2333                Discharge Planning   MATEUS:      Code Status: Full Code   Is the patient medically ready for discharge?:     Reason for patient still in hospital (select all that apply): Patient trending condition, Treatment, Consult recommendations and Pending disposition  Discharge Plan A: Home Health            Critical care time spent on the evaluation and treatment of severe organ dysfunction, review of pertinent labs and imaging studies, discussions with consulting providers and discussions with patient/family: 41 minutes.      Mika Bonilla MD  Department of Hospital Medicine   [facility] ([facility])

## 2022-08-08 NOTE — SUBJECTIVE & OBJECTIVE
Interval History: Patient with continued progress. Patient with more energy today. Sitting on bedside. No events. Decreased drainage today. Patient expressing his desire to transition to home. Await NS clearance. No CP / SOB    Review of Systems   Constitutional:  Positive for activity change and fatigue. Negative for appetite change and fever.   HENT: Negative.  Negative for congestion, sinus pressure and sore throat.    Eyes: Negative.  Negative for photophobia, discharge and visual disturbance.   Respiratory: Negative.  Negative for cough, chest tightness, shortness of breath and wheezing.    Cardiovascular: Negative.  Negative for chest pain and palpitations.   Gastrointestinal: Negative.  Negative for abdominal pain, blood in stool, constipation, diarrhea, nausea and vomiting.   Endocrine: Negative.    Genitourinary: Negative.    Musculoskeletal:  Positive for arthralgias, back pain and myalgias. Negative for neck pain and neck stiffness.   Skin:  Positive for wound.   Allergic/Immunologic: Negative.    Neurological:  Positive for weakness. Negative for seizures, syncope and headaches.   Hematological: Negative.    Psychiatric/Behavioral: Negative.  Negative for agitation, behavioral problems, hallucinations, self-injury and suicidal ideas.    Objective:     Vital Signs (Most Recent):  Temp: 97.1 °F (36.2 °C) (08/08/22 0715)  Pulse: 62 (08/08/22 0900)  Resp: 20 (08/08/22 0902)  BP: (!) 184/88 (08/08/22 0900)  SpO2: 100 % (08/08/22 0900)   Vital Signs (24h Range):  Temp:  [97.1 °F (36.2 °C)-98.6 °F (37 °C)] 97.1 °F (36.2 °C)  Pulse:  [48-64] 62  Resp:  [13-28] 20  SpO2:  [94 %-100 %] 100 %  BP: (124-191)/(57-93) 184/88     Weight: 106.3 kg (234 lb 5.6 oz)  Body mass index is 37.82 kg/m².    Intake/Output Summary (Last 24 hours) at 8/8/2022 1151  Last data filed at 8/8/2022 1100  Gross per 24 hour   Intake 3212.05 ml   Output 2107 ml   Net 1105.05 ml      Physical Exam  Vitals and nursing note reviewed.    Constitutional:       General: He is not in acute distress.     Appearance: Normal appearance. He is not ill-appearing, toxic-appearing or diaphoretic.       HENT:      Head: Normocephalic.      Right Ear: External ear normal.      Left Ear: External ear normal.      Nose: Nose normal.      Mouth/Throat:      Mouth: Mucous membranes are moist.   Eyes:      Pupils: Pupils are equal, round, and reactive to light.   Cardiovascular:      Rate and Rhythm: Normal rate and regular rhythm.      Pulses: Normal pulses.           Dorsalis pedis pulses are 2+ on the right side and 2+ on the left side.      Heart sounds: No murmur heard.  Pulmonary:      Effort: No tachypnea or respiratory distress.      Breath sounds: Normal breath sounds.   Abdominal:      General: Bowel sounds are normal. There is no distension.   Musculoskeletal:         General: No tenderness.      Cervical back: Normal range of motion and neck supple.      Right lower leg: No edema.      Left lower leg: No edema.   Skin:     Capillary Refill: Capillary refill takes 2 to 3 seconds.   Neurological:      General: No focal deficit present.      Mental Status: He is alert and oriented to person, place, and time.      GCS: GCS eye subscore is 4. GCS verbal subscore is 5. GCS motor subscore is 6.      Sensory: Sensation is intact.      Motor: No weakness or atrophy.   Psychiatric:         Mood and Affect: Mood normal.         Behavior: Behavior normal. Behavior is cooperative.         Thought Content: Thought content normal.         Judgment: Judgment normal.       Significant Labs: All pertinent labs within the past 24 hours have been reviewed.  BMP:   Recent Labs   Lab 08/08/22 0415   *      K 3.8      CO2 28   BUN 11   CREATININE 0.8   CALCIUM 8.4*     CBC:   Recent Labs   Lab 08/07/22 0511 08/08/22 0415   WBC 8.30 7.36   HGB 9.3* 9.3*   HCT 31.0* 30.8*    161     CMP:   Recent Labs   Lab 08/07/22 0511 08/08/22 0415     141   K 3.7 3.8    108   CO2 25 28   * 137*   BUN 10 11   CREATININE 0.8 0.8   CALCIUM 8.1* 8.4*   PROT 4.9* 5.2*   ALBUMIN 2.4* 2.6*   BILITOT 0.3 0.3   ALKPHOS 57 53*   AST 32 20   ALT 57* 50*   ANIONGAP 7* 5*     POCT Glucose:   Recent Labs   Lab 08/07/22  2346 08/08/22  0424 08/08/22  0737   POCTGLUCOSE 207* 145* 134*     Imaging Results               MRI Lumbar Spine W WO Cont (Final result)  Result time 07/30/22 19:53:25      Final result by Dale Cotton MD (07/30/22 19:53:25)                   Impression:      Findings strongly concerning for discitis osteomyelitis at L2-3 as detailed above.  Probable epidural phlegmon or abscess extending along the left aspect of the thecal sac and into the posterior soft tissues with the previously noted fluid collection now demonstrating significantly increased peripheral enhancement concerning for superimposed infection.  Neuro surgical consultation would be advised if not previously obtained.    This report was flagged in Epic as abnormal.      Electronically signed by: Dale Cotton  Date:    07/30/2022  Time:    19:53               Narrative:    EXAMINATION:  MRI LUMBAR SPINE W WO CONTRAST    CLINICAL HISTORY:  lower back pain;    TECHNIQUE:  Multiplanar, multisequence MR images were acquired from the thoracolumbar junction to the sacrum without the administration of contrast.    COMPARISON:  Multiple priors.    FINDINGS:  Patient status post hemilaminectomy and discectomy at L2-3 on the left.  Associated with the surgical area of there is fluid within the disc space, bone marrow edema, and abnormal postcontrast enhancement of the in plates about the disc space and disc which extends along the left aspect of the spinal canal and into the posterior soft tissues concerning for discitis osteomyelitis with epidural abscess extending into the posterior soft tissues.  The fluid collection in the posterior soft tissues is noted both within the laminectomy  bed measuring a proximally 3.1 x 2.7 x 2.3 cm in this area, and with extension inferiorly along the L3 lamina and L1 inferior lamina, and also into the posterior soft tissues.  The fluid collection is significantly serpiginous and difficult to accurately measure.  The component in the posterior soft tissues measures on the order of 9.2 x 3.8 by 2.5 cm and also demonstrates some postcontrast enhancement concerning for infection.  Abscess is the diagnosis of  exclusion.  The fluid collection was present on the prior exam of 07/01/2022, but the surrounding peripheral enhancement has significantly increased in comparison to the prior exam, and the new disc space abnormalities raise concern for superimposed infection.    Alignment: Normal.    Vertebrae: Aside from the surgical levels, there is normal marrow signal. No fracture.    Cord: Normal.  Conus terminates at L1    Degenerative findings:    At the nonsurgical levels the degenerative changes are stable from the prior.    The epidural collection extending such as seen on series 6, image 16 and series 9, image 17 now produces moderate spinal canal stenosis.    Paraspinal muscles & soft tissues: As above.                                        Significant Imaging: I have reviewed all pertinent imaging results/findings within the past 24 hours.

## 2022-08-08 NOTE — ASSESSMENT & PLAN NOTE
Expect drain placement with surgery tomorrow    S/p wound revision and closure with LD placement    Stable decreased fluid output

## 2022-08-08 NOTE — CARE UPDATE
Patient's LD removed.   Okay to transfer from ICU to med/surg.   Discussed with ICU nurse/NP.    He needs PT/OT daily.  Formal note to follow later today.

## 2022-08-08 NOTE — PLAN OF CARE
OT re-eval completed. Sit>stand CGA, functional mobility x40ft with RW and CGA, step>pivot to bedside chair with RW and CGA. Recommending HHOT and use of RW at d/c.

## 2022-08-09 PROBLEM — M46.46 DISCITIS OF LUMBAR REGION: Status: RESOLVED | Noted: 2022-08-01 | Resolved: 2022-08-09

## 2022-08-09 PROBLEM — U07.1 COVID: Chronic | Status: RESOLVED | Noted: 2022-07-29 | Resolved: 2022-08-09

## 2022-08-09 LAB
ALBUMIN SERPL BCP-MCNC: 2.8 G/DL (ref 3.5–5.2)
ALP SERPL-CCNC: 61 U/L (ref 55–135)
ALT SERPL W/O P-5'-P-CCNC: 41 U/L (ref 10–44)
ANION GAP SERPL CALC-SCNC: 6 MMOL/L (ref 8–16)
AST SERPL-CCNC: 14 U/L (ref 10–40)
BASOPHILS # BLD AUTO: 0.04 K/UL (ref 0–0.2)
BASOPHILS NFR BLD: 0.6 % (ref 0–1.9)
BILIRUB SERPL-MCNC: 0.4 MG/DL (ref 0.1–1)
BUN SERPL-MCNC: 8 MG/DL (ref 8–23)
CALCIUM SERPL-MCNC: 8.5 MG/DL (ref 8.7–10.5)
CHLORIDE SERPL-SCNC: 109 MMOL/L (ref 95–110)
CO2 SERPL-SCNC: 29 MMOL/L (ref 23–29)
CREAT SERPL-MCNC: 0.8 MG/DL (ref 0.5–1.4)
D DIMER PPP IA.FEU-MCNC: 2.32 MG/L FEU
DIFFERENTIAL METHOD: ABNORMAL
EOSINOPHIL # BLD AUTO: 0.3 K/UL (ref 0–0.5)
EOSINOPHIL NFR BLD: 4.1 % (ref 0–8)
ERYTHROCYTE [DISTWIDTH] IN BLOOD BY AUTOMATED COUNT: 15.9 % (ref 11.5–14.5)
EST. GFR  (NO RACE VARIABLE): >60 ML/MIN/1.73 M^2
FERRITIN SERPL-MCNC: 94 NG/ML (ref 20–300)
GLUCOSE SERPL-MCNC: 120 MG/DL (ref 70–110)
HCT VFR BLD AUTO: 32.4 % (ref 40–54)
HGB BLD-MCNC: 9.8 G/DL (ref 14–18)
IMM GRANULOCYTES # BLD AUTO: 0.08 K/UL (ref 0–0.04)
IMM GRANULOCYTES NFR BLD AUTO: 1.3 % (ref 0–0.5)
LDH SERPL L TO P-CCNC: 175 U/L (ref 110–260)
LYMPHOCYTES # BLD AUTO: 2.8 K/UL (ref 1–4.8)
LYMPHOCYTES NFR BLD: 44.8 % (ref 18–48)
MCH RBC QN AUTO: 27.5 PG (ref 27–31)
MCHC RBC AUTO-ENTMCNC: 30.2 G/DL (ref 32–36)
MCV RBC AUTO: 91 FL (ref 82–98)
MONOCYTES # BLD AUTO: 0.4 K/UL (ref 0.3–1)
MONOCYTES NFR BLD: 6.8 % (ref 4–15)
NEUTROPHILS # BLD AUTO: 2.7 K/UL (ref 1.8–7.7)
NEUTROPHILS NFR BLD: 42.4 % (ref 38–73)
NRBC BLD-RTO: 0 /100 WBC
PLATELET # BLD AUTO: 182 K/UL (ref 150–450)
PMV BLD AUTO: 12.2 FL (ref 9.2–12.9)
POCT GLUCOSE: 132 MG/DL (ref 70–110)
POCT GLUCOSE: 165 MG/DL (ref 70–110)
POCT GLUCOSE: 221 MG/DL (ref 70–110)
POCT GLUCOSE: 243 MG/DL (ref 70–110)
POTASSIUM SERPL-SCNC: 3.9 MMOL/L (ref 3.5–5.1)
PROT SERPL-MCNC: 5.2 G/DL (ref 6–8.4)
RBC # BLD AUTO: 3.57 M/UL (ref 4.6–6.2)
SODIUM SERPL-SCNC: 144 MMOL/L (ref 136–145)
WBC # BLD AUTO: 6.31 K/UL (ref 3.9–12.7)

## 2022-08-09 PROCEDURE — 99024 POSTOP FOLLOW-UP VISIT: CPT | Mod: 95,,, | Performed by: PHYSICIAN ASSISTANT

## 2022-08-09 PROCEDURE — 97116 GAIT TRAINING THERAPY: CPT

## 2022-08-09 PROCEDURE — 85025 COMPLETE CBC W/AUTO DIFF WBC: CPT | Performed by: NURSE PRACTITIONER

## 2022-08-09 PROCEDURE — 80053 COMPREHEN METABOLIC PANEL: CPT | Performed by: NURSE PRACTITIONER

## 2022-08-09 PROCEDURE — 63600175 PHARM REV CODE 636 W HCPCS: Performed by: NURSE PRACTITIONER

## 2022-08-09 PROCEDURE — 97530 THERAPEUTIC ACTIVITIES: CPT

## 2022-08-09 PROCEDURE — 99024 PR POST-OP FOLLOW-UP VISIT: ICD-10-PCS | Mod: 95,,, | Performed by: PHYSICIAN ASSISTANT

## 2022-08-09 PROCEDURE — 25000003 PHARM REV CODE 250: Performed by: NURSE PRACTITIONER

## 2022-08-09 PROCEDURE — 21400001 HC TELEMETRY ROOM

## 2022-08-09 PROCEDURE — 94761 N-INVAS EAR/PLS OXIMETRY MLT: CPT

## 2022-08-09 PROCEDURE — 99900035 HC TECH TIME PER 15 MIN (STAT)

## 2022-08-09 PROCEDURE — 94640 AIRWAY INHALATION TREATMENT: CPT

## 2022-08-09 PROCEDURE — 94799 UNLISTED PULMONARY SVC/PX: CPT

## 2022-08-09 RX ORDER — OXYCODONE AND ACETAMINOPHEN 10; 325 MG/1; MG/1
1 TABLET ORAL EVERY 4 HOURS PRN
Qty: 30 TABLET | Refills: 0 | OUTPATIENT
Start: 2022-08-09

## 2022-08-09 RX ORDER — DOXYCYCLINE HYCLATE 100 MG
100 TABLET ORAL EVERY 12 HOURS
Qty: 60 TABLET | Refills: 0 | OUTPATIENT
Start: 2022-08-09 | End: 2022-09-08

## 2022-08-09 RX ADMIN — DOXYCYCLINE HYCLATE 100 MG: 100 TABLET, COATED ORAL at 08:08

## 2022-08-09 RX ADMIN — ASPIRIN 81 MG: 81 TABLET, COATED ORAL at 08:08

## 2022-08-09 RX ADMIN — ALBUTEROL SULFATE 2 PUFF: 90 AEROSOL, METERED RESPIRATORY (INHALATION) at 12:08

## 2022-08-09 RX ADMIN — ALBUTEROL SULFATE 2 PUFF: 90 AEROSOL, METERED RESPIRATORY (INHALATION) at 07:08

## 2022-08-09 RX ADMIN — FAMOTIDINE 20 MG: 20 TABLET ORAL at 09:08

## 2022-08-09 RX ADMIN — PIPERACILLIN SODIUM AND TAZOBACTAM SODIUM 4.5 G: 4; .5 INJECTION, POWDER, LYOPHILIZED, FOR SOLUTION INTRAVENOUS at 06:08

## 2022-08-09 RX ADMIN — OXYCODONE HYDROCHLORIDE AND ACETAMINOPHEN 1 TABLET: 5; 325 TABLET ORAL at 02:08

## 2022-08-09 RX ADMIN — INSULIN DETEMIR 10 UNITS: 100 INJECTION, SOLUTION SUBCUTANEOUS at 09:08

## 2022-08-09 RX ADMIN — DOXYCYCLINE HYCLATE 100 MG: 100 TABLET, COATED ORAL at 09:08

## 2022-08-09 RX ADMIN — POLYETHYLENE GLYCOL 3350 17 G: 17 POWDER, FOR SOLUTION ORAL at 08:08

## 2022-08-09 RX ADMIN — OXYCODONE HYDROCHLORIDE AND ACETAMINOPHEN 500 MG: 500 TABLET ORAL at 09:08

## 2022-08-09 RX ADMIN — ENOXAPARIN SODIUM 40 MG: 100 INJECTION SUBCUTANEOUS at 04:08

## 2022-08-09 RX ADMIN — OXYCODONE HYDROCHLORIDE AND ACETAMINOPHEN 500 MG: 500 TABLET ORAL at 08:08

## 2022-08-09 RX ADMIN — PIPERACILLIN SODIUM AND TAZOBACTAM SODIUM 4.5 G: 4; .5 INJECTION, POWDER, LYOPHILIZED, FOR SOLUTION INTRAVENOUS at 12:08

## 2022-08-09 RX ADMIN — Medication 1000 UNITS: at 08:08

## 2022-08-09 RX ADMIN — ATORVASTATIN CALCIUM 10 MG: 10 TABLET, FILM COATED ORAL at 09:08

## 2022-08-09 RX ADMIN — LOSARTAN POTASSIUM 50 MG: 50 TABLET, FILM COATED ORAL at 08:08

## 2022-08-09 RX ADMIN — THERA TABS 1 TABLET: TAB at 08:08

## 2022-08-09 RX ADMIN — OXYCODONE HYDROCHLORIDE AND ACETAMINOPHEN 1 TABLET: 5; 325 TABLET ORAL at 09:08

## 2022-08-09 RX ADMIN — ALBUTEROL SULFATE 2 PUFF: 90 AEROSOL, METERED RESPIRATORY (INHALATION) at 01:08

## 2022-08-09 RX ADMIN — INSULIN ASPART 4 UNITS: 100 INJECTION, SOLUTION INTRAVENOUS; SUBCUTANEOUS at 04:08

## 2022-08-09 RX ADMIN — GABAPENTIN 300 MG: 300 CAPSULE ORAL at 02:08

## 2022-08-09 RX ADMIN — GABAPENTIN 300 MG: 300 CAPSULE ORAL at 08:08

## 2022-08-09 RX ADMIN — Medication 6 MG: at 09:08

## 2022-08-09 RX ADMIN — INSULIN DETEMIR 10 UNITS: 100 INJECTION, SOLUTION SUBCUTANEOUS at 08:08

## 2022-08-09 RX ADMIN — DOCUSATE SODIUM 100 MG: 100 CAPSULE, LIQUID FILLED ORAL at 09:08

## 2022-08-09 RX ADMIN — DOCUSATE SODIUM 100 MG: 100 CAPSULE, LIQUID FILLED ORAL at 08:08

## 2022-08-09 RX ADMIN — PIPERACILLIN SODIUM AND TAZOBACTAM SODIUM 4.5 G: 4; .5 INJECTION, POWDER, LYOPHILIZED, FOR SOLUTION INTRAVENOUS at 09:08

## 2022-08-09 RX ADMIN — GABAPENTIN 300 MG: 300 CAPSULE ORAL at 09:08

## 2022-08-09 RX ADMIN — FAMOTIDINE 20 MG: 20 TABLET ORAL at 08:08

## 2022-08-09 NOTE — ASSESSMENT & PLAN NOTE
--supportive care  --PT/OT consulted    S/p laminectomy- complicated by wound dehiscence- see above    S/p revision and washout POD#5

## 2022-08-09 NOTE — ASSESSMENT & PLAN NOTE
--wound cx pending  --Neuro surgery consulted  --ID consulted      7/30/2022  Neurosurgery following    Possible washout vs wound vac placement   ID following, continue empiric IV antibiotic        7/31/2022  MRI of lumbar spine strongly concerning for discitis osteomyelitis at L2-3, probable epidural phlegmon or abscess.   Neurosurgery following, will need revision with washout. Will follow.   Continue IV antibiotics  Wound cultures in progress      8/1- continue IV Abx, local wound care  Await revision and wound exploration with plastic surgery    8/2- surgery tomorrow, cont iV abx    8/3- s/p Wound revision and washout with Lumbar Drain placement  8/4- improving- continue same tx  8/5 Stable - Per NS  8/6 , 8/7, 8/8, 8/9- Stable, NS on board, Plan for drain removal per N/S

## 2022-08-09 NOTE — ASSESSMENT & PLAN NOTE
BP control as per primary team   [Mother] : mother [Normal] : Normal [Goes to dentist yearly] : Patient goes to dentist yearly [Cigarette smoke exposure] : No cigarette smoke exposure [Carbon Monoxide Detectors] : Carbon monoxide detectors [Smoke Detectors] : Smoke detectors [de-identified] : HOME [FreeTextEntry7] : GOOD EATER, GOOD SLEEPER, NKA

## 2022-08-09 NOTE — PROGRESS NOTES
F/U with Mr. Blake - chart reviewed. Patient s/p wound exploration and repair of CSF leak per Dr. Zepeda.incision closed in surgery.no further wound care needs at this time. Please reconsult prn.

## 2022-08-09 NOTE — PROGRESS NOTES
[facility]  Progress Note    Patient Name: Friday ROGELIO Blake  MRN: [id number]  Patient Class: IP- Inpatient   Admission Date: 7/29/2022  Length of Stay: 9 days  Attending Physician: Mika Bonilla MD  Primary Care Provider: Sid Elizabeth MD        Subjective:     Principal Problem:Wound dehiscence        HPI:  65 y/o male with PMHx of HTN, HLD, DM, and obesity who presented tot he ED for eval of post op incision leaking.  Sx are constant and moderate in severity. No mitigating or exacerbating factors reported. Pt had surgery on 7/26, then took a trip to [facility]. He flew back last night. Pt denies fever, chills, pain, HA, N/V, and all other sx at this time.  ED workup shows: H/H 11.6/36.9, COVID +  He will be kept on OBS for CSF leak under the care of hospital medicine  He is a full code and his SDM is his wife             Overview/Hospital Course:  Mr Blake is a 66 year old male who presented to [facility] of evaluation of wound dehiscence. Patient underwent Laminectomy of lumbar spine on 6/22/2022 and I & D of hematoma on 6/28/2022. Traveled to [facility] for daughter's wedding. Positive for drainage from low back surgical wound. Neurosurgery following, plan possible washout vs wound vac placement.  Infectious Disease following, continue empiric IV antibiotics. COVID positive. As of 7/31/2022, patient feeling well, vital signs and labs stable. MRI lumbar spine strongly concerning for discitis osteomyelitis at L2-3, probable epidural phlegmon or abscess. Neurosurgery following, will need revision with washout. Will follow.     8/1- cheerful, resting comfortably in bed on RA, no fever, chills or cough. Still has drainage from the Lumbar wound. Dr. Zepeda plans revision with wound washout/exploration with the help of Plastic Surgery, await confirmation date. Pt agreeable with the plan. Continue wound care, IV Abx, no wound vac yet.     8/2- comfortable on RA, wound Cx  growing Pseudomonas, earlier Cx grew Staph Epi- so now on Vanc, Cefepime and Flagyl. Dr. Zepeda plans Surgery- Washout with possible drain placement tomorrow, pt agreeable.     8/3- seen post op in the ICU, doing well, c/o back pain at the surgical site, just got oxycodone. Dr. Zepeda put him on Dexa 4 mg IV qid post op, he continues to received Cefepime, Flagyl and Vanc. Fluid sent to labs for C/S.     8/4- looks and feels better, lying flat on his side, back pain improved, no NV, dizziness or HA. Lumbar Drain output 11 cc. Dr. Zepeda d/mónica the foleys and is weaning the steroids. All Cx from Surgery remain NGDT. Getting Vanc, Cefepime and oral Flagyl. Last wound Cx grew Pseudomonas.   WBC 8.2, H/H 10.3/32. BS high due to Dexa.     8/5 - Patient with clinical improvement. Patient sitting up in bed. Plan to increase mobility today per NS. No CP/SOB. Patient tolerating diet.    8/6 - Patient improving steadily. Plan for OOB to chair today per NS. No cp / SOB. Patient is in good spirits. Discussed with CC Team.    8/7 - Patient with continued progress. Patient with more energy today. Sitting on bedside. No events. Decreased drainage today. Patient expressing his desire to transition to home. Await NS clearance. No CP / SOB    8/8 - Patient stable improving steadily. Much stronger today sitting in chair at bedside. Patient tolerating therapies / diet.    8/9 - Patient improved - Awaiting N/S recs for d/c . Possibly the next 24 hrs. No events.      Interval History: Patient improved - Awaiting N/S recs for d/c . Possibly the next 24 hrs. No events.    Review of Systems   Constitutional:  Positive for activity change and fatigue. Negative for appetite change and fever.   HENT: Negative.  Negative for congestion, sinus pressure and sore throat.    Eyes: Negative.  Negative for photophobia, discharge and visual disturbance.   Respiratory: Negative.  Negative for cough, chest tightness, shortness of breath and wheezing.     Cardiovascular: Negative.  Negative for chest pain and palpitations.   Gastrointestinal: Negative.  Negative for abdominal pain, blood in stool, constipation, diarrhea, nausea and vomiting.   Endocrine: Negative.    Genitourinary: Negative.    Musculoskeletal:  Positive for myalgias. Negative for arthralgias, back pain, neck pain and neck stiffness.   Skin:  Positive for wound.   Allergic/Immunologic: Negative.    Neurological:  Positive for weakness. Negative for seizures, syncope and headaches.   Hematological: Negative.    Psychiatric/Behavioral: Negative.  Negative for agitation, behavioral problems, hallucinations, self-injury and suicidal ideas.    Objective:     Vital Signs (Most Recent):  Temp: 98.2 °F (36.8 °C) (08/09/22 1132)  Pulse: 60 (08/09/22 1218)  Resp: 20 (08/09/22 1218)  BP: 136/85 (08/09/22 1132)  SpO2: 96 % (08/09/22 1218) Vital Signs (24h Range):  Temp:  [97.5 °F (36.4 °C)-98.4 °F (36.9 °C)] 98.2 °F (36.8 °C)  Pulse:  [51-68] 60  Resp:  [16-30] 20  SpO2:  [95 %-100 %] 96 %  BP: (136-167)/() 136/85     Weight: 44.6 kg (98 lb 5.2 oz)  Body mass index is 15.87 kg/m².    Intake/Output Summary (Last 24 hours) at 8/9/2022 1240  Last data filed at 8/9/2022 0200  Gross per 24 hour   Intake 294.51 ml   Output 1260 ml   Net -965.49 ml      Physical Exam  Vitals and nursing note reviewed.   Constitutional:       General: He is not in acute distress.     Appearance: Normal appearance. He is not ill-appearing, toxic-appearing or diaphoretic.       HENT:      Head: Normocephalic.      Right Ear: External ear normal.      Left Ear: External ear normal.      Nose: Nose normal.      Mouth/Throat:      Mouth: Mucous membranes are moist.   Eyes:      Pupils: Pupils are equal, round, and reactive to light.   Cardiovascular:      Rate and Rhythm: Normal rate and regular rhythm.      Pulses: Normal pulses.           Dorsalis pedis pulses are 2+ on the right side and 2+ on the left side.      Heart sounds: No  murmur heard.  Pulmonary:      Effort: No tachypnea or respiratory distress.      Breath sounds: Normal breath sounds.   Abdominal:      General: Bowel sounds are normal. There is no distension.   Musculoskeletal:         General: No tenderness.      Cervical back: Normal range of motion and neck supple.      Right lower leg: No edema.      Left lower leg: No edema.   Skin:     Capillary Refill: Capillary refill takes 2 to 3 seconds.   Neurological:      General: No focal deficit present.      Mental Status: He is alert and oriented to person, place, and time.      GCS: GCS eye subscore is 4. GCS verbal subscore is 5. GCS motor subscore is 6.      Sensory: Sensation is intact.      Motor: No weakness or atrophy.   Psychiatric:         Mood and Affect: Mood normal.         Behavior: Behavior normal. Behavior is cooperative.         Thought Content: Thought content normal.         Judgment: Judgment normal.       Significant Labs: All pertinent labs within the past 24 hours have been reviewed.  BMP:   Recent Labs   Lab 08/09/22  0513   *      K 3.9      CO2 29   BUN 8   CREATININE 0.8   CALCIUM 8.5*     CBC:   Recent Labs   Lab 08/08/22  0415 08/09/22  0513   WBC 7.36 6.31   HGB 9.3* 9.8*   HCT 30.8* 32.4*    182     CMP:   Recent Labs   Lab 08/08/22 0415 08/09/22  0513    144   K 3.8 3.9    109   CO2 28 29   * 120*   BUN 11 8   CREATININE 0.8 0.8   CALCIUM 8.4* 8.5*   PROT 5.2* 5.2*   ALBUMIN 2.6* 2.8*   BILITOT 0.3 0.4   ALKPHOS 53* 61   AST 20 14   ALT 50* 41   ANIONGAP 5* 6*     Lactic Acid: No results for input(s): LACTATE in the last 48 hours.  POCT Glucose:   Recent Labs   Lab 08/08/22  2155 08/09/22  0613 08/09/22  1135   POCTGLUCOSE 183* 132* 165*     Imaging Results               MRI Lumbar Spine W WO Cont (Final result)  Result time 07/30/22 19:53:25      Final result by Dale Cotton MD (07/30/22 19:53:25)                   Impression:      Findings  strongly concerning for discitis osteomyelitis at L2-3 as detailed above.  Probable epidural phlegmon or abscess extending along the left aspect of the thecal sac and into the posterior soft tissues with the previously noted fluid collection now demonstrating significantly increased peripheral enhancement concerning for superimposed infection.  Neuro surgical consultation would be advised if not previously obtained.    This report was flagged in Epic as abnormal.      Electronically signed by: Dale Cotton  Date:    07/30/2022  Time:    19:53               Narrative:    EXAMINATION:  MRI LUMBAR SPINE W WO CONTRAST    CLINICAL HISTORY:  lower back pain;    TECHNIQUE:  Multiplanar, multisequence MR images were acquired from the thoracolumbar junction to the sacrum without the administration of contrast.    COMPARISON:  Multiple priors.    FINDINGS:  Patient status post hemilaminectomy and discectomy at L2-3 on the left.  Associated with the surgical area of there is fluid within the disc space, bone marrow edema, and abnormal postcontrast enhancement of the in plates about the disc space and disc which extends along the left aspect of the spinal canal and into the posterior soft tissues concerning for discitis osteomyelitis with epidural abscess extending into the posterior soft tissues.  The fluid collection in the posterior soft tissues is noted both within the laminectomy bed measuring a proximally 3.1 x 2.7 x 2.3 cm in this area, and with extension inferiorly along the L3 lamina and L1 inferior lamina, and also into the posterior soft tissues.  The fluid collection is significantly serpiginous and difficult to accurately measure.  The component in the posterior soft tissues measures on the order of 9.2 x 3.8 by 2.5 cm and also demonstrates some postcontrast enhancement concerning for infection.  Abscess is the diagnosis of  exclusion.  The fluid collection was present on the prior exam of 07/01/2022, but the  surrounding peripheral enhancement has significantly increased in comparison to the prior exam, and the new disc space abnormalities raise concern for superimposed infection.    Alignment: Normal.    Vertebrae: Aside from the surgical levels, there is normal marrow signal. No fracture.    Cord: Normal.  Conus terminates at L1    Degenerative findings:    At the nonsurgical levels the degenerative changes are stable from the prior.    The epidural collection extending such as seen on series 6, image 16 and series 9, image 17 now produces moderate spinal canal stenosis.    Paraspinal muscles & soft tissues: As above.                                        Significant Imaging: I have reviewed all pertinent imaging results/findings within the past 24 hours.      Assessment/Plan:      * Wound dehiscence with CSF leak s/p Washout and closure with Lumbar drain  --wound cx pending  --Neuro surgery consulted  --ID consulted      7/30/2022  Neurosurgery following    Possible washout vs wound vac placement   ID following, continue empiric IV antibiotic        7/31/2022  MRI of lumbar spine strongly concerning for discitis osteomyelitis at L2-3, probable epidural phlegmon or abscess.   Neurosurgery following, will need revision with washout. Will follow.   Continue IV antibiotics  Wound cultures in progress      8/1- continue IV Abx, local wound care  Await revision and wound exploration with plastic surgery    8/2- surgery tomorrow, cont iV abx    8/3- s/p Wound revision and washout with Lumbar Drain placement  8/4- improving- continue same tx  8/5 Stable - Per NS  8/6 , 8/7, 8/8, 8/9- Stable, NS on board, Plan for drain removal per N/S    Postoperative CSF leak  Expect drain placement with surgery tomorrow    S/p wound revision and closure with LD placement    Stable decreased fluid output    HLD (hyperlipidemia)  --continue atorvastatin  --cardiac diet      Hypertension  --continue losartan  --cardiac diet    BP under  control      Degenerative disc disease, lumbar  --supportive care  --PT/OT consulted    S/p laminectomy- complicated by wound dehiscence- see above    S/p revision and washout POD#5      Morbid obesity with BMI of 40.0-44.9, adult  Body mass index is 37.82 kg/m². Morbid obesity complicates all aspects of disease management from diagnostic modalities to treatment. Weight loss encouraged and health benefits explained to patient.             Diabetes mellitus without complication  Patient's FSGs are controlled on current medication regimen.  Last A1c reviewed-   Lab Results   Component Value Date    HGBA1C 8.3 (H) 05/18/2022     Most recent fingerstick glucose reviewed-   Recent Labs   Lab 08/08/22  1618 08/08/22  2155 08/09/22  0613 08/09/22  1135   POCTGLUCOSE 241* 183* 132* 165*     Current correctional scale  Low  Maintain anti-hyperglycemic dose as follows-   Antihyperglycemics (From admission, onward)            Start     Stop Route Frequency Ordered    08/08/22 1502  insulin aspart U-100 pen 1-10 Units         -- SubQ Before meals & nightly PRN 08/08/22 1402    08/05/22 1200  insulin detemir U-100 pen 10 Units         -- SubQ 2 times daily 08/05/22 1147        Hold Oral hypoglycemics while patient is in the hospital.    Continue present care, BS generally well controlled    Pt now on Dexa IV- BS may rise- needs tighter BS control    BS high sec to SSI-  Weaning steroids now, cont SSI    Increase Levemir to BID dosing        VTE Risk Mitigation (From admission, onward)         Ordered     enoxaparin injection 40 mg  Daily         08/05/22 1312     IP VTE HIGH RISK PATIENT  Once         07/29/22 2333     Place sequential compression device  Until discontinued         07/29/22 2333                Discharge Planning   MATEUS:      Code Status: Full Code   Is the patient medically ready for discharge?:     Reason for patient still in hospital (select all that apply): Patient trending condition, Consult recommendations  and Pending disposition  Discharge Plan A: Home Health NP at home Care                 Mika Bonilla MD  Department of Hospital Medicine   [facility] ([facility])

## 2022-08-09 NOTE — ASSESSMENT & PLAN NOTE
All previous cultures reviewed.  Will follow MRI of the spine and new cultures.    Cultures=06/29- aerobic culture- MSSE  CUTIBACTERIUM ACNES   06/28-BACTEROIDES FRAGILIS   06/28- staph epi -blood      Is this CSF leak ? Will send fluid for   Beta (?)-2 transferrin testing  For now will use IV daptomycin/cefepime , add empiric doxycycline/flagyl  Follow repeat cultures .  If no pseudomonas, plan will be 6 weeks of Daptomycin/Rocephin and then 3-6 months of doxycycline     08/01- will switch to vancomycin as we anticipate longer hospital stay , continue cefepime, doxycycline/flagyl.  Will plan I and D as per neurosurgery    08/04- cultures reviewed -All cultures reviewed.  Wound culture -07/30 Pseudomonas   Blood cultures- 06/29- Staph epidermidis  06/28- Back -bacteroides   Cutibacterium acnes     Will plan to use 8  weeks of IV Zosyn and po doxycycline   Will follow final operative  cultures   Will check weekly ESR, CRP   Will need to check repeat MRI of the spine before stopping regime .  EOC -09/30/22 08/08/22- will continue zosyn/doxycycline , follow ESR/CRP  Follow in the clinic

## 2022-08-09 NOTE — PROGRESS NOTES
[facility] - Tele[facility] ([facility])  Infectious Disease  Progress Note    Patient Name: Friday ROGELIO Blake  MRN: [id number]  Admission Date: 7/29/2022  Length of Stay: 9 days  Attending Physician: Mika Bonilla MD  Primary Care Provider: Sid Elizabeth MD    Isolation Status: Airborne and Contact and Droplet  Assessment/Plan:      * Wound dehiscence with CSF leak s/p Washout and closure with Lumbar drain  All previous cultures reviewed.  Will follow MRI of the spine and new cultures.    Cultures=06/29- aerobic culture- [facility]  CUTIBACTERIUM ACNES   06/28-BACTEROIDES FRAGILIS   06/28- staph epi -blood      Is this CSF leak ? Will send fluid for   Beta (?)-2 transferrin testing  For now will use IV daptomycin/cefepime , add empiric doxycycline/flagyl  Follow repeat cultures .  If no pseudomonas, plan will be 6 weeks of Daptomycin/Rocephin and then 3-6 months of doxycycline     08/01- will switch to vancomycin as we anticipate longer hospital stay , continue cefepime, doxycycline/flagyl.  Will plan I and D as per neurosurgery    08/04- cultures reviewed -All cultures reviewed.  Wound culture -07/30 Pseudomonas   Blood cultures- 06/29- Staph epidermidis  06/28- Back -bacteroides   Cutibacterium acnes     Will plan to use 8  weeks of IV Zosyn and po doxycycline   Will follow final operative  cultures   Will check weekly ESR, CRP   Will need to check repeat MRI of the spine before stopping regime .  EOC -09/30/22 08/08/22- will continue zosyn/doxycycline , follow ESR/CRP  Follow in the clinic     COVID  - COVID-19 testing   - Infection Control notified     - Isolation:   - Airborne, Contact and Droplet Precautions  - Cohort patients into COVID units              - Limit visitors per hospital policy              - Consolidating lab draws, nursing care, provider visits, and interventions      - Management:  Supplemental O2 to maintain SpO2 >92%  Continuous/intermittent Pulse Ox  Albuterol treatment PRN    Advance Care Planning             Hypertension  BP control as per primary team    Diabetes mellitus without complication  Insulin sliding scale , diabetic diet          Anticipated Disposition:     Thank you for your consult. I will follow-up with patient. Please contact us if you have any additional questions.    Tha Hodge MD  Infectious Disease  [facility] - [facility] ([facility])    Subjective:     Principal Problem:Wound dehiscence    HPI:  66 year old man with PMHx of DM, HTN, hypercalcemia, and hyperlipidemia who presents to the Emergency Department for evaluation of post-op complication.    His surgery history-    06/22/22-  LAMINECTOMY, SPINE, LUMBAR, WITH DISCECTOMY (Left) L2-3      06/26/22  Hemilaminectomy, partial medial facetectomy and foraminotomy L2-3 on left   with discectomy using microscopic dissection   06/28-22-  Fluid collection ventral and lateral consistent with epidural epidural hematoma  compression   Retained disc fragment   Cultures=06/29- aerobic culture- [facility]  CUTIBACTERIUM ACNES   06/28-BACTEROIDES FRAGILIS   06/28- staph epi -blood    He still has persistent leakage from the back and had recent travel to [facility].  MRI of the spine -pending   ESR -55, crp-normal      Interval History:   66 year old man with complicated post op course.  06/22/22-  LAMINECTOMY, SPINE, LUMBAR, WITH DISCECTOMY (Left) L2-3      06/26/22  Hemilaminectomy, partial medial facetectomy and foraminotomy L2-3 on left   with discectomy using microscopic dissection   06/28-22-  Fluid collection ventral and lateral consistent with epidural epidural hematoma  compression   Retained disc fragment   Cultures=06/29- aerobic culture- [facility]  CUTIBACTERIUM ACNES   06/28-BACTEROIDES FRAGILIS   06/28- staph epi -blood      MRI of the lumbar region -   Patient status post hemilaminectomy and discectomy at L2-3 on the left.  Associated with the surgical area of there is fluid within the disc space, bone marrow edema, and abnormal postcontrast enhancement  of the in plates about the disc space and disc which extends along the left aspect of the spinal canal and into the posterior soft tissues concerning for discitis osteomyelitis with epidural abscess extending into the posterior soft tissues.  The fluid collection in the posterior soft tissues is noted both within the laminectomy bed measuring a proximally 3.1 x 2.7 x 2.3 cm in this area, and with extension inferiorly along the L3 lamina and L1 inferior lamina, and also into the posterior soft tissues.  The fluid collection is significantly serpiginous and difficult to accurately measure.  The component in the posterior soft tissues measures on the order of 9.2 x 3.8 by 2.5 cm and also demonstrates some postcontrast enhancement concerning for infection.  Abscess is the diagnosis of  exclusion.  The fluid collection was present on the prior exam of 07/01/2022, but the surrounding peripheral enhancement has significantly increased in comparison to the prior exam, and the new disc space abnormalities raise concern for superimposed infection.  Findings strongly concerning for discitis osteomyelitis at L2-3 as detailed above.     Patient reports less pain.  08/01/22- he is afebrile   Less back pain noted .  08/04/22- he had interval I and D done and is now in the ICU.  All cultures reviewed.  Wound culture -07/30 Pseudomonas   Blood cultures- 06/29- Staph epidermidis  06/28- Back -bacteroides   Cutibacterium acnes     08/08/22- he is afebrile.  Lumbar drain is scheduled to be removed       Review of Systems   Constitutional:  Negative for activity change, appetite change, chills, diaphoresis and fatigue.   HENT:  Negative for congestion, dental problem, ear discharge, ear pain and facial swelling.    Eyes:  Negative for pain, discharge and itching.   Respiratory:  Negative for apnea, cough, chest tightness and shortness of breath.    Cardiovascular:  Negative for chest pain and leg swelling.   Gastrointestinal:  Negative  for abdominal distention and abdominal pain.   Endocrine: Negative for cold intolerance, heat intolerance and polydipsia.   Genitourinary:  Negative for difficulty urinating, dysuria and enuresis.   Musculoskeletal:  Positive for back pain. Negative for arthralgias.        Now has lumbar drian    Skin:  Negative for color change and pallor.   Allergic/Immunologic: Negative for environmental allergies and food allergies.   Neurological:  Negative for dizziness, facial asymmetry, light-headedness and headaches.   Hematological:  Negative for adenopathy. Does not bruise/bleed easily.   Psychiatric/Behavioral:  Negative for agitation and behavioral problems.    Objective:     Vital Signs (Most Recent):  Temp: 98.2 °F (36.8 °C) (08/09/22 0505)  Pulse: (!) 57 (08/09/22 0505)  Resp: 18 (08/09/22 0505)  BP: (!) 161/92 (08/09/22 0505)  SpO2: 97 % (08/09/22 0505)   Vital Signs (24h Range):  Temp:  [97.1 °F (36.2 °C)-98.4 °F (36.9 °C)] 98.2 °F (36.8 °C)  Pulse:  [51-68] 57  Resp:  [16-30] 18  SpO2:  [97 %-100 %] 97 %  BP: (140-191)/() 161/92     Weight: 44.6 kg (98 lb 5.2 oz)  Body mass index is 15.87 kg/m².    Estimated Creatinine Clearance: 57.3 mL/min (based on SCr of 0.8 mg/dL).    Physical Exam  Vitals and nursing note reviewed.   Constitutional:       General: He is not in acute distress.     Appearance: He is well-developed. He is not diaphoretic.   HENT:      Head: Normocephalic and atraumatic.      Nose: Nose normal.   Eyes:      General:         Right eye: No discharge.         Left eye: No discharge.      Conjunctiva/sclera: Conjunctivae normal.      Pupils: Pupils are equal, round, and reactive to light.   Neck:      Thyroid: No thyromegaly.      Vascular: No JVD.      Trachea: No tracheal deviation.   Cardiovascular:      Rate and Rhythm: Normal rate and regular rhythm.      Heart sounds: Normal heart sounds. No murmur heard.    No friction rub. No gallop.   Pulmonary:      Effort: Pulmonary effort is  normal. No respiratory distress.      Breath sounds: Normal breath sounds. No wheezing or rales.   Chest:      Chest wall: No tenderness.   Abdominal:      General: Bowel sounds are normal. There is no distension.      Palpations: Abdomen is soft. There is no mass.      Tenderness: There is no abdominal tenderness.   Genitourinary:     Comments: deferred  Musculoskeletal:         General: No tenderness or deformity. Normal range of motion.      Cervical back: Normal range of motion and neck supple.      Comments: Lumbar drains noted    Skin:     General: Skin is warm and dry.      Capillary Refill: Capillary refill takes less than 2 seconds.      Findings: No erythema or rash.   Neurological:      Mental Status: He is alert and oriented to person, place, and time.      Motor: No abnormal muscle tone.      Coordination: Coordination normal.   Psychiatric:         Behavior: Behavior normal.       Significant Labs: Blood Culture:   Recent Labs   Lab 06/29/22  1439 06/29/22  1657 07/03/22  1015   LABBLOO Gram stain aer bottle: Gram positive cocci in clusters resembling Staph  Results called to and read back by: Alisha Eller RN. 07/02/2022  00:17  STAPHYLOCOCCUS EPIDERMIDIS* Gram stain nico bottle: Gram positive cocci in clusters resembling Staph   Results called to and read back by: Soheila You 07/01/2022  05:47  STAPHYLOCOCCUS EPIDERMIDIS* No growth after 5 days.  No growth after 5 days.       BMP:   Recent Labs   Lab 08/08/22  0415   *      K 3.8      CO2 28   BUN 11   CREATININE 0.8   CALCIUM 8.4*       CBC:   Recent Labs   Lab 08/08/22  0415   WBC 7.36   HGB 9.3*   HCT 30.8*          CMP:   Recent Labs   Lab 08/08/22  0415      K 3.8      CO2 28   *   BUN 11   CREATININE 0.8   CALCIUM 8.4*   PROT 5.2*   ALBUMIN 2.6*   BILITOT 0.3   ALKPHOS 53*   AST 20   ALT 50*   ANIONGAP 5*       Wound Culture:   Recent Labs   Lab 06/28/22  1950 07/30/22  0055 08/03/22  0937    LABAERO STAPHYLOCOCCUS EPIDERMIDIS  Moderate  *  No growth PSEUDOMONAS AERUGINOSA  Moderate  * No growth  No growth       All pertinent labs within the past 24 hours have been reviewed.    Significant Imaging: I have reviewed all pertinent imaging results/findings within the past 24 hours.

## 2022-08-09 NOTE — SUBJECTIVE & OBJECTIVE
Interval History: Patient improved - Awaiting N/S recs for d/c . Possibly the next 24 hrs. No events.    Review of Systems   Constitutional:  Positive for activity change and fatigue. Negative for appetite change and fever.   HENT: Negative.  Negative for congestion, sinus pressure and sore throat.    Eyes: Negative.  Negative for photophobia, discharge and visual disturbance.   Respiratory: Negative.  Negative for cough, chest tightness, shortness of breath and wheezing.    Cardiovascular: Negative.  Negative for chest pain and palpitations.   Gastrointestinal: Negative.  Negative for abdominal pain, blood in stool, constipation, diarrhea, nausea and vomiting.   Endocrine: Negative.    Genitourinary: Negative.    Musculoskeletal:  Positive for myalgias. Negative for arthralgias, back pain, neck pain and neck stiffness.   Skin:  Positive for wound.   Allergic/Immunologic: Negative.    Neurological:  Positive for weakness. Negative for seizures, syncope and headaches.   Hematological: Negative.    Psychiatric/Behavioral: Negative.  Negative for agitation, behavioral problems, hallucinations, self-injury and suicidal ideas.    Objective:     Vital Signs (Most Recent):  Temp: 98.2 °F (36.8 °C) (08/09/22 1132)  Pulse: 60 (08/09/22 1218)  Resp: 20 (08/09/22 1218)  BP: 136/85 (08/09/22 1132)  SpO2: 96 % (08/09/22 1218) Vital Signs (24h Range):  Temp:  [97.5 °F (36.4 °C)-98.4 °F (36.9 °C)] 98.2 °F (36.8 °C)  Pulse:  [51-68] 60  Resp:  [16-30] 20  SpO2:  [95 %-100 %] 96 %  BP: (136-167)/() 136/85     Weight: 44.6 kg (98 lb 5.2 oz)  Body mass index is 15.87 kg/m².    Intake/Output Summary (Last 24 hours) at 8/9/2022 1240  Last data filed at 8/9/2022 0200  Gross per 24 hour   Intake 294.51 ml   Output 1260 ml   Net -965.49 ml      Physical Exam  Vitals and nursing note reviewed.   Constitutional:       General: He is not in acute distress.     Appearance: Normal appearance. He is not ill-appearing, toxic-appearing or  diaphoretic.       HENT:      Head: Normocephalic.      Right Ear: External ear normal.      Left Ear: External ear normal.      Nose: Nose normal.      Mouth/Throat:      Mouth: Mucous membranes are moist.   Eyes:      Pupils: Pupils are equal, round, and reactive to light.   Cardiovascular:      Rate and Rhythm: Normal rate and regular rhythm.      Pulses: Normal pulses.           Dorsalis pedis pulses are 2+ on the right side and 2+ on the left side.      Heart sounds: No murmur heard.  Pulmonary:      Effort: No tachypnea or respiratory distress.      Breath sounds: Normal breath sounds.   Abdominal:      General: Bowel sounds are normal. There is no distension.   Musculoskeletal:         General: No tenderness.      Cervical back: Normal range of motion and neck supple.      Right lower leg: No edema.      Left lower leg: No edema.   Skin:     Capillary Refill: Capillary refill takes 2 to 3 seconds.   Neurological:      General: No focal deficit present.      Mental Status: He is alert and oriented to person, place, and time.      GCS: GCS eye subscore is 4. GCS verbal subscore is 5. GCS motor subscore is 6.      Sensory: Sensation is intact.      Motor: No weakness or atrophy.   Psychiatric:         Mood and Affect: Mood normal.         Behavior: Behavior normal. Behavior is cooperative.         Thought Content: Thought content normal.         Judgment: Judgment normal.       Significant Labs: All pertinent labs within the past 24 hours have been reviewed.  BMP:   Recent Labs   Lab 08/09/22 0513   *      K 3.9      CO2 29   BUN 8   CREATININE 0.8   CALCIUM 8.5*     CBC:   Recent Labs   Lab 08/08/22 0415 08/09/22 0513   WBC 7.36 6.31   HGB 9.3* 9.8*   HCT 30.8* 32.4*    182     CMP:   Recent Labs   Lab 08/08/22 0415 08/09/22 0513    144   K 3.8 3.9    109   CO2 28 29   * 120*   BUN 11 8   CREATININE 0.8 0.8   CALCIUM 8.4* 8.5*   PROT 5.2* 5.2*   ALBUMIN 2.6*  2.8*   BILITOT 0.3 0.4   ALKPHOS 53* 61   AST 20 14   ALT 50* 41   ANIONGAP 5* 6*     Lactic Acid: No results for input(s): LACTATE in the last 48 hours.  POCT Glucose:   Recent Labs   Lab 08/08/22  2155 08/09/22  0613 08/09/22  1135   POCTGLUCOSE 183* 132* 165*     Imaging Results               MRI Lumbar Spine W WO Cont (Final result)  Result time 07/30/22 19:53:25      Final result by Dale Cotton MD (07/30/22 19:53:25)                   Impression:      Findings strongly concerning for discitis osteomyelitis at L2-3 as detailed above.  Probable epidural phlegmon or abscess extending along the left aspect of the thecal sac and into the posterior soft tissues with the previously noted fluid collection now demonstrating significantly increased peripheral enhancement concerning for superimposed infection.  Neuro surgical consultation would be advised if not previously obtained.    This report was flagged in Epic as abnormal.      Electronically signed by: Dale Cotton  Date:    07/30/2022  Time:    19:53               Narrative:    EXAMINATION:  MRI LUMBAR SPINE W WO CONTRAST    CLINICAL HISTORY:  lower back pain;    TECHNIQUE:  Multiplanar, multisequence MR images were acquired from the thoracolumbar junction to the sacrum without the administration of contrast.    COMPARISON:  Multiple priors.    FINDINGS:  Patient status post hemilaminectomy and discectomy at L2-3 on the left.  Associated with the surgical area of there is fluid within the disc space, bone marrow edema, and abnormal postcontrast enhancement of the in plates about the disc space and disc which extends along the left aspect of the spinal canal and into the posterior soft tissues concerning for discitis osteomyelitis with epidural abscess extending into the posterior soft tissues.  The fluid collection in the posterior soft tissues is noted both within the laminectomy bed measuring a proximally 3.1 x 2.7 x 2.3 cm in this area, and with  extension inferiorly along the L3 lamina and L1 inferior lamina, and also into the posterior soft tissues.  The fluid collection is significantly serpiginous and difficult to accurately measure.  The component in the posterior soft tissues measures on the order of 9.2 x 3.8 by 2.5 cm and also demonstrates some postcontrast enhancement concerning for infection.  Abscess is the diagnosis of  exclusion.  The fluid collection was present on the prior exam of 07/01/2022, but the surrounding peripheral enhancement has significantly increased in comparison to the prior exam, and the new disc space abnormalities raise concern for superimposed infection.    Alignment: Normal.    Vertebrae: Aside from the surgical levels, there is normal marrow signal. No fracture.    Cord: Normal.  Conus terminates at L1    Degenerative findings:    At the nonsurgical levels the degenerative changes are stable from the prior.    The epidural collection extending such as seen on series 6, image 16 and series 9, image 17 now produces moderate spinal canal stenosis.    Paraspinal muscles & soft tissues: As above.                                        Significant Imaging: I have reviewed all pertinent imaging results/findings within the past 24 hours.

## 2022-08-09 NOTE — PROGRESS NOTES
[facility] ([facility])  Neurosurgery  Progress Note    Subjective:     Interval History:   Patient seen this afternoon at lunch.   Reports L hip pain is better today.  Denies HA, dizziness.  No new issues to report.  Yesterday, LD removed.    2 HV in place. 60 mL output recorded yesterday.  Has participated with PT, no longer a candidate due to high level of function.     History of Present Illness: See H&P.    Post-Op Info:  Procedure(s) (LRB):  EXPLORATION, WOUND (Bilateral)  REPAIR, CSF LEAK, USING COMPUTER-ASSISTED NAVIGATION (Bilateral)  PLACEMENT (N/A)   6 Days Post-Op      Medications:  Continuous Infusions:  Scheduled Meds:   albuterol  2 puff Inhalation Q6H    ascorbic acid (vitamin C)  500 mg Oral BID    aspirin  81 mg Oral Daily    atorvastatin  10 mg Oral QHS    docusate sodium  100 mg Oral BID    doxycycline  100 mg Oral Q12H    enoxaparin  40 mg Subcutaneous Daily    famotidine  20 mg Oral BID    gabapentin  300 mg Oral TID    insulin detemir U-100  10 Units Subcutaneous BID    losartan  50 mg Oral Daily    melatonin  6 mg Oral Nightly    multivitamin  1 tablet Oral Daily    piperacillin-tazobactam (ZOSYN) IVPB  4.5 g Intravenous Q8H    polyethylene glycol  17 g Oral Daily    vitamin D  1,000 Units Oral Daily     PRN Meds:acetaminophen, acetaminophen, aluminum-magnesium hydroxide-simethicone, dextromethorphan-guaiFENesin  mg/5 ml, dextrose 10%, dextrose 10%, glucagon (human recombinant), glucose, glucose, HYDROmorphone, insulin aspart U-100, loperamide, melatonin, methocarbamoL, naloxone, ondansetron, oxyCODONE-acetaminophen, oxyCODONE-acetaminophen, oxyCODONE-acetaminophen, prochlorperazine, promethazine, senna-docusate 8.6-50 mg, simethicone, sodium chloride 0.9%, sodium chloride 0.9%, sodium chloride 0.9%     Review of Systems  Objective:     Weight: 44.6 kg (98 lb 5.2 oz)  Body mass index is 15.87 kg/m².  Vital Signs (Most Recent):  Temp: 98.2 °F (36.8 °C) (08/09/22  1132)  Pulse: 70 (08/09/22 1300)  Resp: 20 (08/09/22 1218)  BP: 136/85 (08/09/22 1132)  SpO2: 96 % (08/09/22 1218) Vital Signs (24h Range):  Temp:  [97.5 °F (36.4 °C)-98.4 °F (36.9 °C)] 98.2 °F (36.8 °C)  Pulse:  [51-70] 70  Resp:  [16-30] 20  SpO2:  [95 %-100 %] 96 %  BP: (136-167)/() 136/85                Oxygen Concentration (%):  [21] 21         Closed/Suction Drain 08/03/22 1144 Right Back Accordion 10 Fr. (Active)   Site Description Unable to view 08/09/22 1100   Dressing Type Transparent (Tegaderm) 08/08/22 1505   Dressing Status Clean;Intact;Dry 08/09/22 1100   Dressing Intervention Integrity maintained 08/09/22 1100   Drainage Sanguineous 08/08/22 1505   Status Other (Comment) 08/08/22 1505   Output (mL) 0 mL 08/08/22 1705            Closed/Suction Drain 08/03/22 1145 Left Back Accordion 10 Fr. (Active)   Site Description Unable to view 08/09/22 1100   Dressing Type Transparent (Tegaderm) 08/08/22 1505   Dressing Status Clean;Intact;Dry 08/09/22 1100   Dressing Intervention Integrity maintained 08/09/22 1100   Drainage Serosanguineous 08/08/22 1505   Status Other (Comment) 08/08/22 1505   Output (mL) 60 mL 08/08/22 1705       Neurosurgery Physical Exam  Vitals and labs reviewed  MAEW  Incision CDI  Sutures intact  HV drains in place (2)    Significant Labs:  Recent Labs   Lab 08/08/22  0415 08/09/22  0513   * 120*    144   K 3.8 3.9    109   CO2 28 29   BUN 11 8   CREATININE 0.8 0.8   CALCIUM 8.4* 8.5*     Recent Labs   Lab 08/08/22  0415 08/09/22  0513   WBC 7.36 6.31   HGB 9.3* 9.8*   HCT 30.8* 32.4*    182     No results for input(s): LABPT, INR, APTT in the last 48 hours.  Microbiology Results (last 7 days)     Procedure Component Value Units Date/Time    Culture, Anaerobe [[id number]] Collected: 08/03/22 0937    Order Status: Completed Specimen: Wound from Back Updated: 08/09/22 0742     Anaerobic Culture Culture in progress    Narrative:      Deep    Culture, Anaerobe  [[id number]] Collected: 08/03/22 0937    Order Status: Completed Specimen: Wound from Back Updated: 08/08/22 1042     Anaerobic Culture Culture in progress    Narrative:      Superficial    Aerobic culture [[id number]] Collected: 08/03/22 0937    Order Status: Completed Specimen: Wound from Back Updated: 08/06/22 0939     Aerobic Bacterial Culture No growth    Narrative:      Deep    Aerobic culture [[id number]] Collected: 08/03/22 0937    Order Status: Completed Specimen: Wound from Back Updated: 08/06/22 0939     Aerobic Bacterial Culture No growth    Narrative:      Superficial    AFB Culture & Smear [[id number]] Collected: 08/03/22 0937    Order Status: Completed Specimen: Wound from Back Updated: 08/04/22 2127     AFB Culture & Smear Culture in progress     AFB CULTURE STAIN No acid fast bacilli seen.    Narrative:      Superficial    AFB Culture & Smear [[id number]] Collected: 08/03/22 0937    Order Status: Completed Specimen: Wound from Back Updated: 08/04/22 2127     AFB Culture & Smear Culture in progress     AFB CULTURE STAIN No acid fast bacilli seen.    Narrative:      Deep    Gram stain [[id number]] Collected: 08/03/22 0937    Order Status: Completed Specimen: Wound from Back Updated: 08/03/22 2021     Gram Stain Result No WBC's      No organisms seen    Narrative:      Superficial    Gram stain [[id number]] Collected: 08/03/22 0937    Order Status: Completed Specimen: Wound from Back Updated: 08/03/22 1807     Gram Stain Result No WBC's      No organisms seen    Narrative:      Deep    Fungus culture [[id number]] Collected: 08/03/22 0937    Order Status: Sent Specimen: Wound from Back Updated: 08/03/22 1534    Fungus culture [[id number]] Collected: 08/03/22 0937    Order Status: Sent Specimen: Wound from Back Updated: 08/03/22 1534    Aerobic culture [[id number]]  (Abnormal)  (Susceptibility) Collected: 07/30/22 0055    Order Status: Completed Specimen: Incision site from Back Updated: 08/03/22 1035     Aerobic  Bacterial Culture PSEUDOMONAS AERUGINOSA  Moderate          All pertinent labs from the last 24 hours have been reviewed.  Significant Diagnostics:  I have reviewed all pertinent imaging results/findings within the past 24 hours.    Assessment/Plan:     Active Diagnoses:    Diagnosis Date Noted POA    PRINCIPAL PROBLEM:  Wound dehiscence with CSF leak s/p Washout and closure with Lumbar drain [T81.30XA] 07/29/2022 Yes     Chronic    Postoperative CSF leak [G97.82, G96.00] 07/30/2022 Yes     Chronic    Hypertension [I10] 07/29/2022 Yes    HLD (hyperlipidemia) [E78.5] 07/29/2022 Yes    Degenerative disc disease, lumbar [M51.36] 06/22/2022 Yes     Chronic    Diabetes mellitus without complication [E11.9]  Yes     Chronic    Morbid obesity with BMI of 40.0-44.9, adult [E66.01, Z68.41]  Not Applicable      Problems Resolved During this Admission:    Diagnosis Date Noted Date Resolved POA    Discitis of lumbar region [M46.46] 08/01/2022 08/09/2022 Yes    COVID [U07.1] 07/29/2022 08/09/2022 Yes     Chronic     POD #6  Will keep overnight for continued observation.  Plan to D/c Home tomorrow with 1 drain.  Will Remove 1 HV drain prior to d/c.  ID recs- continue zosyn/doxycycline , follow ESR/CRP, follow-up in outpatient clinic.  Continue to monitor. Please call with any changes.    Oliver Clifton PA-C  Neurosurgery  [facility] - Telemetry ([facility])

## 2022-08-09 NOTE — PT/OT/SLP PROGRESS
Physical Therapy Treatment and Discharge    Patient Name:  Friday ROGELIO Blake   MRN:  [id number]    Recommendations:     Discharge Recommendations:  home health PT   Discharge Equipment Recommendations: shower chair   Barriers to discharge: None    Assessment:     Friday ROGELIO Blake is a 66 y.o. male admitted with a medical diagnosis of Wound dehiscence.    PT IS NO LONGER A CANDIDATE FOR INPATIENT SKILLED P.T. DUE TO HIGH LEVEL OF FUNCTION, PT WILL BENEFIT FROM [facility] FOR MAINTENANCE WITH FUNCTIONAL MOBILITY AS NEEDED    Plan:     D/C PT FROM P.T. SERVICES TO [facility] FOR CONTINUED GAIT/TE TO TOLERANCE IN ORDER TO MAINTAIN CURRENT FUNCTIONAL MOBILITY    Subjective     Chief Complaint: READY TO GO HOME  Patient/Family Comments/goals:   Pain/Comfort:  · Pain Rating 1: 2/10  · Location - Side 1: Left  · Location 1: hip  · Pain Addressed 1: Reposition      Objective:     Communicated with NURSE GARRETT  prior to session.  Patient found supine with telemetry, hemovac, peripheral IV upon PT entry to room.     General Precautions: Standard, airborne, contact, droplet, fall   Orthopedic Precautions:spinal precautions   Braces: LSO (WAITING FOR LARGER BRACE)  Respiratory Status: Room air     Functional Mobility:  · Bed Mobility:     · Rolling Left:  independence  · Scooting: independence  · Supine to Sit: independence  · Transfers:     · Sit to Stand:  independence with no AD  · Bed to Chair: modified independence with  rolling walker  using  Step Transfer  · Gait: PT ' WITH RW MANUEL, NO LOB OR SOB ON ROOM AIR  · Balance: GOOD    AM-PAC 6 CLICK MOBILITY  Turning over in bed (including adjusting bedclothes, sheets and blankets)?: 4  Sitting down on and standing up from a chair with arms (e.g., wheelchair, bedside commode, etc.): 4  Moving from lying on back to sitting on the side of the bed?: 4  Moving to and from a bed to a chair (including a wheelchair)?: 4  Need to walk in hospital room?:  "4  Climbing 3-5 steps with a railing?: 1  Basic Mobility Total Score: 21     Therapeutic Activities and Exercises:   PT AND WIFE EDUCATED IN ROLE OF P.T. IN ACUTE CARE HOSPITAL SETTING, REVIEW RW USE AND SAFETY DURING TF'S AND GAIT, REVIEW SPINAL PRECAUTIONS, ENCOURAGED TO INCREASE TIME OOB IN CHAIR TO TOLERANCE, EDUCATED IN AND ENCOURAGED TO PERFORM BLE THEREX WHILE SEATED OR SUPINE THROUGHOUT THE DAY TO TOLERANCE: HIP FLEX/EXT, QUAD SET, LAQ, HEEL SLIDES, AP'S.  PT AGREEABLE TO REQUESTS  PT EDUCATED ON RISK FOR FALLS DUE TO GENERALIZED WEAKNESS, EDUCATED ON "CALL DON'T FALL", ENCOURAGED TO CALL FOR ASSISTANCE WITH ALL NEEDS SUCH AS BED<>CHAIR TRANSFERS OR TRIPS TO BATHROOM, PT AGREEABLE TO SAFETY PRECAUTIONS    Patient left up in chair with all lines intact, call button in reach and NURSE notified..    GOALS:   Multidisciplinary Problems     Physical Therapy Goals     Not on file          Multidisciplinary Problems (Resolved)        Problem: Physical Therapy    Goal Priority Disciplines Outcome Goal Variances Interventions   Physical Therapy Goal   (Resolved)     PT, PT/OT Met     Description: LTG'S TO BE MET IN 14 DAYS (8-22-22)  PT WILL REQUIRE MANUEL FOR BED MOBILITY  PT WILL REQUIRE MANUEL FOR BED<>CHAIR TF'S  PT WILL  FEET WITH RW AND MANUEL                   Time Tracking:     PT Received On: 08/09/22  PT Start Time: 1020     PT Stop Time: 1043  PT Total Time (min): 23 min     Billable Minutes: Gait Training 10 and Therapeutic Activity 13    Treatment Type: Treatment  PT/PTA: PT     PTA Visit Number: 0     08/09/2022  "

## 2022-08-09 NOTE — PLAN OF CARE
D/C PT FROM [facility] SERVICES TO [facility], PT INDEP WITH BED MOBILITY AND TF'S, MANUEL WITH GAIT USING RW

## 2022-08-09 NOTE — PLAN OF CARE
POC reviewed with pt. Pt verbalizes understanding of POC. No questions at this time.  AAOx4. NADN.  NSR with some intermittent bradycardia on cardiac monitor.  Pt remains free of falls. Pt up to chair independently. Patient ambulating in the halls with PT.  IV abx given as ordered.  POCT glucose monitored.   PRN pain medication given.  Drain dressings changed per order.   No complaints at this time.  Safety measures in place. Will continue to monitor.  Informed pt to call for assistance before getting up. Pt verbalizes understanding.   Hourly rounding complete.

## 2022-08-09 NOTE — PROGRESS NOTES
[facility] - Tele[facility] ([facility])  Neurosurgery  Progress Note    Subjective:     Interval History:   Patient was seen this afternoon.   He currently c/o L hip pain, onset 8/7/22.  Denies weakness, numbness.  Patient states he was supposed to have PT over the weekend but was told he could only have OT.  No reports of HA, dizziness, N/V.  + BM x2  No issues urinating.    ID recs- continue zosyn/doxycycline , follow ESR/CRP, follow-up in outpatient clinic     History of Present Illness: See H&P.    Post-Op Info:  Procedure(s) (LRB):  EXPLORATION, WOUND (Bilateral)  REPAIR, CSF LEAK, USING COMPUTER-ASSISTED NAVIGATION (Bilateral)  PLACEMENT (N/A)   6 Days Post-Op      Medications:  Continuous Infusions:  Scheduled Meds:   albuterol  2 puff Inhalation Q6H    ascorbic acid (vitamin C)  500 mg Oral BID    aspirin  81 mg Oral Daily    atorvastatin  10 mg Oral QHS    docusate sodium  100 mg Oral BID    doxycycline  100 mg Oral Q12H    enoxaparin  40 mg Subcutaneous Daily    famotidine  20 mg Oral BID    gabapentin  300 mg Oral TID    insulin detemir U-100  10 Units Subcutaneous BID    losartan  50 mg Oral Daily    melatonin  6 mg Oral Nightly    multivitamin  1 tablet Oral Daily    piperacillin-tazobactam (ZOSYN) IVPB  4.5 g Intravenous Q8H    polyethylene glycol  17 g Oral Daily    vitamin D  1,000 Units Oral Daily     PRN Meds:acetaminophen, acetaminophen, aluminum-magnesium hydroxide-simethicone, dextromethorphan-guaiFENesin  mg/5 ml, dextrose 10%, dextrose 10%, glucagon (human recombinant), glucose, glucose, HYDROmorphone, insulin aspart U-100, loperamide, melatonin, methocarbamoL, naloxone, ondansetron, oxyCODONE-acetaminophen, oxyCODONE-acetaminophen, oxyCODONE-acetaminophen, prochlorperazine, promethazine, senna-docusate 8.6-50 mg, simethicone, sodium chloride 0.9%, sodium chloride 0.9%, sodium chloride 0.9%     Review of Systems  Objective:     Weight: 44.6 kg (98 lb 5.2 oz)  Body mass index is  15.87 kg/m².  Vital Signs (Most Recent):  Temp: 98.2 °F (36.8 °C) (08/09/22 0505)  Pulse: (!) 57 (08/09/22 0505)  Resp: 18 (08/09/22 0505)  BP: (!) 161/92 (08/09/22 0505)  SpO2: 97 % (08/09/22 0505) Vital Signs (24h Range):  Temp:  [97.1 °F (36.2 °C)-98.4 °F (36.9 °C)] 98.2 °F (36.8 °C)  Pulse:  [51-68] 57  Resp:  [16-30] 18  SpO2:  [97 %-100 %] 97 %  BP: (140-191)/() 161/92                          Closed/Suction Drain 08/03/22 1144 Right Back Accordion 10 Fr. (Active)   Site Description Unable to view 08/08/22 0305   Dressing Type Transparent (Tegaderm) 08/08/22 1505   Dressing Status Clean;Dry;Intact 08/08/22 1505   Dressing Intervention Integrity maintained 08/08/22 1505   Drainage Sanguineous 08/08/22 1505   Status Other (Comment) 08/08/22 1505   Output (mL) 0 mL 08/08/22 1705            Closed/Suction Drain 08/03/22 1145 Left Back Accordion 10 Fr. (Active)   Site Description Unable to view 08/08/22 0305   Dressing Type Transparent (Tegaderm) 08/08/22 1505   Dressing Status Clean;Dry;Intact 08/08/22 1505   Dressing Intervention Integrity maintained 08/08/22 1505   Drainage Serosanguineous 08/08/22 1505   Status Other (Comment) 08/08/22 1505   Output (mL) 60 mL 08/08/22 1705       Neurosurgery Physical Exam  Vitals and labs reviewed  MAEW  Incision CDI  Sutures intact  No drainage from incision   Dressing changed this afternoon after LD removed.  HV x2    Significant Labs:  Recent Labs   Lab 08/08/22  0415   *      K 3.8      CO2 28   BUN 11   CREATININE 0.8   CALCIUM 8.4*     Recent Labs   Lab 08/08/22  0415   WBC 7.36   HGB 9.3*   HCT 30.8*        No results for input(s): LABPT, INR, APTT in the last 48 hours.  Microbiology Results (last 7 days)     Procedure Component Value Units Date/Time    Culture, Anaerobe [[id number]] Collected: 08/03/22 0937    Order Status: Completed Specimen: Wound from Back Updated: 08/08/22 1043     Anaerobic Culture Culture in progress    Narrative:       Deep    Culture, Anaerobe [[id number]] Collected: 08/03/22 0937    Order Status: Completed Specimen: Wound from Back Updated: 08/08/22 1042     Anaerobic Culture Culture in progress    Narrative:      Superficial    Aerobic culture [[id number]] Collected: 08/03/22 0937    Order Status: Completed Specimen: Wound from Back Updated: 08/06/22 0939     Aerobic Bacterial Culture No growth    Narrative:      Deep    Aerobic culture [[id number]] Collected: 08/03/22 0937    Order Status: Completed Specimen: Wound from Back Updated: 08/06/22 0939     Aerobic Bacterial Culture No growth    Narrative:      Superficial    AFB Culture & Smear [[id number]] Collected: 08/03/22 0937    Order Status: Completed Specimen: Wound from Back Updated: 08/04/22 2127     AFB Culture & Smear Culture in progress     AFB CULTURE STAIN No acid fast bacilli seen.    Narrative:      Superficial    AFB Culture & Smear [[id number]] Collected: 08/03/22 0937    Order Status: Completed Specimen: Wound from Back Updated: 08/04/22 2127     AFB Culture & Smear Culture in progress     AFB CULTURE STAIN No acid fast bacilli seen.    Narrative:      Deep    Gram stain [[id number]] Collected: 08/03/22 0937    Order Status: Completed Specimen: Wound from Back Updated: 08/03/22 2021     Gram Stain Result No WBC's      No organisms seen    Narrative:      Superficial    Gram stain [[id number]] Collected: 08/03/22 0937    Order Status: Completed Specimen: Wound from Back Updated: 08/03/22 1807     Gram Stain Result No WBC's      No organisms seen    Narrative:      Deep    Fungus culture [[id number]] Collected: 08/03/22 0937    Order Status: Sent Specimen: Wound from Back Updated: 08/03/22 1534    Fungus culture [[id number]] Collected: 08/03/22 0937    Order Status: Sent Specimen: Wound from Back Updated: 08/03/22 1534    Aerobic culture [[id number]]  (Abnormal)  (Susceptibility) Collected: 07/30/22 0055    Order Status: Completed Specimen: Incision site from Back  Updated: 08/03/22 1035     Aerobic Bacterial Culture PSEUDOMONAS AERUGINOSA  Moderate          All pertinent labs from the last 24 hours have been reviewed.  Significant Diagnostics:  I have reviewed all pertinent imaging results/findings within the past 24 hours.    Assessment/Plan:     Active Diagnoses:    Diagnosis Date Noted POA    PRINCIPAL PROBLEM:  Wound dehiscence with CSF leak s/p Washout and closure with Lumbar drain [T81.30XA] 07/29/2022 Yes     Chronic    Discitis of lumbar region [M46.46] 08/01/2022 Yes    Postoperative CSF leak [G97.82, G96.00] 07/30/2022 Yes     Chronic    Hypertension [I10] 07/29/2022 Yes    HLD (hyperlipidemia) [E78.5] 07/29/2022 Yes    COVID [U07.1] 07/29/2022 Yes     Chronic    Degenerative disc disease, lumbar [M51.36] 06/22/2022 Yes     Chronic    Diabetes mellitus without complication [E11.9]  Yes     Chronic    Morbid obesity with BMI of 40.0-44.9, adult [E66.01, Z68.41]  Not Applicable      Problems Resolved During this Admission:     POD #5    LD removed.  Okay to transfer out of ICU to med/tele bed.  PT/OT daily. Increase mobility and LE exercises.  Continue Lovenox.  Keep HV drains in place.  Abx per ID.    Will continue to monitor. Please call with any changes.    Oliver Clifton PA-C  Neurosurgery  [facility] - Telemetry ([facility])

## 2022-08-09 NOTE — ASSESSMENT & PLAN NOTE
Patient's FSGs are controlled on current medication regimen.  Last A1c reviewed-   Lab Results   Component Value Date    HGBA1C 8.3 (H) 05/18/2022     Most recent fingerstick glucose reviewed-   Recent Labs   Lab 08/08/22  1618 08/08/22  2155 08/09/22  0613 08/09/22  1135   POCTGLUCOSE 241* 183* 132* 165*     Current correctional scale  Low  Maintain anti-hyperglycemic dose as follows-   Antihyperglycemics (From admission, onward)            Start     Stop Route Frequency Ordered    08/08/22 1502  insulin aspart U-100 pen 1-10 Units         -- SubQ Before meals & nightly PRN 08/08/22 1402    08/05/22 1200  insulin detemir U-100 pen 10 Units         -- SubQ 2 times daily 08/05/22 1147        Hold Oral hypoglycemics while patient is in the hospital.    Continue present care, BS generally well controlled    Pt now on Dexa IV- BS may rise- needs tighter BS control    BS high sec to SSI-  Weaning steroids now, cont SSI    Increase Levemir to BID dosing

## 2022-08-09 NOTE — SUBJECTIVE & OBJECTIVE
Interval History:   66 year old man with complicated post op course.  06/22/22-  LAMINECTOMY, SPINE, LUMBAR, WITH DISCECTOMY (Left) L2-3      06/26/22  Hemilaminectomy, partial medial facetectomy and foraminotomy L2-3 on left   with discectomy using microscopic dissection   06/28-22-  Fluid collection ventral and lateral consistent with epidural epidural hematoma  compression   Retained disc fragment   Cultures=06/29- aerobic culture- MSSE  CUTIBACTERIUM ACNES   06/28-BACTEROIDES FRAGILIS   06/28- staph epi -blood      MRI of the lumbar region -   Patient status post hemilaminectomy and discectomy at L2-3 on the left.  Associated with the surgical area of there is fluid within the disc space, bone marrow edema, and abnormal postcontrast enhancement of the in plates about the disc space and disc which extends along the left aspect of the spinal canal and into the posterior soft tissues concerning for discitis osteomyelitis with epidural abscess extending into the posterior soft tissues.  The fluid collection in the posterior soft tissues is noted both within the laminectomy bed measuring a proximally 3.1 x 2.7 x 2.3 cm in this area, and with extension inferiorly along the L3 lamina and L1 inferior lamina, and also into the posterior soft tissues.  The fluid collection is significantly serpiginous and difficult to accurately measure.  The component in the posterior soft tissues measures on the order of 9.2 x 3.8 by 2.5 cm and also demonstrates some postcontrast enhancement concerning for infection.  Abscess is the diagnosis of  exclusion.  The fluid collection was present on the prior exam of 07/01/2022, but the surrounding peripheral enhancement has significantly increased in comparison to the prior exam, and the new disc space abnormalities raise concern for superimposed infection.  Findings strongly concerning for discitis osteomyelitis at L2-3 as detailed above.     Patient reports less pain.  08/01/22- he is  afebrile   Less back pain noted .  08/04/22- he had interval I and D done and is now in the ICU.  All cultures reviewed.  Wound culture -07/30 Pseudomonas   Blood cultures- 06/29- Staph epidermidis  06/28- Back -bacteroides   Cutibacterium acnes     08/08/22- he is afebrile.  Lumbar drain is scheduled to be removed       Review of Systems   Constitutional:  Negative for activity change, appetite change, chills, diaphoresis and fatigue.   HENT:  Negative for congestion, dental problem, ear discharge, ear pain and facial swelling.    Eyes:  Negative for pain, discharge and itching.   Respiratory:  Negative for apnea, cough, chest tightness and shortness of breath.    Cardiovascular:  Negative for chest pain and leg swelling.   Gastrointestinal:  Negative for abdominal distention and abdominal pain.   Endocrine: Negative for cold intolerance, heat intolerance and polydipsia.   Genitourinary:  Negative for difficulty urinating, dysuria and enuresis.   Musculoskeletal:  Positive for back pain. Negative for arthralgias.        Now has lumbar drian    Skin:  Negative for color change and pallor.   Allergic/Immunologic: Negative for environmental allergies and food allergies.   Neurological:  Negative for dizziness, facial asymmetry, light-headedness and headaches.   Hematological:  Negative for adenopathy. Does not bruise/bleed easily.   Psychiatric/Behavioral:  Negative for agitation and behavioral problems.    Objective:     Vital Signs (Most Recent):  Temp: 98.2 °F (36.8 °C) (08/09/22 0505)  Pulse: (!) 57 (08/09/22 0505)  Resp: 18 (08/09/22 0505)  BP: (!) 161/92 (08/09/22 0505)  SpO2: 97 % (08/09/22 0505)   Vital Signs (24h Range):  Temp:  [97.1 °F (36.2 °C)-98.4 °F (36.9 °C)] 98.2 °F (36.8 °C)  Pulse:  [51-68] 57  Resp:  [16-30] 18  SpO2:  [97 %-100 %] 97 %  BP: (140-191)/() 161/92     Weight: 44.6 kg (98 lb 5.2 oz)  Body mass index is 15.87 kg/m².    Estimated Creatinine Clearance: 57.3 mL/min (based on SCr of  0.8 mg/dL).    Physical Exam  Vitals and nursing note reviewed.   Constitutional:       General: He is not in acute distress.     Appearance: He is well-developed. He is not diaphoretic.   HENT:      Head: Normocephalic and atraumatic.      Nose: Nose normal.   Eyes:      General:         Right eye: No discharge.         Left eye: No discharge.      Conjunctiva/sclera: Conjunctivae normal.      Pupils: Pupils are equal, round, and reactive to light.   Neck:      Thyroid: No thyromegaly.      Vascular: No JVD.      Trachea: No tracheal deviation.   Cardiovascular:      Rate and Rhythm: Normal rate and regular rhythm.      Heart sounds: Normal heart sounds. No murmur heard.    No friction rub. No gallop.   Pulmonary:      Effort: Pulmonary effort is normal. No respiratory distress.      Breath sounds: Normal breath sounds. No wheezing or rales.   Chest:      Chest wall: No tenderness.   Abdominal:      General: Bowel sounds are normal. There is no distension.      Palpations: Abdomen is soft. There is no mass.      Tenderness: There is no abdominal tenderness.   Genitourinary:     Comments: deferred  Musculoskeletal:         General: No tenderness or deformity. Normal range of motion.      Cervical back: Normal range of motion and neck supple.      Comments: Lumbar drains noted    Skin:     General: Skin is warm and dry.      Capillary Refill: Capillary refill takes less than 2 seconds.      Findings: No erythema or rash.   Neurological:      Mental Status: He is alert and oriented to person, place, and time.      Motor: No abnormal muscle tone.      Coordination: Coordination normal.   Psychiatric:         Behavior: Behavior normal.       Significant Labs: Blood Culture:   Recent Labs   Lab 06/29/22  1439 06/29/22  1657 07/03/22  1015   LABBLOO Gram stain aer bottle: Gram positive cocci in clusters resembling Staph  Results called to and read back by: Alisha Eller RN. 07/02/2022  00:17  STAPHYLOCOCCUS EPIDERMIDIS*  Gram stain nico bottle: Gram positive cocci in clusters resembling Staph   Results called to and read back by: Soheila You 07/01/2022  05:47  STAPHYLOCOCCUS EPIDERMIDIS* No growth after 5 days.  No growth after 5 days.       BMP:   Recent Labs   Lab 08/08/22  0415   *      K 3.8      CO2 28   BUN 11   CREATININE 0.8   CALCIUM 8.4*       CBC:   Recent Labs   Lab 08/08/22 0415   WBC 7.36   HGB 9.3*   HCT 30.8*          CMP:   Recent Labs   Lab 08/08/22  0415      K 3.8      CO2 28   *   BUN 11   CREATININE 0.8   CALCIUM 8.4*   PROT 5.2*   ALBUMIN 2.6*   BILITOT 0.3   ALKPHOS 53*   AST 20   ALT 50*   ANIONGAP 5*       Wound Culture:   Recent Labs   Lab 06/28/22  1950 07/30/22  0055 08/03/22  0937   LABAERO STAPHYLOCOCCUS EPIDERMIDIS  Moderate  *  No growth PSEUDOMONAS AERUGINOSA  Moderate  * No growth  No growth       All pertinent labs within the past 24 hours have been reviewed.    Significant Imaging: I have reviewed all pertinent imaging results/findings within the past 24 hours.

## 2022-08-10 LAB
ALBUMIN SERPL BCP-MCNC: 2.8 G/DL (ref 3.5–5.2)
ALP SERPL-CCNC: 56 U/L (ref 55–135)
ALT SERPL W/O P-5'-P-CCNC: 31 U/L (ref 10–44)
ANION GAP SERPL CALC-SCNC: 7 MMOL/L (ref 8–16)
AORTIC ROOT ANNULUS: 3.62 CM
ASCENDING AORTA: 3.56 CM
AST SERPL-CCNC: 11 U/L (ref 10–40)
AV INDEX (PROSTH): 0.95
AV MEAN GRADIENT: 9 MMHG
AV PEAK GRADIENT: 15 MMHG
AV VALVE AREA: 2.99 CM2
AV VELOCITY RATIO: 0.82
BACTERIA SPEC ANAEROBE CULT: NORMAL
BASOPHILS # BLD AUTO: 0.03 K/UL (ref 0–0.2)
BASOPHILS NFR BLD: 0.5 % (ref 0–1.9)
BILIRUB SERPL-MCNC: 0.3 MG/DL (ref 0.1–1)
BSA FOR ECHO PROCEDURE: 1.44 M2
BUN SERPL-MCNC: 7 MG/DL (ref 8–23)
CALCIUM SERPL-MCNC: 8.7 MG/DL (ref 8.7–10.5)
CHLORIDE SERPL-SCNC: 107 MMOL/L (ref 95–110)
CO2 SERPL-SCNC: 30 MMOL/L (ref 23–29)
CREAT SERPL-MCNC: 0.8 MG/DL (ref 0.5–1.4)
CV ECHO LV RWT: 0.46 CM
DIFFERENTIAL METHOD: ABNORMAL
DOP CALC AO PEAK VEL: 1.96 M/S
DOP CALC AO VTI: 38.8 CM
DOP CALC LVOT AREA: 3.1 CM2
DOP CALC LVOT DIAMETER: 2 CM
DOP CALC LVOT PEAK VEL: 1.61 M/S
DOP CALC LVOT STROKE VOLUME: 115.87 CM3
DOP CALC RVOT PEAK VEL: 1.22 M/S
DOP CALC RVOT VTI: 30.5 CM
DOP CALCLVOT PEAK VEL VTI: 36.9 CM
E WAVE DECELERATION TIME: 266.91 MSEC
E/A RATIO: 1.06
E/E' RATIO: 11.33 M/S
ECHO LV POSTERIOR WALL: 1.07 CM (ref 0.6–1.1)
EJECTION FRACTION: 60 %
EOSINOPHIL # BLD AUTO: 0.3 K/UL (ref 0–0.5)
EOSINOPHIL NFR BLD: 4.8 % (ref 0–8)
ERYTHROCYTE [DISTWIDTH] IN BLOOD BY AUTOMATED COUNT: 15.9 % (ref 11.5–14.5)
EST. GFR  (NO RACE VARIABLE): >60 ML/MIN/1.73 M^2
FRACTIONAL SHORTENING: 37 % (ref 28–44)
GLUCOSE SERPL-MCNC: 134 MG/DL (ref 70–110)
HCT VFR BLD AUTO: 31.4 % (ref 40–54)
HGB BLD-MCNC: 9.3 G/DL (ref 14–18)
IMM GRANULOCYTES # BLD AUTO: 0.04 K/UL (ref 0–0.04)
IMM GRANULOCYTES NFR BLD AUTO: 0.7 % (ref 0–0.5)
INTERVENTRICULAR SEPTUM: 1.14 CM (ref 0.6–1.1)
IVC DIAMETER: 1.39 CM
IVRT: 60.89 MSEC
LA MAJOR: 5.5 CM
LA MINOR: 5.45 CM
LA WIDTH: 4.2 CM
LEFT ATRIUM SIZE: 3.76 CM
LEFT ATRIUM VOLUME INDEX: 49.7 ML/M2
LEFT ATRIUM VOLUME: 73.49 CM3
LEFT INTERNAL DIMENSION IN SYSTOLE: 2.94 CM (ref 2.1–4)
LEFT VENTRICLE DIASTOLIC VOLUME INDEX: 68.45 ML/M2
LEFT VENTRICLE DIASTOLIC VOLUME: 101.3 ML
LEFT VENTRICLE MASS INDEX: 127 G/M2
LEFT VENTRICLE SYSTOLIC VOLUME INDEX: 22.6 ML/M2
LEFT VENTRICLE SYSTOLIC VOLUME: 33.39 ML
LEFT VENTRICULAR INTERNAL DIMENSION IN DIASTOLE: 4.68 CM (ref 3.5–6)
LEFT VENTRICULAR MASS: 187.45 G
LV LATERAL E/E' RATIO: 9.92 M/S
LV SEPTAL E/E' RATIO: 13.22 M/S
LVOT MG: 5.96 MMHG
LVOT MV: 1.18 CM/S
LYMPHOCYTES # BLD AUTO: 2.8 K/UL (ref 1–4.8)
LYMPHOCYTES NFR BLD: 50.3 % (ref 18–48)
MCH RBC QN AUTO: 27.1 PG (ref 27–31)
MCHC RBC AUTO-ENTMCNC: 29.6 G/DL (ref 32–36)
MCV RBC AUTO: 92 FL (ref 82–98)
MONOCYTES # BLD AUTO: 0.4 K/UL (ref 0.3–1)
MONOCYTES NFR BLD: 6.6 % (ref 4–15)
MV PEAK A VEL: 1.12 M/S
MV PEAK E VEL: 1.19 M/S
NEUTROPHILS # BLD AUTO: 2.1 K/UL (ref 1.8–7.7)
NEUTROPHILS NFR BLD: 37.1 % (ref 38–73)
NRBC BLD-RTO: 0 /100 WBC
PISA MRMAX VEL: 3.41 M/S
PISA TR MAX VEL: 2.36 M/S
PLATELET # BLD AUTO: 191 K/UL (ref 150–450)
PMV BLD AUTO: 11.9 FL (ref 9.2–12.9)
POCT GLUCOSE: 121 MG/DL (ref 70–110)
POCT GLUCOSE: 126 MG/DL (ref 70–110)
POCT GLUCOSE: 137 MG/DL (ref 70–110)
POCT GLUCOSE: 151 MG/DL (ref 70–110)
POTASSIUM SERPL-SCNC: 3.8 MMOL/L (ref 3.5–5.1)
PROT SERPL-MCNC: 5.2 G/DL (ref 6–8.4)
PV MEAN GRADIENT: 4.23 MMHG
RA MAJOR: 4.66 CM
RA PRESSURE: 3 MMHG
RBC # BLD AUTO: 3.43 M/UL (ref 4.6–6.2)
SODIUM SERPL-SCNC: 144 MMOL/L (ref 136–145)
STJ: 3.65 CM
TDI LATERAL: 0.12 M/S
TDI SEPTAL: 0.09 M/S
TDI: 0.11 M/S
TR MAX PG: 22 MMHG
TR MEAN GRADIENT: 19 MMHG
TV REST PULMONARY ARTERY PRESSURE: 25 MMHG
WBC # BLD AUTO: 5.63 K/UL (ref 3.9–12.7)

## 2022-08-10 PROCEDURE — 63600175 PHARM REV CODE 636 W HCPCS: Performed by: NURSE PRACTITIONER

## 2022-08-10 PROCEDURE — 25000003 PHARM REV CODE 250: Performed by: NURSE PRACTITIONER

## 2022-08-10 PROCEDURE — 99024 POSTOP FOLLOW-UP VISIT: CPT | Mod: 95,,, | Performed by: PHYSICIAN ASSISTANT

## 2022-08-10 PROCEDURE — 85379 FIBRIN DEGRADATION QUANT: CPT | Performed by: NURSE PRACTITIONER

## 2022-08-10 PROCEDURE — 94640 AIRWAY INHALATION TREATMENT: CPT

## 2022-08-10 PROCEDURE — 85025 COMPLETE CBC W/AUTO DIFF WBC: CPT | Performed by: NURSE PRACTITIONER

## 2022-08-10 PROCEDURE — 94761 N-INVAS EAR/PLS OXIMETRY MLT: CPT

## 2022-08-10 PROCEDURE — 80053 COMPREHEN METABOLIC PANEL: CPT | Performed by: NURSE PRACTITIONER

## 2022-08-10 PROCEDURE — 83615 LACTATE (LD) (LDH) ENZYME: CPT | Performed by: NURSE PRACTITIONER

## 2022-08-10 PROCEDURE — 99024 PR POST-OP FOLLOW-UP VISIT: ICD-10-PCS | Mod: 95,,, | Performed by: PHYSICIAN ASSISTANT

## 2022-08-10 PROCEDURE — 94799 UNLISTED PULMONARY SVC/PX: CPT

## 2022-08-10 PROCEDURE — 21400001 HC TELEMETRY ROOM

## 2022-08-10 PROCEDURE — 82728 ASSAY OF FERRITIN: CPT | Performed by: NURSE PRACTITIONER

## 2022-08-10 RX ADMIN — PIPERACILLIN SODIUM AND TAZOBACTAM SODIUM 4.5 G: 4; .5 INJECTION, POWDER, LYOPHILIZED, FOR SOLUTION INTRAVENOUS at 05:08

## 2022-08-10 RX ADMIN — ASPIRIN 81 MG: 81 TABLET, COATED ORAL at 08:08

## 2022-08-10 RX ADMIN — POLYETHYLENE GLYCOL 3350 17 G: 17 POWDER, FOR SOLUTION ORAL at 08:08

## 2022-08-10 RX ADMIN — ALBUTEROL SULFATE 2 PUFF: 90 AEROSOL, METERED RESPIRATORY (INHALATION) at 07:08

## 2022-08-10 RX ADMIN — DOXYCYCLINE HYCLATE 100 MG: 100 TABLET, COATED ORAL at 09:08

## 2022-08-10 RX ADMIN — Medication 6 MG: at 09:08

## 2022-08-10 RX ADMIN — ATORVASTATIN CALCIUM 10 MG: 10 TABLET, FILM COATED ORAL at 09:08

## 2022-08-10 RX ADMIN — FAMOTIDINE 20 MG: 20 TABLET ORAL at 08:08

## 2022-08-10 RX ADMIN — ALBUTEROL SULFATE 2 PUFF: 90 AEROSOL, METERED RESPIRATORY (INHALATION) at 08:08

## 2022-08-10 RX ADMIN — INSULIN DETEMIR 10 UNITS: 100 INJECTION, SOLUTION SUBCUTANEOUS at 09:08

## 2022-08-10 RX ADMIN — ALBUTEROL SULFATE 2 PUFF: 90 AEROSOL, METERED RESPIRATORY (INHALATION) at 12:08

## 2022-08-10 RX ADMIN — OXYCODONE HYDROCHLORIDE AND ACETAMINOPHEN 500 MG: 500 TABLET ORAL at 09:08

## 2022-08-10 RX ADMIN — DOCUSATE SODIUM 100 MG: 100 CAPSULE, LIQUID FILLED ORAL at 09:08

## 2022-08-10 RX ADMIN — DOXYCYCLINE HYCLATE 100 MG: 100 TABLET, COATED ORAL at 08:08

## 2022-08-10 RX ADMIN — DOCUSATE SODIUM 100 MG: 100 CAPSULE, LIQUID FILLED ORAL at 08:08

## 2022-08-10 RX ADMIN — Medication 1000 UNITS: at 08:08

## 2022-08-10 RX ADMIN — FAMOTIDINE 20 MG: 20 TABLET ORAL at 09:08

## 2022-08-10 RX ADMIN — ENOXAPARIN SODIUM 40 MG: 100 INJECTION SUBCUTANEOUS at 04:08

## 2022-08-10 RX ADMIN — GABAPENTIN 300 MG: 300 CAPSULE ORAL at 08:08

## 2022-08-10 RX ADMIN — LOSARTAN POTASSIUM 50 MG: 50 TABLET, FILM COATED ORAL at 08:08

## 2022-08-10 RX ADMIN — GABAPENTIN 300 MG: 300 CAPSULE ORAL at 09:08

## 2022-08-10 RX ADMIN — ALBUTEROL SULFATE 2 PUFF: 90 AEROSOL, METERED RESPIRATORY (INHALATION) at 01:08

## 2022-08-10 RX ADMIN — OXYCODONE HYDROCHLORIDE AND ACETAMINOPHEN 500 MG: 500 TABLET ORAL at 08:08

## 2022-08-10 RX ADMIN — THERA TABS 1 TABLET: TAB at 08:08

## 2022-08-10 RX ADMIN — INSULIN DETEMIR 10 UNITS: 100 INJECTION, SOLUTION SUBCUTANEOUS at 08:08

## 2022-08-10 RX ADMIN — GABAPENTIN 300 MG: 300 CAPSULE ORAL at 02:08

## 2022-08-10 RX ADMIN — PIPERACILLIN SODIUM AND TAZOBACTAM SODIUM 4.5 G: 4; .5 INJECTION, POWDER, LYOPHILIZED, FOR SOLUTION INTRAVENOUS at 12:08

## 2022-08-10 NOTE — PROGRESS NOTES
[facility] - Tele[facility] ([facility])  Neurosurgery  Progress Note    Subjective:     Interval History:   Patient was seen earlier this morning.  He informed me that he noticed swelling to L arm/hand and lower ext swelling (mega). Ultrasound ordered of L arm. It was negative for thrombus.    Patient denies any other new symptoms.   No HA, dizziness, shortness of breath or chest pain.  L hip continues to improve.  Only has incisional back pain.    No output recorded from HV.    History of Present Illness:  See H&P.    Post-Op Info:  Procedure(s) (LRB):  EXPLORATION, WOUND (Bilateral)  REPAIR, CSF LEAK, USING COMPUTER-ASSISTED NAVIGATION (Bilateral)  PLACEMENT (N/A)   7 Days Post-Op      Medications:  Continuous Infusions:  Scheduled Meds:   albuterol  2 puff Inhalation Q6H    ascorbic acid (vitamin C)  500 mg Oral BID    aspirin  81 mg Oral Daily    atorvastatin  10 mg Oral QHS    docusate sodium  100 mg Oral BID    doxycycline  100 mg Oral Q12H    enoxaparin  40 mg Subcutaneous Daily    famotidine  20 mg Oral BID    gabapentin  300 mg Oral TID    insulin detemir U-100  10 Units Subcutaneous BID    losartan  50 mg Oral Daily    melatonin  6 mg Oral Nightly    multivitamin  1 tablet Oral Daily    piperacillin-tazobactam (ZOSYN) IVPB  4.5 g Intravenous Q8H    polyethylene glycol  17 g Oral Daily    vitamin D  1,000 Units Oral Daily     PRN Meds:acetaminophen, acetaminophen, aluminum-magnesium hydroxide-simethicone, dextromethorphan-guaiFENesin  mg/5 ml, dextrose 10%, dextrose 10%, glucagon (human recombinant), glucose, glucose, HYDROmorphone, insulin aspart U-100, loperamide, melatonin, methocarbamoL, naloxone, ondansetron, oxyCODONE-acetaminophen, oxyCODONE-acetaminophen, oxyCODONE-acetaminophen, prochlorperazine, promethazine, senna-docusate 8.6-50 mg, simethicone, sodium chloride 0.9%, sodium chloride 0.9%, sodium chloride 0.9%     Review of Systems  Objective:     Weight: 44.5 kg (98 lb)  Body mass  index is 15.82 kg/m².  Vital Signs (Most Recent):  Temp: 98.1 °F (36.7 °C) (08/10/22 1140)  Pulse: 75 (08/10/22 1225)  Resp: 18 (08/10/22 1225)  BP: (!) 166/88 (08/10/22 1140)  SpO2: 99 % (08/10/22 1225) Vital Signs (24h Range):  Temp:  [97.7 °F (36.5 °C)-98.5 °F (36.9 °C)] 98.1 °F (36.7 °C)  Pulse:  [52-75] 75  Resp:  [16-19] 18  SpO2:  [96 %-100 %] 99 %  BP: (124-166)/(72-96) 166/88                          Closed/Suction Drain 08/03/22 1144 Right Back Accordion 10 Fr. (Active)   Site Description Unable to view 08/10/22 0900   Dressing Type Transparent (Tegaderm) 08/08/22 1505   Dressing Status Clean;Dry;Intact 08/10/22 0900   Dressing Intervention Integrity maintained 08/10/22 0900   Drainage Sanguineous 08/10/22 0900   Status Other (Comment) 08/08/22 1505   Output (mL) 0 mL 08/08/22 1705            Closed/Suction Drain 08/03/22 1145 Left Back Accordion 10 Fr. (Active)   Site Description Unable to view 08/10/22 0900   Dressing Type Transparent (Tegaderm) 08/08/22 1505   Dressing Status Clean;Dry;Intact 08/10/22 0900   Dressing Intervention Integrity maintained 08/10/22 0900   Drainage Serosanguineous 08/10/22 0900   Status Other (Comment) 08/08/22 1505   Output (mL) 60 mL 08/08/22 1705       Neurosurgery Physical Exam    MAEW  Incision CDI  Sutures intact  L HV drain removed. Site reinforced and dressing changed.  R HV to remain in place.    Significant Labs:  Recent Labs   Lab 08/09/22  0513 08/10/22  0421   * 134*    144   K 3.9 3.8    107   CO2 29 30*   BUN 8 7*   CREATININE 0.8 0.8   CALCIUM 8.5* 8.7     Recent Labs   Lab 08/09/22  0513 08/10/22  0421   WBC 6.31 5.63   HGB 9.8* 9.3*   HCT 32.4* 31.4*    191     No results for input(s): LABPT, INR, APTT in the last 48 hours.  Microbiology Results (last 7 days)     Procedure Component Value Units Date/Time    Culture, Anaerobe [[id number]] Collected: 08/03/22 0937    Order Status: Completed Specimen: Wound from Back Updated: 08/10/22  0737     Anaerobic Culture No anaerobes isolated    Narrative:      Superficial    Fungus culture [[id number]] Collected: 08/03/22 0937    Order Status: Completed Specimen: Wound from Back Updated: 08/09/22 1307     Fungus (Mycology) Culture Culture in progress    Narrative:      Superficial    Fungus culture [[id number]] Collected: 08/03/22 0937    Order Status: Completed Specimen: Wound from Back Updated: 08/09/22 1307     Fungus (Mycology) Culture Culture in progress    Narrative:      Deep    Culture, Anaerobe [[id number]] Collected: 08/03/22 0937    Order Status: Completed Specimen: Wound from Back Updated: 08/09/22 0742     Anaerobic Culture Culture in progress    Narrative:      Deep    Aerobic culture [[id number]] Collected: 08/03/22 0937    Order Status: Completed Specimen: Wound from Back Updated: 08/06/22 0939     Aerobic Bacterial Culture No growth    Narrative:      Deep    Aerobic culture [[id number]] Collected: 08/03/22 0937    Order Status: Completed Specimen: Wound from Back Updated: 08/06/22 0939     Aerobic Bacterial Culture No growth    Narrative:      Superficial    AFB Culture & Smear [[id number]] Collected: 08/03/22 0937    Order Status: Completed Specimen: Wound from Back Updated: 08/04/22 2127     AFB Culture & Smear Culture in progress     AFB CULTURE STAIN No acid fast bacilli seen.    Narrative:      Superficial    AFB Culture & Smear [[id number]] Collected: 08/03/22 0937    Order Status: Completed Specimen: Wound from Back Updated: 08/04/22 2127     AFB Culture & Smear Culture in progress     AFB CULTURE STAIN No acid fast bacilli seen.    Narrative:      Deep    Gram stain [[id number]] Collected: 08/03/22 0937    Order Status: Completed Specimen: Wound from Back Updated: 08/03/22 2021     Gram Stain Result No WBC's      No organisms seen    Narrative:      Superficial    Gram stain [[id number]] Collected: 08/03/22 0937    Order Status: Completed Specimen: Wound from Back Updated: 08/03/22 1807      Gram Stain Result No WBC's      No organisms seen    Narrative:      Deep        All pertinent labs from the last 24 hours have been reviewed.  Significant Diagnostics:  LUE U/S:  1.  No thrombus in central veins of the left upper extremity.  Negative for thrombus around the indwelling PICC line.      I have reviewed all pertinent imaging results/findings within the past 24 hours.    Assessment/Plan:     Active Diagnoses:    Diagnosis Date Noted POA    PRINCIPAL PROBLEM:  Wound dehiscence with CSF leak s/p Washout and closure with Lumbar drain [T81.30XA] 07/29/2022 Yes     Chronic    Postoperative CSF leak [G97.82, G96.00] 07/30/2022 Yes     Chronic    Hypertension [I10] 07/29/2022 Yes    HLD (hyperlipidemia) [E78.5] 07/29/2022 Yes    Degenerative disc disease, lumbar [M51.36] 06/22/2022 Yes     Chronic    Diabetes mellitus without complication [E11.9]  Yes     Chronic    Morbid obesity with BMI of 40.0-44.9, adult [E66.01, Z68.41]  Not Applicable      Problems Resolved During this Admission:    Diagnosis Date Noted Date Resolved POA    Discitis of lumbar region [M46.46] 08/01/2022 08/09/2022 Yes    COVID [U07.1] 07/29/2022 08/09/2022 Yes     Chronic     POD #7    Will monitor one more night and plan to discharge to home tomorrow.   He will d/c with one HV drain in place.   ID recs- continue zosyn/doxycycline , follow ESR/CRP, follow-up in outpatient clinic.  Continue to monitor. Please call with any changes.  Patient agrees with above plan.      Oliver Clifton PA-C  Neurosurgery  [facility] - Tele[facility] ([facility])

## 2022-08-10 NOTE — PROGRESS NOTES
[facility] - [facility], [facility] ([facility] Medicine  Progress Note    Patient Name: Friday ROGELIO Blake  MRN: [id number]  Patient Class: IP- Inpatient   Admission Date: 7/29/2022  Length of Stay: 10 days  Attending Physician: Sharifa Thomas MD  Primary Care Provider: Sid Elizabeth MD        Subjective:     Principal Problem:Wound dehiscence        HPI:  67 y/o male with PMHx of HTN, HLD, DM, and obesity who presented tot he ED for eval of post op incision leaking.  Sx are constant and moderate in severity. No mitigating or exacerbating factors reported. Pt had surgery on 7/26, then took a trip to [facility]. He flew back last night. Pt denies fever, chills, pain, HA, N/V, and all other sx at this time.  ED workup shows: H/H 11.6/36.9, COVID +  He will be kept on OBS for CSF leak under the care of hospital medicine  He is a full code and his SDM is his wife             Overview/Hospital Course:  Mr Blake is a 66 year old male who presented to [facility] Emergency Room of evaluation of wound dehiscence. Patient underwent Laminectomy of lumbar spine on 6/22/2022 and I & D of hematoma on 6/28/2022. Traveled to [facility] for daughter's wedding. Positive for drainage from low back surgical wound. Neurosurgery following, plan possible washout vs wound vac placement.  Infectious Disease following, continue empiric IV antibiotics. COVID positive. As of 7/31/2022, patient feeling well, vital signs and labs stable. MRI lumbar spine strongly concerning for discitis osteomyelitis at L2-3, probable epidural phlegmon or abscess. Neurosurgery following, will need revision with washout. Will follow.     8/1- cheerful, resting comfortably in bed on RA, no fever, chills or cough. Still has drainage from the Lumbar wound. Dr. Zepeda plans revision with wound washout/exploration with the help of Plastic Surgery, await confirmation date. Pt agreeable with the plan. Continue wound care, IV Abx, no wound vac yet.     8/2- comfortable on RA, wound Cx  growing Pseudomonas, earlier Cx grew Staph Epi- so now on Vanc, Cefepime and Flagyl. Dr. Zepeda plans Surgery- Washout with possible drain placement tomorrow, pt agreeable.     8/3- seen post op in the ICU, doing well, c/o back pain at the surgical site, just got oxycodone. Dr. Zepeda put him on Dexa 4 mg IV qid post op, he continues to received Cefepime, Flagyl and Vanc. Fluid sent to labs for C/S.     8/4- looks and feels better, lying flat on his side, back pain improved, no NV, dizziness or HA. Lumbar Drain output 11 cc. Dr. Zepeda d/mónica the foleys and is weaning the steroids. All Cx from Surgery remain NGDT. Getting Vanc, Cefepime and oral Flagyl. Last wound Cx grew Pseudomonas.   WBC 8.2, H/H 10.3/32. BS high due to Dexa.     8/5 - Patient with clinical improvement. Patient sitting up in bed. Plan to increase mobility today per NS. No CP/SOB. Patient tolerating diet.    8/6 - Patient improving steadily. Plan for OOB to chair today per NS. No cp / SOB. Patient is in good spirits. Discussed with CC Team.    8/7 - Patient with continued progress. Patient with more energy today. Sitting on bedside. No events. Decreased drainage today. Patient expressing his desire to transition to home. Await NS clearance. No CP / SOB    8/8 - Patient stable improving steadily. Much stronger today sitting in chair at bedside. Patient tolerating therapies / diet.    8/9 - Patient improved - Awaiting N/S recs for d/c . Possibly the next 24 hrs. No events.    08/10 -No neurologic complaints overnight. Left arm swelling without evidence of DVT on doppler      Interval History:     Review of Systems   Constitutional: Negative.    HENT: Negative.     Eyes: Negative.    Cardiovascular:  Positive for leg swelling.   Gastrointestinal: Negative.    Endocrine: Negative.    Musculoskeletal:  Positive for back pain and joint swelling.   Skin:  Positive for wound.   Objective:     Vital Signs (Most Recent):  Temp: 98.1 °F (36.7 °C) (08/10/22  1140)  Pulse: 60 (08/10/22 1611)  Resp: 18 (08/10/22 1611)  BP: (!) 157/84 (08/10/22 1611)  SpO2: 99 % (08/10/22 1611)   Vital Signs (24h Range):  Temp:  [97.7 °F (36.5 °C)-98.5 °F (36.9 °C)] 98.1 °F (36.7 °C)  Pulse:  [52-75] 60  Resp:  [16-19] 18  SpO2:  [96 %-100 %] 99 %  BP: (124-166)/(72-88) 157/84     Weight: 44.5 kg (98 lb)  Body mass index is 15.82 kg/m².    Intake/Output Summary (Last 24 hours) at 8/10/2022 1757  Last data filed at 8/10/2022 1200  Gross per 24 hour   Intake 420 ml   Output 1800 ml   Net -1380 ml      Physical Exam  Constitutional:       General: He is not in acute distress.     Appearance: Normal appearance.   HENT:      Head: Normocephalic and atraumatic.   Cardiovascular:      Rate and Rhythm: Normal rate and regular rhythm.   Pulmonary:      Effort: Pulmonary effort is normal.      Breath sounds: Normal breath sounds.   Abdominal:      General: Abdomen is flat. Bowel sounds are normal.      Palpations: Abdomen is soft.   Musculoskeletal:         General: Swelling present.      Right lower leg: Edema present.      Left lower leg: Edema present.   Neurological:      General: No focal deficit present.      Mental Status: He is oriented to person, place, and time. Mental status is at baseline.   Psychiatric:         Mood and Affect: Mood normal.         Behavior: Behavior normal.         Thought Content: Thought content normal.         Judgment: Judgment normal.       Significant Labs: All pertinent labs within the past 24 hours have been reviewed.    Significant Imaging: I have reviewed all pertinent imaging results/findings within the past 24 hours.      Assessment/Plan:      * Wound dehiscence with CSF leak s/p Washout and closure with Lumbar drain  --wound cx pending  --Neuro surgery consulted  --ID consulted      7/30/2022  Neurosurgery following    Possible washout vs wound vac placement   ID following, continue empiric IV antibiotic        7/31/2022  MRI of lumbar spine strongly  concerning for discitis osteomyelitis at L2-3, probable epidural phlegmon or abscess.   Neurosurgery following, will need revision with washout. Will follow.   Continue IV antibiotics  Wound cultures in progress      8/1- continue IV Abx, local wound care  Await revision and wound exploration with plastic surgery    8/2- surgery tomorrow, cont iV abx    8/3- s/p Wound revision and washout with Lumbar Drain placement  8/4- improving- continue same tx  8/5 Stable - Per NS  8/6 , 8/7, 8/8, 8/9- Stable, NS on board, Plan for drain removal per N/S    08/10  -drain removal    Postoperative CSF leak  S/p wound revision and closure with LD placement    Stable decreased fluid output    HLD (hyperlipidemia)  --continue atorvastatin  --cardiac diet      Hypertension  --continue losartan  --cardiac diet    BP under control      Degenerative disc disease, lumbar  --supportive care  --PT/OT consulted    S/p laminectomy- complicated by wound dehiscence- see above    S/p revision and washout POD#6      Morbid obesity with BMI of 40.0-44.9, adult  Body mass index is 15.82 kg/m². Morbid obesity complicates all aspects of disease management from diagnostic modalities to treatment. Weight loss encouraged and health benefits explained to patient.             Diabetes mellitus without complication  Patient's FSGs are controlled on current medication regimen.  Last A1c reviewed-   Lab Results   Component Value Date    HGBA1C 8.3 (H) 05/18/2022     Most recent fingerstick glucose reviewed-   Recent Labs   Lab 08/09/22  2120 08/10/22  0537 08/10/22  1138 08/10/22  1543   POCTGLUCOSE 221* 126* 151* 121*     Current correctional scale  Low  Maintain anti-hyperglycemic dose as follows-   Antihyperglycemics (From admission, onward)            Start     Stop Route Frequency Ordered    08/08/22 1502  insulin aspart U-100 pen 1-10 Units         -- SubQ Before meals & nightly PRN 08/08/22 1402    08/05/22 1200  insulin detemir U-100 pen 10 Units          -- SubQ 2 times daily 08/05/22 1147        Hold Oral hypoglycemics while patient is in the hospital.    Continue present care, BS generally well controlled    Pt now on Dexa IV- BS may rise- needs tighter BS control    BS high sec to SSI-  Weaning steroids now, cont SSI    Increase Levemir to BID dosing        VTE Risk Mitigation (From admission, onward)         Ordered     enoxaparin injection 40 mg  Daily         08/05/22 1312     IP VTE HIGH RISK PATIENT  Once         07/29/22 2333     Place sequential compression device  Until discontinued         07/29/22 2333                Discharge Planning   MATEUS:      Code Status: Full Code   Is the patient medically ready for discharge?:     Reason for patient still in hospital (select all that apply): Treatment  Discharge Plan A: Home Health                  Sharifa Thomas MD  Department of Hospital Medicine   [facility] - Telemetry ([facility])

## 2022-08-10 NOTE — ASSESSMENT & PLAN NOTE
Body mass index is 15.82 kg/m². Morbid obesity complicates all aspects of disease management from diagnostic modalities to treatment. Weight loss encouraged and health benefits explained to patient.

## 2022-08-10 NOTE — NURSING
MD made aware of new selling to L arm and bilateral lower extremity edema. Ultrasound to L arm ordered.

## 2022-08-10 NOTE — ASSESSMENT & PLAN NOTE
--wound cx pending  --Neuro surgery consulted  --ID consulted      7/30/2022  Neurosurgery following    Possible washout vs wound vac placement   ID following, continue empiric IV antibiotic        7/31/2022  MRI of lumbar spine strongly concerning for discitis osteomyelitis at L2-3, probable epidural phlegmon or abscess.   Neurosurgery following, will need revision with washout. Will follow.   Continue IV antibiotics  Wound cultures in progress      8/1- continue IV Abx, local wound care  Await revision and wound exploration with plastic surgery    8/2- surgery tomorrow, cont iV abx    8/3- s/p Wound revision and washout with Lumbar Drain placement  8/4- improving- continue same tx  8/5 Stable - Per NS  8/6 , 8/7, 8/8, 8/9- Stable, NS on board, Plan for drain removal per N/S    08/10  -drain removal

## 2022-08-10 NOTE — ASSESSMENT & PLAN NOTE
Patient's FSGs are controlled on current medication regimen.  Last A1c reviewed-   Lab Results   Component Value Date    HGBA1C 8.3 (H) 05/18/2022     Most recent fingerstick glucose reviewed-   Recent Labs   Lab 08/09/22  2120 08/10/22  0537 08/10/22  1138 08/10/22  1543   POCTGLUCOSE 221* 126* 151* 121*     Current correctional scale  Low  Maintain anti-hyperglycemic dose as follows-   Antihyperglycemics (From admission, onward)            Start     Stop Route Frequency Ordered    08/08/22 1502  insulin aspart U-100 pen 1-10 Units         -- SubQ Before meals & nightly PRN 08/08/22 1402    08/05/22 1200  insulin detemir U-100 pen 10 Units         -- SubQ 2 times daily 08/05/22 1147        Hold Oral hypoglycemics while patient is in the hospital.    Continue present care, BS generally well controlled    Pt now on Dexa IV- BS may rise- needs tighter BS control    BS high sec to SSI-  Weaning steroids now, cont SSI    Increase Levemir to BID dosing

## 2022-08-10 NOTE — SUBJECTIVE & OBJECTIVE
Interval History:     Review of Systems   Constitutional: Negative.    HENT: Negative.     Eyes: Negative.    Cardiovascular:  Positive for leg swelling.   Gastrointestinal: Negative.    Endocrine: Negative.    Musculoskeletal:  Positive for back pain and joint swelling.   Skin:  Positive for wound.   Objective:     Vital Signs (Most Recent):  Temp: 98.1 °F (36.7 °C) (08/10/22 1140)  Pulse: 60 (08/10/22 1611)  Resp: 18 (08/10/22 1611)  BP: (!) 157/84 (08/10/22 1611)  SpO2: 99 % (08/10/22 1611)   Vital Signs (24h Range):  Temp:  [97.7 °F (36.5 °C)-98.5 °F (36.9 °C)] 98.1 °F (36.7 °C)  Pulse:  [52-75] 60  Resp:  [16-19] 18  SpO2:  [96 %-100 %] 99 %  BP: (124-166)/(72-88) 157/84     Weight: 44.5 kg (98 lb)  Body mass index is 15.82 kg/m².    Intake/Output Summary (Last 24 hours) at 8/10/2022 1757  Last data filed at 8/10/2022 1200  Gross per 24 hour   Intake 420 ml   Output 1800 ml   Net -1380 ml      Physical Exam  Constitutional:       General: He is not in acute distress.     Appearance: Normal appearance.   HENT:      Head: Normocephalic and atraumatic.   Cardiovascular:      Rate and Rhythm: Normal rate and regular rhythm.   Pulmonary:      Effort: Pulmonary effort is normal.      Breath sounds: Normal breath sounds.   Abdominal:      General: Abdomen is flat. Bowel sounds are normal.      Palpations: Abdomen is soft.   Musculoskeletal:         General: Swelling present.      Right lower leg: Edema present.      Left lower leg: Edema present.   Neurological:      General: No focal deficit present.      Mental Status: He is oriented to person, place, and time. Mental status is at baseline.   Psychiatric:         Mood and Affect: Mood normal.         Behavior: Behavior normal.         Thought Content: Thought content normal.         Judgment: Judgment normal.       Significant Labs: All pertinent labs within the past 24 hours have been reviewed.    Significant Imaging: I have reviewed all pertinent imaging  results/findings within the past 24 hours.

## 2022-08-10 NOTE — PLAN OF CARE
Problem: Adult Inpatient Plan of Care  Goal: Plan of Care Review  Outcome: Ongoing, Progressing  Goal: Patient-Specific Goal (Individualized)  Outcome: Ongoing, Progressing  Goal: Absence of Hospital-Acquired Illness or Injury  Outcome: Ongoing, Progressing  Goal: Optimal Comfort and Wellbeing  Outcome: Ongoing, Progressing  Goal: Readiness for Transition of Care  Outcome: Ongoing, Progressing     Problem: Diabetes Comorbidity  Goal: Blood Glucose Level Within Targeted Range  Outcome: Ongoing, Progressing     Problem: Impaired Wound Healing  Goal: Optimal Wound Healing  Outcome: Ongoing, Progressing     Problem: Fall Injury Risk  Goal: Absence of Fall and Fall-Related Injury  Outcome: Ongoing, Progressing     Problem: Infection  Goal: Absence of Infection Signs and Symptoms  Outcome: Ongoing, Progressing     Problem: Skin Injury Risk Increased  Goal: Skin Health and Integrity  Outcome: Ongoing, Progressing

## 2022-08-10 NOTE — ASSESSMENT & PLAN NOTE
--supportive care  --PT/OT consulted    S/p laminectomy- complicated by wound dehiscence- see above    S/p revision and washout POD#6

## 2022-08-11 LAB
ALBUMIN SERPL BCP-MCNC: 3.1 G/DL (ref 3.5–5.2)
ALP SERPL-CCNC: 59 U/L (ref 55–135)
ALT SERPL W/O P-5'-P-CCNC: 30 U/L (ref 10–44)
ANION GAP SERPL CALC-SCNC: 8 MMOL/L (ref 8–16)
AST SERPL-CCNC: 12 U/L (ref 10–40)
BACTERIA SPEC ANAEROBE CULT: ABNORMAL
BASOPHILS # BLD AUTO: 0.03 K/UL (ref 0–0.2)
BASOPHILS NFR BLD: 0.5 % (ref 0–1.9)
BILIRUB SERPL-MCNC: 0.4 MG/DL (ref 0.1–1)
BUN SERPL-MCNC: 10 MG/DL (ref 8–23)
CALCIUM SERPL-MCNC: 9.4 MG/DL (ref 8.7–10.5)
CHLORIDE SERPL-SCNC: 105 MMOL/L (ref 95–110)
CO2 SERPL-SCNC: 29 MMOL/L (ref 23–29)
CREAT SERPL-MCNC: 0.8 MG/DL (ref 0.5–1.4)
D DIMER PPP IA.FEU-MCNC: 3.38 MG/L FEU
DIFFERENTIAL METHOD: ABNORMAL
EOSINOPHIL # BLD AUTO: 0.3 K/UL (ref 0–0.5)
EOSINOPHIL NFR BLD: 5 % (ref 0–8)
ERYTHROCYTE [DISTWIDTH] IN BLOOD BY AUTOMATED COUNT: 15.8 % (ref 11.5–14.5)
EST. GFR  (NO RACE VARIABLE): >60 ML/MIN/1.73 M^2
FERRITIN SERPL-MCNC: 85 NG/ML (ref 20–300)
GLUCOSE SERPL-MCNC: 117 MG/DL (ref 70–110)
HCT VFR BLD AUTO: 32 % (ref 40–54)
HGB BLD-MCNC: 10 G/DL (ref 14–18)
IMM GRANULOCYTES # BLD AUTO: 0.04 K/UL (ref 0–0.04)
IMM GRANULOCYTES NFR BLD AUTO: 0.6 % (ref 0–0.5)
LDH SERPL L TO P-CCNC: 168 U/L (ref 110–260)
LYMPHOCYTES # BLD AUTO: 2.8 K/UL (ref 1–4.8)
LYMPHOCYTES NFR BLD: 45.9 % (ref 18–48)
MCH RBC QN AUTO: 27.8 PG (ref 27–31)
MCHC RBC AUTO-ENTMCNC: 31.3 G/DL (ref 32–36)
MCV RBC AUTO: 89 FL (ref 82–98)
MONOCYTES # BLD AUTO: 0.4 K/UL (ref 0.3–1)
MONOCYTES NFR BLD: 5.7 % (ref 4–15)
NEUTROPHILS # BLD AUTO: 2.6 K/UL (ref 1.8–7.7)
NEUTROPHILS NFR BLD: 42.3 % (ref 38–73)
NRBC BLD-RTO: 0 /100 WBC
PLATELET # BLD AUTO: 176 K/UL (ref 150–450)
PMV BLD AUTO: 11.6 FL (ref 9.2–12.9)
POCT GLUCOSE: 131 MG/DL (ref 70–110)
POCT GLUCOSE: 136 MG/DL (ref 70–110)
POCT GLUCOSE: 148 MG/DL (ref 70–110)
POCT GLUCOSE: 177 MG/DL (ref 70–110)
POTASSIUM SERPL-SCNC: 4.1 MMOL/L (ref 3.5–5.1)
PROT SERPL-MCNC: 5.8 G/DL (ref 6–8.4)
RBC # BLD AUTO: 3.6 M/UL (ref 4.6–6.2)
SODIUM SERPL-SCNC: 142 MMOL/L (ref 136–145)
WBC # BLD AUTO: 6.17 K/UL (ref 3.9–12.7)

## 2022-08-11 PROCEDURE — 25000003 PHARM REV CODE 250: Performed by: NURSE PRACTITIONER

## 2022-08-11 PROCEDURE — 99024 POSTOP FOLLOW-UP VISIT: CPT | Mod: 95,,, | Performed by: PHYSICIAN ASSISTANT

## 2022-08-11 PROCEDURE — 85025 COMPLETE CBC W/AUTO DIFF WBC: CPT | Performed by: NURSE PRACTITIONER

## 2022-08-11 PROCEDURE — 94640 AIRWAY INHALATION TREATMENT: CPT

## 2022-08-11 PROCEDURE — 21400001 HC TELEMETRY ROOM

## 2022-08-11 PROCEDURE — 80053 COMPREHEN METABOLIC PANEL: CPT | Performed by: NURSE PRACTITIONER

## 2022-08-11 PROCEDURE — 99024 PR POST-OP FOLLOW-UP VISIT: ICD-10-PCS | Mod: 95,,, | Performed by: PHYSICIAN ASSISTANT

## 2022-08-11 PROCEDURE — 97110 THERAPEUTIC EXERCISES: CPT

## 2022-08-11 PROCEDURE — 94761 N-INVAS EAR/PLS OXIMETRY MLT: CPT

## 2022-08-11 PROCEDURE — 25500020 PHARM REV CODE 255: Performed by: INTERNAL MEDICINE

## 2022-08-11 PROCEDURE — 99900035 HC TECH TIME PER 15 MIN (STAT)

## 2022-08-11 PROCEDURE — 63600175 PHARM REV CODE 636 W HCPCS: Performed by: NURSE PRACTITIONER

## 2022-08-11 RX ADMIN — PIPERACILLIN SODIUM AND TAZOBACTAM SODIUM 4.5 G: 4; .5 INJECTION, POWDER, LYOPHILIZED, FOR SOLUTION INTRAVENOUS at 12:08

## 2022-08-11 RX ADMIN — POLYETHYLENE GLYCOL 3350 17 G: 17 POWDER, FOR SOLUTION ORAL at 08:08

## 2022-08-11 RX ADMIN — LOSARTAN POTASSIUM 50 MG: 50 TABLET, FILM COATED ORAL at 08:08

## 2022-08-11 RX ADMIN — IOHEXOL 100 ML: 350 INJECTION, SOLUTION INTRAVENOUS at 11:08

## 2022-08-11 RX ADMIN — DOXYCYCLINE HYCLATE 100 MG: 100 TABLET, COATED ORAL at 08:08

## 2022-08-11 RX ADMIN — GABAPENTIN 300 MG: 300 CAPSULE ORAL at 09:08

## 2022-08-11 RX ADMIN — ASPIRIN 81 MG: 81 TABLET, COATED ORAL at 08:08

## 2022-08-11 RX ADMIN — DOCUSATE SODIUM 100 MG: 100 CAPSULE, LIQUID FILLED ORAL at 08:08

## 2022-08-11 RX ADMIN — Medication 6 MG: at 09:08

## 2022-08-11 RX ADMIN — PIPERACILLIN SODIUM AND TAZOBACTAM SODIUM 4.5 G: 4; .5 INJECTION, POWDER, LYOPHILIZED, FOR SOLUTION INTRAVENOUS at 09:08

## 2022-08-11 RX ADMIN — INSULIN DETEMIR 10 UNITS: 100 INJECTION, SOLUTION SUBCUTANEOUS at 08:08

## 2022-08-11 RX ADMIN — ALBUTEROL SULFATE 2 PUFF: 90 AEROSOL, METERED RESPIRATORY (INHALATION) at 07:08

## 2022-08-11 RX ADMIN — GABAPENTIN 300 MG: 300 CAPSULE ORAL at 08:08

## 2022-08-11 RX ADMIN — DOXYCYCLINE HYCLATE 100 MG: 100 TABLET, COATED ORAL at 09:08

## 2022-08-11 RX ADMIN — ALBUTEROL SULFATE 2 PUFF: 90 AEROSOL, METERED RESPIRATORY (INHALATION) at 01:08

## 2022-08-11 RX ADMIN — Medication 1000 UNITS: at 08:08

## 2022-08-11 RX ADMIN — DOCUSATE SODIUM 100 MG: 100 CAPSULE, LIQUID FILLED ORAL at 09:08

## 2022-08-11 RX ADMIN — FAMOTIDINE 20 MG: 20 TABLET ORAL at 08:08

## 2022-08-11 RX ADMIN — FAMOTIDINE 20 MG: 20 TABLET ORAL at 09:08

## 2022-08-11 RX ADMIN — INSULIN DETEMIR 10 UNITS: 100 INJECTION, SOLUTION SUBCUTANEOUS at 09:08

## 2022-08-11 RX ADMIN — OXYCODONE HYDROCHLORIDE AND ACETAMINOPHEN 500 MG: 500 TABLET ORAL at 08:08

## 2022-08-11 RX ADMIN — PIPERACILLIN SODIUM AND TAZOBACTAM SODIUM 4.5 G: 4; .5 INJECTION, POWDER, LYOPHILIZED, FOR SOLUTION INTRAVENOUS at 04:08

## 2022-08-11 RX ADMIN — GABAPENTIN 300 MG: 300 CAPSULE ORAL at 02:08

## 2022-08-11 RX ADMIN — OXYCODONE HYDROCHLORIDE AND ACETAMINOPHEN 500 MG: 500 TABLET ORAL at 09:08

## 2022-08-11 RX ADMIN — ENOXAPARIN SODIUM 40 MG: 100 INJECTION SUBCUTANEOUS at 04:08

## 2022-08-11 RX ADMIN — ATORVASTATIN CALCIUM 10 MG: 10 TABLET, FILM COATED ORAL at 09:08

## 2022-08-11 RX ADMIN — THERA TABS 1 TABLET: TAB at 08:08

## 2022-08-11 NOTE — PT/OT/SLP PROGRESS
Occupational Therapy   Treatment and Discharge    Name: Friday ROGELIO Blake  MRN: [id number]  Admitting Diagnosis:  Wound dehiscence  8 Days Post-Op    Recommendations:     Discharge Recommendations: home health OT  Discharge Equipment Recommendations:  shower chair  Barriers to discharge:  None    Assessment:     Friday ROGELIO Blake is a 66 y.o. male with a medical diagnosis of Wound dehiscence.  Pt has met acute OT goals. Discharge from skilled OT services at this time. Recommends home health OT.     Rehab Prognosis:  Good; patient would benefit from acute skilled OT services to address these deficits and reach maximum level of function.       Plan:     Patient to be seen 2 x/week to address the above listed problems via self-care/home management, therapeutic activities, therapeutic exercises  · Plan of Care Expires: 08/11/22  · Plan of Care Reviewed with: patient    Subjective     Pain/Comfort:  · Pain Rating 1: 0/10     Pt c/o swelling in legs.    Objective:     Communicated with: nurse Benitez and epic chart review prior to session.  Patient found supine with hemovac, peripheral IV, telemetry upon OT entry to room.    General Precautions: Standard, airborne, contact, droplet, fall   Orthopedic Precautions:spinal precautions   Braces: LSO  Respiratory Status: Room air       Bed Mobility:    · Patient completed Rolling/Turning to Right with modified independence  · Patient completed Supine to Sit with modified independence   · Pt performed supine scoot to HOB with mod (I).    Functional Mobility/Transfers:  · Per PT, pt ambulated 480 ft with mod (I) using RW, good balance.    Activities of Daily Living:  · Lower Body Dressing: independence . pt doffed/donned socks while sitting in bed.   · Pt reports completing grooming and toileting ADL's independently during the day.    [facility] 6 Click ADL: 23    Treatment & Education:  Pt educated on and performed x15 reps BUE AROM therapeutic exercises (shoulder flexion, elbow  flexion/ext, and scapular retractions) while supine in bed. Pt adhering to spinal precautions.  Educated on importance of OOB activity and encouraged completion of BUE and BLE AROM therex throughout the day to tolerance to increase functional strength and activity tolerance, as well as decrease edema in BLE. Patient stated understanding and in agreement with POC.  Pt has met acute OT goals. Discharge from skilled OT services at this time.     Patient left supine with all lines intact, call button in reach and nurse notifiedEducation:      GOALS:   Multidisciplinary Problems     Occupational Therapy Goals     Not on file          Multidisciplinary Problems (Resolved)        Problem: Occupational Therapy    Goal Priority Disciplines Outcome Interventions   Occupational Therapy Goal   (Resolved)     OT, PT/OT Met    Description: Goals to be met by: 8/22/22     Patient will increase functional independence with ADLs by performing:    Toileting from toilet with Set-up Assistance for hygiene and clothing management.   Toilet transfer to toilet with Stand-by Assistance.  Increased functional strength in B UE elbows distally to WFL.                     Time Tracking:     OT Date of Treatment: 08/11/22  OT Start Time: 0225  OT Stop Time: 0240  OT Total Time (min): 15 min    Billable Minutes:Therapeutic Exercise 15    OT/MINNA: OT       Bridgett Carlson OT    8/11/2022

## 2022-08-11 NOTE — PROGRESS NOTES
[facility] - Telemetry ([facility])  Neurosurgery  Progress Note    Subjective:     Interval History:  Patient was seen earlier today.   Reports  ordered CTA chest to rule out PE.  No new issues to report.  Denies HA, dizziness, shortness of breath and chest pain.  No output recorded from HV drain.    History of Present Illness: See H&P.    Post-Op Info:  Procedure(s) (LRB):  EXPLORATION, WOUND (Bilateral)  REPAIR, CSF LEAK, USING COMPUTER-ASSISTED NAVIGATION (Bilateral)  PLACEMENT (N/A)   8 Days Post-Op      Medications:  Continuous Infusions:  Scheduled Meds:   albuterol  2 puff Inhalation Q6H    ascorbic acid (vitamin C)  500 mg Oral BID    aspirin  81 mg Oral Daily    atorvastatin  10 mg Oral QHS    docusate sodium  100 mg Oral BID    doxycycline  100 mg Oral Q12H    enoxaparin  40 mg Subcutaneous Daily    famotidine  20 mg Oral BID    gabapentin  300 mg Oral TID    insulin detemir U-100  10 Units Subcutaneous BID    losartan  50 mg Oral Daily    melatonin  6 mg Oral Nightly    multivitamin  1 tablet Oral Daily    piperacillin-tazobactam (ZOSYN) IVPB  4.5 g Intravenous Q8H    polyethylene glycol  17 g Oral Daily    vitamin D  1,000 Units Oral Daily     PRN Meds:acetaminophen, acetaminophen, aluminum-magnesium hydroxide-simethicone, dextromethorphan-guaiFENesin  mg/5 ml, dextrose 10%, dextrose 10%, glucagon (human recombinant), glucose, glucose, HYDROmorphone, insulin aspart U-100, loperamide, melatonin, methocarbamoL, naloxone, ondansetron, oxyCODONE-acetaminophen, oxyCODONE-acetaminophen, oxyCODONE-acetaminophen, prochlorperazine, promethazine, senna-docusate 8.6-50 mg, simethicone, sodium chloride 0.9%, sodium chloride 0.9%, sodium chloride 0.9%     Review of Systems  Objective:     Weight: 44.5 kg (98 lb)  Body mass index is 15.82 kg/m².  Vital Signs (Most Recent):  Temp: 98.2 °F (36.8 °C) (08/11/22 1314)  Pulse: 72 (08/11/22 1357)  Resp: 18 (08/11/22 1357)  BP: 138/80 (08/11/22 1314)  SpO2:  96 % (08/11/22 1357) Vital Signs (24h Range):  Temp:  [98.1 °F (36.7 °C)-98.8 °F (37.1 °C)] 98.2 °F (36.8 °C)  Pulse:  [51-78] 72  Resp:  [17-21] 18  SpO2:  [94 %-99 %] 96 %  BP: (133-159)/(67-84) 138/80     Date 08/11/22 0700 - 08/12/22 0659   Shift 2800-9214 2909-1592 5463-5987 24 Hour Total   INTAKE   P.O. 600   600   Shift Total(mL/kg) 600(13.5)   600(13.5)   OUTPUT   Shift Total(mL/kg)       Weight (kg) 44.5 44.5 44.5 44.5                        Closed/Suction Drain 08/03/22 1144 Right Back Accordion 10 Fr. (Active)   Site Description Unable to view 08/11/22 0900   Dressing Type Transparent (Tegaderm) 08/08/22 1505   Dressing Status Clean;Dry;Intact 08/11/22 0900   Dressing Intervention Integrity maintained 08/11/22 0900   Drainage Sanguineous 08/11/22 0900   Status Other (Comment) 08/08/22 1505   Output (mL) 0 mL 08/10/22 1900            Closed/Suction Drain 08/03/22 1145 Left Back Accordion 10 Fr. (Active)   Site Description Unable to view 08/11/22 0900   Dressing Type Transparent (Tegaderm) 08/08/22 1505   Dressing Status Clean;Dry;Intact 08/11/22 0900   Dressing Intervention Integrity maintained 08/11/22 0900   Drainage Serosanguineous 08/11/22 0900   Status Other (Comment) 08/08/22 1505   Output (mL) 60 mL 08/08/22 1705       Neurosurgery Physical Exam  Vitals and labs reviewed  MAEW  Incision CDI  Sutures intact  HV drain intact    Significant Labs:  Recent Labs   Lab 08/10/22  0421 08/11/22  0437   * 117*    142   K 3.8 4.1    105   CO2 30* 29   BUN 7* 10   CREATININE 0.8 0.8   CALCIUM 8.7 9.4     Recent Labs   Lab 08/10/22  0421 08/11/22  0437   WBC 5.63 6.17   HGB 9.3* 10.0*   HCT 31.4* 32.0*    176     No results for input(s): LABPT, INR, APTT in the last 48 hours.  Microbiology Results (last 7 days)     Procedure Component Value Units Date/Time    Culture, Anaerobe [[id number]]  (Abnormal) Collected: 08/03/22 0937    Order Status: Completed Specimen: Wound from Back Updated:  08/11/22 1330     Anaerobic Culture CUTIBACTERIUM ACNES  From broth only      Narrative:      Deep    Culture, Anaerobe [[id number]] Collected: 08/03/22 0937    Order Status: Completed Specimen: Wound from Back Updated: 08/10/22 0737     Anaerobic Culture No anaerobes isolated    Narrative:      Superficial    Fungus culture [[id number]] Collected: 08/03/22 0937    Order Status: Completed Specimen: Wound from Back Updated: 08/09/22 1307     Fungus (Mycology) Culture Culture in progress    Narrative:      Superficial    Fungus culture [[id number]] Collected: 08/03/22 0937    Order Status: Completed Specimen: Wound from Back Updated: 08/09/22 1307     Fungus (Mycology) Culture Culture in progress    Narrative:      Deep    Aerobic culture [[id number]] Collected: 08/03/22 0937    Order Status: Completed Specimen: Wound from Back Updated: 08/06/22 0939     Aerobic Bacterial Culture No growth    Narrative:      Deep    Aerobic culture [[id number]] Collected: 08/03/22 0937    Order Status: Completed Specimen: Wound from Back Updated: 08/06/22 0939     Aerobic Bacterial Culture No growth    Narrative:      Superficial    AFB Culture & Smear [[id number]] Collected: 08/03/22 0937    Order Status: Completed Specimen: Wound from Back Updated: 08/04/22 2127     AFB Culture & Smear Culture in progress     AFB CULTURE STAIN No acid fast bacilli seen.    Narrative:      Superficial    AFB Culture & Smear [[id number]] Collected: 08/03/22 0937    Order Status: Completed Specimen: Wound from Back Updated: 08/04/22 2127     AFB Culture & Smear Culture in progress     AFB CULTURE STAIN No acid fast bacilli seen.    Narrative:      Deep        All pertinent labs from the last 24 hours have been reviewed.  Significant Diagnostics:  CT: FINDINGS:  Cardiac size is normal. Major pulmonary arteries are normal.   There is no significant coronary artery calcification.   Several sternal wires are present at the level of the manubrium.  A left PICC  line is present.   The lung fields are grossly clear with minor atelectasis in the lung bases.  No consolidation.   No pleural effusion or pneumothorax.   Upper abdominal images are negative.     Impression:   No evidence of acute pulmonary artery embolism.    Assessment/Plan:     Active Diagnoses:    Diagnosis Date Noted POA    PRINCIPAL PROBLEM:  Wound dehiscence with CSF leak s/p Washout and closure with Lumbar drain [T81.30XA] 07/29/2022 Yes     Chronic    Postoperative CSF leak [G97.82, G96.00] 07/30/2022 Yes     Chronic    Hypertension [I10] 07/29/2022 Yes    HLD (hyperlipidemia) [E78.5] 07/29/2022 Yes    Degenerative disc disease, lumbar [M51.36] 06/22/2022 Yes     Chronic    Diabetes mellitus without complication [E11.9]  Yes     Chronic    Morbid obesity with BMI of 40.0-44.9, adult [E66.01, Z68.41]  Not Applicable      Problems Resolved During this Admission:    Diagnosis Date Noted Date Resolved POA    Discitis of lumbar region [M46.46] 08/01/2022 08/09/2022 Yes    COVID [U07.1] 07/29/2022 08/09/2022 Yes     Chronic     POD #8    Will monitor overnight and plan to discharge to home tomorrow.   Consider removing last HV drain prior to d/c.   ID recs- continue zosyn/doxycycline , follow ESR/CRP, follow-up in outpatient clinic.  Continue to monitor. Please call with any changes.  Patient agrees with above plan.      Oliver Clifton PA-C  Neurosurgery  [facility] - Telemetry ([facility])

## 2022-08-11 NOTE — ASSESSMENT & PLAN NOTE
--supportive care  --PT/OT consulted    S/p laminectomy- complicated by wound dehiscence- see above    S/p revision and washout POD#7

## 2022-08-11 NOTE — PROGRESS NOTES
[facility] ([facility] Medicine  Progress Note    Patient Name: Friday ROGELIO Blake  MRN: [id number]  Patient Class: IP- Inpatient   Admission Date: 7/29/2022  Length of Stay: 11 days  Attending Physician: Sharifa Thomas MD  Primary Care Provider: Sid Elizabeth MD        Subjective:     Principal Problem:Wound dehiscence        HPI:  65 y/o male with PMHx of HTN, HLD, DM, and obesity who presented tot he ED for eval of post op incision leaking.  Sx are constant and moderate in severity. No mitigating or exacerbating factors reported. Pt had surgery on 7/26, then took a trip to [facility]. He flew back last night. Pt denies fever, chills, pain, HA, N/V, and all other sx at this time.  ED workup shows: H/H 11.6/36.9, COVID +  He will be kept on OBS for CSF leak under the care of hospital medicine  He is a full code and his SDM is his wife             Overview/Hospital Course:  Mr Blake is a 66 year old male who presented to [facility] Emergency Room of evaluation of wound dehiscence. Patient underwent Laminectomy of lumbar spine on 6/22/2022 and I & D of hematoma on 6/28/2022. Traveled to [facility] for daughter's wedding. Positive for drainage from low back surgical wound. Neurosurgery following, plan possible washout vs wound vac placement.  Infectious Disease following, continue empiric IV antibiotics. COVID positive. As of 7/31/2022, patient feeling well, vital signs and labs stable. MRI lumbar spine strongly concerning for discitis osteomyelitis at L2-3, probable epidural phlegmon or abscess. Neurosurgery following, will need revision with washout. Will follow.     8/1- cheerful, resting comfortably in bed on RA, no fever, chills or cough. Still has drainage from the Lumbar wound. Dr. Zepeda plans revision with wound washout/exploration with the help of Plastic Surgery, await confirmation date. Pt agreeable with the plan. Continue wound care, IV Abx, no wound vac yet.     8/2- comfortable on RA, wound Cx  growing Pseudomonas, earlier Cx grew Staph Epi- so now on Vanc, Cefepime and Flagyl. Dr. Zepeda plans Surgery- Washout with possible drain placement tomorrow, pt agreeable.     8/3- seen post op in the ICU, doing well, c/o back pain at the surgical site, just got oxycodone. Dr. Zepeda put him on Dexa 4 mg IV qid post op, he continues to received Cefepime, Flagyl and Vanc. Fluid sent to labs for C/S.     8/4- looks and feels better, lying flat on his side, back pain improved, no NV, dizziness or HA. Lumbar Drain output 11 cc. Dr. Zepeda d/mónica the foleys and is weaning the steroids. All Cx from Surgery remain NGDT. Getting Vanc, Cefepime and oral Flagyl. Last wound Cx grew Pseudomonas.   WBC 8.2, H/H 10.3/32. BS high due to Dexa.     8/5 - Patient with clinical improvement. Patient sitting up in bed. Plan to increase mobility today per NS. No CP/SOB. Patient tolerating diet.    8/6 - Patient improving steadily. Plan for OOB to chair today per NS. No cp / SOB. Patient is in good spirits. Discussed with CC Team.    8/7 - Patient with continued progress. Patient with more energy today. Sitting on bedside. No events. Decreased drainage today. Patient expressing his desire to transition to home. Await NS clearance. No CP / SOB    8/8 - Patient stable improving steadily. Much stronger today sitting in chair at bedside. Patient tolerating therapies / diet.    8/9 - Patient improved - Awaiting N/S recs for d/c . Possibly the next 24 hrs. No events.    08/10 -No neurologic complaints overnight. Left arm swelling without evidence of DVT on doppler     08/11 -Elevated Ddimer -CTA - negative for PE, await surgery clearance       Interval History:     Review of Systems   Constitutional: Negative.    HENT: Negative.     Eyes: Negative.    Cardiovascular:  Positive for leg swelling.   Gastrointestinal: Negative.    Endocrine: Negative.    Musculoskeletal:  Positive for back pain and joint swelling.   Skin:  Positive for wound.    Objective:     Vital Signs (Most Recent):  Temp: 98.2 °F (36.8 °C) (08/11/22 1314)  Pulse: 72 (08/11/22 1357)  Resp: 18 (08/11/22 1357)  BP: 138/80 (08/11/22 1314)  SpO2: 96 % (08/11/22 1357)   Vital Signs (24h Range):  Temp:  [98.1 °F (36.7 °C)-98.8 °F (37.1 °C)] 98.2 °F (36.8 °C)  Pulse:  [51-78] 72  Resp:  [17-21] 18  SpO2:  [94 %-99 %] 96 %  BP: (133-159)/(67-84) 138/80     Weight: 44.5 kg (98 lb)  Body mass index is 15.82 kg/m².    Intake/Output Summary (Last 24 hours) at 8/11/2022 1502  Last data filed at 8/11/2022 1200  Gross per 24 hour   Intake 840 ml   Output 400 ml   Net 440 ml        Physical Exam  Constitutional:       General: He is not in acute distress.     Appearance: Normal appearance.   HENT:      Head: Normocephalic and atraumatic.   Cardiovascular:      Rate and Rhythm: Normal rate and regular rhythm.   Pulmonary:      Effort: Pulmonary effort is normal.      Breath sounds: Normal breath sounds.   Abdominal:      General: Abdomen is flat. Bowel sounds are normal.      Palpations: Abdomen is soft.   Musculoskeletal:         General: Swelling present.      Right lower leg: Edema present.      Left lower leg: Edema present.   Neurological:      General: No focal deficit present.      Mental Status: He is oriented to person, place, and time. Mental status is at baseline.   Psychiatric:         Mood and Affect: Mood normal.         Behavior: Behavior normal.         Thought Content: Thought content normal.         Judgment: Judgment normal.       Significant Labs: All pertinent labs within the past 24 hours have been reviewed.    Significant Imaging: I have reviewed all pertinent imaging results/findings within the past 24 hours.      Assessment/Plan:      * Wound dehiscence with CSF leak s/p Washout and closure with Lumbar drain  --wound cx pending  --Neuro surgery consulted  --ID consulted      7/30/2022  Neurosurgery following    Possible washout vs wound vac placement   ID following, continue  empiric IV antibiotic        7/31/2022  MRI of lumbar spine strongly concerning for discitis osteomyelitis at L2-3, probable epidural phlegmon or abscess.   Neurosurgery following, will need revision with washout. Will follow.   Continue IV antibiotics  Wound cultures in progress      8/1- continue IV Abx, local wound care  Await revision and wound exploration with plastic surgery    8/2- surgery tomorrow, cont iV abx    8/3- s/p Wound revision and washout with Lumbar Drain placement  8/4- improving- continue same tx  8/5 Stable - Per NS  8/6 , 8/7, 8/8, 8/9- Stable, NS on board, Plan for drain removal per N/S    08/10  -drain removal    Postoperative CSF leak  S/p wound revision and closure with LD placement    Stable decreased fluid output    HLD (hyperlipidemia)  --continue atorvastatin  --cardiac diet      Hypertension  --continue losartan  --cardiac diet    BP under control      Degenerative disc disease, lumbar  --supportive care  --PT/OT consulted    S/p laminectomy- complicated by wound dehiscence- see above    S/p revision and washout POD#7      Morbid obesity with BMI of 40.0-44.9, adult  Body mass index is 15.82 kg/m². Morbid obesity complicates all aspects of disease management from diagnostic modalities to treatment. Weight loss encouraged and health benefits explained to patient.             Diabetes mellitus without complication  Patient's FSGs are controlled on current medication regimen.  Last A1c reviewed-   Lab Results   Component Value Date    HGBA1C 8.3 (H) 05/18/2022     Most recent fingerstick glucose reviewed-   Recent Labs   Lab 08/10/22  1543 08/10/22  1957 08/11/22  0530 08/11/22  1313   POCTGLUCOSE 121* 137* 131* 177*     Current correctional scale  Low  Maintain anti-hyperglycemic dose as follows-   Antihyperglycemics (From admission, onward)            Start     Stop Route Frequency Ordered    08/08/22 1502  insulin aspart U-100 pen 1-10 Units         -- SubQ Before meals & nightly PRN  08/08/22 1402    08/05/22 1200  insulin detemir U-100 pen 10 Units         -- SubQ 2 times daily 08/05/22 1147        Hold Oral hypoglycemics while patient is in the hospital.    Continue present care, BS generally well controlled    Pt now on Dexa IV- BS may rise- needs tighter BS control    BS high sec to SSI-  Weaning steroids now, cont SSI    Increase Levemir to BID dosing        VTE Risk Mitigation (From admission, onward)         Ordered     enoxaparin injection 40 mg  Daily         08/05/22 1312     IP VTE HIGH RISK PATIENT  Once         07/29/22 2333     Place sequential compression device  Until discontinued         07/29/22 2333                Discharge Planning   MATEUS:      Code Status: Full Code   Is the patient medically ready for discharge?:     Reason for patient still in hospital (select all that apply): Treatment  Discharge Plan A: Home Health                  Sharifa Thomas MD  Department of Hospital Medicine   '[place] - Telemetry ([facility])

## 2022-08-11 NOTE — PLAN OF CARE
Pt has met acute OT goals. Pt mod (I) with bed mobility and functional mobility. Pt (I) with ADL's. Completed x15 reps therex. Discharge from skilled OT services at this time.

## 2022-08-11 NOTE — PLAN OF CARE
POC reviewed with pt. Pt verbalizes understanding of POC. No questions at this time.  AAOx4.   NSR with some intermittent bradycardia on cardiac monitor.  Pt remains free of falls. Independent with ADLs.  Medications given as ordered.  POCT glucose monitored. No SS coverage required.  No PRN pain medications requested this shift.  No complaints or distress noted @ this time.  Safety measures in place. Will continue to monitor.  Informed pt to call for assistance before getting up. Pt verbalizes understanding.   Hourly rounding complete.

## 2022-08-11 NOTE — ASSESSMENT & PLAN NOTE
Patient's FSGs are controlled on current medication regimen.  Last A1c reviewed-   Lab Results   Component Value Date    HGBA1C 8.3 (H) 05/18/2022     Most recent fingerstick glucose reviewed-   Recent Labs   Lab 08/10/22  1543 08/10/22  1957 08/11/22  0530 08/11/22  1313   POCTGLUCOSE 121* 137* 131* 177*     Current correctional scale  Low  Maintain anti-hyperglycemic dose as follows-   Antihyperglycemics (From admission, onward)            Start     Stop Route Frequency Ordered    08/08/22 1502  insulin aspart U-100 pen 1-10 Units         -- SubQ Before meals & nightly PRN 08/08/22 1402    08/05/22 1200  insulin detemir U-100 pen 10 Units         -- SubQ 2 times daily 08/05/22 1147        Hold Oral hypoglycemics while patient is in the hospital.    Continue present care, BS generally well controlled    Pt now on Dexa IV- BS may rise- needs tighter BS control    BS high sec to SSI-  Weaning steroids now, cont SSI    Increase Levemir to BID dosing

## 2022-08-11 NOTE — SUBJECTIVE & OBJECTIVE
Interval History:     Review of Systems   Constitutional: Negative.    HENT: Negative.     Eyes: Negative.    Cardiovascular:  Positive for leg swelling.   Gastrointestinal: Negative.    Endocrine: Negative.    Musculoskeletal:  Positive for back pain and joint swelling.   Skin:  Positive for wound.   Objective:     Vital Signs (Most Recent):  Temp: 98.2 °F (36.8 °C) (08/11/22 1314)  Pulse: 72 (08/11/22 1357)  Resp: 18 (08/11/22 1357)  BP: 138/80 (08/11/22 1314)  SpO2: 96 % (08/11/22 1357)   Vital Signs (24h Range):  Temp:  [98.1 °F (36.7 °C)-98.8 °F (37.1 °C)] 98.2 °F (36.8 °C)  Pulse:  [51-78] 72  Resp:  [17-21] 18  SpO2:  [94 %-99 %] 96 %  BP: (133-159)/(67-84) 138/80     Weight: 44.5 kg (98 lb)  Body mass index is 15.82 kg/m².    Intake/Output Summary (Last 24 hours) at 8/11/2022 1502  Last data filed at 8/11/2022 1200  Gross per 24 hour   Intake 840 ml   Output 400 ml   Net 440 ml        Physical Exam  Constitutional:       General: He is not in acute distress.     Appearance: Normal appearance.   HENT:      Head: Normocephalic and atraumatic.   Cardiovascular:      Rate and Rhythm: Normal rate and regular rhythm.   Pulmonary:      Effort: Pulmonary effort is normal.      Breath sounds: Normal breath sounds.   Abdominal:      General: Abdomen is flat. Bowel sounds are normal.      Palpations: Abdomen is soft.   Musculoskeletal:         General: Swelling present.      Right lower leg: Edema present.      Left lower leg: Edema present.   Neurological:      General: No focal deficit present.      Mental Status: He is oriented to person, place, and time. Mental status is at baseline.   Psychiatric:         Mood and Affect: Mood normal.         Behavior: Behavior normal.         Thought Content: Thought content normal.         Judgment: Judgment normal.       Significant Labs: All pertinent labs within the past 24 hours have been reviewed.    Significant Imaging: I have reviewed all pertinent imaging  results/findings within the past 24 hours.

## 2022-08-12 VITALS
TEMPERATURE: 99 F | RESPIRATION RATE: 18 BRPM | SYSTOLIC BLOOD PRESSURE: 168 MMHG | HEART RATE: 62 BPM | DIASTOLIC BLOOD PRESSURE: 86 MMHG | HEIGHT: 66 IN | OXYGEN SATURATION: 98 % | WEIGHT: 98 LBS | BODY MASS INDEX: 15.75 KG/M2

## 2022-08-12 LAB
ALBUMIN SERPL BCP-MCNC: 3 G/DL (ref 3.5–5.2)
ALP SERPL-CCNC: 59 U/L (ref 55–135)
ALT SERPL W/O P-5'-P-CCNC: 23 U/L (ref 10–44)
ANION GAP SERPL CALC-SCNC: 7 MMOL/L (ref 8–16)
AST SERPL-CCNC: 13 U/L (ref 10–40)
BASOPHILS # BLD AUTO: 0.03 K/UL (ref 0–0.2)
BASOPHILS NFR BLD: 0.6 % (ref 0–1.9)
BILIRUB SERPL-MCNC: 0.4 MG/DL (ref 0.1–1)
BUN SERPL-MCNC: 9 MG/DL (ref 8–23)
CALCIUM SERPL-MCNC: 9.2 MG/DL (ref 8.7–10.5)
CHLORIDE SERPL-SCNC: 105 MMOL/L (ref 95–110)
CO2 SERPL-SCNC: 30 MMOL/L (ref 23–29)
CREAT SERPL-MCNC: 0.8 MG/DL (ref 0.5–1.4)
DIFFERENTIAL METHOD: ABNORMAL
EOSINOPHIL # BLD AUTO: 0.3 K/UL (ref 0–0.5)
EOSINOPHIL NFR BLD: 5.5 % (ref 0–8)
ERYTHROCYTE [DISTWIDTH] IN BLOOD BY AUTOMATED COUNT: 16.1 % (ref 11.5–14.5)
EST. GFR  (NO RACE VARIABLE): >60 ML/MIN/1.73 M^2
GLUCOSE SERPL-MCNC: 97 MG/DL (ref 70–110)
HCT VFR BLD AUTO: 32 % (ref 40–54)
HGB BLD-MCNC: 9.7 G/DL (ref 14–18)
IMM GRANULOCYTES # BLD AUTO: 0.02 K/UL (ref 0–0.04)
IMM GRANULOCYTES NFR BLD AUTO: 0.4 % (ref 0–0.5)
LYMPHOCYTES # BLD AUTO: 2 K/UL (ref 1–4.8)
LYMPHOCYTES NFR BLD: 40.6 % (ref 18–48)
MCH RBC QN AUTO: 27.6 PG (ref 27–31)
MCHC RBC AUTO-ENTMCNC: 30.3 G/DL (ref 32–36)
MCV RBC AUTO: 91 FL (ref 82–98)
MONOCYTES # BLD AUTO: 0.4 K/UL (ref 0.3–1)
MONOCYTES NFR BLD: 8.2 % (ref 4–15)
NEUTROPHILS # BLD AUTO: 2.2 K/UL (ref 1.8–7.7)
NEUTROPHILS NFR BLD: 44.7 % (ref 38–73)
NRBC BLD-RTO: 0 /100 WBC
PLATELET # BLD AUTO: 201 K/UL (ref 150–450)
PMV BLD AUTO: 11.7 FL (ref 9.2–12.9)
POCT GLUCOSE: 133 MG/DL (ref 70–110)
POCT GLUCOSE: 99 MG/DL (ref 70–110)
POTASSIUM SERPL-SCNC: 4 MMOL/L (ref 3.5–5.1)
PROT SERPL-MCNC: 5.6 G/DL (ref 6–8.4)
RBC # BLD AUTO: 3.52 M/UL (ref 4.6–6.2)
SODIUM SERPL-SCNC: 142 MMOL/L (ref 136–145)
WBC # BLD AUTO: 4.9 K/UL (ref 3.9–12.7)

## 2022-08-12 PROCEDURE — 25000003 PHARM REV CODE 250: Performed by: NURSE PRACTITIONER

## 2022-08-12 PROCEDURE — 80053 COMPREHEN METABOLIC PANEL: CPT | Performed by: NURSE PRACTITIONER

## 2022-08-12 PROCEDURE — 94640 AIRWAY INHALATION TREATMENT: CPT

## 2022-08-12 PROCEDURE — 99024 POSTOP FOLLOW-UP VISIT: CPT | Mod: ,,, | Performed by: PHYSICIAN ASSISTANT

## 2022-08-12 PROCEDURE — 85025 COMPLETE CBC W/AUTO DIFF WBC: CPT | Performed by: NURSE PRACTITIONER

## 2022-08-12 PROCEDURE — 63600175 PHARM REV CODE 636 W HCPCS: Performed by: NURSE PRACTITIONER

## 2022-08-12 PROCEDURE — 94761 N-INVAS EAR/PLS OXIMETRY MLT: CPT

## 2022-08-12 PROCEDURE — 99024 PR POST-OP FOLLOW-UP VISIT: ICD-10-PCS | Mod: ,,, | Performed by: PHYSICIAN ASSISTANT

## 2022-08-12 RX ORDER — DOXYCYCLINE HYCLATE 100 MG
100 TABLET ORAL EVERY 12 HOURS
Qty: 144 TABLET | Refills: 0 | Status: ON HOLD | OUTPATIENT
Start: 2022-08-12 | End: 2022-09-13 | Stop reason: HOSPADM

## 2022-08-12 RX ORDER — OXYCODONE AND ACETAMINOPHEN 10; 325 MG/1; MG/1
1 TABLET ORAL EVERY 6 HOURS PRN
Qty: 30 TABLET | Refills: 0 | Status: SHIPPED | OUTPATIENT
Start: 2022-08-12 | End: 2022-09-10

## 2022-08-12 RX ORDER — INSULIN ASPART 100 [IU]/ML
1-10 INJECTION, SOLUTION INTRAVENOUS; SUBCUTANEOUS
Qty: 99 ML | Refills: 0 | Status: SHIPPED | OUTPATIENT
Start: 2022-08-12 | End: 2022-08-18

## 2022-08-12 RX ADMIN — FAMOTIDINE 20 MG: 20 TABLET ORAL at 08:08

## 2022-08-12 RX ADMIN — DOCUSATE SODIUM 100 MG: 100 CAPSULE, LIQUID FILLED ORAL at 08:08

## 2022-08-12 RX ADMIN — INSULIN DETEMIR 10 UNITS: 100 INJECTION, SOLUTION SUBCUTANEOUS at 08:08

## 2022-08-12 RX ADMIN — ALBUTEROL SULFATE 2 PUFF: 90 AEROSOL, METERED RESPIRATORY (INHALATION) at 07:08

## 2022-08-12 RX ADMIN — PIPERACILLIN SODIUM AND TAZOBACTAM SODIUM 4.5 G: 4; .5 INJECTION, POWDER, LYOPHILIZED, FOR SOLUTION INTRAVENOUS at 05:08

## 2022-08-12 RX ADMIN — OXYCODONE HYDROCHLORIDE AND ACETAMINOPHEN 500 MG: 500 TABLET ORAL at 08:08

## 2022-08-12 RX ADMIN — PIPERACILLIN SODIUM AND TAZOBACTAM SODIUM 4.5 G: 4; .5 INJECTION, POWDER, LYOPHILIZED, FOR SOLUTION INTRAVENOUS at 12:08

## 2022-08-12 RX ADMIN — ASPIRIN 81 MG: 81 TABLET, COATED ORAL at 08:08

## 2022-08-12 RX ADMIN — Medication 1000 UNITS: at 08:08

## 2022-08-12 RX ADMIN — THERA TABS 1 TABLET: TAB at 08:08

## 2022-08-12 RX ADMIN — GABAPENTIN 300 MG: 300 CAPSULE ORAL at 03:08

## 2022-08-12 RX ADMIN — GABAPENTIN 300 MG: 300 CAPSULE ORAL at 08:08

## 2022-08-12 RX ADMIN — ALBUTEROL SULFATE 2 PUFF: 90 AEROSOL, METERED RESPIRATORY (INHALATION) at 01:08

## 2022-08-12 RX ADMIN — POLYETHYLENE GLYCOL 3350 17 G: 17 POWDER, FOR SOLUTION ORAL at 08:08

## 2022-08-12 RX ADMIN — DOXYCYCLINE HYCLATE 100 MG: 100 TABLET, COATED ORAL at 08:08

## 2022-08-12 RX ADMIN — LOSARTAN POTASSIUM 50 MG: 50 TABLET, FILM COATED ORAL at 08:08

## 2022-08-12 NOTE — PLAN OF CARE
Referral sent to [facility] via [facility] with home health orders.  [facility] accepted.    Faxed IV antibiotic orders to [facility]. Lesa, liaison states all set up.       08/12/22 3081   Post-Acute Status   Post-Acute Authorization Home Health;IV Infusion   Home Health Status Set-up Complete/Auth obtained   IV Infusion Status Set-up Complete/Auth obtained   Discharge Plan   Discharge Plan A [facility]

## 2022-08-12 NOTE — PLAN OF CARE
Plan of Care: RN Shift Summary & Bedside Handover Report  08/12/2022 3:02 AM    Pt admitted on 7/29/2022 for Wound dehiscence under Sharifa Thomas MD service   Code Status Full Code    Past Medical/ Surgical Hx Past Medical History:   Diagnosis Date    Bilateral carpal tunnel syndrome 2/25/2022    moderate demyelinating bilaterally    Carpal tunnel syndrome     Diabetes mellitus without complication     Fever 6/30/2022    Gram-positive bacteremia 7/2/2022    History of colon polyps     Hypercalcemia 3/23/2021    Hyperlipidemia associated with type 2 diabetes mellitus     Hypertension associated with diabetes     Left carpal tunnel syndrome 3/23/2021    Low back pain 9/28/2021    Lumbar disc disease with radiculopathy     Morbid obesity with BMI of 40.0-44.9, adult     Postoperative hematoma involving nervous system following nervous system procedure 6/28/2022    S/P parathyroidectomy 7/29/2021    Vitamin D deficiency       Past Surgical History:   Procedure Laterality Date    CARPAL TUNNEL RELEASE Left 4/1/2021    Procedure: RELEASE, CARPAL TUNNEL;  Surgeon: Yoandy David MD;  Location: [facility] OR;  Service: Orthopedics;  Laterality: Left;    HERNIA REPAIR      INCISION AND DRAINAGE OF HEMATOMA N/A 6/28/2022    Procedure: INCISION AND DRAINAGE, HEMATOMA;  Surgeon: Shaggy Zepeda MD;  Location: [facility] OR;  Service: Neurosurgery;  Laterality: N/A;    LUMBAR LAMINECTOMY WITH DISCECTOMY Left 6/22/2022    Procedure: LAMINECTOMY, SPINE, LUMBAR, WITH DISCECTOMY;  Surgeon: Shaggy Zepeda MD;  Location: [facility] OR;  Service: Neurosurgery;  Laterality: Left;  minimally invasive L2-3 discectomy and decompression     LUMBAR LAMINECTOMY WITH DISCECTOMY N/A 6/28/2022    Procedure: LAMINECTOMY, SPINE, LUMBAR, WITH DISCECTOMY;  Surgeon: Shaggy Zepeda MD;  Location: [facility] OR;  Service: Neurosurgery;  Laterality: N/A;    PARATHYROIDECTOMY Bilateral 7/27/2021    Procedure: PARATHYROIDECTOMY;  Surgeon: Tha Dial MD;   Location: [facility] OR;  Service: ENT;  Laterality: Bilateral;  with medialstinal exploration    REPAIR OF CEREBROSPINAL FLUID LEAK USING COMPUTER ASSISTED NAVIGATION Bilateral 8/3/2022    Procedure: REPAIR, CSF LEAK, USING COMPUTER-ASSISTED NAVIGATION;  Surgeon: Shaggy Zepeda MD;  Location: [facility] OR;  Service: Neurosurgery;  Laterality: Bilateral;    SELECTIVE INJECTION OF ANESTHETIC AGENT AROUND LUMBAR SPINAL NERVE ROOT BY TRANSFORAMINAL APPROACH Left 3/23/2022    Procedure: BLOCK, SPINAL NERVE ROOT, LUMBAR, SELECTIVE, TRANSFORAMINAL APPROACH Left L2-3, L3-4 RN IV sedation;  Surgeon: Jay Hodges MD;  Location: [facility] PAIN MGT;  Service: Pain Management;  Laterality: Left;    STERNOTOMY N/A 7/27/2021    Procedure: STERNOTOMY;  Surgeon: Tha Dial MD;  Location: [facility] OR;  Service: ENT;  Laterality: N/A;    ULNAR NERVE TRANSPOSITION Left 4/1/2021    Procedure: TRANSPOSITION, NERVE, ULNAR;  Surgeon: Yoandy David MD;  Location: [facility] OR;  Service: Orthopedics;  Laterality: Left;    ULNAR TUNNEL RELEASE Left 4/1/2021    Procedure: RELEASE, CUBITAL TUNNEL;  Surgeon: Yoandy David MD;  Location: [facility] OR;  Service: Orthopedics;  Laterality: Left;    UMBILICAL HERNIA REPAIR      WOUND EXPLORATION Bilateral 8/3/2022    Procedure: EXPLORATION, WOUND;  Surgeon: Shaggy Zepeda MD;  Location: [facility];  Service: Neurosurgery;  Laterality: Bilateral;       HEENT HEENT WDL: WDL   Cognitive/ Neuro Cognitive/Neuro/Behavioral WDL: WDL  Level of Consciousness (AVPU): alert  Orientation: oriented x 4  Delta Coma Scale Score: 15  Additional Documentation: Delta Coma Scale (Group)   Resp Respiratory WDL: WDL  All Lung Fields Breath Sounds: clear  Flow (L/min): 1  Oxygen Concentration (%): 21  O2 Device (Oxygen Therapy): room air   Cardiac Cardiac WDL: WDL  Lead Monitored: Lead II  Rhythm: sinus bradycardia  Frequency/Ectopy: PVCs  Cardiac/Telemetry Box Number: [id number]   Peripheral/ Neurovascular Peripheral Neurovascular  WDL: WDL   VTE core VTE Required Core Measure: Pharmacological prophylaxis initiated/maintained   GI GI WDL: WDL  All Quadrants Bowel Sounds: audible and normoactive  Last Bowel Movement: 08/10/22   Diet Diet diabetic [facility]; 2000 Calorie    Genitourinary WDL: WDL  Voiding Characteristics: voids spontaneously without difficulty  Urine Characteristics: clear yellow   Skin Skin WDL: WDL except, all  Skin Integrity: drain/device(s), incision (Lower back w/ drain)   Oskar Score Oskar Score: 22   Musculoskeletal  Musculoskeletal WDL: WDL  General Mobility: no overt deficits noted   Safety Safety WDL: WDL  Safety Factors: ID band on, upper side rails raised x 2, call light in reach, wheels locked, bed in low position  Safety Precautions: emergency equipment at bedside   Fall Risk Score Fall Risk Score: 9   LDA(s) Lines/Drains/Airways       Peripherally Inserted Central Catheter Line  Duration             PICC Double Lumen 08/01/22 1540 left brachial 10 days              Drain  Duration                  Closed/Suction Drain 08/03/22 1144 Right Back Accordion 10 Fr. 8 days         Closed/Suction Drain 08/03/22 1145 Left Back Accordion 10 Fr. 8 days                   Consults Consults (From admission, onward)          Status Ordering Provider     Inpatient consult to Infectious Diseases  Once        Provider:  Lidia Hodge MD    Acknowledged LIDIA MCMILLAN     Inpatient consult to Critical Care Medicine  Once        Provider:  Chris Knox MD    Completed EULALIA GILLESPIE     Inpatient consult to Neurosurgery  Once        Provider:  Shaggy Zepeda MD    Acknowledged LIDIA MCMILLAN            Shift events uneventful   Plan of Care for RN report  Continue care as ordered, DM management    Discharge Planning Discharge Plan A: Home Health    Patient updated on plan of care, all questions answered up to now regarding plan of care, will continue to monitor per hourly rounding & unit practice/ policy     Note: Other exception details noted in flowsheet if not present in summary

## 2022-08-12 NOTE — PLAN OF CARE
Problem: Adult Inpatient Plan of Care  Goal: Plan of Care Review  Outcome: Met  Goal: Patient-Specific Goal (Individualized)  Outcome: Met  Goal: Absence of Hospital-Acquired Illness or Injury  Outcome: Met  Goal: Optimal Comfort and Wellbeing  Outcome: Met  Goal: Readiness for Transition of Care  Outcome: Met     Problem: Diabetes Comorbidity  Goal: Blood Glucose Level Within Targeted Range  Outcome: Met     Problem: Impaired Wound Healing  Goal: Optimal Wound Healing  Outcome: Met     Problem: Fall Injury Risk  Goal: Absence of Fall and Fall-Related Injury  Outcome: Met     Problem: Infection  Goal: Absence of Infection Signs and Symptoms  Outcome: Met     Problem: Skin Injury Risk Increased  Goal: Skin Health and Integrity  Outcome: Met

## 2022-08-12 NOTE — NURSING
Reviewed AVS with patient. Patient verbalized understanding. Tele monitor removed and replaced. Pt wheeled down by staff

## 2022-08-12 NOTE — PROGRESS NOTES
[facility] ([facility])  Neurosurgery  Progress Note    Subjective:     Interval History:   Patient was seen earlier this morning.  No acute events overnight.  L hip pain improving.   No new issues to report.    History of Present Illness: See H&P.    Post-Op Info:  Procedure(s) (LRB):  EXPLORATION, WOUND (Bilateral)  REPAIR, CSF LEAK, USING COMPUTER-ASSISTED NAVIGATION (Bilateral)  PLACEMENT (N/A)   9 Days Post-Op      Medications:  Continuous Infusions:  Scheduled Meds:   albuterol  2 puff Inhalation Q6H    ascorbic acid (vitamin C)  500 mg Oral BID    aspirin  81 mg Oral Daily    atorvastatin  10 mg Oral QHS    docusate sodium  100 mg Oral BID    doxycycline  100 mg Oral Q12H    enoxaparin  40 mg Subcutaneous Daily    famotidine  20 mg Oral BID    gabapentin  300 mg Oral TID    insulin detemir U-100  10 Units Subcutaneous BID    losartan  50 mg Oral Daily    melatonin  6 mg Oral Nightly    multivitamin  1 tablet Oral Daily    piperacillin-tazobactam (ZOSYN) IVPB  4.5 g Intravenous Q8H    polyethylene glycol  17 g Oral Daily    vitamin D  1,000 Units Oral Daily     PRN Meds:acetaminophen, acetaminophen, aluminum-magnesium hydroxide-simethicone, dextromethorphan-guaiFENesin  mg/5 ml, dextrose 10%, dextrose 10%, glucagon (human recombinant), glucose, glucose, HYDROmorphone, insulin aspart U-100, loperamide, melatonin, methocarbamoL, naloxone, ondansetron, oxyCODONE-acetaminophen, oxyCODONE-acetaminophen, oxyCODONE-acetaminophen, prochlorperazine, promethazine, senna-docusate 8.6-50 mg, simethicone, sodium chloride 0.9%, sodium chloride 0.9%, sodium chloride 0.9%     Review of Systems  Objective:     Weight: 44.5 kg (98 lb)  Body mass index is 15.82 kg/m².  Vital Signs (Most Recent):  Temp: 99.3 °F (37.4 °C) (08/12/22 1211)  Pulse: 62 (08/12/22 1330)  Resp: 18 (08/12/22 1330)  BP: (!) 168/86 (08/12/22 1211)  SpO2: 98 % (08/12/22 1330) Vital Signs (24h Range):  Temp:  [97.9 °F (36.6  °C)-99.3 °F (37.4 °C)] 99.3 °F (37.4 °C)  Pulse:  [53-78] 62  Resp:  [17-19] 18  SpO2:  [92 %-98 %] 98 %  BP: (127-168)/(62-92) 168/86     Date 08/12/22 0700 - 08/13/22 0659   Shift 1515-6668 [id number] 6737-5469 24 Hour Total   INTAKE   P.O. 300   300   Shift Total(mL/kg) 300(6.7)   300(6.7)   OUTPUT   Shift Total(mL/kg)       Weight (kg) 44.5 44.5 44.5 44.5                      Neurosurgery Physical Exam    Vitals and labs reviewed  MAEW  Incision CDI  Sutures intact  HV drain removed, sites reinforced w/ staples  Clean dressing applied    Significant Labs:  Recent Labs   Lab 08/11/22 0437 08/12/22  0518   * 97    142   K 4.1 4.0    105   CO2 29 30*   BUN 10 9   CREATININE 0.8 0.8   CALCIUM 9.4 9.2     Recent Labs   Lab 08/11/22 0437 08/12/22  0518   WBC 6.17 4.90   HGB 10.0* 9.7*   HCT 32.0* 32.0*    201     No results for input(s): LABPT, INR, APTT in the last 48 hours.  Microbiology Results (last 7 days)     Procedure Component Value Units Date/Time    Culture, Anaerobe [[id number]]  (Abnormal) Collected: 08/03/22 0937    Order Status: Completed Specimen: Wound from Back Updated: 08/11/22 1330     Anaerobic Culture CUTIBACTERIUM ACNES  From broth only      Narrative:      Deep    Culture, Anaerobe [[id number]] Collected: 08/03/22 0937    Order Status: Completed Specimen: Wound from Back Updated: 08/10/22 0737     Anaerobic Culture No anaerobes isolated    Narrative:      Superficial    Fungus culture [[id number]] Collected: 08/03/22 0937    Order Status: Completed Specimen: Wound from Back Updated: 08/09/22 1307     Fungus (Mycology) Culture Culture in progress    Narrative:      Superficial    Fungus culture [[id number]] Collected: 08/03/22 0937    Order Status: Completed Specimen: Wound from Back Updated: 08/09/22 1307     Fungus (Mycology) Culture Culture in progress    Narrative:      Deep    Aerobic culture [[id number]] Collected: 08/03/22 0937    Order Status: Completed Specimen:  Wound from Back Updated: 08/06/22 0939     Aerobic Bacterial Culture No growth    Narrative:      Deep    Aerobic culture [[id number]] Collected: 08/03/22 0937    Order Status: Completed Specimen: Wound from Back Updated: 08/06/22 0939     Aerobic Bacterial Culture No growth    Narrative:      Superficial        All pertinent labs from the last 24 hours have been reviewed.  Significant Diagnostics:  I have reviewed all pertinent imaging results/findings within the past 24 hours.    Assessment/Plan:     Active Diagnoses:    Diagnosis Date Noted POA    PRINCIPAL PROBLEM:  Wound dehiscence with CSF leak s/p Washout and closure with Lumbar drain [T81.30XA] 07/29/2022 Yes     Chronic    Postoperative CSF leak [G97.82, G96.00] 07/30/2022 Yes     Chronic    Hypertension [I10] 07/29/2022 Yes    HLD (hyperlipidemia) [E78.5] 07/29/2022 Yes    Degenerative disc disease, lumbar [M51.36] 06/22/2022 Yes     Chronic    Diabetes mellitus without complication [E11.9]  Yes     Chronic    Morbid obesity with BMI of 40.0-44.9, adult [E66.01, Z68.41]  Not Applicable      Problems Resolved During this Admission:    Diagnosis Date Noted Date Resolved POA    Discitis of lumbar region [M46.46] 08/01/2022 08/09/2022 Yes    COVID [U07.1] 07/29/2022 08/09/2022 Yes     Chronic     POD #9    HV drain removed.  Stable for discharge home today.   IV infusion set up via [facility].  Patient will follow-up at our clinic next Friday for wound check , suture/staple removal.       Oliver Clifton PA-C  Neurosurgery  [facility] ([facility])

## 2022-08-12 NOTE — DISCHARGE SUMMARY
[facility] ([facility] Medicine  Discharge Summary      Patient Name: Friday ROGELIO Blake  MRN: [id number]  Patient Class: IP- Inpatient  Admission Date: 7/29/2022  Hospital Length of Stay: 12 days  Discharge Date and Time:  08/12/2022 10:24 AM  Attending Physician: Sharifa Thomas MD   Discharging Provider: Sharifa Thomas MD  Primary Care Provider: Sid Elizabeth MD      HPI:   67 y/o male with PMHx of HTN, HLD, DM, and obesity who presented tot he ED for eval of post op incision leaking.  Sx are constant and moderate in severity. No mitigating or exacerbating factors reported. Pt had surgery on 7/26, then took a trip to [facility]. He flew back last night. Pt denies fever, chills, pain, HA, N/V, and all other sx at this time.  ED workup shows: H/H 11.6/36.9, COVID +  He will be kept on OBS for CSF leak under the care of [facility] medicine  He is a full code and his SDM is his wife             Procedure(s) (LRB):  EXPLORATION, WOUND (Bilateral)  REPAIR, CSF LEAK, USING COMPUTER-ASSISTED NAVIGATION (Bilateral)  PLACEMENT (N/A)      Hospital Course:   Mr Blake is a 66 year old male who presented to [facility] Emergency Room of evaluation of wound dehiscence. Patient underwent Laminectomy of lumbar spine on 6/22/2022 and I & D of hematoma on 6/28/2022. Traveled to [facility] for daughter's wedding. Positive for drainage from low back surgical wound. Neurosurgery following, plan possible washout vs wound vac placement.  Infectious Disease following, continue empiric IV antibiotics. COVID positive. As of 7/31/2022, patient feeling well, vital signs and labs stable. MRI lumbar spine strongly concerning for discitis osteomyelitis at L2-3, probable epidural phlegmon or abscess. Neurosurgery following, will need revision with washout. Will follow.     8/1- cheerful, resting comfortably in bed on RA, no fever, chills or cough. Still has drainage from the Lumbar wound. Dr. Zepeda plans revision with wound  washout/exploration with the help of Plastic Surgery, await confirmation date. Pt agreeable with the plan. Continue wound care, IV Abx, no wound vac yet.     8/2- comfortable on RA, wound Cx growing Pseudomonas, earlier Cx grew Staph Epi- so now on Vanc, Cefepime and Flagyl. Dr. Zepeda plans Surgery- Washout with possible drain placement tomorrow, pt agreeable.     8/3- seen post op in the ICU, doing well, c/o back pain at the surgical site, just got oxycodone. Dr. Zepeda put him on Dexa 4 mg IV qid post op, he continues to received Cefepime, Flagyl and Vanc. Fluid sent to labs for C/S.     8/4- looks and feels better, lying flat on his side, back pain improved, no NV, dizziness or HA. Lumbar Drain output 11 cc. Dr. Zepeda d/mónica the foleys and is weaning the steroids. All Cx from Surgery remain NGDT. Getting Vanc, Cefepime and oral Flagyl. Last wound Cx grew Pseudomonas.   WBC 8.2, H/H 10.3/32. BS high due to Dexa.     8/5 - Patient with clinical improvement. Patient sitting up in bed. Plan to increase mobility today per NS. No CP/SOB. Patient tolerating diet.    8/6 - Patient improving steadily. Plan for OOB to chair today per NS. No cp / SOB. Patient is in good spirits. Discussed with CC Team.    8/7 - Patient with continued progress. Patient with more energy today. Sitting on bedside. No events. Decreased drainage today. Patient expressing his desire to transition to home. Await NS clearance. No CP / SOB    8/8 - Patient stable improving steadily. Much stronger today sitting in chair at bedside. Patient tolerating therapies / diet.    8/9 - Patient improved - Awaiting N/S recs for d/c . Possibly the next 24 hrs. No events.    08/10 -No neurologic complaints overnight. Left arm swelling without evidence of DVT on doppler     08/11 -Elevated Ddimer -CTA - negative for PE, await surgery clearance       08/12 -Patient seen and examined stable for discharge                Lumbar drains removed ,                 PICC  line in place,                  Zosyn  For 4  weeks                  Doxycycline 5 weeks                    Goals of Care Treatment Preferences:  Code Status: Full Code      Consults:   Consults (From admission, onward)        Status Ordering Provider     Inpatient consult to Infectious Diseases  Once        Provider:  Lidia Hodge MD    Acknowledged LIDIA MCMILLAN     Inpatient consult to Critical Care Medicine  Once        Provider:  Chris Knox MD    Completed EULALIA GILLESPIE     Inpatient consult to Neurosurgery  Once        Provider:  Shaggy Zepeda MD    Acknowledged LIDIA MCMILLAN          No new Assessment & Plan notes have been filed under this hospital service since the last note was generated.  Service: Hospital Medicine    Final Active Diagnoses:    Diagnosis Date Noted POA    PRINCIPAL PROBLEM:  Wound dehiscence with CSF leak s/p Washout and closure with Lumbar drain [T81.30XA] 07/29/2022 Yes     Chronic    Postoperative CSF leak [G97.82, G96.00] 07/30/2022 Yes     Chronic    Hypertension [I10] 07/29/2022 Yes    HLD (hyperlipidemia) [E78.5] 07/29/2022 Yes    Degenerative disc disease, lumbar [M51.36] 06/22/2022 Yes     Chronic    Diabetes mellitus without complication [E11.9]  Yes     Chronic    Morbid obesity with BMI of 40.0-44.9, adult [E66.01, Z68.41]  Not Applicable      Problems Resolved During this Admission:    Diagnosis Date Noted Date Resolved POA    Discitis of lumbar region [M46.46] 08/01/2022 08/09/2022 Yes    COVID [U07.1] 07/29/2022 08/09/2022 Yes     Chronic       Discharged Condition: good    Disposition:     Follow Up:    Patient Instructions:   No discharge procedures on file.    Significant Diagnostic Studies: Labs:   BMP:   Recent Labs   Lab 08/11/22 0437 08/12/22  0518   * 97    142   K 4.1 4.0    105   CO2 29 30*   BUN 10 9   CREATININE 0.8 0.8   CALCIUM 9.4 9.2    and CMP   Recent Labs   Lab 08/11/22 0437 08/12/22  0518     142   K 4.1 4.0    105   CO2 29 30*   * 97   BUN 10 9   CREATININE 0.8 0.8   CALCIUM 9.4 9.2   PROT 5.8* 5.6*   ALBUMIN 3.1* 3.0*   BILITOT 0.4 0.4   ALKPHOS 59 59   AST 12 13   ALT 30 23   ANIONGAP 8 7*       Pending Diagnostic Studies:     Procedure Component Value Units Date/Time    FL PICC Line Placement w/o Port w/ Img > 6 Y/O [[id number]]     Order Status: Sent Lab Status: No result          Medications:  Reconciled Home Medications:      Medication List      START taking these medications    doxycycline 100 MG tablet  Commonly known as: VIBRA-TABS  Take 1 tablet (100 mg total) by mouth every 12 (twelve) hours.     insulin aspart U-100 100 unit/mL (3 mL) Inpn pen  Commonly known as: NovoLOG  Inject 1-10 Units into the skin before meals and at bedtime as needed (Hyperglycemia).     insulin detemir U-100 100 unit/mL (3 mL) Inpn pen  Commonly known as: Levemir FLEXTOUCH  Inject 10 Units into the skin 2 (two) times daily.     PIPERACILLIN-TAZOBACTAM 4.5G/100ML D5W IVPB (READY TO MIX)  Inject 100 mLs (4.5 g total) into the vein every 8 (eight) hours.        CHANGE how you take these medications    oxyCODONE-acetaminophen  mg per tablet  Commonly known as: PERCOCET  Take 1 tablet by mouth every 6 (six) hours as needed for Pain.  What changed: when to take this        CONTINUE taking these medications    aspirin 81 MG EC tablet  Commonly known as: ECOTRIN  Take 1 tablet (81 mg total) by mouth once daily.     atorvastatin 10 MG tablet  Commonly known as: LIPITOR  Take 1 tablet (10 mg total) by mouth every evening.     docusate sodium 100 MG capsule  Commonly known as: COLACE  Take 1 capsule (100 mg total) by mouth 2 (two) times daily.     gabapentin 300 MG capsule  Commonly known as: NEURONTIN  Take 1 capsule (300 mg total) by mouth 3 (three) times daily.     irbesartan 150 MG tablet  Commonly known as: AVAPRO  Take 1 tablet (150 mg total) by mouth once daily.     * lancets Misc  Commonly known as:  ONETOUCH ULTRASOFT LANCETS  Check FS daily     * lancets Misc  To check BG 1 times daily, to use with insurance preferred meter     methocarbamoL 750 MG Tab  Commonly known as: ROBAXIN  Take 1 tablet (750 mg total) by mouth 3 (three) times daily as needed (muscle spasms).     * ONETOUCH VERIO TEST STRIPS Strp  Generic drug: blood sugar diagnostic  USE 1 STRIP TO CHECK GLUCOSE ONCE DAILY     * blood sugar diagnostic Strp  To check BG 1 times daily, to use with insurance preferred meter     STOOL SOFTENER-LAXATIVE 8.6-50 mg per tablet  Generic drug: senna-docusate 8.6-50 mg  Take 1 tablet by mouth once daily.         * This list has 4 medication(s) that are the same as other medications prescribed for you. Read the directions carefully, and ask your doctor or other care provider to review them with you.            STOP taking these medications    amoxicillin-clavulanate 875-125mg 875-125 mg per tablet  Commonly known as: AUGMENTIN     blood-glucose meter kit     dapagliflozin 5 mg Tab tablet  Commonly known as: FARXIGA     metFORMIN 1000 MG tablet  Commonly known as: GLUCOPHAGE            Indwelling Lines/Drains at time of discharge:   Lines/Drains/Airways     Peripherally Inserted Central Catheter Line  Duration           PICC Double Lumen 08/01/22 1540 left brachial 10 days          Drain  Duration                Closed/Suction Drain 08/03/22 1144 Right Back Accordion 10 Fr. 8 days         Closed/Suction Drain 08/03/22 1145 Left Back Accordion 10 Fr. 8 days                Time spent on the discharge of patient: 40 minutes         Sharifa Thomas MD  Department of Hospital Medicine  [facility] Tele[facility] ([facility])

## 2022-08-12 NOTE — PLAN OF CARE
[facility] (Hospital)  Discharge Final Note    Primary Care Provider: Sid Elizabeth MD    Expected Discharge Date: 8/12/2022    Final Discharge Note (most recent)     Final Note - 08/12/22 1534        Final Note    Assessment Type Final Discharge Note     Anticipated Discharge Disposition Home-Health Care Svc        Post-Acute Status    Post-Acute Authorization Home Health;IV Infusion     Home Health Status Set-up Complete/Auth obtained     IV Infusion Status Set-up Complete/Auth obtained                 Important Message from Medicare  Important Message from Medicare regarding Discharge Appeal Rights: Given to patient/caregiver, Explained to patient/caregiver, Signed/date by patient/caregiver     Date IMM was signed: 08/10/22  Time IMM was signed: 1543

## 2022-08-12 NOTE — PLAN OF CARE
[facility]    Orders noted for zosyn 4.5 GM IV every 8 hours for 6 weeks. Patient has been on service previously on 7/22.  Spoke with patient who states his preference for administration is every 8 hours. Informed him medication and supplies will be delivered to his home this evening. He is aware of infusing a dose tonight. He will start tomorrow with doses at 6 am, 2 pm, and 10 pm. Called and spoke with Mónica [phone] with [facility] of  to give report. Patient will need extensions as he wants administer meds on his one. Mónica aware and they will see him tomorrow. No further infusion needs noted. Ok to dc from infusion standpoint.     [facility]  Lesa Garcia Rn, Clinical Educator  Cell [phone]  Office [phone]  Fax [phone]

## 2022-08-15 ENCOUNTER — PATIENT OUTREACH (OUTPATIENT)
Dept: ADMINISTRATIVE | Facility: CLINIC | Age: 67
End: 2022-08-15
Payer: MEDICARE

## 2022-08-15 NOTE — PROGRESS NOTES
Dr Elizabeth :    Patient is requesting a refill on Avapro and was given Novolog at discharge as PRN without a Sliding scale. Please contact patient for assistance.        C3 nurse spoke with Friday ROGELIO Blake for a TCC post hospital discharge follow up call. The patient does not have a scheduled HOSFU appointment with within 5-7 days post hospital discharge date 08/12/2022  C3 nurse was unable to schedule HOSFU appointment in [facility].  Please contact patient and schedule follow up appointment using HOSFU visit type on or before 08/19/2022.      Message routed to PCP staff

## 2022-08-16 ENCOUNTER — TELEPHONE (OUTPATIENT)
Dept: INFECTIOUS DISEASES | Facility: CLINIC | Age: 67
End: 2022-08-16
Payer: MEDICARE

## 2022-08-16 ENCOUNTER — LAB VISIT (OUTPATIENT)
Dept: LAB | Facility: HOSPITAL | Age: 67
End: 2022-08-16
Attending: INTERNAL MEDICINE
Payer: MEDICARE

## 2022-08-16 DIAGNOSIS — E11.9 DIABETES MELLITUS WITHOUT COMPLICATION: ICD-10-CM

## 2022-08-16 DIAGNOSIS — Z79.2 ENCOUNTER FOR LONG-TERM (CURRENT) USE OF ANTIBIOTICS: ICD-10-CM

## 2022-08-16 DIAGNOSIS — G97.82: Primary | ICD-10-CM

## 2022-08-16 PROCEDURE — 80053 COMPREHEN METABOLIC PANEL: CPT | Performed by: INTERNAL MEDICINE

## 2022-08-16 PROCEDURE — 86140 C-REACTIVE PROTEIN: CPT | Performed by: INTERNAL MEDICINE

## 2022-08-16 PROCEDURE — 85652 RBC SED RATE AUTOMATED: CPT | Performed by: INTERNAL MEDICINE

## 2022-08-16 PROCEDURE — 85027 COMPLETE CBC AUTOMATED: CPT | Performed by: INTERNAL MEDICINE

## 2022-08-16 PROCEDURE — 36415 COLL VENOUS BLD VENIPUNCTURE: CPT | Mod: PN | Performed by: INTERNAL MEDICINE

## 2022-08-16 NOTE — TELEPHONE ENCOUNTER
----- Message from Yanet Hare sent at 8/16/2022  9:46 AM CDT -----  Contact: Friday  Patient is calling to speak with someone regarding lab. Patient reports home health arrived this morning to perform labs and reports a lab is scheduled today in [facility]. Patient request to be informed if needing to arrive to the [facility] lab for additional blood work. Please give patient a call back at [phone] as soon as possible.   Thank you,  GH

## 2022-08-17 LAB
ACID FAST MOD KINY STN SPEC: NORMAL
ACID FAST MOD KINY STN SPEC: NORMAL
ALBUMIN SERPL BCP-MCNC: 3.6 G/DL (ref 3.5–5.2)
ALP SERPL-CCNC: 59 U/L (ref 55–135)
ALT SERPL W/O P-5'-P-CCNC: 18 U/L (ref 10–44)
ANION GAP SERPL CALC-SCNC: 10 MMOL/L (ref 8–16)
AST SERPL-CCNC: 15 U/L (ref 10–40)
BILIRUB SERPL-MCNC: 0.4 MG/DL (ref 0.1–1)
BUN SERPL-MCNC: 15 MG/DL (ref 8–23)
CALCIUM SERPL-MCNC: 9.5 MG/DL (ref 8.7–10.5)
CHLORIDE SERPL-SCNC: 106 MMOL/L (ref 95–110)
CO2 SERPL-SCNC: 25 MMOL/L (ref 23–29)
CREAT SERPL-MCNC: 1.1 MG/DL (ref 0.5–1.4)
CRP SERPL-MCNC: 3.3 MG/L (ref 0–8.2)
ERYTHROCYTE [DISTWIDTH] IN BLOOD BY AUTOMATED COUNT: 17 % (ref 11.5–14.5)
ERYTHROCYTE [SEDIMENTATION RATE] IN BLOOD BY PHOTOMETRIC METHOD: 50 MM/HR (ref 0–23)
EST. GFR  (NO RACE VARIABLE): >60 ML/MIN/1.73 M^2
GLUCOSE SERPL-MCNC: 93 MG/DL (ref 70–110)
HCT VFR BLD AUTO: 33 % (ref 40–54)
HGB BLD-MCNC: 9.8 G/DL (ref 14–18)
MCH RBC QN AUTO: 27.8 PG (ref 27–31)
MCHC RBC AUTO-ENTMCNC: 29.7 G/DL (ref 32–36)
MCV RBC AUTO: 94 FL (ref 82–98)
MYCOBACTERIUM SPEC QL CULT: NORMAL
MYCOBACTERIUM SPEC QL CULT: NORMAL
PLATELET # BLD AUTO: 214 K/UL (ref 150–450)
PMV BLD AUTO: 12.6 FL (ref 9.2–12.9)
POTASSIUM SERPL-SCNC: 4.3 MMOL/L (ref 3.5–5.1)
PROT SERPL-MCNC: 6.5 G/DL (ref 6–8.4)
RBC # BLD AUTO: 3.53 M/UL (ref 4.6–6.2)
SODIUM SERPL-SCNC: 141 MMOL/L (ref 136–145)
WBC # BLD AUTO: 6.61 K/UL (ref 3.9–12.7)

## 2022-08-17 NOTE — PROGRESS NOTES
"  Subjective:      Patient ID: Friday ROGELIO Blake is a 66 y.o. male.    Chief Complaint: Post-op Evaluation (Pt is here today for PO #1 appointment pt is doing well very mild back pain rating pain 3/10. )    HPI   The patient is here today for postop evaluation #1.  Patient has been feeling "pretty good" since being discharged.  He has not noticed any drainage.   Yesterday HH came and noticed a very small "dot" sized amount of drainage on dressing but it was colorless per patient.    Patient denies HA, dizziness, chest pain, shortness of breath.  Tolerating diet.  No acute bladder/bowel changes.    Has PICC in place. Followed by ID.  Most recent labs- WBC WNL, CRP 3.3 (WNL), Sed rate 50- continues to trend down.    Date of Procedure: 8/3/2022    Procedure: Procedure(s) (LRB):  EXPLORATION, WOUND (Bilateral)  REPAIR, CSF LEAK, USING COMPUTER-ASSISTED NAVIGATION (Bilateral)  PLACEMENT (N/A)       Operative Findings (including complications, if any):   Post operative fluid collection CSF vs abscess vs seroma    Description of Technical Procedures:  Wound washout and exploration superficial and deep  CSF leak repair   Placement of lumbar drain            Objective:        General    Constitutional: He is oriented to person, place, and time. He appears well-developed and well-nourished.   Cardiovascular: Normal rate and regular rhythm.    Pulmonary/Chest: Effort normal.   Abdominal: Soft.   Neurological: He is alert and oriented to person, place, and time.   Psychiatric: He has a normal mood and affect. His behavior is normal.             Neurosurgery Exam:  Vitals reviewed  No pain on exam  Moves all 4 ext well    Incision - CDI  Sutures and staples removed  Back cleaned with Chloraprep  Aquacel surg dressing applied    There is no drainage or erythema.  Mild swelling.                Assessment:       Encounter Diagnoses   Name Primary?    Status post lumbar surgery Yes    Postoperative CSF leak     Encounter for " postoperative wound check     Visit for suture removal     Removal of staples           Plan:       Friday was seen today for post-op evaluation.    Diagnoses and all orders for this visit:    Status post lumbar surgery    Postoperative CSF leak    Encounter for postoperative wound check    Visit for suture removal    Removal of staples        Mr. Friday is doing well and happy with his progress so far.  Sutures were removed from midline incision. Staples removed from prev drain sites.  Area cleaned and Aquacel Surgical dressing placed.  Follow-up in 3 weeks.  Please call with any changes.          Oliver Clifton PA-C

## 2022-08-18 ENCOUNTER — OFFICE VISIT (OUTPATIENT)
Dept: INTERNAL MEDICINE | Facility: CLINIC | Age: 67
End: 2022-08-18
Payer: MEDICARE

## 2022-08-18 VITALS
OXYGEN SATURATION: 98 % | SYSTOLIC BLOOD PRESSURE: 136 MMHG | BODY MASS INDEX: 37.99 KG/M2 | WEIGHT: 242.06 LBS | DIASTOLIC BLOOD PRESSURE: 90 MMHG | HEART RATE: 66 BPM | HEIGHT: 67 IN

## 2022-08-18 DIAGNOSIS — E11.9 DIABETES MELLITUS WITHOUT COMPLICATION: Primary | Chronic | ICD-10-CM

## 2022-08-18 DIAGNOSIS — I15.2 HYPERTENSION ASSOCIATED WITH DIABETES: Chronic | ICD-10-CM

## 2022-08-18 DIAGNOSIS — E11.59 HYPERTENSION ASSOCIATED WITH DIABETES: Chronic | ICD-10-CM

## 2022-08-18 PROCEDURE — 3072F LOW RISK FOR RETINOPATHY: CPT | Mod: CPTII,S$GLB,, | Performed by: INTERNAL MEDICINE

## 2022-08-18 PROCEDURE — 3072F PR LOW RISK FOR RETINOPATHY: ICD-10-PCS | Mod: CPTII,S$GLB,, | Performed by: INTERNAL MEDICINE

## 2022-08-18 PROCEDURE — 1159F PR MEDICATION LIST DOCUMENTED IN MEDICAL RECORD: ICD-10-PCS | Mod: CPTII,S$GLB,, | Performed by: INTERNAL MEDICINE

## 2022-08-18 PROCEDURE — 1159F MED LIST DOCD IN RCRD: CPT | Mod: CPTII,S$GLB,, | Performed by: INTERNAL MEDICINE

## 2022-08-18 PROCEDURE — 4010F ACE/ARB THERAPY RXD/TAKEN: CPT | Mod: CPTII,S$GLB,, | Performed by: INTERNAL MEDICINE

## 2022-08-18 PROCEDURE — 1126F AMNT PAIN NOTED NONE PRSNT: CPT | Mod: CPTII,S$GLB,, | Performed by: INTERNAL MEDICINE

## 2022-08-18 PROCEDURE — 99214 PR OFFICE/OUTPT VISIT, EST, LEVL IV, 30-39 MIN: ICD-10-PCS | Mod: S$GLB,,, | Performed by: INTERNAL MEDICINE

## 2022-08-18 PROCEDURE — 99214 OFFICE O/P EST MOD 30 MIN: CPT | Mod: S$GLB,,, | Performed by: INTERNAL MEDICINE

## 2022-08-18 PROCEDURE — 3066F PR DOCUMENTATION OF TREATMENT FOR NEPHROPATHY: ICD-10-PCS | Mod: CPTII,S$GLB,, | Performed by: INTERNAL MEDICINE

## 2022-08-18 PROCEDURE — 3008F BODY MASS INDEX DOCD: CPT | Mod: CPTII,S$GLB,, | Performed by: INTERNAL MEDICINE

## 2022-08-18 PROCEDURE — 3075F PR MOST RECENT SYSTOLIC BLOOD PRESS GE 130-139MM HG: ICD-10-PCS | Mod: CPTII,S$GLB,, | Performed by: INTERNAL MEDICINE

## 2022-08-18 PROCEDURE — 3080F DIAST BP >= 90 MM HG: CPT | Mod: CPTII,S$GLB,, | Performed by: INTERNAL MEDICINE

## 2022-08-18 PROCEDURE — 4010F PR ACE/ARB THEARPY RXD/TAKEN: ICD-10-PCS | Mod: CPTII,S$GLB,, | Performed by: INTERNAL MEDICINE

## 2022-08-18 PROCEDURE — 3052F HG A1C>EQUAL 8.0%<EQUAL 9.0%: CPT | Mod: CPTII,S$GLB,, | Performed by: INTERNAL MEDICINE

## 2022-08-18 PROCEDURE — 3008F PR BODY MASS INDEX (BMI) DOCUMENTED: ICD-10-PCS | Mod: CPTII,S$GLB,, | Performed by: INTERNAL MEDICINE

## 2022-08-18 PROCEDURE — 3061F NEG MICROALBUMINURIA REV: CPT | Mod: CPTII,S$GLB,, | Performed by: INTERNAL MEDICINE

## 2022-08-18 PROCEDURE — 99999 PR PBB SHADOW E&M-EST. PATIENT-LVL III: CPT | Mod: PBBFAC,,, | Performed by: INTERNAL MEDICINE

## 2022-08-18 PROCEDURE — 3288F FALL RISK ASSESSMENT DOCD: CPT | Mod: CPTII,S$GLB,, | Performed by: INTERNAL MEDICINE

## 2022-08-18 PROCEDURE — 3066F NEPHROPATHY DOC TX: CPT | Mod: CPTII,S$GLB,, | Performed by: INTERNAL MEDICINE

## 2022-08-18 PROCEDURE — 1126F PR PAIN SEVERITY QUANTIFIED, NO PAIN PRESENT: ICD-10-PCS | Mod: CPTII,S$GLB,, | Performed by: INTERNAL MEDICINE

## 2022-08-18 PROCEDURE — 3080F PR MOST RECENT DIASTOLIC BLOOD PRESSURE >= 90 MM HG: ICD-10-PCS | Mod: CPTII,S$GLB,, | Performed by: INTERNAL MEDICINE

## 2022-08-18 PROCEDURE — 1101F PR PT FALLS ASSESS DOC 0-1 FALLS W/OUT INJ PAST YR: ICD-10-PCS | Mod: CPTII,S$GLB,, | Performed by: INTERNAL MEDICINE

## 2022-08-18 PROCEDURE — 1101F PT FALLS ASSESS-DOCD LE1/YR: CPT | Mod: CPTII,S$GLB,, | Performed by: INTERNAL MEDICINE

## 2022-08-18 PROCEDURE — 3288F PR FALLS RISK ASSESSMENT DOCUMENTED: ICD-10-PCS | Mod: CPTII,S$GLB,, | Performed by: INTERNAL MEDICINE

## 2022-08-18 PROCEDURE — 3061F PR NEG MICROALBUMINURIA RESULT DOCUMENTED/REVIEW: ICD-10-PCS | Mod: CPTII,S$GLB,, | Performed by: INTERNAL MEDICINE

## 2022-08-18 PROCEDURE — 1111F PR DISCHARGE MEDS RECONCILED W/ CURRENT OUTPATIENT MED LIST: ICD-10-PCS | Mod: CPTII,S$GLB,, | Performed by: INTERNAL MEDICINE

## 2022-08-18 PROCEDURE — 3075F SYST BP GE 130 - 139MM HG: CPT | Mod: CPTII,S$GLB,, | Performed by: INTERNAL MEDICINE

## 2022-08-18 PROCEDURE — 3052F PR MOST RECENT HEMOGLOBIN A1C LEVEL 8.0 - < 9.0%: ICD-10-PCS | Mod: CPTII,S$GLB,, | Performed by: INTERNAL MEDICINE

## 2022-08-18 PROCEDURE — 99999 PR PBB SHADOW E&M-EST. PATIENT-LVL III: ICD-10-PCS | Mod: PBBFAC,,, | Performed by: INTERNAL MEDICINE

## 2022-08-18 PROCEDURE — 1111F DSCHRG MED/CURRENT MED MERGE: CPT | Mod: CPTII,S$GLB,, | Performed by: INTERNAL MEDICINE

## 2022-08-18 RX ORDER — ATORVASTATIN CALCIUM 10 MG/1
10 TABLET, FILM COATED ORAL NIGHTLY
Qty: 90 TABLET | Refills: 3 | Status: SHIPPED | OUTPATIENT
Start: 2022-08-18 | End: 2023-10-10 | Stop reason: ALTCHOICE

## 2022-08-18 RX ORDER — EMPAGLIFLOZIN 10 MG/1
10 TABLET, FILM COATED ORAL DAILY
Qty: 90 TABLET | Refills: 3 | Status: SHIPPED | OUTPATIENT
Start: 2022-08-18 | End: 2022-09-10

## 2022-08-18 RX ORDER — METFORMIN HYDROCHLORIDE 500 MG/1
500 TABLET ORAL 2 TIMES DAILY WITH MEALS
Qty: 180 TABLET | Refills: 3 | Status: SHIPPED | OUTPATIENT
Start: 2022-08-18 | End: 2022-09-10 | Stop reason: ALTCHOICE

## 2022-08-18 NOTE — PROGRESS NOTES
"HPI:   patient is a 66-year-old man who comes today for hospital follow-up.  Patient was in the hospital due to complication with wound closure from a back procedure.  He was given high-dose steroids while he was in the hospital and neck cause his blood sugars to run very high.  He was switched from oral agents to insulin.  Patient was discharged on insulin.  Patient does not want to be on insulin.  His blood sugars off insulin for last 3 days and not on any oral agents is in the 130-160 range.  Prior to being in hospital he was on metformin alone.  His last A1c last May was 8.3.  He was post be taking Jardiance but his insurance did not have very good coverage for it.  He is willing to try it again.    Current meds have been verified and updated per the EMR  Exam:BP (!) 136/90 (BP Location: Right arm)   Pulse 66   Ht 5' 7" (1.702 m)   Wt 109.8 kg (242 lb 1 oz)   SpO2 98%   BMI 37.91 kg/m²     Exam deferred    Lab Results   Component Value Date    WBC 6.61 08/16/2022    HGB 9.8 (L) 08/16/2022    HCT 33.0 (L) 08/16/2022     08/16/2022    CHOL 131 05/18/2022    TRIG 157 (H) 05/18/2022    HDL 35 (L) 05/18/2022    ALT 18 08/16/2022    AST 15 08/16/2022     08/16/2022    K 4.3 08/16/2022     08/16/2022    CREATININE 1.1 08/16/2022    BUN 15 08/16/2022    CO2 25 08/16/2022    TSH 2.686 05/18/2022    PSA 0.93 05/18/2022    INR 1.0 07/30/2022    HGBA1C 8.3 (H) 05/18/2022       Impression:   diabetes  Patient Active Problem List   Diagnosis    Diabetes mellitus without complication    Hypertension associated with diabetes    Hyperlipidemia associated with type 2 diabetes mellitus    Morbid obesity with BMI of 40.0-44.9, adult    History of colon polyps    Vitamin D deficiency    Anemia    Decreased strength of trunk and back    Lumbar radiculopathy    Degenerative disc disease, lumbar    Hypertension    HLD (hyperlipidemia)    Wound dehiscence with CSF leak s/p Washout and closure with " Lumbar drain    Postoperative CSF leak       Plan:  Orders Placed This Encounter    metFORMIN (GLUCOPHAGE) 500 MG tablet    empagliflozin (JARDIANCE) 10 mg tablet    atorvastatin (LIPITOR) 10 MG tablet      patient will discontinue any insulin.  Patient will be started on metformin 500 mg twice a day and Jardiance 10 mg once a day.  He will see me again in November for his regular diabetic lab work follow-up.  He will call me if he does not do well with his blood sugar readings.  He will also call me if he cannot afford the Jardiance and we will try something else.    This note is generated with speech recognition software and is subject to transcription error and sound alike phrases that may be missed by proofreading.

## 2022-08-19 ENCOUNTER — OFFICE VISIT (OUTPATIENT)
Dept: NEUROSURGERY | Facility: CLINIC | Age: 67
End: 2022-08-19
Payer: MEDICARE

## 2022-08-19 VITALS
HEART RATE: 70 BPM | BODY MASS INDEX: 37.98 KG/M2 | RESPIRATION RATE: 18 BRPM | DIASTOLIC BLOOD PRESSURE: 86 MMHG | WEIGHT: 242 LBS | HEIGHT: 67 IN | SYSTOLIC BLOOD PRESSURE: 142 MMHG

## 2022-08-19 DIAGNOSIS — Z98.890 STATUS POST LUMBAR SURGERY: Primary | ICD-10-CM

## 2022-08-19 DIAGNOSIS — G96.00 POSTOPERATIVE CSF LEAK: ICD-10-CM

## 2022-08-19 DIAGNOSIS — G97.82 POSTOPERATIVE CSF LEAK: ICD-10-CM

## 2022-08-19 DIAGNOSIS — Z48.02 VISIT FOR SUTURE REMOVAL: ICD-10-CM

## 2022-08-19 DIAGNOSIS — Z48.02 REMOVAL OF STAPLES: ICD-10-CM

## 2022-08-19 DIAGNOSIS — Z48.89 ENCOUNTER FOR POSTOPERATIVE WOUND CHECK: ICD-10-CM

## 2022-08-19 PROCEDURE — 3072F PR LOW RISK FOR RETINOPATHY: ICD-10-PCS | Mod: CPTII,S$GLB,, | Performed by: PHYSICIAN ASSISTANT

## 2022-08-19 PROCEDURE — 99999 PR PBB SHADOW E&M-EST. PATIENT-LVL IV: CPT | Mod: PBBFAC,,, | Performed by: PHYSICIAN ASSISTANT

## 2022-08-19 PROCEDURE — 3079F DIAST BP 80-89 MM HG: CPT | Mod: CPTII,S$GLB,, | Performed by: PHYSICIAN ASSISTANT

## 2022-08-19 PROCEDURE — 3072F LOW RISK FOR RETINOPATHY: CPT | Mod: CPTII,S$GLB,, | Performed by: PHYSICIAN ASSISTANT

## 2022-08-19 PROCEDURE — 4010F ACE/ARB THERAPY RXD/TAKEN: CPT | Mod: CPTII,S$GLB,, | Performed by: PHYSICIAN ASSISTANT

## 2022-08-19 PROCEDURE — 1159F MED LIST DOCD IN RCRD: CPT | Mod: CPTII,S$GLB,, | Performed by: PHYSICIAN ASSISTANT

## 2022-08-19 PROCEDURE — 99024 PR POST-OP FOLLOW-UP VISIT: ICD-10-PCS | Mod: S$GLB,,, | Performed by: PHYSICIAN ASSISTANT

## 2022-08-19 PROCEDURE — 3077F SYST BP >= 140 MM HG: CPT | Mod: CPTII,S$GLB,, | Performed by: PHYSICIAN ASSISTANT

## 2022-08-19 PROCEDURE — 3061F NEG MICROALBUMINURIA REV: CPT | Mod: CPTII,S$GLB,, | Performed by: PHYSICIAN ASSISTANT

## 2022-08-19 PROCEDURE — 1125F PR PAIN SEVERITY QUANTIFIED, PAIN PRESENT: ICD-10-PCS | Mod: CPTII,S$GLB,, | Performed by: PHYSICIAN ASSISTANT

## 2022-08-19 PROCEDURE — 3061F PR NEG MICROALBUMINURIA RESULT DOCUMENTED/REVIEW: ICD-10-PCS | Mod: CPTII,S$GLB,, | Performed by: PHYSICIAN ASSISTANT

## 2022-08-19 PROCEDURE — 3052F HG A1C>EQUAL 8.0%<EQUAL 9.0%: CPT | Mod: CPTII,S$GLB,, | Performed by: PHYSICIAN ASSISTANT

## 2022-08-19 PROCEDURE — 4010F PR ACE/ARB THEARPY RXD/TAKEN: ICD-10-PCS | Mod: CPTII,S$GLB,, | Performed by: PHYSICIAN ASSISTANT

## 2022-08-19 PROCEDURE — 3079F PR MOST RECENT DIASTOLIC BLOOD PRESSURE 80-89 MM HG: ICD-10-PCS | Mod: CPTII,S$GLB,, | Performed by: PHYSICIAN ASSISTANT

## 2022-08-19 PROCEDURE — 1125F AMNT PAIN NOTED PAIN PRSNT: CPT | Mod: CPTII,S$GLB,, | Performed by: PHYSICIAN ASSISTANT

## 2022-08-19 PROCEDURE — 3066F NEPHROPATHY DOC TX: CPT | Mod: CPTII,S$GLB,, | Performed by: PHYSICIAN ASSISTANT

## 2022-08-19 PROCEDURE — 3288F FALL RISK ASSESSMENT DOCD: CPT | Mod: CPTII,S$GLB,, | Performed by: PHYSICIAN ASSISTANT

## 2022-08-19 PROCEDURE — 99024 POSTOP FOLLOW-UP VISIT: CPT | Mod: S$GLB,,, | Performed by: PHYSICIAN ASSISTANT

## 2022-08-19 PROCEDURE — 99999 PR PBB SHADOW E&M-EST. PATIENT-LVL IV: ICD-10-PCS | Mod: PBBFAC,,, | Performed by: PHYSICIAN ASSISTANT

## 2022-08-19 PROCEDURE — 3066F PR DOCUMENTATION OF TREATMENT FOR NEPHROPATHY: ICD-10-PCS | Mod: CPTII,S$GLB,, | Performed by: PHYSICIAN ASSISTANT

## 2022-08-19 PROCEDURE — 1101F PR PT FALLS ASSESS DOC 0-1 FALLS W/OUT INJ PAST YR: ICD-10-PCS | Mod: CPTII,S$GLB,, | Performed by: PHYSICIAN ASSISTANT

## 2022-08-19 PROCEDURE — 3077F PR MOST RECENT SYSTOLIC BLOOD PRESSURE >= 140 MM HG: ICD-10-PCS | Mod: CPTII,S$GLB,, | Performed by: PHYSICIAN ASSISTANT

## 2022-08-19 PROCEDURE — 3008F BODY MASS INDEX DOCD: CPT | Mod: CPTII,S$GLB,, | Performed by: PHYSICIAN ASSISTANT

## 2022-08-19 PROCEDURE — 3288F PR FALLS RISK ASSESSMENT DOCUMENTED: ICD-10-PCS | Mod: CPTII,S$GLB,, | Performed by: PHYSICIAN ASSISTANT

## 2022-08-19 PROCEDURE — 1101F PT FALLS ASSESS-DOCD LE1/YR: CPT | Mod: CPTII,S$GLB,, | Performed by: PHYSICIAN ASSISTANT

## 2022-08-19 PROCEDURE — 3052F PR MOST RECENT HEMOGLOBIN A1C LEVEL 8.0 - < 9.0%: ICD-10-PCS | Mod: CPTII,S$GLB,, | Performed by: PHYSICIAN ASSISTANT

## 2022-08-19 PROCEDURE — 3008F PR BODY MASS INDEX (BMI) DOCUMENTED: ICD-10-PCS | Mod: CPTII,S$GLB,, | Performed by: PHYSICIAN ASSISTANT

## 2022-08-19 PROCEDURE — 1159F PR MEDICATION LIST DOCUMENTED IN MEDICAL RECORD: ICD-10-PCS | Mod: CPTII,S$GLB,, | Performed by: PHYSICIAN ASSISTANT

## 2022-08-19 NOTE — TELEPHONE ENCOUNTER
Care Due:                  Date            Visit Type   Department     Provider  --------------------------------------------------------------------------------                                EP -                              PRIMARY      [facility] INTERNAL  Last Visit: 08-      CARE ([facility])   MEDICINE       Sid Elizabeth                              [facility]                              PRIMARY      [facility] INTERNAL  Next Visit: 11-      CARE ([facility])   [facility]       Sid Elizabeth                                                            Last  Test          Frequency    Reason                     Performed    Due Date  --------------------------------------------------------------------------------    HBA1C.......  6 months...  empagliflozin, metFORMIN.  05-   11-    [facility] Embedded Care Gaps. Reference number: [id number]. 8/19/2022   3:57:38 PM CDT

## 2022-08-20 RX ORDER — IRBESARTAN 150 MG/1
TABLET ORAL
Qty: 90 TABLET | Refills: 3 | Status: SHIPPED | OUTPATIENT
Start: 2022-08-20 | End: 2023-06-06

## 2022-08-20 NOTE — TELEPHONE ENCOUNTER
Refill Routing Note   Medication(s) are not appropriate for processing by [facility] for the following reason(s):      - Required vitals are abnormal    [facility] action(s):  Defer Medication-related problems identified: Requires labs     Medication Therapy Plan: FOVS  Medication reconciliation completed: No     Appointments  past 12m or future 3m with PCP    Date Provider   Last Visit   8/18/2022 Sid Elizabeth MD   Next Visit   11/23/2022 Sid Elizabeth MD   ED visits in past 90 days: 0        Note composed:7:18 PM 08/19/2022

## 2022-08-23 ENCOUNTER — LAB VISIT (OUTPATIENT)
Dept: LAB | Facility: HOSPITAL | Age: 67
End: 2022-08-23
Attending: INTERNAL MEDICINE
Payer: MEDICARE

## 2022-08-23 DIAGNOSIS — T81.31XA RUPTURE OF OPERATION WOUND: ICD-10-CM

## 2022-08-23 DIAGNOSIS — Z79.2 ENCOUNTER FOR LONG-TERM (CURRENT) USE OF ANTIBIOTICS: Primary | ICD-10-CM

## 2022-08-23 DIAGNOSIS — Z79.2 ENCOUNTER FOR LONG-TERM (CURRENT) USE OF ANTIBIOTICS: ICD-10-CM

## 2022-08-23 DIAGNOSIS — E11.10 DIABETIC ACIDOSIS WITHOUT COMA: ICD-10-CM

## 2022-08-23 LAB
ALBUMIN SERPL BCP-MCNC: 3.9 G/DL (ref 3.5–5.2)
ALP SERPL-CCNC: 64 U/L (ref 55–135)
ALT SERPL W/O P-5'-P-CCNC: 14 U/L (ref 10–44)
ANION GAP SERPL CALC-SCNC: 9 MMOL/L (ref 8–16)
AST SERPL-CCNC: 15 U/L (ref 10–40)
BASOPHILS # BLD AUTO: 0.04 K/UL (ref 0–0.2)
BASOPHILS NFR BLD: 0.9 % (ref 0–1.9)
BILIRUB SERPL-MCNC: 0.7 MG/DL (ref 0.1–1)
BUN SERPL-MCNC: 13 MG/DL (ref 8–23)
CALCIUM SERPL-MCNC: 10.1 MG/DL (ref 8.7–10.5)
CHLORIDE SERPL-SCNC: 106 MMOL/L (ref 95–110)
CO2 SERPL-SCNC: 25 MMOL/L (ref 23–29)
CREAT SERPL-MCNC: 0.8 MG/DL (ref 0.5–1.4)
CRP SERPL-MCNC: 6.3 MG/L (ref 0–8.2)
DIFFERENTIAL METHOD: ABNORMAL
EOSINOPHIL # BLD AUTO: 0.3 K/UL (ref 0–0.5)
EOSINOPHIL NFR BLD: 6.4 % (ref 0–8)
ERYTHROCYTE [DISTWIDTH] IN BLOOD BY AUTOMATED COUNT: 16.3 % (ref 11.5–14.5)
ERYTHROCYTE [SEDIMENTATION RATE] IN BLOOD BY PHOTOMETRIC METHOD: 58 MM/HR (ref 0–23)
EST. GFR  (NO RACE VARIABLE): >60 ML/MIN/1.73 M^2
GLUCOSE SERPL-MCNC: 100 MG/DL (ref 70–110)
HCT VFR BLD AUTO: 34.4 % (ref 40–54)
HGB BLD-MCNC: 10.6 G/DL (ref 14–18)
IMM GRANULOCYTES # BLD AUTO: 0 K/UL (ref 0–0.04)
IMM GRANULOCYTES NFR BLD AUTO: 0 % (ref 0–0.5)
LYMPHOCYTES # BLD AUTO: 1.8 K/UL (ref 1–4.8)
LYMPHOCYTES NFR BLD: 41.4 % (ref 18–48)
MCH RBC QN AUTO: 27.7 PG (ref 27–31)
MCHC RBC AUTO-ENTMCNC: 30.8 G/DL (ref 32–36)
MCV RBC AUTO: 90 FL (ref 82–98)
MONOCYTES # BLD AUTO: 0.4 K/UL (ref 0.3–1)
MONOCYTES NFR BLD: 8 % (ref 4–15)
NEUTROPHILS # BLD AUTO: 1.9 K/UL (ref 1.8–7.7)
NEUTROPHILS NFR BLD: 43.3 % (ref 38–73)
NRBC BLD-RTO: 0 /100 WBC
PLATELET # BLD AUTO: 240 K/UL (ref 150–450)
PMV BLD AUTO: 12.3 FL (ref 9.2–12.9)
POTASSIUM SERPL-SCNC: 4.5 MMOL/L (ref 3.5–5.1)
PROT SERPL-MCNC: 6.9 G/DL (ref 6–8.4)
RBC # BLD AUTO: 3.82 M/UL (ref 4.6–6.2)
SODIUM SERPL-SCNC: 140 MMOL/L (ref 136–145)
WBC # BLD AUTO: 4.35 K/UL (ref 3.9–12.7)

## 2022-08-23 PROCEDURE — 80053 COMPREHEN METABOLIC PANEL: CPT | Performed by: INTERNAL MEDICINE

## 2022-08-23 PROCEDURE — 85025 COMPLETE CBC W/AUTO DIFF WBC: CPT | Performed by: INTERNAL MEDICINE

## 2022-08-23 PROCEDURE — 86140 C-REACTIVE PROTEIN: CPT | Performed by: INTERNAL MEDICINE

## 2022-08-23 PROCEDURE — 85652 RBC SED RATE AUTOMATED: CPT | Performed by: INTERNAL MEDICINE

## 2022-08-25 ENCOUNTER — DOCUMENT SCAN (OUTPATIENT)
Dept: HOME HEALTH SERVICES | Facility: HOSPITAL | Age: 67
End: 2022-08-25
Payer: MEDICARE

## 2022-08-29 ENCOUNTER — LAB VISIT (OUTPATIENT)
Dept: LAB | Facility: HOSPITAL | Age: 67
End: 2022-08-29
Attending: INTERNAL MEDICINE
Payer: MEDICARE

## 2022-08-29 ENCOUNTER — EXTERNAL HOME HEALTH (OUTPATIENT)
Dept: HOME HEALTH SERVICES | Facility: HOSPITAL | Age: 67
End: 2022-08-29
Payer: MEDICARE

## 2022-08-29 DIAGNOSIS — T81.31XA RUPTURE OF OPERATION WOUND: Primary | ICD-10-CM

## 2022-08-29 DIAGNOSIS — I15.2 ENDOCRINE HYPERTENSION: ICD-10-CM

## 2022-08-29 DIAGNOSIS — T81.31XA RUPTURE OF OPERATION WOUND: ICD-10-CM

## 2022-08-29 DIAGNOSIS — Z79.2 ENCOUNTER FOR LONG-TERM (CURRENT) USE OF ANTIBIOTICS: ICD-10-CM

## 2022-08-29 DIAGNOSIS — E11.69 DIABETES MELLITUS ASSOCIATED WITH HORMONAL ETIOLOGY: ICD-10-CM

## 2022-08-29 LAB
ALBUMIN SERPL BCP-MCNC: 3.9 G/DL (ref 3.5–5.2)
ALP SERPL-CCNC: 66 U/L (ref 55–135)
ALT SERPL W/O P-5'-P-CCNC: 15 U/L (ref 10–44)
ANION GAP SERPL CALC-SCNC: 10 MMOL/L (ref 8–16)
AST SERPL-CCNC: 14 U/L (ref 10–40)
BILIRUB SERPL-MCNC: 0.5 MG/DL (ref 0.1–1)
BUN SERPL-MCNC: 14 MG/DL (ref 8–23)
CALCIUM SERPL-MCNC: 9.8 MG/DL (ref 8.7–10.5)
CHLORIDE SERPL-SCNC: 104 MMOL/L (ref 95–110)
CO2 SERPL-SCNC: 26 MMOL/L (ref 23–29)
CREAT SERPL-MCNC: 0.8 MG/DL (ref 0.5–1.4)
CRP SERPL-MCNC: 2.3 MG/L (ref 0–8.2)
ERYTHROCYTE [DISTWIDTH] IN BLOOD BY AUTOMATED COUNT: 16.1 % (ref 11.5–14.5)
ERYTHROCYTE [SEDIMENTATION RATE] IN BLOOD BY PHOTOMETRIC METHOD: 55 MM/HR (ref 0–23)
EST. GFR  (NO RACE VARIABLE): >60 ML/MIN/1.73 M^2
GLUCOSE SERPL-MCNC: 83 MG/DL (ref 70–110)
HCT VFR BLD AUTO: 34.5 % (ref 40–54)
HGB BLD-MCNC: 11.2 G/DL (ref 14–18)
MCH RBC QN AUTO: 29 PG (ref 27–31)
MCHC RBC AUTO-ENTMCNC: 32.5 G/DL (ref 32–36)
MCV RBC AUTO: 89 FL (ref 82–98)
PLATELET # BLD AUTO: 240 K/UL (ref 150–450)
PMV BLD AUTO: 11.8 FL (ref 9.2–12.9)
POTASSIUM SERPL-SCNC: 4.4 MMOL/L (ref 3.5–5.1)
PROT SERPL-MCNC: 6.9 G/DL (ref 6–8.4)
RBC # BLD AUTO: 3.86 M/UL (ref 4.6–6.2)
SODIUM SERPL-SCNC: 140 MMOL/L (ref 136–145)
WBC # BLD AUTO: 3.63 K/UL (ref 3.9–12.7)

## 2022-08-29 PROCEDURE — 85652 RBC SED RATE AUTOMATED: CPT | Performed by: INTERNAL MEDICINE

## 2022-08-29 PROCEDURE — 86140 C-REACTIVE PROTEIN: CPT | Performed by: INTERNAL MEDICINE

## 2022-08-29 PROCEDURE — 85027 COMPLETE CBC AUTOMATED: CPT | Performed by: INTERNAL MEDICINE

## 2022-08-29 PROCEDURE — 80053 COMPREHEN METABOLIC PANEL: CPT | Performed by: INTERNAL MEDICINE

## 2022-09-01 ENCOUNTER — DOCUMENT SCAN (OUTPATIENT)
Dept: HOME HEALTH SERVICES | Facility: HOSPITAL | Age: 67
End: 2022-09-01
Payer: MEDICARE

## 2022-09-06 ENCOUNTER — LAB VISIT (OUTPATIENT)
Dept: LAB | Facility: HOSPITAL | Age: 67
End: 2022-09-06
Attending: NURSE PRACTITIONER
Payer: MEDICARE

## 2022-09-06 DIAGNOSIS — E11.69 OBESITY, DIABETES, AND HYPERTENSION SYNDROME: ICD-10-CM

## 2022-09-06 DIAGNOSIS — E11.59 OBESITY, DIABETES, AND HYPERTENSION SYNDROME: ICD-10-CM

## 2022-09-06 DIAGNOSIS — I15.2 ENDOCRINE HYPERTENSION: Primary | ICD-10-CM

## 2022-09-06 DIAGNOSIS — E66.9 OBESITY, DIABETES, AND HYPERTENSION SYNDROME: ICD-10-CM

## 2022-09-06 DIAGNOSIS — Z79.2 ENCOUNTER FOR LONG-TERM (CURRENT) USE OF ANTIBIOTICS: ICD-10-CM

## 2022-09-06 DIAGNOSIS — I15.2 OBESITY, DIABETES, AND HYPERTENSION SYNDROME: ICD-10-CM

## 2022-09-06 LAB
ALBUMIN SERPL BCP-MCNC: 3.9 G/DL (ref 3.5–5.2)
ALP SERPL-CCNC: 72 U/L (ref 55–135)
ALT SERPL W/O P-5'-P-CCNC: 18 U/L (ref 10–44)
ANION GAP SERPL CALC-SCNC: 11 MMOL/L (ref 8–16)
AST SERPL-CCNC: 19 U/L (ref 10–40)
BILIRUB SERPL-MCNC: 0.6 MG/DL (ref 0.1–1)
BUN SERPL-MCNC: 11 MG/DL (ref 8–23)
CALCIUM SERPL-MCNC: 9.5 MG/DL (ref 8.7–10.5)
CHLORIDE SERPL-SCNC: 104 MMOL/L (ref 95–110)
CO2 SERPL-SCNC: 25 MMOL/L (ref 23–29)
CREAT SERPL-MCNC: 0.8 MG/DL (ref 0.5–1.4)
CRP SERPL-MCNC: 5.7 MG/L (ref 0–8.2)
ERYTHROCYTE [SEDIMENTATION RATE] IN BLOOD BY PHOTOMETRIC METHOD: 42 MM/HR (ref 0–23)
EST. GFR  (NO RACE VARIABLE): >60 ML/MIN/1.73 M^2
GLUCOSE SERPL-MCNC: 104 MG/DL (ref 70–110)
POTASSIUM SERPL-SCNC: 4.5 MMOL/L (ref 3.5–5.1)
PROT SERPL-MCNC: 6.8 G/DL (ref 6–8.4)
SODIUM SERPL-SCNC: 140 MMOL/L (ref 136–145)

## 2022-09-06 PROCEDURE — 36415 COLL VENOUS BLD VENIPUNCTURE: CPT | Mod: PN | Performed by: NURSE PRACTITIONER

## 2022-09-06 PROCEDURE — 86140 C-REACTIVE PROTEIN: CPT | Performed by: NURSE PRACTITIONER

## 2022-09-06 PROCEDURE — 85025 COMPLETE CBC W/AUTO DIFF WBC: CPT | Performed by: NURSE PRACTITIONER

## 2022-09-06 PROCEDURE — 80053 COMPREHEN METABOLIC PANEL: CPT | Performed by: NURSE PRACTITIONER

## 2022-09-06 PROCEDURE — 85652 RBC SED RATE AUTOMATED: CPT | Performed by: NURSE PRACTITIONER

## 2022-09-07 LAB
ANISOCYTOSIS BLD QL SMEAR: SLIGHT
BASOPHILS # BLD AUTO: 0.03 K/UL (ref 0–0.2)
BASOPHILS NFR BLD: 1.3 % (ref 0–1.9)
BURR CELLS BLD QL SMEAR: ABNORMAL
DIFFERENTIAL METHOD: ABNORMAL
EOSINOPHIL # BLD AUTO: 0.2 K/UL (ref 0–0.5)
EOSINOPHIL NFR BLD: 7.5 % (ref 0–8)
ERYTHROCYTE [DISTWIDTH] IN BLOOD BY AUTOMATED COUNT: 15.9 % (ref 11.5–14.5)
FUNGUS SPEC CULT: NORMAL
FUNGUS SPEC CULT: NORMAL
HCT VFR BLD AUTO: 36.4 % (ref 40–54)
HGB BLD-MCNC: 11.4 G/DL (ref 14–18)
IMM GRANULOCYTES # BLD AUTO: 0 K/UL (ref 0–0.04)
IMM GRANULOCYTES NFR BLD AUTO: 0 % (ref 0–0.5)
LYMPHOCYTES # BLD AUTO: 1.2 K/UL (ref 1–4.8)
LYMPHOCYTES NFR BLD: 53.5 % (ref 18–48)
MCH RBC QN AUTO: 27.3 PG (ref 27–31)
MCHC RBC AUTO-ENTMCNC: 31.3 G/DL (ref 32–36)
MCV RBC AUTO: 87 FL (ref 82–98)
MONOCYTES # BLD AUTO: 0.4 K/UL (ref 0.3–1)
MONOCYTES NFR BLD: 18.4 % (ref 4–15)
NEUTROPHILS # BLD AUTO: 0.4 K/UL (ref 1.8–7.7)
NEUTROPHILS NFR BLD: 19.3 % (ref 38–73)
NRBC BLD-RTO: 0 /100 WBC
OVALOCYTES BLD QL SMEAR: ABNORMAL
PLATELET # BLD AUTO: 216 K/UL (ref 150–450)
PLATELET BLD QL SMEAR: ABNORMAL
PMV BLD AUTO: 12 FL (ref 9.2–12.9)
POIKILOCYTOSIS BLD QL SMEAR: SLIGHT
RBC # BLD AUTO: 4.18 M/UL (ref 4.6–6.2)
WBC # BLD AUTO: 2.28 K/UL (ref 3.9–12.7)

## 2022-09-08 ENCOUNTER — TELEPHONE (OUTPATIENT)
Dept: INFECTIOUS DISEASES | Facility: CLINIC | Age: 67
End: 2022-09-08
Payer: MEDICARE

## 2022-09-10 ENCOUNTER — HOSPITAL ENCOUNTER (INPATIENT)
Facility: HOSPITAL | Age: 67
LOS: 3 days | Discharge: HOME HEALTH | DRG: 920 | End: 2022-09-13
Attending: EMERGENCY MEDICINE | Admitting: EMERGENCY MEDICINE
Payer: MEDICARE

## 2022-09-10 DIAGNOSIS — T81.30XA WOUND DEHISCENCE: Chronic | ICD-10-CM

## 2022-09-10 DIAGNOSIS — G96.00 POSTOPERATIVE CSF LEAK: ICD-10-CM

## 2022-09-10 DIAGNOSIS — M46.26 OSTEOMYELITIS OF LUMBAR SPINE: ICD-10-CM

## 2022-09-10 DIAGNOSIS — Z98.890 STATUS POST LUMBAR SURGERY: ICD-10-CM

## 2022-09-10 DIAGNOSIS — G97.82 POSTOPERATIVE CSF LEAK: ICD-10-CM

## 2022-09-10 DIAGNOSIS — Z98.890 HISTORY OF LUMBAR LAMINECTOMY: ICD-10-CM

## 2022-09-10 DIAGNOSIS — E11.9 TYPE 2 DIABETES MELLITUS WITHOUT COMPLICATION, WITHOUT LONG-TERM CURRENT USE OF INSULIN: ICD-10-CM

## 2022-09-10 DIAGNOSIS — M46.46 LUMBAR DISCITIS: Primary | ICD-10-CM

## 2022-09-10 LAB
ALBUMIN SERPL BCP-MCNC: 3.7 G/DL (ref 3.5–5.2)
ALP SERPL-CCNC: 64 U/L (ref 55–135)
ALT SERPL W/O P-5'-P-CCNC: 40 U/L (ref 10–44)
ANION GAP SERPL CALC-SCNC: 10 MMOL/L (ref 8–16)
ANISOCYTOSIS BLD QL SMEAR: SLIGHT
AST SERPL-CCNC: 45 U/L (ref 10–40)
BASOPHILS # BLD AUTO: 0.02 K/UL (ref 0–0.2)
BASOPHILS NFR BLD: 0.9 % (ref 0–1.9)
BILIRUB SERPL-MCNC: 0.4 MG/DL (ref 0.1–1)
BILIRUB UR QL STRIP: NEGATIVE
BUN SERPL-MCNC: 11 MG/DL (ref 8–23)
BURR CELLS BLD QL SMEAR: ABNORMAL
CALCIUM SERPL-MCNC: 8.9 MG/DL (ref 8.7–10.5)
CHLORIDE SERPL-SCNC: 107 MMOL/L (ref 95–110)
CLARITY UR: CLEAR
CO2 SERPL-SCNC: 22 MMOL/L (ref 23–29)
COLOR UR: YELLOW
CREAT SERPL-MCNC: 0.8 MG/DL (ref 0.5–1.4)
CRP SERPL-MCNC: 29.9 MG/L (ref 0–8.2)
DIFFERENTIAL METHOD: ABNORMAL
EOSINOPHIL # BLD AUTO: 0.1 K/UL (ref 0–0.5)
EOSINOPHIL NFR BLD: 5.8 % (ref 0–8)
ERYTHROCYTE [DISTWIDTH] IN BLOOD BY AUTOMATED COUNT: 15.3 % (ref 11.5–14.5)
ERYTHROCYTE [SEDIMENTATION RATE] IN BLOOD BY WESTERGREN METHOD: 45 MM/HR (ref 0–10)
EST. GFR  (NO RACE VARIABLE): >60 ML/MIN/1.73 M^2
GLUCOSE SERPL-MCNC: 132 MG/DL (ref 70–110)
GLUCOSE UR QL STRIP: NEGATIVE
HCT VFR BLD AUTO: 34.1 % (ref 40–54)
HGB BLD-MCNC: 10.8 G/DL (ref 14–18)
HGB UR QL STRIP: NEGATIVE
IMM GRANULOCYTES # BLD AUTO: 0 K/UL (ref 0–0.04)
IMM GRANULOCYTES NFR BLD AUTO: 0 % (ref 0–0.5)
KETONES UR QL STRIP: NEGATIVE
LACTATE SERPL-SCNC: 0.9 MMOL/L (ref 0.5–2.2)
LEUKOCYTE ESTERASE UR QL STRIP: NEGATIVE
LYMPHOCYTES # BLD AUTO: 1.2 K/UL (ref 1–4.8)
LYMPHOCYTES NFR BLD: 54 % (ref 18–48)
MCH RBC QN AUTO: 27.2 PG (ref 27–31)
MCHC RBC AUTO-ENTMCNC: 31.7 G/DL (ref 32–36)
MCV RBC AUTO: 86 FL (ref 82–98)
MONOCYTES # BLD AUTO: 0.3 K/UL (ref 0.3–1)
MONOCYTES NFR BLD: 11.6 % (ref 4–15)
NEUTROPHILS # BLD AUTO: 0.6 K/UL (ref 1.8–7.7)
NEUTROPHILS NFR BLD: 27.7 % (ref 38–73)
NITRITE UR QL STRIP: NEGATIVE
NRBC BLD-RTO: 0 /100 WBC
OVALOCYTES BLD QL SMEAR: ABNORMAL
PH UR STRIP: 7 [PH] (ref 5–8)
PLATELET # BLD AUTO: 196 K/UL (ref 150–450)
PLATELET BLD QL SMEAR: ABNORMAL
PMV BLD AUTO: 11.2 FL (ref 9.2–12.9)
POCT GLUCOSE: 153 MG/DL (ref 70–110)
POCT GLUCOSE: 194 MG/DL (ref 70–110)
POIKILOCYTOSIS BLD QL SMEAR: SLIGHT
POTASSIUM SERPL-SCNC: 4.5 MMOL/L (ref 3.5–5.1)
PROCALCITONIN SERPL IA-MCNC: 0.07 NG/ML
PROCALCITONIN SERPL IA-MCNC: 0.07 NG/ML
PROT SERPL-MCNC: 6.9 G/DL (ref 6–8.4)
PROT UR QL STRIP: ABNORMAL
RBC # BLD AUTO: 3.97 M/UL (ref 4.6–6.2)
SARS-COV-2 RDRP RESP QL NAA+PROBE: NEGATIVE
SODIUM SERPL-SCNC: 139 MMOL/L (ref 136–145)
SP GR UR STRIP: 1.02 (ref 1–1.03)
URN SPEC COLLECT METH UR: ABNORMAL
UROBILINOGEN UR STRIP-ACNC: NEGATIVE EU/DL
WBC # BLD AUTO: 2.24 K/UL (ref 3.9–12.7)

## 2022-09-10 PROCEDURE — 84145 PROCALCITONIN (PCT): CPT | Performed by: EMERGENCY MEDICINE

## 2022-09-10 PROCEDURE — 85651 RBC SED RATE NONAUTOMATED: CPT | Performed by: EMERGENCY MEDICINE

## 2022-09-10 PROCEDURE — 63600175 PHARM REV CODE 636 W HCPCS: Performed by: EMERGENCY MEDICINE

## 2022-09-10 PROCEDURE — 81003 URINALYSIS AUTO W/O SCOPE: CPT | Performed by: EMERGENCY MEDICINE

## 2022-09-10 PROCEDURE — 85025 COMPLETE CBC W/AUTO DIFF WBC: CPT | Performed by: EMERGENCY MEDICINE

## 2022-09-10 PROCEDURE — 36415 COLL VENOUS BLD VENIPUNCTURE: CPT | Performed by: EMERGENCY MEDICINE

## 2022-09-10 PROCEDURE — 83605 ASSAY OF LACTIC ACID: CPT | Performed by: EMERGENCY MEDICINE

## 2022-09-10 PROCEDURE — A9585 GADOBUTROL INJECTION: HCPCS | Performed by: EMERGENCY MEDICINE

## 2022-09-10 PROCEDURE — 25500020 PHARM REV CODE 255: Performed by: EMERGENCY MEDICINE

## 2022-09-10 PROCEDURE — 99285 EMERGENCY DEPT VISIT HI MDM: CPT | Mod: 25

## 2022-09-10 PROCEDURE — 80053 COMPREHEN METABOLIC PANEL: CPT | Performed by: EMERGENCY MEDICINE

## 2022-09-10 PROCEDURE — A4216 STERILE WATER/SALINE, 10 ML: HCPCS | Performed by: EMERGENCY MEDICINE

## 2022-09-10 PROCEDURE — 84145 PROCALCITONIN (PCT): CPT | Mod: 91 | Performed by: EMERGENCY MEDICINE

## 2022-09-10 PROCEDURE — 86140 C-REACTIVE PROTEIN: CPT | Performed by: EMERGENCY MEDICINE

## 2022-09-10 PROCEDURE — 25000003 PHARM REV CODE 250: Performed by: EMERGENCY MEDICINE

## 2022-09-10 PROCEDURE — 21400001 HC TELEMETRY ROOM

## 2022-09-10 PROCEDURE — U0002 COVID-19 LAB TEST NON-CDC: HCPCS | Performed by: EMERGENCY MEDICINE

## 2022-09-10 PROCEDURE — 87040 BLOOD CULTURE FOR BACTERIA: CPT | Mod: 59 | Performed by: EMERGENCY MEDICINE

## 2022-09-10 RX ORDER — MEROPENEM AND SODIUM CHLORIDE 500 MG/50ML
500 INJECTION, SOLUTION INTRAVENOUS
Status: DISCONTINUED | OUTPATIENT
Start: 2022-09-10 | End: 2022-09-13 | Stop reason: HOSPADM

## 2022-09-10 RX ORDER — TAMSULOSIN HYDROCHLORIDE 0.4 MG/1
1 CAPSULE ORAL DAILY
COMMUNITY
Start: 2022-08-14 | End: 2023-05-24

## 2022-09-10 RX ORDER — SODIUM CHLORIDE 0.9 % (FLUSH) 0.9 %
10 SYRINGE (ML) INJECTION EVERY 8 HOURS
Status: DISCONTINUED | OUTPATIENT
Start: 2022-09-10 | End: 2022-09-13 | Stop reason: HOSPADM

## 2022-09-10 RX ORDER — ACETAMINOPHEN 325 MG/1
650 TABLET ORAL EVERY 4 HOURS PRN
Status: DISCONTINUED | OUTPATIENT
Start: 2022-09-10 | End: 2022-09-13 | Stop reason: HOSPADM

## 2022-09-10 RX ORDER — TALC
6 POWDER (GRAM) TOPICAL NIGHTLY PRN
Status: DISCONTINUED | OUTPATIENT
Start: 2022-09-10 | End: 2022-09-13 | Stop reason: HOSPADM

## 2022-09-10 RX ORDER — METFORMIN HYDROCHLORIDE 1000 MG/1
1000 TABLET ORAL 2 TIMES DAILY WITH MEALS
COMMUNITY
End: 2023-06-06

## 2022-09-10 RX ORDER — INSULIN ASPART 100 [IU]/ML
0-5 INJECTION, SOLUTION INTRAVENOUS; SUBCUTANEOUS
Status: DISCONTINUED | OUTPATIENT
Start: 2022-09-10 | End: 2022-09-13 | Stop reason: HOSPADM

## 2022-09-10 RX ORDER — NALOXONE HCL 0.4 MG/ML
0.02 VIAL (ML) INJECTION
Status: DISCONTINUED | OUTPATIENT
Start: 2022-09-10 | End: 2022-09-13 | Stop reason: HOSPADM

## 2022-09-10 RX ORDER — POLYETHYLENE GLYCOL 3350 17 G/17G
17 POWDER, FOR SOLUTION ORAL DAILY PRN
Status: DISCONTINUED | OUTPATIENT
Start: 2022-09-10 | End: 2022-09-13 | Stop reason: HOSPADM

## 2022-09-10 RX ORDER — ENOXAPARIN SODIUM 100 MG/ML
40 INJECTION SUBCUTANEOUS EVERY 24 HOURS
Status: DISCONTINUED | OUTPATIENT
Start: 2022-09-10 | End: 2022-09-13 | Stop reason: HOSPADM

## 2022-09-10 RX ORDER — GLUCAGON 1 MG
1 KIT INJECTION
Status: DISCONTINUED | OUTPATIENT
Start: 2022-09-10 | End: 2022-09-13 | Stop reason: HOSPADM

## 2022-09-10 RX ORDER — IBUPROFEN 200 MG
16 TABLET ORAL
Status: DISCONTINUED | OUTPATIENT
Start: 2022-09-10 | End: 2022-09-13 | Stop reason: HOSPADM

## 2022-09-10 RX ORDER — FINASTERIDE 5 MG/1
5 TABLET, FILM COATED ORAL DAILY
COMMUNITY
Start: 2022-08-14 | End: 2023-05-24

## 2022-09-10 RX ORDER — IBUPROFEN 200 MG
24 TABLET ORAL
Status: DISCONTINUED | OUTPATIENT
Start: 2022-09-10 | End: 2022-09-13 | Stop reason: HOSPADM

## 2022-09-10 RX ORDER — ONDANSETRON 4 MG/1
4 TABLET, ORALLY DISINTEGRATING ORAL EVERY 8 HOURS PRN
Status: DISCONTINUED | OUTPATIENT
Start: 2022-09-10 | End: 2022-09-13 | Stop reason: HOSPADM

## 2022-09-10 RX ORDER — AMOXICILLIN 250 MG
1 CAPSULE ORAL 2 TIMES DAILY PRN
Status: DISCONTINUED | OUTPATIENT
Start: 2022-09-10 | End: 2022-09-13 | Stop reason: HOSPADM

## 2022-09-10 RX ORDER — GADOBUTROL 604.72 MG/ML
10 INJECTION INTRAVENOUS
Status: COMPLETED | OUTPATIENT
Start: 2022-09-10 | End: 2022-09-10

## 2022-09-10 RX ADMIN — VANCOMYCIN HYDROCHLORIDE 2500 MG: 10 INJECTION, POWDER, LYOPHILIZED, FOR SOLUTION INTRAVENOUS at 09:09

## 2022-09-10 RX ADMIN — ACETAMINOPHEN 650 MG: 325 TABLET ORAL at 03:09

## 2022-09-10 RX ADMIN — ENOXAPARIN SODIUM 40 MG: 100 INJECTION SUBCUTANEOUS at 05:09

## 2022-09-10 RX ADMIN — Medication 10 ML: at 09:09

## 2022-09-10 RX ADMIN — PIPERACILLIN SODIUM AND TAZOBACTAM SODIUM 4.5 G: 4; .5 INJECTION, POWDER, LYOPHILIZED, FOR SOLUTION INTRAVENOUS at 11:09

## 2022-09-10 RX ADMIN — GADOBUTROL 10 ML: 604.72 INJECTION INTRAVENOUS at 07:09

## 2022-09-10 RX ADMIN — MEROPENEM AND SODIUM CHLORIDE 500 MG: 500 INJECTION, SOLUTION INTRAVENOUS at 07:09

## 2022-09-10 NOTE — PROGRESS NOTES
"Pt arrived from ED and in room 207  Applied pt to tele monitor   Confirmed with MTR   BP (!) 142/78   Pulse 62   Temp (S) 98.4 °F (36.9 °C) (Oral)   Resp 18   Ht 5' 7" (1.702 m)   Wt 105.4 kg (232 lb 5.8 oz)   SpO2 100%   BMI 36.39 kg/m²    MD notified of pts arrival   "

## 2022-09-10 NOTE — ASSESSMENT & PLAN NOTE
Has developed persistent fever, despite over 4 weeks of IV abx  MRI shows persistent Discitis/Osteomyelitis L2-3    D/w ID- will treat withy IV Merrem and Vanc

## 2022-09-10 NOTE — H&P
[facility] Medicine  History & Physical    Patient Name: Friday ROGELIO Blake  MRN: [id number]  Patient Class: IP- Inpatient  Admission Date: 9/10/2022  Attending Physician: Kuldeep Bermudez MD   Primary Care Provider: Sid Elizabeth MD         Patient information was obtained from patient, past medical records and ER records.     Subjective:     Principal Problem:Lumbar discitis    Chief Complaint:   Chief Complaint   Patient presents with    Fever     Pt had surg on spine several wks ago. Pt being seen by home health for ongoing abx tx for infection to surg site. Pt reports running fever x1 wk        HPI: 67 y/o AAM with Hx of HTN, HLD, DM2, and obesity, recently had Covid, s/p who presented to the ER for fever for 1 week. He reports temperatures have been running around 100 for 1 week. He continued to take Tylenol but the fever returns. He last took Tylenol 4 hours prior to arrival. Denies all other complaints such as sore throat, cough, rhinorrhea, nasal congestion, ear pain, chest pain, shortness of breath, abdominal pain, nausea, vomiting, diarrhea. He reports some mild pain in his lower back and states that his wife noticed that the swelling in his back appears to have increased. He is s/p lumbar laminectomy with discectomy 6/22 followed by multiple Lumbar drainage and developed L2-3 Discitis and Osteomyelitis for which he was admitted here 7/29 to 8/12 and discharged home with home infusion with IV Zosyn and PO Doxycycline for 4 more weeks and 5 weeks respectively and he was to complete his Abx on 9/23. He ESR and CRP has been steadily falling and Dr. Ayesha BALDWIN has been following him regularly and was to be seen by NSGY Dr. Reyes this coming Tuesday.     In the ER, VSS, Afeb. CBC, CMP, UA and other labs unremarkable, ESR 45, CRP 30- all declining but CT L/S showed Prior L2-3 laminectomy and discectomy again noted. Persistent findings of L2-3 discitis osteomyelitis with an epidural  phlegmon causing L2-3 central canal stenosis.  Adjacent posterior paravertebral fluid collections are again noted with measurements above, possibly abscesses or postoperative seromas.    Pt is being [facility] for IV Abx and Neurosurgical and ID eval and tx.              Past Medical History:   Diagnosis Date    Bilateral carpal tunnel syndrome 2/25/2022    moderate demyelinating bilaterally    Carpal tunnel syndrome     Diabetes mellitus without complication     Fever 6/30/2022    Gram-positive bacteremia 7/2/2022    History of colon polyps     Hypercalcemia 3/23/2021    Hyperlipidemia associated with type 2 diabetes mellitus     Hypertension associated with diabetes     Left carpal tunnel syndrome 3/23/2021    Low back pain 9/28/2021    Lumbar disc disease with radiculopathy     Morbid obesity with BMI of 40.0-44.9, adult     Postoperative hematoma involving nervous system following nervous system procedure 6/28/2022    S/P parathyroidectomy 7/29/2021    Vitamin D deficiency        Past Surgical History:   Procedure Laterality Date    CARPAL TUNNEL RELEASE Left 4/1/2021    Procedure: RELEASE, CARPAL TUNNEL;  Surgeon: Yoandy David MD;  Location: [facility] OR;  Service: Orthopedics;  Laterality: Left;    HERNIA REPAIR      INCISION AND DRAINAGE OF HEMATOMA N/A 6/28/2022    Procedure: INCISION AND DRAINAGE, HEMATOMA;  Surgeon: Shaggy Zepeda MD;  Location: [facility] OR;  Service: Neurosurgery;  Laterality: N/A;    LUMBAR LAMINECTOMY WITH DISCECTOMY Left 6/22/2022    Procedure: LAMINECTOMY, SPINE, LUMBAR, WITH DISCECTOMY;  Surgeon: Shaggy Zepeda MD;  Location: [facility] OR;  Service: Neurosurgery;  Laterality: Left;  minimally invasive L2-3 discectomy and decompression     LUMBAR LAMINECTOMY WITH DISCECTOMY N/A 6/28/2022    Procedure: LAMINECTOMY, SPINE, LUMBAR, WITH DISCECTOMY;  Surgeon: Shaggy Zepeda MD;  Location: [facility] OR;  Service: Neurosurgery;  Laterality: N/A;     PARATHYROIDECTOMY Bilateral 7/27/2021    Procedure: PARATHYROIDECTOMY;  Surgeon: Tha Dial MD;  Location: [facility] OR;  Service: ENT;  Laterality: Bilateral;  with medialstinal exploration    REPAIR OF CEREBROSPINAL FLUID LEAK USING COMPUTER ASSISTED NAVIGATION Bilateral 8/3/2022    Procedure: REPAIR, CSF LEAK, USING COMPUTER-ASSISTED NAVIGATION;  Surgeon: Shaggy Zepeda MD;  Location: [facility] OR;  Service: Neurosurgery;  Laterality: Bilateral;    SELECTIVE INJECTION OF ANESTHETIC AGENT AROUND LUMBAR SPINAL NERVE ROOT BY TRANSFORAMINAL APPROACH Left 3/23/2022    Procedure: BLOCK, SPINAL NERVE ROOT, LUMBAR, SELECTIVE, TRANSFORAMINAL APPROACH Left L2-3, L3-4 RN IV sedation;  Surgeon: Jay Hodges MD;  Location: [facility] PAIN MGT;  Service: Pain Management;  Laterality: Left;    STERNOTOMY N/A 7/27/2021    Procedure: STERNOTOMY;  Surgeon: Tha Dial MD;  Location: [facility] OR;  Service: ENT;  Laterality: N/A;    ULNAR NERVE TRANSPOSITION Left 4/1/2021    Procedure: TRANSPOSITION, NERVE, ULNAR;  Surgeon: Yoandy David MD;  Location: [facility] OR;  Service: Orthopedics;  Laterality: Left;    ULNAR TUNNEL RELEASE Left 4/1/2021    Procedure: RELEASE, CUBITAL TUNNEL;  Surgeon: Yoandy David MD;  Location: [facility] OR;  Service: Orthopedics;  Laterality: Left;    UMBILICAL HERNIA REPAIR      WOUND EXPLORATION Bilateral 8/3/2022    Procedure: EXPLORATION, WOUND;  Surgeon: Shaggy Zepeda MD;  Location: [facility];  Service: Neurosurgery;  Laterality: Bilateral;       Review of patient's allergies indicates:  No Known Allergies    No current facility-administered medications on file prior to encounter.     Current Outpatient Medications on File Prior to Encounter   Medication Sig    aspirin (ECOTRIN) 81 MG EC tablet Take 1 tablet (81 mg total) by mouth once daily.    atorvastatin (LIPITOR) 10 MG tablet Take 1 tablet (10 mg total) by mouth every evening.    doxycycline (VIBRA-TABS) 100 MG tablet Take 1 tablet (100 mg  total) by mouth every 12 (twelve) hours.    finasteride (PROSCAR) 5 mg tablet Take 5 mg by mouth once daily.    gabapentin (NEURONTIN) 300 MG capsule Take 1 capsule (300 mg total) by mouth 3 (three) times daily.    irbesartan (AVAPRO) 150 MG tablet Take 1 tablet by mouth once daily    metFORMIN (GLUCOPHAGE) 1000 MG tablet Take 1,000 mg by mouth 2 (two) times daily with meals.    methocarbamoL (ROBAXIN) 750 MG Tab Take 1 tablet (750 mg total) by mouth 3 (three) times daily as needed (muscle spasms).    tamsulosin (FLOMAX) 0.4 mg Cap Take 1 capsule by mouth once daily.    [DISCONTINUED] docusate sodium (COLACE) 100 MG capsule Take 1 capsule (100 mg total) by mouth 2 (two) times daily.    [DISCONTINUED] empagliflozin (JARDIANCE) 10 mg tablet Take 1 tablet (10 mg total) by mouth once daily.    [DISCONTINUED] metFORMIN (GLUCOPHAGE) 500 MG tablet Take 1 tablet (500 mg total) by mouth 2 (two) times daily with meals.    [DISCONTINUED] senna-docusate 8.6-50 mg (SENNA LAXATIVE-STOOL SOFTENER) 8.6-50 mg per tablet Take 1 tablet by mouth once daily.    blood sugar diagnostic Strp To check BG 1 times daily, to use with insurance preferred meter    lancets Misc To check BG 1 times daily, to use with insurance preferred meter    [DISCONTINUED] blood-glucose meter kit To check BG 1 times daily, to use with insurance preferred meter    [DISCONTINUED] lancets (ONETOUCH ULTRASOFT LANCETS) Misc Check FS daily    [DISCONTINUED] ONETOUCH VERIO TEST STRIPS Strp USE 1 STRIP TO CHECK GLUCOSE ONCE DAILY    [DISCONTINUED] oxyCODONE-acetaminophen (PERCOCET)  mg per tablet Take 1 tablet by mouth every 6 (six) hours as needed for Pain.    [DISCONTINUED] piperacillin sodium/tazobactam (PIPERACILLIN-TAZOBACTAM 4.5G/100ML D5W IVPB, READY TO MIX,) Inject 100 mLs (4.5 g total) into the vein every 8 (eight) hours.     Family History       Problem Relation (Age of Onset)    Asthma Mother    Diabetes Mellitus Father     Hypertension Father    Prostate cancer Brother          Tobacco Use    Smoking status: Never    Smokeless tobacco: Never   Substance and Sexual Activity    Alcohol use: Never    Drug use: Never    Sexual activity: Yes     Partners: Female     Review of Systems   Constitutional:  Positive for activity change, chills and fever. Negative for diaphoresis and fatigue.   HENT: Negative.     Eyes: Negative.    Respiratory: Negative.  Negative for shortness of breath.    Cardiovascular: Negative.  Negative for chest pain, palpitations and leg swelling.   Gastrointestinal: Negative.    Endocrine: Negative.    Genitourinary: Negative.    Musculoskeletal:  Positive for back pain.   Allergic/Immunologic: Negative.    Neurological: Negative.    Hematological: Negative.    Psychiatric/Behavioral:  Positive for dysphoric mood.    Objective:     Vital Signs (Most Recent):  Temp: 99.2 °F (37.3 °C) (09/10/22 1540)  Pulse: 69 (09/10/22 1540)  Resp: 18 (09/10/22 1540)  BP: (!) 157/79 (09/10/22 1540)  SpO2: 100 % (09/10/22 1246)   Vital Signs (24h Range):  Temp:  [98.4 °F (36.9 °C)-99.4 °F (37.4 °C)] 99.2 °F (37.3 °C)  Pulse:  [62-77] 69  Resp:  [14-20] 18  SpO2:  [97 %-100 %] 100 %  BP: (121-159)/() 157/79     Weight: 105.4 kg (232 lb 5.8 oz)  Body mass index is 36.39 kg/m².    Physical Exam  Vitals and nursing note reviewed.   Constitutional:       Appearance: Normal appearance. He is well-developed. He is obese.   HENT:      Head: Normocephalic and atraumatic.      Right Ear: External ear normal.      Left Ear: External ear normal.      Nose: Nose normal.   Eyes:      Extraocular Movements: EOM normal.      Conjunctiva/sclera: Conjunctivae normal.      Pupils: Pupils are equal, round, and reactive to light.   Cardiovascular:      Rate and Rhythm: Normal rate and regular rhythm.      Heart sounds: Normal heart sounds.   Pulmonary:      Effort: Pulmonary effort is normal.      Breath sounds: Normal breath sounds.    Abdominal:      General: Bowel sounds are normal.      Palpations: Abdomen is soft.   Genitourinary:     Penis: Normal.       Prostate: Normal.      Rectum: Normal.   Musculoskeletal:         General: Swelling and tenderness present. Normal range of motion.      Cervical back: Normal range of motion and neck supple.      Comments: L/S midline scar well healed but large area of mild swelling which is fluctuant with minimal tenderness, no redness or warmth   Skin:     General: Skin is warm and dry.      Capillary Refill: Capillary refill takes less than 2 seconds.   Neurological:      General: No focal deficit present.      Mental Status: He is alert and oriented to person, place, and time.      Deep Tendon Reflexes: Reflexes are normal and symmetric.   Psychiatric:         Behavior: Behavior normal.         Thought Content: Thought content normal.         Judgment: Judgment normal.         CRANIAL NERVES     CN III, IV, VI   Pupils are equal, round, and reactive to light.  Extraocular motions are normal.   Results for orders placed or performed during the hospital encounter of 09/10/22   Blood culture #1 **CANNOT BE ORDERED STAT**    Specimen: Peripheral, Hand, Right; Blood   Result Value Ref Range    Blood Culture, Routine No Growth to date    Blood culture #2 **CANNOT BE ORDERED STAT**    Specimen: Peripheral, Antecubital, Left; Blood   Result Value Ref Range    Blood Culture, Routine No Growth to date    Comprehensive metabolic panel   Result Value Ref Range    Sodium 139 136 - 145 mmol/L    Potassium 4.5 3.5 - 5.1 mmol/L    Chloride 107 95 - 110 mmol/L    CO2 22 (L) 23 - 29 mmol/L    Glucose 132 (H) 70 - 110 mg/dL    BUN 11 8 - 23 mg/dL    Creatinine 0.8 0.5 - 1.4 mg/dL    Calcium 8.9 8.7 - 10.5 mg/dL    Total Protein 6.9 6.0 - 8.4 g/dL    Albumin 3.7 3.5 - 5.2 g/dL    Total Bilirubin 0.4 0.1 - 1.0 mg/dL    Alkaline Phosphatase 64 55 - 135 U/L    AST 45 (H) 10 - 40 U/L    ALT 40 10 - 44 U/L    Anion Gap 10 8 -  16 mmol/L    eGFR >60 >60 mL/min/1.73 m^2   CBC auto differential   Result Value Ref Range    WBC 2.24 (L) 3.90 - 12.70 K/uL    RBC 3.97 (L) 4.60 - 6.20 M/uL    Hemoglobin 10.8 (L) 14.0 - 18.0 g/dL    Hematocrit 34.1 (L) 40.0 - 54.0 %    MCV 86 82 - 98 fL    MCH 27.2 27.0 - 31.0 pg    MCHC 31.7 (L) 32.0 - 36.0 g/dL    RDW 15.3 (H) 11.5 - 14.5 %    Platelets 196 150 - 450 K/uL    MPV 11.2 9.2 - 12.9 fL    Immature Granulocytes 0.0 0.0 - 0.5 %    Gran # (ANC) 0.6 (L) 1.8 - 7.7 K/uL    Immature Grans (Abs) 0.00 0.00 - 0.04 K/uL    Lymph # 1.2 1.0 - 4.8 K/uL    Mono # 0.3 0.3 - 1.0 K/uL    Eos # 0.1 0.0 - 0.5 K/uL    Baso # 0.02 0.00 - 0.20 K/uL    nRBC 0 0 /100 WBC    Gran % 27.7 (L) 38.0 - 73.0 %    Lymph % 54.0 (H) 18.0 - 48.0 %    Mono % 11.6 4.0 - 15.0 %    Eosinophil % 5.8 0.0 - 8.0 %    Basophil % 0.9 0.0 - 1.9 %    Platelet Estimate Decreased (A)     Aniso Slight     Poik Slight     Ovalocytes Occasional     [place] Cells Occasional     Differential Method Automated    Lactic acid, plasma   Result Value Ref Range    Lactate (Lactic Acid) 0.9 0.5 - 2.2 mmol/L   Sedimentation rate   Result Value Ref Range    Sed Rate 45 (H) 0 - 10 mm/Hr   C-reactive protein   Result Value Ref Range    CRP 29.9 (H) 0.0 - 8.2 mg/L   Urinalysis, Reflex to Urine Culture Urine, Clean Catch    Specimen: Urine   Result Value Ref Range    Specimen UA Urine, Clean Catch     Color, UA Yellow Yellow, Straw, Jazmyn    Appearance, UA Clear Clear    pH, UA 7.0 5.0 - 8.0    Specific Gravity, UA 1.020 1.005 - 1.030    Protein, UA Trace (A) Negative    Glucose, UA Negative Negative    Ketones, UA Negative Negative    Bilirubin (UA) Negative Negative    Occult Blood UA Negative Negative    Nitrite, UA Negative Negative    Urobilinogen, UA Negative <2.0 EU/dL    Leukocytes, UA Negative Negative   COVID-19 Rapid Screening   Result Value Ref Range    SARS-CoV-2 RNA, Amplification, Qual Negative Negative   Procalcitonin   Result Value Ref Range     Procalcitonin 0.07 <0.25 ng/mL   Procalcitonin   Result Value Ref Range    Procalcitonin 0.07 <0.25 ng/mL   POCT glucose   Result Value Ref Range    POCT Glucose 153 (H) 70 - 110 mg/dL      Significant Labs: All pertinent labs within the past 24 hours have been reviewed.  Imaging Results              MRI Lumbar Spine W WO Cont (Final result)  Result time 09/10/22 09:40:01      Final result by Mika Marquis III, MD (09/10/22 09:40:01)                   Impression:      Prior L2-3 laminectomy and discectomy again noted.  Persistent findings of L2-3 discitis osteomyelitis with an epidural phlegmon causing L2-3 central canal stenosis.  Adjacent posterior paravertebral fluid collections are again noted with measurements above, possibly abscesses or postoperative seromas.      Electronically signed by: Mika Marquis MD  Date:    09/10/2022  Time:    09:40               Narrative:    EXAMINATION:  MRI LUMBAR SPINE W WO CONTRAST    CLINICAL HISTORY:  Low back pain, infection suspected;    COMPARISON:  LUMBAR MRI 07/30/2022    FINDINGS:  Again noted is prior L2-3 laminectomy and discectomy.  Persistent erosive change of the L2-3 endplates, T2 hyperintense signal and enhancement of the L2-3 disc space, surrounding T2 hyperintense marrow signal and enhancement compatible with discitis and osteomyelitis.  There is a rim enhancing fluid collection in the L2-3 posterior soft tissues within the laminectomy bed measuring approximately 3.1 x 1.2 x 2.0 cm, previously 3.1 x 2.7 x 2.3 cm.  Interval decrease in size of the adjacent rim enhancing fluid collections adjacent to the L3 spinous process measuring approximately 1.3 x 0.9 cm, previously 3.5 x 2.0 cm.  In the more posterior paravertebral soft tissues from the levels of L1 through L4, there is a 10.6 x 5.5 x 2.4 cm rim enhancing fluid collection, previously 9.2 x 3.8 by 2.5 cm.  there is enhancing epidural soft tissue thickening greatest along the left-sided L2-3 compatible  with phlegmonous change with mass effect upon the descending nerve roots and significant central canal stenosis.  No residual epidural abscess identified.  No other fluid collections or sites of discitis identified.  No fractures identified.  Normal alignment.  Stable degenerative disc bulging and facet arthropathy at the remaining levels with mild central canal stenosis at L1-2 and L3-4.                                       X-Ray Chest AP Portable (Final result)  Result time 09/10/22 08:12:46      Final result by Mika Marquis III, MD (09/10/22 08:12:46)                   Impression:      No acute findings.      Electronically signed by: Mika Marquis MD  Date:    09/10/2022  Time:    08:12               Narrative:    EXAMINATION:  XR CHEST AP PORTABLE    CLINICAL HISTORY:  fever;    FINDINGS:  Comparison is made with the most recent prior chest x-ray.  Sternal sutures are again noted.  The cardiomediastinal silhouette is within normal limits for AP technique. The lungs appear clear of active disease. No acute appearing infiltrate, pleural effusion or pneumothorax identified.                                       CT Lumbar Spine Without Contrast (Final result)  Result time 09/10/22 09:15:21      Final result by Mika Marquis III, MD (09/10/22 09:15:21)                   Impression:      Status post L2-3 discectomy and laminectomy with findings suspicious for L2-3 discitis and osteomyelitis, progressed since prior lumbar CT from 06/30/2022.  Suspected L2-3 epidural phlegmon.  Adjacent posterior paravertebral fluid collections, possibly postoperative hematomas or abscess.  Please see separately dictated lumbar spine MRI report from today for additional details.      Electronically signed by: Mika Marquis MD  Date:    09/10/2022  Time:    09:15               Narrative:    EXAMINATION:  CT LUMBAR SPINE WITHOUT CONTRAST    CLINICAL HISTORY:  Low back pain, infection suspected;    TECHNIQUE:  Axial noncontrast CT  scan of the lumbar spine with sagittal and coronal reconstructions. All CT scans at this facility are performed  using dose modulation techniques as appropriate to performed exam including the following:  automated exposure control; adjustment of mA and/or kV according to the patients size (this includes techniques or standardized protocols for targeted exams where dose is matched to indication/reason for exam: i.e. extremities or head);  iterative reconstruction technique.    COMPARISON:  Lumbar CT 06/30/2022. lumbar MRI 07/30/2022    FINDINGS:  Prior L2-3 laminectomy and discectomy again noted.  Progressive erosive change of the L2-3 endplates since prior CT characteristic of discitis and osteomyelitis.  There is heterogeneous soft tissue thickening of the epidural space suggesting phlegmonous change.  Small epidural abscess cannot be excluded by CT.  3.1 x 1.0 cm posterior paravertebral fluid collection at the L2 laminectomy site, possibly postoperative seroma or abscess.  There is another fluid collection in the more posterior superficial paravertebral soft tissues which is excluded from the field of view and will be detailed on separately dictated lumbar MRI report from today.  There is prominent surrounding inflammatory or postoperative changes of the posterior paraspinal soft tissues.  No other sites of discitis identified.  No other fluid collections identified.  No fracture is identified.  Joint alignment is anatomic.  Stable multilevel lumbar degenerative disc disease with disc bulging and facet arthropathy which will be detailed on separately dictated lumbar spine MRI report from today.                                     Significant Imaging: I have reviewed all pertinent imaging results/findings within the past 24 hours.    Assessment/Plan:     * Lumbar discitis with Osteo L2-3  Has developed persistent fever, despite over 4 weeks of IV abx  MRI shows persistent Discitis/Osteomyelitis L2-3    D/w ID-  will treat withy IV Merrem and Vanc       Type 2 diabetes mellitus without complication, without long-term current use of insulin  Patient's FSGs are controlled on current medication regimen.  Last A1c reviewed-   Lab Results   Component Value Date    HGBA1C 8.3 (H) 05/18/2022     Most recent fingerstick glucose reviewed-   Recent Labs   Lab 09/10/22  1541   POCTGLUCOSE 153*     Current correctional scale  Low  Maintain anti-hyperglycemic dose as follows-   Antihyperglycemics (From admission, onward)    Start     Stop Route Frequency Ordered    09/10/22 1612  insulin aspart U-100 pen 0-5 Units         -- SubQ Before meals & nightly PRN 09/10/22 1515        Hold Oral hypoglycemics while patient is in the hospital.    Check A1c in am      VTE Risk Mitigation (From admission, onward)         Ordered     enoxaparin injection 40 mg  Daily         09/10/22 1515     IP VTE HIGH RISK PATIENT  Once         09/10/22 1515     Place sequential compression device  Until discontinued         09/10/22 1515                   Kuldeep Bermudez MD  Department of Hospital Medicine   [facility] - Telemetry ([facility])

## 2022-09-10 NOTE — HPI
67 y/o AAM with Hx of HTN, HLD, DM2, and obesity, recently had Covid, s/p who presented to the ER for fever for 1 week. He reports temperatures have been running around 100 for 1 week. He continued to take Tylenol but the fever returns. He last took Tylenol 4 hours prior to arrival. Denies all other complaints such as sore throat, cough, rhinorrhea, nasal congestion, ear pain, chest pain, shortness of breath, abdominal pain, nausea, vomiting, diarrhea. He reports some mild pain in his lower back and states that his wife noticed that the swelling in his back appears to have increased. He is s/p lumbar laminectomy with discectomy 6/22 followed by multiple Lumbar drainage and developed L2-3 Discitis and Osteomyelitis for which he was admitted here 7/29 to 8/12 and discharged home with home infusion with IV Zosyn and PO Doxycycline for 4 more weeks and 5 weeks respectively and he was to complete his Abx on 9/23. He ESR and CRP has been steadily falling and Dr. Ayesha BALDWIN has been following him regularly and was to be seen by NSGY Dr. Reyes this coming Tuesday.     In the ER, VSS, Afeb. CBC, CMP, UA and other labs unremarkable, ESR 45, CRP 30- all declining but CT L/S showed Prior L2-3 laminectomy and discectomy again noted. Persistent findings of L2-3 discitis osteomyelitis with an epidural phlegmon causing L2-3 central canal stenosis.  Adjacent posterior paravertebral fluid collections are again noted with measurements above, possibly abscesses or postoperative seromas.    Pt is being admitted to [facility] for IV Abx and Neurosurgical and NICOLASA pacheco and tx.

## 2022-09-10 NOTE — ED PROVIDER NOTES
Encounter Date: 9/10/2022       History     Chief Complaint   Patient presents with    Fever     Pt had surg on spine several wks ago. Pt being seen by home health for ongoing abx tx for infection to surg site. Pt reports running fever x1 wk     Patient is a 66-year-old male who presents today with complaints of fever for 1 week.  Patient has a history of lumbar laminectomy with discectomy.  He was hospitalized for postoperative infection and has been receiving an antibiotic through his PICC line.  He is unsure the name of that antibiotic.  He reports temperatures have been running around 100 for 1 week.  He continues to take Tylenol and the fever returns.  He last took Tylenol 4 hours prior to arrival.  Denies all other complaints such as sore throat, cough, rhinorrhea, nasal congestion, ear pain, chest pain, shortness of breath, abdominal pain, nausea, vomiting, diarrhea.  He reports some mild pain in his lower back      Review of patient's allergies indicates:  No Known Allergies  Past Medical History:   Diagnosis Date    Bilateral carpal tunnel syndrome 2/25/2022    moderate demyelinating bilaterally    Carpal tunnel syndrome     Diabetes mellitus without complication     Fever 6/30/2022    Gram-positive bacteremia 7/2/2022    History of colon polyps     Hypercalcemia 3/23/2021    Hyperlipidemia associated with type 2 diabetes mellitus     Hypertension associated with diabetes     Left carpal tunnel syndrome 3/23/2021    Low back pain 9/28/2021    Lumbar disc disease with radiculopathy     Morbid obesity with BMI of 40.0-44.9, adult     Postoperative hematoma involving nervous system following nervous system procedure 6/28/2022    S/P parathyroidectomy 7/29/2021    Vitamin D deficiency      Past Surgical History:   Procedure Laterality Date    CARPAL TUNNEL RELEASE Left 4/1/2021    Procedure: RELEASE, CARPAL TUNNEL;  Surgeon: Yoandy David MD;  Location: [facility];  Service: Orthopedics;  Laterality:  Left;    HERNIA REPAIR      INCISION AND DRAINAGE OF HEMATOMA N/A 6/28/2022    Procedure: INCISION AND DRAINAGE, HEMATOMA;  Surgeon: Shaggy Zepeda MD;  Location: [facility] OR;  Service: Neurosurgery;  Laterality: N/A;    LUMBAR LAMINECTOMY WITH DISCECTOMY Left 6/22/2022    Procedure: LAMINECTOMY, SPINE, LUMBAR, WITH DISCECTOMY;  Surgeon: Shaggy Zepeda MD;  Location: [facility] OR;  Service: Neurosurgery;  Laterality: Left;  minimally invasive L2-3 discectomy and decompression     LUMBAR LAMINECTOMY WITH DISCECTOMY N/A 6/28/2022    Procedure: LAMINECTOMY, SPINE, LUMBAR, WITH DISCECTOMY;  Surgeon: Shaggy Zepeda MD;  Location: [facility] OR;  Service: Neurosurgery;  Laterality: N/A;    PARATHYROIDECTOMY Bilateral 7/27/2021    Procedure: PARATHYROIDECTOMY;  Surgeon: Tha Dial MD;  Location: [facility] OR;  Service: ENT;  Laterality: Bilateral;  with medialstinal exploration    REPAIR OF CEREBROSPINAL FLUID LEAK USING COMPUTER ASSISTED NAVIGATION Bilateral 8/3/2022    Procedure: REPAIR, CSF LEAK, USING COMPUTER-ASSISTED NAVIGATION;  Surgeon: Shaggy Zepeda MD;  Location: [facility] OR;  Service: Neurosurgery;  Laterality: Bilateral;    SELECTIVE INJECTION OF ANESTHETIC AGENT AROUND LUMBAR SPINAL NERVE ROOT BY TRANSFORAMINAL APPROACH Left 3/23/2022    Procedure: BLOCK, SPINAL NERVE ROOT, LUMBAR, SELECTIVE, TRANSFORAMINAL APPROACH Left L2-3, L3-4 RN IV sedation;  Surgeon: Jay Hodges MD;  Location: [facility];  Service: Pain Management;  Laterality: Left;    STERNOTOMY N/A 7/27/2021    Procedure: STERNOTOMY;  Surgeon: Tha Dial MD;  Location: [facility] OR;  Service: ENT;  Laterality: N/A;    ULNAR NERVE TRANSPOSITION Left 4/1/2021    Procedure: TRANSPOSITION, NERVE, ULNAR;  Surgeon: Yoandy David MD;  Location: [facility] OR;  Service: Orthopedics;  Laterality: Left;    ULNAR TUNNEL RELEASE Left 4/1/2021    Procedure: RELEASE, CUBITAL TUNNEL;  Surgeon: Yoandy David MD;  Location: [facility] OR;  Service: Orthopedics;   Laterality: Left;    UMBILICAL HERNIA REPAIR      WOUND EXPLORATION Bilateral 8/3/2022    Procedure: EXPLORATION, WOUND;  Surgeon: Shaggy Zepeda MD;  Location: [facility];  Service: Neurosurgery;  Laterality: Bilateral;     Family History   Problem Relation Age of Onset    Asthma Mother     Hypertension Father     Diabetes Mellitus Father     Prostate cancer Brother      Social History     Tobacco Use    Smoking status: Never    Smokeless tobacco: Never   Substance Use Topics    Alcohol use: Never    Drug use: Never     Review of Systems   Constitutional:  Positive for fever. Negative for chills and diaphoresis.   HENT:  Negative for congestion, rhinorrhea and sore throat.    Eyes:  Negative for pain, redness and visual disturbance.   Respiratory:  Negative for cough and shortness of breath.    Cardiovascular:  Negative for chest pain, palpitations and leg swelling.   Gastrointestinal:  Negative for abdominal distention, abdominal pain, blood in stool, constipation, diarrhea, nausea and vomiting.   Genitourinary:  Negative for dysuria and hematuria.   Musculoskeletal:  Positive for back pain. Negative for arthralgias and joint swelling.   Skin:  Negative for rash and wound.   Neurological:  Negative for seizures, syncope and headaches.   All other systems reviewed and are negative.    Physical Exam     Initial Vitals [09/10/22 0116]   BP Pulse Resp Temp SpO2   (!) 155/86 67 14 99.4 °F (37.4 °C) 98 %      MAP       --         Physical Exam    Nursing note and vitals reviewed.  Constitutional: He appears well-developed and well-nourished. No distress.   HENT:   Head: Normocephalic and atraumatic.   Mouth/Throat: Oropharynx is clear and moist.   Eyes: Conjunctivae and EOM are normal. Pupils are equal, round, and reactive to light.   Neck: Neck supple. No tracheal deviation present.   Cardiovascular:  Normal rate, regular rhythm, normal heart sounds and intact distal pulses.           Pulmonary/Chest: Breath sounds  normal. No respiratory distress.   Abdominal: Abdomen is soft. He exhibits no distension. There is no abdominal tenderness. There is no rebound and no guarding.   Musculoskeletal:         General: No tenderness or edema. Normal range of motion.      Cervical back: Neck supple.      Comments: Lumbar surgical incision overlying area of fluctuance.  No erythema.  No purulence     Neurological: He is alert and oriented to person, place, and time.   No focal deficits   Skin: Skin is warm. No rash noted. No erythema.   Psychiatric: He has a normal mood and affect. His behavior is normal.       ED Course   Critical Care    Date/Time: 9/10/2022 12:21 PM  Performed by: Joanne Ca MD  Authorized by: Joanne Ca MD   Direct patient critical care time: 25 minutes  Additional history critical care time: 5 minutes  Ordering / reviewing critical care time: 5 minutes  Documentation critical care time: 5 minutes  Consulting other physicians critical care time: 5 minutes  Total critical care time (exclusive of procedural time) : 45 minutes  Critical care was necessary to treat or prevent imminent or life-threatening deterioration of the following conditions: spinal infection.  Critical care was time spent personally by me on the following activities: blood draw for specimens, development of treatment plan with patient or surrogate, discussions with consultants, discussions with primary provider, interpretation of cardiac output measurements, evaluation of patient's response to treatment, examination of patient, obtaining history from patient or surrogate, ordering and performing treatments and interventions, ordering and review of laboratory studies, ordering and review of radiographic studies, re-evaluation of patient's condition, pulse oximetry and review of old charts.      Labs Reviewed   COMPREHENSIVE METABOLIC PANEL - Abnormal; Notable for the following components:       Result Value    CO2 22 (*)     Glucose 132 (*)      AST 45 (*)     All other components within normal limits   CBC W/ AUTO DIFFERENTIAL - Abnormal; Notable for the following components:    WBC 2.24 (*)     RBC 3.97 (*)     Hemoglobin 10.8 (*)     Hematocrit 34.1 (*)     MCHC 31.7 (*)     RDW 15.3 (*)     Gran # (ANC) 0.6 (*)     Gran % 27.7 (*)     Lymph % 54.0 (*)     Platelet Estimate Decreased (*)     All other components within normal limits   SEDIMENTATION RATE - Abnormal; Notable for the following components:    Sed Rate 45 (*)     All other components within normal limits   C-REACTIVE PROTEIN - Abnormal; Notable for the following components:    CRP 29.9 (*)     All other components within normal limits   URINALYSIS, REFLEX TO URINE CULTURE - Abnormal; Notable for the following components:    Protein, UA Trace (*)     All other components within normal limits    Narrative:     Specimen Source->Urine   CULTURE, BLOOD   CULTURE, BLOOD   LACTIC ACID, PLASMA   SARS-COV-2 RNA AMPLIFICATION, QUAL   PROCALCITONIN     Results for orders placed or performed during the hospital encounter of 09/10/22   Comprehensive metabolic panel   Result Value Ref Range    Sodium 139 136 - 145 mmol/L    Potassium 4.5 3.5 - 5.1 mmol/L    Chloride 107 95 - 110 mmol/L    CO2 22 (L) 23 - 29 mmol/L    Glucose 132 (H) 70 - 110 mg/dL    BUN 11 8 - 23 mg/dL    Creatinine 0.8 0.5 - 1.4 mg/dL    Calcium 8.9 8.7 - 10.5 mg/dL    Total Protein 6.9 6.0 - 8.4 g/dL    Albumin 3.7 3.5 - 5.2 g/dL    Total Bilirubin 0.4 0.1 - 1.0 mg/dL    Alkaline Phosphatase 64 55 - 135 U/L    AST 45 (H) 10 - 40 U/L    ALT 40 10 - 44 U/L    Anion Gap 10 8 - 16 mmol/L    eGFR >60 >60 mL/min/1.73 m^2   CBC auto differential   Result Value Ref Range    WBC 2.24 (L) 3.90 - 12.70 K/uL    RBC 3.97 (L) 4.60 - 6.20 M/uL    Hemoglobin 10.8 (L) 14.0 - 18.0 g/dL    Hematocrit 34.1 (L) 40.0 - 54.0 %    MCV 86 82 - 98 fL    MCH 27.2 27.0 - 31.0 pg    MCHC 31.7 (L) 32.0 - 36.0 g/dL    RDW 15.3 (H) 11.5 - 14.5 %    Platelets 196  150 - 450 K/uL    MPV 11.2 9.2 - 12.9 fL    Immature Granulocytes 0.0 0.0 - 0.5 %    Gran # (ANC) 0.6 (L) 1.8 - 7.7 K/uL    Immature Grans (Abs) 0.00 0.00 - 0.04 K/uL    Lymph # 1.2 1.0 - 4.8 K/uL    Mono # 0.3 0.3 - 1.0 K/uL    Eos # 0.1 0.0 - 0.5 K/uL    Baso # 0.02 0.00 - 0.20 K/uL    nRBC 0 0 /100 WBC    Gran % 27.7 (L) 38.0 - 73.0 %    Lymph % 54.0 (H) 18.0 - 48.0 %    Mono % 11.6 4.0 - 15.0 %    Eosinophil % 5.8 0.0 - 8.0 %    Basophil % 0.9 0.0 - 1.9 %    Platelet Estimate Decreased (A)     Aniso Slight     Poik Slight     Ovalocytes Occasional     Ce Cells Occasional     Differential Method Automated    Lactic acid, plasma   Result Value Ref Range    Lactate (Lactic Acid) 0.9 0.5 - 2.2 mmol/L   Sedimentation rate   Result Value Ref Range    Sed Rate 45 (H) 0 - 10 mm/Hr   C-reactive protein   Result Value Ref Range    CRP 29.9 (H) 0.0 - 8.2 mg/L   Urinalysis, Reflex to Urine Culture Urine, Clean Catch    Specimen: Urine   Result Value Ref Range    Specimen UA Urine, Clean Catch     Color, UA Yellow Yellow, Straw, Jazmyn    Appearance, UA Clear Clear    pH, UA 7.0 5.0 - 8.0    Specific Gravity, UA 1.020 1.005 - 1.030    Protein, UA Trace (A) Negative    Glucose, UA Negative Negative    Ketones, UA Negative Negative    Bilirubin (UA) Negative Negative    Occult Blood UA Negative Negative    Nitrite, UA Negative Negative    Urobilinogen, UA Negative <2.0 EU/dL    Leukocytes, UA Negative Negative   COVID-19 Rapid Screening   Result Value Ref Range    SARS-CoV-2 RNA, Amplification, Qual Negative Negative   Procalcitonin   Result Value Ref Range    Procalcitonin 0.07 <0.25 ng/mL            Imaging Results              MRI Lumbar Spine W WO Cont (Final result)  Result time 09/10/22 09:40:01      Final result by Mika Marquis III, MD (09/10/22 09:40:01)                   Impression:      Prior L2-3 laminectomy and discectomy again noted.  Persistent findings of L2-3 discitis osteomyelitis with an epidural phlegmon  causing L2-3 central canal stenosis.  Adjacent posterior paravertebral fluid collections are again noted with measurements above, possibly abscesses or postoperative seromas.      Electronically signed by: Mika Marquis MD  Date:    09/10/2022  Time:    09:40               Narrative:    EXAMINATION:  MRI LUMBAR SPINE W WO CONTRAST    CLINICAL HISTORY:  Low back pain, infection suspected;    COMPARISON:  LUMBAR MRI 07/30/2022    FINDINGS:  Again noted is prior L2-3 laminectomy and discectomy.  Persistent erosive change of the L2-3 endplates, T2 hyperintense signal and enhancement of the L2-3 disc space, surrounding T2 hyperintense marrow signal and enhancement compatible with discitis and osteomyelitis.  There is a rim enhancing fluid collection in the L2-3 posterior soft tissues within the laminectomy bed measuring approximately 3.1 x 1.2 x 2.0 cm, previously 3.1 x 2.7 x 2.3 cm.  Interval decrease in size of the adjacent rim enhancing fluid collections adjacent to the L3 spinous process measuring approximately 1.3 x 0.9 cm, previously 3.5 x 2.0 cm.  In the more posterior paravertebral soft tissues from the levels of L1 through L4, there is a 10.6 x 5.5 x 2.4 cm rim enhancing fluid collection, previously 9.2 x 3.8 by 2.5 cm.  there is enhancing epidural soft tissue thickening greatest along the left-sided L2-3 compatible with phlegmonous change with mass effect upon the descending nerve roots and significant central canal stenosis.  No residual epidural abscess identified.  No other fluid collections or sites of discitis identified.  No fractures identified.  Normal alignment.  Stable degenerative disc bulging and facet arthropathy at the remaining levels with mild central canal stenosis at L1-2 and L3-4.                                       X-Ray Chest AP Portable (Final result)  Result time 09/10/22 08:12:46      Final result by Mika Marquis III, MD (09/10/22 08:12:46)                   Impression:      No  acute findings.      Electronically signed by: Mika Marquis MD  Date:    09/10/2022  Time:    08:12               Narrative:    EXAMINATION:  XR CHEST AP PORTABLE    CLINICAL HISTORY:  fever;    FINDINGS:  Comparison is made with the most recent prior chest x-ray.  Sternal sutures are again noted.  The cardiomediastinal silhouette is within normal limits for AP technique. The lungs appear clear of active disease. No acute appearing infiltrate, pleural effusion or pneumothorax identified.                                       CT Lumbar Spine Without Contrast (Final result)  Result time 09/10/22 09:15:21      Final result by Mika Marquis III, MD (09/10/22 09:15:21)                   Impression:      Status post L2-3 discectomy and laminectomy with findings suspicious for L2-3 discitis and osteomyelitis, progressed since prior lumbar CT from 06/30/2022.  Suspected L2-3 epidural phlegmon.  Adjacent posterior paravertebral fluid collections, possibly postoperative hematomas or abscess.  Please see separately dictated lumbar spine MRI report from today for additional details.      Electronically signed by: Mika Marquis MD  Date:    09/10/2022  Time:    09:15               Narrative:    EXAMINATION:  CT LUMBAR SPINE WITHOUT CONTRAST    CLINICAL HISTORY:  Low back pain, infection suspected;    TECHNIQUE:  Axial noncontrast CT scan of the lumbar spine with sagittal and coronal reconstructions. All CT scans at this facility are performed  using dose modulation techniques as appropriate to performed exam including the following:  automated exposure control; adjustment of mA and/or kV according to the patients size (this includes techniques or standardized protocols for targeted exams where dose is matched to indication/reason for exam: i.e. extremities or head);  iterative reconstruction technique.    COMPARISON:  Lumbar CT 06/30/2022. lumbar MRI 07/30/2022    FINDINGS:  Prior L2-3 laminectomy and discectomy again  noted.  Progressive erosive change of the L2-3 endplates since prior CT characteristic of discitis and osteomyelitis.  There is heterogeneous soft tissue thickening of the epidural space suggesting phlegmonous change.  Small epidural abscess cannot be excluded by CT.  3.1 x 1.0 cm posterior paravertebral fluid collection at the L2 laminectomy site, possibly postoperative seroma or abscess.  There is another fluid collection in the more posterior superficial paravertebral soft tissues which is excluded from the field of view and will be detailed on separately dictated lumbar MRI report from today.  There is prominent surrounding inflammatory or postoperative changes of the posterior paraspinal soft tissues.  No other sites of discitis identified.  No other fluid collections identified.  No fracture is identified.  Joint alignment is anatomic.  Stable multilevel lumbar degenerative disc disease with disc bulging and facet arthropathy which will be detailed on separately dictated lumbar spine MRI report from today.                                  STATRAD Impression of CT lumbar:  No acute fracture.  Inflammation the posterior subcutaneous tissue likely to related to recent laminectomy.  Incompletely characterized as is outside the field of view     Medications   gadobutroL (GADAVIST) injection 10 mL (10 mLs Intravenous Given 9/10/22 0719)   piperacillin-tazobactam 4.5 g in dextrose 5 % 100 mL IVPB (ready to mix system) (4.5 g Intravenous New Bag 9/10/22 1138)     Medical Decision Making:   Clinical Tests:   Lab Tests: Ordered and Reviewed  Radiological Study: Ordered and Reviewed           ED Course as of 09/10/22 1223   Sat Sep 10, 2022   0511 Discussed case with Infectious Disease, Dr. Hodge. Agreed to obtain ED MRI [DP]      ED Course User Index  [DP] Igor Mckinnon MD       6:00 AM:  Care handed off to Dr. Ca with MRI pending    10:00 AM:  Reviewed MRI results and patient has been re-evaluated already  receiving IV zosyn and oral doxy for osteo of lumbar spine, has no other complaints of back pain or leg pain, no acute neuro symptoms, moving all ext and he is well known to both ID and neurosurgery Dr. Zepeda whom I've both spoken to.  Dr. eZpeda recommends admitting here for IV antibiotics and seeing if IR can either biopsy or drain possible lumbar fluid collection.Patient would prefer not to be transferred if not absolutely needed.     11:22 AM: Discussed case with Nieves Atkins NP ([facility]). Dr. Bermudez agrees with current care and management of pt and accepts admission.   Admitting Service: [facility]  Admitting Physician: Dr. Bermudez  Admit to: Inpatient unit    11:23 AM: Re-evaluated pt. I have discussed test results, shared treatment plan, and the need for admission with patient and family at bedside. Pt and family express understanding at this time and agree with all information. All questions answered. Pt and family have no further questions or concerns at this time. Pt is ready for admit.                Clinical Impression:   Final diagnoses:  [M46.26] Osteomyelitis of lumbar spine (Primary)  [Z98.890] History of lumbar laminectomy      ED Disposition Condition    Admit                 Joanne Ca MD  09/10/22 1286

## 2022-09-10 NOTE — SUBJECTIVE & OBJECTIVE
Past Medical History:   Diagnosis Date    Bilateral carpal tunnel syndrome 2/25/2022    moderate demyelinating bilaterally    Carpal tunnel syndrome     Diabetes mellitus without complication     Fever 6/30/2022    Gram-positive bacteremia 7/2/2022    History of colon polyps     Hypercalcemia 3/23/2021    Hyperlipidemia associated with type 2 diabetes mellitus     Hypertension associated with diabetes     Left carpal tunnel syndrome 3/23/2021    Low back pain 9/28/2021    Lumbar disc disease with radiculopathy     Morbid obesity with BMI of 40.0-44.9, adult     Postoperative hematoma involving nervous system following nervous system procedure 6/28/2022    S/P parathyroidectomy 7/29/2021    Vitamin D deficiency        Past Surgical History:   Procedure Laterality Date    CARPAL TUNNEL RELEASE Left 4/1/2021    Procedure: RELEASE, CARPAL TUNNEL;  Surgeon: Yoandy David MD;  Location: [facility] OR;  Service: Orthopedics;  Laterality: Left;    HERNIA REPAIR      INCISION AND DRAINAGE OF HEMATOMA N/A 6/28/2022    Procedure: INCISION AND DRAINAGE, HEMATOMA;  Surgeon: Shaggy Zepeda MD;  Location: [facility] OR;  Service: Neurosurgery;  Laterality: N/A;    LUMBAR LAMINECTOMY WITH DISCECTOMY Left 6/22/2022    Procedure: LAMINECTOMY, SPINE, LUMBAR, WITH DISCECTOMY;  Surgeon: Shaggy Zepeda MD;  Location: [facility] OR;  Service: Neurosurgery;  Laterality: Left;  minimally invasive L2-3 discectomy and decompression     LUMBAR LAMINECTOMY WITH DISCECTOMY N/A 6/28/2022    Procedure: LAMINECTOMY, SPINE, LUMBAR, WITH DISCECTOMY;  Surgeon: Shaggy Zepeda MD;  Location: [facility] OR;  Service: Neurosurgery;  Laterality: N/A;    PARATHYROIDECTOMY Bilateral 7/27/2021    Procedure: PARATHYROIDECTOMY;  Surgeon: Tha Dial MD;  Location: [facility] OR;  Service: ENT;  Laterality: Bilateral;  with medialstinal exploration    REPAIR OF CEREBROSPINAL FLUID LEAK USING COMPUTER ASSISTED NAVIGATION Bilateral 8/3/2022    Procedure: REPAIR, CSF LEAK,  USING COMPUTER-ASSISTED NAVIGATION;  Surgeon: Shaggy Zepeda MD;  Location: [facility] OR;  Service: Neurosurgery;  Laterality: Bilateral;    SELECTIVE INJECTION OF ANESTHETIC AGENT AROUND LUMBAR SPINAL NERVE ROOT BY TRANSFORAMINAL APPROACH Left 3/23/2022    Procedure: BLOCK, SPINAL NERVE ROOT, LUMBAR, SELECTIVE, TRANSFORAMINAL APPROACH Left L2-3, L3-4 RN IV sedation;  Surgeon: Jay Hodges MD;  Location: [facility] PAIN [facility]GT;  Service: Pain Management;  Laterality: Left;    STERNOTOMY N/A 7/27/2021    Procedure: STERNOTOMY;  Surgeon: Tha Dial MD;  Location: [facility] OR;  Service: ENT;  Laterality: N/A;    ULNAR NERVE TRANSPOSITION Left 4/1/2021    Procedure: TRANSPOSITION, NERVE, ULNAR;  Surgeon: Yoandy David MD;  Location: [facility] OR;  Service: Orthopedics;  Laterality: Left;    ULNAR TUNNEL RELEASE Left 4/1/2021    Procedure: RELEASE, CUBITAL TUNNEL;  Surgeon: Yoandy David MD;  Location: [facility] OR;  Service: Orthopedics;  Laterality: Left;    UMBILICAL HERNIA REPAIR      WOUND EXPLORATION Bilateral 8/3/2022    Procedure: EXPLORATION, WOUND;  Surgeon: Shaggy Zepeda MD;  Location: [facility] OR;  Service: Neurosurgery;  Laterality: Bilateral;       Review of patient's allergies indicates:  No Known Allergies    No current facility-administered medications on file prior to encounter.     Current Outpatient Medications on File Prior to Encounter   Medication Sig    aspirin (ECOTRIN) 81 MG EC tablet Take 1 tablet (81 mg total) by mouth once daily.    atorvastatin (LIPITOR) 10 MG tablet Take 1 tablet (10 mg total) by mouth every evening.    doxycycline (VIBRA-TABS) 100 MG tablet Take 1 tablet (100 mg total) by mouth every 12 (twelve) hours.    finasteride (PROSCAR) 5 mg tablet Take 5 mg by mouth once daily.    gabapentin (NEURONTIN) 300 MG capsule Take 1 capsule (300 mg total) by mouth 3 (three) times daily.    irbesartan (AVAPRO) 150 MG tablet Take 1 tablet by mouth once daily    metFORMIN (GLUCOPHAGE) 1000 MG tablet  Take 1,000 mg by mouth 2 (two) times daily with meals.    methocarbamoL (ROBAXIN) 750 MG Tab Take 1 tablet (750 mg total) by mouth 3 (three) times daily as needed (muscle spasms).    tamsulosin (FLOMAX) 0.4 mg Cap Take 1 capsule by mouth once daily.    [DISCONTINUED] docusate sodium (COLACE) 100 MG capsule Take 1 capsule (100 mg total) by mouth 2 (two) times daily.    [DISCONTINUED] empagliflozin (JARDIANCE) 10 mg tablet Take 1 tablet (10 mg total) by mouth once daily.    [DISCONTINUED] metFORMIN (GLUCOPHAGE) 500 MG tablet Take 1 tablet (500 mg total) by mouth 2 (two) times daily with meals.    [DISCONTINUED] senna-docusate 8.6-50 mg (SENNA LAXATIVE-STOOL SOFTENER) 8.6-50 mg per tablet Take 1 tablet by mouth once daily.    blood sugar diagnostic Strp To check BG 1 times daily, to use with insurance preferred meter    lancets Misc To check BG 1 times daily, to use with insurance preferred meter    [DISCONTINUED] blood-glucose meter kit To check BG 1 times daily, to use with insurance preferred meter    [DISCONTINUED] lancets (ONETOUCH ULTRASOFT LANCETS) Misc Check FS daily    [DISCONTINUED] ONETOUCH VERIO TEST STRIPS Strp USE 1 STRIP TO CHECK GLUCOSE ONCE DAILY    [DISCONTINUED] oxyCODONE-acetaminophen (PERCOCET)  mg per tablet Take 1 tablet by mouth every 6 (six) hours as needed for Pain.    [DISCONTINUED] piperacillin sodium/tazobactam (PIPERACILLIN-TAZOBACTAM 4.5G/100ML D5W IVPB, READY TO MIX,) Inject 100 mLs (4.5 g total) into the vein every 8 (eight) hours.     Family History       Problem Relation (Age of Onset)    Asthma Mother    Diabetes Mellitus Father    Hypertension Father    Prostate cancer Brother          Tobacco Use    Smoking status: Never    Smokeless tobacco: Never   Substance and Sexual Activity    Alcohol use: Never    Drug use: Never    Sexual activity: Yes     Partners: Female     Review of Systems   Constitutional:  Positive for activity change, chills and fever. Negative for diaphoresis  and fatigue.   HENT: Negative.     Eyes: Negative.    Respiratory: Negative.  Negative for shortness of breath.    Cardiovascular: Negative.  Negative for chest pain, palpitations and leg swelling.   Gastrointestinal: Negative.    Endocrine: Negative.    Genitourinary: Negative.    Musculoskeletal:  Positive for back pain.   Allergic/Immunologic: Negative.    Neurological: Negative.    Hematological: Negative.    Psychiatric/Behavioral:  Positive for dysphoric mood.    Objective:     Vital Signs (Most Recent):  Temp: 99.2 °F (37.3 °C) (09/10/22 1540)  Pulse: 69 (09/10/22 1540)  Resp: 18 (09/10/22 1540)  BP: (!) 157/79 (09/10/22 1540)  SpO2: 100 % (09/10/22 1246)   Vital Signs (24h Range):  Temp:  [98.4 °F (36.9 °C)-99.4 °F (37.4 °C)] 99.2 °F (37.3 °C)  Pulse:  [62-77] 69  Resp:  [14-20] 18  SpO2:  [97 %-100 %] 100 %  BP: (121-159)/() 157/79     Weight: 105.4 kg (232 lb 5.8 oz)  Body mass index is 36.39 kg/m².    Physical Exam  Vitals and nursing note reviewed.   Constitutional:       Appearance: Normal appearance. He is well-developed. He is obese.   HENT:      Head: Normocephalic and atraumatic.      Right Ear: External ear normal.      Left Ear: External ear normal.      Nose: Nose normal.   Eyes:      Extraocular Movements: EOM normal.      Conjunctiva/sclera: Conjunctivae normal.      Pupils: Pupils are equal, round, and reactive to light.   Cardiovascular:      Rate and Rhythm: Normal rate and regular rhythm.      Heart sounds: Normal heart sounds.   Pulmonary:      Effort: Pulmonary effort is normal.      Breath sounds: Normal breath sounds.   Abdominal:      General: Bowel sounds are normal.      Palpations: Abdomen is soft.   Genitourinary:     Penis: Normal.       Prostate: Normal.      Rectum: Normal.   Musculoskeletal:         General: Swelling and tenderness present. Normal range of motion.      Cervical back: Normal range of motion and neck supple.      Comments: L/S midline scar well healed but  large area of mild swelling which is fluctuant with minimal tenderness, no redness or warmth   Skin:     General: Skin is warm and dry.      Capillary Refill: Capillary refill takes less than 2 seconds.   Neurological:      General: No focal deficit present.      Mental Status: He is alert and oriented to person, place, and time.      Deep Tendon Reflexes: Reflexes are normal and symmetric.   Psychiatric:         Behavior: Behavior normal.         Thought Content: Thought content normal.         Judgment: Judgment normal.         CRANIAL NERVES     CN III, IV, VI   Pupils are equal, round, and reactive to light.  Extraocular motions are normal.   Results for orders placed or performed during the hospital encounter of 09/10/22   Blood culture #1 **CANNOT BE ORDERED STAT**    Specimen: Peripheral, Hand, Right; Blood   Result Value Ref Range    Blood Culture, Routine No Growth to date    Blood culture #2 **CANNOT BE ORDERED STAT**    Specimen: Peripheral, Antecubital, Left; Blood   Result Value Ref Range    Blood Culture, Routine No Growth to date    Comprehensive metabolic panel   Result Value Ref Range    Sodium 139 136 - 145 mmol/L    Potassium 4.5 3.5 - 5.1 mmol/L    Chloride 107 95 - 110 mmol/L    CO2 22 (L) 23 - 29 mmol/L    Glucose 132 (H) 70 - 110 mg/dL    BUN 11 8 - 23 mg/dL    Creatinine 0.8 0.5 - 1.4 mg/dL    Calcium 8.9 8.7 - 10.5 mg/dL    Total Protein 6.9 6.0 - 8.4 g/dL    Albumin 3.7 3.5 - 5.2 g/dL    Total Bilirubin 0.4 0.1 - 1.0 mg/dL    Alkaline Phosphatase 64 55 - 135 U/L    AST 45 (H) 10 - 40 U/L    ALT 40 10 - 44 U/L    Anion Gap 10 8 - 16 mmol/L    eGFR >60 >60 mL/min/1.73 m^2   CBC auto differential   Result Value Ref Range    WBC 2.24 (L) 3.90 - 12.70 K/uL    RBC 3.97 (L) 4.60 - 6.20 M/uL    Hemoglobin 10.8 (L) 14.0 - 18.0 g/dL    Hematocrit 34.1 (L) 40.0 - 54.0 %    MCV 86 82 - 98 fL    MCH 27.2 27.0 - 31.0 pg    MCHC 31.7 (L) 32.0 - 36.0 g/dL    RDW 15.3 (H) 11.5 - 14.5 %    Platelets 196 150  - 450 K/uL    MPV 11.2 9.2 - 12.9 fL    Immature Granulocytes 0.0 0.0 - 0.5 %    Gran # (ANC) 0.6 (L) 1.8 - 7.7 K/uL    Immature Grans (Abs) 0.00 0.00 - 0.04 K/uL    Lymph # 1.2 1.0 - 4.8 K/uL    Mono # 0.3 0.3 - 1.0 K/uL    Eos # 0.1 0.0 - 0.5 K/uL    Baso # 0.02 0.00 - 0.20 K/uL    nRBC 0 0 /100 WBC    Gran % 27.7 (L) 38.0 - 73.0 %    Lymph % 54.0 (H) 18.0 - 48.0 %    Mono % 11.6 4.0 - 15.0 %    Eosinophil % 5.8 0.0 - 8.0 %    Basophil % 0.9 0.0 - 1.9 %    Platelet Estimate Decreased (A)     Aniso Slight     Poik Slight     Ovalocytes Occasional     Ce Cells Occasional     Differential Method Automated    Lactic acid, plasma   Result Value Ref Range    Lactate (Lactic Acid) 0.9 0.5 - 2.2 mmol/L   Sedimentation rate   Result Value Ref Range    Sed Rate 45 (H) 0 - 10 mm/Hr   C-reactive protein   Result Value Ref Range    CRP 29.9 (H) 0.0 - 8.2 mg/L   Urinalysis, Reflex to Urine Culture Urine, Clean Catch    Specimen: Urine   Result Value Ref Range    Specimen UA Urine, Clean Catch     Color, UA Yellow Yellow, Straw, Jazmyn    Appearance, UA Clear Clear    pH, UA 7.0 5.0 - 8.0    Specific Gravity, UA 1.020 1.005 - 1.030    Protein, UA Trace (A) Negative    Glucose, UA Negative Negative    Ketones, UA Negative Negative    Bilirubin (UA) Negative Negative    Occult Blood UA Negative Negative    Nitrite, UA Negative Negative    Urobilinogen, UA Negative <2.0 EU/dL    Leukocytes, UA Negative Negative   COVID-19 Rapid Screening   Result Value Ref Range    SARS-CoV-2 RNA, Amplification, Qual Negative Negative   Procalcitonin   Result Value Ref Range    Procalcitonin 0.07 <0.25 ng/mL   Procalcitonin   Result Value Ref Range    Procalcitonin 0.07 <0.25 ng/mL   POCT glucose   Result Value Ref Range    POCT Glucose 153 (H) 70 - 110 mg/dL      Significant Labs: All pertinent labs within the past 24 hours have been reviewed.  Imaging Results              MRI Lumbar Spine W WO Cont (Final result)  Result time 09/10/22 09:40:01       Final result by Mika Marquis III, MD (09/10/22 09:40:01)                   Impression:      Prior L2-3 laminectomy and discectomy again noted.  Persistent findings of L2-3 discitis osteomyelitis with an epidural phlegmon causing L2-3 central canal stenosis.  Adjacent posterior paravertebral fluid collections are again noted with measurements above, possibly abscesses or postoperative seromas.      Electronically signed by: Mika Marquis MD  Date:    09/10/2022  Time:    09:40               Narrative:    EXAMINATION:  MRI LUMBAR SPINE W WO CONTRAST    CLINICAL HISTORY:  Low back pain, infection suspected;    COMPARISON:  LUMBAR MRI 07/30/2022    FINDINGS:  Again noted is prior L2-3 laminectomy and discectomy.  Persistent erosive change of the L2-3 endplates, T2 hyperintense signal and enhancement of the L2-3 disc space, surrounding T2 hyperintense marrow signal and enhancement compatible with discitis and osteomyelitis.  There is a rim enhancing fluid collection in the L2-3 posterior soft tissues within the laminectomy bed measuring approximately 3.1 x 1.2 x 2.0 cm, previously 3.1 x 2.7 x 2.3 cm.  Interval decrease in size of the adjacent rim enhancing fluid collections adjacent to the L3 spinous process measuring approximately 1.3 x 0.9 cm, previously 3.5 x 2.0 cm.  In the more posterior paravertebral soft tissues from the levels of L1 through L4, there is a 10.6 x 5.5 x 2.4 cm rim enhancing fluid collection, previously 9.2 x 3.8 by 2.5 cm.  there is enhancing epidural soft tissue thickening greatest along the left-sided L2-3 compatible with phlegmonous change with mass effect upon the descending nerve roots and significant central canal stenosis.  No residual epidural abscess identified.  No other fluid collections or sites of discitis identified.  No fractures identified.  Normal alignment.  Stable degenerative disc bulging and facet arthropathy at the remaining levels with mild central canal stenosis at  L1-2 and L3-4.                                       X-Ray Chest AP Portable (Final result)  Result time 09/10/22 08:12:46      Final result by Mika Marquis III, MD (09/10/22 08:12:46)                   Impression:      No acute findings.      Electronically signed by: Mika Marquis MD  Date:    09/10/2022  Time:    08:12               Narrative:    EXAMINATION:  XR CHEST AP PORTABLE    CLINICAL HISTORY:  fever;    FINDINGS:  Comparison is made with the most recent prior chest x-ray.  Sternal sutures are again noted.  The cardiomediastinal silhouette is within normal limits for AP technique. The lungs appear clear of active disease. No acute appearing infiltrate, pleural effusion or pneumothorax identified.                                       CT Lumbar Spine Without Contrast (Final result)  Result time 09/10/22 09:15:21      Final result by Mika Marquis III, MD (09/10/22 09:15:21)                   Impression:      Status post L2-3 discectomy and laminectomy with findings suspicious for L2-3 discitis and osteomyelitis, progressed since prior lumbar CT from 06/30/2022.  Suspected L2-3 epidural phlegmon.  Adjacent posterior paravertebral fluid collections, possibly postoperative hematomas or abscess.  Please see separately dictated lumbar spine MRI report from today for additional details.      Electronically signed by: Mika Marquis MD  Date:    09/10/2022  Time:    09:15               Narrative:    EXAMINATION:  CT LUMBAR SPINE WITHOUT CONTRAST    CLINICAL HISTORY:  Low back pain, infection suspected;    TECHNIQUE:  Axial noncontrast CT scan of the lumbar spine with sagittal and coronal reconstructions. All CT scans at this facility are performed  using dose modulation techniques as appropriate to performed exam including the following:  automated exposure control; adjustment of mA and/or kV according to the patients size (this includes techniques or standardized protocols for targeted exams where dose is  matched to indication/reason for exam: i.e. extremities or head);  iterative reconstruction technique.    COMPARISON:  Lumbar CT 06/30/2022. lumbar MRI 07/30/2022    FINDINGS:  Prior L2-3 laminectomy and discectomy again noted.  Progressive erosive change of the L2-3 endplates since prior CT characteristic of discitis and osteomyelitis.  There is heterogeneous soft tissue thickening of the epidural space suggesting phlegmonous change.  Small epidural abscess cannot be excluded by CT.  3.1 x 1.0 cm posterior paravertebral fluid collection at the L2 laminectomy site, possibly postoperative seroma or abscess.  There is another fluid collection in the more posterior superficial paravertebral soft tissues which is excluded from the field of view and will be detailed on separately dictated lumbar MRI report from today.  There is prominent surrounding inflammatory or postoperative changes of the posterior paraspinal soft tissues.  No other sites of discitis identified.  No other fluid collections identified.  No fracture is identified.  Joint alignment is anatomic.  Stable multilevel lumbar degenerative disc disease with disc bulging and facet arthropathy which will be detailed on separately dictated lumbar spine MRI report from today.                                     Significant Imaging: I have reviewed all pertinent imaging results/findings within the past 24 hours.

## 2022-09-10 NOTE — ASSESSMENT & PLAN NOTE
Patient's FSGs are controlled on current medication regimen.  Last A1c reviewed-   Lab Results   Component Value Date    HGBA1C 8.3 (H) 05/18/2022     Most recent fingerstick glucose reviewed-   Recent Labs   Lab 09/10/22  1541   POCTGLUCOSE 153*     Current correctional scale  Low  Maintain anti-hyperglycemic dose as follows-   Antihyperglycemics (From admission, onward)    Start     Stop Route Frequency Ordered    09/10/22 1612  insulin aspart U-100 pen 0-5 Units         -- SubQ Before meals & nightly PRN 09/10/22 1515        Hold Oral hypoglycemics while patient is in the hospital.    Check A1c in am

## 2022-09-10 NOTE — PHARMACY MED REC
"Admission Medication History     The home medication history was taken by Alexander Heller.    You may go to "Admission" then "Reconcile Home Medications" tabs to review and/or act upon these items.     The home medication list has been updated by the Pharmacy department.   Please read ALL comments highlighted in yellow.   Please address this information as you see fit.    Feel free to contact us if you have any questions or require assistance.      The medications listed below were removed from the home medication list. Please reorder if appropriate:  Patient reports no longer taking the following medication(s):  DOCUSATE SODIUM 100 MG CAPSULE (no longer needed)  EMPAGLIFLOZIN (JARDIANCE) 10 MG TABLET (too expensive!!!)  METFORMIN 500 MG TABLET (taking 1,000 mg tablet)  PIPERACILLIN-TAZOBACTAM 4.5 G/100 mL D5W IVPB (therapy completed)  SENNA-DOCUSATE 8.6-50 MG PER TABLET (no longer needed)    Medications listed below were obtained from: Patient/family, [employer], and Patient's pharmacy  (Not in a hospital admission)      Potential issues to be addressed PRIOR TO DISCHARGE: NONE    Alexander Heller [facility]  Pharmacy Technician Specialist-Medication History  [employer] [phone]  Secure chat preferred     Current Outpatient Medications on File Prior to Encounter   Medication Sig Dispense Refill Last Dose    aspirin (ECOTRIN) 81 MG EC tablet Take 1 tablet (81 mg total) by mouth once daily.   9/9/2022    atorvastatin (LIPITOR) 10 MG tablet Take 1 tablet (10 mg total) by mouth every evening. 90 tablet 3 9/9/2022    doxycycline (VIBRA-TABS) 100 MG tablet Take 1 tablet (100 mg total) by mouth every 12 (twelve) hours. 144 tablet 0 9/9/2022    finasteride (PROSCAR) 5 mg tablet Take 5 mg by mouth once daily.   9/9/2022    gabapentin (NEURONTIN) 300 MG capsule Take 1 capsule (300 mg total) by mouth 3 (three) times daily. 90 capsule 11 9/9/2022    irbesartan (AVAPRO) 150 MG tablet Take 1 tablet by mouth once daily " 90 tablet 3 9/9/2022    metFORMIN (GLUCOPHAGE) 1000 MG tablet Take 1,000 mg by mouth 2 (two) times daily with meals.   9/9/2022    methocarbamoL (ROBAXIN) 750 MG Tab Take 1 tablet (750 mg total) by mouth 3 (three) times daily as needed (muscle spasms). 60 tablet 0 Past Week at 09/07/2022    tamsulosin (FLOMAX) 0.4 mg Cap Take 1 capsule by mouth once daily.   9/9/2022    [DISCONTINUED] docusate sodium (COLACE) 100 MG capsule Take 1 capsule (100 mg total) by mouth 2 (two) times daily. 20 capsule 0 9/9/2022    [DISCONTINUED] empagliflozin (JARDIANCE) 10 mg tablet Take 1 tablet (10 mg total) by mouth once daily. 90 tablet 3 9/9/2022    [DISCONTINUED] metFORMIN (GLUCOPHAGE) 500 MG tablet Take 1 tablet (500 mg total) by mouth 2 (two) times daily with meals. 180 tablet 3 9/9/2022    [DISCONTINUED] senna-docusate 8.6-50 mg (SENNA LAXATIVE-STOOL SOFTENER) 8.6-50 mg per tablet Take 1 tablet by mouth once daily. 14 tablet 0 9/9/2022    blood sugar diagnostic Strp To check BG 1 times daily, to use with insurance preferred meter 100 strip 3 Unknown    lancets Misc To check BG 1 times daily, to use with insurance preferred meter 100 each 3 Unknown    [DISCONTINUED] blood-glucose meter kit To check BG 1 times daily, to use with insurance preferred meter 1 each 0     [DISCONTINUED] lancets (ONETOUCH ULTRASOFT LANCETS) Misc Check FS daily 100 each 3     [DISCONTINUED] ONETOUCH VERIO TEST STRIPS Strp USE 1 STRIP TO CHECK GLUCOSE ONCE DAILY 100 each 3     [DISCONTINUED] oxyCODONE-acetaminophen (PERCOCET)  mg per tablet Take 1 tablet by mouth every 6 (six) hours as needed for Pain. 30 tablet 0     [DISCONTINUED] piperacillin sodium/tazobactam (PIPERACILLIN-TAZOBACTAM 4.5G/100ML D5W IVPB, READY TO MIX,) Inject 100 mLs (4.5 g total) into the vein every 8 (eight) hours. 8400 mL 0                            .

## 2022-09-11 LAB
POCT GLUCOSE: 132 MG/DL (ref 70–110)
POCT GLUCOSE: 134 MG/DL (ref 70–110)
POCT GLUCOSE: 149 MG/DL (ref 70–110)
POCT GLUCOSE: 205 MG/DL (ref 70–110)

## 2022-09-11 PROCEDURE — 99024 POSTOP FOLLOW-UP VISIT: CPT | Mod: ,,, | Performed by: NEUROLOGICAL SURGERY

## 2022-09-11 PROCEDURE — C1751 CATH, INF, PER/CENT/MIDLINE: HCPCS

## 2022-09-11 PROCEDURE — 21400001 HC TELEMETRY ROOM

## 2022-09-11 PROCEDURE — 25000003 PHARM REV CODE 250: Performed by: EMERGENCY MEDICINE

## 2022-09-11 PROCEDURE — 99024 PR POST-OP FOLLOW-UP VISIT: ICD-10-PCS | Mod: ,,, | Performed by: NEUROLOGICAL SURGERY

## 2022-09-11 PROCEDURE — 63600175 PHARM REV CODE 636 W HCPCS: Performed by: EMERGENCY MEDICINE

## 2022-09-11 PROCEDURE — A4216 STERILE WATER/SALINE, 10 ML: HCPCS | Performed by: EMERGENCY MEDICINE

## 2022-09-11 PROCEDURE — 36569 INSJ PICC 5 YR+ W/O IMAGING: CPT

## 2022-09-11 RX ORDER — SODIUM CHLORIDE 9 MG/ML
INJECTION, SOLUTION INTRAVENOUS
Status: DISCONTINUED | OUTPATIENT
Start: 2022-09-11 | End: 2022-09-13 | Stop reason: HOSPADM

## 2022-09-11 RX ORDER — SODIUM CHLORIDE 0.9 % (FLUSH) 0.9 %
10 SYRINGE (ML) INJECTION EVERY 6 HOURS
Status: DISCONTINUED | OUTPATIENT
Start: 2022-09-11 | End: 2022-09-13 | Stop reason: HOSPADM

## 2022-09-11 RX ORDER — SODIUM CHLORIDE 0.9 % (FLUSH) 0.9 %
10 SYRINGE (ML) INJECTION
Status: DISCONTINUED | OUTPATIENT
Start: 2022-09-11 | End: 2022-09-13 | Stop reason: HOSPADM

## 2022-09-11 RX ADMIN — MEROPENEM AND SODIUM CHLORIDE 500 MG: 500 INJECTION, SOLUTION INTRAVENOUS at 07:09

## 2022-09-11 RX ADMIN — MEROPENEM AND SODIUM CHLORIDE 500 MG: 500 INJECTION, SOLUTION INTRAVENOUS at 02:09

## 2022-09-11 RX ADMIN — MEROPENEM AND SODIUM CHLORIDE 500 MG: 500 INJECTION, SOLUTION INTRAVENOUS at 08:09

## 2022-09-11 RX ADMIN — VANCOMYCIN HYDROCHLORIDE 1750 MG: 10 INJECTION, POWDER, LYOPHILIZED, FOR SOLUTION INTRAVENOUS at 09:09

## 2022-09-11 RX ADMIN — SODIUM CHLORIDE, PRESERVATIVE FREE 10 ML: 5 INJECTION INTRAVENOUS at 06:09

## 2022-09-11 RX ADMIN — ENOXAPARIN SODIUM 40 MG: 100 INJECTION SUBCUTANEOUS at 06:09

## 2022-09-11 RX ADMIN — Medication 10 ML: at 02:09

## 2022-09-11 NOTE — ASSESSMENT & PLAN NOTE
Patient's FSGs are controlled on current medication regimen.  Last A1c reviewed-   Lab Results   Component Value Date    HGBA1C 8.3 (H) 05/18/2022     Most recent fingerstick glucose reviewed-   Recent Labs   Lab 09/10/22  2151 09/11/22  0448 09/11/22  1144 09/11/22  1512   POCTGLUCOSE 194* 134* 132* 149*     Current correctional scale  Low  Maintain anti-hyperglycemic dose as follows-   Antihyperglycemics (From admission, onward)    Start     Stop Route Frequency Ordered    09/10/22 1612  insulin aspart U-100 pen 0-5 Units         -- SubQ Before meals & nightly PRN 09/10/22 1515        Hold Oral hypoglycemics while patient is in the hospital.    Check A1c in am

## 2022-09-11 NOTE — PROGRESS NOTES
[facility] [facility] ([facility] Medicine  Progress Note    Patient Name: Friday ROGELIO Blake  MRN: [id number]  Patient Class: IP- Inpatient   Admission Date: 9/10/2022  Length of Stay: 1 days  Attending Physician: Kuldeep Bermudez MD  Primary Care Provider: Sid Elizabeth MD        Subjective:     Principal Problem:Lumbar discitis        HPI:  67 y/o AAM with Hx of HTN, HLD, DM2, and obesity, recently had Covid, s/p who presented to the ER for fever for 1 week. He reports temperatures have been running around 100 for 1 week. He continued to take Tylenol but the fever returns. He last took Tylenol 4 hours prior to arrival. Denies all other complaints such as sore throat, cough, rhinorrhea, nasal congestion, ear pain, chest pain, shortness of breath, abdominal pain, nausea, vomiting, diarrhea. He reports some mild pain in his lower back and states that his wife noticed that the swelling in his back appears to have increased. He is s/p lumbar laminectomy with discectomy 6/22 followed by multiple Lumbar drainage and developed L2-3 Discitis and Osteomyelitis for which he was admitted here 7/29 to 8/12 and discharged home with home infusion with IV Zosyn and PO Doxycycline for 4 more weeks and 5 weeks respectively and he was to complete his Abx on 9/23. He ESR and CRP has been steadily falling and Dr. Ayesha BALDWIN has been following him regularly and was to be seen by NSGY Dr. Reyes this coming Tuesday.     In the ER, VSS, Afeb. CBC, CMP, UA and other labs unremarkable, ESR 45, CRP 30- all declining but CT L/S showed Prior L2-3 laminectomy and discectomy again noted. Persistent findings of L2-3 discitis osteomyelitis with an epidural phlegmon causing L2-3 central canal stenosis.  Adjacent posterior paravertebral fluid collections are again noted with measurements above, possibly abscesses or postoperative seromas.    Pt is being admitted to Hospital Med for IV Abx and Neurosurgical and ID eval and tx.               Overview/Hospital Course:  65 y/o AAM with Hx of HTN, HLD, DM2, and obesity, recently had Covid, s/p who presented to the ER for fever for 1 week, Temp running around 100 F x 1 week. He reports some mild pain in his lower back and states that his wife noticed that the swelling in his back appears to have increased. He is s/p lumbar laminectomy with discectomy 6/22 followed by multiple Lumbar drainage and developed L2-3 Discitis and Osteomyelitis for which he was admitted here 7/29 to 8/12 and discharged home with home infusion with IV Zosyn and PO Doxycycline for 4/5 more weeks respectively. ESR and CRP steadily falling. ID and NSGY  following him closely.     In the ER, VSS, Afeb. CBC, CMP, UA and other labs unremarkable, ESR 45, CRP 30- all declining but CT L/S showed Prior L2-3 laminectomy and discectomy again noted. Persistent findings of L2-3 discitis osteomyelitis with an epidural phlegmon causing L2-3 central canal stenosis.  Adjacent posterior paravertebral fluid collections are again noted with measurements above, possibly abscesses or postoperative seromas.    9/11- Appreciate Dr. Zepeda- pt s/p 5 weeks s/p washout lumbar spine, has been on IV Abx at home. Resting comfortably,  Afebrile but Tmax 100.1. eating drinking well, getting Merrem and Vanc. IR will evaluate him in am for lumbar drain etc. Old PICC d/mónica, new Picc placed today.               Interval History: Appreciate Dr. Zepeda- pt s/p 5 weeks s/p washout lumbar spine, has been on IV Abx at home. Resting comfortably,  Afebrile but Tmax 100.1. eating drinking well, getting Merrem and Vanc. IR will evaluate him in am for lumbar drain etc. Old PICC d/mónica, new Picc placed today.      Review of Systems   Constitutional:  Positive for activity change, chills and fever. Negative for diaphoresis and fatigue.   HENT: Negative.     Eyes: Negative.    Respiratory: Negative.  Negative for shortness of breath.    Cardiovascular: Negative.  Negative  for chest pain, palpitations and leg swelling.   Gastrointestinal: Negative.    Endocrine: Negative.    Genitourinary: Negative.    Musculoskeletal:  Positive for back pain.   Allergic/Immunologic: Negative.    Neurological: Negative.    Hematological: Negative.    Psychiatric/Behavioral:  Negative for dysphoric mood.    Objective:     Vital Signs (Most Recent):  Temp: 97.9 °F (36.6 °C) (09/11/22 1547)  Pulse: 61 (09/11/22 1547)  Resp: 18 (09/11/22 1547)  BP: 134/77 (09/11/22 1547)  SpO2: 99 % (09/11/22 1547) Vital Signs (24h Range):  Temp:  [97.8 °F (36.6 °C)-100.1 °F (37.8 °C)] 97.9 °F (36.6 °C)  Pulse:  [52-77] 61  Resp:  [17-19] 18  SpO2:  [95 %-100 %] 99 %  BP: (113-134)/(58-89) 134/77     Weight: 106.3 kg (234 lb 5.6 oz)  Body mass index is 36.7 kg/m².    Intake/Output Summary (Last 24 hours) at 9/11/2022 1744  Last data filed at 9/11/2022 1127  Gross per 24 hour   Intake 599.02 ml   Output 451 ml   Net 148.02 ml      Physical Exam  Vitals and nursing note reviewed.   Constitutional:       Appearance: Normal appearance. He is well-developed. He is obese.   HENT:      Head: Normocephalic and atraumatic.      Right Ear: External ear normal.      Left Ear: External ear normal.      Nose: Nose normal.   Eyes:      Conjunctiva/sclera: Conjunctivae normal.      Pupils: Pupils are equal, round, and reactive to light.   Cardiovascular:      Rate and Rhythm: Normal rate and regular rhythm.      Heart sounds: Normal heart sounds.   Pulmonary:      Effort: Pulmonary effort is normal.      Breath sounds: Normal breath sounds.   Abdominal:      General: Bowel sounds are normal.      Palpations: Abdomen is soft.   Genitourinary:     Penis: Normal.       Prostate: Normal.      Rectum: Normal.   Musculoskeletal:         General: Swelling and tenderness present. Normal range of motion.      Cervical back: Normal range of motion and neck supple.      Comments: L/S midline scar well healed but large area of mild swelling which  is fluctuant with minimal tenderness, no redness or warmth   Skin:     General: Skin is warm and dry.      Capillary Refill: Capillary refill takes less than 2 seconds.   Neurological:      General: No focal deficit present.      Mental Status: He is alert and oriented to person, place, and time.      Deep Tendon Reflexes: Reflexes are normal and symmetric.   Psychiatric:         Behavior: Behavior normal.         Thought Content: Thought content normal.         Judgment: Judgment normal.       Significant Labs: All pertinent labs within the past 24 hours have been reviewed.  BMP:   Recent Labs   Lab 09/10/22  0259   *      K 4.5      CO2 22*   BUN 11   CREATININE 0.8   CALCIUM 8.9     CBC:   Recent Labs   Lab 09/10/22  0259   WBC 2.24*   HGB 10.8*   HCT 34.1*          Significant Imaging: I have reviewed all pertinent imaging results/findings within the past 24 hours.      Assessment/Plan:      * Lumbar discitis with Osteo L2-3  Has developed persistent fever, despite over 4 weeks of IV abx  MRI shows persistent Discitis/Osteomyelitis L2-3    D/w ID- will treat withy IV Merrem and Vanc     Await IR input in am    Type 2 diabetes mellitus without complication, without long-term current use of insulin  Patient's FSGs are controlled on current medication regimen.  Last A1c reviewed-   Lab Results   Component Value Date    HGBA1C 8.3 (H) 05/18/2022     Most recent fingerstick glucose reviewed-   Recent Labs   Lab 09/10/22  2151 09/11/22  0448 09/11/22  1144 09/11/22  1512   POCTGLUCOSE 194* 134* 132* 149*     Current correctional scale  Low  Maintain anti-hyperglycemic dose as follows-   Antihyperglycemics (From admission, onward)    Start     Stop Route Frequency Ordered    09/10/22 1612  insulin aspart U-100 pen 0-5 Units         -- SubQ Before meals & nightly PRN 09/10/22 1515        Hold Oral hypoglycemics while patient is in the hospital.    Check A1c in am      VTE Risk Mitigation (From  admission, onward)         Ordered     enoxaparin injection 40 mg  Daily         09/10/22 1515     IP VTE HIGH RISK PATIENT  Once         09/10/22 1515     Place sequential compression device  Until discontinued         09/10/22 1515                Discharge Planning   MATEUS:      Code Status: Full Code   Is the patient medically ready for discharge?:     Reason for patient still in hospital (select all that apply): Patient trending condition, Laboratory test, Treatment, Imaging and Consult recommendations         Kuldeep Bermudez MD  Department of Hospital Medicine   [place] - Tele[facility] ([facility])

## 2022-09-11 NOTE — PROCEDURES
"Friday ROGELIO Blake is a 66 y.o. male patient.    Temp: 97.8 °F (36.6 °C) (09/11/22 1105)  Pulse: (!) 52 (09/11/22 1105)  Resp: 18 (09/11/22 1105)  BP: 132/60 (09/11/22 1105)  SpO2: 100 % (09/11/22 1105)  Weight: 106.3 kg (234 lb 5.6 oz) (09/11/22 0134)  Height: 5' 7" (170.2 cm) (09/10/22 0116)    PICC  Date/Time: 9/11/2022 10:52 AM  Performed by: Rafael Mcclendon RN  Supervising provider: Grady Sierra RN  Consent Done: Yes  Time out: Immediately prior to procedure a time out was called to verify the correct patient, procedure, equipment, support staff and site/side marked as required  Indications: med administration and vascular access  Anesthesia: local infiltration  Local anesthetic: lidocaine 1% without epinephrine  Anesthetic Total (mL): 3  Preparation: skin prepped with ChloraPrep  Skin prep agent dried: skin prep agent completely dried prior to procedure  Sterile barriers: all five maximum sterile barriers used - cap, mask, sterile gown, sterile gloves, and large sterile sheet  Hand hygiene: hand hygiene performed prior to central venous catheter insertion  Location details: left basilic  Catheter type: double lumen  Catheter size: 4 Fr  Catheter Length: 43cm    Ultrasound guidance: yes  Vessel Caliber: medium and patent, compressibility normal  Vascular Doppler: not done  Needle advanced into vessel with real time Ultrasound guidance.  Guidewire confirmed in vessel.  Sterile sheath used.  no esophageal manometryNumber of attempts: 1  Post-procedure: blood return through all ports, chlorhexidine patch and sterile dressing applied  Estimated blood loss (mL): 0    Complications: none  Comments: Over wire exchange for 40cm picc line with partial occlusion following cathflo        Name Rafael Mcclendon RN  9/11/2022    "

## 2022-09-11 NOTE — PROGRESS NOTES
[place] - Telemetry ([facility])  Neurosurgery  Progress Note    Subjective:     Interval History:   Appx 5 weeks s/p washout lumbar spine   Has been on abx at home with home health   PICC line to L UE has had difficulty drawing blood over past two weeks   Pt states that he has had low grade fever at home + chills   1000-100.6  Denies HA, N/V, back or leg pain   Ambulates unassisted   Denies weakness   In ER labs and cultures taken no growth to date   Currently on vanc and meropenum    MRI/CT   Posterior fluid collection superficial and deep similar to prior   With regards to thecal sac there is relatively minimal compression to thecal sac       MAEW   Incision with some swelling   No redness or drainage   Sensation  I     Assessment /plan   Clinically patient doing well   Low grade fever at home   Afebrile since admission   CRP slightly elevated  WBC WNL   ESR trending down     There is posterior fluid   Will speak with IR milly MEEKS to see about drainage and obtaining cultures   PICC line has not been able to be used to draw blood for about 2 weeks   Will consult PICC team to see about changing/replaceing   US of BLE LE and L UE to rule out DVT     Post-Op Info:  * No surgery found *          Medications:  Continuous Infusions:  Scheduled Meds:   enoxaparin  40 mg Subcutaneous Daily    meropenem (MERREM) IVPB  500 mg Intravenous Q6H    sodium chloride 0.9%  10 mL Intravenous Q8H    vancomycin (VANCOCIN) IVPB  1,750 mg Intravenous Q12H     PRN Meds:acetaminophen, dextrose 10%, dextrose 10%, glucagon (human recombinant), glucose, glucose, insulin aspart U-100, melatonin, naloxone, ondansetron, polyethylene glycol, senna-docusate 8.6-50 mg, Pharmacy to dose Vancomycin consult **AND** vancomycin - pharmacy to dose     Review of Systems  Objective:     Weight: 106.3 kg (234 lb 5.6 oz)  Body mass index is 36.7 kg/m².  Vital Signs (Most Recent):  Temp: 99.7 °F (37.6 °C) (09/11/22 0734)  Pulse: 69 (09/11/22 0734)  Resp: 18  (09/11/22 0734)  BP: 131/89 (09/11/22 0734)  SpO2: 98 % (09/11/22 0734) Vital Signs (24h Range):  Temp:  [98.6 °F (37 °C)-100.1 °F (37.8 °C)] 99.7 °F (37.6 °C)  Pulse:  [60-77] 69  Resp:  [17-20] 18  SpO2:  [95 %-100 %] 98 %  BP: (113-157)/(58-89) 131/89                          Physical Exam:    Constitutional: He appears well-developed and well-nourished. No distress.     Eyes: Pupils are equal, round, and reactive to light. EOM are normal.     Cardiovascular: Normal rate.     Abdominal: Soft.     Skin: Skin displays no rash on trunk.     Psych/Behavior: He is alert. He is oriented to person, place, and time. He has a normal mood and affect.     Musculoskeletal:        Neck: Range of motion is full. Muscle strength is 5/5. Tone is normal.        Back: Range of motion is limited. There is tenderness (incision cdi no drainage or redness, some swelling). Muscle strength is 5/5. Tone is normal.        Right Upper Extremities: There is no tenderness. Tone is normal.        Left Upper Extremities: There is tenderness. Tenderness is located PICC in pklace. Tone is normal.       Right Lower Extremities: There is no tenderness.        Left Lower Extremities: There is no tenderness.     Neurological:        Sensory: There is no sensory deficit in the trunk. There is no sensory deficit in the extremities.        DTRs: DTRs are normal.        Cranial nerves: Cranial nerve(s) II, III, IV, V, VI, VII, VIII, IX, X, XI and XII are intact.     Significant Labs:  Recent Labs   Lab 09/10/22  0259   *      K 4.5      CO2 22*   BUN 11   CREATININE 0.8   CALCIUM 8.9     Recent Labs   Lab 09/10/22  0259   WBC 2.24*   HGB 10.8*   HCT 34.1*        No results for input(s): LABPT, INR, APTT in the last 48 hours.  Microbiology Results (last 7 days)       Procedure Component Value Units Date/Time    Blood culture #1 **CANNOT BE ORDERED STAT** [[id number]] Collected: 09/10/22 0258    Order Status: Completed Specimen:  Blood from Peripheral, Hand, Right Updated: 09/10/22 1515     Blood Culture, Routine No Growth to date    Blood culture #2 **CANNOT BE ORDERED STAT** [[id number]] Collected: 09/10/22 0257    Order Status: Completed Specimen: Blood from Peripheral, Antecubital, Left Updated: 09/10/22 1515     Blood Culture, Routine No Growth to date          CMP:   Recent Labs   Lab 09/10/22  0259   *   CALCIUM 8.9   ALBUMIN 3.7   PROT 6.9      K 4.5   CO2 22*      BUN 11   CREATININE 0.8   ALKPHOS 64   ALT 40   AST 45*   BILITOT 0.4     CRP:   Recent Labs   Lab 09/10/22  0259   CRP 29.9*     Procalcitonin:   Recent Labs   Lab 09/10/22  0259 09/10/22  1643   PROCAL 0.07 0.07     Recent Lab Results         09/11/22  0448   09/10/22  2151   09/10/22  1643   09/10/22  1541        Procalcitonin     0.07  Comment: A concentration < 0.25 ng/mL represents a low risk of bacterial   infection.  Procalcitonin may not be accurate among patients with localized   infection, recent trauma or major surgery, immunosuppressed state,   invasive fungal infection, renal dysfunction. Decisions regarding   initiation or continuation of antibiotic therapy should not be based   solely on procalcitonin levels.           POCT Glucose 134   194     153             Significant Diagnostics:  Reviewed as above     Assessment/Plan:     Active Diagnoses:    Diagnosis Date Noted POA    PRINCIPAL PROBLEM:  Lumbar discitis with Osteo L2-3 [M46.46] 08/01/2022 Yes    Type 2 diabetes mellitus without complication, without long-term current use of insulin [E11.9]  Yes      Problems Resolved During this Admission:       Shaggy Zepeda MD  Neurosurgery  [facility] [facility])

## 2022-09-11 NOTE — PLAN OF CARE
Pt oriented x4 VSS  Pt remained afebile throughout this shift  All meds administered per order.  Pt remianed free of falls  Plan of care reviewed. pt verbalizes understanding.  Patient moving/ turning independently.  Patient normal sinus on monitor  Bed low, side rails up x2, wheels locked, call light in reach.  Pt instructed to call for assistance

## 2022-09-11 NOTE — SUBJECTIVE & OBJECTIVE
Interval History: Appreciate Dr. Zepeda- pt s/p 5 weeks s/p washout lumbar spine, has been on IV Abx at home. Resting comfortably,  Afebrile but Tmax 100.1. eating drinking well, getting Merrem and Vanc. IR will evaluate him in am for lumbar drain etc. Old PICC d/mónica, new Picc placed today.      Review of Systems   Constitutional:  Positive for activity change, chills and fever. Negative for diaphoresis and fatigue.   HENT: Negative.     Eyes: Negative.    Respiratory: Negative.  Negative for shortness of breath.    Cardiovascular: Negative.  Negative for chest pain, palpitations and leg swelling.   Gastrointestinal: Negative.    Endocrine: Negative.    Genitourinary: Negative.    Musculoskeletal:  Positive for back pain.   Allergic/Immunologic: Negative.    Neurological: Negative.    Hematological: Negative.    Psychiatric/Behavioral:  Negative for dysphoric mood.    Objective:     Vital Signs (Most Recent):  Temp: 97.9 °F (36.6 °C) (09/11/22 1547)  Pulse: 61 (09/11/22 1547)  Resp: 18 (09/11/22 1547)  BP: 134/77 (09/11/22 1547)  SpO2: 99 % (09/11/22 1547) Vital Signs (24h Range):  Temp:  [97.8 °F (36.6 °C)-100.1 °F (37.8 °C)] 97.9 °F (36.6 °C)  Pulse:  [52-77] 61  Resp:  [17-19] 18  SpO2:  [95 %-100 %] 99 %  BP: (113-134)/(58-89) 134/77     Weight: 106.3 kg (234 lb 5.6 oz)  Body mass index is 36.7 kg/m².    Intake/Output Summary (Last 24 hours) at 9/11/2022 1744  Last data filed at 9/11/2022 1127  Gross per 24 hour   Intake 599.02 ml   Output 451 ml   Net 148.02 ml      Physical Exam  Vitals and nursing note reviewed.   Constitutional:       Appearance: Normal appearance. He is well-developed. He is obese.   HENT:      Head: Normocephalic and atraumatic.      Right Ear: External ear normal.      Left Ear: External ear normal.      Nose: Nose normal.   Eyes:      Conjunctiva/sclera: Conjunctivae normal.      Pupils: Pupils are equal, round, and reactive to light.   Cardiovascular:      Rate and Rhythm: Normal rate  and regular rhythm.      Heart sounds: Normal heart sounds.   Pulmonary:      Effort: Pulmonary effort is normal.      Breath sounds: Normal breath sounds.   Abdominal:      General: Bowel sounds are normal.      Palpations: Abdomen is soft.   Genitourinary:     Penis: Normal.       Prostate: Normal.      Rectum: Normal.   Musculoskeletal:         General: Swelling and tenderness present. Normal range of motion.      Cervical back: Normal range of motion and neck supple.      Comments: L/S midline scar well healed but large area of mild swelling which is fluctuant with minimal tenderness, no redness or warmth   Skin:     General: Skin is warm and dry.      Capillary Refill: Capillary refill takes less than 2 seconds.   Neurological:      General: No focal deficit present.      Mental Status: He is alert and oriented to person, place, and time.      Deep Tendon Reflexes: Reflexes are normal and symmetric.   Psychiatric:         Behavior: Behavior normal.         Thought Content: Thought content normal.         Judgment: Judgment normal.       Significant Labs: All pertinent labs within the past 24 hours have been reviewed.  BMP:   Recent Labs   Lab 09/10/22  0259   *      K 4.5      CO2 22*   BUN 11   CREATININE 0.8   CALCIUM 8.9     CBC:   Recent Labs   Lab 09/10/22  0259   WBC 2.24*   HGB 10.8*   HCT 34.1*          Significant Imaging: I have reviewed all pertinent imaging results/findings within the past 24 hours.

## 2022-09-11 NOTE — PROGRESS NOTES
Pharmacokinetic Initial Assessment: IV Vancomycin    Assessment/Plan:    Initiate intravenous vancomycin with loading dose of 2500 mg once followed by a maintenance dose of vancomycin 1750 mg IV every 12 hours  Desired empiric serum trough concentration is 15 to 20 mcg/mL  Draw vancomycin trough level 60 min prior to fourth dose on 9/12 at approximately 08:15  Pharmacy will continue to follow and monitor vancomycin.      Please contact pharmacy at extension [phone] with any questions regarding this assessment.     Thank you for the consult,   Jay Brock       Patient brief summary:  Friday ROGELIO Blake is a 66 y.o. male initiated on antimicrobial therapy with IV Vancomycin for treatment of suspected bone/joint infection    Drug Allergies:   Review of patient's allergies indicates:  No Known Allergies    Actual Body Weight:   105.4kg    Renal Function:   Estimated Creatinine Clearance: 105.1 mL/min (based on SCr of 0.8 mg/dL).,     Dialysis Method (if applicable):  N/A    CBC (last 72 hours):  Recent Labs   Lab Result Units 09/10/22  0259   WBC K/uL 2.24*   Hemoglobin g/dL 10.8*   Hematocrit % 34.1*   Platelets K/uL 196   Gran % % 27.7*   Lymph % % 54.0*   Mono % % 11.6   Eosinophil % % 5.8   Basophil % % 0.9   Differential Method  Automated       Metabolic Panel (last 72 hours):  Recent Labs   Lab Result Units 09/10/22  0259 09/10/22  0307   Sodium mmol/L 139  --    Potassium mmol/L 4.5  --    Chloride mmol/L 107  --    CO2 mmol/L 22*  --    Glucose mg/dL 132*  --    Glucose, UA   --  Negative   BUN mg/dL 11  --    Creatinine mg/dL 0.8  --    Albumin g/dL 3.7  --    Total Bilirubin mg/dL 0.4  --    Alkaline Phosphatase U/L 64  --    AST U/L 45*  --    ALT U/L 40  --        Drug levels (last 3 results):  No results for input(s): VANCOMYCINRA, VANCORANDOM, VANCOMYCINPE, VANCOPEAK, VANCOMYCINTR, VANCOTROUGH in the last 72 hours.    Microbiologic Results:  Microbiology Results (last 7 days)       Procedure Component  Value Units Date/Time    Blood culture #1 **CANNOT BE ORDERED STAT** [[id number]] Collected: 09/10/22 0258    Order Status: Completed Specimen: Blood from Peripheral, Hand, Right Updated: 09/10/22 1515     Blood Culture, Routine No Growth to date    Blood culture #2 **CANNOT BE ORDERED STAT** [[id number]] Collected: 09/10/22 0257    Order Status: Completed Specimen: Blood from Peripheral, Antecubital, Left Updated: 09/10/22 1515     Blood Culture, Routine No Growth to date

## 2022-09-11 NOTE — HOSPITAL COURSE
67 y/o AAM with Hx of HTN, HLD, DM2, and obesity, recently had Covid, s/p who presented to the ER for fever for 1 week, Temp running around 100 F x 1 week. He reports some mild pain in his lower back and states that his wife noticed that the swelling in his back appears to have increased. He is s/p lumbar laminectomy with discectomy 6/22 followed by multiple Lumbar drainage and developed L2-3 Discitis and Osteomyelitis for which he was admitted here 7/29 to 8/12 and discharged home with home infusion with IV Zosyn and PO Doxycycline for 4/5 more weeks respectively. ESR and CRP steadily falling. ID and NSGY  following him closely.     In the ER, VSS, Afeb. CBC, CMP, UA and other labs unremarkable, ESR 45, CRP 30- all declining but CT L/S showed Prior L2-3 laminectomy and discectomy again noted. Persistent findings of L2-3 discitis osteomyelitis with an epidural phlegmon causing L2-3 central canal stenosis.  Adjacent posterior paravertebral fluid collections are again noted with measurements above, possibly abscesses or postoperative seromas.    9/11- Appreciate Dr. Zepeda- pt s/p 5 weeks s/p washout lumbar spine, has been on IV Abx at home. Resting comfortably,  Afebrile but Tmax 100.1. eating drinking well, getting Merrem and Vanc. IR will evaluate him in am for lumbar drain etc. Old PICC d/mónica, new Picc placed today.      9/12- looks and feels well, resting comfortably, had low grade fever to 99.7 only but otherwise well, no significant back pain. Doppler LE and UE neg for any DVTs. Dr. Zepeda feels fluid in the Lumbar area likely Seroma and not CSF leak or abscess and IR plans Drainage in am for C/S but not planning to leave any drains. Will repeat MRI in 5-7 days to see improvement in the amount of fluid as well as look over the discitis/ osteomyelitis. Pt agreeable with plan- will continue new Abx Merrem/Vanc.      9/13- looks and feels much better, giles after IR drained 40 cc straw colored clear fluid. No pus  seen. Pain/Soreness in his lumbar area improved with decreased swelling. He has also remained afebrile in last 24 hrs. Dr. Zepeda and ID cleared him to be discharged home with HH to continue home infusion with his new regimen- Vanc and Merrem which has been changed to 1 gm BID for ease of administration and may changed pending culture results. Dr. Hodge will be monitoring that. Dr. Zepeda cleared him for discharge and may repeat his MRI in a week or so to see how the discitis/osteomyelitis appear since the fluid removal and to ascertain there is no CSF leak. This was explained in detail to pt and he understands and accepts and feels ready and eager to go home now. Pt was seen and examined and deemed stable for discharge home today with HH.

## 2022-09-11 NOTE — PLAN OF CARE
Problem: Adult Inpatient Plan of Care  Goal: Patient-Specific Goal (Individualized)  Outcome: Ongoing, Progressing     Pt AAO   SB-SR on tele   Blood pressure stable   Room air   Temp max this shift 99.7  IV ABX continued  L picc removed and replaced for IV ABX use   Next vanc trough is tomorrow @ 0900  Lovenox for VTE

## 2022-09-11 NOTE — ASSESSMENT & PLAN NOTE
Has developed persistent fever, despite over 4 weeks of IV abx  MRI shows persistent Discitis/Osteomyelitis L2-3    D/w ID- will treat withy IV Merrem and Vanc     Await IR input in am

## 2022-09-12 LAB
ACANTHOCYTES BLD QL SMEAR: PRESENT
ANION GAP SERPL CALC-SCNC: 8 MMOL/L (ref 8–16)
ANISOCYTOSIS BLD QL SMEAR: SLIGHT
BASOPHILS # BLD AUTO: 0.02 K/UL (ref 0–0.2)
BASOPHILS NFR BLD: 0.6 % (ref 0–1.9)
BUN SERPL-MCNC: 9 MG/DL (ref 8–23)
BURR CELLS BLD QL SMEAR: ABNORMAL
CALCIUM SERPL-MCNC: 9 MG/DL (ref 8.7–10.5)
CHLORIDE SERPL-SCNC: 108 MMOL/L (ref 95–110)
CO2 SERPL-SCNC: 27 MMOL/L (ref 23–29)
CREAT SERPL-MCNC: 0.7 MG/DL (ref 0.5–1.4)
DIFFERENTIAL METHOD: ABNORMAL
EOSINOPHIL # BLD AUTO: 0.2 K/UL (ref 0–0.5)
EOSINOPHIL NFR BLD: 6.9 % (ref 0–8)
ERYTHROCYTE [DISTWIDTH] IN BLOOD BY AUTOMATED COUNT: 15.4 % (ref 11.5–14.5)
EST. GFR  (NO RACE VARIABLE): >60 ML/MIN/1.73 M^2
GLUCOSE SERPL-MCNC: 132 MG/DL (ref 70–110)
HCT VFR BLD AUTO: 34.4 % (ref 40–54)
HGB BLD-MCNC: 10.6 G/DL (ref 14–18)
IMM GRANULOCYTES # BLD AUTO: 0.01 K/UL (ref 0–0.04)
IMM GRANULOCYTES NFR BLD AUTO: 0.3 % (ref 0–0.5)
LYMPHOCYTES # BLD AUTO: 2 K/UL (ref 1–4.8)
LYMPHOCYTES NFR BLD: 62.5 % (ref 18–48)
MCH RBC QN AUTO: 26.9 PG (ref 27–31)
MCHC RBC AUTO-ENTMCNC: 30.8 G/DL (ref 32–36)
MCV RBC AUTO: 87 FL (ref 82–98)
MONOCYTES # BLD AUTO: 0.4 K/UL (ref 0.3–1)
MONOCYTES NFR BLD: 11.6 % (ref 4–15)
NEUTROPHILS # BLD AUTO: 0.6 K/UL (ref 1.8–7.7)
NEUTROPHILS NFR BLD: 18.1 % (ref 38–73)
NRBC BLD-RTO: 0 /100 WBC
PLATELET # BLD AUTO: 200 K/UL (ref 150–450)
PMV BLD AUTO: 11.6 FL (ref 9.2–12.9)
POCT GLUCOSE: 105 MG/DL (ref 70–110)
POCT GLUCOSE: 133 MG/DL (ref 70–110)
POCT GLUCOSE: 144 MG/DL (ref 70–110)
POCT GLUCOSE: 149 MG/DL (ref 70–110)
POIKILOCYTOSIS BLD QL SMEAR: SLIGHT
POTASSIUM SERPL-SCNC: 4 MMOL/L (ref 3.5–5.1)
RBC # BLD AUTO: 3.94 M/UL (ref 4.6–6.2)
SODIUM SERPL-SCNC: 143 MMOL/L (ref 136–145)
VANCOMYCIN TROUGH SERPL-MCNC: 13.1 UG/ML (ref 10–22)
WBC # BLD AUTO: 3.2 K/UL (ref 3.9–12.7)

## 2022-09-12 PROCEDURE — A4216 STERILE WATER/SALINE, 10 ML: HCPCS | Performed by: EMERGENCY MEDICINE

## 2022-09-12 PROCEDURE — 21400001 HC TELEMETRY ROOM

## 2022-09-12 PROCEDURE — 99024 POSTOP FOLLOW-UP VISIT: CPT | Mod: ,,, | Performed by: PHYSICIAN ASSISTANT

## 2022-09-12 PROCEDURE — 80202 ASSAY OF VANCOMYCIN: CPT | Performed by: EMERGENCY MEDICINE

## 2022-09-12 PROCEDURE — 63600175 PHARM REV CODE 636 W HCPCS: Performed by: EMERGENCY MEDICINE

## 2022-09-12 PROCEDURE — 99223 1ST HOSP IP/OBS HIGH 75: CPT | Mod: NSCH,,, | Performed by: INTERNAL MEDICINE

## 2022-09-12 PROCEDURE — 25000003 PHARM REV CODE 250: Performed by: EMERGENCY MEDICINE

## 2022-09-12 PROCEDURE — 80048 BASIC METABOLIC PNL TOTAL CA: CPT | Performed by: EMERGENCY MEDICINE

## 2022-09-12 PROCEDURE — 99223 PR INITIAL HOSPITAL CARE,LEVL III: ICD-10-PCS | Mod: NSCH,,, | Performed by: INTERNAL MEDICINE

## 2022-09-12 PROCEDURE — 85025 COMPLETE CBC W/AUTO DIFF WBC: CPT | Performed by: EMERGENCY MEDICINE

## 2022-09-12 PROCEDURE — 99024 PR POST-OP FOLLOW-UP VISIT: ICD-10-PCS | Mod: ,,, | Performed by: PHYSICIAN ASSISTANT

## 2022-09-12 RX ORDER — ATORVASTATIN CALCIUM 10 MG/1
10 TABLET, FILM COATED ORAL NIGHTLY
Status: DISCONTINUED | OUTPATIENT
Start: 2022-09-12 | End: 2022-09-13 | Stop reason: HOSPADM

## 2022-09-12 RX ORDER — TAMSULOSIN HYDROCHLORIDE 0.4 MG/1
1 CAPSULE ORAL DAILY
Status: DISCONTINUED | OUTPATIENT
Start: 2022-09-13 | End: 2022-09-13 | Stop reason: HOSPADM

## 2022-09-12 RX ORDER — FINASTERIDE 5 MG/1
5 TABLET, FILM COATED ORAL DAILY
Status: DISCONTINUED | OUTPATIENT
Start: 2022-09-13 | End: 2022-09-13 | Stop reason: HOSPADM

## 2022-09-12 RX ORDER — GABAPENTIN 300 MG/1
300 CAPSULE ORAL 3 TIMES DAILY
Status: DISCONTINUED | OUTPATIENT
Start: 2022-09-12 | End: 2022-09-13 | Stop reason: HOSPADM

## 2022-09-12 RX ORDER — LOSARTAN POTASSIUM 50 MG/1
50 TABLET ORAL DAILY
Refills: 3 | Status: DISCONTINUED | OUTPATIENT
Start: 2022-09-13 | End: 2022-09-13 | Stop reason: HOSPADM

## 2022-09-12 RX ADMIN — SODIUM CHLORIDE, PRESERVATIVE FREE 10 ML: 5 INJECTION INTRAVENOUS at 01:09

## 2022-09-12 RX ADMIN — SODIUM CHLORIDE: 0.9 INJECTION, SOLUTION INTRAVENOUS at 08:09

## 2022-09-12 RX ADMIN — GABAPENTIN 300 MG: 300 CAPSULE ORAL at 08:09

## 2022-09-12 RX ADMIN — MEROPENEM AND SODIUM CHLORIDE 500 MG: 500 INJECTION, SOLUTION INTRAVENOUS at 10:09

## 2022-09-12 RX ADMIN — MEROPENEM AND SODIUM CHLORIDE 500 MG: 500 INJECTION, SOLUTION INTRAVENOUS at 01:09

## 2022-09-12 RX ADMIN — ATORVASTATIN CALCIUM 10 MG: 10 TABLET, FILM COATED ORAL at 08:09

## 2022-09-12 RX ADMIN — ENOXAPARIN SODIUM 40 MG: 100 INJECTION SUBCUTANEOUS at 05:09

## 2022-09-12 RX ADMIN — SODIUM CHLORIDE: 0.9 INJECTION, SOLUTION INTRAVENOUS at 10:09

## 2022-09-12 RX ADMIN — VANCOMYCIN HYDROCHLORIDE 1750 MG: 10 INJECTION, POWDER, LYOPHILIZED, FOR SOLUTION INTRAVENOUS at 10:09

## 2022-09-12 RX ADMIN — MEROPENEM AND SODIUM CHLORIDE 500 MG: 500 INJECTION, SOLUTION INTRAVENOUS at 08:09

## 2022-09-12 RX ADMIN — SODIUM CHLORIDE, PRESERVATIVE FREE 10 ML: 5 INJECTION INTRAVENOUS at 05:09

## 2022-09-12 RX ADMIN — VANCOMYCIN HYDROCHLORIDE 1750 MG: 10 INJECTION, POWDER, LYOPHILIZED, FOR SOLUTION INTRAVENOUS at 11:09

## 2022-09-12 RX ADMIN — Medication 10 ML: at 10:09

## 2022-09-12 RX ADMIN — MEROPENEM AND SODIUM CHLORIDE 500 MG: 500 INJECTION, SOLUTION INTRAVENOUS at 05:09

## 2022-09-12 NOTE — PLAN OF CARE
Pt oriented x4 VSS  Pt remained afebile throughout this shift  All meds administered per order. ANTOINETTE PIC  Pt remianed free of falls  Plan of care reviewed. pt verbalizes understanding.  Patient moving/ turning independently.  Patient normal sinus on monitor  Bed low, side rails up x2, wheels locked, call light in reach.  Pt instructed to call for assistance  Hourly rounding completed.

## 2022-09-12 NOTE — ASSESSMENT & PLAN NOTE
Has developed persistent fever, despite over 4 weeks of IV abx  MRI shows persistent Discitis/Osteomyelitis L2-3    D/w ID- will treat withy IV Merrem and Vanc     Await IR input in am    Likely seroma- await IR drainage in am  Cont Merrem and Vanc

## 2022-09-12 NOTE — ASSESSMENT & PLAN NOTE
Patient's FSGs are controlled on current medication regimen.  Last A1c reviewed-   Lab Results   Component Value Date    HGBA1C 8.3 (H) 05/18/2022     Most recent fingerstick glucose reviewed-   Recent Labs   Lab 09/11/22  2140 09/12/22  0514 09/12/22  1139 09/12/22  1618   POCTGLUCOSE 205* 133* 149* 105     Current correctional scale  Low  Maintain anti-hyperglycemic dose as follows-   Antihyperglycemics (From admission, onward)    Start     Stop Route Frequency Ordered    09/10/22 1612  insulin aspart U-100 pen 0-5 Units         -- SubQ Before meals & nightly PRN 09/10/22 1515        Hold Oral hypoglycemics while patient is in the hospital.    Check A1c in am    Blood Sugar under control

## 2022-09-12 NOTE — CONSULTS
Pharmacokinetic Assessment Follow Up: IV Vancomycin    Vancomycin serum concentration assessment(s):    The trough level was drawn correctly and can be used to guide therapy at this time. The measurement is below the desired definitive target range of 15 to 20 mcg/mL.    Patient's Vancomycin level is expected to accumulate to goal with continued dosing. Patient had loss of IV access during the second dose, which caused about a 3 hour delay in completing that dose.     Vancomycin Regimen Plan:    Continue regimen of Vancomycin 1750 mg IV every 12 hours with next serum trough concentration measured at 1000 prior to 5th dose on 9/13/22.    Will wait to increase dose until we get another trough with continued dosing.     Drug levels (last 3 results):  Recent Labs   Lab Result Units 09/12/22  1001   Vancomycin-Trough ug/mL 13.1     Pharmacy will continue to follow and monitor vancomycin.    Please contact pharmacy at extension [phone] for questions regarding this assessment.    Thank you for the consult,   Gely Sanchez PharmD       Patient brief summary:  Friday ROGELIO Blake is a 66 y.o. male initiated on antimicrobial therapy with IV Vancomycin for treatment of bone/joint infection    The patient's current regimen is Vancomycin 1750 mg IV every 12 hours.    Drug Allergies:   Review of patient's allergies indicates:  No Known Allergies    Actual Body Weight:   106.3 kg    Renal Function:   Estimated Creatinine Clearance: 120.7 mL/min (based on SCr of 0.7 mg/dL).,     Dialysis Method (if applicable):  N/A    CBC (last 72 hours):  Recent Labs   Lab Result Units 09/10/22  0259 09/12/22  0502   WBC K/uL 2.24* 3.20*   Hemoglobin g/dL 10.8* 10.6*   Hematocrit % 34.1* 34.4*   Platelets K/uL 196 200   Gran % % 27.7* 18.1*   Lymph % % 54.0* 62.5*   Mono % % 11.6 11.6   Eosinophil % % 5.8 6.9   Basophil % % 0.9 0.6   Differential Method  Automated Automated       Metabolic Panel (last 72 hours):  Recent Labs   Lab Result Units  09/10/22  0259 09/10/22  0307 09/12/22  0502   Sodium mmol/L 139  --  143   Potassium mmol/L 4.5  --  4.0   Chloride mmol/L 107  --  108   CO2 mmol/L 22*  --  27   Glucose mg/dL 132*  --  132*   Glucose, UA   --  Negative  --    BUN mg/dL 11  --  9   Creatinine mg/dL 0.8  --  0.7   Albumin g/dL 3.7  --   --    Total Bilirubin mg/dL 0.4  --   --    Alkaline Phosphatase U/L 64  --   --    AST U/L 45*  --   --    ALT U/L 40  --   --        Vancomycin Administrations:  vancomycin given in the last 96 hours                     vancomycin 1.75 g in 5 % dextrose 500 mL IVPB (mg) 1,750 mg New Bag 09/12/22 1053     1,750 mg New Bag 09/11/22 2120    vancomycin 1.75 g in 5 % dextrose 500 mL IVPB ()  Restarted 09/11/22 1309      Restarted  1302     1,750 mg New Bag  0959    vancomycin (VANCOCIN) 2,500 mg in dextrose 5 % 500 mL IVPB (mg) 2,500 mg New Bag 09/10/22 2115                    Microbiologic Results:  Microbiology Results (last 7 days)       Procedure Component Value Units Date/Time    Blood culture #1 **CANNOT BE ORDERED STAT** [[id number]] Collected: 09/10/22 0258    Order Status: Completed Specimen: Blood from Peripheral, Hand, Right Updated: 09/12/22 1012     Blood Culture, Routine No Growth to date      No Growth to date      No Growth to date    Blood culture #2 **CANNOT BE ORDERED STAT** [[id number]] Collected: 09/10/22 0257    Order Status: Completed Specimen: Blood from Peripheral, Antecubital, Left Updated: 09/12/22 1012     Blood Culture, Routine No Growth to date      No Growth to date      No Growth to date          Thank you for allowing us to participate in this patient's care.     Gely Sanchez, Kat 09/12/2022 11:48 AM

## 2022-09-12 NOTE — PLAN OF CARE
Care plan reviewed with patient. Ambulatory in the room. Free from falls. No other complaints made.

## 2022-09-12 NOTE — PROGRESS NOTES
[facility] Quick Note:     Patient observed to be readmitted at [facility] on 9/10/22.     Patient is a current IV ABX patient of [facility], being followed by Dr Hodge on the outpatient side; prior to admit, patient's IV ABX RX was IV Zosyn 4.5g q8.     Patient to be followed by [facility] staff while admitted for any needs.        Please do not hesitate to reach out for any additional needs.     Srinath Naqvi MS, RDN, LDN  Clinical Dietitian  [facility]   Desk: [phone]  [email or web address]

## 2022-09-12 NOTE — PLAN OF CARE
CM notified [facility] and [facility] ([facility]) of plan for d/c tomorrow with IV Vanc/Meropenem.

## 2022-09-12 NOTE — PROGRESS NOTES
[facility]  Progress Note    Patient Name: Friday ROGELIO Blake  MRN: [id number]  Patient Class: IP- Inpatient   Admission Date: 9/10/2022  Length of Stay: 2 days  Attending Physician: Kuldeep Bermudez MD  Primary Care Provider: Sid Elizabeth MD        Subjective:     Principal Problem:Lumbar discitis        HPI:  67 y/o AAM with Hx of HTN, HLD, DM2, and obesity, recently had Covid, s/p who presented to the ER for fever for 1 week. He reports temperatures have been running around 100 for 1 week. He continued to take Tylenol but the fever returns. He last took Tylenol 4 hours prior to arrival. Denies all other complaints such as sore throat, cough, rhinorrhea, nasal congestion, ear pain, chest pain, shortness of breath, abdominal pain, nausea, vomiting, diarrhea. He reports some mild pain in his lower back and states that his wife noticed that the swelling in his back appears to have increased. He is s/p lumbar laminectomy with discectomy 6/22 followed by multiple Lumbar drainage and developed L2-3 Discitis and Osteomyelitis for which he was admitted here 7/29 to 8/12 and discharged home with home infusion with IV Zosyn and PO Doxycycline for 4 more weeks and 5 weeks respectively and he was to complete his Abx on 9/23. He ESR and CRP has been steadily falling and Dr. Ayesha BALDWIN has been following him regularly and was to be seen by NSGY Dr. Reyes this coming Tuesday.     In the ER, VSS, Afeb. CBC, CMP, UA and other labs unremarkable, ESR 45, CRP 30- all declining but CT L/S showed Prior L2-3 laminectomy and discectomy again noted. Persistent findings of L2-3 discitis osteomyelitis with an epidural phlegmon causing L2-3 central canal stenosis.  Adjacent posterior paravertebral fluid collections are again noted with measurements above, possibly abscesses or postoperative seromas.    Pt is being admitted to Hospital Med for IV Abx and Neurosurgical and ID eval and tx.               Overview/Hospital Course:  67 y/o AAM with Hx of HTN, HLD, DM2, and obesity, recently had Covid, s/p who presented to the ER for fever for 1 week, Temp running around 100 F x 1 week. He reports some mild pain in his lower back and states that his wife noticed that the swelling in his back appears to have increased. He is s/p lumbar laminectomy with discectomy 6/22 followed by multiple Lumbar drainage and developed L2-3 Discitis and Osteomyelitis for which he was admitted here 7/29 to 8/12 and discharged home with home infusion with IV Zosyn and PO Doxycycline for 4/5 more weeks respectively. ESR and CRP steadily falling. ID and NSGY  following him closely.     In the ER, VSS, Afeb. CBC, CMP, UA and other labs unremarkable, ESR 45, CRP 30- all declining but CT L/S showed Prior L2-3 laminectomy and discectomy again noted. Persistent findings of L2-3 discitis osteomyelitis with an epidural phlegmon causing L2-3 central canal stenosis.  Adjacent posterior paravertebral fluid collections are again noted with measurements above, possibly abscesses or postoperative seromas.    9/11- Appreciate Dr. Zepeda- pt s/p 5 weeks s/p washout lumbar spine, has been on IV Abx at home. Resting comfortably,  Afebrile but Tmax 100.1. eating drinking well, getting Merrem and Vanc. IR will evaluate him in am for lumbar drain etc. Old PICC d/mónica, new Picc placed today.      9/12- looks and feels well, resting comfortably, had low grade fever to 99.7 only but otherwise well, no significant back pain. Doppler LE and UE neg for any DVTs. Dr. Zepeda feels fluid in the Lumbar area likely Seroma and not CSF leak or abscess and IR plans Drainage in am for C/S but not planning to leave any drains. Will repeat MRI in 5-7 days to see improvement in the amount of fluid as well as look over the discitis/ osteomyelitis. Pt agreeable with plan- will continue new Abx Merrem/Vanc.                 Interval History: looks and feels well, resting  comfortably, had low grade fever to 99.7 only but otherwise well, no significant back pain. Doppler LE and UE neg for any DVTs. Dr. Zepeda feels fluid in the Lumbar area likely Seroma and not CSF leak or abscess and IR plans Drainage in am for C/S but not planning to leave any drains. Will repeat MRI in 5-7 days to see improvement in the amount of fluid as well as look over the discitis/ osteomyelitis. Pt agreeable with plan- will continue new Abx Merrem/Vanc.      Review of Systems   Constitutional:  Positive for activity change, chills and fever. Negative for diaphoresis and fatigue.   HENT: Negative.     Eyes: Negative.    Respiratory: Negative.  Negative for shortness of breath.    Cardiovascular: Negative.  Negative for chest pain, palpitations and leg swelling.   Gastrointestinal: Negative.    Endocrine: Negative.    Genitourinary: Negative.    Musculoskeletal:  Positive for back pain.   Allergic/Immunologic: Negative.    Neurological: Negative.    Hematological: Negative.    Psychiatric/Behavioral:  Negative for dysphoric mood.    Objective:     Vital Signs (Most Recent):  Temp: 97.4 °F (36.3 °C) (09/12/22 1614)  Pulse: (!) 55 (09/12/22 1614)  Resp: 15 (09/12/22 1614)  BP: (!) 141/86 (09/12/22 1614)  SpO2: 99 % (09/12/22 1614)   Vital Signs (24h Range):  Temp:  [97.2 °F (36.2 °C)-98.3 °F (36.8 °C)] 97.4 °F (36.3 °C)  Pulse:  [55-65] 55  Resp:  [15-18] 15  SpO2:  [97 %-100 %] 99 %  BP: (116-141)/(60-86) 141/86     Weight: 106.3 kg (234 lb 5.6 oz)  Body mass index is 36.7 kg/m².    Intake/Output Summary (Last 24 hours) at 9/12/2022 1700  Last data filed at 9/12/2022 0853  Gross per 24 hour   Intake 920.17 ml   Output --   Net 920.17 ml      Physical Exam  Vitals and nursing note reviewed.   Constitutional:       Appearance: Normal appearance. He is well-developed. He is obese.   HENT:      Head: Normocephalic and atraumatic.      Right Ear: External ear normal.      Left Ear: External ear normal.      Nose:  Nose normal.   Eyes:      Conjunctiva/sclera: Conjunctivae normal.      Pupils: Pupils are equal, round, and reactive to light.   Cardiovascular:      Rate and Rhythm: Normal rate and regular rhythm.      Heart sounds: Normal heart sounds.   Pulmonary:      Effort: Pulmonary effort is normal.      Breath sounds: Normal breath sounds.   Abdominal:      General: Bowel sounds are normal.      Palpations: Abdomen is soft.   Genitourinary:     Penis: Normal.       Prostate: Normal.      Rectum: Normal.   Musculoskeletal:         General: Swelling and tenderness present. Normal range of motion.      Cervical back: Normal range of motion and neck supple.      Comments: L/S midline scar well healed but large area of mild swelling which is fluctuant with minimal tenderness, no redness or warmth   Skin:     General: Skin is warm and dry.      Capillary Refill: Capillary refill takes less than 2 seconds.   Neurological:      General: No focal deficit present.      Mental Status: He is alert and oriented to person, place, and time.      Deep Tendon Reflexes: Reflexes are normal and symmetric.   Psychiatric:         Behavior: Behavior normal.         Thought Content: Thought content normal.         Judgment: Judgment normal.       Significant Labs: All pertinent labs within the past 24 hours have been reviewed.  Blood Culture: No results for input(s): LABBLOO in the last 48 hours.  BMP:   Recent Labs   Lab 09/12/22  0502   *      K 4.0      CO2 27   BUN 9   CREATININE 0.7   CALCIUM 9.0     CBC:   Recent Labs   Lab 09/12/22  0502   WBC 3.20*   HGB 10.6*   HCT 34.4*        CMP:   Recent Labs   Lab 09/12/22  0502      K 4.0      CO2 27   *   BUN 9   CREATININE 0.7   CALCIUM 9.0   ANIONGAP 8       Significant Imaging: I have reviewed all pertinent imaging results/findings within the past 24 hours.      Assessment/Plan:      * Lumbar discitis with Osteo L2-3  Has developed persistent  fever, despite over 4 weeks of IV abx  MRI shows persistent Discitis/Osteomyelitis L2-3    D/w ID- will treat withy IV Merrem and Vanc     Await IR input in am    Likely seroma- await IR drainage in am  Cont Merrem and Vanc    Type 2 diabetes mellitus without complication, without long-term current use of insulin  Patient's FSGs are controlled on current medication regimen.  Last A1c reviewed-   Lab Results   Component Value Date    HGBA1C 8.3 (H) 05/18/2022     Most recent fingerstick glucose reviewed-   Recent Labs   Lab 09/11/22  2140 09/12/22  0514 09/12/22  1139 09/12/22  1618   POCTGLUCOSE 205* 133* 149* 105     Current correctional scale  Low  Maintain anti-hyperglycemic dose as follows-   Antihyperglycemics (From admission, onward)    Start     Stop Route Frequency Ordered    09/10/22 1612  insulin aspart U-100 pen 0-5 Units         -- SubQ Before meals & nightly PRN 09/10/22 1515        Hold Oral hypoglycemics while patient is in the hospital.    Check A1c in am    Blood Sugar under control      VTE Risk Mitigation (From admission, onward)         Ordered     enoxaparin injection 40 mg  Daily         09/10/22 1515     IP VTE HIGH RISK PATIENT  Once         09/10/22 1515     Place sequential compression device  Until discontinued         09/10/22 1515                Discharge Planning   MATEUS:      Code Status: Full Code   Is the patient medically ready for discharge?:     Reason for patient still in hospital (select all that apply): Patient trending condition, Laboratory test, Treatment, Imaging and Consult recommendations  Discharge Plan A: Home Health          Kuldeep Bermudez MD  Department of Hospital Medicine   [facility] Tele[facility] ([facility])

## 2022-09-12 NOTE — SUBJECTIVE & OBJECTIVE
Interval History: looks and feels well, resting comfortably, had low grade fever to 99.7 only but otherwise well, no significant back pain. Doppler LE and UE neg for any DVTs. Dr. Zepeda feels fluid in the Lumbar area likely Seroma and not CSF leak or abscess and IR plans Drainage in am for C/S but not planning to leave any drains. Will repeat MRI in 5-7 days to see improvement in the amount of fluid as well as look over the discitis/ osteomyelitis. Pt agreeable with plan- will continue new Abx Merrem/Vanc.      Review of Systems   Constitutional:  Positive for activity change, chills and fever. Negative for diaphoresis and fatigue.   HENT: Negative.     Eyes: Negative.    Respiratory: Negative.  Negative for shortness of breath.    Cardiovascular: Negative.  Negative for chest pain, palpitations and leg swelling.   Gastrointestinal: Negative.    Endocrine: Negative.    Genitourinary: Negative.    Musculoskeletal:  Positive for back pain.   Allergic/Immunologic: Negative.    Neurological: Negative.    Hematological: Negative.    Psychiatric/Behavioral:  Negative for dysphoric mood.    Objective:     Vital Signs (Most Recent):  Temp: 97.4 °F (36.3 °C) (09/12/22 1614)  Pulse: (!) 55 (09/12/22 1614)  Resp: 15 (09/12/22 1614)  BP: (!) 141/86 (09/12/22 1614)  SpO2: 99 % (09/12/22 1614)   Vital Signs (24h Range):  Temp:  [97.2 °F (36.2 °C)-98.3 °F (36.8 °C)] 97.4 °F (36.3 °C)  Pulse:  [55-65] 55  Resp:  [15-18] 15  SpO2:  [97 %-100 %] 99 %  BP: (116-141)/(60-86) 141/86     Weight: 106.3 kg (234 lb 5.6 oz)  Body mass index is 36.7 kg/m².    Intake/Output Summary (Last 24 hours) at 9/12/2022 1700  Last data filed at 9/12/2022 0853  Gross per 24 hour   Intake 920.17 ml   Output --   Net 920.17 ml      Physical Exam  Vitals and nursing note reviewed.   Constitutional:       Appearance: Normal appearance. He is well-developed. He is obese.   HENT:      Head: Normocephalic and atraumatic.      Right Ear: External ear normal.       Left Ear: External ear normal.      Nose: Nose normal.   Eyes:      Conjunctiva/sclera: Conjunctivae normal.      Pupils: Pupils are equal, round, and reactive to light.   Cardiovascular:      Rate and Rhythm: Normal rate and regular rhythm.      Heart sounds: Normal heart sounds.   Pulmonary:      Effort: Pulmonary effort is normal.      Breath sounds: Normal breath sounds.   Abdominal:      General: Bowel sounds are normal.      Palpations: Abdomen is soft.   Genitourinary:     Penis: Normal.       Prostate: Normal.      Rectum: Normal.   Musculoskeletal:         General: Swelling and tenderness present. Normal range of motion.      Cervical back: Normal range of motion and neck supple.      Comments: L/S midline scar well healed but large area of mild swelling which is fluctuant with minimal tenderness, no redness or warmth   Skin:     General: Skin is warm and dry.      Capillary Refill: Capillary refill takes less than 2 seconds.   Neurological:      General: No focal deficit present.      Mental Status: He is alert and oriented to person, place, and time.      Deep Tendon Reflexes: Reflexes are normal and symmetric.   Psychiatric:         Behavior: Behavior normal.         Thought Content: Thought content normal.         Judgment: Judgment normal.       Significant Labs: All pertinent labs within the past 24 hours have been reviewed.  Blood Culture: No results for input(s): LABBLOO in the last 48 hours.  BMP:   Recent Labs   Lab 09/12/22  0502   *      K 4.0      CO2 27   BUN 9   CREATININE 0.7   CALCIUM 9.0     CBC:   Recent Labs   Lab 09/12/22  0502   WBC 3.20*   HGB 10.6*   HCT 34.4*        CMP:   Recent Labs   Lab 09/12/22  0502      K 4.0      CO2 27   *   BUN 9   CREATININE 0.7   CALCIUM 9.0   ANIONGAP 8       Significant Imaging: I have reviewed all pertinent imaging results/findings within the past 24 hours.

## 2022-09-12 NOTE — PLAN OF CARE
Patient concerns from earlier assessment escalated to patient advocate. Patient advocate to f/u with patient.

## 2022-09-12 NOTE — PROGRESS NOTES
[facility] - Tele[facility] ([facility])  Neurosurgery  Progress Note    Subjective:     Interval History:   Patient was seen this morning.  No acute events overnight.  Reports PICC was changed yesterday.  Denies HA, dizziness, shortness of breath, chest pain, back and leg pain, and weakness.  No issues urinating.  Denies constipation, incontinence.    MRI/CT   Posterior fluid collection superficial and deep similar to prior   With regards to thecal sac there is relatively minimal compression to thecal sac       History of Present Illness:   See H&P.    Post-Op Info:  * No surgery found *          Medications:  Continuous Infusions:  Scheduled Meds:   enoxaparin  40 mg Subcutaneous Daily    meropenem (MERREM) IVPB  500 mg Intravenous Q6H    sodium chloride 0.9%  10 mL Intravenous Q8H    sodium chloride 0.9%  10 mL Intravenous Q6H    vancomycin (VANCOCIN) IVPB  1,750 mg Intravenous Q12H     PRN Meds:sodium chloride 0.9%, acetaminophen, dextrose 10%, dextrose 10%, glucagon (human recombinant), glucose, glucose, insulin aspart U-100, melatonin, naloxone, ondansetron, polyethylene glycol, senna-docusate 8.6-50 mg, Flushing PICC Protocol **AND** sodium chloride 0.9% **AND** sodium chloride 0.9%, Pharmacy to dose Vancomycin consult **AND** vancomycin - pharmacy to dose     Review of Systems  Objective:     Weight: 106.3 kg (234 lb 5.6 oz)  Body mass index is 36.7 kg/m².  Vital Signs (Most Recent):  Temp: 98.2 °F (36.8 °C) (09/12/22 0853)  Pulse: 61 (09/12/22 0853)  Resp: 15 (09/12/22 0853)  BP: 132/79 (09/12/22 0853)  SpO2: 99 % (09/12/22 0853) Vital Signs (24h Range):  Temp:  [97.8 °F (36.6 °C)-98.3 °F (36.8 °C)] 98.2 °F (36.8 °C)  Pulse:  [52-69] 61  Resp:  [15-18] 15  SpO2:  [97 %-100 %] 99 %  BP: (116-141)/(60-79) 132/79     Date 09/12/22 0700 - 09/13/22 0659   Shift 2225-1293 8915-3866 3025-0177 24 Hour Total   INTAKE   P.O. 350   350   Shift Total(mL/kg) 350(3.3)   350(3.3)   OUTPUT   Shift Total(mL/kg)       Weight (kg) 106.3  106.3 106.3 106.3                        Neurosurgery Physical Exam  Incision CDI  There is swelling present  No erythema or drainage  Moves all 4 ext well  Sensation intact to light touch      Significant Labs:  Recent Labs   Lab 09/12/22  0502   *      K 4.0      CO2 27   BUN 9   CREATININE 0.7   CALCIUM 9.0     Recent Labs   Lab 09/12/22  0502   WBC 3.20*   HGB 10.6*   HCT 34.4*        No results for input(s): LABPT, INR, APTT in the last 48 hours.  Microbiology Results (last 7 days)       Procedure Component Value Units Date/Time    Blood culture #2 **CANNOT BE ORDERED STAT** [[id number]] Collected: 09/10/22 0257    Order Status: Completed Specimen: Blood from Peripheral, Antecubital, Left Updated: 09/11/22 1012     Blood Culture, Routine No Growth to date      No Growth to date    Blood culture #1 **CANNOT BE ORDERED STAT** [[id number]] Collected: 09/10/22 0258    Order Status: Completed Specimen: Blood from Peripheral, Hand, Right Updated: 09/11/22 1012     Blood Culture, Routine No Growth to date      No Growth to date          All pertinent labs from the last 24 hours have been reviewed.  Significant Diagnostics:  I have reviewed all pertinent imaging results/findings within the past 24 hours.    Assessment/Plan:     Active Diagnoses:    Diagnosis Date Noted POA    PRINCIPAL PROBLEM:  Lumbar discitis with Osteo L2-3 [M46.46] 08/01/2022 Yes    Type 2 diabetes mellitus without complication, without long-term current use of insulin [E11.9]  Yes      Problems Resolved During this Admission:     Patient with new PICC. Getting Meropenem and Vanc.  Will have patient complete US of mega LE and LUE to rule out possible DVT.  IR consulted for aspiration and biopsy of fluid collection. Dr. Márquez aware and will plan to do this tomorrow.  Will continue to monitor. Please call with any changes.    Oliver Clifton PA-C  Neurosurgery  [facility] - Telemetry ([facility])

## 2022-09-12 NOTE — PLAN OF CARE
[facility] - Telemetry (Hospital)  Initial Discharge Assessment       Primary Care Provider: Sid Elizabeth MD    Admission Diagnosis: Osteomyelitis of lumbar spine [M46.26]  History of lumbar laminectomy [Z98.890]    Admission Date: 9/10/2022  Expected Discharge Date:     Discharge Barriers Identified: None    Payor: HUMANA MANAGED MEDICARE / Plan: HUMANA TOTAL CARE ADVANTAGE / Product Type: Medicare Advantage /     Extended Emergency Contact Information  Primary Emergency Contact: Cyndi Blake  Mobile Phone: [phone]  Relation: Spouse  Preferred language: English   needed? No    Discharge Plan A: [facility]         [facility] [address] [place] [id number] [facility]  [id number] [place] [address]  Phone: [phone] Fax: [phone]      Initial Assessment (most recent)       Adult Discharge Assessment - 09/12/22 1402          Discharge Assessment    Assessment Type Discharge Planning Assessment     Confirmed/corrected address, phone number and insurance Yes     Confirmed Demographics Correct on Facesheet     Source of Information patient;family     Communicated MATEUS with patient/caregiver Date not available/Unable to determine     Reason For Admission Lumbar infection     Lives With spouse     Facility Arrived From: home     Do you expect to return to your current living situation? Yes     Do you have help at home or someone to help you manage your care at home? Yes     Who are your caregiver(s) and their phone number(s)? spouse and [facility]     Prior to hospitilization cognitive status: Alert/Oriented     Current cognitive status: Alert/Oriented     Walking or Climbing Stairs Difficulty none     Dressing/Bathing Difficulty none     Equipment Currently Used at Home other (see comments)   IV abx supplies    Readmission within 30 days? No     Patient currently being followed by outpatient case management? No     Do you currently have service(s) that help you manage your care at home? Yes      Name and Contact number of agency [facility]     Is the pt/caregiver preference to resume services with current agency Yes     Do you take prescription medications? Yes     Do you have prescription coverage? Yes     Do you have any problems affording any of your prescribed medications? No     Is the patient taking medications as prescribed? yes     How do you get to doctors appointments? family or friend will provide     Are you on dialysis? No     Discharge Plan A Home Health     DME Needed Upon Discharge  none     Discharge Plan discussed with: Patient   family at bedside    Discharge Barriers Identified None                   Anticipated DC Dispo: home with resumption of [facility] and [facility]  Prior Level of Function: independent  PCP: Dr. Elizabeth, surgeon: Dr. Zepeda  Comments:  CM met with patient at bedside to introduce role and discuss d/c planning. Patient is independent and agreeable to resume [facility] upon d/c.    Patient states during second surgery at [facility] he received scar to L side of face. Patient requesting to have Dermatology/Plastics referral and states issue has been escalated to head of Anesthesiology Department. VASILE explained this is not handled by CM Department. Email sent to Telemetry Manager, Director of  and Hospital DON regarding patient request.

## 2022-09-12 NOTE — NURSING
Patient has history of diabetes. Seen patient to see if he needed any additional education or have any needs. Patient states he has had diabetes for a long time and knows how to control his diabetes at home.He doesn't need any information at this time.  He has just had back surgery's and has been on steroids so his blood glucose has gotten up to 200. Normally he states it stays between 110-120 at home.  Will follow for any additional needs.

## 2022-09-13 ENCOUNTER — PATIENT MESSAGE (OUTPATIENT)
Dept: INFECTIOUS DISEASES | Facility: CLINIC | Age: 67
End: 2022-09-13
Payer: MEDICARE

## 2022-09-13 VITALS
WEIGHT: 234.38 LBS | OXYGEN SATURATION: 100 % | HEIGHT: 67 IN | TEMPERATURE: 99 F | BODY MASS INDEX: 36.79 KG/M2 | HEART RATE: 58 BPM | DIASTOLIC BLOOD PRESSURE: 76 MMHG | RESPIRATION RATE: 18 BRPM | SYSTOLIC BLOOD PRESSURE: 128 MMHG

## 2022-09-13 DIAGNOSIS — G96.00 POSTOPERATIVE CSF LEAK: ICD-10-CM

## 2022-09-13 DIAGNOSIS — G97.82 POSTOPERATIVE CSF LEAK: ICD-10-CM

## 2022-09-13 DIAGNOSIS — Z98.890 STATUS POST LUMBAR SURGERY: Primary | ICD-10-CM

## 2022-09-13 LAB
ANION GAP SERPL CALC-SCNC: 12 MMOL/L (ref 8–16)
ANISOCYTOSIS BLD QL SMEAR: SLIGHT
BASOPHILS # BLD AUTO: 0.04 K/UL (ref 0–0.2)
BASOPHILS NFR BLD: 0.9 % (ref 0–1.9)
BUN SERPL-MCNC: 12 MG/DL (ref 8–23)
BURR CELLS BLD QL SMEAR: ABNORMAL
CALCIUM SERPL-MCNC: 9.5 MG/DL (ref 8.7–10.5)
CHLORIDE SERPL-SCNC: 106 MMOL/L (ref 95–110)
CO2 SERPL-SCNC: 24 MMOL/L (ref 23–29)
CREAT SERPL-MCNC: 0.8 MG/DL (ref 0.5–1.4)
DIFFERENTIAL METHOD: ABNORMAL
EOSINOPHIL # BLD AUTO: 0.2 K/UL (ref 0–0.5)
EOSINOPHIL NFR BLD: 5.3 % (ref 0–8)
ERYTHROCYTE [DISTWIDTH] IN BLOOD BY AUTOMATED COUNT: 15.4 % (ref 11.5–14.5)
EST. GFR  (NO RACE VARIABLE): >60 ML/MIN/1.73 M^2
GLUCOSE SERPL-MCNC: 140 MG/DL (ref 70–110)
HCT VFR BLD AUTO: 36.7 % (ref 40–54)
HGB BLD-MCNC: 11.2 G/DL (ref 14–18)
HYPOCHROMIA BLD QL SMEAR: ABNORMAL
IMM GRANULOCYTES # BLD AUTO: 0.01 K/UL (ref 0–0.04)
IMM GRANULOCYTES NFR BLD AUTO: 0.2 % (ref 0–0.5)
LYMPHOCYTES # BLD AUTO: 2.6 K/UL (ref 1–4.8)
LYMPHOCYTES NFR BLD: 56.3 % (ref 18–48)
MCH RBC QN AUTO: 26.6 PG (ref 27–31)
MCHC RBC AUTO-ENTMCNC: 30.5 G/DL (ref 32–36)
MCV RBC AUTO: 87 FL (ref 82–98)
MONOCYTES # BLD AUTO: 0.3 K/UL (ref 0.3–1)
MONOCYTES NFR BLD: 6.8 % (ref 4–15)
NEUTROPHILS # BLD AUTO: 1.4 K/UL (ref 1.8–7.7)
NEUTROPHILS NFR BLD: 30.5 % (ref 38–73)
NRBC BLD-RTO: 0 /100 WBC
OVALOCYTES BLD QL SMEAR: ABNORMAL
PLATELET # BLD AUTO: 211 K/UL (ref 150–450)
PMV BLD AUTO: 10.8 FL (ref 9.2–12.9)
POCT GLUCOSE: 117 MG/DL (ref 70–110)
POCT GLUCOSE: 194 MG/DL (ref 70–110)
POIKILOCYTOSIS BLD QL SMEAR: SLIGHT
POTASSIUM SERPL-SCNC: 4.1 MMOL/L (ref 3.5–5.1)
RBC # BLD AUTO: 4.21 M/UL (ref 4.6–6.2)
SODIUM SERPL-SCNC: 142 MMOL/L (ref 136–145)
VANCOMYCIN TROUGH SERPL-MCNC: 18.5 UG/ML (ref 10–22)
WBC # BLD AUTO: 4.55 K/UL (ref 3.9–12.7)

## 2022-09-13 PROCEDURE — 87075 CULTR BACTERIA EXCEPT BLOOD: CPT | Performed by: PHYSICIAN ASSISTANT

## 2022-09-13 PROCEDURE — 80202 ASSAY OF VANCOMYCIN: CPT | Performed by: EMERGENCY MEDICINE

## 2022-09-13 PROCEDURE — 86335 IMMUNFIX E-PHORSIS/URINE/CSF: CPT | Performed by: PHYSICIAN ASSISTANT

## 2022-09-13 PROCEDURE — 80048 BASIC METABOLIC PNL TOTAL CA: CPT | Performed by: EMERGENCY MEDICINE

## 2022-09-13 PROCEDURE — 87206 SMEAR FLUORESCENT/ACID STAI: CPT | Performed by: PHYSICIAN ASSISTANT

## 2022-09-13 PROCEDURE — 87070 CULTURE OTHR SPECIMN AEROBIC: CPT | Performed by: PHYSICIAN ASSISTANT

## 2022-09-13 PROCEDURE — 25000003 PHARM REV CODE 250: Performed by: EMERGENCY MEDICINE

## 2022-09-13 PROCEDURE — 87102 FUNGUS ISOLATION CULTURE: CPT | Performed by: PHYSICIAN ASSISTANT

## 2022-09-13 PROCEDURE — 63600175 PHARM REV CODE 636 W HCPCS: Performed by: EMERGENCY MEDICINE

## 2022-09-13 PROCEDURE — A4216 STERILE WATER/SALINE, 10 ML: HCPCS | Performed by: EMERGENCY MEDICINE

## 2022-09-13 PROCEDURE — 85025 COMPLETE CBC W/AUTO DIFF WBC: CPT | Performed by: EMERGENCY MEDICINE

## 2022-09-13 PROCEDURE — 87116 MYCOBACTERIA CULTURE: CPT | Performed by: PHYSICIAN ASSISTANT

## 2022-09-13 PROCEDURE — 87205 SMEAR GRAM STAIN: CPT | Performed by: PHYSICIAN ASSISTANT

## 2022-09-13 RX ORDER — MEROPENEM AND SODIUM CHLORIDE 500 MG/50ML
1000 INJECTION, SOLUTION INTRAVENOUS 2 TIMES DAILY
Qty: 20 EACH | Refills: 1 | Status: SHIPPED | OUTPATIENT
Start: 2022-09-13 | End: 2022-11-23

## 2022-09-13 RX ORDER — MEROPENEM AND SODIUM CHLORIDE 500 MG/50ML
500 INJECTION, SOLUTION INTRAVENOUS EVERY 6 HOURS
Qty: 40 EACH | Refills: 1 | Status: SHIPPED | OUTPATIENT
Start: 2022-09-13 | End: 2022-09-13 | Stop reason: SDUPTHER

## 2022-09-13 RX ADMIN — MEROPENEM AND SODIUM CHLORIDE 500 MG: 500 INJECTION, SOLUTION INTRAVENOUS at 05:09

## 2022-09-13 RX ADMIN — GABAPENTIN 300 MG: 300 CAPSULE ORAL at 08:09

## 2022-09-13 RX ADMIN — SODIUM CHLORIDE, PRESERVATIVE FREE 10 ML: 5 INJECTION INTRAVENOUS at 05:09

## 2022-09-13 RX ADMIN — FINASTERIDE 5 MG: 5 TABLET, FILM COATED ORAL at 08:09

## 2022-09-13 RX ADMIN — LOSARTAN POTASSIUM 50 MG: 50 TABLET, FILM COATED ORAL at 08:09

## 2022-09-13 RX ADMIN — VANCOMYCIN HYDROCHLORIDE 1750 MG: 10 INJECTION, POWDER, LYOPHILIZED, FOR SOLUTION INTRAVENOUS at 01:09

## 2022-09-13 NOTE — SEDATION DOCUMENTATION
Procedure completed. Pt tolerated well. Bandaid to left low back CDI. Will transport patient back to room 207. No IV pain medication needed during procedure.

## 2022-09-13 NOTE — ASSESSMENT & PLAN NOTE
He was on zosyn but developed fever-will follow IR guided aspiration /neurologyb-will use meropenem/vancomycin for now .  Follow cultures to guide regime

## 2022-09-13 NOTE — SEDATION DOCUMENTATION
Pt transported back to room 207 via w/c and bedside report given to nurse. Pt stable at time of transfer. Bandaid to procedure site CDI

## 2022-09-13 NOTE — CONSULTS
[facility])  Infectious Disease  Consult Note    Patient Name: Friday ROGELIO Blake  MRN: [id number]  Admission Date: 9/10/2022  Hospital Length of Stay: 3 days  Attending Physician: Kuldeep Bermudez MD  Primary Care Provider: Sid Elizabeth MD     Isolation Status: No active isolations    Patient information was obtained from patient, past medical records and ER records.      Consults  Assessment/Plan:     * Lumbar discitis with Osteo L2-3  He was on zosyn but developed fever-will follow IR guided aspiration /neurologyb-will use meropenem/vancomycin for now .  Follow cultures to guide regime    Postoperative CSF leak  Will follow neurosurgery -continue meropenem/vanco    Hypertension associated with diabetes  BP control as per primary team    Type 2 diabetes mellitus without complication, without long-term current use of insulin  Insulin sliding scale, diabetic diet         Thank you for your consult. I will follow-up with patient. Please contact us if you have any additional questions.    Tha Hodge MD, FACP  Infectious Disease  [facility])    Subjective:     Principal Problem: Lumbar discitis    HPI:  Last ID note-07/2022  66 year old man with PMHx of DM, HTN, hypercalcemia, and hyperlipidemia who presents to the Emergency Department for evaluation of post-op complication.   His surgery history-   06/22/22-  LAMINECTOMY, SPINE, LUMBAR, WITH DISCECTOMY (Left) L2-3      06/26/22  Hemilaminectomy, partial medial facetectomy and foraminotomy L2-3 on left   with discectomy using microscopic dissection   06/28-22-  Fluid collection ventral and lateral consistent with epidural epidural hematoma  compression   Retained disc fragment   Cultures=06/29- aerobic culture- MSSE  CUTIBACTERIUM ACNES   06/28-BACTEROIDES FRAGILIS   06/28- staph epi -blood    He still has persistent leakage from the back and had recent travel to [place].  MRI of the spine -pending   ESR -55, crp-normal    09/12/22-  during the last admission,cultures were positive for pseudomonas and the pan was made to complete 6 weeks of Zosyn with EOC -09/30/22 with po doxycycline.  He pPrior L2-3 laminectomy and discectomy again noted.  Persistent findings of L2-3 discitis osteomyelitis with an epidural phlegmon causing L2-3 central canal stenosis.  Adjacent posterior paravertebral fluid collections are again noted with measurements above, possibly abscesses or postoperative seromasresented at this time with fever for a week and MRI of the lumbar region-  He was started on Meropenem/vanco and was seen by neurosurgery .          Past Medical History:   Diagnosis Date    Bilateral carpal tunnel syndrome 2/25/2022    moderate demyelinating bilaterally    Carpal tunnel syndrome     Diabetes mellitus without complication     Fever 6/30/2022    Gram-positive bacteremia 7/2/2022    History of colon polyps     Hypercalcemia 3/23/2021    Hyperlipidemia associated with type 2 diabetes mellitus     Hypertension associated with diabetes     Left carpal tunnel syndrome 3/23/2021    Low back pain 9/28/2021    Lumbar disc disease with radiculopathy     Morbid obesity with BMI of 40.0-44.9, adult     Postoperative hematoma involving nervous system following nervous system procedure 6/28/2022    S/P parathyroidectomy 7/29/2021    Vitamin D deficiency        Past Surgical History:   Procedure Laterality Date    CARPAL TUNNEL RELEASE Left 4/1/2021    Procedure: RELEASE, CARPAL TUNNEL;  Surgeon: Yoandy David MD;  Location: [facility] OR;  Service: Orthopedics;  Laterality: Left;    HERNIA REPAIR      INCISION AND DRAINAGE OF HEMATOMA N/A 6/28/2022    Procedure: INCISION AND DRAINAGE, HEMATOMA;  Surgeon: Shaggy Zepeda MD;  [facility];  Service: Neurosurgery;  Laterality: N/A;    LUMBAR LAMINECTOMY WITH DISCECTOMY Left 6/22/2022    Procedure: LAMINECTOMY, SPINE, LUMBAR, WITH DISCECTOMY;  Surgeon: Shaggy Zepeda MD;   Location: [facility] OR;  Service: Neurosurgery;  Laterality: Left;  minimally invasive L2-3 discectomy and decompression     LUMBAR LAMINECTOMY WITH DISCECTOMY N/A 6/28/2022    Procedure: LAMINECTOMY, SPINE, LUMBAR, WITH DISCECTOMY;  Surgeon: Shaggy Zepeda MD;  Location: [facility] OR;  Service: Neurosurgery;  Laterality: N/A;    PARATHYROIDECTOMY Bilateral 7/27/2021    Procedure: PARATHYROIDECTOMY;  Surgeon: Tha Dial MD;  Location: [facility] OR;  Service: ENT;  Laterality: Bilateral;  with medialstinal exploration    REPAIR OF CEREBROSPINAL FLUID LEAK USING COMPUTER ASSISTED NAVIGATION Bilateral 8/3/2022    Procedure: REPAIR, CSF LEAK, USING COMPUTER-ASSISTED NAVIGATION;  Surgeon: Shaggy Zepeda MD;  Location: [facility] OR;  Service: Neurosurgery;  Laterality: Bilateral;    SELECTIVE INJECTION OF ANESTHETIC AGENT AROUND LUMBAR SPINAL NERVE ROOT BY TRANSFORAMINAL APPROACH Left 3/23/2022    Procedure: BLOCK, SPINAL NERVE ROOT, LUMBAR, SELECTIVE, TRANSFORAMINAL APPROACH Left L2-3, L3-4 RN IV sedation;  Surgeon: Jay Hodges MD;  Location: [facility] PAIN MGT;  Service: Pain Management;  Laterality: Left;    STERNOTOMY N/A 7/27/2021    Procedure: STERNOTOMY;  Surgeon: Tha Dial MD;  Location: [facility] OR;  Service: ENT;  Laterality: N/A;    ULNAR NERVE TRANSPOSITION Left 4/1/2021    Procedure: TRANSPOSITION, NERVE, ULNAR;  Surgeon: Yoandy David MD;  Location: [facility] OR;  Service: Orthopedics;  Laterality: Left;    ULNAR TUNNEL RELEASE Left 4/1/2021    Procedure: RELEASE, CUBITAL TUNNEL;  Surgeon: Yoandy David MD;  Location: [facility] OR;  Service: Orthopedics;  Laterality: Left;    UMBILICAL HERNIA REPAIR      WOUND EXPLORATION Bilateral 8/3/2022    Procedure: EXPLORATION, WOUND;  Surgeon: Shaggy Zepeda MD;  Location: [facility] OR;  Service: Neurosurgery;  Laterality: Bilateral;       Review of patient's allergies indicates:  No Known Allergies    Medications:  Medications Prior to Admission   Medication Sig     aspirin (ECOTRIN) 81 MG EC tablet Take 1 tablet (81 mg total) by mouth once daily.    atorvastatin (LIPITOR) 10 MG tablet Take 1 tablet (10 mg total) by mouth every evening.    doxycycline (VIBRA-TABS) 100 MG tablet Take 1 tablet (100 mg total) by mouth every 12 (twelve) hours.    finasteride (PROSCAR) 5 mg tablet Take 5 mg by mouth once daily.    gabapentin (NEURONTIN) 300 MG capsule Take 1 capsule (300 mg total) by mouth 3 (three) times daily.    irbesartan (AVAPRO) 150 MG tablet Take 1 tablet by mouth once daily    metFORMIN (GLUCOPHAGE) 1000 MG tablet Take 1,000 mg by mouth 2 (two) times daily with meals.    methocarbamoL (ROBAXIN) 750 MG Tab Take 1 tablet (750 mg total) by mouth 3 (three) times daily as needed (muscle spasms).    tamsulosin (FLOMAX) 0.4 mg Cap Take 1 capsule by mouth once daily.    blood sugar diagnostic Strp To check BG 1 times daily, to use with insurance preferred meter    lancets Misc To check BG 1 times daily, to use with insurance preferred meter     Antibiotics (From admission, onward)      Start     Stop Route Frequency Ordered    09/11/22 2200  vancomycin 1.75 g in 5 % dextrose 500 mL IVPB         -- IV Every 12 hours (non-standard times) 09/11/22 1420    09/10/22 2030  vancomycin - pharmacy to dose  (vancomycin IVPB)        See Hyperspace for full Linked Orders Report.    -- IV pharmacy to manage frequency 09/10/22 1931    09/10/22 1930  meropenem-0.9% sodium chloride 500 mg/50 mL IVPB         -- IV Every 6 hours (non-standard times) 09/10/22 1833          Antifungals (From admission, onward)      None          Antivirals (From admission, onward)      None             Immunization History   Administered Date(s) Administered    COVID-19, MRNA, LN-S, PF (Pfizer) (Purple Cap) 03/04/2021, 03/25/2021, 11/05/2021, 07/13/2022    Hepatitis A, Adult 02/26/2020    Hepatitis B, Adult 02/26/2020    Influenza (FLUAD) - Quadrivalent - Adjuvanted - PF *Preferred* (65+) 11/05/2021     Influenza (FLUBLOK) - Quadrivalent - Recombinant - PF *Preferred* (egg allergy) 10/18/2020    Influenza - Quadrivalent - PF *Preferred* (6 months and older) 12/09/2014, 11/17/2015, 11/06/2018, 12/10/2019, 10/18/2020    Influenza Split 10/05/2016    Meningococcal Conjugate (MCV4P) 02/26/2020    Pneumococcal Conjugate - 13 Valent 11/06/2018, 01/04/2021    Pneumococcal Polysaccharide - 23 Valent 11/17/2015, 01/29/2019    Tdap 11/17/2015    Zoster Recombinant 09/12/2018, 11/06/2018       Family History       Problem Relation (Age of Onset)    Asthma Mother    Diabetes Mellitus Father    Hypertension Father    Prostate cancer Brother          Social History     Socioeconomic History    Marital status:    Tobacco Use    Smoking status: Never    Smokeless tobacco: Never   Substance and Sexual Activity    Alcohol use: Never    Drug use: Never    Sexual activity: Yes     Partners: Female     Review of Systems   Constitutional:  Positive for fever. Negative for activity change, appetite change, chills, diaphoresis and fatigue.   HENT:  Negative for congestion, dental problem, ear discharge, ear pain and facial swelling.    Eyes:  Negative for pain, discharge and itching.   Respiratory:  Negative for apnea, cough, chest tightness and shortness of breath.    Cardiovascular:  Negative for chest pain and leg swelling.   Gastrointestinal:  Negative for abdominal distention and abdominal pain.   Endocrine: Negative for cold intolerance, heat intolerance and polydipsia.   Genitourinary:  Negative for difficulty urinating, dysuria and enuresis.   Musculoskeletal:  Negative for arthralgias and back pain.   Skin:  Negative for color change and pallor.   Allergic/Immunologic: Negative for environmental allergies and food allergies.   Neurological:  Negative for dizziness, facial asymmetry, light-headedness and headaches.   Hematological:  Negative for adenopathy. Does not bruise/bleed easily.    Psychiatric/Behavioral:  Negative for agitation and behavioral problems.    Objective:     Vital Signs (Most Recent):  Temp: 97.8 °F (36.6 °C) (09/13/22 0439)  Pulse: (!) 54 (09/13/22 0439)  Resp: 17 (09/13/22 0439)  BP: 118/62 (09/13/22 0439)  SpO2: 100 % (09/13/22 0439)   Vital Signs (24h Range):  Temp:  [97.2 °F (36.2 °C)-98.2 °F (36.8 °C)] 97.8 °F (36.6 °C)  Pulse:  [] 54  Resp:  [15-17] 17  SpO2:  [96 %-100 %] 100 %  BP: (118-160)/(62-86) 118/62     Weight: 106.3 kg (234 lb 5.6 oz)  Body mass index is 36.7 kg/m².    Estimated Creatinine Clearance: 120.7 mL/min (based on SCr of 0.7 mg/dL).    Physical Exam  Vitals and nursing note reviewed.   Constitutional:       Appearance: He is well-developed.   HENT:      Head: Normocephalic and atraumatic.      Nose: Nose normal.   Eyes:      Comments: PERRL   Cardiovascular:      Rate and Rhythm: Normal rate and regular rhythm.      Heart sounds: Normal heart sounds.   Pulmonary:      Effort: Pulmonary effort is normal. No respiratory distress.      Breath sounds: Normal breath sounds. No wheezing or rales.   Abdominal:      Tenderness: There is no abdominal tenderness.   Musculoskeletal:         General: Normal range of motion.      Cervical back: Normal range of motion and neck supple.   Skin:     General: Skin is dry.   Neurological:      Mental Status: He is alert and oriented to person, place, and time.       Significant Labs: BMP:   Recent Labs   Lab 09/12/22  0502   *      K 4.0      CO2 27   BUN 9   CREATININE 0.7   CALCIUM 9.0     CBC:   Recent Labs   Lab 09/12/22  0502   WBC 3.20*   HGB 10.6*   HCT 34.4*        CMP:   Recent Labs   Lab 09/12/22  0502      K 4.0      CO2 27   *   BUN 9   CREATININE 0.7   CALCIUM 9.0   ANIONGAP 8     Wound Culture:   Recent Labs   Lab 06/28/22  1950 07/30/22  0055 08/03/22  0937   LABAERO STAPHYLOCOCCUS EPIDERMIDIS  Moderate  *  No growth PSEUDOMONAS AERUGINOSA  Moderate  * No  growth  No growth     All pertinent labs within the past 24 hours have been reviewed.    Significant Imaging: I have reviewed all pertinent imaging results/findings within the past 24 hours.

## 2022-09-13 NOTE — NURSING
Discharge instructions received and reviewed with pt and family at bedside.  Pt voiced understanding and all questions answered to satisfaction.  Stressed importance to making and keeping all follow up appointments.  Medications sent to pt pharmacy and reviewed with pt.  Tele monitor removed and brought to monitor tech.  IV d/c'd with tip intact, pressure dressing applied.  Pt transported to front of hospital via w/c by PCT to be discharged home.

## 2022-09-13 NOTE — PLAN OF CARE
[facility] - Tele[facility] (Hospital)  Discharge Final Note    Primary Care Provider: Sid Elizabeth MD    Expected Discharge Date: 9/13/2022    Final Discharge Note (most recent)       Final Note - 09/13/22 1300          Final Note    Assessment Type Final Discharge Note     Anticipated Discharge Disposition Home-Health Care Svc        Post-Acute Status    Post-Acute Authorization Home Health;IV Infusion     Home Health Status Set-up Complete/Auth obtained     IV Infusion Status Set-up Complete/Auth obtained                     Important Message from Medicare  Important Message from Medicare regarding Discharge Appeal Rights: Given to patient/caregiver, Explained to patient/caregiver, Signed/date by patient/caregiver     Date IMM was signed: 09/13/22  Time IMM was signed: 1017    Contact Info       Shaggy Zepeda MD   Specialty: Neurosurgery    [address] [place], [place] [id number]   Phone: [phone]       Next Steps: Follow up in 1 week(s)    Instructions: Hospital follow up    Tha Hodge MD, FACP   Specialty: Infectious Diseases, Hospitalist    [address] [place] [address]   Phone: [phone]       Next Steps: Follow up in 1 week(s)    Instructions: Hospital follow up          DC Dispo: home with continued [facility] and [facility].    Ambulatory referral to Dermatology placed per patient request. [facility] aware.    Patient has f/u appt with Dr. Hodge on 9/14.    Patient off floor at this time, CM discussed the above with family at bedside.

## 2022-09-13 NOTE — PROGRESS NOTES
[facility] Discharge Planning/Quick Note:      [facility] received updated orders for patient's home IV ABX regimen, which is a change from what he was on prior to admit. It is noted that at discharge, patient will now need IV Vancomycin 1.75g Q12 + IV Meropenem 1g Q12.   I spoke to patient and discussed the change in RX. I also discussed the home dosing times for the new medication. Lastly, I texted (with the patient's permission) the information regarding the changes and dosing times for him to have to refer back to.    All additional questions addressed appropriately.    Patient's IV medications will be delivered to the patient's home on 9/13/22. Patient's aware and OK with this plan.    Patient's bedside nurse and CM is aware of the above information. Patient is cleared for discharge from [facility] perspective.    Please do not hesitate to reach out for any additional needs.      Srinath Naqvi MS, RDN, LDN  Clinical Dietitian  [facility]   Desk: [phone]  Other Phone: [phone]  [email or web address]

## 2022-09-13 NOTE — SUBJECTIVE & OBJECTIVE
Past Medical History:   Diagnosis Date    Bilateral carpal tunnel syndrome 2/25/2022    moderate demyelinating bilaterally    Carpal tunnel syndrome     Diabetes mellitus without complication     Fever 6/30/2022    Gram-positive bacteremia 7/2/2022    History of colon polyps     Hypercalcemia 3/23/2021    Hyperlipidemia associated with type 2 diabetes mellitus     Hypertension associated with diabetes     Left carpal tunnel syndrome 3/23/2021    Low back pain 9/28/2021    Lumbar disc disease with radiculopathy     Morbid obesity with BMI of 40.0-44.9, adult     Postoperative hematoma involving nervous system following nervous system procedure 6/28/2022    S/P parathyroidectomy 7/29/2021    Vitamin D deficiency        Past Surgical History:   Procedure Laterality Date    CARPAL TUNNEL RELEASE Left 4/1/2021    Procedure: RELEASE, CARPAL TUNNEL;  Surgeon: Yoandy David MD;  Location: [facility] OR;  Service: Orthopedics;  Laterality: Left;    HERNIA REPAIR      INCISION AND DRAINAGE OF HEMATOMA N/A 6/28/2022    Procedure: INCISION AND DRAINAGE, HEMATOMA;  Surgeon: Shaggy Zepeda MD;  Location: [facility] OR;  Service: Neurosurgery;  Laterality: N/A;    LUMBAR LAMINECTOMY WITH DISCECTOMY Left 6/22/2022    Procedure: LAMINECTOMY, SPINE, LUMBAR, WITH DISCECTOMY;  Surgeon: Shaggy Zepeda MD;  Location: [facility] OR;  Service: Neurosurgery;  Laterality: Left;  minimally invasive L2-3 discectomy and decompression     LUMBAR LAMINECTOMY WITH DISCECTOMY N/A 6/28/2022    Procedure: LAMINECTOMY, SPINE, LUMBAR, WITH DISCECTOMY;  Surgeon: Shaggy Zepeda MD;  Location: [facility] OR;  Service: Neurosurgery;  Laterality: N/A;    PARATHYROIDECTOMY Bilateral 7/27/2021    Procedure: PARATHYROIDECTOMY;  Surgeon: Tha Dial MD;  Location: [facility] OR;  Service: ENT;  Laterality: Bilateral;  with medialstinal exploration    REPAIR OF CEREBROSPINAL FLUID LEAK USING COMPUTER ASSISTED NAVIGATION Bilateral 8/3/2022    Procedure: REPAIR, CSF LEAK,  USING COMPUTER-ASSISTED NAVIGATION;  Surgeon: Shaggy Zepeda MD;  Location: [facility] OR;  Service: Neurosurgery;  Laterality: Bilateral;    SELECTIVE INJECTION OF ANESTHETIC AGENT AROUND LUMBAR SPINAL NERVE ROOT BY TRANSFORAMINAL APPROACH Left 3/23/2022    Procedure: BLOCK, SPINAL NERVE ROOT, LUMBAR, SELECTIVE, TRANSFORAMINAL APPROACH Left L2-3, L3-4 RN IV sedation;  Surgeon: Jay Hodges MD;  Location: [facility] PAIN MGT;  Service: Pain Management;  Laterality: Left;    STERNOTOMY N/A 7/27/2021    Procedure: STERNOTOMY;  Surgeon: Tha Dial MD;  Location: [facility] OR;  Service: ENT;  Laterality: N/A;    ULNAR NERVE TRANSPOSITION Left 4/1/2021    Procedure: TRANSPOSITION, NERVE, ULNAR;  Surgeon: Yoandy David MD;  Location: [facility] OR;  Service: Orthopedics;  Laterality: Left;    ULNAR TUNNEL RELEASE Left 4/1/2021    Procedure: RELEASE, CUBITAL TUNNEL;  Surgeon: Yoandy David MD;  Location: [facility] OR;  Service: Orthopedics;  Laterality: Left;    UMBILICAL HERNIA REPAIR      WOUND EXPLORATION Bilateral 8/3/2022    Procedure: EXPLORATION, WOUND;  Surgeon: Shaggy Zepeda MD;  Location: [facility] OR;  Service: Neurosurgery;  Laterality: Bilateral;       Review of patient's allergies indicates:  No Known Allergies    Medications:  Medications Prior to Admission   Medication Sig    aspirin (ECOTRIN) 81 MG EC tablet Take 1 tablet (81 mg total) by mouth once daily.    atorvastatin (LIPITOR) 10 MG tablet Take 1 tablet (10 mg total) by mouth every evening.    doxycycline (VIBRA-TABS) 100 MG tablet Take 1 tablet (100 mg total) by mouth every 12 (twelve) hours.    finasteride (PROSCAR) 5 mg tablet Take 5 mg by mouth once daily.    gabapentin (NEURONTIN) 300 MG capsule Take 1 capsule (300 mg total) by mouth 3 (three) times daily.    irbesartan (AVAPRO) 150 MG tablet Take 1 tablet by mouth once daily    metFORMIN (GLUCOPHAGE) 1000 MG tablet Take 1,000 mg by mouth 2 (two) times daily with meals.    methocarbamoL (ROBAXIN) 750 MG  Tab Take 1 tablet (750 mg total) by mouth 3 (three) times daily as needed (muscle spasms).    tamsulosin (FLOMAX) 0.4 mg Cap Take 1 capsule by mouth once daily.    blood sugar diagnostic Strp To check BG 1 times daily, to use with insurance preferred meter    lancets Misc To check BG 1 times daily, to use with insurance preferred meter     Antibiotics (From admission, onward)      Start     Stop Route Frequency Ordered    09/11/22 2200  vancomycin 1.75 g in 5 % dextrose 500 mL IVPB         -- IV Every 12 hours (non-standard times) 09/11/22 1420    09/10/22 2030  vancomycin - pharmacy to dose  (vancomycin IVPB)        [facility]    -- IV pharmacy to manage frequency 09/10/22 1931    09/10/22 1930  meropenem-0.9% sodium chloride 500 mg/50 mL IVPB         -- IV Every 6 hours (non-standard times) 09/10/22 1833          Antifungals (From admission, onward)      None          Antivirals (From admission, onward)      None             Immunization History   Administered Date(s) Administered    COVID-19, MRNA, LN-S, PF (Pfizer) (Purple Cap) 03/04/2021, 03/25/2021, 11/05/2021, 07/13/2022    Hepatitis A, Adult 02/26/2020    Hepatitis B, Adult 02/26/2020    Influenza (FLUAD) - Quadrivalent - Adjuvanted - PF *Preferred* (65+) 11/05/2021    Influenza (FLUBLOK) - Quadrivalent - Recombinant - PF *Preferred* (egg allergy) 10/18/2020    Influenza - Quadrivalent - PF *Preferred* (6 months and older) 12/09/2014, 11/17/2015, 11/06/2018, 12/10/2019, 10/18/2020    Influenza Split 10/05/2016    Meningococcal Conjugate (MCV4P) 02/26/2020    Pneumococcal Conjugate - 13 Valent 11/06/2018, 01/04/2021    Pneumococcal Polysaccharide - 23 Valent 11/17/2015, 01/29/2019    Tdap 11/17/2015    Zoster Recombinant 09/12/2018, 11/06/2018       Family History       Problem Relation (Age of Onset)    Asthma Mother    Diabetes Mellitus Father    Hypertension Father    Prostate cancer Brother          Social History      Socioeconomic History    Marital status:    Tobacco Use    Smoking status: Never    Smokeless tobacco: Never   Substance and Sexual Activity    Alcohol use: Never    Drug use: Never    Sexual activity: Yes     Partners: Female     Review of Systems   Constitutional:  Positive for fever. Negative for activity change, appetite change, chills, diaphoresis and fatigue.   HENT:  Negative for congestion, dental problem, ear discharge, ear pain and facial swelling.    Eyes:  Negative for pain, discharge and itching.   Respiratory:  Negative for apnea, cough, chest tightness and shortness of breath.    Cardiovascular:  Negative for chest pain and leg swelling.   Gastrointestinal:  Negative for abdominal distention and abdominal pain.   Endocrine: Negative for cold intolerance, heat intolerance and polydipsia.   Genitourinary:  Negative for difficulty urinating, dysuria and enuresis.   Musculoskeletal:  Negative for arthralgias and back pain.   Skin:  Negative for color change and pallor.   Allergic/Immunologic: Negative for environmental allergies and food allergies.   Neurological:  Negative for dizziness, facial asymmetry, light-headedness and headaches.   Hematological:  Negative for adenopathy. Does not bruise/bleed easily.   Psychiatric/Behavioral:  Negative for agitation and behavioral problems.    Objective:     Vital Signs (Most Recent):  Temp: 97.8 °F (36.6 °C) (09/13/22 0439)  Pulse: (!) 54 (09/13/22 0439)  Resp: 17 (09/13/22 0439)  BP: 118/62 (09/13/22 0439)  SpO2: 100 % (09/13/22 0439)   Vital Signs (24h Range):  Temp:  [97.2 °F (36.2 °C)-98.2 °F (36.8 °C)] 97.8 °F (36.6 °C)  Pulse:  [] 54  Resp:  [15-17] 17  SpO2:  [96 %-100 %] 100 %  BP: (118-160)/(62-86) 118/62     Weight: 106.3 kg (234 lb 5.6 oz)  Body mass index is 36.7 kg/m².    Estimated Creatinine Clearance: 120.7 mL/min (based on SCr of 0.7 mg/dL).    Physical Exam  Vitals and nursing note reviewed.   Constitutional:       Appearance:  He is well-developed.   HENT:      Head: Normocephalic and atraumatic.      Nose: Nose normal.   Eyes:      Comments: PERRL   Cardiovascular:      Rate and Rhythm: Normal rate and regular rhythm.      Heart sounds: Normal heart sounds.   Pulmonary:      Effort: Pulmonary effort is normal. No respiratory distress.      Breath sounds: Normal breath sounds. No wheezing or rales.   Abdominal:      Tenderness: There is no abdominal tenderness.   Musculoskeletal:         General: Normal range of motion.      Cervical back: Normal range of motion and neck supple.   Skin:     General: Skin is dry.   Neurological:      Mental Status: He is alert and oriented to person, place, and time.       Significant Labs: BMP:   Recent Labs   Lab 09/12/22  0502   *      K 4.0      CO2 27   BUN 9   CREATININE 0.7   CALCIUM 9.0     CBC:   Recent Labs   Lab 09/12/22  0502   WBC 3.20*   HGB 10.6*   HCT 34.4*        CMP:   Recent Labs   Lab 09/12/22  0502      K 4.0      CO2 27   *   BUN 9   CREATININE 0.7   CALCIUM 9.0   ANIONGAP 8     Wound Culture:   Recent Labs   Lab 06/28/22  1950 07/30/22  0055 08/03/22  0937   LABAERO STAPHYLOCOCCUS EPIDERMIDIS  Moderate  *  No growth PSEUDOMONAS AERUGINOSA  Moderate  * No growth  No growth     All pertinent labs within the past 24 hours have been reviewed.    Significant Imaging: I have reviewed all pertinent imaging results/findings within the past 24 hours.

## 2022-09-13 NOTE — PLAN OF CARE
Problem: Adult Inpatient Plan of Care  Goal: Absence of Hospital-Acquired Illness or Injury  Intervention: Identify and Manage Fall Risk  Flowsheets (Taken 9/13/2022 0430)  Safety Promotion/Fall Prevention:   assistive device/personal item within reach   instructed to call staff for mobility     Problem: Diabetes Comorbidity  Goal: Blood Glucose Level Within Targeted Range  Outcome: Ongoing, Progressing     Problem: Diabetes Comorbidity  Goal: Blood Glucose Level Within Targeted Range  Intervention: Monitor and Manage Glycemia  Flowsheets (Taken 9/13/2022 0430)  Glycemic Management: blood glucose monitored     Problem: Infection  Goal: Absence of Infection Signs and Symptoms  Intervention: Prevent or Manage Infection  Flowsheets (Taken 9/13/2022 0430)  Infection Management: aseptic technique maintained

## 2022-09-13 NOTE — HPI
Last ID note-07/2022  66 year old man with PMHx of DM, HTN, hypercalcemia, and hyperlipidemia who presents to the Emergency Department for evaluation of post-op complication.   His surgery history-   06/22/22-  LAMINECTOMY, SPINE, LUMBAR, WITH DISCECTOMY (Left) L2-3      06/26/22  Hemilaminectomy, partial medial facetectomy and foraminotomy L2-3 on left   with discectomy using microscopic dissection   06/28-22-  Fluid collection ventral and lateral consistent with epidural epidural hematoma  compression   Retained disc fragment   Cultures=06/29- aerobic culture- [facility]  CUTIBACTERIUM ACNES   06/28-BACTEROIDES FRAGILIS   06/28- staph epi -blood    He still has persistent leakage from the back and had recent travel to [facility].  MRI of the spine -pending   ESR -55, crp-normal    09/12/22- during the last admission,cultures were positive for pseudomonas and the pan was made to complete 6 weeks of Zosyn with EOC -09/30/22 with po doxycycline.  He pPrior L2-3 laminectomy and discectomy again noted.  Persistent findings of L2-3 discitis osteomyelitis with an epidural phlegmon causing L2-3 central canal stenosis.  Adjacent posterior paravertebral fluid collections are again noted with measurements above, possibly abscesses or postoperative seromasresented at this time with fever for a week and MRI of the lumbar region-  He was started on Meropenem/vanco and was seen by neurosurgery .

## 2022-09-13 NOTE — SEDATION DOCUMENTATION
Pt positioned prone on CT table. CM applied and VS taken and stable. NADN. Pt verbalized understanding of procedure. Pt has not been NPO so no IV sedation to be given.

## 2022-09-13 NOTE — OP NOTE
Pre Op Diagnosis: lower back superficial fluid collection     Post Op Diagnosis: same     Procedure:  drain     Procedure performed by: Willi HINSON, Pino GREWAL     Written Informed Consent Obtained: Yes     Specimen Removed:  yes     Estimated Blood Loss:  minimal     Findings: Local anesthesia and moderate sedation were used.     The patient tolerated the procedure well and there were no complications.      Disposition:  F/U in clinic or with ordering physician    Discharge instructions:  Light activity for 24 hours.  Remove band aid in 24 hours.  No baths (showers are appropriate).      Sterile technique was performed in the lower back, lidocaine was used as a local anesthetic.  40 ccs of light darrius clear fluid removed and sent to lab.  Pt tolerated the procedure well without immediate complications.  Please see radiologist report for details. F/u with PCP and/or ordering physician.     Time Spent on discharge:  15 mins

## 2022-09-13 NOTE — CONSULTS
Pharmacokinetic Assessment Follow Up: IV Vancomycin    Vancomycin serum concentration assessment(s):    The trough level was drawn correctly and can be used to guide therapy at this time. The measurement is within the desired definitive target range of 15 to 20 mcg/mL.    Vancomycin Regimen Plan:    Continue regimen to Vancomycin 1750 mg IV every 12 hours with next serum trough concentration measured at 1230 prior to PM dose on 9/15/22.    Drug levels (last 3 results):  Recent Labs   Lab Result Units 09/12/22  1001 09/13/22  1117   Vancomycin-Trough ug/mL 13.1 18.5     Pharmacy will continue to follow and monitor vancomycin.    Please contact pharmacy at extension [phone] for questions regarding this assessment.    Thank you for the consult,   Gely Sanchez PharmD       Patient brief summary:  Friday ROGELIO Blake is a 66 y.o. male initiated on antimicrobial therapy with IV Vancomycin for treatment of bone/joint infection    The patient's current regimen is Vancomycin 1750 mg IV every 12 hours. Above level was collected prior to the 6th dose. Level on 9/12/22 was collected prior to the 4th dose.     Drug Allergies: Review of patient's allergies indicates:  No Known Allergies    Actual Body Weight: 106.3 kg    Renal Function:   Estimated Creatinine Clearance: 105.6 mL/min (based on SCr of 0.8 mg/dL).,     Dialysis Method (if applicable):  N/A    CBC (last 72 hours):  Recent Labs   Lab Result Units 09/12/22  0502 09/13/22  0546   WBC K/uL 3.20* 4.55   Hemoglobin g/dL 10.6* 11.2*   Hematocrit % 34.4* 36.7*   Platelets K/uL 200 211   Gran % % 18.1* 30.5*   Lymph % % 62.5* 56.3*   Mono % % 11.6 6.8   Eosinophil % % 6.9 5.3   Basophil % % 0.6 0.9   Differential Method  Automated Automated       Metabolic Panel (last 72 hours):  Recent Labs   Lab Result Units 09/12/22  0502 09/13/22  0546   Sodium mmol/L 143 142   Potassium mmol/L 4.0 4.1   Chloride mmol/L 108 106   CO2 mmol/L 27 24   Glucose mg/dL 132* 140*   BUN mg/dL 9 12    Creatinine mg/dL 0.7 0.8       Vancomycin Administrations:  vancomycin given in the last 96 hours                     vancomycin 1.75 g in 5 % dextrose 500 mL IVPB (mg) 1,750 mg New Bag 09/12/22 2356     1,750 mg New Bag  1053     1,750 mg New Bag 09/11/22 2120    vancomycin 1.75 g in 5 % dextrose 500 mL IVPB ()  Restarted 09/11/22 1309      Restarted  1302     1,750 mg New Bag  0959    vancomycin (VANCOCIN) 2,500 mg in dextrose 5 % 500 mL IVPB (mg) 2,500 mg New Bag 09/10/22 2115                    Microbiologic Results:  Microbiology Results (last 7 days)       Procedure Component Value Units Date/Time    Blood culture #1 **CANNOT BE ORDERED STAT** [[id number]] Collected: 09/10/22 0258    Order Status: Completed Specimen: Blood from Peripheral, Hand, Right Updated: 09/13/22 1012     Blood Culture, Routine No Growth to date      No Growth to date      No Growth to date      No Growth to date    Blood culture #2 **CANNOT BE ORDERED STAT** [[id number]] Collected: 09/10/22 0257    Order Status: Completed Specimen: Blood from Peripheral, Antecubital, Left Updated: 09/13/22 1012     Blood Culture, Routine No Growth to date      No Growth to date      No Growth to date      No Growth to date          Thank you for allowing us to participate in this patient's care.     Gely Sanchez, Kat 09/13/2022 12:21 PM

## 2022-09-13 NOTE — INTERVAL H&P NOTE
The patient has been examined and the H&P has been reviewed:    I concur with the findings and no changes have occurred since H&P was written.        Active Hospital Problems    Diagnosis  POA    *Lumbar discitis with Osteo L2-3 [M46.46]  Yes    Type 2 diabetes mellitus without complication, without long-term current use of insulin [E11.9]  Yes    Hypertension associated with diabetes [E11.59, I15.2]  Yes     Chronic      Resolved Hospital Problems    Diagnosis Date Resolved POA    Postoperative CSF leak [G97.82, G96.00] 09/13/2022 Yes     Chronic

## 2022-09-14 ENCOUNTER — TREATMENT PLANNING (OUTPATIENT)
Dept: INFECTIOUS DISEASES | Facility: CLINIC | Age: 67
End: 2022-09-14
Payer: MEDICARE

## 2022-09-14 LAB
GRAM STN SPEC: NORMAL
GRAM STN SPEC: NORMAL

## 2022-09-14 NOTE — PROGRESS NOTES
Patient is concerned about the timing of the vancomycin.  Will switch to IV daptomycin at 100mg daily .  Stop Vanco-  Will review cost of medication with infusion company   Continue empiric Meropenem .  Follow cultures        Yes

## 2022-09-15 LAB
B2 TRANSFERRIN FLD QL: NEGATIVE
BACTERIA BLD CULT: NORMAL
BACTERIA BLD CULT: NORMAL

## 2022-09-17 LAB — BACTERIA SPEC AEROBE CULT: NO GROWTH

## 2022-09-19 ENCOUNTER — LAB VISIT (OUTPATIENT)
Dept: LAB | Facility: HOSPITAL | Age: 67
End: 2022-09-19
Attending: INTERNAL MEDICINE
Payer: MEDICARE

## 2022-09-19 DIAGNOSIS — E11.59 TYPE 2 DIABETES MELLITUS WITH OTHER CIRCULATORY COMPLICATIONS: ICD-10-CM

## 2022-09-19 DIAGNOSIS — T81.31XA DISRUPTION OF EXTERNAL SURGICAL WOUND: ICD-10-CM

## 2022-09-19 DIAGNOSIS — Z79.2 ENCOUNTER FOR LONG-TERM (CURRENT) USE OF ANTIBIOTICS: Primary | ICD-10-CM

## 2022-09-19 LAB
ALBUMIN SERPL BCP-MCNC: 3.7 G/DL (ref 3.5–5.2)
ALP SERPL-CCNC: 82 U/L (ref 55–135)
ALT SERPL W/O P-5'-P-CCNC: 28 U/L (ref 10–44)
ANION GAP SERPL CALC-SCNC: 10 MMOL/L (ref 8–16)
AST SERPL-CCNC: 26 U/L (ref 10–40)
BILIRUB SERPL-MCNC: 0.5 MG/DL (ref 0.1–1)
BUN SERPL-MCNC: 12 MG/DL (ref 8–23)
CALCIUM SERPL-MCNC: 10 MG/DL (ref 8.7–10.5)
CHLORIDE SERPL-SCNC: 101 MMOL/L (ref 95–110)
CO2 SERPL-SCNC: 26 MMOL/L (ref 23–29)
CREAT SERPL-MCNC: 0.7 MG/DL (ref 0.5–1.4)
CRP SERPL-MCNC: 3.1 MG/L (ref 0–3.19)
ERYTHROCYTE [DISTWIDTH] IN BLOOD BY AUTOMATED COUNT: 16 % (ref 11.5–14.5)
ERYTHROCYTE [SEDIMENTATION RATE] IN BLOOD BY WESTERGREN METHOD: 25 MM/HR (ref 0–10)
EST. GFR  (NO RACE VARIABLE): >60 ML/MIN/1.73 M^2
GLUCOSE SERPL-MCNC: 81 MG/DL (ref 70–110)
HCT VFR BLD AUTO: 35.5 % (ref 40–54)
HGB BLD-MCNC: 10.9 G/DL (ref 14–18)
MCH RBC QN AUTO: 27.3 PG (ref 27–31)
MCHC RBC AUTO-ENTMCNC: 30.7 G/DL (ref 32–36)
MCV RBC AUTO: 89 FL (ref 82–98)
PLATELET # BLD AUTO: 270 K/UL (ref 150–450)
PMV BLD AUTO: 12.5 FL (ref 9.2–12.9)
POTASSIUM SERPL-SCNC: 4.2 MMOL/L (ref 3.5–5.1)
PROT SERPL-MCNC: 6.5 G/DL (ref 6–8.4)
RBC # BLD AUTO: 4 M/UL (ref 4.6–6.2)
SODIUM SERPL-SCNC: 137 MMOL/L (ref 136–145)
WBC # BLD AUTO: 5.69 K/UL (ref 3.9–12.7)

## 2022-09-19 PROCEDURE — 86141 C-REACTIVE PROTEIN HS: CPT | Performed by: INTERNAL MEDICINE

## 2022-09-19 PROCEDURE — 80053 COMPREHEN METABOLIC PANEL: CPT | Performed by: INTERNAL MEDICINE

## 2022-09-19 PROCEDURE — 85651 RBC SED RATE NONAUTOMATED: CPT | Performed by: INTERNAL MEDICINE

## 2022-09-19 PROCEDURE — 82550 ASSAY OF CK (CPK): CPT | Performed by: INTERNAL MEDICINE

## 2022-09-19 PROCEDURE — 85025 COMPLETE CBC W/AUTO DIFF WBC: CPT | Performed by: INTERNAL MEDICINE

## 2022-09-20 LAB
BASOPHILS # BLD AUTO: 0.04 K/UL (ref 0–0.2)
BASOPHILS NFR BLD: 0.7 % (ref 0–1.9)
CK SERPL-CCNC: 209 U/L (ref 20–200)
DIFFERENTIAL METHOD: ABNORMAL
EOSINOPHIL # BLD AUTO: 0.2 K/UL (ref 0–0.5)
EOSINOPHIL NFR BLD: 4.2 % (ref 0–8)
ERYTHROCYTE [DISTWIDTH] IN BLOOD BY AUTOMATED COUNT: 16 % (ref 11.5–14.5)
HCT VFR BLD AUTO: 35.5 % (ref 40–54)
HGB BLD-MCNC: 10.9 G/DL (ref 14–18)
IMM GRANULOCYTES # BLD AUTO: 0.01 K/UL (ref 0–0.04)
IMM GRANULOCYTES NFR BLD AUTO: 0.2 % (ref 0–0.5)
LYMPHOCYTES # BLD AUTO: 2 K/UL (ref 1–4.8)
LYMPHOCYTES NFR BLD: 34.9 % (ref 18–48)
MCH RBC QN AUTO: 27.3 PG (ref 27–31)
MCHC RBC AUTO-ENTMCNC: 30.7 G/DL (ref 32–36)
MCV RBC AUTO: 89 FL (ref 82–98)
MONOCYTES # BLD AUTO: 0.5 K/UL (ref 0.3–1)
MONOCYTES NFR BLD: 8.2 % (ref 4–15)
NEUTROPHILS # BLD AUTO: 3 K/UL (ref 1.8–7.7)
NEUTROPHILS NFR BLD: 51.8 % (ref 38–73)
NRBC BLD-RTO: 0 /100 WBC
PLATELET # BLD AUTO: 270 K/UL (ref 150–450)
PMV BLD AUTO: 12.5 FL (ref 9.2–12.9)
RBC # BLD AUTO: 4 M/UL (ref 4.6–6.2)
WBC # BLD AUTO: 5.69 K/UL (ref 3.9–12.7)

## 2022-09-20 NOTE — PHYSICIAN QUERY
PT Name: Friday ROGELIO Blake  MR #: [id number]    DOCUMENTATION CLARIFICATION     CDS/: Britney Ríos RN          Contact Information: [email or web address]      This form is a permanent document in the medical record.     Query Date: September 20, 2022    Dear Provider,  By submitting this query, we are merely seeking further clarification of documentation. Please utilize your independent clinical judgment when addressing the question(s) below.    The medical record contains the following:  Supporting Clinical Findings Location in Medical Record   He is s/p lumbar laminectomy with discectomy 6/22 followed by multiple Lumbar drainage and developed L2-3 Discitis and Osteomyelitis for which he was admitted here 7/29 to 8/12 and discharged home with home infusion with IV Zosyn and PO Doxycycline for 4 more weeks and 5 weeks respectively and he was to complete his Abx on 9/23.    Has developed persistent fever, despite over 4 weeks of IV abx. MRI shows persistent Discitis/Osteomyelitis L2-3. Will treat with IV Merrem and Vanc     Prior L2-3 laminectomy and discectomy again noted.  Persistent findings of L2-3 discitis osteomyelitis with an epidural phlegmon causing L2-3 central canal stenosis.  Adjacent posterior paravertebral fluid collections are again noted with measurements above, possibly abscesses or postoperative seromas    Patient status post hemilaminectomy and discectomy at L2-3 on the left. Findings strongly concerning for discitis osteomyelitis at L2-3.     Interval left hemilaminectomy at L2-L3.  There is ventral epidural tissue that likely relates to small hematoma and less likely extruded disc or phlegmonous material.  This distorts the thecal sac toward the right and results in flattening to 5 mm.  There is severe lateral recess stenosis that could affect the L3 spinal nerve roots that are descending at this level.  No definitive signs of spondylodiscitis.  No definite rosette abscess.  Increased T2  signal within the paraspinal muscles about the laminectomy site is not necessarily unusual given the postoperative state.  Unchanged degenerative findings elsewhere as described above.  Persistent mild/moderate left lateral recess stenosis at L1-L2 a could affect the left L2 spinal nerve roots. 9/10/2022 H&P Current Encounter          9/10/2022 H&P Current Encounter      9/10/2022 MRI Lumbar Spine Current Encounter        7/30/2022 MRI Lumber Spine Previous Encounter     6/27/2022 MRI Lumbar Spine Previous Encounter       Please clarify if discitis/osteo (as it relates to the lumbar laminectomy with discectomy on 6/22/2022) is:      [ xx ] Complication of the procedure   [  ] Present, but not a complication of the procedure   [  ] Other (please specify): __________________   [  ] Clinically Undetermined       Please document in your progress notes daily for the duration of treatment until resolved and include in your discharge summary.

## 2022-09-21 LAB
ACID FAST MOD KINY STN SPEC: NORMAL
ACID FAST MOD KINY STN SPEC: NORMAL
BACTERIA SPEC ANAEROBE CULT: NORMAL
MYCOBACTERIUM SPEC QL CULT: NORMAL
MYCOBACTERIUM SPEC QL CULT: NORMAL

## 2022-09-25 ENCOUNTER — DOCUMENT SCAN (OUTPATIENT)
Dept: HOME HEALTH SERVICES | Facility: HOSPITAL | Age: 67
End: 2022-09-25
Payer: MEDICARE

## 2022-09-26 ENCOUNTER — DOCUMENT SCAN (OUTPATIENT)
Dept: HOME HEALTH SERVICES | Facility: HOSPITAL | Age: 67
End: 2022-09-26
Payer: MEDICARE

## 2022-09-26 ENCOUNTER — LAB VISIT (OUTPATIENT)
Dept: LAB | Facility: HOSPITAL | Age: 67
End: 2022-09-26
Attending: INTERNAL MEDICINE
Payer: MEDICARE

## 2022-09-26 DIAGNOSIS — I15.2 ENDOCRINE HYPERTENSION: ICD-10-CM

## 2022-09-26 DIAGNOSIS — Z79.2 ENCOUNTER FOR LONG-TERM (CURRENT) USE OF ANTIBIOTICS: ICD-10-CM

## 2022-09-26 DIAGNOSIS — M46.46 SEPTIC DISCITIS OF LUMBAR REGION: Primary | ICD-10-CM

## 2022-09-26 DIAGNOSIS — M46.46 SEPTIC DISCITIS OF LUMBAR REGION: ICD-10-CM

## 2022-09-26 DIAGNOSIS — Z79.2 ENCOUNTER FOR LONG-TERM (CURRENT) USE OF ANTIBIOTICS: Primary | ICD-10-CM

## 2022-09-26 LAB
ALBUMIN SERPL BCP-MCNC: 3.7 G/DL (ref 3.5–5.2)
ALP SERPL-CCNC: 83 U/L (ref 55–135)
ALT SERPL W/O P-5'-P-CCNC: 32 U/L (ref 10–44)
ANION GAP SERPL CALC-SCNC: 9 MMOL/L (ref 8–16)
AST SERPL-CCNC: 37 U/L (ref 10–40)
BASOPHILS # BLD AUTO: 0.06 K/UL (ref 0–0.2)
BASOPHILS NFR BLD: 1.2 % (ref 0–1.9)
BILIRUB SERPL-MCNC: 0.5 MG/DL (ref 0.1–1)
BUN SERPL-MCNC: 13 MG/DL (ref 8–23)
CALCIUM SERPL-MCNC: 9.3 MG/DL (ref 8.7–10.5)
CHLORIDE SERPL-SCNC: 106 MMOL/L (ref 95–110)
CK SERPL-CCNC: 411 U/L (ref 20–200)
CO2 SERPL-SCNC: 25 MMOL/L (ref 23–29)
CREAT SERPL-MCNC: 0.8 MG/DL (ref 0.5–1.4)
CRP SERPL-MCNC: 1.6 MG/L (ref 0–8.2)
DIFFERENTIAL METHOD: ABNORMAL
EOSINOPHIL # BLD AUTO: 0.3 K/UL (ref 0–0.5)
EOSINOPHIL NFR BLD: 5.6 % (ref 0–8)
ERYTHROCYTE [DISTWIDTH] IN BLOOD BY AUTOMATED COUNT: 16.3 % (ref 11.5–14.5)
ERYTHROCYTE [SEDIMENTATION RATE] IN BLOOD BY PHOTOMETRIC METHOD: 27 MM/HR (ref 0–23)
EST. GFR  (NO RACE VARIABLE): >60 ML/MIN/1.73 M^2
GLUCOSE SERPL-MCNC: 98 MG/DL (ref 70–110)
HCT VFR BLD AUTO: 35.1 % (ref 40–54)
HGB BLD-MCNC: 10.6 G/DL (ref 14–18)
IMM GRANULOCYTES # BLD AUTO: 0.01 K/UL (ref 0–0.04)
IMM GRANULOCYTES NFR BLD AUTO: 0.2 % (ref 0–0.5)
LYMPHOCYTES # BLD AUTO: 2.1 K/UL (ref 1–4.8)
LYMPHOCYTES NFR BLD: 43.1 % (ref 18–48)
MCH RBC QN AUTO: 26.6 PG (ref 27–31)
MCHC RBC AUTO-ENTMCNC: 30.2 G/DL (ref 32–36)
MCV RBC AUTO: 88 FL (ref 82–98)
MONOCYTES # BLD AUTO: 0.4 K/UL (ref 0.3–1)
MONOCYTES NFR BLD: 8.9 % (ref 4–15)
NEUTROPHILS # BLD AUTO: 2 K/UL (ref 1.8–7.7)
NEUTROPHILS NFR BLD: 41 % (ref 38–73)
NRBC BLD-RTO: 0 /100 WBC
PLATELET # BLD AUTO: 252 K/UL (ref 150–450)
PMV BLD AUTO: 12.8 FL (ref 9.2–12.9)
POTASSIUM SERPL-SCNC: 4.4 MMOL/L (ref 3.5–5.1)
PROT SERPL-MCNC: 6.4 G/DL (ref 6–8.4)
RBC # BLD AUTO: 3.98 M/UL (ref 4.6–6.2)
SODIUM SERPL-SCNC: 140 MMOL/L (ref 136–145)
WBC # BLD AUTO: 4.83 K/UL (ref 3.9–12.7)

## 2022-09-26 PROCEDURE — 82550 ASSAY OF CK (CPK): CPT | Performed by: INTERNAL MEDICINE

## 2022-09-26 PROCEDURE — 85025 COMPLETE CBC W/AUTO DIFF WBC: CPT | Performed by: INTERNAL MEDICINE

## 2022-09-26 PROCEDURE — 86140 C-REACTIVE PROTEIN: CPT | Performed by: INTERNAL MEDICINE

## 2022-09-26 PROCEDURE — 85652 RBC SED RATE AUTOMATED: CPT | Performed by: INTERNAL MEDICINE

## 2022-09-26 PROCEDURE — 80053 COMPREHEN METABOLIC PANEL: CPT | Performed by: INTERNAL MEDICINE

## 2022-09-29 ENCOUNTER — DOCUMENT SCAN (OUTPATIENT)
Dept: HOME HEALTH SERVICES | Facility: HOSPITAL | Age: 67
End: 2022-09-29
Payer: MEDICARE

## 2022-09-29 ENCOUNTER — TELEPHONE (OUTPATIENT)
Dept: INFECTIOUS DISEASES | Facility: CLINIC | Age: 67
End: 2022-09-29
Payer: MEDICARE

## 2022-09-29 RX ORDER — LEVOFLOXACIN 500 MG/1
500 TABLET, FILM COATED ORAL DAILY
Qty: 30 TABLET | Refills: 0 | Status: SHIPPED | OUTPATIENT
Start: 2022-09-29 | End: 2022-10-29

## 2022-09-29 NOTE — TELEPHONE ENCOUNTER
----- Message from Tha Hodge MD, FACP sent at 9/28/2022  3:09 PM CDT -----  Regarding: PICC line   Let us pull the PICC.  I will send po meds

## 2022-10-03 ENCOUNTER — CLINICAL SUPPORT (OUTPATIENT)
Dept: ENDOSCOPY | Facility: HOSPITAL | Age: 67
End: 2022-10-03
Attending: INTERNAL MEDICINE
Payer: MEDICARE

## 2022-10-03 ENCOUNTER — HOSPITAL ENCOUNTER (OUTPATIENT)
Dept: PREADMISSION TESTING | Facility: HOSPITAL | Age: 67
Discharge: HOME | End: 2022-10-03
Attending: INTERNAL MEDICINE
Payer: MEDICARE

## 2022-10-03 DIAGNOSIS — Z12.11 ENCOUNTER FOR SCREENING COLONOSCOPY: Primary | ICD-10-CM

## 2022-10-03 DIAGNOSIS — Z12.11 SCREENING FOR COLON CANCER: ICD-10-CM

## 2022-10-03 RX ORDER — SODIUM, POTASSIUM,MAG SULFATES 17.5-3.13G
1 SOLUTION, RECONSTITUTED, ORAL ORAL DAILY
Qty: 1 KIT | Refills: 0 | Status: SHIPPED | OUTPATIENT
Start: 2022-10-03 | End: 2022-10-05

## 2022-10-18 LAB — FUNGUS SPEC CULT: NORMAL

## 2022-11-02 LAB
ACID FAST MOD KINY STN SPEC: NORMAL
MYCOBACTERIUM SPEC QL CULT: NORMAL

## 2022-11-14 ENCOUNTER — PATIENT OUTREACH (OUTPATIENT)
Dept: ADMINISTRATIVE | Facility: HOSPITAL | Age: 67
End: 2022-11-14
Payer: MEDICARE

## 2022-11-14 NOTE — PROGRESS NOTES
DM Report: Per chart review, patient has a lab appointment scheduled on 11/18/22 for recheck of Hemoglobin a1c.

## 2022-11-18 ENCOUNTER — LAB VISIT (OUTPATIENT)
Dept: LAB | Facility: HOSPITAL | Age: 67
End: 2022-11-18
Attending: INTERNAL MEDICINE
Payer: MEDICARE

## 2022-11-18 ENCOUNTER — PES CALL (OUTPATIENT)
Dept: ADMINISTRATIVE | Facility: CLINIC | Age: 67
End: 2022-11-18
Payer: MEDICARE

## 2022-11-18 DIAGNOSIS — E11.9 DIABETES MELLITUS WITHOUT COMPLICATION: ICD-10-CM

## 2022-11-18 LAB
ANION GAP SERPL CALC-SCNC: 8 MMOL/L (ref 8–16)
BUN SERPL-MCNC: 18 MG/DL (ref 8–23)
CALCIUM SERPL-MCNC: 9.8 MG/DL (ref 8.7–10.5)
CHLORIDE SERPL-SCNC: 106 MMOL/L (ref 95–110)
CHOLEST SERPL-MCNC: 122 MG/DL (ref 120–199)
CHOLEST/HDLC SERPL: 3.4 {RATIO} (ref 2–5)
CO2 SERPL-SCNC: 26 MMOL/L (ref 23–29)
CREAT SERPL-MCNC: 0.9 MG/DL (ref 0.5–1.4)
EST. GFR  (NO RACE VARIABLE): >60 ML/MIN/1.73 M^2
ESTIMATED AVG GLUCOSE: 163 MG/DL (ref 68–131)
GLUCOSE SERPL-MCNC: 113 MG/DL (ref 70–110)
HBA1C MFR BLD: 7.3 % (ref 4–5.6)
HDLC SERPL-MCNC: 36 MG/DL (ref 40–75)
HDLC SERPL: 29.5 % (ref 20–50)
LDLC SERPL CALC-MCNC: 66.6 MG/DL (ref 63–159)
NONHDLC SERPL-MCNC: 86 MG/DL
POTASSIUM SERPL-SCNC: 4.7 MMOL/L (ref 3.5–5.1)
SODIUM SERPL-SCNC: 140 MMOL/L (ref 136–145)
TRIGL SERPL-MCNC: 97 MG/DL (ref 30–150)
TSH SERPL DL<=0.005 MIU/L-ACNC: 1.82 UIU/ML (ref 0.4–4)

## 2022-11-18 PROCEDURE — 83036 HEMOGLOBIN GLYCOSYLATED A1C: CPT | Performed by: INTERNAL MEDICINE

## 2022-11-18 PROCEDURE — 36415 COLL VENOUS BLD VENIPUNCTURE: CPT | Mod: PO | Performed by: INTERNAL MEDICINE

## 2022-11-18 PROCEDURE — 84443 ASSAY THYROID STIM HORMONE: CPT | Performed by: INTERNAL MEDICINE

## 2022-11-18 PROCEDURE — 80048 BASIC METABOLIC PNL TOTAL CA: CPT | Performed by: INTERNAL MEDICINE

## 2022-11-18 PROCEDURE — 80061 LIPID PANEL: CPT | Performed by: INTERNAL MEDICINE

## 2022-11-23 ENCOUNTER — OFFICE VISIT (OUTPATIENT)
Dept: INTERNAL MEDICINE | Facility: CLINIC | Age: 67
End: 2022-11-23
Payer: MEDICARE

## 2022-11-23 VITALS
DIASTOLIC BLOOD PRESSURE: 80 MMHG | HEIGHT: 67 IN | OXYGEN SATURATION: 97 % | SYSTOLIC BLOOD PRESSURE: 122 MMHG | BODY MASS INDEX: 38.06 KG/M2 | WEIGHT: 242.5 LBS | HEART RATE: 61 BPM

## 2022-11-23 DIAGNOSIS — E11.69 HYPERLIPIDEMIA ASSOCIATED WITH TYPE 2 DIABETES MELLITUS: ICD-10-CM

## 2022-11-23 DIAGNOSIS — E11.59 HYPERTENSION ASSOCIATED WITH DIABETES: Chronic | ICD-10-CM

## 2022-11-23 DIAGNOSIS — E11.9 TYPE 2 DIABETES MELLITUS WITHOUT COMPLICATION, WITHOUT LONG-TERM CURRENT USE OF INSULIN: Primary | ICD-10-CM

## 2022-11-23 DIAGNOSIS — Z12.5 PROSTATE CANCER SCREENING: ICD-10-CM

## 2022-11-23 DIAGNOSIS — E55.9 VITAMIN D DEFICIENCY: ICD-10-CM

## 2022-11-23 DIAGNOSIS — N52.9 ERECTILE DYSFUNCTION, UNSPECIFIED ERECTILE DYSFUNCTION TYPE: ICD-10-CM

## 2022-11-23 DIAGNOSIS — D64.9 ANEMIA, UNSPECIFIED TYPE: ICD-10-CM

## 2022-11-23 DIAGNOSIS — I15.2 HYPERTENSION ASSOCIATED WITH DIABETES: Chronic | ICD-10-CM

## 2022-11-23 DIAGNOSIS — E78.5 HYPERLIPIDEMIA ASSOCIATED WITH TYPE 2 DIABETES MELLITUS: ICD-10-CM

## 2022-11-23 PROCEDURE — 99499 UNLISTED E&M SERVICE: CPT | Mod: HCNC,S$GLB,, | Performed by: INTERNAL MEDICINE

## 2022-11-23 PROCEDURE — 90694 VACC AIIV4 NO PRSRV 0.5ML IM: CPT | Mod: S$GLB,,, | Performed by: INTERNAL MEDICINE

## 2022-11-23 PROCEDURE — G0008 FLU VACCINE - QUADRIVALENT - ADJUVANTED: ICD-10-PCS | Mod: S$GLB,,, | Performed by: INTERNAL MEDICINE

## 2022-11-23 PROCEDURE — G0008 ADMIN INFLUENZA VIRUS VAC: HCPCS | Mod: S$GLB,,, | Performed by: INTERNAL MEDICINE

## 2022-11-23 PROCEDURE — 3079F PR MOST RECENT DIASTOLIC BLOOD PRESSURE 80-89 MM HG: ICD-10-PCS | Mod: CPTII,S$GLB,, | Performed by: INTERNAL MEDICINE

## 2022-11-23 PROCEDURE — 3288F FALL RISK ASSESSMENT DOCD: CPT | Mod: CPTII,S$GLB,, | Performed by: INTERNAL MEDICINE

## 2022-11-23 PROCEDURE — 1126F AMNT PAIN NOTED NONE PRSNT: CPT | Mod: CPTII,S$GLB,, | Performed by: INTERNAL MEDICINE

## 2022-11-23 PROCEDURE — 3072F LOW RISK FOR RETINOPATHY: CPT | Mod: CPTII,S$GLB,, | Performed by: INTERNAL MEDICINE

## 2022-11-23 PROCEDURE — 99999 PR PBB SHADOW E&M-EST. PATIENT-LVL III: CPT | Mod: PBBFAC,,, | Performed by: INTERNAL MEDICINE

## 2022-11-23 PROCEDURE — 3066F NEPHROPATHY DOC TX: CPT | Mod: CPTII,S$GLB,, | Performed by: INTERNAL MEDICINE

## 2022-11-23 PROCEDURE — 4010F PR ACE/ARB THEARPY RXD/TAKEN: ICD-10-PCS | Mod: CPTII,S$GLB,, | Performed by: INTERNAL MEDICINE

## 2022-11-23 PROCEDURE — 3074F SYST BP LT 130 MM HG: CPT | Mod: CPTII,S$GLB,, | Performed by: INTERNAL MEDICINE

## 2022-11-23 PROCEDURE — 3051F HG A1C>EQUAL 7.0%<8.0%: CPT | Mod: CPTII,S$GLB,, | Performed by: INTERNAL MEDICINE

## 2022-11-23 PROCEDURE — 1126F PR PAIN SEVERITY QUANTIFIED, NO PAIN PRESENT: ICD-10-PCS | Mod: CPTII,S$GLB,, | Performed by: INTERNAL MEDICINE

## 2022-11-23 PROCEDURE — 1159F PR MEDICATION LIST DOCUMENTED IN MEDICAL RECORD: ICD-10-PCS | Mod: CPTII,S$GLB,, | Performed by: INTERNAL MEDICINE

## 2022-11-23 PROCEDURE — 3061F NEG MICROALBUMINURIA REV: CPT | Mod: CPTII,S$GLB,, | Performed by: INTERNAL MEDICINE

## 2022-11-23 PROCEDURE — 3051F PR MOST RECENT HEMOGLOBIN A1C LEVEL 7.0 - < 8.0%: ICD-10-PCS | Mod: CPTII,S$GLB,, | Performed by: INTERNAL MEDICINE

## 2022-11-23 PROCEDURE — 3072F PR LOW RISK FOR RETINOPATHY: ICD-10-PCS | Mod: CPTII,S$GLB,, | Performed by: INTERNAL MEDICINE

## 2022-11-23 PROCEDURE — 99499 RISK ADDL DX/OHS AUDIT: ICD-10-PCS | Mod: HCNC,S$GLB,, | Performed by: INTERNAL MEDICINE

## 2022-11-23 PROCEDURE — 3066F PR DOCUMENTATION OF TREATMENT FOR NEPHROPATHY: ICD-10-PCS | Mod: CPTII,S$GLB,, | Performed by: INTERNAL MEDICINE

## 2022-11-23 PROCEDURE — 3008F PR BODY MASS INDEX (BMI) DOCUMENTED: ICD-10-PCS | Mod: CPTII,S$GLB,, | Performed by: INTERNAL MEDICINE

## 2022-11-23 PROCEDURE — 3079F DIAST BP 80-89 MM HG: CPT | Mod: CPTII,S$GLB,, | Performed by: INTERNAL MEDICINE

## 2022-11-23 PROCEDURE — 99214 OFFICE O/P EST MOD 30 MIN: CPT | Mod: 25,S$GLB,, | Performed by: INTERNAL MEDICINE

## 2022-11-23 PROCEDURE — 99999 PR PBB SHADOW E&M-EST. PATIENT-LVL III: ICD-10-PCS | Mod: PBBFAC,,, | Performed by: INTERNAL MEDICINE

## 2022-11-23 PROCEDURE — 3288F PR FALLS RISK ASSESSMENT DOCUMENTED: ICD-10-PCS | Mod: CPTII,S$GLB,, | Performed by: INTERNAL MEDICINE

## 2022-11-23 PROCEDURE — 4010F ACE/ARB THERAPY RXD/TAKEN: CPT | Mod: CPTII,S$GLB,, | Performed by: INTERNAL MEDICINE

## 2022-11-23 PROCEDURE — 99214 PR OFFICE/OUTPT VISIT, EST, LEVL IV, 30-39 MIN: ICD-10-PCS | Mod: 25,S$GLB,, | Performed by: INTERNAL MEDICINE

## 2022-11-23 PROCEDURE — 3074F PR MOST RECENT SYSTOLIC BLOOD PRESSURE < 130 MM HG: ICD-10-PCS | Mod: CPTII,S$GLB,, | Performed by: INTERNAL MEDICINE

## 2022-11-23 PROCEDURE — 1101F PR PT FALLS ASSESS DOC 0-1 FALLS W/OUT INJ PAST YR: ICD-10-PCS | Mod: CPTII,S$GLB,, | Performed by: INTERNAL MEDICINE

## 2022-11-23 PROCEDURE — 1159F MED LIST DOCD IN RCRD: CPT | Mod: CPTII,S$GLB,, | Performed by: INTERNAL MEDICINE

## 2022-11-23 PROCEDURE — 90694 FLU VACCINE - QUADRIVALENT - ADJUVANTED: ICD-10-PCS | Mod: S$GLB,,, | Performed by: INTERNAL MEDICINE

## 2022-11-23 PROCEDURE — 3008F BODY MASS INDEX DOCD: CPT | Mod: CPTII,S$GLB,, | Performed by: INTERNAL MEDICINE

## 2022-11-23 PROCEDURE — 1101F PT FALLS ASSESS-DOCD LE1/YR: CPT | Mod: CPTII,S$GLB,, | Performed by: INTERNAL MEDICINE

## 2022-11-23 PROCEDURE — 3061F PR NEG MICROALBUMINURIA RESULT DOCUMENTED/REVIEW: ICD-10-PCS | Mod: CPTII,S$GLB,, | Performed by: INTERNAL MEDICINE

## 2022-11-23 RX ORDER — SILDENAFIL CITRATE 20 MG/1
TABLET ORAL
Qty: 100 TABLET | Refills: 11 | Status: SHIPPED | OUTPATIENT
Start: 2022-11-23 | End: 2023-05-24

## 2022-11-23 NOTE — PROGRESS NOTES
"HPI:  Patient is 67-year-old man who comes today for follow-up of his hypertension, diabetes, lipids.  Patient is been doing very well.  He has no complaints.  His surgical wounds have all finally healed all over.  He is doing great in that respect.  He denies any hypoglycemic problems.  His blood pressures been well controlled.    Current meds have been verified and updated per the EMR  Exam:/80 (BP Location: Right arm)   Pulse 61   Ht 5' 7" (1.702 m)   Wt 110 kg (242 lb 8.1 oz)   SpO2 97%   BMI 37.98 kg/m²   Carotids 2+ equal without bruits  Chest clear  Cardiovascular regular rate and rhythm without murmur gallop or rub    Lab Results   Component Value Date    WBC 4.83 09/26/2022    HGB 10.6 (L) 09/26/2022    HCT 35.1 (L) 09/26/2022     09/26/2022    CHOL 122 11/18/2022    TRIG 97 11/18/2022    HDL 36 (L) 11/18/2022    ALT 32 09/26/2022    AST 37 09/26/2022     11/18/2022    K 4.7 11/18/2022     11/18/2022    CREATININE 0.9 11/18/2022    BUN 18 11/18/2022    CO2 26 11/18/2022    TSH 1.819 11/18/2022    PSA 0.93 05/18/2022    INR 1.0 07/30/2022    HGBA1C 7.3 (H) 11/18/2022       Impression:  Diabetes, controlled much improved since being off the steroids and over all of his complications of surgery.  Other medical problems below, stable  Patient Active Problem List   Diagnosis    Type 2 diabetes mellitus without complication, without long-term current use of insulin    Hypertension associated with diabetes    Hyperlipidemia associated with type 2 diabetes mellitus    Morbid obesity with BMI of 40.0-44.9, adult    History of colon polyps    Vitamin D deficiency    Anemia    Decreased strength of trunk and back    Lumbar radiculopathy    Degenerative disc disease, lumbar    Lumbar discitis with Osteo L2-3       Plan:  Orders Placed This Encounter    Hemoglobin A1C    Comprehensive Metabolic Panel    Lipid Panel    TSH    CBC Auto Differential    Microalbumin/Creatinine Ratio, Urine    " PSA, Screening    sildenafil (REVATIO) 20 mg Tab     He will see me again in 6 months with above lab work.  He was given Viagra to use as needed.  Patient was given flu vaccine today    This note is generated with speech recognition software and is subject to transcription error and sound alike phrases that may be missed by proofreading.

## 2022-12-14 ENCOUNTER — ANESTHESIA (OUTPATIENT)
Dept: ENDOSCOPY | Facility: HOSPITAL | Age: 67
End: 2022-12-14
Payer: MEDICARE

## 2022-12-14 ENCOUNTER — ANESTHESIA EVENT (OUTPATIENT)
Dept: ENDOSCOPY | Facility: HOSPITAL | Age: 67
End: 2022-12-14
Payer: MEDICARE

## 2022-12-14 ENCOUNTER — HOSPITAL ENCOUNTER (OUTPATIENT)
Facility: HOSPITAL | Age: 67
Discharge: HOME | End: 2022-12-14
Attending: SURGERY | Admitting: SURGERY
Payer: MEDICARE

## 2022-12-14 DIAGNOSIS — K63.5 POLYP OF COLON, UNSPECIFIED PART OF COLON, UNSPECIFIED TYPE: Primary | ICD-10-CM

## 2022-12-14 LAB — POCT GLUCOSE: 98 MG/DL (ref 70–110)

## 2022-12-14 PROCEDURE — 63600175 PHARM REV CODE 636 W HCPCS: Performed by: NURSE ANESTHETIST, CERTIFIED REGISTERED

## 2022-12-14 PROCEDURE — G0105 COLORECTAL SCRN; HI RISK IND: HCPCS | Performed by: SURGERY

## 2022-12-14 PROCEDURE — G0105 COLORECTAL SCRN; HI RISK IND: ICD-10-PCS | Mod: ,,, | Performed by: SURGERY

## 2022-12-14 PROCEDURE — G0105 COLORECTAL SCRN; HI RISK IND: HCPCS | Mod: ,,, | Performed by: SURGERY

## 2022-12-14 PROCEDURE — 25000003 PHARM REV CODE 250: Performed by: NURSE ANESTHETIST, CERTIFIED REGISTERED

## 2022-12-14 PROCEDURE — 37000008 HC ANESTHESIA 1ST 15 MINUTES: Performed by: SURGERY

## 2022-12-14 PROCEDURE — 37000009 HC ANESTHESIA EA ADD 15 MINS: Performed by: SURGERY

## 2022-12-14 RX ORDER — SODIUM CHLORIDE, SODIUM LACTATE, POTASSIUM CHLORIDE, CALCIUM CHLORIDE 600; 310; 30; 20 MG/100ML; MG/100ML; MG/100ML; MG/100ML
INJECTION, SOLUTION INTRAVENOUS CONTINUOUS PRN
Status: DISCONTINUED | OUTPATIENT
Start: 2022-12-14 | End: 2022-12-14

## 2022-12-14 RX ORDER — PROPOFOL 10 MG/ML
VIAL (ML) INTRAVENOUS
Status: DISCONTINUED | OUTPATIENT
Start: 2022-12-14 | End: 2022-12-14

## 2022-12-14 RX ORDER — LIDOCAINE HYDROCHLORIDE 10 MG/ML
INJECTION, SOLUTION EPIDURAL; INFILTRATION; INTRACAUDAL; PERINEURAL
Status: DISCONTINUED | OUTPATIENT
Start: 2022-12-14 | End: 2022-12-14

## 2022-12-14 RX ADMIN — LIDOCAINE HYDROCHLORIDE 50 MG: 10 INJECTION, SOLUTION EPIDURAL; INFILTRATION; INTRACAUDAL; PERINEURAL at 07:12

## 2022-12-14 RX ADMIN — PROPOFOL 50 MG: 10 INJECTION, EMULSION INTRAVENOUS at 07:12

## 2022-12-14 RX ADMIN — SODIUM CHLORIDE, SODIUM LACTATE, POTASSIUM CHLORIDE, AND CALCIUM CHLORIDE: .6; .31; .03; .02 INJECTION, SOLUTION INTRAVENOUS at 07:12

## 2022-12-14 RX ADMIN — PROPOFOL 100 MG: 10 INJECTION, EMULSION INTRAVENOUS at 07:12

## 2022-12-14 NOTE — H&P
PRE PROCEDURE H&P    Patient Name: Friday ROGELIO Blake  MRN: [id number]  : 1955  Date of Procedure:  2022  Referring Physician: Sid Elizabeth MD  Primary Physician: Sid Elizabeth MD  Procedure Physician: Belia Gómez DO      Planned Procedure: Colonoscopy  Diagnosis: previous adenomatous polyp  Chief Complaint: Same as above    HPI: Patient is an 67 y.o. male is here for the above.     Last colonoscopy: 2018  Family history: Denies colorectal cancers  Anticoagulation: Aspirin    Past Medical History:   Past Medical History:   Diagnosis Date    Bilateral carpal tunnel syndrome 2022    moderate demyelinating bilaterally    Carpal tunnel syndrome     Diabetes mellitus without complication     Fever 2022    Gram-positive bacteremia 2022    History of colon polyps     Hypercalcemia 3/23/2021    Hyperlipidemia associated with type 2 diabetes mellitus     Hypertension associated with diabetes     Left carpal tunnel syndrome 3/23/2021    Low back pain 2021    Lumbar disc disease with radiculopathy     Morbid obesity with BMI of 40.0-44.9, adult     Postoperative hematoma involving nervous system following nervous system procedure 2022    S/P parathyroidectomy 2021    Vitamin D deficiency         Past Surgical History:  Past Surgical History:   Procedure Laterality Date    CARPAL TUNNEL RELEASE Left 2021    Procedure: RELEASE, CARPAL TUNNEL;  Surgeon: Yoandy David MD;  Location: [facility] OR;  Service: Orthopedics;  Laterality: Left;    HERNIA REPAIR      INCISION AND DRAINAGE OF HEMATOMA N/A 2022    Procedure: INCISION AND DRAINAGE, HEMATOMA;  Surgeon: Shaggy Zepeda MD;  Location: [facility] OR;  Service: Neurosurgery;  Laterality: N/A;    LUMBAR LAMINECTOMY WITH DISCECTOMY Left 2022    Procedure: LAMINECTOMY, SPINE, LUMBAR, WITH DISCECTOMY;  Surgeon: Shaggy Zepeda MD;  Location: [facility] OR;  Service: Neurosurgery;  Laterality: Left;  minimally invasive L2-3  discectomy and decompression     LUMBAR LAMINECTOMY WITH DISCECTOMY N/A 6/28/2022    Procedure: LAMINECTOMY, SPINE, LUMBAR, WITH DISCECTOMY;  Surgeon: Shaggy Zepeda MD;  Location: [facility] OR;  Service: Neurosurgery;  Laterality: N/A;    PARATHYROIDECTOMY Bilateral 7/27/2021    Procedure: PARATHYROIDECTOMY;  Surgeon: Tha Dial MD;  Location: [facility] OR;  Service: ENT;  Laterality: Bilateral;  with medialstinal exploration    REPAIR OF CEREBROSPINAL FLUID LEAK USING COMPUTER ASSISTED NAVIGATION Bilateral 8/3/2022    Procedure: REPAIR, CSF LEAK, USING COMPUTER-ASSISTED NAVIGATION;  Surgeon: Shaggy Zepeda MD;  Location: [facility] OR;  Service: Neurosurgery;  Laterality: Bilateral;    SELECTIVE INJECTION OF ANESTHETIC AGENT AROUND LUMBAR SPINAL NERVE ROOT BY TRANSFORAMINAL APPROACH Left 3/23/2022    Procedure: BLOCK, SPINAL NERVE ROOT, LUMBAR, SELECTIVE, TRANSFORAMINAL APPROACH Left L2-3, L3-4 RN IV sedation;  Surgeon: Jay Hodges MD;  Location: [facility]IN [facility]GT;  Service: Pain Management;  Laterality: Left;    STERNOTOMY N/A 7/27/2021    Procedure: STERNOTOMY;  Surgeon: Tha Dial MD;  Location: [facility] OR;  Service: ENT;  Laterality: N/A;    ULNAR NERVE TRANSPOSITION Left 4/1/2021    Procedure: TRANSPOSITION, NERVE, ULNAR;  Surgeon: Yoandy David MD;  Location: [facility] OR;  Service: Orthopedics;  Laterality: Left;    ULNAR TUNNEL RELEASE Left 4/1/2021    Procedure: RELEASE, CUBITAL TUNNEL;  Surgeon: Yoandy David MD;  Location: [facility] OR;  Service: Orthopedics;  Laterality: Left;    UMBILICAL HERNIA REPAIR      WOUND EXPLORATION Bilateral 8/3/2022    Procedure: EXPLORATION, WOUND;  Surgeon: Shaggy Zepeda MD;  Location: [facility] OR;  Service: Neurosurgery;  Laterality: Bilateral;        Home Medications:  Prior to Admission medications    Medication Sig Start Date End Date Taking? Authorizing Provider   aspirin (ECOTRIN) 81 MG EC tablet Take 1 tablet (81 mg total) by mouth once daily. 7/6/22  Yes Oliver JOSE  URI Clifton   atorvastatin (LIPITOR) 10 MG tablet Take 1 tablet (10 mg total) by mouth every evening. 8/18/22  Yes Sid Elizabeth MD   finasteride (PROSCAR) 5 mg tablet Take 5 mg by mouth once daily. 8/14/22  Yes Historical Provider   gabapentin (NEURONTIN) 300 MG capsule Take 1 capsule (300 mg total) by mouth 3 (three) times daily. 7/6/22 7/6/23 Yes Oliver Clifton PA-C   irbesartan (AVAPRO) 150 MG tablet Take 1 tablet by mouth once daily 8/20/22  Yes Sid Elizabeth MD   metFORMIN (GLUCOPHAGE) 1000 MG tablet Take 1,000 mg by mouth 2 (two) times daily with meals.   Yes Historical Provider   sildenafil (REVATIO) 20 mg Tab Take 2-5 pills one hour to intercourse 11/23/22  Yes Sid Elizabeth MD   tamsulosin (FLOMAX) 0.4 mg Cap Take 1 capsule by mouth once daily. 8/14/22  Yes Historical Provider   blood sugar diagnostic Strp To check BG 1 times daily, to use with insurance preferred meter 10/4/21   Sid Elizabeth MD   lancets Misc To check BG 1 times daily, to use with insurance preferred meter 10/4/21   Sid Elizabeth MD   methocarbamoL (ROBAXIN) 750 MG Tab Take 1 tablet (750 mg total) by mouth 3 (three) times daily as needed (muscle spasms). 6/22/22   Oliver Clifton PA-C   blood-glucose meter kit To check BG 1 times daily, to use with insurance preferred meter 10/4/21 8/12/22  Sid Elizabeth MD        Allergies:  Review of patient's allergies indicates:  No Known Allergies     Social History:   Social History     Socioeconomic History    Marital status:    Tobacco Use    Smoking status: Never    Smokeless tobacco: Never   Substance and Sexual Activity    Alcohol use: Never    Drug use: Never    Sexual activity: Yes     Partners: Female       Family History:  Family History   Problem Relation Age of Onset    Asthma Mother     Hypertension Father     Diabetes Mellitus Father     Prostate cancer Brother        ROS: No acute cardiac events, no acute respiratory complaints.     Physical Exam  "(all patients):    /81 (BP Location: Left arm, Patient Position: Lying)   Pulse (!) 52   Temp 98.1 °F (36.7 °C) (Temporal)   Resp 18   Ht 5' 7" (1.702 m)   Wt 109.8 kg (242 lb)   SpO2 96%   BMI 37.90 kg/m²   Lungs: Clear to auscultation bilaterally, respirations unlabored  Heart: Regular rate and rhythm, S1 and S2 normal, no obvious murmurs  Abdomen:         Soft, non-tender, bowel sounds normal, no masses, no organomegaly    Lab Results   Component Value Date    WBC 4.83 09/26/2022    MCV 88 09/26/2022    RDW 16.3 (H) 09/26/2022     09/26/2022    INR 1.0 07/30/2022     (H) 11/18/2022    HGBA1C 7.3 (H) 11/18/2022    BUN 18 11/18/2022     11/18/2022    K 4.7 11/18/2022     11/18/2022        SEDATION PLAN: per anesthesia      History reviewed, vital signs satisfactory, cardiopulmonary status satisfactory, sedation options, risks and plans have been discussed with the patient  All their questions were answered and the patient agrees to the sedation procedures as planned and the patient is deemed an appropriate candidate for the sedation as planned.    Procedure explained to patient, informed consent obtained and placed in chart.    Belia Gómez, DO  General Surgery  [facility]  12/14/2022      "

## 2022-12-14 NOTE — PROVATION PATIENT INSTRUCTIONS
Discharge Summary/Instructions after an Endoscopic Procedure  Patient Name: Friday Lou  Patient MRN: [id number]  Patient YOB: 1955 Wednesday, December 14, 2022 Belai Gómez DO  Dear patient,  As a result of recent federal legislation (The Federal Cures Act), you may   receive lab or pathology results from your procedure in your [facility]   account before your physician is able to contact you. Your physician or   their representative will relay the results to you with their   recommendations at their soonest availability.  Thank you,  RESTRICTIONS:  During your procedure today, you received medications for sedation.  These   medications may affect your judgment, balance and coordination.  Therefore,   for 24 hours, you have the following restrictions:   - DO NOT drive a car, operate machinery, make legal/financial decisions,   sign important papers or drink alcohol.    ACTIVITY:  Today: no heavy lifting, straining or running due to procedural   sedation/anesthesia.  The following day: return to full activity including work.  DIET:  Eat and drink normally unless instructed otherwise.     TREATMENT FOR COMMON SIDE EFFECTS:  - Mild abdominal pain, nausea, belching, bloating or excessive gas:  rest,   eat lightly and use a heating pad.  - Sore Throat: treat with throat lozenges and/or gargle with warm salt   water.  - Because air was used during the procedure, expelling large amounts of air   from your rectum or belching is normal.  - If a bowel prep was taken, you may not have a bowel movement for 1-3 days.    This is normal.  SYMPTOMS TO WATCH FOR AND REPORT TO YOUR PHYSICIAN:  1. Abdominal pain or bloating, other than gas cramps.  2. Chest pain.  3. Back pain.  4. Signs of infection such as: chills or fever occurring within 24 hours   after the procedure.  5. Rectal bleeding, which would show as bright red, maroon, or black stools.   (A tablespoon of blood from the rectum is not serious,  Spoke with patient and relayed the results of her CT and the need for 1 year follow-up.  Patient is agreeable.   We will get back to her with sleep study results next week, and she will call by the end of the week if she has not heard anything.   CT order placed for 1 year follow-up.   especially if   hemorrhoids are present.)  6. Vomiting.  7. Weakness or dizziness.  GO DIRECTLY TO THE NEAREST EMERGENCY ROOM IF YOU HAVE ANY OF THE FOLLOWING:      Difficulty breathing              Chills and/or fever over 101 F   Persistent vomiting and/or vomiting blood   Severe abdominal pain   Severe chest pain   Black, tarry stools   Bleeding- more than one tablespoon   Any other symptom or condition that you feel may need urgent attention  Your doctor recommends these additional instructions:  If any biopsies were taken, your doctors clinic will contact you in 1 to 2   weeks with any results.  - Patient has a contact number available for emergencies.  The signs and   symptoms of potential delayed complications were discussed with the   patient.  Return to normal activities tomorrow.  Written discharge   instructions were provided to the patient.   - Resume previous diet.   - Discharge patient to home (ambulatory).   - Continue present medications.   - Repeat colonoscopy in 5 years for surveillance.   - Return to GI clinic PRN.  For questions, problems or results please call your physician Belia Gómez, DO at Work:  [phone]  If you have any questions about the above instructions, call the GI   department at [phone] or call the endoscopy unit at [phone]   from 7am until 3 pm.  [facility], EMERGENCY ROOM PHONE NUMBER:   [phone]  IF A COMPLICATION OR EMERGENCY SITUATION ARISES AND YOU ARE UNABLE TO REACH   YOUR PHYSICIAN - GO DIRECTLY TO THE EMERGENCY ROOM.  I have read or have had read to me these discharge instructions for my   procedure and have received a written copy.  I understand these   instructions and will follow-up with my physician if I have any questions.     __________________________________       _____________________________________  Nurse Signature                                          Patient/Designated   Responsible Party  Signature  Belia Gómez, DO  12/14/2022 8:08:13 AM  This report has been verified and signed electronically.  Dear patient,  As a result of recent federal legislation (The Federal Cures Act), you may   receive lab or pathology results from your procedure in your [facility]   account before your physician is able to contact you. Your physician or   their representative will relay the results to you with their   recommendations at their soonest availability.  Thank you,  PROVATION

## 2022-12-14 NOTE — TRANSFER OF CARE
"Anesthesia Transfer of Care Note    Patient: Friday ROGELIO Blake    Procedure(s) Performed: Procedure(s) (LRB):  COLONOSCOPY (N/A)    Patient location: GI    Anesthesia Type: MAC    Transport from OR: Transported from OR on room air with adequate spontaneous ventilation    Post pain: adequate analgesia    Post assessment: no apparent anesthetic complications    Post vital signs: stable    Level of consciousness: sedated    Nausea/Vomiting: no nausea/vomiting    Complications: none    Transfer of care protocol was followed      Last vitals:   Visit Vitals  /81 (BP Location: Left arm, Patient Position: Lying)   Pulse (!) 52   Temp 36.7 °C (98.1 °F) (Temporal)   Resp 18   Ht 5' 7" (1.702 m)   Wt 109.8 kg (242 lb)   SpO2 96%   BMI 37.90 kg/m²     "

## 2022-12-14 NOTE — ANESTHESIA POSTPROCEDURE EVALUATION
Anesthesia Post Evaluation    Patient: Friday ROGELIO Blake    Procedure(s) Performed: Procedure(s) (LRB):  COLONOSCOPY (N/A)    Final Anesthesia Type: MAC      Patient location during evaluation: GI PACU  Patient participation: Yes- Able to Participate  Level of consciousness: awake and alert  Post-procedure vital signs: reviewed and stable  Pain management: adequate  Airway patency: patent    PONV status at discharge: No PONV  Anesthetic complications: no      Cardiovascular status: hemodynamically stable  Respiratory status: unassisted, spontaneous ventilation and room air  Hydration status: euvolemic  Follow-up not needed.          Vitals Value Taken Time   /65 12/14/22 0812   Temp 36.8 °C (98.2 °F) 12/14/22 0809   Pulse 61 12/14/22 0812   Resp 17 12/14/22 0812   SpO2 96 % 12/14/22 0812         No case tracking events are documented in the log.      Pain/Javid Score: Javid Score: 6 (12/14/2022  8:09 AM)

## 2022-12-14 NOTE — ANESTHESIA PREPROCEDURE EVALUATION
12/14/2022 Friday ROGELIO Blake is a 67 y.o., male.      Pre-op Assessment    I have reviewed the Patient Summary Reports.     I have reviewed the Nursing Notes. I have reviewed the NPO Status.   I have reviewed the Medications.     Review of Systems  Anesthesia Hx:  No problems with previous Anesthesia  Denies Family Hx of Anesthesia complications.  Personal Hx of Anesthesia complications  Skin Injury Facial blisters post 2nd back sx   Hematology/Oncology:     Oncology Normal    -- Anemia:   EENT/Dental:EENT/Dental Normal   Cardiovascular:   Hypertension ECG has been reviewed.    Pulmonary:   Asymptomatic COVID   Renal/:  Renal/ Normal     Hepatic/GI:  Hepatic/GI Normal    Musculoskeletal:   Arthritis   Spine Disorders: lumbar Degenerative disease and Disc disease    Neurological:  Neurology Normal    Endocrine:   Diabetes  Obesity / BMI > 30  Dermatological:   FACIAL SWELLING AND BLISTERS post SPINE SURGERY   Psych:  Psychiatric Normal           Physical Exam  General: Well nourished, Cooperative, Alert and Oriented    Airway:  Mallampati: III   Mouth Opening: Normal  TM Distance: Normal  Tongue: Normal  Neck ROM: Normal ROM    Dental:  Intact        Anesthesia Plan  Type of Anesthesia, risks & benefits discussed:    Anesthesia Type: MAC  Intra-op Monitoring Plan: Standard ASA Monitors  Post Op Pain Control Plan: IV/PO Opioids PRN  Induction:  IV  ASA Score: 2  Day of Surgery Review of History & Physical: H&P Update referred to the surgeon/provider.I have interviewed and examined the patient. I have reviewed the patient's H&P dated:     Ready For Surgery From Anesthesia Perspective.     .

## 2022-12-14 NOTE — DISCHARGE SUMMARY
[facility] (Hospital)  Discharge Note  Short Stay    Procedure(s) (LRB):  COLONOSCOPY (N/A)      OUTCOME: Patient tolerated treatment/procedure well without complication and is now ready for discharge.    DISPOSITION: Home or Self Care    FINAL DIAGNOSIS:  <principal problem not specified>    FOLLOWUP: With primary care provider    DISCHARGE INSTRUCTIONS:  No discharge procedures on file.     TIME SPENT ON DISCHARGE: 5 minutes

## 2022-12-15 VITALS
DIASTOLIC BLOOD PRESSURE: 89 MMHG | HEART RATE: 55 BPM | HEIGHT: 67 IN | OXYGEN SATURATION: 99 % | BODY MASS INDEX: 37.98 KG/M2 | RESPIRATION RATE: 17 BRPM | TEMPERATURE: 98 F | SYSTOLIC BLOOD PRESSURE: 129 MMHG | WEIGHT: 242 LBS

## 2023-01-12 ENCOUNTER — OFFICE VISIT (OUTPATIENT)
Dept: OPHTHALMOLOGY | Facility: CLINIC | Age: 68
End: 2023-01-12
Payer: MEDICARE

## 2023-01-12 DIAGNOSIS — H26.9 CORTICAL CATARACT OF RIGHT EYE: ICD-10-CM

## 2023-01-12 DIAGNOSIS — Z96.1 PSEUDOPHAKIA OF LEFT EYE: ICD-10-CM

## 2023-01-12 DIAGNOSIS — E11.36 DIABETIC CATARACT OF RIGHT EYE: ICD-10-CM

## 2023-01-12 DIAGNOSIS — H25.11 AGE-RELATED NUCLEAR CATARACT OF RIGHT EYE: ICD-10-CM

## 2023-01-12 DIAGNOSIS — E11.9 TYPE 2 DIABETES MELLITUS WITHOUT RETINOPATHY: Primary | ICD-10-CM

## 2023-01-12 PROCEDURE — 1160F PR REVIEW ALL MEDS BY PRESCRIBER/CLIN PHARMACIST DOCUMENTED: ICD-10-PCS | Mod: HCNC,CPTII,S$GLB, | Performed by: OPTOMETRIST

## 2023-01-12 PROCEDURE — 99999 PR PBB SHADOW E&M-EST. PATIENT-LVL III: ICD-10-PCS | Mod: PBBFAC,HCNC,, | Performed by: OPTOMETRIST

## 2023-01-12 PROCEDURE — 1160F RVW MEDS BY RX/DR IN RCRD: CPT | Mod: HCNC,CPTII,S$GLB, | Performed by: OPTOMETRIST

## 2023-01-12 PROCEDURE — 1159F PR MEDICATION LIST DOCUMENTED IN MEDICAL RECORD: ICD-10-PCS | Mod: HCNC,CPTII,S$GLB, | Performed by: OPTOMETRIST

## 2023-01-12 PROCEDURE — 1159F MED LIST DOCD IN RCRD: CPT | Mod: HCNC,CPTII,S$GLB, | Performed by: OPTOMETRIST

## 2023-01-12 PROCEDURE — 99999 PR PBB SHADOW E&M-EST. PATIENT-LVL III: CPT | Mod: PBBFAC,HCNC,, | Performed by: OPTOMETRIST

## 2023-01-12 PROCEDURE — 92014 PR EYE EXAM, EST PATIENT,COMPREHESV: ICD-10-PCS | Mod: HCNC,S$GLB,, | Performed by: OPTOMETRIST

## 2023-01-12 PROCEDURE — 92014 COMPRE OPH EXAM EST PT 1/>: CPT | Mod: HCNC,S$GLB,, | Performed by: OPTOMETRIST

## 2023-01-12 NOTE — PROGRESS NOTES
HPI     Diabetic Eye Exam            Comments: Last eye exam 01/10/22 DNL          Comments    Blood sugar 125-130 yesterday am  Lab Results       Component                Value               Date                       HGBA1C                   7.3 (H)             11/18/2022     Vision changes since last eye exam?: no     Any eye pain today: no    Other ocular symptoms: no    Interested in contact lens fitting today? no                                     Last edited by Malinda Bennett on 1/12/2023  9:51 AM.            Assessment /Plan     For exam results, see Encounter Report.    Type 2 diabetes mellitus without retinopathy  There was no diabetic retinopathy present in either eye today.   Recommended that pt continue care with PCP and/or specialists regarding diabetes.  Follow-up dilated eye exam recommended in 12 months, sooner with any vision changes or new concerns.    Age-related nuclear cataract of right eye  Cortical cataract of right eye  Diabetic cataract of right eye  Mild with good va OU  Pt is asymptomatic at this time  Will continue to monitor    Pseudophakia of left eye  With MF PCIOL  Doing well  Monitor 12 months     Pt declined Spec Rx    RTC 1 yr for dilated eye exam or PRN if any problems.   Discussed above and answered questions.

## 2023-02-07 DIAGNOSIS — Z00.00 ENCOUNTER FOR MEDICARE ANNUAL WELLNESS EXAM: ICD-10-CM

## 2023-02-09 DIAGNOSIS — Z00.00 ENCOUNTER FOR MEDICARE ANNUAL WELLNESS EXAM: ICD-10-CM

## 2023-02-15 ENCOUNTER — TELEPHONE (OUTPATIENT)
Dept: INTERNAL MEDICINE | Facility: CLINIC | Age: 68
End: 2023-02-15
Payer: MEDICARE

## 2023-02-15 NOTE — TELEPHONE ENCOUNTER
----- Message from Jaime Echeverria sent at 2/15/2023  9:05 AM CST -----  [employer]  calling in regards to authorization     Pls call back at [phone] due to no auth for service being requested      Thank you    Bienvenido

## 2023-03-15 ENCOUNTER — TELEPHONE (OUTPATIENT)
Dept: INTERNAL MEDICINE | Facility: CLINIC | Age: 68
End: 2023-03-15

## 2023-03-15 ENCOUNTER — OFFICE VISIT (OUTPATIENT)
Dept: INTERNAL MEDICINE | Facility: CLINIC | Age: 68
End: 2023-03-15
Payer: MEDICARE

## 2023-03-15 VITALS
RESPIRATION RATE: 18 BRPM | DIASTOLIC BLOOD PRESSURE: 85 MMHG | SYSTOLIC BLOOD PRESSURE: 125 MMHG | WEIGHT: 246.69 LBS | BODY MASS INDEX: 38.72 KG/M2 | OXYGEN SATURATION: 98 % | HEART RATE: 61 BPM | HEIGHT: 67 IN

## 2023-03-15 DIAGNOSIS — E55.9 VITAMIN D DEFICIENCY: ICD-10-CM

## 2023-03-15 DIAGNOSIS — E11.9 TYPE 2 DIABETES MELLITUS WITHOUT COMPLICATION, WITHOUT LONG-TERM CURRENT USE OF INSULIN: ICD-10-CM

## 2023-03-15 DIAGNOSIS — E11.69 HYPERLIPIDEMIA ASSOCIATED WITH TYPE 2 DIABETES MELLITUS: ICD-10-CM

## 2023-03-15 DIAGNOSIS — G31.89 OTHER SPECIFIED DEGENERATIVE DISEASES OF NERVOUS SYSTEM: ICD-10-CM

## 2023-03-15 DIAGNOSIS — Z00.00 ENCOUNTER FOR MEDICARE ANNUAL WELLNESS EXAM: ICD-10-CM

## 2023-03-15 DIAGNOSIS — N40.0 BENIGN PROSTATIC HYPERPLASIA WITHOUT LOWER URINARY TRACT SYMPTOMS: ICD-10-CM

## 2023-03-15 DIAGNOSIS — E11.59 HYPERTENSION ASSOCIATED WITH DIABETES: Chronic | ICD-10-CM

## 2023-03-15 DIAGNOSIS — E66.01 MORBID (SEVERE) OBESITY DUE TO EXCESS CALORIES: ICD-10-CM

## 2023-03-15 DIAGNOSIS — Z00.00 ENCOUNTER FOR PREVENTIVE HEALTH EXAMINATION: Primary | ICD-10-CM

## 2023-03-15 DIAGNOSIS — E78.5 HYPERLIPIDEMIA ASSOCIATED WITH TYPE 2 DIABETES MELLITUS: ICD-10-CM

## 2023-03-15 DIAGNOSIS — I15.2 HYPERTENSION ASSOCIATED WITH DIABETES: Chronic | ICD-10-CM

## 2023-03-15 PROCEDURE — 1170F FXNL STATUS ASSESSED: CPT | Mod: HCNC,CPTII,S$GLB, | Performed by: NURSE PRACTITIONER

## 2023-03-15 PROCEDURE — 1159F PR MEDICATION LIST DOCUMENTED IN MEDICAL RECORD: ICD-10-PCS | Mod: HCNC,CPTII,S$GLB, | Performed by: NURSE PRACTITIONER

## 2023-03-15 PROCEDURE — 1159F MED LIST DOCD IN RCRD: CPT | Mod: HCNC,CPTII,S$GLB, | Performed by: NURSE PRACTITIONER

## 2023-03-15 PROCEDURE — 1125F PR PAIN SEVERITY QUANTIFIED, PAIN PRESENT: ICD-10-PCS | Mod: HCNC,CPTII,S$GLB, | Performed by: NURSE PRACTITIONER

## 2023-03-15 PROCEDURE — 3288F PR FALLS RISK ASSESSMENT DOCUMENTED: ICD-10-PCS | Mod: HCNC,CPTII,S$GLB, | Performed by: NURSE PRACTITIONER

## 2023-03-15 PROCEDURE — 3288F FALL RISK ASSESSMENT DOCD: CPT | Mod: HCNC,CPTII,S$GLB, | Performed by: NURSE PRACTITIONER

## 2023-03-15 PROCEDURE — 1101F PT FALLS ASSESS-DOCD LE1/YR: CPT | Mod: HCNC,CPTII,S$GLB, | Performed by: NURSE PRACTITIONER

## 2023-03-15 PROCEDURE — 1160F RVW MEDS BY RX/DR IN RCRD: CPT | Mod: HCNC,CPTII,S$GLB, | Performed by: NURSE PRACTITIONER

## 2023-03-15 PROCEDURE — 3079F DIAST BP 80-89 MM HG: CPT | Mod: HCNC,CPTII,S$GLB, | Performed by: NURSE PRACTITIONER

## 2023-03-15 PROCEDURE — 3008F BODY MASS INDEX DOCD: CPT | Mod: HCNC,CPTII,S$GLB, | Performed by: NURSE PRACTITIONER

## 2023-03-15 PROCEDURE — 3072F PR LOW RISK FOR RETINOPATHY: ICD-10-PCS | Mod: HCNC,CPTII,S$GLB, | Performed by: NURSE PRACTITIONER

## 2023-03-15 PROCEDURE — 1101F PR PT FALLS ASSESS DOC 0-1 FALLS W/OUT INJ PAST YR: ICD-10-PCS | Mod: HCNC,CPTII,S$GLB, | Performed by: NURSE PRACTITIONER

## 2023-03-15 PROCEDURE — 3074F PR MOST RECENT SYSTOLIC BLOOD PRESSURE < 130 MM HG: ICD-10-PCS | Mod: HCNC,CPTII,S$GLB, | Performed by: NURSE PRACTITIONER

## 2023-03-15 PROCEDURE — 1170F PR FUNCTIONAL STATUS ASSESSED: ICD-10-PCS | Mod: HCNC,CPTII,S$GLB, | Performed by: NURSE PRACTITIONER

## 2023-03-15 PROCEDURE — 3008F PR BODY MASS INDEX (BMI) DOCUMENTED: ICD-10-PCS | Mod: HCNC,CPTII,S$GLB, | Performed by: NURSE PRACTITIONER

## 2023-03-15 PROCEDURE — 3079F PR MOST RECENT DIASTOLIC BLOOD PRESSURE 80-89 MM HG: ICD-10-PCS | Mod: HCNC,CPTII,S$GLB, | Performed by: NURSE PRACTITIONER

## 2023-03-15 PROCEDURE — G0439 PPPS, SUBSEQ VISIT: HCPCS | Mod: HCNC,S$GLB,, | Performed by: NURSE PRACTITIONER

## 2023-03-15 PROCEDURE — 99999 PR PBB SHADOW E&M-EST. PATIENT-LVL V: CPT | Mod: PBBFAC,HCNC,, | Performed by: NURSE PRACTITIONER

## 2023-03-15 PROCEDURE — 99999 PR PBB SHADOW E&M-EST. PATIENT-LVL V: ICD-10-PCS | Mod: PBBFAC,HCNC,, | Performed by: NURSE PRACTITIONER

## 2023-03-15 PROCEDURE — 3072F LOW RISK FOR RETINOPATHY: CPT | Mod: HCNC,CPTII,S$GLB, | Performed by: NURSE PRACTITIONER

## 2023-03-15 PROCEDURE — 3074F SYST BP LT 130 MM HG: CPT | Mod: HCNC,CPTII,S$GLB, | Performed by: NURSE PRACTITIONER

## 2023-03-15 PROCEDURE — 1125F AMNT PAIN NOTED PAIN PRSNT: CPT | Mod: HCNC,CPTII,S$GLB, | Performed by: NURSE PRACTITIONER

## 2023-03-15 PROCEDURE — 1160F PR REVIEW ALL MEDS BY PRESCRIBER/CLIN PHARMACIST DOCUMENTED: ICD-10-PCS | Mod: HCNC,CPTII,S$GLB, | Performed by: NURSE PRACTITIONER

## 2023-03-15 PROCEDURE — G0439 PR MEDICARE ANNUAL WELLNESS SUBSEQUENT VISIT: ICD-10-PCS | Mod: HCNC,S$GLB,, | Performed by: NURSE PRACTITIONER

## 2023-03-15 NOTE — TELEPHONE ENCOUNTER
----- Message from Ricardo An NP sent at 3/15/2023  8:55 AM CDT -----  Regarding: labs  Please schedule his labs one week prior to his next visit with Dr Elizabeth, send to his mychart  thanks

## 2023-03-15 NOTE — PATIENT INSTRUCTIONS
Counseling and Referral of Other Preventative  (Italic type indicates deductible and co-insurance are waived)    Patient Name: Friday Lou  Today's Date: 3/15/2023    Health Maintenance       Date Due Completion Date    Hepatitis C Screening Never done ---    Foot Exam 01/04/2022 1/4/2021 (Done)    Override on 1/4/2021: Done    COVID-19 Vaccine (5 - Booster for Pfizer series) 09/07/2022 7/13/2022    Diabetes Urine Screening 05/18/2023 5/18/2022    Hemoglobin A1c 05/18/2023 11/18/2022    Lipid Panel 11/18/2023 11/18/2022    Low Dose Statin 01/12/2024 1/12/2023    Eye Exam 01/12/2024 1/12/2023    Pneumococcal Vaccines (Age 65+) (3 - PPSV23 if available, else PCV20) 01/29/2024 1/4/2021    TETANUS VACCINE 11/17/2025 11/17/2015    Colorectal Cancer Screening 12/14/2027 12/14/2022        No orders of the defined types were placed in this encounter.      The following information is provided to all patients.  This information is to help you find resources for any of the problems found today that may be affecting your health:                Living healthy guide: www.[facility].[place].gov      Understanding Diabetes: www.diabetes.org      Eating healthy: www.cdc.gov/healthyweight      CDC home safety checklist: www.cdc.gov/steadi/patient.html      Agency on Aging: www.goea.[place].gov      Alcoholics anonymous (AA): www.aa.org      Physical Activity: www.kaiser.nih.gov/lb0eutv      Tobacco use: www.quitwithusla.org

## 2023-03-15 NOTE — PROGRESS NOTES
"  Friday Lou presented for a  Medicare AWV and comprehensive Health Risk Assessment today. The following components were reviewed and updated:    Medical history  Family History  Social history  Allergies and Current Medications  Health Risk Assessment  Health Maintenance  Care Team         ** See Completed Assessments for Annual Wellness Visit within the encounter summary.**         The following assessments were completed:  Living Situation  CAGE  Depression Screening  Timed Get Up and Go  Whisper Test  Cognitive Function Screening    Nutrition Screening  ADL Screening  PAQ Screening        Vitals:    03/15/23 0702   BP: 125/85   Pulse: 61   Resp: 18   SpO2: 98%   Weight: 111.9 kg (246 lb 11.1 oz)   Height: 5' 7" (1.702 m)     Body mass index is 38.64 kg/m².  Physical Exam  Constitutional:       General: He is not in acute distress.     Appearance: Normal appearance. He is well-developed. He is not ill-appearing, toxic-appearing or diaphoretic.   HENT:      Head: Normocephalic and atraumatic.      Right Ear: External ear normal.      Left Ear: External ear normal.   Eyes:      Extraocular Movements: Extraocular movements intact.      Conjunctiva/sclera: Conjunctivae normal.   Neck:      Vascular: No carotid bruit.   Cardiovascular:      Rate and Rhythm: Normal rate and regular rhythm.      Pulses: Normal pulses.      Heart sounds: Normal heart sounds. No murmur heard.    No friction rub. No gallop.   Pulmonary:      Effort: Pulmonary effort is normal. No respiratory distress.      Breath sounds: Normal breath sounds. No stridor. No wheezing, rhonchi or rales.   Chest:      Chest wall: No tenderness.   Abdominal:      General: Bowel sounds are normal. There is no distension.      Palpations: Abdomen is soft. There is no mass.      Tenderness: There is no abdominal tenderness.      Hernia: No hernia is present.   Musculoskeletal:         General: No tenderness. Normal range of motion.      Cervical back: Normal " range of motion and neck supple. No tenderness.   Lymphadenopathy:      Cervical: No cervical adenopathy.   Skin:     General: Skin is warm and dry.   Neurological:      Mental Status: He is alert and oriented to person, place, and time.      Cranial Nerves: No cranial nerve deficit.   Psychiatric:         Mood and Affect: Mood normal.         Behavior: Behavior normal.             Diagnoses and health risks identified today and associated recommendations/orders:    1. Encounter for preventive health examination  Screenings performed, as noted above.  Personal preventative testing needs reviewed.      2. Encounter for Medicare annual wellness exam  Screenings performed, as noted above.  Personal preventative testing needs reviewed.  - Ambulatory Referral/Consult to Enhanced Annual Wellness Visit (eAWV)    3. Other specified degenerative diseases of nervous system  Monitored, stable, moderated back pain, still stays very active, follow up with pcp    4. Morbid (severe) obesity due to excess calories  Monitored, stable, cont to exercise to rohini, heart healthy diet    5. Hyperlipidemia associated with type 2 diabetes mellitus  Treated with atorvastatin, stable, cont tx    6. Hypertension associated with diabetes  Treated with avapro, stable, con tx    7. Type 2 diabetes mellitus without complication, without long-term current use of insulin  Treated with Metformin, stable, cont tx    8. Vitamin D deficiency  Treated with vit D, stable, cont tx    9. Benign prostatic hyperplasia without lower urinary tract symptoms  Treated with proscar and flomax, stable, cont tx    Review for Substance Use Disorders: patient does not use substances per chart   Review for opioid screening: Patient is not prescribed opioids       Provided Friday with a 5-10 year written screening schedule and personal prevention plan. Recommendations were developed using the USPSTF age appropriate recommendations. Education, counseling, and referrals  were provided as needed. After Visit Summary printed and given to patient which includes a list of additional screenings\tests needed.    No follow-ups on file.    Ricardo An, NP    I offered to discuss advanced care planning, including how to pick a person who would make decisions for you if you were unable to make them for yourself, called a health care power of , and what kind of decisions you might make such as use of life sustaining treatments such as ventilators and tube feeding when faced with a life limiting illness recorded on a living will that they will need to know. (How you want to be cared for as you near the end of your natural life)     X  Patient is unwilling to engage in a discussion regarding advance directives at this time.

## 2023-05-21 NOTE — PROGRESS NOTES
Subjective:      Patient ID: Friday ROGELIO Blake is a 66 y.o. male.    Chief Complaint: Lumbar Spine Pain (L-Spine) (The pt presents today to discuss his MRI results and possible sx options. Pt stated he has been experiencing back pain for the last 4 months and does not remember anything in particular that started his pain. The pt rates his pain 6/10 and stated his pain is worsened with prolonged standing, the pt stated his pain is elevated with pain meds, sitting and resting. 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   HENT: Negative for hearing loss, sore throat and tinnitus.    Eyes: Negative for pain, discharge and itching.   Cardiovascular: Negative for chest pain.   Gastrointestinal: Negative for abdominal pain.   Endocrine: Negative for cold intolerance and heat intolerance.   Genitourinary: Negative for difficulty urinating and dysuria.   Musculoskeletal: Positive for back pain and gait problem.   Allergic/Immunologic: Negative for environmental allergies.   Neurological: Positive for weakness. Negative for dizziness, tremors, light-headedness and headaches.   Hematological: Negative for adenopathy.   Psychiatric/Behavioral: Negative for agitation, behavioral problems and confusion.         Objective:       Neurosurgery Physical Exam  Ortho/SPM Exam  Ortho Exam    Nursing note and vitals reviewed  Gen:Oriented to person, place, and time.             Appears stated age   Skin: no  rashes or lesions identified   Head:Normocephalic and atraumatic.  Nose: Nose normal.    Eyes: EOM are normal. Pupils are equal, round, and reactive to light.   Neck: Neck supple. No masses or lesions palpated  Cardiovascular: Intact distal pulses.    Abdominal: Soft.   Neurological: Alert and oriented to person, place, and time.  No cranial nerve deficit.  Coordination normal. Normal muscle tone  Psychiatric: Normal mood and affect. Behavior is normal.      Back:       None  Paraspinal muscle spasms   None  Pain with flexion and extention    Limited secondary to pain  Range of motion     Positive on the L eft at  15 degrees  Straight leg raise     Motor   Right Right Left Left  Level Group   5  5  L2 Hip flexor (Psoas)   5  5  L3 Leg extension (Quads)   5  5  L4 Dorsiflexion & foot inversion (Tibialis Anterior)   5  5  L5 Great toe extension ( EHL)   5  5  S1 Foot eversion (Gastroc, PL & PB)     Sensation  NL Decreased (R/L/BL) Level Sensation    X  L2 Anterio-medial thigh   X  L3 Medial thigh around knee   X  L4 Medial foot   X  L5 Dorsum foot   X  S1 Lateral foot     Reflex  2+  Patellar tendon (L4)   2+  Achilles tendon (S1)             Results for orders placed during the hospital encounter of 01/18/22    MRI Lumbar Spine Without Contrast    Narrative  EXAMINATION:  MRI LUMBAR SPINE WITHOUT CONTRAST    FINDINGS:  Straightening of the lumbar lordosis.  Vertebral body height is normal.  Marrow signal is within normal limits. The conus medullaris terminates at the level of L1-L2.  No abnormal signal within the conus. Mild congenital spinal stenosis.  Interspaces are as follows:    T12-L1 disc: Mild disc bulge.  Posterior annular fissure.  Slight buckling of ligamentum flavum.  The thecal sac measures 12 mm.  No significant foraminal stenosis.    L1-L2 disc : Left paracentral disc protrusion that causes mild-to-moderate left lateral recess stenosis.  This is best demonstrated on axial T2 series 6, image 8.  Mild  degenerative facet hypertrophy bilaterally.  The thecal sac measures 7 mm.  No significant foraminal stenosis.    L2-L3 disc: Focal left lateral recess/paracentral disc protrusion with annular fissure that likely compresses the descending left L3 spinal nerve roots.  This is best demonstrated on axial T2 series 6, image 12.  Mild degenerative facet hypertrophy with buckling of ligamentum flavum.  The thecal sac measures 6 mm AP.  No significant foraminal stenosis.    L3-L4 disc: Broad-based posterior disc bulge with a posterior annular fissure.  Mild degenerative facet hypertrophy and buckling of ligamentum flavum.  The thecal sac measures 8 mm AP.  Mild bilateral foraminal stenosis.    L4-L5 disc: Circumferential disc bulge.  Anterior osteophytes.  Severe degenerative facet hypertrophy.  Right-sided facet joint effusion.  Posteriorly and inferiorly projecting synovial cyst from the right facet joint measuring 10 mm.  The thecal sac measures 12 mm AP.  Disc encroaches into the left exit foramina and causes mild/moderate left foraminal stenosis.  Mild right foraminal stenosis.    L5-S1 disc: Posterior disc bulge.  Severe degenerative facet hypertrophy.  The thecal sac measures 12 mm.  No significant foraminal stenosis.    Impression  1. 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  2. Left-sided disc protrusion with annular fissure at L2-L3 causes moderate spinal stenosis and severe left lateral recess stenosis.  At likely compresses the descending left L3 spinal nerve roots and may result in left L3 radiculopathy.  3. Left paracentral disc protrusion L1-L2 causes mild to moderate left lateral recess stenosis and may compress the descending left L2 spinal nerve roots.  4. Mild overall spinal stenosis at L1-L2 and L3-L4.  5. Mild/moderate left and mild right foraminal stenosis at L4-L5.  6. Severe facet arthropathy at L4-L5 and L5-S1.  Right facet joint effusion at L4-L5 with a posteriorly  projecting synovial cyst.      Electronically signed by: Ramon Mead Jr., MD  Date:    01/18/2022  Time:    16:06     Results for orders placed during the hospital encounter of 04/21/22    X-Ray Lumbar Complete Including Flex And Ext    Narrative  EXAMINATION:  XR LUMBAR SPINE 5 VIEW WITH FLEX AND EXT    CLINICAL HISTORY:  Radiculopathy, lumbar region    TECHNIQUE:  Five views of the lumbar spine plus flexion extension views were performed.    COMPARISON:  None.    FINDINGS:  Alignment of the lumbar spine is normal within the neutral and extension positions.  There is minimal grade 1 anterolisthesis of L4 on L5 which develops with flexion.  No fracture or pars defect identified.  There is mild L4-L5 disc height loss and endplate spurring.  Mild inferior lumbar spine facet arthropathy.  Sacroiliac joints appear normal.  No osseous erosion or suspicious osseous lesion.  Surgical mesh material overlies the lower abdomen.    Impression  As above.      Electronically signed by: Jonathan Marshall  Date:    04/21/2022  Time:    11:51    No results found for this or any previous visit.         I  reviewed all pertinent imaging regarding this case.  Assessment:     1. Degenerative disc disease, lumbar    2. Lumbar spondylosis    3. Lumbar stenosis with neurogenic claudication    4. Lumbar radiculopathy      Plan:     Degenerative disc disease, lumbar    Lumbar spondylosis  -     Ambulatory referral/consult to Neurosurgery    Lumbar stenosis with neurogenic claudication    Lumbar radiculopathy    patient with DDD and multiple levels of foraminal stenosis on the L which could be contributing to his symptoms   May likely benefit from simple decompression however his pain seems to be coming from multiple levels   He has symptoms through L5 where he has concurrent anterolisthesis     Will get CT and standing XR to look at alignment and bone anatomy     Thank you for the referral   Please call with any questions    Shaggy Zepeda  MD  Neurosurgery     Disclaimer: This note was prepared using a voice recognition system and is likely to have sound alike errors within the text.     Acute UTI Delirium

## 2023-05-24 ENCOUNTER — OFFICE VISIT (OUTPATIENT)
Dept: INTERNAL MEDICINE | Facility: CLINIC | Age: 68
End: 2023-05-24
Payer: MEDICARE

## 2023-05-24 VITALS
DIASTOLIC BLOOD PRESSURE: 66 MMHG | HEART RATE: 66 BPM | WEIGHT: 252.63 LBS | HEIGHT: 67 IN | TEMPERATURE: 98 F | SYSTOLIC BLOOD PRESSURE: 128 MMHG | OXYGEN SATURATION: 97 % | BODY MASS INDEX: 39.65 KG/M2

## 2023-05-24 DIAGNOSIS — Z86.010 HISTORY OF COLON POLYPS: ICD-10-CM

## 2023-05-24 DIAGNOSIS — I15.2 HYPERTENSION ASSOCIATED WITH DIABETES: Chronic | ICD-10-CM

## 2023-05-24 DIAGNOSIS — E11.69 HYPERLIPIDEMIA ASSOCIATED WITH TYPE 2 DIABETES MELLITUS: ICD-10-CM

## 2023-05-24 DIAGNOSIS — E66.01 MORBID OBESITY WITH BMI OF 40.0-44.9, ADULT: ICD-10-CM

## 2023-05-24 DIAGNOSIS — E11.59 HYPERTENSION ASSOCIATED WITH DIABETES: Chronic | ICD-10-CM

## 2023-05-24 DIAGNOSIS — Z00.00 ROUTINE GENERAL MEDICAL EXAMINATION AT A HEALTH CARE FACILITY: Primary | ICD-10-CM

## 2023-05-24 DIAGNOSIS — E11.9 TYPE 2 DIABETES MELLITUS WITHOUT COMPLICATION, WITHOUT LONG-TERM CURRENT USE OF INSULIN: ICD-10-CM

## 2023-05-24 DIAGNOSIS — E78.5 HYPERLIPIDEMIA ASSOCIATED WITH TYPE 2 DIABETES MELLITUS: ICD-10-CM

## 2023-05-24 DIAGNOSIS — E55.9 VITAMIN D DEFICIENCY: ICD-10-CM

## 2023-05-24 DIAGNOSIS — D64.9 ANEMIA, UNSPECIFIED TYPE: ICD-10-CM

## 2023-05-24 PROCEDURE — 99999 PR PBB SHADOW E&M-EST. PATIENT-LVL IV: ICD-10-PCS | Mod: PBBFAC,,, | Performed by: INTERNAL MEDICINE

## 2023-05-24 PROCEDURE — 3072F PR LOW RISK FOR RETINOPATHY: ICD-10-PCS | Mod: CPTII,S$GLB,, | Performed by: INTERNAL MEDICINE

## 2023-05-24 PROCEDURE — 1101F PT FALLS ASSESS-DOCD LE1/YR: CPT | Mod: CPTII,S$GLB,, | Performed by: INTERNAL MEDICINE

## 2023-05-24 PROCEDURE — 1160F RVW MEDS BY RX/DR IN RCRD: CPT | Mod: CPTII,S$GLB,, | Performed by: INTERNAL MEDICINE

## 2023-05-24 PROCEDURE — 1159F PR MEDICATION LIST DOCUMENTED IN MEDICAL RECORD: ICD-10-PCS | Mod: CPTII,S$GLB,, | Performed by: INTERNAL MEDICINE

## 2023-05-24 PROCEDURE — 1101F PR PT FALLS ASSESS DOC 0-1 FALLS W/OUT INJ PAST YR: ICD-10-PCS | Mod: CPTII,S$GLB,, | Performed by: INTERNAL MEDICINE

## 2023-05-24 PROCEDURE — 3008F BODY MASS INDEX DOCD: CPT | Mod: CPTII,S$GLB,, | Performed by: INTERNAL MEDICINE

## 2023-05-24 PROCEDURE — 99397 PER PM REEVAL EST PAT 65+ YR: CPT | Mod: S$GLB,,, | Performed by: INTERNAL MEDICINE

## 2023-05-24 PROCEDURE — 99999 PR PBB SHADOW E&M-EST. PATIENT-LVL IV: CPT | Mod: PBBFAC,,, | Performed by: INTERNAL MEDICINE

## 2023-05-24 PROCEDURE — 3078F DIAST BP <80 MM HG: CPT | Mod: CPTII,S$GLB,, | Performed by: INTERNAL MEDICINE

## 2023-05-24 PROCEDURE — 3288F PR FALLS RISK ASSESSMENT DOCUMENTED: ICD-10-PCS | Mod: CPTII,S$GLB,, | Performed by: INTERNAL MEDICINE

## 2023-05-24 PROCEDURE — 3078F PR MOST RECENT DIASTOLIC BLOOD PRESSURE < 80 MM HG: ICD-10-PCS | Mod: CPTII,S$GLB,, | Performed by: INTERNAL MEDICINE

## 2023-05-24 PROCEDURE — 99397 PR PREVENTIVE VISIT,EST,65 & OVER: ICD-10-PCS | Mod: S$GLB,,, | Performed by: INTERNAL MEDICINE

## 2023-05-24 PROCEDURE — 1126F PR PAIN SEVERITY QUANTIFIED, NO PAIN PRESENT: ICD-10-PCS | Mod: CPTII,S$GLB,, | Performed by: INTERNAL MEDICINE

## 2023-05-24 PROCEDURE — 1160F PR REVIEW ALL MEDS BY PRESCRIBER/CLIN PHARMACIST DOCUMENTED: ICD-10-PCS | Mod: CPTII,S$GLB,, | Performed by: INTERNAL MEDICINE

## 2023-05-24 PROCEDURE — 3072F LOW RISK FOR RETINOPATHY: CPT | Mod: CPTII,S$GLB,, | Performed by: INTERNAL MEDICINE

## 2023-05-24 PROCEDURE — 1159F MED LIST DOCD IN RCRD: CPT | Mod: CPTII,S$GLB,, | Performed by: INTERNAL MEDICINE

## 2023-05-24 PROCEDURE — 3008F PR BODY MASS INDEX (BMI) DOCUMENTED: ICD-10-PCS | Mod: CPTII,S$GLB,, | Performed by: INTERNAL MEDICINE

## 2023-05-24 PROCEDURE — 1126F AMNT PAIN NOTED NONE PRSNT: CPT | Mod: CPTII,S$GLB,, | Performed by: INTERNAL MEDICINE

## 2023-05-24 PROCEDURE — 3074F SYST BP LT 130 MM HG: CPT | Mod: CPTII,S$GLB,, | Performed by: INTERNAL MEDICINE

## 2023-05-24 PROCEDURE — 3288F FALL RISK ASSESSMENT DOCD: CPT | Mod: CPTII,S$GLB,, | Performed by: INTERNAL MEDICINE

## 2023-05-24 PROCEDURE — 3074F PR MOST RECENT SYSTOLIC BLOOD PRESSURE < 130 MM HG: ICD-10-PCS | Mod: CPTII,S$GLB,, | Performed by: INTERNAL MEDICINE

## 2023-05-24 NOTE — PROGRESS NOTES
HPI:  Patient is a 67-year-old man who comes in today for follow-up of his diabetes, hypertension, lipids, and for his annual physical exam.  Patient unfortunately did not get his lab work done prior to this appointment.  He states his blood pressures been doing well.  He is had no problems with his blood sugars at home.  He denies any hypoglycemic problems.  His back has been doing well.    Current MEDS: medcard review, verified and update  Allergies: Per the electronic medical record    Past Medical History:   Diagnosis Date    Bilateral carpal tunnel syndrome 2/25/2022    moderate demyelinating bilaterally    Carpal tunnel syndrome     Diabetes mellitus without complication     Fever 6/30/2022    Gram-positive bacteremia 7/2/2022    History of colon polyps     Hypercalcemia 3/23/2021    Hyperlipidemia associated with type 2 diabetes mellitus     Hypertension associated with diabetes     Left carpal tunnel syndrome 3/23/2021    Low back pain 9/28/2021    Lumbar disc disease with radiculopathy     Morbid obesity with BMI of 40.0-44.9, adult     Postoperative hematoma involving nervous system following nervous system procedure 6/28/2022    S/P parathyroidectomy 7/29/2021    Vitamin D deficiency        Past Surgical History:   Procedure Laterality Date    CARPAL TUNNEL RELEASE Left 4/1/2021    Procedure: RELEASE, CARPAL TUNNEL;  Surgeon: Yoandy David MD;  Location: [facility] OR;  Service: Orthopedics;  Laterality: Left;    COLONOSCOPY N/A 12/14/2022    Procedure: COLONOSCOPY;  Surgeon: Belia Gómez DO;  Location: [facility] ENDO;  Service: Endoscopy;  Laterality: N/A;    HERNIA REPAIR      INCISION AND DRAINAGE OF HEMATOMA N/A 6/28/2022    Procedure: INCISION AND DRAINAGE, HEMATOMA;  Surgeon: Shaggy Zepeda MD;  Location: [facility] OR;  Service: Neurosurgery;  Laterality: N/A;    LUMBAR LAMINECTOMY WITH DISCECTOMY Left 6/22/2022    Procedure: LAMINECTOMY, SPINE, LUMBAR, WITH DISCECTOMY;  Surgeon: Shaggy WOODSON  MD [place];  Location: [facility] OR;  Service: Neurosurgery;  Laterality: Left;  minimally invasive L2-3 discectomy and decompression     LUMBAR LAMINECTOMY WITH DISCECTOMY N/A 6/28/2022    Procedure: LAMINECTOMY, SPINE, LUMBAR, WITH DISCECTOMY;  Surgeon: Shaggy Zepeda MD;  Location: [facility] OR;  Service: Neurosurgery;  Laterality: N/A;    PARATHYROIDECTOMY Bilateral 7/27/2021    Procedure: PARATHYROIDECTOMY;  Surgeon: Tha Dial MD;  Location: [facility] OR;  Service: ENT;  Laterality: Bilateral;  with medialstinal exploration    REPAIR OF CEREBROSPINAL FLUID LEAK USING COMPUTER ASSISTED NAVIGATION Bilateral 8/3/2022    Procedure: REPAIR, CSF LEAK, USING COMPUTER-ASSISTED NAVIGATION;  Surgeon: Shaggy Zepeda MD;  Location: [facility] OR;  Service: Neurosurgery;  Laterality: Bilateral;    SELECTIVE INJECTION OF ANESTHETIC AGENT AROUND LUMBAR SPINAL NERVE ROOT BY TRANSFORAMINAL APPROACH Left 3/23/2022    Procedure: BLOCK, SPINAL NERVE ROOT, LUMBAR, SELECTIVE, TRANSFORAMINAL APPROACH Left L2-3, L3-4 RN IV sedation;  Surgeon: Jay Hodges MD;  Location: [facility] PAIN MGT;  Service: Pain Management;  Laterality: Left;    STERNOTOMY N/A 7/27/2021    Procedure: STERNOTOMY;  Surgeon: Tha Dial MD;  Location: [facility] OR;  Service: ENT;  Laterality: N/A;    ULNAR NERVE TRANSPOSITION Left 4/1/2021    Procedure: TRANSPOSITION, NERVE, ULNAR;  Surgeon: Yoandy David MD;  Location: [facility] OR;  Service: Orthopedics;  Laterality: Left;    ULNAR TUNNEL RELEASE Left 4/1/2021    Procedure: RELEASE, CUBITAL TUNNEL;  Surgeon: Yoandy David MD;  Location: [facility] OR;  Service: Orthopedics;  Laterality: Left;    UMBILICAL HERNIA REPAIR      WOUND EXPLORATION Bilateral 8/3/2022    Procedure: EXPLORATION, WOUND;  Surgeon: Shaggy Zepeda MD;  Location: [facility] OR;  Service: Neurosurgery;  Laterality: Bilateral;       SHx: per the electronic medical record    FHx: recorded in the electronic medical record    ROS:    denies any chest pains or  "shortness of breath. Denies any nausea, vomiting or diarrhea. Denies any fever, chills or sweats. Denies any change in weight, voice, stool, skin or hair. Denies any dysuria, dyspepsia or dysphagia. Denies any change in vision, hearing or headaches. Denies any swollen lymph nodes or loss of memory.    PE:  /66 (BP Location: Left arm, Patient Position: Sitting, BP Method: Large (Manual))   Pulse 66   Temp 97.7 °F (36.5 °C) (Tympanic)   Ht 5' 7" (1.702 m)   Wt 114.6 kg (252 lb 10.4 oz)   SpO2 97%   BMI 39.57 kg/m²   Gen: Well-developed, well-nourished, male, in no acute distress, oriented x3  HEENT: neck is supple, no adenopathy, carotids 2+ equal without bruits, thyroid exam normal size without nodules.  CHEST: clear to auscultation and percussion  CVS: regular rate and rhythm without significant murmur, gallop, or rubs  ABD: soft, benign, no rebound no guarding, no distention.  Bowel sounds are normal.     nontender.  No palpable masses.  No organomegaly and no audible bruits.  RECTAL:  Deferred  EXT: no clubbing, cyanosis, or edema  LYMPH: no cervical, inguinal, or axillary adenopathy  FEET: no loss of sensation.  No ulcers or pressure sores.  Monofilament testing normal  NEURO: gait normal.  Cranial nerves II- XII intact. No nystagmus.  Speech normal.   Gross motor and sensory unremarkable.    Lab Results   Component Value Date    WBC 4.83 09/26/2022    HGB 10.6 (L) 09/26/2022    HCT 35.1 (L) 09/26/2022     09/26/2022    CHOL 122 11/18/2022    TRIG 97 11/18/2022    HDL 36 (L) 11/18/2022    ALT 32 09/26/2022    AST 37 09/26/2022     11/18/2022    K 4.7 11/18/2022     11/18/2022    CREATININE 0.9 11/18/2022    BUN 18 11/18/2022    CO2 26 11/18/2022    TSH 1.819 11/18/2022    PSA 0.93 05/18/2022    INR 1.0 07/30/2022    HGBA1C 7.3 (H) 11/18/2022       Impression:  Diabetes and lipids, needs to get his lab work done.  Hypertension, well controlled  Other medical problems below, " stable  Patient Active Problem List   Diagnosis    Type 2 diabetes mellitus without complication, without long-term current use of insulin    Hypertension associated with diabetes    Hyperlipidemia associated with type 2 diabetes mellitus    History of colon polyps    Vitamin D deficiency    Anemia    Decreased strength of trunk and back    Lumbar radiculopathy    Degenerative disc disease, lumbar    Lumbar discitis with Osteo L2-3    Benign prostatic hyperplasia without lower urinary tract symptoms       Plan:   Orders Placed This Encounter    Hemoglobin A1C    Comprehensive Metabolic Panel    Lipid Panel    TSH    CBC Auto Differential    Ambulatory referral/consult to Optometry     Patient was referred to see Optometry.  Patient will see me again in 6 months with above lab work.  He will get his lab work done that was missed this week.  This note is generated with speech recognition software and is subject to transcription error and sound alike phrases that may be missed by proofreading.

## 2023-05-25 ENCOUNTER — LAB VISIT (OUTPATIENT)
Dept: LAB | Facility: HOSPITAL | Age: 68
End: 2023-05-25
Attending: INTERNAL MEDICINE
Payer: MEDICARE

## 2023-05-25 DIAGNOSIS — E11.9 TYPE 2 DIABETES MELLITUS WITHOUT COMPLICATION, WITHOUT LONG-TERM CURRENT USE OF INSULIN: ICD-10-CM

## 2023-05-25 LAB
ALBUMIN/CREAT UR: 18.8 UG/MG (ref 0–30)
CREAT UR-MCNC: 96 MG/DL (ref 23–375)
MICROALBUMIN UR DL<=1MG/L-MCNC: 18 UG/ML

## 2023-05-25 PROCEDURE — 82570 ASSAY OF URINE CREATININE: CPT | Performed by: INTERNAL MEDICINE

## 2023-05-26 ENCOUNTER — PATIENT MESSAGE (OUTPATIENT)
Dept: INTERNAL MEDICINE | Facility: CLINIC | Age: 68
End: 2023-05-26
Payer: MEDICARE

## 2023-06-06 ENCOUNTER — TELEPHONE (OUTPATIENT)
Dept: INTERNAL MEDICINE | Facility: CLINIC | Age: 68
End: 2023-06-06
Payer: MEDICARE

## 2023-06-06 ENCOUNTER — OFFICE VISIT (OUTPATIENT)
Dept: URGENT CARE | Facility: CLINIC | Age: 68
End: 2023-06-06
Payer: MEDICARE

## 2023-06-06 VITALS
HEIGHT: 67 IN | TEMPERATURE: 97 F | HEART RATE: 59 BPM | DIASTOLIC BLOOD PRESSURE: 80 MMHG | SYSTOLIC BLOOD PRESSURE: 152 MMHG | BODY MASS INDEX: 39.55 KG/M2 | WEIGHT: 252 LBS | RESPIRATION RATE: 17 BRPM | OXYGEN SATURATION: 97 %

## 2023-06-06 DIAGNOSIS — R42 DIZZINESS: ICD-10-CM

## 2023-06-06 DIAGNOSIS — I10 HYPERTENSION, UNSPECIFIED TYPE: Primary | ICD-10-CM

## 2023-06-06 PROCEDURE — 93005 EKG 12-LEAD: ICD-10-PCS | Mod: S$GLB,,, | Performed by: NURSE PRACTITIONER

## 2023-06-06 PROCEDURE — 93010 EKG 12-LEAD: ICD-10-PCS | Mod: S$GLB,,, | Performed by: INTERNAL MEDICINE

## 2023-06-06 PROCEDURE — 93005 ELECTROCARDIOGRAM TRACING: CPT | Mod: S$GLB,,, | Performed by: NURSE PRACTITIONER

## 2023-06-06 PROCEDURE — 99214 OFFICE O/P EST MOD 30 MIN: CPT | Mod: S$GLB,,, | Performed by: NURSE PRACTITIONER

## 2023-06-06 PROCEDURE — 99214 PR OFFICE/OUTPT VISIT, EST, LEVL IV, 30-39 MIN: ICD-10-PCS | Mod: S$GLB,,, | Performed by: NURSE PRACTITIONER

## 2023-06-06 PROCEDURE — 93010 ELECTROCARDIOGRAM REPORT: CPT | Mod: S$GLB,,, | Performed by: INTERNAL MEDICINE

## 2023-06-06 RX ORDER — CLONIDINE HYDROCHLORIDE 0.1 MG/1
0.1 TABLET ORAL
Status: COMPLETED | OUTPATIENT
Start: 2023-06-06 | End: 2023-06-06

## 2023-06-06 RX ORDER — IRBESARTAN 300 MG/1
300 TABLET ORAL NIGHTLY
Qty: 90 TABLET | Refills: 3 | Status: SHIPPED | OUTPATIENT
Start: 2023-06-06 | End: 2024-06-05

## 2023-06-06 RX ORDER — AMLODIPINE BESYLATE 5 MG/1
5 TABLET ORAL DAILY
Qty: 30 TABLET | Refills: 0 | Status: SHIPPED | OUTPATIENT
Start: 2023-06-06 | End: 2023-06-08 | Stop reason: SDUPTHER

## 2023-06-06 RX ORDER — METFORMIN HYDROCHLORIDE 500 MG/1
TABLET ORAL
COMMUNITY
Start: 2023-03-06 | End: 2023-10-16

## 2023-06-06 RX ADMIN — CLONIDINE HYDROCHLORIDE 0.1 MG: 0.1 TABLET ORAL at 10:06

## 2023-06-06 NOTE — TELEPHONE ENCOUNTER
No care due was identified.  [facility] Embedded Care Due Messages. Reference number: [id number].   6/06/2023 6:13:41 PM CDT

## 2023-06-06 NOTE — TELEPHONE ENCOUNTER
No care due was identified.  [facility] Embedded Care Due Messages. Reference number: [id number].   6/06/2023 6:13:07 PM CDT

## 2023-06-06 NOTE — TELEPHONE ENCOUNTER
----- Message from Mariana Grullon MA sent at 6/6/2023  8:26 AM CDT -----  Contact: self [phone]  Please advise  ----- Message -----  From: Trish Stoll  Sent: 6/6/2023   8:17 AM CDT  To: Elizabeth Sid A Staff    Patient called in this morning stating his blood pressure has been running high for the past 3 days and that is with taking his medication. He states that he feels dizzy and the reading this morning is 189/103 . Please call back to tell him what he needs to do. [phone]. Thanks tpeulalia

## 2023-06-06 NOTE — TELEPHONE ENCOUNTER
Tell pt to double his avapro to take 300 mg per day. I sent in a new prescription to his pharmacy. Have him come see me in four weeks

## 2023-06-06 NOTE — PROGRESS NOTES
"Subjective:      Patient ID: Friday ROGELIO Blake is a 67 y.o. male.    Vitals:  height is 5' 7" (1.702 m) and weight is 114.3 kg (252 lb). His temperature is 97.1 °F (36.2 °C). His blood pressure is 152/80 (abnormal) and his pulse is 59 (abnormal). His respiration is 17 and oxygen saturation is 97%.     Chief Complaint: Hypertension    PT states for the past 3 days his BP has been high. He states that he had run out of his been BP meds for several weeks and over the weekend started to feel dizzy.  He took his blood pressure and systolic reading was over 200. He did get his prescription filled and has taken the medication the last two days. This AM he had a spell of dizziness and is here to be evaluated.  He did have some nausea associated with the dizziness this AM. Pertinent negatives are chest pain, SOB, headache, syncope.     Hypertension  This is a new problem. The current episode started in the past 7 days. The problem is unchanged. The problem is uncontrolled. Pertinent negatives include no anxiety, blurred vision, chest pain, headaches, malaise/fatigue, neck pain, orthopnea, palpitations, peripheral edema, PND, shortness of breath or sweats. There are no associated agents to hypertension. Risk factors for coronary artery disease include diabetes mellitus. Past treatments include nothing. The current treatment provides no improvement. There is no history of angina, kidney disease, CAD/MI, CVA, heart failure, left ventricular hypertrophy, PVD or retinopathy. There is no history of chronic renal disease, coarctation of the aorta, hyperaldosteronism, hypercortisolism, hyperparathyroidism, a hypertension causing med, pheochromocytoma, renovascular disease, sleep apnea or a thyroid problem.     Constitution: Negative for fever.   HENT:  Negative for sinus pain and sinus pressure.    Neck: Negative for neck pain.   Cardiovascular:  Negative for chest pain and palpitations.   Eyes:  Negative for blurred vision. "   Respiratory:  Negative for cough and shortness of breath.    Gastrointestinal:  Positive for nausea. Negative for vomiting.   Skin:  Negative for rash.   Neurological:  Positive for dizziness. Negative for headaches.    Objective:     Physical Exam   Constitutional: He is oriented to person, place, and time. He appears well-developed. He is cooperative.  Non-toxic appearance. He does not appear ill. No distress.   HENT:   Head: Normocephalic and atraumatic.   Ears:   Right Ear: Hearing, tympanic membrane, external ear and ear canal normal.   Left Ear: Hearing, tympanic membrane, external ear and ear canal normal.   Nose: Nose normal. No mucosal edema, rhinorrhea or nasal deformity. No epistaxis. Right sinus exhibits no maxillary sinus tenderness and no frontal sinus tenderness. Left sinus exhibits no maxillary sinus tenderness and no frontal sinus tenderness.   Mouth/Throat: Uvula is midline, oropharynx is clear and moist and mucous membranes are normal. No trismus in the jaw. Normal dentition. No uvula swelling. No oropharyngeal exudate, posterior oropharyngeal edema or posterior oropharyngeal erythema.   Eyes: Conjunctivae and lids are normal. No scleral icterus.   Neck: Trachea normal and phonation normal. Neck supple. No edema present. No erythema present. No neck rigidity present.   Cardiovascular: Normal rate, regular rhythm, normal heart sounds and normal pulses.   Pulmonary/Chest: Effort normal and breath sounds normal. No respiratory distress. He has no decreased breath sounds. He has no rhonchi.   Abdominal: Normal appearance.   Musculoskeletal: Normal range of motion.         General: No deformity. Normal range of motion.   Neurological: He is alert and oriented to person, place, and time. He exhibits normal muscle tone. Coordination normal.   Skin: Skin is warm, dry, intact, not diaphoretic and not pale.   Psychiatric: His speech is normal and behavior is normal. Judgment and thought content normal.    Nursing note and vitals reviewed.    Assessment:     1. Hypertension, unspecified type    2. Dizziness        Plan:       Hypertension, unspecified type  -     cloNIDine tablet 0.1 mg  -     EKG 12-lead; Future    Dizziness  -     cloNIDine tablet 0.1 mg  -     EKG 12-lead; Future    Other orders  -     amLODIPine (NORVASC) 5 MG tablet; Take 1 tablet (5 mg total) by mouth once daily.  Dispense: 30 tablet; Refill: 0          Medical Decision Making:   Initial Assessment:   Dizziness    Differential Diagnosis:   Hypertension  ACS  Clinical Tests:   Medical Tests: Ordered and Reviewed  Urgent Care Management:  ECG no STEMI. NSR with no acute abnormalities.  Dizziness improved after clonidine.   Gave amlodipine to use when BP is elevated until he can be seen by primary care provider.        If symptoms worsen or fail to improve, follow-up with primary care doctor or nearest ER. After visit summary given and discussed. Patient verbalized understanding and agrees with treatment plan. Patient remained stable and was discharged in no acute distress.

## 2023-06-07 RX ORDER — LANCETS
EACH MISCELLANEOUS
Qty: 100 EACH | Refills: 3 | Status: SHIPPED | OUTPATIENT
Start: 2023-06-07

## 2023-06-08 ENCOUNTER — HOSPITAL ENCOUNTER (EMERGENCY)
Facility: HOSPITAL | Age: 68
Discharge: HOME | End: 2023-06-08
Attending: EMERGENCY MEDICINE
Payer: MEDICARE

## 2023-06-08 VITALS
OXYGEN SATURATION: 98 % | TEMPERATURE: 98 F | DIASTOLIC BLOOD PRESSURE: 88 MMHG | WEIGHT: 244.94 LBS | BODY MASS INDEX: 38.36 KG/M2 | SYSTOLIC BLOOD PRESSURE: 144 MMHG | HEART RATE: 57 BPM | RESPIRATION RATE: 18 BRPM

## 2023-06-08 DIAGNOSIS — R42 DIZZINESS: ICD-10-CM

## 2023-06-08 DIAGNOSIS — I10 HYPERTENSION, UNSPECIFIED TYPE: Primary | ICD-10-CM

## 2023-06-08 LAB
ALBUMIN SERPL BCP-MCNC: 4.3 G/DL (ref 3.5–5.2)
ALP SERPL-CCNC: 64 U/L (ref 55–135)
ALT SERPL W/O P-5'-P-CCNC: 30 U/L (ref 10–44)
ANION GAP SERPL CALC-SCNC: 11 MMOL/L (ref 8–16)
AST SERPL-CCNC: 27 U/L (ref 10–40)
BASOPHILS # BLD AUTO: 0.02 K/UL (ref 0–0.2)
BASOPHILS NFR BLD: 0.4 % (ref 0–1.9)
BILIRUB SERPL-MCNC: 1 MG/DL (ref 0.1–1)
BUN SERPL-MCNC: 17 MG/DL (ref 8–23)
CALCIUM SERPL-MCNC: 10 MG/DL (ref 8.7–10.5)
CHLORIDE SERPL-SCNC: 101 MMOL/L (ref 95–110)
CO2 SERPL-SCNC: 24 MMOL/L (ref 23–29)
CREAT SERPL-MCNC: 1 MG/DL (ref 0.5–1.4)
DIFFERENTIAL METHOD: NORMAL
EOSINOPHIL # BLD AUTO: 0.2 K/UL (ref 0–0.5)
EOSINOPHIL NFR BLD: 3.3 % (ref 0–8)
ERYTHROCYTE [DISTWIDTH] IN BLOOD BY AUTOMATED COUNT: 14.2 % (ref 11.5–14.5)
EST. GFR  (NO RACE VARIABLE): >60 ML/MIN/1.73 M^2
GLUCOSE SERPL-MCNC: 198 MG/DL (ref 70–110)
HCT VFR BLD AUTO: 43.8 % (ref 40–54)
HCV AB SERPL QL IA: NEGATIVE
HEP C VIRUS HOLD SPECIMEN: NORMAL
HGB BLD-MCNC: 14.2 G/DL (ref 14–18)
HIV 1+2 AB+HIV1 P24 AG SERPL QL IA: NEGATIVE
IMM GRANULOCYTES # BLD AUTO: 0.01 K/UL (ref 0–0.04)
IMM GRANULOCYTES NFR BLD AUTO: 0.2 % (ref 0–0.5)
LYMPHOCYTES # BLD AUTO: 2.5 K/UL (ref 1–4.8)
LYMPHOCYTES NFR BLD: 44.6 % (ref 18–48)
MCH RBC QN AUTO: 29.1 PG (ref 27–31)
MCHC RBC AUTO-ENTMCNC: 32.4 G/DL (ref 32–36)
MCV RBC AUTO: 90 FL (ref 82–98)
MONOCYTES # BLD AUTO: 0.3 K/UL (ref 0.3–1)
MONOCYTES NFR BLD: 5.1 % (ref 4–15)
NEUTROPHILS # BLD AUTO: 2.6 K/UL (ref 1.8–7.7)
NEUTROPHILS NFR BLD: 46.4 % (ref 38–73)
NRBC BLD-RTO: 0 /100 WBC
PLATELET # BLD AUTO: 157 K/UL (ref 150–450)
PMV BLD AUTO: 12.1 FL (ref 9.2–12.9)
POTASSIUM SERPL-SCNC: 3.9 MMOL/L (ref 3.5–5.1)
PROT SERPL-MCNC: 7.4 G/DL (ref 6–8.4)
RBC # BLD AUTO: 4.88 M/UL (ref 4.6–6.2)
SODIUM SERPL-SCNC: 136 MMOL/L (ref 136–145)
TROPONIN I SERPL DL<=0.01 NG/ML-MCNC: <0.006 NG/ML (ref 0–0.03)
WBC # BLD AUTO: 5.49 K/UL (ref 3.9–12.7)

## 2023-06-08 PROCEDURE — 84484 ASSAY OF TROPONIN QUANT: CPT | Mod: HCNC | Performed by: PHYSICIAN ASSISTANT

## 2023-06-08 PROCEDURE — 86803 HEPATITIS C AB TEST: CPT | Mod: HCNC | Performed by: EMERGENCY MEDICINE

## 2023-06-08 PROCEDURE — 93010 ELECTROCARDIOGRAM REPORT: CPT | Mod: HCNC,,, | Performed by: INTERNAL MEDICINE

## 2023-06-08 PROCEDURE — 85025 COMPLETE CBC W/AUTO DIFF WBC: CPT | Mod: HCNC | Performed by: PHYSICIAN ASSISTANT

## 2023-06-08 PROCEDURE — 93010 EKG 12-LEAD: ICD-10-PCS | Mod: HCNC,,, | Performed by: INTERNAL MEDICINE

## 2023-06-08 PROCEDURE — 87389 HIV-1 AG W/HIV-1&-2 AB AG IA: CPT | Mod: HCNC | Performed by: EMERGENCY MEDICINE

## 2023-06-08 PROCEDURE — 93005 ELECTROCARDIOGRAM TRACING: CPT | Mod: HCNC

## 2023-06-08 PROCEDURE — 80053 COMPREHEN METABOLIC PANEL: CPT | Mod: HCNC | Performed by: PHYSICIAN ASSISTANT

## 2023-06-08 PROCEDURE — 99285 EMERGENCY DEPT VISIT HI MDM: CPT | Mod: 25,HCNC

## 2023-06-08 RX ORDER — HYDRALAZINE HYDROCHLORIDE 20 MG/ML
10 INJECTION INTRAMUSCULAR; INTRAVENOUS
Status: DISCONTINUED | OUTPATIENT
Start: 2023-06-08 | End: 2023-06-08

## 2023-06-08 RX ORDER — AMLODIPINE BESYLATE 5 MG/1
10 TABLET ORAL DAILY
Qty: 30 TABLET | Refills: 0 | Status: SHIPPED | OUTPATIENT
Start: 2023-06-08 | End: 2024-06-07

## 2023-06-08 NOTE — FIRST PROVIDER EVALUATION
Emergency Department TeleTriage Encounter Note      CHIEF COMPLAINT    Chief Complaint   Patient presents with    Hypertension     Pt reports high BP and dizziness x 3 days. Went to an urgent care who prescribed him Amlodipine. Pt states medication isn't helping and caused him to vomit. Pt denies any CP or HA       VITAL SIGNS   Initial Vitals [06/08/23 1225]   BP Pulse Resp Temp SpO2   (!) 190/87 61 18 98.1 °F (36.7 °C) 98 %      MAP       --            ALLERGIES    Review of patient's allergies indicates:  No Known Allergies    PROVIDER TRIAGE NOTE  Patient presents with 3-4 day history of spinning dizziness worse with head movement and feeling like he is off-balance when walking. He was recently prescribed amlodipine for HTN, but had the dizziness prior to the rx.       ORDERS  Labs Reviewed   HIV 1 / 2 ANTIBODY   HEPATITIS C ANTIBODY   HEP C VIRUS HOLD SPECIMEN       ED Orders (720h ago, onward)      Start Ordered     Status Ordering Provider    06/08/23 1227 06/08/23 1226  HIV 1/2 Ag/Ab (4th Gen)  STAT         Acknowledged AISHA OSUNA    06/08/23 1227 06/08/23 1226  Hepatitis C Antibody  STAT        See Hyperspace for full Linked Orders Report.    Acknowledged AISHA OSUNA    06/08/23 1227 06/08/23 1226  HCV Virus Hold Specimen  STAT        See Hyperspace for full Linked Orders Report.    Acknowledged AISHA OSUNA              Virtual Visit Note: The provider triage portion of this emergency department evaluation and documentation was performed via [employer], a HIPAA-compliant telemedicine application, in concert with a tele-presenter in the room. A face to face patient evaluation with one of my colleagues will occur once the patient is placed in an emergency department room.      DISCLAIMER: This note was prepared with [employer] voice recognition transcription software. Garbled syntax, mangled pronouns, and other bizarre constructions may be attributed to that software system.

## 2023-06-08 NOTE — ED PROVIDER NOTES
SCRIBE #1 NOTE: I, Dago Albarado, am scribing for, and in the presence of, Meek Villa MD. I have scribed the entire note.       History     Chief Complaint   Patient presents with    Hypertension     Pt reports high BP and dizziness x 3 days. Went to an urgent care who prescribed him Amlodipine. Pt states medication isn't helping and caused him to vomit. Pt denies any CP or HA     Review of patient's allergies indicates:  No Known Allergies      History of Present Illness     HPI    6/8/2023, 2:34 PM  History obtained from the patient      History of Present Illness: Friday ROGELIO Blake is a 67 y.o. male patient with a PMHx of DM, HLD, HTN, who presents to the Emergency Department for evaluation of high BP and dizziness which onset gradually 3 days ago. Pt has been out of BP medication for 1 month. Pt was seen at an urgent care to refill his medication and was also prescribed Amlodipine. Pt states this provided no relief and he has been nauseous and vomiting. Symptoms are episodic and moderate in severity. No mitigating or exacerbating factors reported. No associated sxs reported. Patient denies any CP, SOB, headache, abdominal pain, cough, and all other sxs at this time. No further complaints or concerns at this time.       Arrival mode: Personal vehicle     PCP: Sid Elizabeth MD        Past Medical History:  Past Medical History:   Diagnosis Date    Bilateral carpal tunnel syndrome 2/25/2022    moderate demyelinating bilaterally    Carpal tunnel syndrome     Diabetes mellitus without complication     Fever 6/30/2022    Gram-positive bacteremia 7/2/2022    History of colon polyps     Hypercalcemia 3/23/2021    Hyperlipidemia associated with type 2 diabetes mellitus     Hypertension associated with diabetes     Left carpal tunnel syndrome 3/23/2021    Low back pain 9/28/2021    Lumbar disc disease with radiculopathy     Morbid obesity with BMI of 40.0-44.9, adult     Postoperative hematoma involving nervous  system following nervous system procedure 6/28/2022    S/P parathyroidectomy 7/29/2021    Vitamin D deficiency        Past Surgical History:  Past Surgical History:   Procedure Laterality Date    CARPAL TUNNEL RELEASE Left 4/1/2021    Procedure: RELEASE, CARPAL TUNNEL;  Surgeon: Yoandy David MD;  Location: [facility] OR;  Service: Orthopedics;  Laterality: Left;    COLONOSCOPY N/A 12/14/2022    Procedure: COLONOSCOPY;  Surgeon: Belia Gómez DO;  Location: [facility] ENDO;  Service: Endoscopy;  Laterality: N/A;    HERNIA REPAIR      INCISION AND DRAINAGE OF HEMATOMA N/A 6/28/2022    Procedure: INCISION AND DRAINAGE, HEMATOMA;  Surgeon: Shaggy Zepeda MD;  Location: [facility] OR;  Service: Neurosurgery;  Laterality: N/A;    LUMBAR LAMINECTOMY WITH DISCECTOMY Left 6/22/2022    Procedure: LAMINECTOMY, SPINE, LUMBAR, WITH DISCECTOMY;  Surgeon: Shaggy Zepeda MD;  Location: [facility] OR;  Service: Neurosurgery;  Laterality: Left;  minimally invasive L2-3 discectomy and decompression     LUMBAR LAMINECTOMY WITH DISCECTOMY N/A 6/28/2022    Procedure: LAMINECTOMY, SPINE, LUMBAR, WITH DISCECTOMY;  Surgeon: Shaggy Zepeda MD;  Location: [facility] OR;  Service: Neurosurgery;  Laterality: N/A;    PARATHYROIDECTOMY Bilateral 7/27/2021    Procedure: PARATHYROIDECTOMY;  Surgeon: Tha Dial MD;  Location: [facility] OR;  Service: ENT;  Laterality: Bilateral;  with medialstinal exploration    REPAIR OF CEREBROSPINAL FLUID LEAK USING COMPUTER ASSISTED NAVIGATION Bilateral 8/3/2022    Procedure: REPAIR, CSF LEAK, USING COMPUTER-ASSISTED NAVIGATION;  Surgeon: Shaggy Zepeda MD;  Location: [facility] OR;  Service: Neurosurgery;  Laterality: Bilateral;    SELECTIVE INJECTION OF ANESTHETIC AGENT AROUND LUMBAR SPINAL NERVE ROOT BY TRANSFORAMINAL APPROACH Left 3/23/2022    Procedure: BLOCK, SPINAL NERVE ROOT, LUMBAR, SELECTIVE, TRANSFORAMINAL APPROACH Left L2-3, L3-4 RN IV sedation;  Surgeon: Jay Hodges MD;  Location: [facility] PAIN MGT;  Service: Pain  Management;  Laterality: Left;    STERNOTOMY N/A 7/27/2021    Procedure: STERNOTOMY;  Surgeon: Tha Dial MD;  Location: [facility] OR;  Service: ENT;  Laterality: N/A;    ULNAR NERVE TRANSPOSITION Left 4/1/2021    Procedure: TRANSPOSITION, NERVE, ULNAR;  Surgeon: oYandy David MD;  Location: [facility] OR;  Service: Orthopedics;  Laterality: Left;    ULNAR TUNNEL RELEASE Left 4/1/2021    Procedure: RELEASE, CUBITAL TUNNEL;  Surgeon: Yoandy David MD;  Location: [facility] OR;  Service: Orthopedics;  Laterality: Left;    UMBILICAL HERNIA REPAIR      WOUND EXPLORATION Bilateral 8/3/2022    Procedure: EXPLORATION, WOUND;  Surgeon: Shaggy Zepeda MD;  Location: [facility] OR;  Service: Neurosurgery;  Laterality: Bilateral;         Family History:  Family History   Problem Relation Age of Onset    Asthma Mother     Hypertension Father     Diabetes Mellitus Father     Prostate cancer Brother        Social History:  Social History     Tobacco Use    Smoking status: Never    Smokeless tobacco: Never   Substance and Sexual Activity    Alcohol use: Never    Drug use: Never    Sexual activity: Yes     Partners: Female        Review of Systems     Review of Systems   Constitutional:  Negative for fever.   HENT:  Negative for sore throat.    Respiratory:  Negative for cough and shortness of breath.    Cardiovascular:  Negative for chest pain.   Gastrointestinal:  Positive for nausea and vomiting. Negative for abdominal pain.   Genitourinary:  Negative for dysuria.   Musculoskeletal:  Negative for back pain.   Skin:  Negative for rash.   Neurological:  Positive for dizziness. Negative for weakness and headaches.   Hematological:  Does not bruise/bleed easily.   All other systems reviewed and are negative.     Physical Exam     Initial Vitals [06/08/23 1225]   BP Pulse Resp Temp SpO2   (!) 190/87 61 18 98.1 °F (36.7 °C) 98 %      MAP       --          Physical Exam  Nursing Notes and Vital Signs Reviewed.  Constitutional: Patient is  in no acute distress. Well-developed and well-nourished. Elderly.  Head: Atraumatic. Normocephalic.  Eyes: PERRL. EOM intact. Conjunctivae are not pale. No scleral icterus.  ENT: Mucous membranes are moist. Oropharynx is clear and symmetric.    Neck: Supple. Full ROM. No lymphadenopathy.  Cardiovascular: Regular rate. Regular rhythm. No murmurs, rubs, or gallops. Distal pulses are 2+ and symmetric.  Pulmonary/Chest: No respiratory distress. Clear to auscultation bilaterally. No wheezing or rales.  Abdominal: Soft and non-distended.  There is no tenderness.  No rebound, guarding, or rigidity.   Musculoskeletal: Moves all extremities. No obvious deformities. No edema. No calf tenderness.  Skin: Warm and dry.  Neurological:  Alert, awake, and appropriate.  Normal speech.  No acute focal neurological deficits are appreciated.  Psychiatric: Normal affect. Good eye contact. Appropriate in content.     ED Course   Procedures  ED Vital Signs:  Vitals:    06/08/23 1225 06/08/23 1402 06/08/23 1403 06/08/23 1405   BP: (!) 190/87 (S) (!) 146/77 (S) (!) 161/87 (S) (!) 171/91   Pulse: 61 (S) (!) 59 (S) (!) 57 (S) (!) 55   Resp: 18      Temp: 98.1 °F (36.7 °C)      TempSrc: Oral      SpO2: 98%      Weight: 111.1 kg (244 lb 14.9 oz)       06/08/23 1504   BP: (!) 144/88   Pulse: (!) 57   Resp:    Temp:    TempSrc:    SpO2:    Weight:        Abnormal Lab Results:  Labs Reviewed   COMPREHENSIVE METABOLIC PANEL - Abnormal; Notable for the following components:       Result Value    Glucose 198 (*)     All other components within normal limits   HIV 1 / 2 ANTIBODY    Narrative:     Release to patient->Immediate   HEPATITIS C ANTIBODY    Narrative:     Release to patient->Immediate   HEP C VIRUS HOLD SPECIMEN    Narrative:     Release to patient->Immediate   CBC W/ AUTO DIFFERENTIAL   TROPONIN I        All Lab Results:  Results for orders placed or performed during the hospital encounter of 06/08/23   HIV 1/2 Ag/Ab (4th Gen)   Result  Value Ref Range    HIV 1/2 Ag/Ab Negative Negative   Hepatitis C Antibody   Result Value Ref Range    Hepatitis C Ab Negative Negative   HCV Virus Hold Specimen   Result Value Ref Range    HEP C Virus Hold Specimen Hold for HCV sendout    CBC auto differential   Result Value Ref Range    WBC 5.49 3.90 - 12.70 K/uL    RBC 4.88 4.60 - 6.20 M/uL    Hemoglobin 14.2 14.0 - 18.0 g/dL    Hematocrit 43.8 40.0 - 54.0 %    MCV 90 82 - 98 fL    MCH 29.1 27.0 - 31.0 pg    MCHC 32.4 32.0 - 36.0 g/dL    RDW 14.2 11.5 - 14.5 %    Platelets 157 150 - 450 K/uL    MPV 12.1 9.2 - 12.9 fL    Immature Granulocytes 0.2 0.0 - 0.5 %    Gran # (ANC) 2.6 1.8 - 7.7 K/uL    Immature Grans (Abs) 0.01 0.00 - 0.04 K/uL    Lymph # 2.5 1.0 - 4.8 K/uL    Mono # 0.3 0.3 - 1.0 K/uL    Eos # 0.2 0.0 - 0.5 K/uL    Baso # 0.02 0.00 - 0.20 K/uL    nRBC 0 0 /100 WBC    Gran % 46.4 38.0 - 73.0 %    Lymph % 44.6 18.0 - 48.0 %    Mono % 5.1 4.0 - 15.0 %    Eosinophil % 3.3 0.0 - 8.0 %    Basophil % 0.4 0.0 - 1.9 %    Differential Method Automated    Comprehensive metabolic panel   Result Value Ref Range    Sodium 136 136 - 145 mmol/L    Potassium 3.9 3.5 - 5.1 mmol/L    Chloride 101 95 - 110 mmol/L    CO2 24 23 - 29 mmol/L    Glucose 198 (H) 70 - 110 mg/dL    BUN 17 8 - 23 mg/dL    Creatinine 1.0 0.5 - 1.4 mg/dL    Calcium 10.0 8.7 - 10.5 mg/dL    Total Protein 7.4 6.0 - 8.4 g/dL    Albumin 4.3 3.5 - 5.2 g/dL    Total Bilirubin 1.0 0.1 - 1.0 mg/dL    Alkaline Phosphatase 64 55 - 135 U/L    AST 27 10 - 40 U/L    ALT 30 10 - 44 U/L    Anion Gap 11 8 - 16 mmol/L    eGFR >60 >60 mL/min/1.73 m^2   Troponin I   Result Value Ref Range    Troponin I <0.006 0.000 - 0.026 ng/mL       Imaging Results:  Imaging Results              CT Head Without Contrast (Final result)  Result time 06/08/23 14:29:21      Final result by ZHENG Lai Sr., MD (06/08/23 14:29:21)                   Impression:      1. There is no evidence of an acute ischemic event.  2. There is no  intracranial hemorrhage.  All CT scans at this facility use dose modulation, iterative reconstruction, and/or weight base dosing when appropriate to reduce radiation dose when appropriate to reduce radiation dose to as low as reasonably achievable.      Electronically signed by: Demarcus Lai MD  Date:    06/08/2023  Time:    14:29               Narrative:    EXAMINATION:  CT HEAD WITHOUT CONTRAST    CLINICAL HISTORY:  Dizziness, persistent/recurrent, cardiac or vascular cause suspected;    TECHNIQUE:  Standard brain CT protocol without IV contrast was performed.    COMPARISON:  None    FINDINGS:  There is no evidence of an acute ischemic event.  There is no intracranial hemorrhage.  There is no calvarial fracture.  The visualized portion of the paranasal sinuses is clear.                                           The Emergency Provider reviewed the vital signs and test results, which are outlined above.     ED Discussion     2:45 PM: Reassessed pt at this time. Discussed with pt all pertinent ED information and results. Discussed pt dx and plan of tx. Gave pt all f/u and return to the ED instructions. All questions and concerns were addressed at this time. Pt expresses understanding of information and instructions, and is comfortable with plan to discharge. Pt is stable for discharge.    I discussed with patient and/or family/caretaker that evaluation in the ED does not suggest any emergent or life threatening medical conditions requiring immediate intervention beyond what was provided in the ED, and I believe patient is safe for discharge.  Regardless, an unremarkable evaluation in the ED does not preclude the development or presence of a serious of life threatening condition. As such, patient was instructed to return immediately for any worsening or change in current symptoms.       Medical Decision Making:   Initial Assessment:   Htn and dizziness  Differential Diagnosis:   Htn, dizziness  Clinical Tests:    Lab Tests: Ordered and Reviewed  Radiological Study: Ordered and Reviewed  ED Management:  Labs and imaging reviewed by me.  No acute findings.  Considered admission, but patient is feeling better, and BP has improved on its own  Will increase amlodipine at home.           ED Medication(s):  Medications - No data to display      Current Discharge Medication List           Follow-up Information       Sid Elizabeth MD.    Specialty: Internal Medicine  Contact information:  [address]  [address]  [facility] [id number] [phone]                                 Scribe Attestation:   Scribe #1: I performed the above scribed service and the documentation accurately describes the services I performed. I attest to the accuracy of the note.     Attending:   Physician Attestation Statement for Scribe #1: I, Meek Villa MD, personally performed the services described in this documentation, as scribed by Dago Albarado, in my presence, and it is both accurate and complete.           Clinical Impression       ICD-10-CM ICD-9-CM   1. Hypertension, unspecified type  I10 401.9   2. Dizziness  R42 780.4       Disposition:   Disposition: Discharged  Condition: Stable       Meek Villa MD  06/08/23 0412

## 2023-06-09 ENCOUNTER — TELEPHONE (OUTPATIENT)
Dept: URGENT CARE | Facility: CLINIC | Age: 68
End: 2023-06-09
Payer: MEDICARE

## 2023-06-09 NOTE — TELEPHONE ENCOUNTER
Courtesy call made for 06/06 visit. Pt reported he since has been to ER for dizziness, and has also seen pcp for hypertension and adjustment of his bp medication. Pt had no further questions or concerns.

## 2023-06-10 ENCOUNTER — PATIENT MESSAGE (OUTPATIENT)
Dept: INTERNAL MEDICINE | Facility: CLINIC | Age: 68
End: 2023-06-10
Payer: MEDICARE

## 2023-06-23 ENCOUNTER — PATIENT MESSAGE (OUTPATIENT)
Dept: ADMINISTRATIVE | Facility: OTHER | Age: 68
End: 2023-06-23
Payer: MEDICARE

## 2023-07-13 ENCOUNTER — OFFICE VISIT (OUTPATIENT)
Dept: INTERNAL MEDICINE | Facility: CLINIC | Age: 68
End: 2023-07-13
Payer: MEDICARE

## 2023-07-13 VITALS
BODY MASS INDEX: 37.79 KG/M2 | OXYGEN SATURATION: 95 % | DIASTOLIC BLOOD PRESSURE: 80 MMHG | WEIGHT: 240.75 LBS | HEART RATE: 59 BPM | HEIGHT: 67 IN | SYSTOLIC BLOOD PRESSURE: 122 MMHG

## 2023-07-13 DIAGNOSIS — E11.69 HYPERLIPIDEMIA ASSOCIATED WITH TYPE 2 DIABETES MELLITUS: ICD-10-CM

## 2023-07-13 DIAGNOSIS — I15.2 HYPERTENSION ASSOCIATED WITH DIABETES: Primary | Chronic | ICD-10-CM

## 2023-07-13 DIAGNOSIS — E11.59 HYPERTENSION ASSOCIATED WITH DIABETES: Primary | Chronic | ICD-10-CM

## 2023-07-13 DIAGNOSIS — E78.5 HYPERLIPIDEMIA ASSOCIATED WITH TYPE 2 DIABETES MELLITUS: ICD-10-CM

## 2023-07-13 DIAGNOSIS — E11.9 TYPE 2 DIABETES MELLITUS WITHOUT COMPLICATION, WITHOUT LONG-TERM CURRENT USE OF INSULIN: ICD-10-CM

## 2023-07-13 PROCEDURE — 3008F PR BODY MASS INDEX (BMI) DOCUMENTED: ICD-10-PCS | Mod: HCNC,CPTII,S$GLB, | Performed by: INTERNAL MEDICINE

## 2023-07-13 PROCEDURE — 1159F MED LIST DOCD IN RCRD: CPT | Mod: HCNC,CPTII,S$GLB, | Performed by: INTERNAL MEDICINE

## 2023-07-13 PROCEDURE — 1126F AMNT PAIN NOTED NONE PRSNT: CPT | Mod: HCNC,CPTII,S$GLB, | Performed by: INTERNAL MEDICINE

## 2023-07-13 PROCEDURE — 4010F ACE/ARB THERAPY RXD/TAKEN: CPT | Mod: HCNC,CPTII,S$GLB, | Performed by: INTERNAL MEDICINE

## 2023-07-13 PROCEDURE — 3008F BODY MASS INDEX DOCD: CPT | Mod: HCNC,CPTII,S$GLB, | Performed by: INTERNAL MEDICINE

## 2023-07-13 PROCEDURE — 99213 OFFICE O/P EST LOW 20 MIN: CPT | Mod: HCNC,S$GLB,, | Performed by: INTERNAL MEDICINE

## 2023-07-13 PROCEDURE — 3288F FALL RISK ASSESSMENT DOCD: CPT | Mod: HCNC,CPTII,S$GLB, | Performed by: INTERNAL MEDICINE

## 2023-07-13 PROCEDURE — 1126F PR PAIN SEVERITY QUANTIFIED, NO PAIN PRESENT: ICD-10-PCS | Mod: HCNC,CPTII,S$GLB, | Performed by: INTERNAL MEDICINE

## 2023-07-13 PROCEDURE — 3061F PR NEG MICROALBUMINURIA RESULT DOCUMENTED/REVIEW: ICD-10-PCS | Mod: HCNC,CPTII,S$GLB, | Performed by: INTERNAL MEDICINE

## 2023-07-13 PROCEDURE — 3079F DIAST BP 80-89 MM HG: CPT | Mod: HCNC,CPTII,S$GLB, | Performed by: INTERNAL MEDICINE

## 2023-07-13 PROCEDURE — 99999 PR PBB SHADOW E&M-EST. PATIENT-LVL III: CPT | Mod: PBBFAC,HCNC,, | Performed by: INTERNAL MEDICINE

## 2023-07-13 PROCEDURE — 3072F PR LOW RISK FOR RETINOPATHY: ICD-10-PCS | Mod: HCNC,CPTII,S$GLB, | Performed by: INTERNAL MEDICINE

## 2023-07-13 PROCEDURE — 3072F LOW RISK FOR RETINOPATHY: CPT | Mod: HCNC,CPTII,S$GLB, | Performed by: INTERNAL MEDICINE

## 2023-07-13 PROCEDURE — 3061F NEG MICROALBUMINURIA REV: CPT | Mod: HCNC,CPTII,S$GLB, | Performed by: INTERNAL MEDICINE

## 2023-07-13 PROCEDURE — 3074F SYST BP LT 130 MM HG: CPT | Mod: HCNC,CPTII,S$GLB, | Performed by: INTERNAL MEDICINE

## 2023-07-13 PROCEDURE — 99999 PR PBB SHADOW E&M-EST. PATIENT-LVL III: ICD-10-PCS | Mod: PBBFAC,HCNC,, | Performed by: INTERNAL MEDICINE

## 2023-07-13 PROCEDURE — 3051F PR MOST RECENT HEMOGLOBIN A1C LEVEL 7.0 - < 8.0%: ICD-10-PCS | Mod: HCNC,CPTII,S$GLB, | Performed by: INTERNAL MEDICINE

## 2023-07-13 PROCEDURE — 4010F PR ACE/ARB THEARPY RXD/TAKEN: ICD-10-PCS | Mod: HCNC,CPTII,S$GLB, | Performed by: INTERNAL MEDICINE

## 2023-07-13 PROCEDURE — 1101F PR PT FALLS ASSESS DOC 0-1 FALLS W/OUT INJ PAST YR: ICD-10-PCS | Mod: HCNC,CPTII,S$GLB, | Performed by: INTERNAL MEDICINE

## 2023-07-13 PROCEDURE — 3051F HG A1C>EQUAL 7.0%<8.0%: CPT | Mod: HCNC,CPTII,S$GLB, | Performed by: INTERNAL MEDICINE

## 2023-07-13 PROCEDURE — 99213 PR OFFICE/OUTPT VISIT, EST, LEVL III, 20-29 MIN: ICD-10-PCS | Mod: HCNC,S$GLB,, | Performed by: INTERNAL MEDICINE

## 2023-07-13 PROCEDURE — 3074F PR MOST RECENT SYSTOLIC BLOOD PRESSURE < 130 MM HG: ICD-10-PCS | Mod: HCNC,CPTII,S$GLB, | Performed by: INTERNAL MEDICINE

## 2023-07-13 PROCEDURE — 3079F PR MOST RECENT DIASTOLIC BLOOD PRESSURE 80-89 MM HG: ICD-10-PCS | Mod: HCNC,CPTII,S$GLB, | Performed by: INTERNAL MEDICINE

## 2023-07-13 PROCEDURE — 3066F NEPHROPATHY DOC TX: CPT | Mod: HCNC,CPTII,S$GLB, | Performed by: INTERNAL MEDICINE

## 2023-07-13 PROCEDURE — 3066F PR DOCUMENTATION OF TREATMENT FOR NEPHROPATHY: ICD-10-PCS | Mod: HCNC,CPTII,S$GLB, | Performed by: INTERNAL MEDICINE

## 2023-07-13 PROCEDURE — 99499 UNLISTED E&M SERVICE: CPT | Mod: S$GLB,,, | Performed by: INTERNAL MEDICINE

## 2023-07-13 PROCEDURE — 1159F PR MEDICATION LIST DOCUMENTED IN MEDICAL RECORD: ICD-10-PCS | Mod: HCNC,CPTII,S$GLB, | Performed by: INTERNAL MEDICINE

## 2023-07-13 PROCEDURE — 3288F PR FALLS RISK ASSESSMENT DOCUMENTED: ICD-10-PCS | Mod: HCNC,CPTII,S$GLB, | Performed by: INTERNAL MEDICINE

## 2023-07-13 PROCEDURE — 1101F PT FALLS ASSESS-DOCD LE1/YR: CPT | Mod: HCNC,CPTII,S$GLB, | Performed by: INTERNAL MEDICINE

## 2023-07-13 NOTE — PROGRESS NOTES
"HPI:  Patient is a 67-year-old man who comes in today to discuss his hypertension.  Patient states that since changing his blood pressure medications to split up his blood pressures been doing much better.  It has been averaging about 135/80.  He brings me about 20 readings.    Current meds have been verified and updated per the EMR  Exam:/80 (BP Location: Right arm)   Pulse (!) 59   Ht 5' 7" (1.702 m)   Wt 109.2 kg (240 lb 11.9 oz)   SpO2 95%   BMI 37.71 kg/m²   Exam deferred    Lab Results   Component Value Date    WBC 5.49 06/08/2023    HGB 14.2 06/08/2023    HCT 43.8 06/08/2023     06/08/2023    CHOL 156 05/25/2023    TRIG 257 (H) 05/25/2023    HDL 37 (L) 05/25/2023    ALT 30 06/08/2023    AST 27 06/08/2023     06/08/2023    K 3.9 06/08/2023     06/08/2023    CREATININE 1.0 06/08/2023    BUN 17 06/08/2023    CO2 24 06/08/2023    TSH 1.862 05/25/2023    PSA 0.93 05/18/2022    INR 1.0 07/30/2022    HGBA1C 7.7 (H) 05/25/2023       Impression:  Hypertension, well controlled  Patient Active Problem List   Diagnosis    Type 2 diabetes mellitus without complication, without long-term current use of insulin    Hypertension associated with diabetes    Hyperlipidemia associated with type 2 diabetes mellitus    History of colon polyps    Vitamin D deficiency    Anemia    Decreased strength of trunk and back    Lumbar radiculopathy    Degenerative disc disease, lumbar    Lumbar discitis with Osteo L2-3    Benign prostatic hyperplasia without lower urinary tract symptoms       Plan:     He will continue his current meds.  He will see me again in roughly 3-4 months for his regular lab work.    This note is generated with speech recognition software and is subject to transcription error and sound alike phrases that may be missed by proofreading.      "

## 2023-09-04 DIAGNOSIS — E11.9 TYPE 2 DIABETES MELLITUS WITHOUT COMPLICATION, WITHOUT LONG-TERM CURRENT USE OF INSULIN: Primary | ICD-10-CM

## 2023-09-04 RX ORDER — BLOOD-GLUCOSE METER
EACH MISCELLANEOUS
Qty: 100 EACH | Refills: 3 | Status: SHIPPED | OUTPATIENT
Start: 2023-09-04

## 2023-09-04 RX ORDER — INSULIN PUMP SYRINGE, 3 ML
EACH MISCELLANEOUS
Qty: 1 EACH | Refills: 0 | Status: SHIPPED | OUTPATIENT
Start: 2023-09-04

## 2023-09-04 NOTE — TELEPHONE ENCOUNTER
No care due was identified.  [facility] Embedded Care Due Messages. Reference number: [id number].   9/04/2023 12:38:45 PM CDT

## 2023-09-04 NOTE — TELEPHONE ENCOUNTER
Refill Routing Note   Medication(s) are not appropriate for processing by [facility] for the following reason(s):      No active prescription written by provider  Pharmacy requesting Meter kit ( Accu-chek or True Metrix)    [facility] action(s):  Defer  Approve Care Due:  None identified     Medication Therapy Plan: Pharmacy requesting new rx for an Accu-chek or TrueMetrix brand metere per insurance preference.      Appointments  past 12m or future 3m with PCP    Date Provider   Last Visit   7/13/2023 Sid Elizabeht MD   Next Visit   11/27/2023 Sid Elizabeth MD   ED visits in past 90 days: 1        Note composed:1:06 PM 09/04/2023

## 2023-09-07 ENCOUNTER — TELEPHONE (OUTPATIENT)
Dept: INTERNAL MEDICINE | Facility: CLINIC | Age: 68
End: 2023-09-07
Payer: MEDICARE

## 2023-09-07 DIAGNOSIS — E11.9 TYPE 2 DIABETES MELLITUS WITHOUT COMPLICATION, WITHOUT LONG-TERM CURRENT USE OF INSULIN: ICD-10-CM

## 2023-10-10 ENCOUNTER — OFFICE VISIT (OUTPATIENT)
Dept: CARDIOLOGY | Facility: CLINIC | Age: 68
End: 2023-10-10
Payer: MEDICARE

## 2023-10-10 VITALS
OXYGEN SATURATION: 98 % | WEIGHT: 243.38 LBS | BODY MASS INDEX: 38.12 KG/M2 | HEART RATE: 67 BPM | SYSTOLIC BLOOD PRESSURE: 120 MMHG | DIASTOLIC BLOOD PRESSURE: 76 MMHG

## 2023-10-10 DIAGNOSIS — E78.5 HYPERLIPIDEMIA ASSOCIATED WITH TYPE 2 DIABETES MELLITUS: ICD-10-CM

## 2023-10-10 DIAGNOSIS — E11.59 HYPERTENSION ASSOCIATED WITH DIABETES: Primary | Chronic | ICD-10-CM

## 2023-10-10 DIAGNOSIS — I51.7 LAE (LEFT ATRIAL ENLARGEMENT): ICD-10-CM

## 2023-10-10 DIAGNOSIS — R00.1 SINUS BRADYCARDIA: ICD-10-CM

## 2023-10-10 DIAGNOSIS — R94.31 ABNORMAL ECG: ICD-10-CM

## 2023-10-10 DIAGNOSIS — E11.69 HYPERLIPIDEMIA ASSOCIATED WITH TYPE 2 DIABETES MELLITUS: ICD-10-CM

## 2023-10-10 DIAGNOSIS — I51.7 LVH (LEFT VENTRICULAR HYPERTROPHY): ICD-10-CM

## 2023-10-10 DIAGNOSIS — I15.2 HYPERTENSION ASSOCIATED WITH DIABETES: Primary | Chronic | ICD-10-CM

## 2023-10-10 DIAGNOSIS — E66.01 MORBID OBESITY WITH BMI OF 40.0-44.9, ADULT: ICD-10-CM

## 2023-10-10 PROCEDURE — 99999 PR PBB SHADOW E&M-EST. PATIENT-LVL III: CPT | Mod: PBBFAC,HCNC,, | Performed by: INTERNAL MEDICINE

## 2023-10-10 PROCEDURE — 1126F AMNT PAIN NOTED NONE PRSNT: CPT | Mod: HCNC,CPTII,S$GLB, | Performed by: INTERNAL MEDICINE

## 2023-10-10 PROCEDURE — 99999 PR PBB SHADOW E&M-EST. PATIENT-LVL III: ICD-10-PCS | Mod: PBBFAC,HCNC,, | Performed by: INTERNAL MEDICINE

## 2023-10-10 PROCEDURE — 3008F BODY MASS INDEX DOCD: CPT | Mod: HCNC,CPTII,S$GLB, | Performed by: INTERNAL MEDICINE

## 2023-10-10 PROCEDURE — 3008F PR BODY MASS INDEX (BMI) DOCUMENTED: ICD-10-PCS | Mod: HCNC,CPTII,S$GLB, | Performed by: INTERNAL MEDICINE

## 2023-10-10 PROCEDURE — 3061F NEG MICROALBUMINURIA REV: CPT | Mod: HCNC,CPTII,S$GLB, | Performed by: INTERNAL MEDICINE

## 2023-10-10 PROCEDURE — 3078F PR MOST RECENT DIASTOLIC BLOOD PRESSURE < 80 MM HG: ICD-10-PCS | Mod: HCNC,CPTII,S$GLB, | Performed by: INTERNAL MEDICINE

## 2023-10-10 PROCEDURE — 4010F ACE/ARB THERAPY RXD/TAKEN: CPT | Mod: HCNC,CPTII,S$GLB, | Performed by: INTERNAL MEDICINE

## 2023-10-10 PROCEDURE — 1126F PR PAIN SEVERITY QUANTIFIED, NO PAIN PRESENT: ICD-10-PCS | Mod: HCNC,CPTII,S$GLB, | Performed by: INTERNAL MEDICINE

## 2023-10-10 PROCEDURE — 3051F HG A1C>EQUAL 7.0%<8.0%: CPT | Mod: HCNC,CPTII,S$GLB, | Performed by: INTERNAL MEDICINE

## 2023-10-10 PROCEDURE — 3066F PR DOCUMENTATION OF TREATMENT FOR NEPHROPATHY: ICD-10-PCS | Mod: HCNC,CPTII,S$GLB, | Performed by: INTERNAL MEDICINE

## 2023-10-10 PROCEDURE — 3074F SYST BP LT 130 MM HG: CPT | Mod: HCNC,CPTII,S$GLB, | Performed by: INTERNAL MEDICINE

## 2023-10-10 PROCEDURE — 3288F FALL RISK ASSESSMENT DOCD: CPT | Mod: HCNC,CPTII,S$GLB, | Performed by: INTERNAL MEDICINE

## 2023-10-10 PROCEDURE — 3061F PR NEG MICROALBUMINURIA RESULT DOCUMENTED/REVIEW: ICD-10-PCS | Mod: HCNC,CPTII,S$GLB, | Performed by: INTERNAL MEDICINE

## 2023-10-10 PROCEDURE — 99214 PR OFFICE/OUTPT VISIT, EST, LEVL IV, 30-39 MIN: ICD-10-PCS | Mod: HCNC,S$GLB,, | Performed by: INTERNAL MEDICINE

## 2023-10-10 PROCEDURE — 1101F PR PT FALLS ASSESS DOC 0-1 FALLS W/OUT INJ PAST YR: ICD-10-PCS | Mod: HCNC,CPTII,S$GLB, | Performed by: INTERNAL MEDICINE

## 2023-10-10 PROCEDURE — 3074F PR MOST RECENT SYSTOLIC BLOOD PRESSURE < 130 MM HG: ICD-10-PCS | Mod: HCNC,CPTII,S$GLB, | Performed by: INTERNAL MEDICINE

## 2023-10-10 PROCEDURE — 3288F PR FALLS RISK ASSESSMENT DOCUMENTED: ICD-10-PCS | Mod: HCNC,CPTII,S$GLB, | Performed by: INTERNAL MEDICINE

## 2023-10-10 PROCEDURE — 1159F MED LIST DOCD IN RCRD: CPT | Mod: HCNC,CPTII,S$GLB, | Performed by: INTERNAL MEDICINE

## 2023-10-10 PROCEDURE — 99214 OFFICE O/P EST MOD 30 MIN: CPT | Mod: HCNC,S$GLB,, | Performed by: INTERNAL MEDICINE

## 2023-10-10 PROCEDURE — 1101F PT FALLS ASSESS-DOCD LE1/YR: CPT | Mod: HCNC,CPTII,S$GLB, | Performed by: INTERNAL MEDICINE

## 2023-10-10 PROCEDURE — 4010F PR ACE/ARB THEARPY RXD/TAKEN: ICD-10-PCS | Mod: HCNC,CPTII,S$GLB, | Performed by: INTERNAL MEDICINE

## 2023-10-10 PROCEDURE — 3078F DIAST BP <80 MM HG: CPT | Mod: HCNC,CPTII,S$GLB, | Performed by: INTERNAL MEDICINE

## 2023-10-10 PROCEDURE — 3066F NEPHROPATHY DOC TX: CPT | Mod: HCNC,CPTII,S$GLB, | Performed by: INTERNAL MEDICINE

## 2023-10-10 PROCEDURE — 3051F PR MOST RECENT HEMOGLOBIN A1C LEVEL 7.0 - < 8.0%: ICD-10-PCS | Mod: HCNC,CPTII,S$GLB, | Performed by: INTERNAL MEDICINE

## 2023-10-10 PROCEDURE — 1159F PR MEDICATION LIST DOCUMENTED IN MEDICAL RECORD: ICD-10-PCS | Mod: HCNC,CPTII,S$GLB, | Performed by: INTERNAL MEDICINE

## 2023-10-10 RX ORDER — ATORVASTATIN CALCIUM 20 MG/1
20 TABLET, FILM COATED ORAL DAILY
Qty: 90 TABLET | Refills: 3 | Status: SHIPPED | OUTPATIENT
Start: 2023-10-10 | End: 2024-10-09

## 2023-10-10 NOTE — PROGRESS NOTES
Subjective:    Patient ID:  Friday ROGELIO Blake is a 67 y.o. male who presents for evaluation of Hyperlipidemia, Hypertension, and Risk Factor Management For Atherosclerosis        HPI Pt presents for eval.   His current med conditions include DM, LVH, LAE, PRISCILA,  HTN, hyperlipidemia, obesity.  Nonsmoker.  ecg 3/1/21 sinus shara 58 bpm, nonspecific T wave abnl (flat T wave).  Stress MPI 9/21 personally reviewed: good exercise capacity, no ischemia, normal EF.  Echo 9/21 reviewed: normal LV function.  Now here.  Pt last seen 2 years ago.  Echo Aug 2022 normal EF, LVH, PRISCILA, normal PAP.  ECG x 2 June 2023: mild sinus bradycardia, nonspecific st-t abnl.  No angina.  Denies CP sxs.  No CHF sxs.  Weight stable.  No syncope.  HGAIC above goal.  TG above goal.  Ran out of statin few months ago.  Compliant w meds.        Past Medical History:   Diagnosis Date    Bilateral carpal tunnel syndrome 2/25/2022    moderate demyelinating bilaterally    Carpal tunnel syndrome     Diabetes mellitus without complication     Fever 6/30/2022    Gram-positive bacteremia 7/2/2022    History of colon polyps     Hypercalcemia 3/23/2021    Hyperlipidemia associated with type 2 diabetes mellitus     Hypertension associated with diabetes     Left carpal tunnel syndrome 3/23/2021    Low back pain 9/28/2021    Lumbar disc disease with radiculopathy     Morbid obesity with BMI of 40.0-44.9, adult     Postoperative hematoma involving nervous system following nervous system procedure 6/28/2022    S/P parathyroidectomy 7/29/2021    Vitamin D deficiency      Current Outpatient Medications   Medication Instructions    amLODIPine (NORVASC) 10 mg, Oral, Daily    aspirin (ECOTRIN) 81 mg, Oral, Daily    atorvastatin (LIPITOR) 20 mg, Oral, Daily    blood-glucose meter kit To check BG 1 times daily, to use with insurance preferred meter    gabapentin (NEURONTIN) 300 mg, Oral, 3 times daily    irbesartan (AVAPRO) 300 mg, Oral, Nightly    lancets Misc To check BG  1 times daily, to use with insurance preferred meter    metFORMIN (GLUCOPHAGE) 500 MG tablet Oral    ONETOUCH VERIO TEST STRIPS Strp USE 1 STRIP TO CHECK GLUCOSE ONCE DAILY         Review of Systems   Constitutional: Negative.   HENT: Negative.     Eyes: Negative.    Cardiovascular: Negative.    Respiratory: Negative.     Endocrine: Negative.    Hematologic/Lymphatic: Negative.    Skin: Negative.    Musculoskeletal:  Positive for arthritis and back pain.   Gastrointestinal: Negative.    Genitourinary: Negative.    Neurological: Negative.    Psychiatric/Behavioral: Negative.     Allergic/Immunologic: Negative.        /76   Pulse 67   Wt 110.4 kg (243 lb 6.2 oz)   SpO2 98%   BMI 38.12 kg/m²       Wt Readings from Last 3 Encounters:   10/10/23 110.4 kg (243 lb 6.2 oz)   07/13/23 109.2 kg (240 lb 11.9 oz)   06/08/23 111.1 kg (244 lb 14.9 oz)     Temp Readings from Last 3 Encounters:   06/08/23 98.1 °F (36.7 °C) (Oral)   06/06/23 97.1 °F (36.2 °C)   05/24/23 97.7 °F (36.5 °C) (Tympanic)     BP Readings from Last 3 Encounters:   10/10/23 120/76   07/13/23 122/80   06/08/23 (!) 144/88     Pulse Readings from Last 3 Encounters:   10/10/23 67   07/13/23 (!) 59   06/08/23 (!) 57          Objective:    Physical Exam  Vitals and nursing note reviewed.   Constitutional:       Appearance: He is well-developed. He is obese.   HENT:      Head: Normocephalic.   Neck:      Thyroid: No thyromegaly.      Vascular: Normal carotid pulses. No carotid bruit or JVD.   Cardiovascular:      Rate and Rhythm: Normal rate and regular rhythm.      Pulses:           Radial pulses are 2+ on the right side and 2+ on the left side.      Heart sounds: S1 normal and S2 normal. Heart sounds not distant. No midsystolic click and no opening snap. No murmur heard.     No friction rub. No S3 or S4 sounds.   Pulmonary:      Effort: Pulmonary effort is normal.      Breath sounds: Normal breath sounds. No wheezing or rales.   Abdominal:      General:  Bowel sounds are normal. There is no distension or abdominal bruit.      Palpations: Abdomen is soft. There is no mass.      Tenderness: There is no abdominal tenderness.   Musculoskeletal:      Cervical back: Neck supple.   Skin:     General: Skin is warm.   Neurological:      Mental Status: He is alert and oriented to person, place, and time.   Psychiatric:         Behavior: Behavior normal.       I have reviewed all pertinent labs and cardiac studies.      Chemistry        Component Value Date/Time     06/08/2023 1350    K 3.9 06/08/2023 1350     06/08/2023 1350    CO2 24 06/08/2023 1350    BUN 17 06/08/2023 1350    CREATININE 1.0 06/08/2023 1350     (H) 06/08/2023 1350        Component Value Date/Time    CALCIUM 10.0 06/08/2023 1350    ALKPHOS 64 06/08/2023 1350    AST 27 06/08/2023 1350    ALT 30 06/08/2023 1350    BILITOT 1.0 06/08/2023 1350    ESTGFRAFRICA >60 07/31/2022 0607    EGFRNONAA >60 07/31/2022 0607        Lab Results   Component Value Date    WBC 5.49 06/08/2023    HGB 14.2 06/08/2023    HCT 43.8 06/08/2023    MCV 90 06/08/2023     06/08/2023       Lab Results   Component Value Date    HGBA1C 7.7 (H) 05/25/2023     Lab Results   Component Value Date    CHOL 156 05/25/2023    CHOL 122 11/18/2022    CHOL 131 05/18/2022     Lab Results   Component Value Date    HDL 37 (L) 05/25/2023    HDL 36 (L) 11/18/2022    HDL 35 (L) 05/18/2022     Lab Results   Component Value Date    LDLCALC 67.6 05/25/2023    LDLCALC 66.6 11/18/2022    LDLCALC 64.6 05/18/2022     Lab Results   Component Value Date    TRIG 257 (H) 05/25/2023    TRIG 97 11/18/2022    TRIG 157 (H) 05/18/2022     Lab Results   Component Value Date    CHOLHDL 23.7 05/25/2023    CHOLHDL 29.5 11/18/2022    CHOLHDL 26.7 05/18/2022         Results for orders placed during the hospital encounter of 07/29/22    Echo    Interpretation Summary  · The left ventricle is normal in size with concentric hypertrophy and normal systolic  function.  · Severe left atrial enlargement.  · The estimated ejection fraction is 60%.  · Normal left ventricular diastolic function.  · Normal right ventricular size with normal right ventricular systolic function.  · Severe right atrial enlargement.  · Normal central venous pressure (3 mmHg).  · The estimated PA systolic pressure is 25 mmHg.          Assessment:       1. Hypertension associated with diabetes    2. Hyperlipidemia associated with type 2 diabetes mellitus    3. Sinus bradycardia    4. Abnormal ECG    5. Morbid obesity with BMI of 40.0-44.9, adult    6. LVH (left ventricular hypertrophy)    7. LAE (left atrial enlargement)         Plan:             Stable cardiovascular conditions at present time on current medical treatment.  Reviewed all tests and above medical conditions with patient in detail and formulated treatment plan.  Continue optimal medical treatment for cardiovascular conditions.  Cardiac low salt diet advised.  Daily exercise encouraged, with the goal 30 +  minutes aerobic exercise as tolerated.  Maintaining healthy weight and weight loss goals (if needed) were discussed in clinic.  HTN: Need for BP control and HTN goals (if needed) were discussed and tx plan formulated.  Goal < 130/80.  Continue current HTN meds.  Lipids: Importance of optimal lipid control were discussed in detail as well as possible pharmacologic and lifestyle changes that may be needed.  Restart Atorvastatin, go to 20 mg dose and have PCP check lipids as usual in future.  Sinus bradycardia: Chronic/stable. Monitor.  Abnl ecg: Monitor.  LVH: HTN control, weight loss.  LAE: HTN control.      F/u in 1 year.      I have reviewed all pertinent labs and cardiac studies independently. Plans and recommendations have been formulated under my direct supervision. All questions answered and patient voiced understanding.

## 2023-10-14 NOTE — TELEPHONE ENCOUNTER
No care due was identified.  [facility] Embedded Care Due Messages. Reference number: [id number].   10/14/2023 4:45:27 PM CDT

## 2023-10-16 RX ORDER — METFORMIN HYDROCHLORIDE 500 MG/1
500 TABLET ORAL 2 TIMES DAILY WITH MEALS
Qty: 180 TABLET | Refills: 3 | Status: SHIPPED | OUTPATIENT
Start: 2023-10-16

## 2023-10-16 NOTE — TELEPHONE ENCOUNTER
Refill Routing Note   Medication(s) are not appropriate for processing by [facility] for the following reason(s):      Clarification of medication (Rx) details  No active prescription written by provider    [facility] action(s):  Defer Care Due:  None identified            Appointments  past 12m or future 3m with PCP    Date Provider   Last Visit   7/13/2023 Sid Elizabeth MD   Next Visit   11/27/2023 Sid Elizabeth MD   ED visits in past 90 days: 0        Note composed:4:07 AM 10/16/2023

## 2023-11-27 ENCOUNTER — OFFICE VISIT (OUTPATIENT)
Dept: INTERNAL MEDICINE | Facility: CLINIC | Age: 68
End: 2023-11-27
Payer: MEDICARE

## 2023-11-27 VITALS
SYSTOLIC BLOOD PRESSURE: 122 MMHG | OXYGEN SATURATION: 98 % | RESPIRATION RATE: 20 BRPM | HEIGHT: 67 IN | TEMPERATURE: 98 F | WEIGHT: 251.13 LBS | DIASTOLIC BLOOD PRESSURE: 70 MMHG | HEART RATE: 68 BPM | BODY MASS INDEX: 39.42 KG/M2

## 2023-11-27 DIAGNOSIS — E11.9 TYPE 2 DIABETES MELLITUS WITHOUT COMPLICATION, WITHOUT LONG-TERM CURRENT USE OF INSULIN: Primary | ICD-10-CM

## 2023-11-27 DIAGNOSIS — E11.69 HYPERLIPIDEMIA ASSOCIATED WITH TYPE 2 DIABETES MELLITUS: ICD-10-CM

## 2023-11-27 DIAGNOSIS — I15.2 HYPERTENSION ASSOCIATED WITH DIABETES: Chronic | ICD-10-CM

## 2023-11-27 DIAGNOSIS — D64.9 ANEMIA, UNSPECIFIED TYPE: ICD-10-CM

## 2023-11-27 DIAGNOSIS — E78.5 HYPERLIPIDEMIA ASSOCIATED WITH TYPE 2 DIABETES MELLITUS: ICD-10-CM

## 2023-11-27 DIAGNOSIS — E55.9 VITAMIN D DEFICIENCY: ICD-10-CM

## 2023-11-27 DIAGNOSIS — E11.59 HYPERTENSION ASSOCIATED WITH DIABETES: Chronic | ICD-10-CM

## 2023-11-27 DIAGNOSIS — N40.0 BENIGN PROSTATIC HYPERPLASIA WITHOUT LOWER URINARY TRACT SYMPTOMS: ICD-10-CM

## 2023-11-27 DIAGNOSIS — Z86.010 HISTORY OF COLON POLYPS: ICD-10-CM

## 2023-11-27 DIAGNOSIS — E66.01 MORBID OBESITY WITH BMI OF 40.0-44.9, ADULT: ICD-10-CM

## 2023-11-27 PROCEDURE — 1101F PT FALLS ASSESS-DOCD LE1/YR: CPT | Mod: HCNC,CPTII,S$GLB, | Performed by: INTERNAL MEDICINE

## 2023-11-27 PROCEDURE — 1159F PR MEDICATION LIST DOCUMENTED IN MEDICAL RECORD: ICD-10-PCS | Mod: HCNC,CPTII,S$GLB, | Performed by: INTERNAL MEDICINE

## 2023-11-27 PROCEDURE — 3066F NEPHROPATHY DOC TX: CPT | Mod: HCNC,CPTII,S$GLB, | Performed by: INTERNAL MEDICINE

## 2023-11-27 PROCEDURE — 99214 OFFICE O/P EST MOD 30 MIN: CPT | Mod: HCNC,S$GLB,, | Performed by: INTERNAL MEDICINE

## 2023-11-27 PROCEDURE — 1126F AMNT PAIN NOTED NONE PRSNT: CPT | Mod: HCNC,CPTII,S$GLB, | Performed by: INTERNAL MEDICINE

## 2023-11-27 PROCEDURE — 1159F MED LIST DOCD IN RCRD: CPT | Mod: HCNC,CPTII,S$GLB, | Performed by: INTERNAL MEDICINE

## 2023-11-27 PROCEDURE — 1126F PR PAIN SEVERITY QUANTIFIED, NO PAIN PRESENT: ICD-10-PCS | Mod: HCNC,CPTII,S$GLB, | Performed by: INTERNAL MEDICINE

## 2023-11-27 PROCEDURE — 3008F PR BODY MASS INDEX (BMI) DOCUMENTED: ICD-10-PCS | Mod: HCNC,CPTII,S$GLB, | Performed by: INTERNAL MEDICINE

## 2023-11-27 PROCEDURE — 3066F PR DOCUMENTATION OF TREATMENT FOR NEPHROPATHY: ICD-10-PCS | Mod: HCNC,CPTII,S$GLB, | Performed by: INTERNAL MEDICINE

## 2023-11-27 PROCEDURE — 99999 PR PBB SHADOW E&M-EST. PATIENT-LVL IV: CPT | Mod: PBBFAC,HCNC,, | Performed by: INTERNAL MEDICINE

## 2023-11-27 PROCEDURE — 4010F PR ACE/ARB THEARPY RXD/TAKEN: ICD-10-PCS | Mod: HCNC,CPTII,S$GLB, | Performed by: INTERNAL MEDICINE

## 2023-11-27 PROCEDURE — 3051F HG A1C>EQUAL 7.0%<8.0%: CPT | Mod: HCNC,CPTII,S$GLB, | Performed by: INTERNAL MEDICINE

## 2023-11-27 PROCEDURE — 99214 PR OFFICE/OUTPT VISIT, EST, LEVL IV, 30-39 MIN: ICD-10-PCS | Mod: HCNC,S$GLB,, | Performed by: INTERNAL MEDICINE

## 2023-11-27 PROCEDURE — 3288F PR FALLS RISK ASSESSMENT DOCUMENTED: ICD-10-PCS | Mod: HCNC,CPTII,S$GLB, | Performed by: INTERNAL MEDICINE

## 2023-11-27 PROCEDURE — 3074F SYST BP LT 130 MM HG: CPT | Mod: HCNC,CPTII,S$GLB, | Performed by: INTERNAL MEDICINE

## 2023-11-27 PROCEDURE — 3078F DIAST BP <80 MM HG: CPT | Mod: HCNC,CPTII,S$GLB, | Performed by: INTERNAL MEDICINE

## 2023-11-27 PROCEDURE — 3051F PR MOST RECENT HEMOGLOBIN A1C LEVEL 7.0 - < 8.0%: ICD-10-PCS | Mod: HCNC,CPTII,S$GLB, | Performed by: INTERNAL MEDICINE

## 2023-11-27 PROCEDURE — 3074F PR MOST RECENT SYSTOLIC BLOOD PRESSURE < 130 MM HG: ICD-10-PCS | Mod: HCNC,CPTII,S$GLB, | Performed by: INTERNAL MEDICINE

## 2023-11-27 PROCEDURE — 99999 PR PBB SHADOW E&M-EST. PATIENT-LVL IV: ICD-10-PCS | Mod: PBBFAC,HCNC,, | Performed by: INTERNAL MEDICINE

## 2023-11-27 PROCEDURE — 3078F PR MOST RECENT DIASTOLIC BLOOD PRESSURE < 80 MM HG: ICD-10-PCS | Mod: HCNC,CPTII,S$GLB, | Performed by: INTERNAL MEDICINE

## 2023-11-27 PROCEDURE — 3061F NEG MICROALBUMINURIA REV: CPT | Mod: HCNC,CPTII,S$GLB, | Performed by: INTERNAL MEDICINE

## 2023-11-27 PROCEDURE — 1101F PR PT FALLS ASSESS DOC 0-1 FALLS W/OUT INJ PAST YR: ICD-10-PCS | Mod: HCNC,CPTII,S$GLB, | Performed by: INTERNAL MEDICINE

## 2023-11-27 PROCEDURE — 3288F FALL RISK ASSESSMENT DOCD: CPT | Mod: HCNC,CPTII,S$GLB, | Performed by: INTERNAL MEDICINE

## 2023-11-27 PROCEDURE — 3061F PR NEG MICROALBUMINURIA RESULT DOCUMENTED/REVIEW: ICD-10-PCS | Mod: HCNC,CPTII,S$GLB, | Performed by: INTERNAL MEDICINE

## 2023-11-27 PROCEDURE — 4010F ACE/ARB THERAPY RXD/TAKEN: CPT | Mod: HCNC,CPTII,S$GLB, | Performed by: INTERNAL MEDICINE

## 2023-11-27 PROCEDURE — 3008F BODY MASS INDEX DOCD: CPT | Mod: HCNC,CPTII,S$GLB, | Performed by: INTERNAL MEDICINE

## 2023-11-27 NOTE — PROGRESS NOTES
"HPI:  Patient is a 68-year-old man who comes today for follow-up of his diabetes, hypertension, and lipids.  Patient unfortunately did not get his lab work done prior to this appointment.  Patient denies any hypoglycemic problems.  His blood pressure home has been well controlled.  He denies any other new problems or complaints.    Current meds have been verified and updated per the EMR  Exam:/70 (BP Location: Right arm, Patient Position: Sitting, BP Method: Large (Manual))   Pulse 68   Temp 98.2 °F (36.8 °C) (Tympanic)   Resp 20   Ht 5' 7" (1.702 m)   Wt 113.9 kg (251 lb 1.7 oz)   SpO2 98%   BMI 39.33 kg/m²   Carotids 2+ equal without bruits  Chest clear  Cardiovascular regular rate and rhythm without murmur gallop or rub  Abdomen soft and benign.  No rebound or guarding or distention.  No organomegaly.    Lab Results   Component Value Date    WBC 5.49 06/08/2023    HGB 14.2 06/08/2023    HCT 43.8 06/08/2023     06/08/2023    CHOL 156 05/25/2023    TRIG 257 (H) 05/25/2023    HDL 37 (L) 05/25/2023    ALT 30 06/08/2023    AST 27 06/08/2023     06/08/2023    K 3.9 06/08/2023     06/08/2023    CREATININE 1.0 06/08/2023    BUN 17 06/08/2023    CO2 24 06/08/2023    TSH 1.862 05/25/2023    PSA 0.93 05/18/2022    INR 1.0 07/30/2022    HGBA1C 7.7 (H) 05/25/2023    BNP <10 07/30/2022    SEDRATE 27 (H) 09/26/2022    CRP 1.6 09/26/2022       Impression:  Diabetes, hypertension, lipids all need to be reassessed.  He needs to get his lab work done  Patient Active Problem List   Diagnosis    Type 2 diabetes mellitus without complication, without long-term current use of insulin    Hypertension associated with diabetes    Hyperlipidemia associated with type 2 diabetes mellitus    History of colon polyps    Vitamin D deficiency    Anemia    Decreased strength of trunk and back    Lumbar radiculopathy    Degenerative disc disease, lumbar    Lumbar discitis with Osteo L2-3    Benign prostatic " hyperplasia without lower urinary tract symptoms    Sinus bradycardia    LVH (left ventricular hypertrophy)    LAE (left atrial enlargement)       Plan:  Orders Placed This Encounter    Hemoglobin A1C    Comprehensive Metabolic Panel    Lipid Panel    TSH    CBC Auto Differential    Microalbumin/Creatinine Ratio, Urine    Vitamin D     Patient will have the lab work done today we will be in touch with him via the computer.  Patient will have the above lab work and see me in 6 months.  His medications remain the same.    This note is generated with speech recognition software and is subject to transcription error and sound alike phrases that may be missed by proofreading.

## 2023-11-28 ENCOUNTER — LAB VISIT (OUTPATIENT)
Dept: LAB | Facility: HOSPITAL | Age: 68
End: 2023-11-28
Attending: INTERNAL MEDICINE
Payer: MEDICARE

## 2023-11-28 DIAGNOSIS — Z12.5 PROSTATE CANCER SCREENING: ICD-10-CM

## 2023-11-28 DIAGNOSIS — E11.9 TYPE 2 DIABETES MELLITUS WITHOUT COMPLICATION, WITHOUT LONG-TERM CURRENT USE OF INSULIN: ICD-10-CM

## 2023-11-28 LAB
ALBUMIN SERPL BCP-MCNC: 4.2 G/DL (ref 3.5–5.2)
ALP SERPL-CCNC: 65 U/L (ref 55–135)
ALT SERPL W/O P-5'-P-CCNC: 28 U/L (ref 10–44)
ANION GAP SERPL CALC-SCNC: 8 MMOL/L (ref 8–16)
AST SERPL-CCNC: 30 U/L (ref 10–40)
BASOPHILS # BLD AUTO: 0.06 K/UL (ref 0–0.2)
BASOPHILS NFR BLD: 1.1 % (ref 0–1.9)
BILIRUB SERPL-MCNC: 0.6 MG/DL (ref 0.1–1)
BUN SERPL-MCNC: 16 MG/DL (ref 8–23)
CALCIUM SERPL-MCNC: 9.3 MG/DL (ref 8.7–10.5)
CHLORIDE SERPL-SCNC: 104 MMOL/L (ref 95–110)
CHOLEST SERPL-MCNC: 146 MG/DL (ref 120–199)
CHOLEST/HDLC SERPL: 4.4 {RATIO} (ref 2–5)
CO2 SERPL-SCNC: 28 MMOL/L (ref 23–29)
COMPLEXED PSA SERPL-MCNC: 0.59 NG/ML (ref 0–4)
CREAT SERPL-MCNC: 1.2 MG/DL (ref 0.5–1.4)
DIFFERENTIAL METHOD: ABNORMAL
EOSINOPHIL # BLD AUTO: 0.3 K/UL (ref 0–0.5)
EOSINOPHIL NFR BLD: 5.9 % (ref 0–8)
ERYTHROCYTE [DISTWIDTH] IN BLOOD BY AUTOMATED COUNT: 13.7 % (ref 11.5–14.5)
EST. GFR  (NO RACE VARIABLE): >60 ML/MIN/1.73 M^2
ESTIMATED AVG GLUCOSE: 180 MG/DL (ref 68–131)
GLUCOSE SERPL-MCNC: 148 MG/DL (ref 70–110)
HBA1C MFR BLD: 7.9 % (ref 4–5.6)
HCT VFR BLD AUTO: 44.3 % (ref 40–54)
HDLC SERPL-MCNC: 33 MG/DL (ref 40–75)
HDLC SERPL: 22.6 % (ref 20–50)
HGB BLD-MCNC: 13.8 G/DL (ref 14–18)
IMM GRANULOCYTES # BLD AUTO: 0.02 K/UL (ref 0–0.04)
IMM GRANULOCYTES NFR BLD AUTO: 0.4 % (ref 0–0.5)
LDLC SERPL CALC-MCNC: 81 MG/DL (ref 63–159)
LYMPHOCYTES # BLD AUTO: 2.9 K/UL (ref 1–4.8)
LYMPHOCYTES NFR BLD: 53.1 % (ref 18–48)
MCH RBC QN AUTO: 29.1 PG (ref 27–31)
MCHC RBC AUTO-ENTMCNC: 31.2 G/DL (ref 32–36)
MCV RBC AUTO: 93 FL (ref 82–98)
MONOCYTES # BLD AUTO: 0.4 K/UL (ref 0.3–1)
MONOCYTES NFR BLD: 7.5 % (ref 4–15)
NEUTROPHILS # BLD AUTO: 1.7 K/UL (ref 1.8–7.7)
NEUTROPHILS NFR BLD: 32 % (ref 38–73)
NONHDLC SERPL-MCNC: 113 MG/DL
NRBC BLD-RTO: 0 /100 WBC
PLATELET # BLD AUTO: 151 K/UL (ref 150–450)
PMV BLD AUTO: 12.9 FL (ref 9.2–12.9)
POTASSIUM SERPL-SCNC: 5.1 MMOL/L (ref 3.5–5.1)
PROT SERPL-MCNC: 6.9 G/DL (ref 6–8.4)
RBC # BLD AUTO: 4.75 M/UL (ref 4.6–6.2)
SODIUM SERPL-SCNC: 140 MMOL/L (ref 136–145)
TRIGL SERPL-MCNC: 160 MG/DL (ref 30–150)
TSH SERPL DL<=0.005 MIU/L-ACNC: 2.4 UIU/ML (ref 0.4–4)
WBC # BLD AUTO: 5.44 K/UL (ref 3.9–12.7)

## 2023-11-28 PROCEDURE — 36415 COLL VENOUS BLD VENIPUNCTURE: CPT | Mod: HCNC,PO | Performed by: INTERNAL MEDICINE

## 2023-11-28 PROCEDURE — 80061 LIPID PANEL: CPT | Mod: HCNC | Performed by: INTERNAL MEDICINE

## 2023-11-28 PROCEDURE — 84443 ASSAY THYROID STIM HORMONE: CPT | Mod: HCNC | Performed by: INTERNAL MEDICINE

## 2023-11-28 PROCEDURE — 84153 ASSAY OF PSA TOTAL: CPT | Mod: HCNC | Performed by: INTERNAL MEDICINE

## 2023-11-28 PROCEDURE — 83036 HEMOGLOBIN GLYCOSYLATED A1C: CPT | Mod: HCNC | Performed by: INTERNAL MEDICINE

## 2023-11-28 PROCEDURE — 85025 COMPLETE CBC W/AUTO DIFF WBC: CPT | Mod: HCNC | Performed by: INTERNAL MEDICINE

## 2023-11-28 PROCEDURE — 80053 COMPREHEN METABOLIC PANEL: CPT | Mod: HCNC | Performed by: INTERNAL MEDICINE

## 2023-11-29 ENCOUNTER — PATIENT MESSAGE (OUTPATIENT)
Dept: INTERNAL MEDICINE | Facility: CLINIC | Age: 68
End: 2023-11-29
Payer: MEDICARE

## 2023-12-07 ENCOUNTER — TELEPHONE (OUTPATIENT)
Dept: INTERNAL MEDICINE | Facility: CLINIC | Age: 68
End: 2023-12-07
Payer: MEDICARE

## 2024-03-13 ENCOUNTER — OFFICE VISIT (OUTPATIENT)
Dept: INTERNAL MEDICINE | Facility: CLINIC | Age: 69
End: 2024-03-13
Payer: MEDICARE

## 2024-03-13 ENCOUNTER — TELEPHONE (OUTPATIENT)
Dept: INTERNAL MEDICINE | Facility: CLINIC | Age: 69
End: 2024-03-13

## 2024-03-13 VITALS
OXYGEN SATURATION: 95 % | RESPIRATION RATE: 18 BRPM | WEIGHT: 245.81 LBS | BODY MASS INDEX: 38.58 KG/M2 | HEART RATE: 54 BPM | HEIGHT: 67 IN | SYSTOLIC BLOOD PRESSURE: 130 MMHG | DIASTOLIC BLOOD PRESSURE: 70 MMHG

## 2024-03-13 DIAGNOSIS — E78.5 HYPERLIPIDEMIA ASSOCIATED WITH TYPE 2 DIABETES MELLITUS: ICD-10-CM

## 2024-03-13 DIAGNOSIS — M46.46 LUMBAR DISCITIS: ICD-10-CM

## 2024-03-13 DIAGNOSIS — E11.9 TYPE 2 DIABETES MELLITUS WITHOUT COMPLICATION, WITHOUT LONG-TERM CURRENT USE OF INSULIN: ICD-10-CM

## 2024-03-13 DIAGNOSIS — E11.69 HYPERLIPIDEMIA ASSOCIATED WITH TYPE 2 DIABETES MELLITUS: ICD-10-CM

## 2024-03-13 DIAGNOSIS — E21.3 HYPERPARATHYROIDISM: ICD-10-CM

## 2024-03-13 DIAGNOSIS — E11.59 HYPERTENSION ASSOCIATED WITH DIABETES: ICD-10-CM

## 2024-03-13 DIAGNOSIS — I15.2 HYPERTENSION ASSOCIATED WITH DIABETES: ICD-10-CM

## 2024-03-13 DIAGNOSIS — N40.0 BENIGN PROSTATIC HYPERPLASIA WITHOUT LOWER URINARY TRACT SYMPTOMS: ICD-10-CM

## 2024-03-13 DIAGNOSIS — E55.9 VITAMIN D DEFICIENCY: ICD-10-CM

## 2024-03-13 DIAGNOSIS — E66.01 CLASS 2 SEVERE OBESITY DUE TO EXCESS CALORIES WITH SERIOUS COMORBIDITY AND BODY MASS INDEX (BMI) OF 38.0 TO 38.9 IN ADULT: ICD-10-CM

## 2024-03-13 DIAGNOSIS — Z00.00 ENCOUNTER FOR PREVENTIVE HEALTH EXAMINATION: Primary | ICD-10-CM

## 2024-03-13 PROCEDURE — 1159F MED LIST DOCD IN RCRD: CPT | Mod: HCNC,CPTII,S$GLB, | Performed by: NURSE PRACTITIONER

## 2024-03-13 PROCEDURE — 3078F DIAST BP <80 MM HG: CPT | Mod: HCNC,CPTII,S$GLB, | Performed by: NURSE PRACTITIONER

## 2024-03-13 PROCEDURE — 3288F FALL RISK ASSESSMENT DOCD: CPT | Mod: HCNC,CPTII,S$GLB, | Performed by: NURSE PRACTITIONER

## 2024-03-13 PROCEDURE — 1160F RVW MEDS BY RX/DR IN RCRD: CPT | Mod: HCNC,CPTII,S$GLB, | Performed by: NURSE PRACTITIONER

## 2024-03-13 PROCEDURE — 99999 PR PBB SHADOW E&M-EST. PATIENT-LVL IV: CPT | Mod: PBBFAC,HCNC,, | Performed by: NURSE PRACTITIONER

## 2024-03-13 PROCEDURE — 3075F SYST BP GE 130 - 139MM HG: CPT | Mod: HCNC,CPTII,S$GLB, | Performed by: NURSE PRACTITIONER

## 2024-03-13 PROCEDURE — G0439 PPPS, SUBSEQ VISIT: HCPCS | Mod: HCNC,S$GLB,, | Performed by: NURSE PRACTITIONER

## 2024-03-13 PROCEDURE — 1170F FXNL STATUS ASSESSED: CPT | Mod: HCNC,CPTII,S$GLB, | Performed by: NURSE PRACTITIONER

## 2024-03-13 PROCEDURE — 1126F AMNT PAIN NOTED NONE PRSNT: CPT | Mod: HCNC,CPTII,S$GLB, | Performed by: NURSE PRACTITIONER

## 2024-03-13 PROCEDURE — 4010F ACE/ARB THERAPY RXD/TAKEN: CPT | Mod: HCNC,CPTII,S$GLB, | Performed by: NURSE PRACTITIONER

## 2024-03-13 PROCEDURE — 1101F PT FALLS ASSESS-DOCD LE1/YR: CPT | Mod: HCNC,CPTII,S$GLB, | Performed by: NURSE PRACTITIONER

## 2024-03-13 NOTE — PATIENT INSTRUCTIONS
Counseling and Referral of Other Preventative  (Italic type indicates deductible and co-insurance are waived)    Patient Name: Friday Lou  Today's Date: 3/13/2024    Health Maintenance       Date Due Completion Date    Foot Exam 01/04/2022 1/4/2021 (Done)    Override on 1/4/2021: Done    COVID-19 Vaccine (6 - 2023-24 season) 09/01/2023 7/13/2022    Eye Exam 01/12/2024 1/12/2023    Pneumococcal Vaccines (Age 65+) (3 of 3 - PPSV23 or PCV20) 01/29/2024 1/4/2021    Diabetes Urine Screening 05/25/2024 5/25/2023    Hemoglobin A1c 05/28/2024 11/28/2023    Low Dose Statin 11/27/2024 11/27/2023    Lipid Panel 11/28/2024 11/28/2023    TETANUS VACCINE 11/17/2025 11/17/2015    Colorectal Cancer Screening 12/14/2027 12/14/2022        No orders of the defined types were placed in this encounter.      The following information is provided to all patients.  This information is to help you find resources for any of the problems found today that may be affecting your health:                  Living healthy guide: www.[facility].[place].gov      Understanding Diabetes: www.diabetes.org      Eating healthy: www.cdc.gov/healthyweight      CDC home safety checklist: www.cdc.gov/steadi/patient.html      Agency on Aging: www.goea.[place].[facility]      Alcoholics anonymous (AA): www.aa.org      Physical Activity: www.kaiser.nih.gov/ii3qehb      Tobacco use: www.quitwithusla.org

## 2024-03-13 NOTE — PROGRESS NOTES
"  Friday Lou presented for a  Medicare AWV and comprehensive Health Risk Assessment today. The following components were reviewed and updated:    Medical history  Family History  Social history  Allergies and Current Medications  Health Risk Assessment  Health Maintenance  Care Team         ** See Completed Assessments for Annual Wellness Visit within the encounter summary.**         The following assessments were completed:  Living Situation  CAGE  Depression Screening  Timed Get Up and Go  Whisper Test  Cognitive Function Screening    Nutrition Screening  ADL Screening  PAQ Screening      Opioid documentation:      Patient does not have a current opioid prescription.        Vitals:    03/13/24 0709   BP: 130/70   Pulse: (!) 54   Resp: 18   SpO2: 95%   Weight: 111.5 kg (245 lb 13 oz)   Height: 5' 7" (1.702 m)     Body mass index is 38.5 kg/m².  Physical Exam  Constitutional:       General: He is not in acute distress.     Appearance: Normal appearance. He is well-developed. He is obese. He is not ill-appearing, toxic-appearing or diaphoretic.   HENT:      Head: Normocephalic and atraumatic.      Right Ear: External ear normal.      Left Ear: External ear normal.      Mouth/Throat:      Mouth: Mucous membranes are moist.   Eyes:      Conjunctiva/sclera: Conjunctivae normal.   Neck:      Vascular: No carotid bruit.   Cardiovascular:      Rate and Rhythm: Normal rate and regular rhythm.      Pulses: Normal pulses.      Heart sounds: Normal heart sounds. No murmur heard.     No friction rub. No gallop.   Pulmonary:      Effort: Pulmonary effort is normal. No respiratory distress.      Breath sounds: Normal breath sounds. No stridor. No wheezing, rhonchi or rales.   Chest:      Chest wall: No tenderness.   Abdominal:      General: Bowel sounds are normal. There is no distension.      Palpations: Abdomen is soft. There is no mass.      Tenderness: There is no abdominal tenderness.      Hernia: No hernia is present. "   Musculoskeletal:         General: No tenderness. Normal range of motion.      Cervical back: No rigidity or tenderness.   Lymphadenopathy:      Cervical: No cervical adenopathy.   Skin:     General: Skin is warm and dry.   Neurological:      Mental Status: He is alert and oriented to person, place, and time.      Cranial Nerves: No cranial nerve deficit.   Psychiatric:         Mood and Affect: Mood normal.         Behavior: Behavior normal.               Diagnoses and health risks identified today and associated recommendations/orders:    1. Encounter for preventive health examination  Screenings performed, as noted above.  Personal preventative testing needs reviewed.      2. Hyperparathyroidism  Monitored, stable, follow up with pcp    3. Hypertension associated with diabetes  Treated with amlodipine, irbesartan, stable, cont tx    4. Hyperlipidemia associated with type 2 diabetes mellitus  Treated with atorvastatin, stable, cont tx    5. Benign prostatic hyperplasia without lower urinary tract symptoms  Monitored, stable, follow up with pcp    6. Lumbar discitis with Osteo L2-3  Assessed, hx of complicated back surgery, stays active    7. Type 2 diabetes mellitus without complication, without long-term current use of insulin  Treated with Metformin, diet, states has improved his diet, follow up with Dr Elizabeth    8. Vitamin D deficiency  Treated with supplements, stable, cont tx    9. Class 2 severe obesity due to excess calories with serious comorbidity and body mass index (BMI) of 38.0 to 38.9 in adult  Monitored, unstable, heart healthy diet and exercise to tolerance      Provided Friday with a 5-10 year written screening schedule and personal prevention plan. Recommendations were developed using the USPSTF age appropriate recommendations. Education, counseling, and referrals were provided as needed. After Visit Summary printed and given to patient which includes a list of additional screenings\tests  needed.    No follow-ups on file.    Ricardo An, NP  I offered to discuss advanced care planning, including how to pick a person who would make decisions for you if you were unable to make them for yourself, called a health care power of , and what kind of decisions you might make such as use of life sustaining treatments such as ventilators and tube feeding when faced with a life limiting illness recorded on a living will that they will need to know. (How you want to be cared for as you near the end of your natural life)     X Patient is interested in learning more about how to make advanced directives.  I provided them paperwork and offered to discuss this with them.

## 2024-03-13 NOTE — TELEPHONE ENCOUNTER
----- Message from Ricardo An NP sent at 3/13/2024  7:48 AM CDT -----  Regarding: appt  Please schedule his diabetic eye exam and send through portal, [facility] or [facility]  thanks

## 2024-03-14 ENCOUNTER — OFFICE VISIT (OUTPATIENT)
Dept: OPHTHALMOLOGY | Facility: CLINIC | Age: 69
End: 2024-03-14
Payer: MEDICARE

## 2024-03-14 DIAGNOSIS — Z96.1 PSEUDOPHAKIA OF LEFT EYE: ICD-10-CM

## 2024-03-14 DIAGNOSIS — H52.7 REFRACTIVE ERRORS: ICD-10-CM

## 2024-03-14 DIAGNOSIS — E11.9 TYPE 2 DIABETES MELLITUS WITHOUT RETINOPATHY: Primary | ICD-10-CM

## 2024-03-14 DIAGNOSIS — E11.36 DIABETIC CATARACT: ICD-10-CM

## 2024-03-14 PROCEDURE — 4010F ACE/ARB THERAPY RXD/TAKEN: CPT | Mod: HCNC,CPTII,S$GLB, | Performed by: OPTOMETRIST

## 2024-03-14 PROCEDURE — 1159F MED LIST DOCD IN RCRD: CPT | Mod: HCNC,CPTII,S$GLB, | Performed by: OPTOMETRIST

## 2024-03-14 PROCEDURE — 92014 COMPRE OPH EXAM EST PT 1/>: CPT | Mod: HCNC,S$GLB,, | Performed by: OPTOMETRIST

## 2024-03-14 PROCEDURE — 92015 DETERMINE REFRACTIVE STATE: CPT | Mod: HCNC,S$GLB,, | Performed by: OPTOMETRIST

## 2024-03-14 PROCEDURE — 2023F DILAT RTA XM W/O RTNOPTHY: CPT | Mod: HCNC,CPTII,S$GLB, | Performed by: OPTOMETRIST

## 2024-03-14 PROCEDURE — 99999 PR PBB SHADOW E&M-EST. PATIENT-LVL II: CPT | Mod: PBBFAC,HCNC,, | Performed by: OPTOMETRIST

## 2024-03-14 NOTE — PROGRESS NOTES
SUBJECTIVE  Friday ROGELIO Blake is 68 y.o. male  Uncorrected distance visual acuity was 20/25 -2 in the right eye and 20/40 -2 in the left eye. Uncorrected near visual acuity was J1 in the right eye and J1 in the left eye.   Chief Complaint   Patient presents with    Diabetic Eye Exam     Lab Results       Component                Value               Date                       HGBA1C                   7.9 (H)             11/28/2023                    HPI     Diabetic Eye Exam     Additional comments: Lab Results       Component                Value               Date                       HGBA1C                   7.9 (H)             11/28/2023                     Comments    Patient states no visual complaints  No ocular pain/discomfort  Not using any otc drops  Does not wear any type of eyewear           Last edited by Cindy Estrella on 3/14/2024  4:09 PM.         Assessment /Plan :  1. Type 2 diabetes mellitus without retinopathy   No Background Diabetic Retinopathy  Strict BG control, f/u w/ PCP, and annual DFE  Stressed importance of DM control to preserve vision      2. Diabetic cataract   Worsening cataract OD  Cataracts are not visually significant and not affecting activities of daily living. Annual observation is recommended at this time. Patient to call or return to clinic with any significant change in vision prior to next visit.     3. Pseudophakia of left eye   Well-centered, stable IOL OS. Monitor annually.      4. Refractive errors   Dispense Final Rx for glasses.  RTC 1 year  Discussed above and answered questions.

## 2024-05-20 ENCOUNTER — LAB VISIT (OUTPATIENT)
Dept: LAB | Facility: HOSPITAL | Age: 69
End: 2024-05-20
Attending: INTERNAL MEDICINE
Payer: MEDICARE

## 2024-05-20 DIAGNOSIS — E11.9 TYPE 2 DIABETES MELLITUS WITHOUT COMPLICATION, WITHOUT LONG-TERM CURRENT USE OF INSULIN: ICD-10-CM

## 2024-05-20 DIAGNOSIS — E55.9 VITAMIN D DEFICIENCY: ICD-10-CM

## 2024-05-20 LAB
25(OH)D3+25(OH)D2 SERPL-MCNC: 25 NG/ML (ref 30–96)
ALBUMIN SERPL BCP-MCNC: 4.2 G/DL (ref 3.5–5.2)
ALBUMIN/CREAT UR: 9.8 UG/MG (ref 0–30)
ALP SERPL-CCNC: 62 U/L (ref 55–135)
ALT SERPL W/O P-5'-P-CCNC: 28 U/L (ref 10–44)
ANION GAP SERPL CALC-SCNC: 8 MMOL/L (ref 8–16)
AST SERPL-CCNC: 23 U/L (ref 10–40)
BASOPHILS # BLD AUTO: 0.04 K/UL (ref 0–0.2)
BASOPHILS NFR BLD: 0.7 % (ref 0–1.9)
BILIRUB SERPL-MCNC: 0.7 MG/DL (ref 0.1–1)
BUN SERPL-MCNC: 15 MG/DL (ref 8–23)
CALCIUM SERPL-MCNC: 10 MG/DL (ref 8.7–10.5)
CHLORIDE SERPL-SCNC: 105 MMOL/L (ref 95–110)
CHOLEST SERPL-MCNC: 114 MG/DL (ref 120–199)
CHOLEST/HDLC SERPL: 3.7 {RATIO} (ref 2–5)
CO2 SERPL-SCNC: 26 MMOL/L (ref 23–29)
CREAT SERPL-MCNC: 1 MG/DL (ref 0.5–1.4)
CREAT UR-MCNC: 112 MG/DL (ref 23–375)
DIFFERENTIAL METHOD BLD: ABNORMAL
EOSINOPHIL # BLD AUTO: 0.3 K/UL (ref 0–0.5)
EOSINOPHIL NFR BLD: 5.5 % (ref 0–8)
ERYTHROCYTE [DISTWIDTH] IN BLOOD BY AUTOMATED COUNT: 14.2 % (ref 11.5–14.5)
EST. GFR  (NO RACE VARIABLE): >60 ML/MIN/1.73 M^2
ESTIMATED AVG GLUCOSE: 203 MG/DL (ref 68–131)
GLUCOSE SERPL-MCNC: 144 MG/DL (ref 70–110)
HBA1C MFR BLD: 8.7 % (ref 4–5.6)
HCT VFR BLD AUTO: 43.9 % (ref 40–54)
HDLC SERPL-MCNC: 31 MG/DL (ref 40–75)
HDLC SERPL: 27.2 % (ref 20–50)
HGB BLD-MCNC: 14 G/DL (ref 14–18)
IMM GRANULOCYTES # BLD AUTO: 0.01 K/UL (ref 0–0.04)
IMM GRANULOCYTES NFR BLD AUTO: 0.2 % (ref 0–0.5)
LDLC SERPL CALC-MCNC: 60.6 MG/DL (ref 63–159)
LYMPHOCYTES # BLD AUTO: 3.1 K/UL (ref 1–4.8)
LYMPHOCYTES NFR BLD: 54.9 % (ref 18–48)
MCH RBC QN AUTO: 29.5 PG (ref 27–31)
MCHC RBC AUTO-ENTMCNC: 31.9 G/DL (ref 32–36)
MCV RBC AUTO: 92 FL (ref 82–98)
MICROALBUMIN UR DL<=1MG/L-MCNC: 11 UG/ML
MONOCYTES # BLD AUTO: 0.3 K/UL (ref 0.3–1)
MONOCYTES NFR BLD: 6 % (ref 4–15)
NEUTROPHILS # BLD AUTO: 1.9 K/UL (ref 1.8–7.7)
NEUTROPHILS NFR BLD: 32.7 % (ref 38–73)
NONHDLC SERPL-MCNC: 83 MG/DL
NRBC BLD-RTO: 0 /100 WBC
PLATELET # BLD AUTO: 150 K/UL (ref 150–450)
PMV BLD AUTO: 12.7 FL (ref 9.2–12.9)
POTASSIUM SERPL-SCNC: 4.8 MMOL/L (ref 3.5–5.1)
PROT SERPL-MCNC: 7 G/DL (ref 6–8.4)
RBC # BLD AUTO: 4.75 M/UL (ref 4.6–6.2)
SODIUM SERPL-SCNC: 139 MMOL/L (ref 136–145)
TRIGL SERPL-MCNC: 112 MG/DL (ref 30–150)
TSH SERPL DL<=0.005 MIU/L-ACNC: 2.04 UIU/ML (ref 0.4–4)
WBC # BLD AUTO: 5.65 K/UL (ref 3.9–12.7)

## 2024-05-20 PROCEDURE — 82043 UR ALBUMIN QUANTITATIVE: CPT | Mod: HCNC | Performed by: INTERNAL MEDICINE

## 2024-05-20 PROCEDURE — 36415 COLL VENOUS BLD VENIPUNCTURE: CPT | Mod: HCNC,PO | Performed by: INTERNAL MEDICINE

## 2024-05-20 PROCEDURE — 84443 ASSAY THYROID STIM HORMONE: CPT | Mod: HCNC | Performed by: INTERNAL MEDICINE

## 2024-05-20 PROCEDURE — 80053 COMPREHEN METABOLIC PANEL: CPT | Mod: HCNC | Performed by: INTERNAL MEDICINE

## 2024-05-20 PROCEDURE — 82306 VITAMIN D 25 HYDROXY: CPT | Mod: HCNC | Performed by: INTERNAL MEDICINE

## 2024-05-20 PROCEDURE — 85025 COMPLETE CBC W/AUTO DIFF WBC: CPT | Mod: HCNC | Performed by: INTERNAL MEDICINE

## 2024-05-20 PROCEDURE — 83036 HEMOGLOBIN GLYCOSYLATED A1C: CPT | Mod: HCNC | Performed by: INTERNAL MEDICINE

## 2024-05-20 PROCEDURE — 80061 LIPID PANEL: CPT | Mod: HCNC | Performed by: INTERNAL MEDICINE

## 2024-05-27 ENCOUNTER — OFFICE VISIT (OUTPATIENT)
Dept: INTERNAL MEDICINE | Facility: CLINIC | Age: 69
End: 2024-05-27
Payer: MEDICARE

## 2024-05-27 VITALS
SYSTOLIC BLOOD PRESSURE: 130 MMHG | DIASTOLIC BLOOD PRESSURE: 82 MMHG | TEMPERATURE: 98 F | OXYGEN SATURATION: 95 % | BODY MASS INDEX: 38.19 KG/M2 | HEART RATE: 69 BPM | WEIGHT: 243.81 LBS

## 2024-05-27 DIAGNOSIS — N40.0 BENIGN PROSTATIC HYPERPLASIA WITHOUT LOWER URINARY TRACT SYMPTOMS: ICD-10-CM

## 2024-05-27 DIAGNOSIS — I51.7 LAE (LEFT ATRIAL ENLARGEMENT): ICD-10-CM

## 2024-05-27 DIAGNOSIS — E78.5 HYPERLIPIDEMIA ASSOCIATED WITH TYPE 2 DIABETES MELLITUS: ICD-10-CM

## 2024-05-27 DIAGNOSIS — I51.7 LVH (LEFT VENTRICULAR HYPERTROPHY): ICD-10-CM

## 2024-05-27 DIAGNOSIS — D64.9 ANEMIA, UNSPECIFIED TYPE: ICD-10-CM

## 2024-05-27 DIAGNOSIS — E21.3 HYPERPARATHYROIDISM: ICD-10-CM

## 2024-05-27 DIAGNOSIS — E11.69 HYPERLIPIDEMIA ASSOCIATED WITH TYPE 2 DIABETES MELLITUS: ICD-10-CM

## 2024-05-27 DIAGNOSIS — E11.59 HYPERTENSION ASSOCIATED WITH DIABETES: Chronic | ICD-10-CM

## 2024-05-27 DIAGNOSIS — E66.01 CLASS 2 SEVERE OBESITY DUE TO EXCESS CALORIES WITH SERIOUS COMORBIDITY AND BODY MASS INDEX (BMI) OF 38.0 TO 38.9 IN ADULT: ICD-10-CM

## 2024-05-27 DIAGNOSIS — I15.2 HYPERTENSION ASSOCIATED WITH DIABETES: Chronic | ICD-10-CM

## 2024-05-27 DIAGNOSIS — Z00.00 ROUTINE GENERAL MEDICAL EXAMINATION AT A HEALTH CARE FACILITY: Primary | ICD-10-CM

## 2024-05-27 DIAGNOSIS — E11.9 TYPE 2 DIABETES MELLITUS WITHOUT COMPLICATION, WITHOUT LONG-TERM CURRENT USE OF INSULIN: ICD-10-CM

## 2024-05-27 DIAGNOSIS — E55.9 VITAMIN D DEFICIENCY: ICD-10-CM

## 2024-05-27 DIAGNOSIS — Z86.010 HISTORY OF COLON POLYPS: ICD-10-CM

## 2024-05-27 DIAGNOSIS — R00.1 SINUS BRADYCARDIA: ICD-10-CM

## 2024-05-27 PROCEDURE — 4010F ACE/ARB THERAPY RXD/TAKEN: CPT | Mod: HCNC,CPTII,S$GLB, | Performed by: INTERNAL MEDICINE

## 2024-05-27 PROCEDURE — 1159F MED LIST DOCD IN RCRD: CPT | Mod: HCNC,CPTII,S$GLB, | Performed by: INTERNAL MEDICINE

## 2024-05-27 PROCEDURE — 3075F SYST BP GE 130 - 139MM HG: CPT | Mod: HCNC,CPTII,S$GLB, | Performed by: INTERNAL MEDICINE

## 2024-05-27 PROCEDURE — 3052F HG A1C>EQUAL 8.0%<EQUAL 9.0%: CPT | Mod: HCNC,CPTII,S$GLB, | Performed by: INTERNAL MEDICINE

## 2024-05-27 PROCEDURE — 3066F NEPHROPATHY DOC TX: CPT | Mod: HCNC,CPTII,S$GLB, | Performed by: INTERNAL MEDICINE

## 2024-05-27 PROCEDURE — 3079F DIAST BP 80-89 MM HG: CPT | Mod: HCNC,CPTII,S$GLB, | Performed by: INTERNAL MEDICINE

## 2024-05-27 PROCEDURE — 1101F PT FALLS ASSESS-DOCD LE1/YR: CPT | Mod: HCNC,CPTII,S$GLB, | Performed by: INTERNAL MEDICINE

## 2024-05-27 PROCEDURE — 3288F FALL RISK ASSESSMENT DOCD: CPT | Mod: HCNC,CPTII,S$GLB, | Performed by: INTERNAL MEDICINE

## 2024-05-27 PROCEDURE — 1160F RVW MEDS BY RX/DR IN RCRD: CPT | Mod: HCNC,CPTII,S$GLB, | Performed by: INTERNAL MEDICINE

## 2024-05-27 PROCEDURE — 99999 PR PBB SHADOW E&M-EST. PATIENT-LVL III: CPT | Mod: PBBFAC,HCNC,, | Performed by: INTERNAL MEDICINE

## 2024-05-27 PROCEDURE — 99397 PER PM REEVAL EST PAT 65+ YR: CPT | Mod: HCNC,S$GLB,, | Performed by: INTERNAL MEDICINE

## 2024-05-27 PROCEDURE — 3008F BODY MASS INDEX DOCD: CPT | Mod: HCNC,CPTII,S$GLB, | Performed by: INTERNAL MEDICINE

## 2024-05-27 PROCEDURE — 3061F NEG MICROALBUMINURIA REV: CPT | Mod: HCNC,CPTII,S$GLB, | Performed by: INTERNAL MEDICINE

## 2024-05-27 PROCEDURE — 1126F AMNT PAIN NOTED NONE PRSNT: CPT | Mod: HCNC,CPTII,S$GLB, | Performed by: INTERNAL MEDICINE

## 2024-05-27 RX ORDER — IRBESARTAN 300 MG/1
300 TABLET ORAL NIGHTLY
Qty: 90 TABLET | Refills: 3 | Status: SHIPPED | OUTPATIENT
Start: 2024-05-27

## 2024-05-27 RX ORDER — TIRZEPATIDE 2.5 MG/.5ML
2.5 INJECTION, SOLUTION SUBCUTANEOUS
Qty: 4 PEN | Refills: 1 | Status: SHIPPED | OUTPATIENT
Start: 2024-05-27

## 2024-05-27 NOTE — PROGRESS NOTES
HPI:  Patient is a 68-year-old man who comes in today for follow-up of diabetes, hypertension, lipids, and for his annual physical exam.  Patient has been doing fairly well.  He denies any chest pains or shortness a breath.  His blood pressure home has been in the 120-130/80 range.  He admits that his blood sugars have gone up recently.  He denies any hypoglycemic problems at all.  He denies any other problems or complaints.      Current MEDS: medcard review, verified and update  Allergies: Per the electronic medical record    Past Medical History:   Diagnosis Date    Anemia 03/23/2021    Bilateral carpal tunnel syndrome 02/25/2022    moderate demyelinating bilaterally    History of colon polyps     Hyperlipidemia associated with type 2 diabetes mellitus     Hypertension associated with diabetes     Low back pain 09/28/2021    Lumbar radiculopathy     Morbid obesity with BMI of 40.0-44.9, adult     Type 2 diabetes mellitus without complication, without long-term current use of insulin     Vitamin D deficiency        Past Surgical History:   Procedure Laterality Date    CARPAL TUNNEL RELEASE Left 4/1/2021    Procedure: RELEASE, CARPAL TUNNEL;  Surgeon: Yoandy David MD;  Location: [facility] OR;  Service: Orthopedics;  Laterality: Left;    COLONOSCOPY N/A 12/14/2022    Procedure: COLONOSCOPY;  Surgeon: Belia Gómez DO;  Location: [facility] ENDO;  Service: Endoscopy;  Laterality: N/A;    HERNIA REPAIR      INCISION AND DRAINAGE OF HEMATOMA N/A 6/28/2022    Procedure: INCISION AND DRAINAGE, HEMATOMA;  Surgeon: Shaggy Zepeda MD;  Location: [facility] OR;  Service: Neurosurgery;  Laterality: N/A;    LUMBAR LAMINECTOMY WITH DISCECTOMY Left 6/22/2022    Procedure: LAMINECTOMY, SPINE, LUMBAR, WITH DISCECTOMY;  Surgeon: Shaggy Zepeda MD;  Location: [facility] OR;  Service: Neurosurgery;  Laterality: Left;  minimally invasive L2-3 discectomy and decompression     LUMBAR LAMINECTOMY WITH DISCECTOMY N/A 6/28/2022    Procedure:  LAMINECTOMY, SPINE, LUMBAR, WITH DISCECTOMY;  Surgeon: Shaggy Zepeda MD;  Location: [facility] OR;  Service: Neurosurgery;  Laterality: N/A;    PARATHYROIDECTOMY Bilateral 7/27/2021    Procedure: PARATHYROIDECTOMY;  Surgeon: Tha Dial MD;  Location: [facility] OR;  Service: ENT;  Laterality: Bilateral;  with medialstinal exploration    REPAIR OF CEREBROSPINAL FLUID LEAK USING COMPUTER ASSISTED NAVIGATION Bilateral 8/3/2022    Procedure: REPAIR, CSF LEAK, USING COMPUTER-ASSISTED NAVIGATION;  Surgeon: Shaggy Zepeda MD;  Location: [facility] OR;  Service: Neurosurgery;  Laterality: Bilateral;    SELECTIVE INJECTION OF ANESTHETIC AGENT AROUND LUMBAR SPINAL NERVE ROOT BY TRANSFORAMINAL APPROACH Left 3/23/2022    Procedure: BLOCK, SPINAL NERVE ROOT, LUMBAR, SELECTIVE, TRANSFORAMINAL APPROACH Left L2-3, L3-4 RN IV sedation;  Surgeon: Jay Hodges MD;  Location: [facility] PAIN MGT;  Service: Pain Management;  Laterality: Left;    STERNOTOMY N/A 7/27/2021    Procedure: STERNOTOMY;  Surgeon: Tha Dial MD;  Location: [facility] OR;  Service: ENT;  Laterality: N/A;    ULNAR NERVE TRANSPOSITION Left 4/1/2021    Procedure: TRANSPOSITION, NERVE, ULNAR;  Surgeon: Yoandy David MD;  Location: [facility] OR;  Service: Orthopedics;  Laterality: Left;    ULNAR TUNNEL RELEASE Left 4/1/2021    Procedure: RELEASE, CUBITAL TUNNEL;  Surgeon: Yoandy David MD;  Location: [facility] OR;  Service: Orthopedics;  Laterality: Left;    UMBILICAL HERNIA REPAIR      WOUND EXPLORATION Bilateral 8/3/2022    Procedure: EXPLORATION, WOUND;  Surgeon: Shaggy Zepeda MD;  Location: [facility];  Service: Neurosurgery;  Laterality: Bilateral;       SHx: per the electronic medical record    FHx: recorded in the electronic medical record    ROS:    denies any chest pains or shortness of breath. Denies any nausea, vomiting or diarrhea. Denies any fever, chills or sweats. Denies any change in weight, voice, stool, skin or hair. Denies any dysuria, dyspepsia or dysphagia.  Denies any change in vision, hearing or headaches. Denies any swollen lymph nodes or loss of memory.    PE:  /82 (BP Location: Left arm, Patient Position: Sitting, BP Method: Large (Manual))   Pulse 69   Temp 97.9 °F (36.6 °C) (Tympanic)   Wt 110.6 kg (243 lb 13.3 oz)   SpO2 95%   BMI 38.19 kg/m²   Gen: Well-developed, well-nourished, male, in no acute distress, oriented x3  HEENT: neck is supple, no adenopathy, carotids 2+ equal without bruits, thyroid exam normal size without nodules.  CHEST: clear to auscultation and percussion  CVS: regular rate and rhythm without significant murmur, gallop, or rubs  ABD: soft, benign, no rebound no guarding, no distention.  Bowel sounds are normal.     nontender.  No palpable masses.  No organomegaly and no audible bruits.  RECTAL:  Deferred.  EXT: no clubbing, cyanosis, or edema  LYMPH: no cervical, inguinal, or axillary adenopathy  FEET: no loss of sensation.  No ulcers or pressure sores.  Monofilament testing normal.  NEURO: gait normal.  Cranial nerves II- XII intact. No nystagmus.  Speech normal.   Gross motor and sensory unremarkable.    Lab Results   Component Value Date    WBC 5.65 05/20/2024    HGB 14.0 05/20/2024    HCT 43.9 05/20/2024     05/20/2024    CHOL 114 (L) 05/20/2024    TRIG 112 05/20/2024    HDL 31 (L) 05/20/2024    ALT 28 05/20/2024    AST 23 05/20/2024     05/20/2024    K 4.8 05/20/2024     05/20/2024    CREATININE 1.0 05/20/2024    BUN 15 05/20/2024    CO2 26 05/20/2024    TSH 2.040 05/20/2024    PSA 0.59 11/28/2023    INR 1.0 07/30/2022    HGBA1C 8.7 (H) 05/20/2024    BNP <10 07/30/2022    SEDRATE 27 (H) 09/26/2022    CRP 1.6 09/26/2022       Impression:  Diabetes, not to goal  Hypertension and lipids, both well controlled on current therapy  History of colon polyps, not due for repeat for another 3 years  Other medical problems as outlined below, stable  Patient Active Problem List   Diagnosis    Type 2 diabetes mellitus  without complication, without long-term current use of insulin    Hypertension associated with diabetes    Hyperlipidemia associated with type 2 diabetes mellitus    Class 2 severe obesity due to excess calories with serious comorbidity and body mass index (BMI) of 38.0 to 38.9 in adult    History of colon polyps    Vitamin D deficiency    Anemia    Decreased strength of trunk and back    Lumbar radiculopathy    Degenerative disc disease, lumbar    Lumbar discitis with Osteo L2-3    Benign prostatic hyperplasia without lower urinary tract symptoms    Sinus bradycardia    LVH (left ventricular hypertrophy)    LAE (left atrial enlargement)    Hyperparathyroidism       Plan:   Orders Placed This Encounter    Hemoglobin A1C    Basic Metabolic Panel    tirzepatide (MOUNJARO) 2.5 mg/0.5 mL PnIj   Patient will be started on Mounjaro 2.5 mg and we will titrate the dose.  Patient will see me again in 3 months with above lab work.    This note is generated with speech recognition software and is subject to transcription error and sound alike phrases that may be missed by proofreading.

## 2024-06-22 ENCOUNTER — PATIENT MESSAGE (OUTPATIENT)
Dept: INTERNAL MEDICINE | Facility: CLINIC | Age: 69
End: 2024-06-22
Payer: MEDICARE

## 2024-06-22 DIAGNOSIS — E11.9 DIABETES MELLITUS WITHOUT COMPLICATION: Primary | ICD-10-CM

## 2024-06-24 RX ORDER — TIRZEPATIDE 5 MG/.5ML
5 INJECTION, SOLUTION SUBCUTANEOUS
Qty: 4 PEN | Refills: 1 | Status: SHIPPED | OUTPATIENT
Start: 2024-06-24

## 2024-07-10 ENCOUNTER — TELEPHONE (OUTPATIENT)
Dept: INTERNAL MEDICINE | Facility: CLINIC | Age: 69
End: 2024-07-10
Payer: MEDICARE

## 2024-07-10 NOTE — TELEPHONE ENCOUNTER
Call patient and tell him I received his letter.  Tell him I have reviewed his medical records.  Tell him he does have a history of hypertension in his currently on several occasions, ibersartan and and amlodipine, for treating his hypertension.  I do not find any documentation in our medical records of any history of peripheral vascular disease.  I have no idea where the insurance company is getting that data.  More than likely is coming from reviewing insurance claims from other physicians.  Without knowing where they are getting that diagnosis it is hard for me to comment.  All I can say is that there has nothing in the [facility] system documenting any history of peripheral vascular disease.  Tell him that if I write a letter I have to confirm the history of hypertension.  That would also greatly reduce the likelihood that they would approve your application

## 2024-07-11 ENCOUNTER — TELEPHONE (OUTPATIENT)
Dept: INTERNAL MEDICINE | Facility: CLINIC | Age: 69
End: 2024-07-11
Payer: MEDICARE

## 2024-07-11 ENCOUNTER — PATIENT MESSAGE (OUTPATIENT)
Dept: INTERNAL MEDICINE | Facility: CLINIC | Age: 69
End: 2024-07-11
Payer: MEDICARE

## 2024-07-11 NOTE — TELEPHONE ENCOUNTER
I have dictated letter. You should be able to see in your MyChart. I have also printed a copy of the letter and you can come to my office to pick it up if you choose.

## 2024-07-11 NOTE — TELEPHONE ENCOUNTER
----- Message from Aarti Wilkerson sent at 7/11/2024  7:44 AM CDT -----  Contact: Friday   Patient is returning a phone call.    Who left a message for the patient: Ambroseag     Does patient know what this is regarding:  a letter     Would you like a call back, or a response through your [facility] portal?: call back     Comments:

## 2024-07-11 NOTE — LETTER
July 11, 2024 Friday ROGELIO Blake  [address] [place] [address]             [facility]  [address] [place] [address]  Phone: [phone]  Fax: [phone] To whom it May concern:     Friday Lou is a patient of mine who I treatment fall for diabetes and hypertension.  Patient has no history of any peripheral vascular disease.    If you have any questions or concerns, please don't hesitate to call.    Sincerely,        Sid Elizabeth MD

## 2024-08-20 ENCOUNTER — LAB VISIT (OUTPATIENT)
Dept: LAB | Facility: HOSPITAL | Age: 69
End: 2024-08-20
Attending: INTERNAL MEDICINE
Payer: MEDICARE

## 2024-08-20 DIAGNOSIS — E11.9 TYPE 2 DIABETES MELLITUS WITHOUT COMPLICATION, WITHOUT LONG-TERM CURRENT USE OF INSULIN: ICD-10-CM

## 2024-08-20 PROCEDURE — 36415 COLL VENOUS BLD VENIPUNCTURE: CPT | Mod: HCNC,PO | Performed by: INTERNAL MEDICINE

## 2024-08-20 PROCEDURE — 83036 HEMOGLOBIN GLYCOSYLATED A1C: CPT | Mod: HCNC | Performed by: INTERNAL MEDICINE

## 2024-08-20 PROCEDURE — 80048 BASIC METABOLIC PNL TOTAL CA: CPT | Mod: HCNC | Performed by: INTERNAL MEDICINE

## 2024-08-21 LAB
ANION GAP SERPL CALC-SCNC: 9 MMOL/L (ref 8–16)
BUN SERPL-MCNC: 13 MG/DL (ref 8–23)
CALCIUM SERPL-MCNC: 10.1 MG/DL (ref 8.7–10.5)
CHLORIDE SERPL-SCNC: 106 MMOL/L (ref 95–110)
CO2 SERPL-SCNC: 23 MMOL/L (ref 23–29)
CREAT SERPL-MCNC: 1 MG/DL (ref 0.5–1.4)
EST. GFR  (NO RACE VARIABLE): >60 ML/MIN/1.73 M^2
ESTIMATED AVG GLUCOSE: 146 MG/DL (ref 68–131)
GLUCOSE SERPL-MCNC: 98 MG/DL (ref 70–110)
HBA1C MFR BLD: 6.7 % (ref 4–5.6)
POTASSIUM SERPL-SCNC: 5 MMOL/L (ref 3.5–5.1)
SODIUM SERPL-SCNC: 138 MMOL/L (ref 136–145)

## 2024-08-25 DIAGNOSIS — E11.9 DIABETES MELLITUS WITHOUT COMPLICATION: ICD-10-CM

## 2024-08-26 RX ORDER — TIRZEPATIDE 5 MG/.5ML
INJECTION, SOLUTION SUBCUTANEOUS
Qty: 4 PEN | Refills: 11 | Status: SHIPPED | OUTPATIENT
Start: 2024-08-26

## 2024-09-04 ENCOUNTER — OFFICE VISIT (OUTPATIENT)
Dept: PODIATRY | Facility: CLINIC | Age: 69
End: 2024-09-04
Payer: MEDICARE

## 2024-09-04 ENCOUNTER — OFFICE VISIT (OUTPATIENT)
Dept: INTERNAL MEDICINE | Facility: CLINIC | Age: 69
End: 2024-09-04
Payer: MEDICARE

## 2024-09-04 VITALS — WEIGHT: 227.75 LBS | HEIGHT: 67 IN | BODY MASS INDEX: 35.75 KG/M2

## 2024-09-04 VITALS
HEART RATE: 77 BPM | TEMPERATURE: 98 F | SYSTOLIC BLOOD PRESSURE: 126 MMHG | WEIGHT: 227.75 LBS | BODY MASS INDEX: 35.75 KG/M2 | HEIGHT: 67 IN | DIASTOLIC BLOOD PRESSURE: 64 MMHG | OXYGEN SATURATION: 98 %

## 2024-09-04 DIAGNOSIS — Z12.5 SCREENING FOR PROSTATE CANCER: ICD-10-CM

## 2024-09-04 DIAGNOSIS — R00.1 SINUS BRADYCARDIA: ICD-10-CM

## 2024-09-04 DIAGNOSIS — E11.59 HYPERTENSION ASSOCIATED WITH DIABETES: Chronic | ICD-10-CM

## 2024-09-04 DIAGNOSIS — D64.9 ANEMIA, UNSPECIFIED TYPE: ICD-10-CM

## 2024-09-04 DIAGNOSIS — B35.1 DERMATOPHYTOSIS OF NAIL: ICD-10-CM

## 2024-09-04 DIAGNOSIS — E55.9 VITAMIN D DEFICIENCY: ICD-10-CM

## 2024-09-04 DIAGNOSIS — E11.9 TYPE 2 DIABETES MELLITUS WITHOUT COMPLICATION, WITHOUT LONG-TERM CURRENT USE OF INSULIN: ICD-10-CM

## 2024-09-04 DIAGNOSIS — I15.2 HYPERTENSION ASSOCIATED WITH DIABETES: Chronic | ICD-10-CM

## 2024-09-04 DIAGNOSIS — E21.3 HYPERPARATHYROIDISM: ICD-10-CM

## 2024-09-04 DIAGNOSIS — L60.0 INGROWN RIGHT BIG TOENAIL: Primary | ICD-10-CM

## 2024-09-04 DIAGNOSIS — L60.0 INGROWN RIGHT BIG TOENAIL: ICD-10-CM

## 2024-09-04 DIAGNOSIS — E78.5 HYPERLIPIDEMIA ASSOCIATED WITH TYPE 2 DIABETES MELLITUS: Primary | ICD-10-CM

## 2024-09-04 DIAGNOSIS — E11.69 HYPERLIPIDEMIA ASSOCIATED WITH TYPE 2 DIABETES MELLITUS: Primary | ICD-10-CM

## 2024-09-04 PROCEDURE — 3044F HG A1C LEVEL LT 7.0%: CPT | Mod: HCNC,CPTII,S$GLB, | Performed by: PODIATRIST

## 2024-09-04 PROCEDURE — 1125F AMNT PAIN NOTED PAIN PRSNT: CPT | Mod: HCNC,CPTII,S$GLB, | Performed by: PODIATRIST

## 2024-09-04 PROCEDURE — 3008F BODY MASS INDEX DOCD: CPT | Mod: HCNC,CPTII,S$GLB, | Performed by: PODIATRIST

## 2024-09-04 PROCEDURE — 3044F HG A1C LEVEL LT 7.0%: CPT | Mod: HCNC,CPTII,S$GLB, | Performed by: INTERNAL MEDICINE

## 2024-09-04 PROCEDURE — 1101F PT FALLS ASSESS-DOCD LE1/YR: CPT | Mod: HCNC,CPTII,S$GLB, | Performed by: PODIATRIST

## 2024-09-04 PROCEDURE — 99999 PR PBB SHADOW E&M-EST. PATIENT-LVL IV: CPT | Mod: PBBFAC,HCNC,, | Performed by: INTERNAL MEDICINE

## 2024-09-04 PROCEDURE — 3078F DIAST BP <80 MM HG: CPT | Mod: HCNC,CPTII,S$GLB, | Performed by: INTERNAL MEDICINE

## 2024-09-04 PROCEDURE — 3288F FALL RISK ASSESSMENT DOCD: CPT | Mod: HCNC,CPTII,S$GLB, | Performed by: PODIATRIST

## 2024-09-04 PROCEDURE — G2211 COMPLEX E/M VISIT ADD ON: HCPCS | Mod: HCNC,S$GLB,, | Performed by: INTERNAL MEDICINE

## 2024-09-04 PROCEDURE — 3061F NEG MICROALBUMINURIA REV: CPT | Mod: HCNC,CPTII,S$GLB, | Performed by: PODIATRIST

## 2024-09-04 PROCEDURE — 3074F SYST BP LT 130 MM HG: CPT | Mod: HCNC,CPTII,S$GLB, | Performed by: INTERNAL MEDICINE

## 2024-09-04 PROCEDURE — 99999 PR PBB SHADOW E&M-EST. PATIENT-LVL III: CPT | Mod: PBBFAC,HCNC,, | Performed by: PODIATRIST

## 2024-09-04 PROCEDURE — 1126F AMNT PAIN NOTED NONE PRSNT: CPT | Mod: HCNC,CPTII,S$GLB, | Performed by: INTERNAL MEDICINE

## 2024-09-04 PROCEDURE — 3061F NEG MICROALBUMINURIA REV: CPT | Mod: HCNC,CPTII,S$GLB, | Performed by: INTERNAL MEDICINE

## 2024-09-04 PROCEDURE — 1160F RVW MEDS BY RX/DR IN RCRD: CPT | Mod: HCNC,CPTII,S$GLB, | Performed by: INTERNAL MEDICINE

## 2024-09-04 PROCEDURE — 3008F BODY MASS INDEX DOCD: CPT | Mod: HCNC,CPTII,S$GLB, | Performed by: INTERNAL MEDICINE

## 2024-09-04 PROCEDURE — 99203 OFFICE O/P NEW LOW 30 MIN: CPT | Mod: HCNC,S$GLB,, | Performed by: PODIATRIST

## 2024-09-04 PROCEDURE — 3066F NEPHROPATHY DOC TX: CPT | Mod: HCNC,CPTII,S$GLB, | Performed by: PODIATRIST

## 2024-09-04 PROCEDURE — 3066F NEPHROPATHY DOC TX: CPT | Mod: HCNC,CPTII,S$GLB, | Performed by: INTERNAL MEDICINE

## 2024-09-04 PROCEDURE — 1159F MED LIST DOCD IN RCRD: CPT | Mod: HCNC,CPTII,S$GLB, | Performed by: PODIATRIST

## 2024-09-04 PROCEDURE — 1159F MED LIST DOCD IN RCRD: CPT | Mod: HCNC,CPTII,S$GLB, | Performed by: INTERNAL MEDICINE

## 2024-09-04 PROCEDURE — 4010F ACE/ARB THERAPY RXD/TAKEN: CPT | Mod: HCNC,CPTII,S$GLB, | Performed by: PODIATRIST

## 2024-09-04 PROCEDURE — 4010F ACE/ARB THERAPY RXD/TAKEN: CPT | Mod: HCNC,CPTII,S$GLB, | Performed by: INTERNAL MEDICINE

## 2024-09-04 PROCEDURE — 99214 OFFICE O/P EST MOD 30 MIN: CPT | Mod: HCNC,S$GLB,, | Performed by: INTERNAL MEDICINE

## 2024-09-04 RX ORDER — KETOCONAZOLE 20 MG/G
CREAM TOPICAL DAILY
Qty: 30 G | Refills: 6 | Status: SHIPPED | OUTPATIENT
Start: 2024-09-04

## 2024-09-04 NOTE — PROGRESS NOTES
"HPI:  Patient is a 68-year-old gentleman who comes in today for follow-up of diabetes, hypertension, lipids.  Patient has been doing much better.  He has been very compliant with his medications.  He states his blood sugars usually  in the morning.  He denies any hypoglycemic events.  His blood pressures been well controlled.  He does complain of a ingrown toenail problem on the right foot.    Current meds have been verified and updated per the EMR  Exam:/64   Pulse 77   Temp 97.5 °F (36.4 °C) (Tympanic)   Ht 5' 7" (1.702 m)   Wt 103.3 kg (227 lb 11.8 oz)   SpO2 98%   BMI 35.67 kg/m²   Carotids 2+ equal without bruits, neck is supple without adenopathy  Chest clear  Cardiovascular regular rate and rhythm without murmur gallop or rub  Abdomen is soft and benign no rebound or guarding or distention.  Bowel sounds are normal.  No audible bruits.  No organomegaly    Lab Results   Component Value Date    WBC 5.65 05/20/2024    HGB 14.0 05/20/2024    HCT 43.9 05/20/2024     05/20/2024    CHOL 114 (L) 05/20/2024    TRIG 112 05/20/2024    HDL 31 (L) 05/20/2024    ALT 28 05/20/2024    AST 23 05/20/2024     08/20/2024    K 5.0 08/20/2024     08/20/2024    CREATININE 1.0 08/20/2024    BUN 13 08/20/2024    CO2 23 08/20/2024    TSH 2.040 05/20/2024    PSA 0.59 11/28/2023    INR 1.0 07/30/2022    HGBA1C 6.7 (H) 08/20/2024    BNP <10 07/30/2022    SEDRATE 27 (H) 09/26/2022    CRP 1.6 09/26/2022       Impression:  Diabetes, controlled markedly improved  Hypertension and lipids, both well treated and at goal  Right ingrown toenail of the great toe, will refer him to see Podiatry  Patient Active Problem List   Diagnosis    Type 2 diabetes mellitus without complication, without long-term current use of insulin    Hypertension associated with diabetes    Hyperlipidemia associated with type 2 diabetes mellitus    Class 2 severe obesity due to excess calories with serious comorbidity and body mass " index (BMI) of 38.0 to 38.9 in adult    History of colon polyps    Vitamin D deficiency    Anemia    Decreased strength of trunk and back    Lumbar radiculopathy    Degenerative disc disease, lumbar    Lumbar discitis with Osteo L2-3    Benign prostatic hyperplasia without lower urinary tract symptoms    Sinus bradycardia    LVH (left ventricular hypertrophy)    LAE (left atrial enlargement)    Hyperparathyroidism       Plan:  Orders Placed This Encounter    Hemoglobin A1C    Lipid Panel    Comprehensive Metabolic Panel    TSH    CBC Auto Differential    Microalbumin/Creatinine Ratio, Urine    PSA, Screening    Ambulatory referral/consult to Podiatry   Patient will have the above lab work and see me in 3 months.  He will was referred to see Podiatry      This note is generated with speech recognition software and is subject to transcription error and sound alike phrases that may be missed by proofreading.

## 2024-09-04 NOTE — PROGRESS NOTES
Podiatry Department    Patient ID: Friday ROGELIO Blake is a 68 y.o. male.    Chief Complaint: Ingrown Toenail (C/o right hallux medial side ingrown toenail, rates pain 4/10 , diabetic, wears slide on shoes, PCP Sid Elizabeth last seen 9/4/2024)    History of Present Illness    CHIEF COMPLAINT:  Patient presents today for foot pain.    FOOT PAIN:  He reports pain in the nail area of the right hallux, which worsens when wearing shoes and increases by the afternoon, typically around 2-3 PM. He denies pain on the lateral side of the nail.    ROS:  Musculoskeletal: +joint pain            Physical Exam    Cardiovascular: Palpable pedal pulses.  Skin: Thickened, dystrophic, elongated toenails with subungual debris. Right hallux medial border: buildup of hyperkeratotic tissue and incurvated nail plate. No acute signs of infection.           Diagnoses:  1. Ingrown right big toenail  -     Ambulatory referral/consult to Podiatry    2. Type 2 diabetes mellitus without complication, without long-term current use of insulin  -     Ambulatory referral/consult to Podiatry    3. Dermatophytosis of nail    Other orders  -     ketoconazole (NIZORAL) 2 % cream; Apply topically once daily.  Dispense: 30 g; Refill: 6        Assessment & Plan    - Patient presents with foot pain, likely due to fungal infection of the toenail  - Thickened, dystrophic, elongated toenails with subungual debris observed  - Right hallux medial border shows buildup of hyperkeratotic tissue and incurvated nail plate  - No acute signs of infection noted  - Will perform in-office procedure to remove wedge of affected nail  FUNGAL NAIL INFECTION:  - Explained that fungal infection causes nail thickening and white debris buildup, leading to pain.  - Performed in-office procedure to remove wedge of affected toenail.  POST-PROCEDURE CARE:  - Patient to soak foot in Epsom salt at home to alleviate tenderness post-procedure.            Future Appointments   Date Time  Provider Department [place]   10/14/2024  9:40 AM Leonidas White MD [facility] CARDIO [place]   12/4/2024  7:30 AM SPECIMEN, [facility] SPECLAB [place]   12/4/2024  8:50 AM LABORATORY, [facility] LAB [place]   12/11/2024  8:20 AM Sid Elizabeth MD [facility]        This note was generated with the assistance of ambient listening technology. Verbal consent was obtained by the patient and accompanying visitor(s) for the recording of patient appointment to facilitate this note. I attest to having reviewed and edited the generated note for accuracy, though some syntax or spelling errors may persist. Please contact the author of this note for any clarification.      Report Electronically Signed By:     Valarie Hutchins DPM   Podiatry  [facility]  9/4/2024

## 2024-09-29 NOTE — ASSESSMENT & PLAN NOTE
HISTORY OF THE PRESENT ILLNESS    2024  Abbey Hagan is a 68 y.o. yo  who presented to the ER yesterday with what she thought was rectal bleeding.  She had pain and swelling on her bottom.    G'sP's:   LMP: age 38  Relationship: single and not having sex  PAP Hx: no h/o abnormals  LAST PAP: PAP neg / HPV neg / Date: 2014  MMG (24) = negative  HRT:  used for about 1 year only at menopause but not since   BLEEDING: denies     GYNECOLOGIC HISTORY  PAP Hx: no h/o abnormals  Menarche: 13 yoa  Non-menstrual pelvic pressure/pain: No  Dyspareunia: N/A  Vasomotor Sxs: denies  Vaginal dryness: denies    OBSTETRIC HISTORY  IAB: 1    PAST MEDICAL HISTORY  -------------------------------------    Anxiety    Arthritis    back, neck and knees    Depression    Emphysema of lung    HSV infection    Hypertension     PAST SURGICAL HISTORY  ----------------------------    Colonoscopy    Procedure: COLONOSCOPY;  Surgeon: Gerard Hernandez MD;  Location: [facility];  Service: Endoscopy;  Laterality: N/A;    Hip arthroplasty    bilat    Joint replacement    bilat hip    Transforaminal epidural injection of steroid    Procedure: Injection,steroid,epidural,transforaminal approach;  Surgeon: Scott Barger MD;  Location: [facility];  Service: Pain Management;  Laterality: Bilateral;  L4-5     ALLERGIES  Review of patient's allergies indicates:   Allergen Reactions    Penicillin g      Childhood allergy--unknown reaction    Sulfa (sulfonamide antibiotics)      Childhood allergy--unknown reaction     MEDICATIONS  Current Outpatient Medications   Medication Instructions    acetaminophen (TYLENOL 8 HOUR ORAL) Oral    albuterol (VENTOLIN HFA) 90 mcg/actuation inhaler 2 puffs, Inhalation, Every 4 hours PRN, Rescue    albuterol-ipratropium (DUO-NEB) 2.5 mg-0.5 mg/3 mL nebulizer solution 3 mLs, Nebulization, Every 6 hours PRN, Rescue    ascorbic acid (vitamin C) (VITAMIN C) 100 mg, Oral, Daily    CHOLECALCIFEROL,  - lumbar drain in place  - neurosx following; monitor drainage output per recs  - neuro checks q4   VITAMIN D3, (VITAMIN D-3 ORAL) No Sig Provided    EScitalopram oxalate (LEXAPRO) 20 MG tablet TAKE 1 TABLET(20 MG) BY MOUTH EVERY DAY    fluticasone-umeclidin-vilanter (TRELEGY ELLIPTA) 100-62.5-25 mcg DsDv 1 puff, Inhalation, Daily    gabapentin (NEURONTIN) 600 mg, Oral, Nightly    lisinopriL-hydrochlorothiazide (PRINZIDE,ZESTORETIC) 10-12.5 mg per tablet TAKE 1 TABLET BY MOUTH EVERY DAY    multivitamin capsule 1 capsule, Daily    POTASSIUM/MAGNESIUM (MAGNESIUM-POTASSIUM ORAL) 1 tablet, Oral, Daily, Every day    predniSONE (DELTASONE) 20 mg, Oral, Daily, Take one daily for 5 days, repeat for breathing problems.    vitamin B complex (B COMPLEX 1 ORAL) Oral     SOCIAL HISTORY  Lives with: self  Smoker: quit smoking 1 month ago; had smoked x 50yrs  Alcohol:  quit a few years ago  Drugs: denies  Occupation:  retired     FAMILY HISTORY  BLEEDING or  CLOTTING DISORDERS: none  BREAST CA: grandmother  UTERINE CA: none  OVARIAN CA: none  COLON CA: none    PHYSICAL EXAM  Vitals:    09/29/24 1531   BP: (!) 107/57   Pulse: 68   Resp: 18   Temp: 98.4 °F (36.9 °C)       Vital Signs (Most Recent):  Temp: 98.4 °F (36.9 °C) (09/29/24 1531)  Pulse: 68 (09/29/24 1531)  Resp: 18 (09/29/24 1531)  BP: (!) 107/57 (09/29/24 1531)  SpO2: (!) 92 % (09/29/24 1531) Vital Signs (24h Range):  Temp:  [97.6 °F (36.4 °C)-98.4 °F (36.9 °C)] 98.4 °F (36.9 °C)  Pulse:  [68-93] 68  Resp:  [15-21] 18  SpO2:  [92 %-97 %] 92 %  BP: ()/(53-74) 107/57     GEN = alert/oriented, nad  HEENT = sclera anicteric, EOM grossly normal  BREASTS = deferred, no concerns  CV = BP and HR as per vitals  PULM = normal respiratory effort   =      External: nefg, no lesions, right labia is draining blood-mucous discharge, no fullness inferior to small skin opening, mild fullness supero-laterally, no erythema / edema / crepitus    LABS & RADS     Recent Labs   Lab 09/28/24  1839 09/29/24  0433   WBC 26.86* 19.47*   HGB 13.2 11.9*   HCT 38.2 34.3*     223   MCV 86 86     CT ABD PELVIS (24) =  There is a 7.0 x 2.0 cm abscess in the right labia.     The there is a 6.3 cm low-attenuation lesion seen within the left adnexa possibly representing cyst.  Nonemergent pelvic ultrasound for further evaluation is recommended.     There is thrombosis of the deep right pelvic vein.       ASSESSMENT AND PLAN:  68 y.o.  with draining right vulvar abscess.  No surrounding cellulitis.  Patient non-toxic.  Incidental finding of 6cm left adnexal cyst and thrombosis of deep right pelvic vein.    Continue antibiotics (currently receiving levaquin 500 q24h / flagyl 500 q8h / vanc 1g BID)  Encouraged patient to massage area couple times a day to encourage continued drainage  Would encourage use of peribottle if available  Defer to Hospitalist service whether anticoagulation is warranted for thrombosis of deep pelvic vein  Will see patient as outpatient for f/u of ovarian cyst and complete pelvic exam, cervical CA screening.    MD MAYI

## 2024-10-03 DIAGNOSIS — R00.1 SINUS BRADYCARDIA: Primary | ICD-10-CM

## 2024-10-03 DIAGNOSIS — I51.7 LAE (LEFT ATRIAL ENLARGEMENT): ICD-10-CM

## 2024-10-03 DIAGNOSIS — I51.7 LVH (LEFT VENTRICULAR HYPERTROPHY): ICD-10-CM

## 2024-10-04 DIAGNOSIS — E11.69 HYPERLIPIDEMIA ASSOCIATED WITH TYPE 2 DIABETES MELLITUS: ICD-10-CM

## 2024-10-04 DIAGNOSIS — E78.5 HYPERLIPIDEMIA ASSOCIATED WITH TYPE 2 DIABETES MELLITUS: ICD-10-CM

## 2024-10-04 RX ORDER — METFORMIN HYDROCHLORIDE 500 MG/1
500 TABLET ORAL 2 TIMES DAILY WITH MEALS
Qty: 180 TABLET | Refills: 3 | Status: SHIPPED | OUTPATIENT
Start: 2024-10-04

## 2024-10-04 RX ORDER — ATORVASTATIN CALCIUM 20 MG/1
20 TABLET, FILM COATED ORAL
Qty: 90 TABLET | Refills: 5 | Status: SHIPPED | OUTPATIENT
Start: 2024-10-04

## 2024-10-04 NOTE — TELEPHONE ENCOUNTER
Requested Prescriptions     Pending Prescriptions Disp Refills    metFORMIN (GLUCOPHAGE) 500 MG tablet [Pharmacy Med Name: metFORMIN HCl 500 MG Oral Tablet] 180 tablet 0     Sig: TAKE 1 TABLET BY MOUTH TWICE DAILY WITH MEALS     LV 09/04/2024   LF 10/16/2023   Alar Island Pedicle Flap Text: The defect edges were debeveled with a #15 scalpel blade.  Given the location of the defect, shape of the defect and the proximity to the alar rim an island pedicle advancement flap was deemed most appropriate.  Using a sterile surgical marker, an appropriate advancement flap was drawn incorporating the defect, outlining the appropriate donor tissue and placing the expected incisions within the nasal ala running parallel to the alar rim. The area thus outlined was incised with a #15 scalpel blade.  The skin margins were undermined minimally to an appropriate distance in all directions around the primary defect and laterally outward around the island pedicle utilizing iris scissors.  There was minimal undermining beneath the pedicle flap.

## 2024-10-13 NOTE — PROGRESS NOTES
Subjective:    Patient ID:  Friday ROGELIO Blake is a 68 y.o. male who presents for evaluation of Annual Exam        HPI Pt presents for eval.   His current med conditions include DM, LVH, LAE, PRISCILA,  HTN, hyperlipidemia, obesity.  Nonsmoker.  ecg 3/1/21 sinus shara 58 bpm, nonspecific T wave abnl (flat T wave).  Stress MPI 9/21 personally reviewed: good exercise capacity, no ischemia, normal EF.  Echo 9/21 reviewed: normal LV function.  Echo Aug 2022 normal EF, LVH, PRISCILA, normal PAP.  ECG x 2 June 2023: mild sinus bradycardia, nonspecific st-t abnl.  Now here for annual exam.  No acute issues noted.  Ecg today 10/14/24 personally reviewed: NSR, possible old septal infarct, nonspecific T wave abnl.  No angina.  No CHF sxs.  Has lost 20 pounds.  On Monjaro.  BP is controlled.  HGAIC at goal.  Lipids look good overall on statin tx.  Compliant w meds.          Past Medical History:   Diagnosis Date    Anemia 03/23/2021    Bilateral carpal tunnel syndrome 02/25/2022    moderate demyelinating bilaterally    History of colon polyps     Hyperlipidemia associated with type 2 diabetes mellitus     Hypertension associated with diabetes     Low back pain 09/28/2021    Lumbar radiculopathy     Morbid obesity with BMI of 40.0-44.9, adult     Type 2 diabetes mellitus without complication, without long-term current use of insulin     Vitamin D deficiency      Current Outpatient Medications   Medication Instructions    aspirin (ECOTRIN) 81 mg, Oral, Daily    atorvastatin (LIPITOR) 20 mg, Oral    blood-glucose meter kit To check BG 1 times daily, to use with insurance preferred meter    irbesartan (AVAPRO) 300 mg, Oral, Nightly    ketoconazole (NIZORAL) 2 % cream Topical (Top), Daily    lancets Misc To check BG 1 times daily, to use with insurance preferred meter    metFORMIN (GLUCOPHAGE) 500 mg, Oral, 2 times daily with meals    ONETOUCH VERIO TEST STRIPS Strp USE 1 STRIP TO CHECK GLUCOSE ONCE DAILY    tirzepatide (MOUNJARO) 5 mg/0.5 mL  PnIj INJECT 5 MG INTO THE SKIN ONCE A WEEK         Review of Systems   Constitutional: Positive for weight loss.   HENT: Negative.     Eyes: Negative.    Cardiovascular: Negative.    Respiratory: Negative.     Endocrine: Negative.    Hematologic/Lymphatic: Negative.    Skin: Negative.    Musculoskeletal:  Positive for arthritis and back pain.   Gastrointestinal: Negative.    Genitourinary: Negative.    Neurological: Negative.    Psychiatric/Behavioral: Negative.     Allergic/Immunologic: Negative.        /80   Pulse 74   Wt 102 kg (224 lb 13.9 oz)   SpO2 97%   BMI 35.22 kg/m²       Wt Readings from Last 3 Encounters:   10/14/24 102 kg (224 lb 13.9 oz)   09/04/24 103.3 kg (227 lb 11.8 oz)   09/04/24 103.3 kg (227 lb 11.8 oz)     Temp Readings from Last 3 Encounters:   09/04/24 97.5 °F (36.4 °C) (Tympanic)   05/27/24 97.9 °F (36.6 °C) (Tympanic)   11/27/23 98.2 °F (36.8 °C) (Tympanic)     BP Readings from Last 3 Encounters:   10/14/24 128/80   09/04/24 126/64   05/27/24 130/82     Pulse Readings from Last 3 Encounters:   10/14/24 74   09/04/24 77   05/27/24 69          Objective:    Physical Exam  Vitals and nursing note reviewed.   Constitutional:       Appearance: He is well-developed. He is obese.   HENT:      Head: Normocephalic.   Neck:      Thyroid: No thyromegaly.      Vascular: Normal carotid pulses. No carotid bruit or JVD.   Cardiovascular:      Rate and Rhythm: Normal rate and regular rhythm.      Pulses:           Radial pulses are 2+ on the right side and 2+ on the left side.      Heart sounds: S1 normal and S2 normal. Heart sounds not distant. No midsystolic click and no opening snap. No murmur heard.     No friction rub. No S3 or S4 sounds.   Pulmonary:      Effort: Pulmonary effort is normal.      Breath sounds: Normal breath sounds. No wheezing or rales.   Abdominal:      General: Bowel sounds are normal. There is no distension or abdominal bruit.      Palpations: Abdomen is soft. There is  no mass.      Tenderness: There is no abdominal tenderness.   Musculoskeletal:      Cervical back: Neck supple.   Skin:     General: Skin is warm.   Neurological:      Mental Status: He is alert and oriented to person, place, and time.   Psychiatric:         Behavior: Behavior normal.       I have reviewed all pertinent labs and cardiac studies.      Chemistry        Component Value Date/Time     08/20/2024 0827    K 5.0 08/20/2024 0827     08/20/2024 0827    CO2 23 08/20/2024 0827    BUN 13 08/20/2024 0827    CREATININE 1.0 08/20/2024 0827    GLU 98 08/20/2024 0827        Component Value Date/Time    CALCIUM 10.1 08/20/2024 0827    ALKPHOS 62 05/20/2024 0812    AST 23 05/20/2024 0812    ALT 28 05/20/2024 0812    BILITOT 0.7 05/20/2024 0812    ESTGFRAFRICA >60 07/31/2022 0607    EGFRNONAA >60 07/31/2022 0607        Lab Results   Component Value Date    WBC 5.65 05/20/2024    HGB 14.0 05/20/2024    HCT 43.9 05/20/2024    MCV 92 05/20/2024     05/20/2024       Lab Results   Component Value Date    HGBA1C 6.7 (H) 08/20/2024     Lab Results   Component Value Date    CHOL 114 (L) 05/20/2024    CHOL 146 11/28/2023    CHOL 156 05/25/2023     Lab Results   Component Value Date    HDL 31 (L) 05/20/2024    HDL 33 (L) 11/28/2023    HDL 37 (L) 05/25/2023     Lab Results   Component Value Date    LDLCALC 60.6 (L) 05/20/2024    LDLCALC 81.0 11/28/2023    LDLCALC 67.6 05/25/2023     Lab Results   Component Value Date    TRIG 112 05/20/2024    TRIG 160 (H) 11/28/2023    TRIG 257 (H) 05/25/2023     Lab Results   Component Value Date    CHOLHDL 27.2 05/20/2024    CHOLHDL 22.6 11/28/2023    CHOLHDL 23.7 05/25/2023         Results for orders placed during the hospital encounter of 07/29/22    Echo    Interpretation Summary  · The left ventricle is normal in size with concentric hypertrophy and normal systolic function.  · Severe left atrial enlargement.  · The estimated ejection fraction is 60%.  · Normal left  ventricular diastolic function.  · Normal right ventricular size with normal right ventricular systolic function.  · Severe right atrial enlargement.  · Normal central venous pressure (3 mmHg).  · The estimated PA systolic pressure is 25 mmHg.          Assessment:       1. Hyperlipidemia associated with type 2 diabetes mellitus    2. LAE (left atrial enlargement)    3. Hypertension associated with diabetes    4. Class 2 severe obesity due to excess calories with serious comorbidity and body mass index (BMI) of 38.0 to 38.9 in adult    5. LVH (left ventricular hypertrophy)    6. Type 2 diabetes mellitus without complication, without long-term current use of insulin    7. Sinus bradycardia    8. Abnormal ECG           Plan:             Stable cardiovascular conditions at present time on current medical treatment.  Reviewed all tests and above medical conditions with patient in detail and formulated treatment plan.  Continue optimal medical treatment for cardiovascular conditions.  Cardiac low salt diet advised.  Daily exercise encouraged, with the goal 30 +  minutes aerobic exercise as tolerated.  Maintaining healthy weight and weight loss goals (if needed) were discussed in clinic.  HTN: Need for BP control and HTN goals (if needed) were discussed and tx plan formulated.  Goal < 130/80.  Continue current HTN meds.  Lipids: Importance of optimal lipid control were discussed in detail as well as possible pharmacologic and lifestyle changes that may be needed.  Statin tx.  Sinus bradycardia: Chronic/stable. Monitor.  Abnl ecg: Monitor.  LVH: HTN control, weight loss.  LAE: HTN control.  DM: Optimal HGAIC control advised. Continue Monjaro/Metformin and f/u w PCP for DM mgt.      Stress test + echo + JOSHUA to be scheduled.    PHONE REVIEW.      F/u in 1 year.      I have reviewed all pertinent labs and cardiac studies independently. Plans and recommendations have been formulated under my direct supervision. All questions  answered and patient voiced understanding.

## 2024-10-14 ENCOUNTER — HOSPITAL ENCOUNTER (OUTPATIENT)
Dept: CARDIOLOGY | Facility: HOSPITAL | Age: 69
Discharge: HOME | End: 2024-10-14
Attending: INTERNAL MEDICINE
Payer: MEDICARE

## 2024-10-14 ENCOUNTER — OFFICE VISIT (OUTPATIENT)
Dept: CARDIOLOGY | Facility: CLINIC | Age: 69
End: 2024-10-14
Payer: MEDICARE

## 2024-10-14 VITALS
SYSTOLIC BLOOD PRESSURE: 128 MMHG | BODY MASS INDEX: 35.22 KG/M2 | OXYGEN SATURATION: 97 % | HEART RATE: 74 BPM | WEIGHT: 224.88 LBS | DIASTOLIC BLOOD PRESSURE: 80 MMHG

## 2024-10-14 DIAGNOSIS — E78.5 HYPERLIPIDEMIA ASSOCIATED WITH TYPE 2 DIABETES MELLITUS: Primary | ICD-10-CM

## 2024-10-14 DIAGNOSIS — I51.7 LVH (LEFT VENTRICULAR HYPERTROPHY): ICD-10-CM

## 2024-10-14 DIAGNOSIS — E11.59 HYPERTENSION ASSOCIATED WITH DIABETES: Chronic | ICD-10-CM

## 2024-10-14 DIAGNOSIS — E11.9 TYPE 2 DIABETES MELLITUS WITHOUT COMPLICATION, WITHOUT LONG-TERM CURRENT USE OF INSULIN: ICD-10-CM

## 2024-10-14 DIAGNOSIS — E11.69 HYPERLIPIDEMIA ASSOCIATED WITH TYPE 2 DIABETES MELLITUS: Primary | ICD-10-CM

## 2024-10-14 DIAGNOSIS — I51.7 LAE (LEFT ATRIAL ENLARGEMENT): ICD-10-CM

## 2024-10-14 DIAGNOSIS — R00.1 SINUS BRADYCARDIA: ICD-10-CM

## 2024-10-14 DIAGNOSIS — R94.31 ABNORMAL ECG: ICD-10-CM

## 2024-10-14 DIAGNOSIS — I15.2 HYPERTENSION ASSOCIATED WITH DIABETES: Chronic | ICD-10-CM

## 2024-10-14 DIAGNOSIS — E66.812 CLASS 2 SEVERE OBESITY DUE TO EXCESS CALORIES WITH SERIOUS COMORBIDITY AND BODY MASS INDEX (BMI) OF 38.0 TO 38.9 IN ADULT: ICD-10-CM

## 2024-10-14 DIAGNOSIS — E66.01 CLASS 2 SEVERE OBESITY DUE TO EXCESS CALORIES WITH SERIOUS COMORBIDITY AND BODY MASS INDEX (BMI) OF 38.0 TO 38.9 IN ADULT: ICD-10-CM

## 2024-10-14 LAB
OHS QRS DURATION: 84 MS
OHS QTC CALCULATION: 404 MS

## 2024-10-14 PROCEDURE — 93005 ELECTROCARDIOGRAM TRACING: CPT | Mod: HCNC

## 2024-10-14 PROCEDURE — 99999 PR PBB SHADOW E&M-EST. PATIENT-LVL III: CPT | Mod: PBBFAC,HCNC,, | Performed by: INTERNAL MEDICINE

## 2024-10-14 PROCEDURE — 3044F HG A1C LEVEL LT 7.0%: CPT | Mod: CPTII,S$GLB,, | Performed by: INTERNAL MEDICINE

## 2024-10-14 PROCEDURE — 3288F FALL RISK ASSESSMENT DOCD: CPT | Mod: CPTII,S$GLB,, | Performed by: INTERNAL MEDICINE

## 2024-10-14 PROCEDURE — 99214 OFFICE O/P EST MOD 30 MIN: CPT | Mod: S$GLB,,, | Performed by: INTERNAL MEDICINE

## 2024-10-14 PROCEDURE — 4010F ACE/ARB THERAPY RXD/TAKEN: CPT | Mod: CPTII,S$GLB,, | Performed by: INTERNAL MEDICINE

## 2024-10-14 PROCEDURE — 3079F DIAST BP 80-89 MM HG: CPT | Mod: CPTII,S$GLB,, | Performed by: INTERNAL MEDICINE

## 2024-10-14 PROCEDURE — 93010 ELECTROCARDIOGRAM REPORT: CPT | Mod: HCNC,,, | Performed by: INTERNAL MEDICINE

## 2024-10-14 PROCEDURE — 3061F NEG MICROALBUMINURIA REV: CPT | Mod: CPTII,S$GLB,, | Performed by: INTERNAL MEDICINE

## 2024-10-14 PROCEDURE — 3066F NEPHROPATHY DOC TX: CPT | Mod: CPTII,S$GLB,, | Performed by: INTERNAL MEDICINE

## 2024-10-14 PROCEDURE — 1159F MED LIST DOCD IN RCRD: CPT | Mod: CPTII,S$GLB,, | Performed by: INTERNAL MEDICINE

## 2024-10-14 PROCEDURE — 1160F RVW MEDS BY RX/DR IN RCRD: CPT | Mod: CPTII,S$GLB,, | Performed by: INTERNAL MEDICINE

## 2024-10-14 PROCEDURE — 3074F SYST BP LT 130 MM HG: CPT | Mod: CPTII,S$GLB,, | Performed by: INTERNAL MEDICINE

## 2024-10-14 PROCEDURE — 1101F PT FALLS ASSESS-DOCD LE1/YR: CPT | Mod: CPTII,S$GLB,, | Performed by: INTERNAL MEDICINE

## 2024-10-14 PROCEDURE — 3008F BODY MASS INDEX DOCD: CPT | Mod: CPTII,S$GLB,, | Performed by: INTERNAL MEDICINE

## 2024-10-15 ENCOUNTER — PATIENT MESSAGE (OUTPATIENT)
Dept: CARDIOLOGY | Facility: HOSPITAL | Age: 69
End: 2024-10-15
Payer: MEDICARE

## 2024-10-21 ENCOUNTER — HOSPITAL ENCOUNTER (OUTPATIENT)
Dept: CARDIOLOGY | Facility: HOSPITAL | Age: 69
Discharge: HOME | End: 2024-10-21
Attending: INTERNAL MEDICINE
Payer: MEDICARE

## 2024-10-21 VITALS
HEIGHT: 67 IN | HEIGHT: 67 IN | HEART RATE: 62 BPM | BODY MASS INDEX: 35.16 KG/M2 | WEIGHT: 224 LBS | SYSTOLIC BLOOD PRESSURE: 143 MMHG | WEIGHT: 224 LBS | BODY MASS INDEX: 35.16 KG/M2 | DIASTOLIC BLOOD PRESSURE: 83 MMHG

## 2024-10-21 DIAGNOSIS — I51.7 LVH (LEFT VENTRICULAR HYPERTROPHY): ICD-10-CM

## 2024-10-21 DIAGNOSIS — E11.9 TYPE 2 DIABETES MELLITUS WITHOUT COMPLICATION, WITHOUT LONG-TERM CURRENT USE OF INSULIN: ICD-10-CM

## 2024-10-21 DIAGNOSIS — I15.2 HYPERTENSION ASSOCIATED WITH DIABETES: Chronic | ICD-10-CM

## 2024-10-21 DIAGNOSIS — E11.69 HYPERLIPIDEMIA ASSOCIATED WITH TYPE 2 DIABETES MELLITUS: ICD-10-CM

## 2024-10-21 DIAGNOSIS — R00.1 SINUS BRADYCARDIA: ICD-10-CM

## 2024-10-21 DIAGNOSIS — E66.01 CLASS 2 SEVERE OBESITY DUE TO EXCESS CALORIES WITH SERIOUS COMORBIDITY AND BODY MASS INDEX (BMI) OF 38.0 TO 38.9 IN ADULT: ICD-10-CM

## 2024-10-21 DIAGNOSIS — R94.31 ABNORMAL ECG: ICD-10-CM

## 2024-10-21 DIAGNOSIS — E78.5 HYPERLIPIDEMIA ASSOCIATED WITH TYPE 2 DIABETES MELLITUS: ICD-10-CM

## 2024-10-21 DIAGNOSIS — E66.812 CLASS 2 SEVERE OBESITY DUE TO EXCESS CALORIES WITH SERIOUS COMORBIDITY AND BODY MASS INDEX (BMI) OF 38.0 TO 38.9 IN ADULT: ICD-10-CM

## 2024-10-21 DIAGNOSIS — I51.7 LAE (LEFT ATRIAL ENLARGEMENT): ICD-10-CM

## 2024-10-21 DIAGNOSIS — E11.59 HYPERTENSION ASSOCIATED WITH DIABETES: Chronic | ICD-10-CM

## 2024-10-21 LAB
AORTIC ROOT ANNULUS: 2.86 CM
ASCENDING AORTA: 3.35 CM
AV INDEX (PROSTH): 0.65
AV MEAN GRADIENT: 6.9 MMHG
AV PEAK GRADIENT: 13 MMHG
AV VALVE AREA BY VELOCITY RATIO: 2.1 CM²
AV VALVE AREA: 2 CM²
AV VELOCITY RATIO: 0.67
BSA FOR ECHO PROCEDURE: 2.19 M2
CV ECHO LV RWT: 0.44 CM
DOP CALC AO PEAK VEL: 1.8 M/S
DOP CALC AO VTI: 36.3 CM
DOP CALC LVOT AREA: 3.1 CM2
DOP CALC LVOT DIAMETER: 2 CM
DOP CALC LVOT PEAK VEL: 1.2 M/S
DOP CALC LVOT STROKE VOLUME: 73.8 CM3
DOP CALC RVOT PEAK VEL: 0.77 M/S
DOP CALC RVOT VTI: 15.6 CM
DOP CALCLVOT PEAK VEL VTI: 23.5 CM
E WAVE DECELERATION TIME: 290.24 MSEC
E/A RATIO: 0.87
E/E' RATIO: 7.65 M/S
ECHO LV POSTERIOR WALL: 1 CM (ref 0.6–1.1)
FRACTIONAL SHORTENING: 33.3 % (ref 28–44)
INTERVENTRICULAR SEPTUM: 1.1 CM (ref 0.6–1.1)
IVRT: 114.18 MSEC
LA MAJOR: 5.08 CM
LA MINOR: 4.33 CM
LA WIDTH: 3.4 CM
LEFT ABI: 1.14
LEFT ARM BP: 143 MMHG
LEFT ATRIUM AREA SYSTOLIC (APICAL 2 CHAMBER): 12.01 CM2
LEFT ATRIUM AREA SYSTOLIC (APICAL 4 CHAMBER): 15.21 CM2
LEFT ATRIUM SIZE: 3.51 CM
LEFT ATRIUM VOLUME INDEX MOD: 15.4 ML/M2
LEFT ATRIUM VOLUME INDEX: 22.4 ML/M2
LEFT ATRIUM VOLUME MOD: 32.75 ML
LEFT ATRIUM VOLUME: 47.42 CM3
LEFT DORSALIS PEDIS: 159 MMHG
LEFT INTERNAL DIMENSION IN SYSTOLE: 3 CM (ref 2.1–4)
LEFT POSTERIOR TIBIAL: 163 MMHG
LEFT TBI: 0.68
LEFT TOE PRESSURE: 97 MMHG
LEFT VENTRICLE DIASTOLIC VOLUME INDEX: 44.5 ML/M2
LEFT VENTRICLE DIASTOLIC VOLUME: 94.33 ML
LEFT VENTRICLE END SYSTOLIC VOLUME APICAL 2 CHAMBER: 25.92 ML
LEFT VENTRICLE END SYSTOLIC VOLUME APICAL 4 CHAMBER: 34.69 ML
LEFT VENTRICLE MASS INDEX: 77.3 G/M2
LEFT VENTRICLE SYSTOLIC VOLUME INDEX: 17 ML/M2
LEFT VENTRICLE SYSTOLIC VOLUME: 36.01 ML
LEFT VENTRICULAR INTERNAL DIMENSION IN DIASTOLE: 4.5 CM (ref 3.5–6)
LEFT VENTRICULAR MASS: 164 G
LV LATERAL E/E' RATIO: 7.33 M/S
LV SEPTAL E/E' RATIO: 8 M/S
LVED V (TEICH): 94.33 ML
LVES V (TEICH): 36.01 ML
LVOT MG: 3.16 MMHG
LVOT MV: 0.84 CM/S
MV PEAK A VEL: 1.01 M/S
MV PEAK E VEL: 0.88 M/S
MV STENOSIS PRESSURE HALF TIME: 84.17 MS
MV VALVE AREA P 1/2 METHOD: 2.61 CM2
PISA TR MAX VEL: 2.11 M/S
PULM VEIN S/D RATIO: 1.31
PV MEAN GRADIENT: 2 MMHG
PV PEAK D VEL: 0.36 M/S
PV PEAK GRADIENT: 3 MMHG
PV PEAK S VEL: 0.47 M/S
PV PEAK VELOCITY: 0.83 M/S
RA MAJOR: 4.55 CM
RA PRESSURE ESTIMATED: 3 MMHG
RA WIDTH: 3.54 CM
RIGHT ABI: 1.06
RIGHT ARM BP: 140 MMHG
RIGHT DORSALIS PEDIS: 149 MMHG
RIGHT POSTERIOR TIBIAL: 152 MMHG
RIGHT TBI: 0.69
RIGHT TOE PRESSURE: 98 MMHG
RIGHT VENTRICULAR END-DIASTOLIC DIMENSION: 3.14 CM
RV TB RVSP: 5 MMHG
SINUS: 2.78 CM
STJ: 2.53 CM
TDI LATERAL: 0.12 M/S
TDI SEPTAL: 0.11 M/S
TDI: 0.12 M/S
TR MAX PG: 18 MMHG
TV REST PULMONARY ARTERY PRESSURE: 21 MMHG
Z-SCORE OF LEFT VENTRICULAR DIMENSION IN END DIASTOLE: -3.98
Z-SCORE OF LEFT VENTRICULAR DIMENSION IN END SYSTOLE: -2.45

## 2024-10-21 PROCEDURE — 93306 TTE W/DOPPLER COMPLETE: CPT

## 2024-10-21 PROCEDURE — 93922 UPR/L XTREMITY ART 2 LEVELS: CPT

## 2024-10-21 PROCEDURE — 93922 UPR/L XTREMITY ART 2 LEVELS: CPT | Mod: 26,,, | Performed by: INTERNAL MEDICINE

## 2024-10-21 PROCEDURE — 93306 TTE W/DOPPLER COMPLETE: CPT | Mod: 26,,, | Performed by: INTERNAL MEDICINE

## 2024-11-06 ENCOUNTER — HOSPITAL ENCOUNTER (OUTPATIENT)
Dept: CARDIOLOGY | Facility: HOSPITAL | Age: 69
Discharge: HOME | End: 2024-11-06
Attending: INTERNAL MEDICINE
Payer: MEDICARE

## 2024-11-06 ENCOUNTER — HOSPITAL ENCOUNTER (OUTPATIENT)
Dept: RADIOLOGY | Facility: HOSPITAL | Age: 69
Discharge: HOME | End: 2024-11-06
Attending: INTERNAL MEDICINE
Payer: MEDICARE

## 2024-11-06 DIAGNOSIS — I51.7 LVH (LEFT VENTRICULAR HYPERTROPHY): ICD-10-CM

## 2024-11-06 DIAGNOSIS — E11.59 HYPERTENSION ASSOCIATED WITH DIABETES: Chronic | ICD-10-CM

## 2024-11-06 DIAGNOSIS — E11.9 TYPE 2 DIABETES MELLITUS WITHOUT COMPLICATION, WITHOUT LONG-TERM CURRENT USE OF INSULIN: ICD-10-CM

## 2024-11-06 DIAGNOSIS — E66.812 CLASS 2 SEVERE OBESITY DUE TO EXCESS CALORIES WITH SERIOUS COMORBIDITY AND BODY MASS INDEX (BMI) OF 38.0 TO 38.9 IN ADULT: ICD-10-CM

## 2024-11-06 DIAGNOSIS — E11.69 HYPERLIPIDEMIA ASSOCIATED WITH TYPE 2 DIABETES MELLITUS: ICD-10-CM

## 2024-11-06 DIAGNOSIS — I51.7 LAE (LEFT ATRIAL ENLARGEMENT): ICD-10-CM

## 2024-11-06 DIAGNOSIS — R94.31 ABNORMAL ECG: ICD-10-CM

## 2024-11-06 DIAGNOSIS — E66.01 CLASS 2 SEVERE OBESITY DUE TO EXCESS CALORIES WITH SERIOUS COMORBIDITY AND BODY MASS INDEX (BMI) OF 38.0 TO 38.9 IN ADULT: ICD-10-CM

## 2024-11-06 DIAGNOSIS — R00.1 SINUS BRADYCARDIA: ICD-10-CM

## 2024-11-06 DIAGNOSIS — I15.2 HYPERTENSION ASSOCIATED WITH DIABETES: Chronic | ICD-10-CM

## 2024-11-06 DIAGNOSIS — E78.5 HYPERLIPIDEMIA ASSOCIATED WITH TYPE 2 DIABETES MELLITUS: ICD-10-CM

## 2024-11-06 PROCEDURE — A9502 TC99M TETROFOSMIN: HCPCS | Mod: HCNC | Performed by: INTERNAL MEDICINE

## 2024-11-06 PROCEDURE — 93017 CV STRESS TEST TRACING ONLY: CPT | Mod: HCNC

## 2024-11-06 PROCEDURE — 78452 HT MUSCLE IMAGE SPECT MULT: CPT | Mod: HCNC

## 2024-11-06 RX ADMIN — TETROFOSMIN 24.1 MILLICURIE: 1.38 INJECTION, POWDER, LYOPHILIZED, FOR SOLUTION INTRAVENOUS at 11:11

## 2024-11-06 RX ADMIN — TETROFOSMIN 8 MILLICURIE: 1.38 INJECTION, POWDER, LYOPHILIZED, FOR SOLUTION INTRAVENOUS at 08:11

## 2024-11-07 LAB
CV STRESS BASE HR: 65 BPM
DIASTOLIC BLOOD PRESSURE: 74 MMHG
NUC REST EJECTION FRACTION: 76
NUC STRESS EJECTION FRACTION: 74 %
OHS CV CPX 85 PERCENT MAX PREDICTED HEART RATE MALE: 129
OHS CV CPX ESTIMATED METS: 10
OHS CV CPX MAX PREDICTED HEART RATE: 152
OHS CV CPX PATIENT IS FEMALE: 0
OHS CV CPX PATIENT IS MALE: 1
OHS CV CPX PEAK DIASTOLIC BLOOD PRESSURE: 95 MMHG
OHS CV CPX PEAK HEAR RATE: 129 BPM
OHS CV CPX PEAK RATE PRESSURE PRODUCT: NORMAL
OHS CV CPX PEAK SYSTOLIC BLOOD PRESSURE: 206 MMHG
OHS CV CPX PERCENT MAX PREDICTED HEART RATE ACHIEVED: 85
OHS CV CPX RATE PRESSURE PRODUCT PRESENTING: 8255
OHS CV INITIAL DOSE: 8 MCG/KG/MIN
OHS CV PEAK DOSE: 24.1 MCG/KG/MIN
STRESS ECHO POST EXERCISE DUR MIN: 8 MINUTES
STRESS ECHO POST EXERCISE DUR SEC: 23 SECONDS
SYSTOLIC BLOOD PRESSURE: 127 MMHG

## 2024-12-04 ENCOUNTER — LAB VISIT (OUTPATIENT)
Dept: LAB | Facility: HOSPITAL | Age: 69
End: 2024-12-04
Attending: INTERNAL MEDICINE
Payer: MEDICARE

## 2024-12-04 DIAGNOSIS — Z12.5 SCREENING FOR PROSTATE CANCER: ICD-10-CM

## 2024-12-04 DIAGNOSIS — E11.9 TYPE 2 DIABETES MELLITUS WITHOUT COMPLICATION, WITHOUT LONG-TERM CURRENT USE OF INSULIN: ICD-10-CM

## 2024-12-04 LAB
ALBUMIN SERPL BCP-MCNC: 4.1 G/DL (ref 3.5–5.2)
ALP SERPL-CCNC: 62 U/L (ref 40–150)
ALT SERPL W/O P-5'-P-CCNC: 32 U/L (ref 10–44)
ANION GAP SERPL CALC-SCNC: 7 MMOL/L (ref 8–16)
AST SERPL-CCNC: 31 U/L (ref 10–40)
BASOPHILS # BLD AUTO: 0.05 K/UL (ref 0–0.2)
BASOPHILS NFR BLD: 0.8 % (ref 0–1.9)
BILIRUB SERPL-MCNC: 0.6 MG/DL (ref 0.1–1)
BUN SERPL-MCNC: 15 MG/DL (ref 8–23)
CALCIUM SERPL-MCNC: 9.7 MG/DL (ref 8.7–10.5)
CHLORIDE SERPL-SCNC: 108 MMOL/L (ref 95–110)
CHOLEST SERPL-MCNC: 119 MG/DL (ref 120–199)
CHOLEST/HDLC SERPL: 3.5 {RATIO} (ref 2–5)
CO2 SERPL-SCNC: 26 MMOL/L (ref 23–29)
COMPLEXED PSA SERPL-MCNC: 1.1 NG/ML (ref 0–4)
CREAT SERPL-MCNC: 0.9 MG/DL (ref 0.5–1.4)
DIFFERENTIAL METHOD BLD: ABNORMAL
EOSINOPHIL # BLD AUTO: 0.4 K/UL (ref 0–0.5)
EOSINOPHIL NFR BLD: 6.6 % (ref 0–8)
ERYTHROCYTE [DISTWIDTH] IN BLOOD BY AUTOMATED COUNT: 14.6 % (ref 11.5–14.5)
EST. GFR  (NO RACE VARIABLE): >60 ML/MIN/1.73 M^2
ESTIMATED AVG GLUCOSE: 137 MG/DL (ref 68–131)
GLUCOSE SERPL-MCNC: 96 MG/DL (ref 70–110)
HBA1C MFR BLD: 6.4 % (ref 4–5.6)
HCT VFR BLD AUTO: 43.2 % (ref 40–54)
HDLC SERPL-MCNC: 34 MG/DL (ref 40–75)
HDLC SERPL: 28.6 % (ref 20–50)
HGB BLD-MCNC: 13.9 G/DL (ref 14–18)
IMM GRANULOCYTES # BLD AUTO: 0.02 K/UL (ref 0–0.04)
IMM GRANULOCYTES NFR BLD AUTO: 0.3 % (ref 0–0.5)
LDLC SERPL CALC-MCNC: 68.6 MG/DL (ref 63–159)
LYMPHOCYTES # BLD AUTO: 3.1 K/UL (ref 1–4.8)
LYMPHOCYTES NFR BLD: 50.7 % (ref 18–48)
MCH RBC QN AUTO: 29.5 PG (ref 27–31)
MCHC RBC AUTO-ENTMCNC: 32.2 G/DL (ref 32–36)
MCV RBC AUTO: 92 FL (ref 82–98)
MONOCYTES # BLD AUTO: 0.3 K/UL (ref 0.3–1)
MONOCYTES NFR BLD: 5.2 % (ref 4–15)
NEUTROPHILS # BLD AUTO: 2.2 K/UL (ref 1.8–7.7)
NEUTROPHILS NFR BLD: 36.4 % (ref 38–73)
NONHDLC SERPL-MCNC: 85 MG/DL
NRBC BLD-RTO: 0 /100 WBC
PLATELET # BLD AUTO: 182 K/UL (ref 150–450)
PMV BLD AUTO: 12.2 FL (ref 9.2–12.9)
POTASSIUM SERPL-SCNC: 5.1 MMOL/L (ref 3.5–5.1)
PROT SERPL-MCNC: 7.3 G/DL (ref 6–8.4)
RBC # BLD AUTO: 4.71 M/UL (ref 4.6–6.2)
SODIUM SERPL-SCNC: 141 MMOL/L (ref 136–145)
TRIGL SERPL-MCNC: 82 MG/DL (ref 30–150)
TSH SERPL DL<=0.005 MIU/L-ACNC: 1.72 UIU/ML (ref 0.4–4)
WBC # BLD AUTO: 6.1 K/UL (ref 3.9–12.7)

## 2024-12-04 PROCEDURE — 84153 ASSAY OF PSA TOTAL: CPT | Mod: HCNC | Performed by: INTERNAL MEDICINE

## 2024-12-04 PROCEDURE — 84443 ASSAY THYROID STIM HORMONE: CPT | Mod: HCNC | Performed by: INTERNAL MEDICINE

## 2024-12-04 PROCEDURE — 85025 COMPLETE CBC W/AUTO DIFF WBC: CPT | Mod: HCNC | Performed by: INTERNAL MEDICINE

## 2024-12-04 PROCEDURE — 80061 LIPID PANEL: CPT | Mod: HCNC | Performed by: INTERNAL MEDICINE

## 2024-12-04 PROCEDURE — 36415 COLL VENOUS BLD VENIPUNCTURE: CPT | Mod: HCNC,PO | Performed by: INTERNAL MEDICINE

## 2024-12-04 PROCEDURE — 80053 COMPREHEN METABOLIC PANEL: CPT | Mod: HCNC | Performed by: INTERNAL MEDICINE

## 2024-12-04 PROCEDURE — 83036 HEMOGLOBIN GLYCOSYLATED A1C: CPT | Mod: HCNC | Performed by: INTERNAL MEDICINE

## 2024-12-11 ENCOUNTER — OFFICE VISIT (OUTPATIENT)
Dept: INTERNAL MEDICINE | Facility: CLINIC | Age: 69
End: 2024-12-11
Payer: MEDICARE

## 2024-12-11 VITALS
BODY MASS INDEX: 35.81 KG/M2 | WEIGHT: 228.63 LBS | DIASTOLIC BLOOD PRESSURE: 82 MMHG | TEMPERATURE: 97 F | SYSTOLIC BLOOD PRESSURE: 124 MMHG | OXYGEN SATURATION: 95 % | HEART RATE: 81 BPM

## 2024-12-11 DIAGNOSIS — I15.2 HYPERTENSION ASSOCIATED WITH DIABETES: Chronic | ICD-10-CM

## 2024-12-11 DIAGNOSIS — E11.9 TYPE 2 DIABETES MELLITUS WITHOUT COMPLICATION, WITHOUT LONG-TERM CURRENT USE OF INSULIN: ICD-10-CM

## 2024-12-11 DIAGNOSIS — E11.59 HYPERTENSION ASSOCIATED WITH DIABETES: Chronic | ICD-10-CM

## 2024-12-11 DIAGNOSIS — E78.5 HYPERLIPIDEMIA ASSOCIATED WITH TYPE 2 DIABETES MELLITUS: Primary | ICD-10-CM

## 2024-12-11 DIAGNOSIS — D64.9 ANEMIA, UNSPECIFIED TYPE: ICD-10-CM

## 2024-12-11 DIAGNOSIS — E11.69 HYPERLIPIDEMIA ASSOCIATED WITH TYPE 2 DIABETES MELLITUS: Primary | ICD-10-CM

## 2024-12-11 PROCEDURE — G2211 COMPLEX E/M VISIT ADD ON: HCPCS | Mod: HCNC,S$GLB,, | Performed by: INTERNAL MEDICINE

## 2024-12-11 PROCEDURE — 3074F SYST BP LT 130 MM HG: CPT | Mod: HCNC,CPTII,S$GLB, | Performed by: INTERNAL MEDICINE

## 2024-12-11 PROCEDURE — 3008F BODY MASS INDEX DOCD: CPT | Mod: HCNC,CPTII,S$GLB, | Performed by: INTERNAL MEDICINE

## 2024-12-11 PROCEDURE — 3066F NEPHROPATHY DOC TX: CPT | Mod: HCNC,CPTII,S$GLB, | Performed by: INTERNAL MEDICINE

## 2024-12-11 PROCEDURE — 1101F PT FALLS ASSESS-DOCD LE1/YR: CPT | Mod: HCNC,CPTII,S$GLB, | Performed by: INTERNAL MEDICINE

## 2024-12-11 PROCEDURE — 3061F NEG MICROALBUMINURIA REV: CPT | Mod: HCNC,CPTII,S$GLB, | Performed by: INTERNAL MEDICINE

## 2024-12-11 PROCEDURE — 1159F MED LIST DOCD IN RCRD: CPT | Mod: HCNC,CPTII,S$GLB, | Performed by: INTERNAL MEDICINE

## 2024-12-11 PROCEDURE — 4010F ACE/ARB THERAPY RXD/TAKEN: CPT | Mod: HCNC,CPTII,S$GLB, | Performed by: INTERNAL MEDICINE

## 2024-12-11 PROCEDURE — 3044F HG A1C LEVEL LT 7.0%: CPT | Mod: HCNC,CPTII,S$GLB, | Performed by: INTERNAL MEDICINE

## 2024-12-11 PROCEDURE — 3079F DIAST BP 80-89 MM HG: CPT | Mod: HCNC,CPTII,S$GLB, | Performed by: INTERNAL MEDICINE

## 2024-12-11 PROCEDURE — 99999 PR PBB SHADOW E&M-EST. PATIENT-LVL III: CPT | Mod: PBBFAC,HCNC,, | Performed by: INTERNAL MEDICINE

## 2024-12-11 PROCEDURE — 99214 OFFICE O/P EST MOD 30 MIN: CPT | Mod: HCNC,S$GLB,, | Performed by: INTERNAL MEDICINE

## 2024-12-11 PROCEDURE — 3288F FALL RISK ASSESSMENT DOCD: CPT | Mod: HCNC,CPTII,S$GLB, | Performed by: INTERNAL MEDICINE

## 2024-12-11 PROCEDURE — 1126F AMNT PAIN NOTED NONE PRSNT: CPT | Mod: HCNC,CPTII,S$GLB, | Performed by: INTERNAL MEDICINE

## 2024-12-11 NOTE — PROGRESS NOTES
HPI:  Patient is a 69-year-old gentleman who comes in today for follow-up of diabetes, hypertension, lipids.  Patient has been doing well.  He has no reported problems or complaints.  He denies any hypoglycemic events.  His blood pressures been well controlled.  He initially lost 20 lb with the Mounjaro but it has plateaued out for the last 3-4 months in his weight is been stable.    Current meds have been verified and updated per the EMR  Exam:/82 (BP Location: Right arm, Patient Position: Sitting)   Pulse 81   Temp 96.6 °F (35.9 °C) (Tympanic)   Wt 103.7 kg (228 lb 9.9 oz)   SpO2 95%   BMI 35.81 kg/m²   Carotids 2+ equal without bruits, neck is supple without adenopathy  Chest clear  Cardiovascular regular rate and rhythm without murmur gallop or rub  Extremities without edema    Lab Results   Component Value Date    WBC 6.10 12/04/2024    HGB 13.9 (L) 12/04/2024    HCT 43.2 12/04/2024     12/04/2024    CHOL 119 (L) 12/04/2024    TRIG 82 12/04/2024    HDL 34 (L) 12/04/2024    ALT 32 12/04/2024    AST 31 12/04/2024     12/04/2024    K 5.1 12/04/2024     12/04/2024    CREATININE 0.9 12/04/2024    BUN 15 12/04/2024    CO2 26 12/04/2024    TSH 1.720 12/04/2024    PSA 1.1 12/04/2024    INR 1.0 07/30/2022    HGBA1C 6.4 (H) 12/04/2024    BNP <10 07/30/2022    SEDRATE 27 (H) 09/26/2022    CRP 1.6 09/26/2022       Impression:  Diabetes, controlled markedly improved  Hypertension lipids, well controlled on current meds  Obesity, his weight loss has plateaued.  We will increase the doses of the Mounjaro  Patient Active Problem List   Diagnosis    Type 2 diabetes mellitus without complication, without long-term current use of insulin    Hypertension associated with diabetes    Hyperlipidemia associated with type 2 diabetes mellitus    Class 2 severe obesity due to excess calories with serious comorbidity and body mass index (BMI) of 38.0 to 38.9 in adult    History of colon polyps    Vitamin D  deficiency    Anemia    Decreased strength of trunk and back    Lumbar radiculopathy    Degenerative disc disease, lumbar    Lumbar discitis with Osteo L2-3    Benign prostatic hyperplasia without lower urinary tract symptoms    Sinus bradycardia    LVH (left ventricular hypertrophy)    LAE (left atrial enlargement)    Hyperparathyroidism       Plan:  Orders Placed This Encounter    Hemoglobin A1C    Comprehensive Metabolic Panel    Lipid Panel    TSH    tirzepatide 7.5 mg/0.5 mL PnIj     Patient will have the above lab work and be seen in 6 months.  The Mounjaro was increased to 7.5 mg per week.  Other medications remain the same    This note is generated with speech recognition software and is subject to transcription error and sound alike phrases that may be missed by proofreading.

## 2025-02-05 ENCOUNTER — TELEPHONE (OUTPATIENT)
Dept: INTERNAL MEDICINE | Facility: CLINIC | Age: 70
End: 2025-02-05

## 2025-02-05 ENCOUNTER — OFFICE VISIT (OUTPATIENT)
Dept: INTERNAL MEDICINE | Facility: CLINIC | Age: 70
End: 2025-02-05
Payer: MEDICARE

## 2025-02-05 VITALS
SYSTOLIC BLOOD PRESSURE: 120 MMHG | BODY MASS INDEX: 34.57 KG/M2 | OXYGEN SATURATION: 96 % | HEIGHT: 67 IN | HEART RATE: 71 BPM | RESPIRATION RATE: 18 BRPM | WEIGHT: 220.25 LBS | DIASTOLIC BLOOD PRESSURE: 74 MMHG

## 2025-02-05 DIAGNOSIS — I51.7 LAE (LEFT ATRIAL ENLARGEMENT): ICD-10-CM

## 2025-02-05 DIAGNOSIS — E11.9 TYPE 2 DIABETES MELLITUS WITHOUT COMPLICATION, WITHOUT LONG-TERM CURRENT USE OF INSULIN: ICD-10-CM

## 2025-02-05 DIAGNOSIS — E66.9 OBESITY (BMI 30-39.9): ICD-10-CM

## 2025-02-05 DIAGNOSIS — E11.59 HYPERTENSION ASSOCIATED WITH DIABETES: Chronic | ICD-10-CM

## 2025-02-05 DIAGNOSIS — Z00.00 ENCOUNTER FOR MEDICARE ANNUAL WELLNESS EXAM: Primary | ICD-10-CM

## 2025-02-05 DIAGNOSIS — I15.2 HYPERTENSION ASSOCIATED WITH DIABETES: Chronic | ICD-10-CM

## 2025-02-05 DIAGNOSIS — E11.69 HYPERLIPIDEMIA ASSOCIATED WITH TYPE 2 DIABETES MELLITUS: ICD-10-CM

## 2025-02-05 DIAGNOSIS — E78.5 HYPERLIPIDEMIA ASSOCIATED WITH TYPE 2 DIABETES MELLITUS: ICD-10-CM

## 2025-02-05 PROCEDURE — 3288F FALL RISK ASSESSMENT DOCD: CPT | Mod: HCNC,CPTII,S$GLB, | Performed by: NURSE PRACTITIONER

## 2025-02-05 PROCEDURE — 1101F PT FALLS ASSESS-DOCD LE1/YR: CPT | Mod: HCNC,CPTII,S$GLB, | Performed by: NURSE PRACTITIONER

## 2025-02-05 PROCEDURE — 1158F ADVNC CARE PLAN TLK DOCD: CPT | Mod: HCNC,CPTII,S$GLB, | Performed by: NURSE PRACTITIONER

## 2025-02-05 PROCEDURE — 1160F RVW MEDS BY RX/DR IN RCRD: CPT | Mod: HCNC,CPTII,S$GLB, | Performed by: NURSE PRACTITIONER

## 2025-02-05 PROCEDURE — 3072F LOW RISK FOR RETINOPATHY: CPT | Mod: HCNC,CPTII,S$GLB, | Performed by: NURSE PRACTITIONER

## 2025-02-05 PROCEDURE — 1170F FXNL STATUS ASSESSED: CPT | Mod: HCNC,CPTII,S$GLB, | Performed by: NURSE PRACTITIONER

## 2025-02-05 PROCEDURE — G0439 PPPS, SUBSEQ VISIT: HCPCS | Mod: HCNC,S$GLB,, | Performed by: NURSE PRACTITIONER

## 2025-02-05 PROCEDURE — 3074F SYST BP LT 130 MM HG: CPT | Mod: HCNC,CPTII,S$GLB, | Performed by: NURSE PRACTITIONER

## 2025-02-05 PROCEDURE — 1126F AMNT PAIN NOTED NONE PRSNT: CPT | Mod: HCNC,CPTII,S$GLB, | Performed by: NURSE PRACTITIONER

## 2025-02-05 PROCEDURE — 3078F DIAST BP <80 MM HG: CPT | Mod: HCNC,CPTII,S$GLB, | Performed by: NURSE PRACTITIONER

## 2025-02-05 PROCEDURE — 99999 PR PBB SHADOW E&M-EST. PATIENT-LVL V: CPT | Mod: PBBFAC,HCNC,, | Performed by: NURSE PRACTITIONER

## 2025-02-05 PROCEDURE — 1159F MED LIST DOCD IN RCRD: CPT | Mod: HCNC,CPTII,S$GLB, | Performed by: NURSE PRACTITIONER

## 2025-02-05 NOTE — PROGRESS NOTES
"  Friday Lou presented for a  Medicare AWV and comprehensive Health Risk Assessment today. The following components were reviewed and updated:    Medical history  Family History  Social history  Allergies and Current Medications  Health Risk Assessment  Health Maintenance  Care Team         ** See Completed Assessments for Annual Wellness Visit within the encounter summary.**         The following assessments were completed:  Living Situation  CAGE  Depression Screening  Timed Get Up and Go  Whisper Test  Cognitive Function Screening    Nutrition Screening  ADL Screening  PAQ Screening  Has urine leakage ever interrupted your daily activites or sleep? No  Do you think you could use some help to better manage urine leakage?No       Opioid documentation:      Patient does not have a current opioid prescription.        Vitals:    02/05/25 0704   BP: 120/74   Pulse: 71   Resp: 18   SpO2: 96%   Weight: 99.9 kg (220 lb 3.8 oz)   Height: 5' 7" (1.702 m)     Body mass index is 34.49 kg/m².  Physical Exam  Constitutional:       General: He is not in acute distress.     Appearance: Normal appearance. He is well-developed. He is obese. He is not ill-appearing, toxic-appearing or diaphoretic.   HENT:      Head: Normocephalic and atraumatic.      Right Ear: External ear normal.      Left Ear: External ear normal.   Eyes:      Conjunctiva/sclera: Conjunctivae normal.   Cardiovascular:      Rate and Rhythm: Normal rate and regular rhythm.      Pulses: Normal pulses.      Heart sounds: Normal heart sounds. No murmur heard.     No friction rub. No gallop.   Pulmonary:      Effort: Pulmonary effort is normal. No respiratory distress.      Breath sounds: Normal breath sounds. No wheezing or rales.   Chest:      Chest wall: No tenderness.   Abdominal:      General: Bowel sounds are normal.      Palpations: Abdomen is soft.   Musculoskeletal:         General: No tenderness. Normal range of motion.      Cervical back: Normal range of " motion.   Skin:     General: Skin is warm and dry.   Neurological:      Mental Status: He is alert and oriented to person, place, and time.      Cranial Nerves: No cranial nerve deficit.      Comments: Protective Sensation (w/ 10 gram monofilament):  Right: Intact  Left: Intact    Visual Inspection:  Normal -  Bilateral    Pedal Pulses:   Right: Present  Left: Present    Posterior Tibialis Pulses:   Right:Present  Left: Present      Psychiatric:         Mood and Affect: Mood normal.         Behavior: Behavior normal.             Diagnoses and health risks identified today and associated recommendations/orders:    1. Encounter for Medicare annual wellness exam  Screenings performed, as noted above.  Personal preventative testing needs reviewed.      2. Hypertension associated with diabetes  Treated with Avapro, stable, cont tx  - Ambulatory referral/consult to Podiatry; Future    3. Hyperlipidemia associated with type 2 diabetes mellitus  Treated with atorvastatin, stable, cont tx    4. Type 2 diabetes mellitus without complication, without long-term current use of insulin  Treated with Metformin, tirzepatide, stable, cont tx, last Hga1c 6.4    5. Obesity (BMI 30-39.9)  Assessed, improving, doing well on tirzepatide    6. LAE (left atrial enlargement)  Monitored, stable, follow up with cardiology, Dr White    Will schedule an eye exam and podiatry appt for nail care      Provided Friday with a 5-10 year written screening schedule and personal prevention plan. Recommendations were developed using the USPSTF age appropriate recommendations. Education, counseling, and referrals were provided as needed. After Visit Summary printed and given to patient which includes a list of additional screenings\tests needed.    No follow-ups on file.    Ricardo An NP  I offered to discuss advanced care planning, including how to pick a person who would make decisions for you if you were unable to make them for yourself, called a  health care power of , and what kind of decisions you might make such as use of life sustaining treatments such as ventilators and tube feeding when faced with a life limiting illness recorded on a living will that they will need to know. (How you want to be cared for as you near the end of your natural life)     X Patient is interested in learning more about how to make advanced directives.  I provided them paperwork and offered to discuss this with them.

## 2025-02-05 NOTE — TELEPHONE ENCOUNTER
----- Message from Ricardo An NP sent at 2/5/2025  7:25 AM CST -----  Regarding: appt  Please schedule an appt with podiatry at [facility] for [facility] care diabetic, and  his diabetic eye exam 3/15/25 or later   [facility]  send through portal.  thanks

## 2025-02-05 NOTE — PATIENT INSTRUCTIONS
Counseling and Referral of Other Preventative  (Italic type indicates deductible and co-insurance are waived)    Patient Name: Friday Adikema  Today's Date: 2/5/2025    Health Maintenance       Date Due Completion Date    COVID-19 Vaccine (6 - 2024-25 season) 09/01/2024 7/13/2022    Diabetic Eye Exam 03/14/2025 3/14/2024    Hemoglobin A1c 06/04/2025 12/4/2024    TETANUS VACCINE 11/17/2025 11/17/2015    Diabetes Urine Screening 12/04/2025 12/4/2024    Lipid Panel 12/04/2025 12/4/2024    Low Dose Statin 12/11/2025 12/11/2024    Foot Exam 02/05/2026 2/5/2025    Override on 5/24/2023: Done    Override on 5/18/2022: Done    Override on 1/4/2021: Done    Colorectal Cancer Screening 12/14/2027 12/14/2022        Orders Placed This Encounter   Procedures    Ambulatory referral/consult to Podiatry       The following information is provided to all patients.  This information is to help you find resources for any of the problems found today that may be affecting your health:                  Living healthy guide: www.[facility].[place].[facility]      Understanding Diabetes: www.diabetes.org      Eating healthy: www.cdc.gov/healthyweight      CDC home safety checklist: www.cdc.gov/steadi/patient.html      Agency on Aging: www.goea.[place]      Alcoholics anonymous (AA): www.aa.org      Physical Activity: www.kaiser.nih.gov/wy8rsxz      Tobacco use: www.quitwithusla.org         Counseling and Referral of Other Preventative  (Italic type indicates deductible and co-insurance are waived)    Patient Name: Friday Adikema  Today's Date: 2/5/2025    Health Maintenance       Date Due Completion Date    COVID-19 Vaccine (6 - 2024-25 season) 09/01/2024 7/13/2022    Diabetic Eye Exam 03/14/2025 3/14/2024    Hemoglobin A1c 06/04/2025 12/4/2024    TETANUS VACCINE 11/17/2025 11/17/2015    Diabetes Urine Screening 12/04/2025 12/4/2024    Lipid Panel 12/04/2025 12/4/2024    Low Dose Statin 12/11/2025 12/11/2024    Foot Exam 02/05/2026 2/5/2025     Override on 5/24/2023: Done    Override on 5/18/2022: Done    Override on 1/4/2021: Done    Colorectal Cancer Screening 12/14/2027 12/14/2022        Orders Placed This Encounter   Procedures    Ambulatory referral/consult to Podiatry       The following information is provided to all patients.  This information is to help you find resources for any of the problems found today that may be affecting your health:                  Living healthy guide: www.[facility].[place].[facility]      Understanding Diabetes: www.diabetes.org      Eating healthy: www.cdc.gov/healthyweight      CDC home safety checklist: www.cdc.gov/steadi/patient.html      Agency on Aging: www.goea.[place]      Alcoholics anonymous (AA): www.aa.org      Physical Activity: www.kaiser.nih.gov/xz0dfza      Tobacco use: www.quitwithusla.org

## 2025-02-17 ENCOUNTER — OFFICE VISIT (OUTPATIENT)
Dept: PODIATRY | Facility: CLINIC | Age: 70
End: 2025-02-17
Payer: MEDICARE

## 2025-02-17 DIAGNOSIS — E11.9 ENCOUNTER FOR COMPREHENSIVE DIABETIC FOOT EXAMINATION, TYPE 2 DIABETES MELLITUS: Primary | ICD-10-CM

## 2025-02-17 DIAGNOSIS — E11.9 TYPE 2 DIABETES MELLITUS WITHOUT COMPLICATION, WITHOUT LONG-TERM CURRENT USE OF INSULIN: ICD-10-CM

## 2025-02-17 DIAGNOSIS — B35.1 DERMATOPHYTOSIS OF NAIL: ICD-10-CM

## 2025-02-17 DIAGNOSIS — I15.2 HYPERTENSION ASSOCIATED WITH DIABETES: Chronic | ICD-10-CM

## 2025-02-17 DIAGNOSIS — L60.0 ONYCHOCRYPTOSIS: ICD-10-CM

## 2025-02-17 DIAGNOSIS — E11.59 HYPERTENSION ASSOCIATED WITH DIABETES: Chronic | ICD-10-CM

## 2025-02-17 PROCEDURE — 99999 PR PBB SHADOW E&M-EST. PATIENT-LVL III: CPT | Mod: PBBFAC,HCNC,, | Performed by: PODIATRIST

## 2025-02-17 NOTE — PROGRESS NOTES
Subjective:       Patient ID: Friday ROGELIO Blake is a 69 y.o. male.    Chief Complaint: Diabetic Foot Exam (Patient is a diabetic and was last seen on 12.11.24 by Sid Elizabeth. He is a diabetic and denies pain at presentl )      HPI: Friday ROGELIO Blake presents to the office today, under referral by ,Ricardo An NP  for his annual diabetic foot assessment and risk evaluation.  Patient is a DMII. This patient last saw his/her internal/family medicine physician on 02/05/2025.  Relates history of ingrown to the right great toe.  States having continued discomfort.  Otherwise, no pain..     Hemoglobin A1C   Date Value Ref Range Status   12/04/2024 6.4 (H) 4.0 - 5.6 % Final     Comment:     ADA Screening Guidelines:  5.7-6.4%  Consistent with prediabetes  >or=6.5%  Consistent with diabetes    High levels of fetal hemoglobin interfere with the HbA1C  assay. Heterozygous hemoglobin variants (HbS, HgC, etc)do  not significantly interfere with this assay.   However, presence of multiple variants may affect accuracy.     08/20/2024 6.7 (H) 4.0 - 5.6 % Final     Comment:     ADA Screening Guidelines:  5.7-6.4%  Consistent with prediabetes  >or=6.5%  Consistent with diabetes    High levels of fetal hemoglobin interfere with the HbA1C  assay. Heterozygous hemoglobin variants (HbS, HgC, etc)do  not significantly interfere with this assay.   However, presence of multiple variants may affect accuracy.     05/20/2024 8.7 (H) 4.0 - 5.6 % Final     Comment:     ADA Screening Guidelines:  5.7-6.4%  Consistent with prediabetes  >or=6.5%  Consistent with diabetes    High levels of fetal hemoglobin interfere with the HbA1C  assay. Heterozygous hemoglobin variants (HbS, HgC, etc)do  not significantly interfere with this assay.   However, presence of multiple variants may affect accuracy.     .    Review of patient's allergies indicates:  No Known Allergies    Past Medical History:   Diagnosis Date    Anemia 03/23/2021    Bilateral carpal  tunnel syndrome 02/25/2022    moderate demyelinating bilaterally    History of colon polyps     Hyperlipidemia associated with type 2 diabetes mellitus     Hypertension associated with diabetes     Low back pain 09/28/2021    Lumbar radiculopathy     Morbid obesity with BMI of 40.0-44.9, adult     Type 2 diabetes mellitus without complication, without long-term current use of insulin     Vitamin D deficiency        Family History   Problem Relation Name Age of Onset    Asthma Mother      Hypertension Father      Diabetes Mellitus Father      Prostate cancer Brother         Social History[1]    Past Surgical History:   Procedure Laterality Date    CARPAL TUNNEL RELEASE Left 4/1/2021    Procedure: RELEASE, CARPAL TUNNEL;  Surgeon: Yoandy David MD;  Location: [facility] OR;  Service: Orthopedics;  Laterality: Left;    COLONOSCOPY N/A 12/14/2022    Procedure: COLONOSCOPY;  Surgeon: Belia Gómez DO;  Location: [facility] ENDO;  Service: Endoscopy;  Laterality: N/A;    HERNIA REPAIR      INCISION AND DRAINAGE OF HEMATOMA N/A 6/28/2022    Procedure: INCISION AND DRAINAGE, HEMATOMA;  Surgeon: Shaggy Zepeda MD;  Location: [facility] OR;  Service: Neurosurgery;  Laterality: N/A;    LUMBAR LAMINECTOMY WITH DISCECTOMY Left 6/22/2022    Procedure: LAMINECTOMY, SPINE, LUMBAR, WITH DISCECTOMY;  Surgeon: Shaggy Zepeda MD;  Location: [facility] OR;  Service: Neurosurgery;  Laterality: Left;  minimally invasive L2-3 discectomy and decompression     LUMBAR LAMINECTOMY WITH DISCECTOMY N/A 6/28/2022    Procedure: LAMINECTOMY, SPINE, LUMBAR, WITH DISCECTOMY;  Surgeon: Shaggy Zepeda MD;  Location: [facility] OR;  Service: Neurosurgery;  Laterality: N/A;    PARATHYROIDECTOMY Bilateral 7/27/2021    Procedure: PARATHYROIDECTOMY;  Surgeon: Tha Dial MD;  Location: [facility] OR;  Service: ENT;  Laterality: Bilateral;  with medialstinal exploration    REPAIR OF CEREBROSPINAL FLUID LEAK USING COMPUTER ASSISTED NAVIGATION Bilateral 8/3/2022     Procedure: REPAIR, CSF LEAK, USING COMPUTER-ASSISTED NAVIGATION;  Surgeon: Shaggy Zepeda MD;  Location: [facility] OR;  Service: Neurosurgery;  Laterality: Bilateral;    SELECTIVE INJECTION OF ANESTHETIC AGENT AROUND LUMBAR SPINAL NERVE ROOT BY TRANSFORAMINAL APPROACH Left 3/23/2022    Procedure: BLOCK, SPINAL NERVE ROOT, LUMBAR, SELECTIVE, TRANSFORAMINAL APPROACH Left L2-3, L3-4 RN IV sedation;  Surgeon: Jay Hodges MD;  Location: [facility] PAIN MGT;  Service: Pain Management;  Laterality: Left;    STERNOTOMY N/A 7/27/2021    Procedure: STERNOTOMY;  Surgeon: Tha Dial MD;  Location: [facility] OR;  Service: ENT;  Laterality: N/A;    ULNAR NERVE TRANSPOSITION Left 4/1/2021    Procedure: TRANSPOSITION, NERVE, ULNAR;  Surgeon: Yoandy David MD;  Location: [facility] OR;  Service: Orthopedics;  Laterality: Left;    ULNAR TUNNEL RELEASE Left 4/1/2021    Procedure: RELEASE, CUBITAL TUNNEL;  Surgeon: Yoandy David MD;  Location: [facility] OR;  Service: Orthopedics;  Laterality: Left;    UMBILICAL HERNIA REPAIR      WOUND EXPLORATION Bilateral 8/3/2022    Procedure: EXPLORATION, WOUND;  Surgeon: Shaggy Zepeda MD;  Location: [facility] OR;  Service: Neurosurgery;  Laterality: Bilateral;       Review of Systems        Objective:   There were no vitals taken for this visit.    Physical Exam  LOWER EXTREMITY PHYSICAL EXAMINATION    ORTHOPEDIC:  No pain on palpation of the foot or ankle.  Intrinsic and extrinsic musculature intact to the bilateral lower extremities.  No history of amputations.  No pain with ambulation or gait    VASCULAR:  The right dorsalis pedis pulse 2/4 and the right posterior tibial pulse 2/4.  The left dorsalis pedis pulse 2/4 and posterior tibial pulse on the left is 2/4.  Capillary refill is intact.  Pedal hair growth intact    NEUROLOGY:  Sharp/dull, light touch, proprioception all intact and equal bilaterally.  Vibratory sensation is intact.  Protective sensation intact    DERMATOLOGY:  Skin is supple,  moist, intact.  There is no signs of callusing, ulcerations, other lesions identified to the dorsal or plantar aspect of the right or left foot.  The R1, 2, 5 and left L1,2, 5 are thickened, discolored dystrophic.  There is subungual debris.  Nail plates have area of dark discoloration.  The remaining nails on the right foot and the left foot are elongated but of normal color, thickness, and texture.   There is evidence of slight aspect medial border of the right hallux.  No signs of underlying acute infection abscess.  There is no evidence of wounds or skin breakdown.  No edema or erythema.  No obvious lacerations or fissuring.  Interdigital spaces are clean, dry, intact.  No rashes or scars appreciated.    Assessment:     1. Encounter for comprehensive diabetic foot examination, type 2 diabetes mellitus    2. Type 2 diabetes mellitus without complication, without long-term current use of insulin    3. Hypertension associated with diabetes    4. Dermatophytosis of nail    5. Onychocryptosis        Plan:     Encounter for comprehensive diabetic foot examination, type 2 diabetes mellitus    Type 2 diabetes mellitus without complication, without long-term current use of insulin    Hypertension associated with diabetes  -     Ambulatory referral/consult to Podiatry    Dermatophytosis of nail    Onychocryptosis      Thorough discussion is had with the patient today, concerning the diagnosis, its etiology, and the treatment algorithm at present.    Diabetic education was discussed and provided. Discussed appropriate foot care. Encourage compliance with diet and A1c.     Dystrophic nail plates, as outlined above (R#1,2,5  ; L#1,2,5 ), are sharply debrided with double action nail nipper, and/or with the assistance of a mechanical rotary christian, with removal of all offending nail and nail border(s), for reduction of pains. Nails are reduced in terms of length, width and girth with removal of subungual debris to facilitate  pain free weight bearing and ambulation. The elongated nails as outlined in the objective portion of this note, were trimmed to appropriate length, with a double action nail nipper, for alleviation/reduction of pains as well.     Thorough discussion is had with the patient concerning the diagnosis, its etiology, and the treatment algorithm at present. Shoe inspection. Foot Education. Patient reminded of the importance of good nutrition and blood sugar control to help prevent podiatric complications of diabetes. Patient instructed on proper foot hygeine. We discussed wearing proper and supportive shoe gear, daily foot inspections, never walking barefooted or sock footed, never putting sharp instruments to feet which can cause major complications associated with infection, ulcers, lacerations.    Cryptitis border from the medial aspect of the right hallux nail was removed with a sterile nail Nipper.  A small 3 mm wedge nail was removed to the medial without issue.  No evidence of bleeding.  Medial nail fold was inspected to ensure no residual ingrown pieces were present.             [1]   Social History  Socioeconomic History    Marital status:    Tobacco Use    Smoking status: Never    Smokeless tobacco: Never   Substance and Sexual Activity    Alcohol use: Never    Drug use: Never    Sexual activity: Yes     Partners: Female     Social Drivers of Health     Financial Resource Strain: Low Risk  (2/5/2025)    Overall Financial Resource Strain (CARDIA)     Difficulty of Paying Living Expenses: Not hard at all   Food Insecurity: No Food Insecurity (2/5/2025)    Hunger Vital Sign     Worried About Running Out of Food in the Last Year: Never true     Ran Out of Food in the Last Year: Never true   Transportation Needs: No Transportation Needs (2/5/2025)    PRAPARE - Transportation     Lack of Transportation (Medical): No     Lack of Transportation (Non-Medical): No   Physical Activity: Sufficiently Active (2/5/2025)     Exercise Vital Sign     Days of Exercise per Week: 5 days     Minutes of Exercise per Session: 40 min   Stress: No Stress Concern Present (2/5/2025)    [employer] - Occupational Stress Questionnaire     Feeling of Stress : Not at all   Housing Stability: Unknown (2/5/2025)    Housing Stability Vital Sign     Unable to Pay for Housing in the Last Year: No     Homeless in the Last Year: No

## 2025-05-01 ENCOUNTER — OFFICE VISIT (OUTPATIENT)
Dept: OPHTHALMOLOGY | Facility: CLINIC | Age: 70
End: 2025-05-01
Payer: MEDICARE

## 2025-05-01 DIAGNOSIS — H25.811 COMBINED FORMS OF AGE-RELATED CATARACT OF RIGHT EYE: ICD-10-CM

## 2025-05-01 DIAGNOSIS — Z96.1 PSEUDOPHAKIA OF LEFT EYE: ICD-10-CM

## 2025-05-01 DIAGNOSIS — E11.9 TYPE 2 DIABETES MELLITUS WITHOUT RETINOPATHY: Primary | ICD-10-CM

## 2025-05-01 PROCEDURE — 4010F ACE/ARB THERAPY RXD/TAKEN: CPT | Mod: CPTII,S$GLB,, | Performed by: OPHTHALMOLOGY

## 2025-05-01 PROCEDURE — 1159F MED LIST DOCD IN RCRD: CPT | Mod: CPTII,S$GLB,, | Performed by: OPHTHALMOLOGY

## 2025-05-01 PROCEDURE — 1160F RVW MEDS BY RX/DR IN RCRD: CPT | Mod: CPTII,S$GLB,, | Performed by: OPHTHALMOLOGY

## 2025-05-01 PROCEDURE — 2023F DILAT RTA XM W/O RTNOPTHY: CPT | Mod: CPTII,S$GLB,, | Performed by: OPHTHALMOLOGY

## 2025-05-01 PROCEDURE — 92004 COMPRE OPH EXAM NEW PT 1/>: CPT | Mod: S$GLB,,, | Performed by: OPHTHALMOLOGY

## 2025-05-01 PROCEDURE — 99999 PR PBB SHADOW E&M-EST. PATIENT-LVL II: CPT | Mod: PBBFAC,,, | Performed by: OPHTHALMOLOGY

## 2025-05-01 NOTE — PROGRESS NOTES
HPI     Diabetic Eye Exam     Additional comments: NP to SSS  Patient here today for yearly DM            Comments    Diagnosed with diabetes in 2020  Lab Results       Component                Value               Date                       HGBA1C                   6.4 (H)             12/04/2024              1. DM  2. NSC OD  3. PCIOL OS                Last edited by Becky Wang, PCT on 5/1/2025  1:34 PM.            Assessment /Plan     For exam results, see Encounter Report.    Type 2 diabetes mellitus without retinopathy  Last A1c 6.4 . Diabetes controlled with no diabetic retinopathy on dilated exam. Reviewed diabetic eye precautions including excellent blood sugar control, and importance of regular follow up.     Combined forms of age-related cataract of right eye  Cataracts are present but not visually significant. Will continue to monitor.     Pseudophakia of left eye  Condition stable, no therapeutic intervention necessary at this time. Will continue to monitor.      Return to clinic in 1 year or sooner PRN

## 2025-05-13 RX ORDER — IRBESARTAN 300 MG/1
300 TABLET ORAL NIGHTLY
Qty: 90 TABLET | Refills: 3 | Status: SHIPPED | OUTPATIENT
Start: 2025-05-13

## 2025-05-13 NOTE — TELEPHONE ENCOUNTER
Requested Prescriptions     Pending Prescriptions Disp Refills    irbesartan (AVAPRO) 300 MG tablet 90 tablet 3     Sig: Take 1 tablet (300 mg total) by mouth every evening.         LV 12/11/2024 KH  NV 06/16/2025 HS  LF 05/27/2024

## 2025-06-16 ENCOUNTER — LAB VISIT (OUTPATIENT)
Dept: LAB | Facility: HOSPITAL | Age: 70
End: 2025-06-16
Payer: MEDICARE

## 2025-06-16 ENCOUNTER — OFFICE VISIT (OUTPATIENT)
Dept: INTERNAL MEDICINE | Facility: CLINIC | Age: 70
End: 2025-06-16
Payer: MEDICARE

## 2025-06-16 VITALS
SYSTOLIC BLOOD PRESSURE: 138 MMHG | TEMPERATURE: 98 F | WEIGHT: 228.38 LBS | BODY MASS INDEX: 35.84 KG/M2 | OXYGEN SATURATION: 97 % | HEIGHT: 67 IN | HEART RATE: 63 BPM | DIASTOLIC BLOOD PRESSURE: 82 MMHG

## 2025-06-16 DIAGNOSIS — E11.69 HYPERLIPIDEMIA ASSOCIATED WITH TYPE 2 DIABETES MELLITUS: ICD-10-CM

## 2025-06-16 DIAGNOSIS — E11.59 HYPERTENSION ASSOCIATED WITH DIABETES: Primary | ICD-10-CM

## 2025-06-16 DIAGNOSIS — I15.2 HYPERTENSION ASSOCIATED WITH DIABETES: Primary | ICD-10-CM

## 2025-06-16 DIAGNOSIS — E66.01 SEVERE OBESITY (BMI 35.0-39.9) WITH COMORBIDITY: ICD-10-CM

## 2025-06-16 DIAGNOSIS — Z79.899 OTHER LONG TERM (CURRENT) DRUG THERAPY: ICD-10-CM

## 2025-06-16 DIAGNOSIS — E78.5 HYPERLIPIDEMIA ASSOCIATED WITH TYPE 2 DIABETES MELLITUS: ICD-10-CM

## 2025-06-16 DIAGNOSIS — E11.9 TYPE 2 DIABETES MELLITUS WITHOUT COMPLICATION, WITHOUT LONG-TERM CURRENT USE OF INSULIN: ICD-10-CM

## 2025-06-16 LAB
ABSOLUTE EOSINOPHIL (OHS): 0.39 K/UL
ABSOLUTE MONOCYTE (OHS): 0.38 K/UL (ref 0.3–1)
ABSOLUTE NEUTROPHIL COUNT (OHS): 2.09 K/UL (ref 1.8–7.7)
ALBUMIN SERPL BCP-MCNC: 4.2 G/DL (ref 3.5–5.2)
ALP SERPL-CCNC: 65 UNIT/L (ref 40–150)
ALT SERPL W/O P-5'-P-CCNC: 22 UNIT/L (ref 10–44)
ANION GAP (OHS): 6 MMOL/L (ref 8–16)
AST SERPL-CCNC: 23 UNIT/L (ref 11–45)
BASOPHILS # BLD AUTO: 0.04 K/UL
BASOPHILS NFR BLD AUTO: 0.6 %
BILIRUB SERPL-MCNC: 0.4 MG/DL (ref 0.1–1)
BUN SERPL-MCNC: 12 MG/DL (ref 8–23)
CALCIUM SERPL-MCNC: 9.7 MG/DL (ref 8.7–10.5)
CHLORIDE SERPL-SCNC: 105 MMOL/L (ref 95–110)
CHOLEST SERPL-MCNC: 130 MG/DL (ref 120–199)
CHOLEST/HDLC SERPL: 3.6 {RATIO} (ref 2–5)
CO2 SERPL-SCNC: 27 MMOL/L (ref 23–29)
CREAT SERPL-MCNC: 1 MG/DL (ref 0.5–1.4)
EAG (OHS): 134 MG/DL (ref 68–131)
ERYTHROCYTE [DISTWIDTH] IN BLOOD BY AUTOMATED COUNT: 14.5 % (ref 11.5–14.5)
GFR SERPLBLD CREATININE-BSD FMLA CKD-EPI: >60 ML/MIN/1.73/M2
GLUCOSE SERPL-MCNC: 96 MG/DL (ref 70–110)
HBA1C MFR BLD: 6.3 % (ref 4–5.6)
HCT VFR BLD AUTO: 44.8 % (ref 40–54)
HDLC SERPL-MCNC: 36 MG/DL (ref 40–75)
HDLC SERPL: 27.7 % (ref 20–50)
HGB BLD-MCNC: 13.5 GM/DL (ref 14–18)
IMM GRANULOCYTES # BLD AUTO: 0.02 K/UL (ref 0–0.04)
IMM GRANULOCYTES NFR BLD AUTO: 0.3 % (ref 0–0.5)
LDLC SERPL CALC-MCNC: 60.6 MG/DL (ref 63–159)
LYMPHOCYTES # BLD AUTO: 3.24 K/UL (ref 1–4.8)
MCH RBC QN AUTO: 27.8 PG (ref 27–31)
MCHC RBC AUTO-ENTMCNC: 30.1 G/DL (ref 32–36)
MCV RBC AUTO: 92 FL (ref 82–98)
NONHDLC SERPL-MCNC: 94 MG/DL
NUCLEATED RBC (/100WBC) (OHS): 0 /100 WBC
PLATELET # BLD AUTO: 153 K/UL (ref 150–450)
PMV BLD AUTO: 12.9 FL (ref 9.2–12.9)
POTASSIUM SERPL-SCNC: 4.7 MMOL/L (ref 3.5–5.1)
PROT SERPL-MCNC: 6.8 GM/DL (ref 6–8.4)
RBC # BLD AUTO: 4.86 M/UL (ref 4.6–6.2)
RELATIVE EOSINOPHIL (OHS): 6.3 %
RELATIVE LYMPHOCYTE (OHS): 52.6 % (ref 18–48)
RELATIVE MONOCYTE (OHS): 6.2 % (ref 4–15)
RELATIVE NEUTROPHIL (OHS): 34 % (ref 38–73)
SODIUM SERPL-SCNC: 138 MMOL/L (ref 136–145)
TRIGL SERPL-MCNC: 167 MG/DL (ref 30–150)
WBC # BLD AUTO: 6.16 K/UL (ref 3.9–12.7)

## 2025-06-16 PROCEDURE — 99214 OFFICE O/P EST MOD 30 MIN: CPT | Mod: S$GLB,,,

## 2025-06-16 PROCEDURE — 3044F HG A1C LEVEL LT 7.0%: CPT | Mod: CPTII,S$GLB,,

## 2025-06-16 PROCEDURE — 85025 COMPLETE CBC W/AUTO DIFF WBC: CPT

## 2025-06-16 PROCEDURE — 1159F MED LIST DOCD IN RCRD: CPT | Mod: CPTII,S$GLB,,

## 2025-06-16 PROCEDURE — 3288F FALL RISK ASSESSMENT DOCD: CPT | Mod: CPTII,S$GLB,,

## 2025-06-16 PROCEDURE — 3075F SYST BP GE 130 - 139MM HG: CPT | Mod: CPTII,S$GLB,,

## 2025-06-16 PROCEDURE — 3008F BODY MASS INDEX DOCD: CPT | Mod: CPTII,S$GLB,,

## 2025-06-16 PROCEDURE — 36415 COLL VENOUS BLD VENIPUNCTURE: CPT | Mod: PO

## 2025-06-16 PROCEDURE — 3079F DIAST BP 80-89 MM HG: CPT | Mod: CPTII,S$GLB,,

## 2025-06-16 PROCEDURE — 80061 LIPID PANEL: CPT

## 2025-06-16 PROCEDURE — 1101F PT FALLS ASSESS-DOCD LE1/YR: CPT | Mod: CPTII,S$GLB,,

## 2025-06-16 PROCEDURE — 80053 COMPREHEN METABOLIC PANEL: CPT

## 2025-06-16 PROCEDURE — 99999 PR PBB SHADOW E&M-EST. PATIENT-LVL III: CPT | Mod: PBBFAC,,,

## 2025-06-16 PROCEDURE — 4010F ACE/ARB THERAPY RXD/TAKEN: CPT | Mod: CPTII,S$GLB,,

## 2025-06-16 PROCEDURE — 83036 HEMOGLOBIN GLYCOSYLATED A1C: CPT

## 2025-06-16 PROCEDURE — 1126F AMNT PAIN NOTED NONE PRSNT: CPT | Mod: CPTII,S$GLB,,

## 2025-06-17 ENCOUNTER — RESULTS FOLLOW-UP (OUTPATIENT)
Dept: INTERNAL MEDICINE | Facility: CLINIC | Age: 70
End: 2025-06-17

## 2025-06-17 ENCOUNTER — TELEPHONE (OUTPATIENT)
Dept: INTERNAL MEDICINE | Facility: CLINIC | Age: 70
End: 2025-06-17
Payer: MEDICARE

## 2025-06-17 NOTE — TELEPHONE ENCOUNTER
Spoke with pt, let him know results as stated by . Pt verbalized understanding.       Copied from CRM #[id number]. Topic: General Inquiry - Return Call  >> Jun 17, 2025  9:39 AM Zeferino wrote:  Type:  Patient Returning Call    Who Called:Friday   Who Left Message for Patient:nurse   Does the patient know what this is regarding?:yes   Would the patient rather a call back or a response via [employer]? Call back   Best Call Back Number:[phone]  Additional Information:

## 2025-06-19 NOTE — PROGRESS NOTES
Patient ID: Friday ROGELIO Blake is a 69 y.o. male.    Chief Complaint: Follow-up (Est. Care from Dr. Elizabeth)    History of Present Illness    CHIEF COMPLAINT:  - Mr. Blake presents for a follow-up visit after returning from a [place]-long trip out of the country, with concerns about recent blood pressure elevation and a fever that has just resolved.    HPI:  Mr. Blake recently returned from a month-long trip out of the country over the weekend and has jet lag. He noticed an elevation in blood pressure and developed a fever, which resolved yesterday. He took NyQuil and DayQuil to manage these symptoms, which he reports typically exacerbates his blood pressure. He feels significantly improved now. His symptoms may be attributed to a viral infection. He is currently taking one blood pressure medication, a cholesterol medication, and a weekly injection of Mounjaro for diabetes. The Mounjaro has been effective, with his diabetes numbers decreasing significantly. He expresses satisfaction with his current health status and treatment plan.    He denies any new problems or concerns other than the recent fever and blood pressure elevation.      ROS:  General: +fever, -chills, -fatigue, -weight gain, -weight loss  Eyes: -vision changes, -redness, -discharge  ENT: -ear pain, -nasal congestion, -sore throat  Cardiovascular: -chest pain, -palpitations, -lower extremity edema  Respiratory: -cough, -shortness of breath  Gastrointestinal: -abdominal pain, -nausea, -vomiting, -diarrhea, -constipation, -blood in stool  Genitourinary: -dysuria, -hematuria, -frequency  Musculoskeletal: -joint pain, -muscle pain  Skin: -rash, -lesion  Neurological: -headache, -dizziness, -numbness, -tingling  Psychiatric: -anxiety, -depression, -sleep difficulty         Pmh, Psh, Family Hx, Social Hx updated in Epic Tabs today.         2/5/2025     7:09 AM 3/13/2024     7:12 AM 5/24/2023     8:25 AM 3/15/2023     7:18 AM 3/15/2023     7:10 AM 1/4/2022      7:52 AM   Depression Patient Health Questionnaire   Over the last two weeks how often have you been bothered by little interest or pleasure in doing things Not at all Not at all Not at all Not at all  Not at all  Not at all    Over the last two weeks how often have you been bothered by feeling down, depressed or hopeless Not at all Not at all Not at all Not at all Not at all Not at all    PHQ-2 Total Score 0 0 0 0 0 0       Data saved with a previous flowsheet row definition       Active Problem List with Overview Notes    Diagnosis Date Noted    Obesity (BMI 30-39.9) 02/05/2025    Hyperparathyroidism 03/13/2024    Sinus bradycardia 10/10/2023    LVH (left ventricular hypertrophy) 10/10/2023    LAE (left atrial enlargement) 10/10/2023     Dr White following      Benign prostatic hyperplasia without lower urinary tract symptoms 03/15/2023    Lumbar discitis with Osteo L2-3 08/01/2022    Degenerative disc disease, lumbar 06/22/2022    Lumbar radiculopathy     Decreased strength of trunk and back 07/13/2021    Anemia 03/23/2021    Type 2 diabetes mellitus without complication, without long-term current use of insulin     Hypertension associated with diabetes     Hyperlipidemia associated with type 2 diabetes mellitus     History of colon polyps     Vitamin D deficiency        Past Medical History:   Diagnosis Date    Anemia 03/23/2021    Bilateral carpal tunnel syndrome 02/25/2022    moderate demyelinating bilaterally    History of colon polyps     Hyperlipidemia associated with type 2 diabetes mellitus     Hypertension associated with diabetes     Low back pain 09/28/2021    Lumbar radiculopathy     Morbid obesity with BMI of 40.0-44.9, adult     Type 2 diabetes mellitus without complication, without long-term current use of insulin     Vitamin D deficiency        Past Surgical History:   Procedure Laterality Date    CARPAL TUNNEL RELEASE Left 4/1/2021    Procedure: RELEASE, CARPAL TUNNEL;  Surgeon: Yoandy Brennan  MD Frankie;  Location: [facility] OR;  Service: Orthopedics;  Laterality: Left;    COLONOSCOPY N/A 12/14/2022    Procedure: COLONOSCOPY;  Surgeon: Belia Gómez DO;  Location: [facility] ENDO;  Service: Endoscopy;  Laterality: N/A;    HERNIA REPAIR      INCISION AND DRAINAGE OF HEMATOMA N/A 6/28/2022    Procedure: INCISION AND DRAINAGE, HEMATOMA;  Surgeon: Shaggy Zepeda MD;  Location: [facility] OR;  Service: Neurosurgery;  Laterality: N/A;    LUMBAR LAMINECTOMY WITH DISCECTOMY Left 6/22/2022    Procedure: LAMINECTOMY, SPINE, LUMBAR, WITH DISCECTOMY;  Surgeon: Shaggy Zepeda MD;  Location: [facility] OR;  Service: Neurosurgery;  Laterality: Left;  minimally invasive L2-3 discectomy and decompression     LUMBAR LAMINECTOMY WITH DISCECTOMY N/A 6/28/2022    Procedure: LAMINECTOMY, SPINE, LUMBAR, WITH DISCECTOMY;  Surgeon: Shaggy Zepeda MD;  Location: [facility] OR;  Service: Neurosurgery;  Laterality: N/A;    PARATHYROIDECTOMY Bilateral 7/27/2021    Procedure: PARATHYROIDECTOMY;  Surgeon: Tha Dial MD;  Location: [facility] OR;  Service: ENT;  Laterality: Bilateral;  with medialstinal exploration    REPAIR OF CEREBROSPINAL FLUID LEAK USING COMPUTER ASSISTED NAVIGATION Bilateral 8/3/2022    Procedure: REPAIR, CSF LEAK, USING COMPUTER-ASSISTED NAVIGATION;  Surgeon: Shaggy Zepeda MD;  Location: [facility] OR;  Service: Neurosurgery;  Laterality: Bilateral;    SELECTIVE INJECTION OF ANESTHETIC AGENT AROUND LUMBAR SPINAL NERVE ROOT BY TRANSFORAMINAL APPROACH Left 3/23/2022    Procedure: BLOCK, SPINAL NERVE ROOT, LUMBAR, SELECTIVE, TRANSFORAMINAL APPROACH Left L2-3, L3-4 RN IV sedation;  Surgeon: Jay Hodges MD;  Location: [facility] PAIN MGT;  Service: Pain Management;  Laterality: Left;    STERNOTOMY N/A 7/27/2021    Procedure: STERNOTOMY;  Surgeon: Tha Dial MD;  Location: [facility] OR;  Service: ENT;  Laterality: N/A;    ULNAR NERVE TRANSPOSITION Left 4/1/2021    Procedure: TRANSPOSITION, NERVE, ULNAR;  Surgeon: Yoandy David MD;   Location: [facility] OR;  Service: Orthopedics;  Laterality: Left;    ULNAR TUNNEL RELEASE Left 4/1/2021    Procedure: RELEASE, CUBITAL TUNNEL;  Surgeon: Yoandy David MD;  Location: [facility] OR;  Service: Orthopedics;  Laterality: Left;    UMBILICAL HERNIA REPAIR      WOUND EXPLORATION Bilateral 8/3/2022    Procedure: EXPLORATION, WOUND;  Surgeon: Shaggy Zepeda MD;  Location: [facility] OR;  Service: Neurosurgery;  Laterality: Bilateral;       Family History   Problem Relation Name Age of Onset    Asthma Mother      Hypertension Father      Diabetes Mellitus Father      Prostate cancer Brother         Social History     Socioeconomic History    Marital status:    Tobacco Use    Smoking status: Never    Smokeless tobacco: Never   Substance and Sexual Activity    Alcohol use: Never    Drug use: Never    Sexual activity: Yes     Partners: Female     Social Drivers of Health     Financial Resource Strain: Low Risk  (2/5/2025)    Overall Financial Resource Strain (CARDIA)     Difficulty of Paying Living Expenses: Not hard at all   Food Insecurity: No Food Insecurity (2/5/2025)    Hunger Vital Sign     Worried About Running Out of Food in the Last Year: Never true     Ran Out of Food in the Last Year: Never true   Transportation Needs: No Transportation Needs (2/5/2025)    PRAPARE - Transportation     Lack of Transportation (Medical): No     Lack of Transportation (Non-Medical): No   Physical Activity: Sufficiently Active (2/5/2025)    Exercise Vital Sign     Days of Exercise per Week: 5 days     Minutes of Exercise per Session: 40 min   Stress: No Stress Concern Present (2/5/2025)    [employer] - Occupational Stress Questionnaire     Feeling of Stress : Not at all   Housing Stability: Unknown (2/5/2025)    Housing Stability Vital Sign     Unable to Pay for Housing in the Last Year: No     Homeless in the Last Year: No       Medications Ordered Prior to Encounter[1]    Review of  patient's allergies indicates:  No Known Allergies    General - Well developed, alert and oriented in NAD  HEENT - normocephalic, no evidence of trauma, sclera white, EOMI  Neck - full range of motion  COR - regular rate and rhythm without murmurs or gallops  Lungs - Clear  Abdomen - soft, non-tender  Ext - no cyanosis     Assessment:     1. Hypertension associated with diabetes    2. Hyperlipidemia associated with type 2 diabetes mellitus    3. Type 2 diabetes mellitus without complication, without long-term current use of insulin    4. Other long term (current) drug therapy        Pertinent Labs:    Chemistry        Component Value Date/Time     06/16/2025 1107     12/04/2024 0743    K 4.7 06/16/2025 1107    K 5.1 12/04/2024 0743     06/16/2025 1107     12/04/2024 0743    CO2 27 06/16/2025 1107    CO2 26 12/04/2024 0743    BUN 12 06/16/2025 1107    CREATININE 1.0 06/16/2025 1107    GLU 96 06/16/2025 1107    GLU 96 12/04/2024 0743        Component Value Date/Time    CALCIUM 9.7 06/16/2025 1107    CALCIUM 9.7 12/04/2024 0743    ALKPHOS 65 06/16/2025 1107    ALKPHOS 62 12/04/2024 0743    AST 23 06/16/2025 1107    AST 31 12/04/2024 0743    ALT 22 06/16/2025 1107    ALT 32 12/04/2024 0743    BILITOT 0.4 06/16/2025 1107    BILITOT 0.6 12/04/2024 0743    ESTGFRAFRICA >60 07/31/2022 0607    EGFRNONAA >60 07/31/2022 0607          Lab Results   Component Value Date    WBC 6.16 06/16/2025    HGB 13.5 (L) 06/16/2025    HCT 44.8 06/16/2025    MCV 92 06/16/2025    MCH 27.8 06/16/2025    MCHC 30.1 (L) 06/16/2025    RDW 14.5 06/16/2025     06/16/2025    MPV 12.9 06/16/2025    NEUTROABS 2.2 11/23/2020    LYMPHOPCT 48 11/23/2020    PLTEST Decreased (A) 09/10/2022       Lab Results   Component Value Date    HGBA1C 6.3 (H) 06/16/2025    HGBA1C 6.4 (H) 12/04/2024    HGBA1C 6.7 (H) 08/20/2024    GLU 96 06/16/2025     Lab Results   Component Value Date    LDLCALC 60.6 (L) 06/16/2025     Lab Results  "  Component Value Date    TSH 1.720 12/04/2024     Lab Results   Component Value Date    CHOL 130 06/16/2025    CHOL 119 (L) 12/04/2024    CHOL 114 (L) 05/20/2024     Lab Results   Component Value Date    TRIG 167 (H) 06/16/2025    TRIG 82 12/04/2024    TRIG 112 05/20/2024     Lab Results   Component Value Date    HDL 36 (L) 06/16/2025    HDL 34 (L) 12/04/2024    HDL 31 (L) 05/20/2024     Lab Results   Component Value Date    LDLCALC 60.6 (L) 06/16/2025    LDLCALC 68.6 12/04/2024    LDLCALC 60.6 (L) 05/20/2024     No results found for: "NONHDLC"  Lab Results   Component Value Date    CHOLHDL 27.7 06/16/2025    CHOLHDL 28.6 12/04/2024    CHOLHDL 27.2 05/20/2024       The 10-year ASCVD risk score (Berta DK, et al., 2019) is: 35.2%    Values used to calculate the score:      Age: 69 years      Sex: Male      Is Non- : Yes      Diabetic: Yes      Tobacco smoker: No      Systolic Blood Pressure: 138 mmHg      Is BP treated: Yes      HDL Cholesterol: 36 mg/dL      Total Cholesterol: 130 mg/dL    Plan:     Assessment & Plan    Assessed recent fever and BP elevation, likely due to viral infection and exacerbated by OTC medications (NyQuil, DayQuil).  Reviewed current medication regimen.  Noted significant improvement in diabetes numbers with Mounjaro treatment.  Maintained current medication regimen without changes due to overall good management of conditions.  Considered recent travel and associated jet lag as potential contributors to temporary health fluctuations.  Opted not to adjust BP medication despite slight elevation, anticipating spontaneous normalization.    HYPERTENSION:  - Mr. Blake's blood pressure increased slightly after returning from a trip and taking NyQuil and DayQuil, which typically aggravate blood pressure.  - Currently, blood pressure is stable with medication.  - Advised to continue current antihypertensive medication regimen, which was reviewed and confirmed as " appropriate.    HYPERLIPIDEMIA:  - Instructed the patient to continue current cholesterol medication.    TYPE 2 DIABETES:  - Mr. Blake's diabetes numbers have improved and are well-controlled with Mounjaro therapy.  - Instructed to continue weekly Mounjaro injections for diabetes management.  - Will monitor diabetes control with labs in 6 months.    VIRAL INFECTION:  - Mr. Blake experienced fever which resolved yesterday.  - Symptoms are consistent with a viral infection.    FOLLOW-UP:  - Ordered non-fasting labs for 6-month follow-up.  - Provider will call patient with lab results and discuss any concerns.  - Follow up in 6 months and 14 days for routine visit.         1. Hypertension associated with diabetes    2. Hyperlipidemia associated with type 2 diabetes mellitus  - Lipid Panel; Future  - Lipid Panel; Future    3. Type 2 diabetes mellitus without complication, without long-term current use of insulin  - CBC Auto Differential; Future  - Comprehensive Metabolic Panel; Future  - Hemoglobin A1C; Future  - Hemoglobin A1C; Future  - Comprehensive Metabolic Panel; Future    4. Other long term (current) drug therapy  - CBC Auto Differential; Future  - Comprehensive Metabolic Panel; Future  - Hemoglobin A1C; Future  - Lipid Panel; Future  - Lipid Panel; Future  - Hemoglobin A1C; Future  - Comprehensive Metabolic Panel; Future    Hypertension:  Chronic, stable, controlled with current medication, continue Irbesartan 300 mg daily.    DM2:  Chronic, continue Metformin 500 mg twice daily, tirzepatide 7.5 mg weekly.  Hyperlipidemia:  Chronic, stable, continue Atorvastatin 20 mg daily.    Immunization History   Administered Date(s) Administered    COVID-19, MRNA, LN-S, PF (Pfizer) (Gray Cap) 07/13/2022    COVID-19, MRNA, LN-S, PF (Pfizer) (Purple Cap) 03/04/2021, 03/25/2021, 11/05/2021, 07/13/2022    Hepatitis A, Adult 02/26/2020    Hepatitis B, Adult 02/26/2020    Influenza (FLUAD) - Quadrivalent - Adjuvanted - PF  *Preferred* (65+) 11/05/2021, 11/23/2022    Influenza (FLUBLOK) - Quadrivalent - Recombinant - PF *Preferred* (egg allergy) 10/18/2020    Influenza - Quadrivalent - High Dose - PF (65 years and older) 09/04/2023    Influenza - Quadrivalent - PF *Preferred* (6 months and older) 11/17/2015, 11/06/2018, 12/10/2019    Influenza - Trivalent - Fluarix, Flulaval, Fluzone, Afluria - PF 12/09/2014    Influenza - Trivalent - Fluzone High Dose - PF (65 years and older) 11/24/2024    Influenza Split 10/05/2016    Meningococcal Conjugate (MCV4P) 02/26/2020    Pneumococcal Conjugate - 13 Valent 11/06/2018, 01/04/2021    Pneumococcal Conjugate - 20 Valent 11/24/2024    Pneumococcal Polysaccharide - 23 Valent 11/17/2015, 01/29/2019    RSVpreF (Arexvy) 09/04/2023    Tdap 11/17/2015    Zoster Recombinant 09/12/2018, 11/06/2018       Orders Placed This Encounter   Procedures    CBC Auto Differential    Comprehensive Metabolic Panel    Hemoglobin A1C    Lipid Panel    Lipid Panel    Hemoglobin A1C    Comprehensive Metabolic Panel       Portions of this note were generated by [employer].    Each patient to whom medical services by telemedicine are provided:  (1) informed of the relationship between the physician and patient and the respective role of any other health care provider with respect to management of the patient; and (2) notified that he or she may decline to receive medical services by telemedicine and may withdraw from such care at any time.    I spent a total of 35 minutes face to face and non-face to face on the date of this visit.This includes time preparing to see the patient (eg, review of tests, notes), obtaining and/or reviewing additional history from an independent historian and/or outside medical records, documenting clinical information in the electronic health record, independently interpreting results and/or communicating results to the patient/family/caregiver, or care coordinator.  Visit today included  increased complexity associated with the care of the episodic problem addressed and managing the longitudinal care of the patient due to the serious and/or complex managed problem(s).      This note was generated with the assistance of ambient listening technology. Verbal consent was obtained by the patient and accompanying visitor(s) for the recording of patient appointment to facilitate this note. I attest to having reviewed and edited the generated note for accuracy, though some syntax or spelling errors may persist. Please contact the author of this note for any clarification.      Belia Valentine MD       [1]   Current Outpatient Medications on File Prior to Visit   Medication Sig Dispense Refill    aspirin (ECOTRIN) 81 MG EC tablet Take 1 tablet (81 mg total) by mouth once daily.      atorvastatin (LIPITOR) 20 MG tablet Take 1 tablet by mouth once daily 90 tablet 5    blood-glucose meter kit To check BG 1 times daily, to use with insurance preferred meter 1 each 0    irbesartan (AVAPRO) 300 MG tablet Take 1 tablet (300 mg total) by mouth every evening. 90 tablet 3    lancets Misc To check BG 1 times daily, to use with insurance preferred meter 100 each 3    metFORMIN (GLUCOPHAGE) 500 MG tablet TAKE 1 TABLET BY MOUTH TWICE DAILY WITH MEALS 180 tablet 3    ONETOUCH VERIO TEST STRIPS Strp USE 1 STRIP TO CHECK GLUCOSE ONCE DAILY 100 each 3    tirzepatide 7.5 mg/0.5 mL PnIj Inject 7.5 mg into the skin every 7 days. 12 Pen 3     No current facility-administered medications on file prior to visit.

## 2025-08-04 ENCOUNTER — OFFICE VISIT (OUTPATIENT)
Dept: INTERNAL MEDICINE | Facility: CLINIC | Age: 70
End: 2025-08-04
Payer: MEDICARE

## 2025-08-04 ENCOUNTER — HOSPITAL ENCOUNTER (OUTPATIENT)
Dept: RADIOLOGY | Facility: HOSPITAL | Age: 70
Discharge: HOME | End: 2025-08-04
Payer: MEDICARE

## 2025-08-04 VITALS
TEMPERATURE: 98 F | OXYGEN SATURATION: 99 % | BODY MASS INDEX: 34.73 KG/M2 | DIASTOLIC BLOOD PRESSURE: 76 MMHG | WEIGHT: 221.31 LBS | SYSTOLIC BLOOD PRESSURE: 112 MMHG | HEART RATE: 67 BPM | HEIGHT: 67 IN

## 2025-08-04 DIAGNOSIS — E11.59 HYPERTENSION ASSOCIATED WITH DIABETES: ICD-10-CM

## 2025-08-04 DIAGNOSIS — M89.8X1 PAIN OF LEFT CLAVICLE: ICD-10-CM

## 2025-08-04 DIAGNOSIS — I15.2 HYPERTENSION ASSOCIATED WITH DIABETES: ICD-10-CM

## 2025-08-04 DIAGNOSIS — M89.8X1 PAIN OF LEFT CLAVICLE: Primary | ICD-10-CM

## 2025-08-04 PROCEDURE — 3074F SYST BP LT 130 MM HG: CPT | Mod: CPTII,S$GLB,,

## 2025-08-04 PROCEDURE — 3008F BODY MASS INDEX DOCD: CPT | Mod: CPTII,S$GLB,,

## 2025-08-04 PROCEDURE — 3078F DIAST BP <80 MM HG: CPT | Mod: CPTII,S$GLB,,

## 2025-08-04 PROCEDURE — 73000 X-RAY EXAM OF COLLAR BONE: CPT | Mod: 26,LT,, | Performed by: RADIOLOGY

## 2025-08-04 PROCEDURE — 99999 PR PBB SHADOW E&M-EST. PATIENT-LVL III: CPT | Mod: PBBFAC,,,

## 2025-08-04 PROCEDURE — 4010F ACE/ARB THERAPY RXD/TAKEN: CPT | Mod: CPTII,S$GLB,,

## 2025-08-04 PROCEDURE — 3044F HG A1C LEVEL LT 7.0%: CPT | Mod: CPTII,S$GLB,,

## 2025-08-04 PROCEDURE — 99214 OFFICE O/P EST MOD 30 MIN: CPT | Mod: S$GLB,,,

## 2025-08-04 PROCEDURE — 1100F PTFALLS ASSESS-DOCD GE2>/YR: CPT | Mod: CPTII,S$GLB,,

## 2025-08-04 PROCEDURE — G2211 COMPLEX E/M VISIT ADD ON: HCPCS | Mod: S$GLB,,,

## 2025-08-04 PROCEDURE — 3288F FALL RISK ASSESSMENT DOCD: CPT | Mod: CPTII,S$GLB,,

## 2025-08-04 PROCEDURE — 1125F AMNT PAIN NOTED PAIN PRSNT: CPT | Mod: CPTII,S$GLB,,

## 2025-08-04 PROCEDURE — 73000 X-RAY EXAM OF COLLAR BONE: CPT | Mod: TC,PO,LT

## 2025-08-07 NOTE — PROGRESS NOTES
Patient ID: Friday ROGELIO Blake is a 69 y.o. male.    Chief Complaint: Fall (1 week ago. Missed bottom step of step ladder. Pain in collarbone. )    History of Present Illness    CHIEF COMPLAINT:  - Mr. Blake presents with concerns about a fall that occurred a week ago, resulting in ongoing pain and headaches.    HPI:  Mr. Blake reports a fall approximately 1 week ago. Since the incident, he has had persistent pain in an unspecified location and slight headaches nightly. He indicates a specific area where the headaches begin. He states that such incidents usually do not bother him, but he is taking precautions due to advancing age. For pain management, he took Aleve this morning. He also mentions having regular medications.      ROS:  General: -fever, -chills, -fatigue, -weight gain, -weight loss, +malaise  Eyes: -vision changes, -redness, -discharge  ENT: -ear pain, -nasal congestion, -sore throat  Cardiovascular: -chest pain, -palpitations, -lower extremity edema  Respiratory: -cough, -shortness of breath  Gastrointestinal: -abdominal pain, -nausea, -vomiting, -diarrhea, -constipation, -blood in stool  Genitourinary: -dysuria, -hematuria, -frequency  Musculoskeletal: -joint pain, -muscle pain, +limb pain  Skin: -rash, -lesion  Neurological: +headache, -dizziness, -numbness, -tingling  Psychiatric: -anxiety, -depression, -sleep difficulty         Pmh, Psh, Family Hx, Social Hx updated in Epic Tabs today.         2/5/2025     7:09 AM 3/13/2024     7:12 AM 5/24/2023     8:25 AM 3/15/2023     7:18 AM 3/15/2023     7:10 AM 1/4/2022     7:52 AM   Depression Patient Health Questionnaire   Over the last two weeks how often have you been bothered by little interest or pleasure in doing things Not at all Not at all Not at all Not at all  Not at all  Not at all    Over the last two weeks how often have you been bothered by feeling down, depressed or hopeless Not at all Not at all Not at all Not at all Not at all Not at all     PHQ-2 Total Score 0 0 0 0 0 0       Data saved with a previous flowsheet row definition       Active Problem List with Overview Notes    Diagnosis Date Noted    Obesity (BMI 30-39.9) 02/05/2025    Hyperparathyroidism 03/13/2024    Sinus bradycardia 10/10/2023    LVH (left ventricular hypertrophy) 10/10/2023    LAE (left atrial enlargement) 10/10/2023     Dr Wihte following      Benign prostatic hyperplasia without lower urinary tract symptoms 03/15/2023    Lumbar discitis with Osteo L2-3 08/01/2022    Degenerative disc disease, lumbar 06/22/2022    Lumbar radiculopathy     Decreased strength of trunk and back 07/13/2021    Anemia 03/23/2021    Type 2 diabetes mellitus without complication, without long-term current use of insulin     Hypertension associated with diabetes     Hyperlipidemia associated with type 2 diabetes mellitus     Severe obesity (BMI 35.0-39.9) with comorbidity     History of colon polyps     Vitamin D deficiency        Past Medical History:   Diagnosis Date    Anemia 03/23/2021    Bilateral carpal tunnel syndrome 02/25/2022    moderate demyelinating bilaterally    History of colon polyps     Hyperlipidemia associated with type 2 diabetes mellitus     Hypertension associated with diabetes     Low back pain 09/28/2021    Lumbar radiculopathy     Morbid obesity with BMI of 40.0-44.9, adult     Type 2 diabetes mellitus without complication, without long-term current use of insulin     Vitamin D deficiency        Past Surgical History:   Procedure Laterality Date    CARPAL TUNNEL RELEASE Left 4/1/2021    Procedure: RELEASE, CARPAL TUNNEL;  Surgeon: Yoandy David MD;  Location: [facility];  Service: Orthopedics;  Laterality: Left;    COLONOSCOPY N/A 12/14/2022    Procedure: COLONOSCOPY;  Surgeon: Belia Gómez DO;  Location: [facility];  Service: Endoscopy;  Laterality: N/A;    HERNIA REPAIR      INCISION AND DRAINAGE OF HEMATOMA N/A 6/28/2022    Procedure: INCISION AND DRAINAGE,  HEMATOMA;  Surgeon: Shaggy Zepeda MD;  Location: [facility] OR;  Service: Neurosurgery;  Laterality: N/A;    LUMBAR LAMINECTOMY WITH DISCECTOMY Left 6/22/2022    Procedure: LAMINECTOMY, SPINE, LUMBAR, WITH DISCECTOMY;  Surgeon: Shaggy Zepeda MD;  Location: [facility] OR;  Service: Neurosurgery;  Laterality: Left;  minimally invasive L2-3 discectomy and decompression     LUMBAR LAMINECTOMY WITH DISCECTOMY N/A 6/28/2022    Procedure: LAMINECTOMY, SPINE, LUMBAR, WITH DISCECTOMY;  Surgeon: Shaggy Zepeda MD;  Location: [facility] OR;  Service: Neurosurgery;  Laterality: N/A;    PARATHYROIDECTOMY Bilateral 7/27/2021    Procedure: PARATHYROIDECTOMY;  Surgeon: Tha Dial MD;  Location: [facility] OR;  Service: ENT;  Laterality: Bilateral;  with medialstinal exploration    REPAIR OF CEREBROSPINAL FLUID LEAK USING COMPUTER ASSISTED NAVIGATION Bilateral 8/3/2022    Procedure: REPAIR, CSF LEAK, USING COMPUTER-ASSISTED NAVIGATION;  Surgeon: Shaggy Zepeda MD;  Location: [facility] OR;  Service: Neurosurgery;  Laterality: Bilateral;    SELECTIVE INJECTION OF ANESTHETIC AGENT AROUND LUMBAR SPINAL NERVE ROOT BY TRANSFORAMINAL APPROACH Left 3/23/2022    Procedure: BLOCK, SPINAL NERVE ROOT, LUMBAR, SELECTIVE, TRANSFORAMINAL APPROACH Left L2-3, L3-4 RN IV sedation;  Surgeon: Jay Hodges MD;  Location: [facility];  Service: Pain Management;  Laterality: Left;    STERNOTOMY N/A 7/27/2021    Procedure: STERNOTOMY;  Surgeon: Tha Dial MD;  Location: [facility] OR;  Service: ENT;  Laterality: N/A;    ULNAR NERVE TRANSPOSITION Left 4/1/2021    Procedure: TRANSPOSITION, NERVE, ULNAR;  Surgeon: Yoandy David MD;  Location: [facility] OR;  Service: Orthopedics;  Laterality: Left;    ULNAR TUNNEL RELEASE Left 4/1/2021    Procedure: RELEASE, CUBITAL TUNNEL;  Surgeon: Yoandy David MD;  Location: [facility] OR;  Service: Orthopedics;  Laterality: Left;    UMBILICAL HERNIA REPAIR      WOUND EXPLORATION Bilateral 8/3/2022    Procedure: EXPLORATION, WOUND;   Surgeon: Shaggy Zepeda MD;  Location: [facility];  Service: Neurosurgery;  Laterality: Bilateral;       Family History   Problem Relation Name Age of Onset    Asthma Mother      Hypertension Father      Diabetes Mellitus Father      Prostate cancer Brother         Social History     Socioeconomic History    Marital status:    Tobacco Use    Smoking status: Never    Smokeless tobacco: Never   Substance and Sexual Activity    Alcohol use: Never    Drug use: Never    Sexual activity: Yes     Partners: Female     Social Drivers of Health     Financial Resource Strain: Low Risk  (2/5/2025)    Overall Financial Resource Strain (CARDIA)     Difficulty of Paying Living Expenses: Not hard at all   Food Insecurity: No Food Insecurity (2/5/2025)    Hunger Vital Sign     Worried About Running Out of Food in the Last Year: Never true     Ran Out of Food in the Last Year: Never true   Transportation Needs: No Transportation Needs (2/5/2025)    PRAPARE - Transportation     Lack of Transportation (Medical): No     Lack of Transportation (Non-Medical): No   Physical Activity: Sufficiently Active (2/5/2025)    Exercise Vital Sign     Days of Exercise per Week: 5 days     Minutes of Exercise per Session: 40 min   Stress: No Stress Concern Present (2/5/2025)    [employer] - Occupational Stress Questionnaire     Feeling of Stress : Not at all   Housing Stability: Unknown (2/5/2025)    Housing Stability Vital Sign     Unable to Pay for Housing in the Last Year: No     Homeless in the Last Year: No       Medications Ordered Prior to Encounter[1]    Review of patient's allergies indicates:  No Known Allergies    General - Well developed, alert and oriented in NAD  HEENT - normocephalic, no evidence of trauma, sclera white, EOMI  Neck - full range of motion  COR - regular rate and rhythm without murmurs or gallops  Lungs - Clear  Abdomen - soft, non-tender  Ext - no cyanosis     Assessment:     1. Pain  of left clavicle    2. Hypertension associated with diabetes        Pertinent Labs:    Chemistry        Component Value Date/Time     06/16/2025 1107     12/04/2024 0743    K 4.7 06/16/2025 1107    K 5.1 12/04/2024 0743     06/16/2025 1107     12/04/2024 0743    CO2 27 06/16/2025 1107    CO2 26 12/04/2024 0743    BUN 12 06/16/2025 1107    CREATININE 1.0 06/16/2025 1107    GLU 96 06/16/2025 1107    GLU 96 12/04/2024 0743        Component Value Date/Time    CALCIUM 9.7 06/16/2025 1107    CALCIUM 9.7 12/04/2024 0743    ALKPHOS 65 06/16/2025 1107    ALKPHOS 62 12/04/2024 0743    AST 23 06/16/2025 1107    AST 31 12/04/2024 0743    ALT 22 06/16/2025 1107    ALT 32 12/04/2024 0743    BILITOT 0.4 06/16/2025 1107    BILITOT 0.6 12/04/2024 0743    ESTGFRAFRICA >60 07/31/2022 0607    EGFRNONAA >60 07/31/2022 0607          Lab Results   Component Value Date    WBC 6.16 06/16/2025    HGB 13.5 (L) 06/16/2025    HCT 44.8 06/16/2025    MCV 92 06/16/2025    MCH 27.8 06/16/2025    MCHC 30.1 (L) 06/16/2025    RDW 14.5 06/16/2025     06/16/2025    MPV 12.9 06/16/2025    NEUTROABS 2.2 11/23/2020    LYMPHOPCT 48 11/23/2020    PLTEST Decreased (A) 09/10/2022       Lab Results   Component Value Date    HGBA1C 6.3 (H) 06/16/2025    HGBA1C 6.4 (H) 12/04/2024    HGBA1C 6.7 (H) 08/20/2024    GLU 96 06/16/2025     Lab Results   Component Value Date    LDLCALC 60.6 (L) 06/16/2025     Lab Results   Component Value Date    TSH 1.720 12/04/2024     Lab Results   Component Value Date    CHOL 130 06/16/2025    CHOL 119 (L) 12/04/2024    CHOL 114 (L) 05/20/2024     Lab Results   Component Value Date    TRIG 167 (H) 06/16/2025    TRIG 82 12/04/2024    TRIG 112 05/20/2024     Lab Results   Component Value Date    HDL 36 (L) 06/16/2025    HDL 34 (L) 12/04/2024    HDL 31 (L) 05/20/2024     Lab Results   Component Value Date    LDLCALC 60.6 (L) 06/16/2025    LDLCALC 68.6 12/04/2024    LDLCALC 60.6 (L) 05/20/2024     No  "results found for: "NONHDLC"  Lab Results   Component Value Date    CHOLHDL 27.7 06/16/2025    CHOLHDL 28.6 12/04/2024    CHOLHDL 27.2 05/20/2024       The 10-year ASCVD risk score (Berta DELVALLE, et al., 2019) is: 25.3%    Values used to calculate the score:      Age: 69 years      Sex: Male      Is Non- : Yes      Diabetic: Yes      Tobacco smoker: No      Systolic Blood Pressure: 112 mmHg      Is BP treated: Yes      HDL Cholesterol: 36 mg/dL      Total Cholesterol: 130 mg/dL    Plan:     Assessment & Plan    Assessed fall from a week ago, focusing on potential head injury.  Considered and ordered XR to evaluate for potential fractures related to recent fall, though clinical exam suggests unlikely.  Noted complaint of nightly headaches since the fall.    FALL:  - Mr. Blake reports falling a week ago with resulting pain.  - Upon evaluation, no fractures suspected, likely just swelling.  - Ordered x-ray to rule out potential fractures.  - Mr. Blake instructed to wait for nurse for further x-ray instructions.  - Discussed fall impact and precautionary measures.    BACK PAIN:  - Mr. Blake reports ongoing back pain and discomfort.  - No cracking or severe pain noted on evaluation, likely just swelling.  - Mr. Blake has been taking Aleve for pain management.    HEADACHE:  - Mr. Blake reports experiencing mild headaches that occur regularly nightly.  - Discussed headache symptoms and their pattern.         1. Pain of left clavicle  - X-Ray Clavicle Left; Future    2. Hypertension associated with diabetes    Hypertension: Irbesartan 300 mg daily.    DM2:  Metformin 500 mg twice daily, tirzepatide 7.5 mg weekly.  Hyperlipidemia: Atorvastatin 20 mg daily.    Immunization History   Administered Date(s) Administered    COVID-19, MRNA, LN-S, PF (Pfizer) (Gray Cap) 07/13/2022    COVID-19, MRNA, LN-S, PF (Pfizer) (Purple Cap) 03/04/2021, 03/25/2021, 11/05/2021, 07/13/2022    Hepatitis A, Adult " 02/26/2020    Hepatitis B, Adult 02/26/2020    Influenza (FLUAD) - Quadrivalent - Adjuvanted - PF *Preferred* (65+) 11/05/2021, 11/23/2022    Influenza (FLUBLOK) - Quadrivalent - Recombinant - PF *Preferred* (egg allergy) 10/18/2020    Influenza - Quadrivalent - High Dose - PF (65 years and older) 09/04/2023    Influenza - Quadrivalent - PF *Preferred* (6 months and older) 11/17/2015, 11/06/2018, 12/10/2019    Influenza - Trivalent - Fluarix, Flulaval, Fluzone, Afluria - PF 12/09/2014    Influenza - Trivalent - Fluzone High Dose - PF (65 years and older) 11/24/2024    Influenza Split 10/05/2016    Meningococcal Conjugate (MCV4P) 02/26/2020    Pneumococcal Conjugate - 13 Valent 11/06/2018, 01/04/2021    Pneumococcal Conjugate - 20 Valent 11/24/2024    Pneumococcal Polysaccharide - 23 Valent 11/17/2015, 01/29/2019    RSVpreF (Arexvy) 09/04/2023    Tdap 11/17/2015    Zoster Recombinant 09/12/2018, 11/06/2018       Orders Placed This Encounter   Procedures    X-Ray Clavicle Left       Portions of this note were generated by [employer].    Each patient to whom medical services by telemedicine are provided:  (1) informed of the relationship between the physician and patient and the respective role of any other health care provider with respect to management of the patient; and (2) notified that he or she may decline to receive medical services by telemedicine and may withdraw from such care at any time.    I spent a total of 32 minutes face to face and non-face to face on the date of this visit.This includes time preparing to see the patient (eg, review of tests, notes), obtaining and/or reviewing additional history from an independent historian and/or outside medical records, documenting clinical information in the electronic health record, independently interpreting results and/or communicating results to the patient/family/caregiver, or care coordinator.  Visit today included increased complexity associated with the  care of the episodic problem addressed and managing the longitudinal care of the patient due to the serious and/or complex managed problem(s).      This note was generated with the assistance of ambient listening technology. Verbal consent was obtained by the patient and accompanying visitor(s) for the recording of patient appointment to facilitate this note. I attest to having reviewed and edited the generated note for accuracy, though some syntax or spelling errors may persist. Please contact the author of this note for any clarification.      Belia Valentine MD         [1]   Current Outpatient Medications on File Prior to Visit   Medication Sig Dispense Refill    aspirin (ECOTRIN) 81 MG EC tablet Take 1 tablet (81 mg total) by mouth once daily.      atorvastatin (LIPITOR) 20 MG tablet Take 1 tablet by mouth once daily 90 tablet 5    blood-glucose meter kit To check BG 1 times daily, to use with insurance preferred meter 1 each 0    irbesartan (AVAPRO) 300 MG tablet Take 1 tablet (300 mg total) by mouth every evening. 90 tablet 3    lancets Misc To check BG 1 times daily, to use with insurance preferred meter 100 each 3    metFORMIN (GLUCOPHAGE) 500 MG tablet TAKE 1 TABLET BY MOUTH TWICE DAILY WITH MEALS 180 tablet 3    ONETOUCH VERIO TEST STRIPS Strp USE 1 STRIP TO CHECK GLUCOSE ONCE DAILY 100 each 3    tirzepatide 7.5 mg/0.5 mL PnIj Inject 7.5 mg into the skin every 7 days. 12 Pen 3     No current facility-administered medications on file prior to visit.

## (undated) DEVICE — APPLIER LIGACLIP SM 9.38IN

## (undated) DEVICE — SUPPORT ULNA NERVE PROTECTOR

## (undated) DEVICE — APPLICATOR CHLORAPREP ORN 26ML

## (undated) DEVICE — SEE MEDLINE ITEM 146292

## (undated) DEVICE — SPONGE IV DRAIN 4X4 STERILE

## (undated) DEVICE — Device

## (undated) DEVICE — SYR ONLY LUER LOCK 20CC

## (undated) DEVICE — ADHESIVE MASTISOL VIAL 48/BX

## (undated) DEVICE — CONTAINER SPECIMEN OR STER 4OZ

## (undated) DEVICE — CHLORAPREP 10.5 ML APPLICATOR

## (undated) DEVICE — DURAPREP SURG SCRUB 26ML

## (undated) DEVICE — GAUZE SPONGE 4X4 12PLY

## (undated) DEVICE — GOWN B1 X-LG X-LONG

## (undated) DEVICE — SEE MEDLINE ITEM 157173

## (undated) DEVICE — COVER LIGHT HANDLE 80/CA

## (undated) DEVICE — PAD ABD 8X10 STERILE

## (undated) DEVICE — SUT CTD VICRYL 3-0 PS-2 UND

## (undated) DEVICE — SOL 9P NACL IRR PIC IL

## (undated) DEVICE — BUR LEGEND MIDAS REX 14CM 13MM

## (undated) DEVICE — SEE MEDLINE ITEM 152522

## (undated) DEVICE — SPONGE PATTY SURGICAL .5X3IN

## (undated) DEVICE — NDL SPINAL SPINOCAN 22GX3.5

## (undated) DEVICE — SEE MEDLINE ITEM 157117

## (undated) DEVICE — DRAPE CORETEMP FLD WRM 56X62IN

## (undated) DEVICE — SEE MEDLINE ITEM 157131

## (undated) DEVICE — INSERT CUSHIONPRONE VIEW LARGE

## (undated) DEVICE — UNDERGLOVES BIOGEL PI SIZE 8

## (undated) DEVICE — SEE MEDLINE ITEM 157027

## (undated) DEVICE — SUT VICRYL 3-0 27 SH

## (undated) DEVICE — TUBING MEDI-VAC 20FT .25IN

## (undated) DEVICE — SEALANT ADHERUS AUTOSPRY DURAL

## (undated) DEVICE — NDL SPINAL 20GX3.5 HUB

## (undated) DEVICE — DRAPE INCISE IOBAN 2 23X17IN

## (undated) DEVICE — GLOVE SURG ULTRA TOUCH 8

## (undated) DEVICE — SEE MEDLINE ITEM 152622

## (undated) DEVICE — MANIFOLD 4 PORT

## (undated) DEVICE — STAPLER SKIN PROXIMATE WIDE

## (undated) DEVICE — DRAPE MOBILE C-ARM

## (undated) DEVICE — GLOVE SURGICAL LATEX SZ 7

## (undated) DEVICE — NDL ECLIPSE SAFETY 18GX1-1/2IN

## (undated) DEVICE — SPONGE SURGIFOAM 100 8.5X12X10

## (undated) DEVICE — DRESSING ANTIMICROBIAL 1 INCH

## (undated) DEVICE — BLADE EZ CLEAN 2.5IN MODIFIED

## (undated) DEVICE — SYS CLSR DERMABOND PRINEO 22CM

## (undated) DEVICE — ELECTRODE REM PLYHSV RETURN 9

## (undated) DEVICE — SKIN MARKER DEVON 160

## (undated) DEVICE — CORD BIPOLAR 12 FOOT

## (undated) DEVICE — SPONGE GAUZE 16PLY 4X4

## (undated) DEVICE — KIT EVACUATOR 3-SPRING 1/8 DRN

## (undated) DEVICE — DRESSING SURGICAL 1/2X1/2

## (undated) DEVICE — BLADE SAW SAG 34X64X.89MM SS

## (undated) DEVICE — SEE MEDLINE ITEM 152739

## (undated) DEVICE — SOL NS 1000CC

## (undated) DEVICE — SUT STEEL 7 MONO B&S18 CCS

## (undated) DEVICE — SEALER AQUAMANTYS 2.3 BIPOLAR

## (undated) DEVICE — SYR 10CC LUER LOCK

## (undated) DEVICE — GLOVE BIOGEL ECLIPSE SZ 8

## (undated) DEVICE — TAPE SILK 1/2IN

## (undated) DEVICE — DRESSING XEROFORM FOIL PK 1X8

## (undated) DEVICE — CATH 16FR URETHRL RED RUB

## (undated) DEVICE — DRAPE LAP T SHT W/ INSTR PAD

## (undated) DEVICE — SUT MONOCRYL 4-0 PS-1 UND

## (undated) DEVICE — HEMOSTAT SURGICEL SNOW ABSORB

## (undated) DEVICE — DRESSING AQUACEL AG ADV 3.5X6

## (undated) DEVICE — SWAB CULTURETTE II DUAL

## (undated) DEVICE — DEV-O-LOOPS MAXI RED

## (undated) DEVICE — CORD BIPOLAR ELECTROSURGICAL

## (undated) DEVICE — SHEARS HARMONIC CRVD 9 CM

## (undated) DEVICE — UNDERGLOVES BIOGEL PI SIZE 8.5

## (undated) DEVICE — SCRUB HIBICLENS 4% CHG 4OZ

## (undated) DEVICE — RETRACTOR RADIALUX LIGHTED

## (undated) DEVICE — UNDERGLOVE BIOGEL PI SZ 6.5 LF

## (undated) DEVICE — TOURNIQUET SB QC SP 24X4IN

## (undated) DEVICE — PADDING CAST 3 X 4YD

## (undated) DEVICE — DRAPE THREE-QTR REINF 53X77IN

## (undated) DEVICE — DRESSING AQUACEL AG 3.5X10IN

## (undated) DEVICE — DECANTER 6 VIAL

## (undated) DEVICE — DRESSING ADH SQUARE 8X8CM

## (undated) DEVICE — SYR B-D DISP CONTROL 10CC100/C

## (undated) DEVICE — GLOVE BIOGEL 7.5

## (undated) DEVICE — KIT SURGIFLO HEMOSTATIC MATRIX

## (undated) DEVICE — DRESSING TRANS 2X2 TEGADERM

## (undated) DEVICE — SUT PROLENE 6-0 BV-1 30IN

## (undated) DEVICE — LIGATING CLIP LARGE

## (undated) DEVICE — TRAY CATH FOL SIL URIMTR 16FR

## (undated) DEVICE — SUT STRATAFIX 1PDS CTX 18IN

## (undated) DEVICE — POSITIONER HEAD DONUT 9IN FOAM

## (undated) DEVICE — DRESSING TELFA STRL 4X3 LF

## (undated) DEVICE — GAUZE SPONGE PEANUT STRL

## (undated) DEVICE — WAX BONE STERILE 2.5G

## (undated) DEVICE — PACK BASIC SETUP SC BR

## (undated) DEVICE — DRAPE SURG W/TWL 17 5/8X23

## (undated) DEVICE — NDL HYPODERMIC BLUNT 18G 1.5IN

## (undated) DEVICE — ALCOHOL 70% ISOP RUBBING 4OZ

## (undated) DEVICE — NDL SAFETY 22G X 1.5 ECLIPSE

## (undated) DEVICE — NDL SAFETY 25G X 1.5 ECLIPSE

## (undated) DEVICE — SEE MEDLINE ITEM 157148

## (undated) DEVICE — ELECTRODE BLADE INSULATED 1 IN

## (undated) DEVICE — DRAPE PLASTIC U 60X72

## (undated) DEVICE — MATRIX FLOSEAL HEMOSTATIC 10ML

## (undated) DEVICE — SUT ETHILON 2-0 PSLX 30IN

## (undated) DEVICE — BLADE SURG #15 CARBON STEEL

## (undated) DEVICE — LOTION DURA PREP REMOVER 40Z

## (undated) DEVICE — DRESSING TRANS 4X4 TEGADERM

## (undated) DEVICE — SPHERE NDI PASSIVE

## (undated) DEVICE — BLADE 2-CUT XLN W 19.5X0.38X41

## (undated) DEVICE — GOWN POLY REINF BRTH SLV XL

## (undated) DEVICE — SPONGE LAP 18X18 PREWASHED

## (undated) DEVICE — SEE MEDLINE ITEM 146231

## (undated) DEVICE — TOWEL OR DISP STRL BLUE 4/PK

## (undated) DEVICE — BANDAGE ELASTIC 3X5 VELCRO ST

## (undated) DEVICE — SUT STRATAFIX SPIRAL MONO

## (undated) DEVICE — SUT SILK 2-0 STRANDS 30IN

## (undated) DEVICE — COVER OVERHEAD SURG LT BLUE

## (undated) DEVICE — DRESSING TRANS 4X10 TEGADERM

## (undated) DEVICE — SUT BONE WAX 2.5 GRMS 12/BX

## (undated) DEVICE — SEE MEDLINE ITEM 157194

## (undated) DEVICE — PATCH SEALANT TACHOSIL 4.8X4.8

## (undated) DEVICE — GAUZE SPONGE 4'X4 12 PLY

## (undated) DEVICE — EVACUATOR WOUND BULB 100CC

## (undated) DEVICE — UNDERGLOVE BIOGEL PI SZ 5.5

## (undated) DEVICE — BLADE SURG CARBON STEEL #10

## (undated) DEVICE — SUT VICRYL PLUS 0 CT1 18IN

## (undated) DEVICE — GLOVE BIOGEL SZ 8 1/2

## (undated) DEVICE — COVER CAMERA OPERATING ROOM

## (undated) DEVICE — SUT 4-0 ETHILON 18 PS-2

## (undated) DEVICE — SLING ARM BUCKLE CLOSURE X-LG

## (undated) DEVICE — PROBE SIMULATOR KRAFF

## (undated) DEVICE — KIT PT CARE FRME POS JACK

## (undated) DEVICE — BLADE STERNUM SYSTEM 6

## (undated) DEVICE — APPLIER CLIP LIAGCLIP 9.375IN

## (undated) DEVICE — TRAY SKIN SCRUB WET PREMIUM

## (undated) DEVICE — PAD CAST SPECIALIST STRL 3

## (undated) DEVICE — GOWN SURG 2XL DISP TIE BACK

## (undated) DEVICE — SHEET THYROID W/ISO-BAC

## (undated) DEVICE — TAPE SILK 3IN

## (undated) DEVICE — PAD CAST SPECIALIST STRL 4

## (undated) DEVICE — DRAPE STERI INSTRUMENT 1018

## (undated) DEVICE — TUBE SPEC COLL&TRANSPORT 11ML

## (undated) DEVICE — UNDERGLOVES BIOGEL PI SIZE 7.5

## (undated) DEVICE — DRAPE C-ARMOR EQUIPMENT COVER

## (undated) DEVICE — SUT VICRYL CTD 2-0 GI 27 SH

## (undated) DEVICE — SUT VICRYL+ 2-0 SH 18IN

## (undated) DEVICE — SUT VICRYL 1 OB 36 CTX

## (undated) DEVICE — SEE MEDLINE ITEM 146308

## (undated) DEVICE — PADDING CAST 4IN SPECIALIST